# Patient Record
Sex: FEMALE | Race: WHITE | Employment: OTHER | URBAN - METROPOLITAN AREA
[De-identification: names, ages, dates, MRNs, and addresses within clinical notes are randomized per-mention and may not be internally consistent; named-entity substitution may affect disease eponyms.]

---

## 2017-02-24 ENCOUNTER — TELEPHONE (OUTPATIENT)
Dept: INTERNAL MEDICINE CLINIC | Age: 72
End: 2017-02-24

## 2017-02-24 NOTE — TELEPHONE ENCOUNTER
PSR called pt to f/u about pts insurance referral request to Dr. Maurice Painter for her appt on 3/2/17. PSR informed pt that April has pended the referral request, and we are awaiting April's response for the authorization. Pt verbalized understanding. Pt is also asking for a call from the nurse. Pt wants to know if she has had all of her pneumonia shots, and wants to know if she has been given the PR13 shot? Pt states she can best be reached on her cell, [phone].

## 2017-02-27 NOTE — TELEPHONE ENCOUNTER
I called and spoke with patient. Pt was informed that according to our records, it does not look like she has had her Prevnar 13. She verbalized understanding.

## 2017-02-28 ENCOUNTER — HOSPITAL ENCOUNTER (OUTPATIENT)
Dept: MAMMOGRAPHY | Age: 72
Discharge: HOME | End: 2017-02-28
Attending: SPECIALIST
Payer: MEDICARE

## 2017-02-28 DIAGNOSIS — Z12.31 ENCOUNTER FOR MAMMOGRAM TO ESTABLISH BASELINE MAMMOGRAM: ICD-10-CM

## 2017-02-28 PROCEDURE — 77067 SCR MAMMO BI INCL CAD: CPT

## 2017-03-06 RX ORDER — POTASSIUM CHLORIDE 750 MG/1
CAPSULE, EXTENDED RELEASE ORAL
Qty: 180 CAP | Refills: 3 | Status: SHIPPED | OUTPATIENT
Start: 2017-03-06 | End: 2017-04-11 | Stop reason: SDUPTHER

## 2017-03-06 RX ORDER — FUROSEMIDE 80 MG/1
TABLET ORAL
Qty: 90 TAB | Refills: 3 | Status: SHIPPED | OUTPATIENT
Start: 2017-03-06 | End: 2017-04-11 | Stop reason: SDUPTHER

## 2017-03-06 RX ORDER — AMLODIPINE BESYLATE 10 MG/1
TABLET ORAL
Qty: 90 TAB | Refills: 3 | Status: SHIPPED | OUTPATIENT
Start: 2017-03-06 | End: 2017-04-11 | Stop reason: SDUPTHER

## 2017-03-06 RX ORDER — OMEGA-3-ACID ETHYL ESTERS 1 G/1
CAPSULE, LIQUID FILLED ORAL
Qty: 360 CAP | Refills: 3 | Status: SHIPPED | OUTPATIENT
Start: 2017-03-06 | End: 2017-04-11 | Stop reason: SDUPTHER

## 2017-03-06 RX ORDER — EZETIMIBE 10 MG
TABLET ORAL
Qty: 90 TAB | Refills: 3 | Status: SHIPPED | OUTPATIENT
Start: 2017-03-06 | End: 2017-04-11 | Stop reason: SDUPTHER

## 2017-04-10 RX ORDER — POLYETHYLENE GLYCOL 3350 17 G/17G
POWDER, FOR SOLUTION ORAL
Qty: 1581 G | Refills: 3 | Status: SHIPPED | OUTPATIENT
Start: 2017-04-10 | End: 2018-09-14 | Stop reason: SDUPTHER

## 2017-04-11 ENCOUNTER — OFFICE VISIT (OUTPATIENT)
Dept: INTERNAL MEDICINE CLINIC | Age: 72
End: 2017-04-11

## 2017-04-11 ENCOUNTER — DOCUMENTATION ONLY (OUTPATIENT)
Dept: INTERNAL MEDICINE CLINIC | Age: 72
End: 2017-04-11

## 2017-04-11 VITALS
TEMPERATURE: 98 F | OXYGEN SATURATION: 97 % | HEART RATE: 69 BPM | WEIGHT: 219 LBS | DIASTOLIC BLOOD PRESSURE: 70 MMHG | SYSTOLIC BLOOD PRESSURE: 129 MMHG | HEIGHT: 63 IN | RESPIRATION RATE: 16 BRPM | BODY MASS INDEX: 38.8 KG/M2

## 2017-04-11 DIAGNOSIS — Z00.00 ROUTINE GENERAL MEDICAL EXAMINATION AT A HEALTH CARE FACILITY: ICD-10-CM

## 2017-04-11 DIAGNOSIS — Z13.31 SCREENING FOR DEPRESSION: ICD-10-CM

## 2017-04-11 DIAGNOSIS — Z13.39 SCREENING FOR ALCOHOLISM: ICD-10-CM

## 2017-04-11 DIAGNOSIS — R09.81 NASAL SINUS CONGESTION: Primary | ICD-10-CM

## 2017-04-11 DIAGNOSIS — R04.0 MILD EPISTAXIS: ICD-10-CM

## 2017-04-11 RX ORDER — RANITIDINE 300 MG/1
TABLET ORAL
Qty: 30 TAB | Refills: 11 | Status: SHIPPED | OUTPATIENT
Start: 2017-04-11 | End: 2018-04-13 | Stop reason: SDUPTHER

## 2017-04-11 RX ORDER — OMEGA-3-ACID ETHYL ESTERS 1 G/1
CAPSULE, LIQUID FILLED ORAL
Qty: 360 CAP | Refills: 3 | Status: SHIPPED | OUTPATIENT
Start: 2017-04-11 | End: 2018-01-17 | Stop reason: SDUPTHER

## 2017-04-11 RX ORDER — FUROSEMIDE 80 MG/1
TABLET ORAL
Qty: 90 TAB | Refills: 3 | Status: SHIPPED | OUTPATIENT
Start: 2017-04-11 | End: 2018-01-16 | Stop reason: SDUPTHER

## 2017-04-11 RX ORDER — FLUTICASONE PROPIONATE 50 MCG
2 SPRAY, SUSPENSION (ML) NASAL DAILY
Qty: 3 BOTTLE | Refills: 3 | Status: SHIPPED | OUTPATIENT
Start: 2017-04-11 | End: 2018-03-13 | Stop reason: SDUPTHER

## 2017-04-11 RX ORDER — COLCHICINE 0.6 MG/1
TABLET ORAL
Qty: 90 TAB | Refills: 3 | Status: SHIPPED | OUTPATIENT
Start: 2017-04-11 | End: 2017-11-27

## 2017-04-11 RX ORDER — LORATADINE 10 MG/1
10 TABLET ORAL DAILY
Qty: 30 TAB | Refills: 5 | Status: SHIPPED | OUTPATIENT
Start: 2017-04-11 | End: 2018-05-09 | Stop reason: SDUPTHER

## 2017-04-11 RX ORDER — LANCETS 28 GAUGE
EACH MISCELLANEOUS
Refills: 3 | COMMUNITY
Start: 2017-02-08 | End: 2020-03-13 | Stop reason: SDUPTHER

## 2017-04-11 RX ORDER — NEOMYCIN SULFATE, POLYMYXIN B SULFATE AND DEXAMETHASONE 3.5; 10000; 1 MG/ML; [USP'U]/ML; MG/ML
SUSPENSION/ DROPS OPHTHALMIC
Refills: 0 | COMMUNITY
Start: 2017-04-07 | End: 2018-07-10

## 2017-04-11 NOTE — MR AVS SNAPSHOT
Visit Information Date & Time Provider Department Dept. Phone Encounter #  
 4/11/2017 12:00 PM Heidi Joaquin, [address] [phone] [id number] Your Appointments 4/13/2017 10:30 AM  
ROUTINE CARE with MD Benny [facility] Appt Note: 4m f.u  
 [address] [facility] [address] [place] [address]  
[address] [facility]  
  
    
 5/15/2017 11:00 AM  
ROUTINE CARE with Emeterio Almanza, [address] [facility]) Appt Note: 6 month follow up  
 [address] [facility] [address] [id number]  
[address] [place] [address] [address] [place] [address] Upcoming Health Maintenance Date Due Hepatitis C Screening 1945 DTaP/Tdap/Td series (1 - Tdap) 4/1/1966 MEDICARE YEARLY EXAM 7/26/2015 Pneumococcal 65+ Low/Medium Risk (2 of 2 - PPSV23) 10/5/2015 GLAUCOMA SCREENING Q2Y 1/28/2016 BREAST CANCER SCRN MAMMOGRAM 2/28/2019 COLONOSCOPY 2/11/2020 Allergies as of 4/11/2017  Review Complete On: 4/11/2017 By: Kalani Mckinley III, DO Severity Noted Reaction Type Reactions Celebrex [Celecoxib]  10/05/2009    Hives Pcn [Penicillins]  10/05/2009    Unknown (comments) Can't remember Sulfa (Sulfonamide Antibiotics)  10/05/2009    Hives Current Immunizations  Reviewed on 1/12/2015 Name Date Influenza Vaccine 10/1/2013 Pneumococcal Vaccine (Unspecified Type) 10/5/2010 Not reviewed this visit You Were Diagnosed With   
  
 Codes Comments Nasal sinus congestion    -  Primary ICD-10-CM: R09.81 ICD-9-CM: 478.19 Routine general medical examination at a health care facility     ICD-10-CM: Z00.00 ICD-9-CM: V70.0 Screening for alcoholism     ICD-10-CM: Z13.89 ICD-9-CM: V79.1  Screening for depression     ICD-10-CM: Z13.89 
 ICD-9-CM: V79.0 Mild epistaxis     ICD-10-CM: R04.0 ICD-9-CM: 772. 7 Vitals BP Pulse Temp Resp Height(growth percentile) Weight(growth percentile) 129/70 (BP 1 Location: Right arm, BP Patient Position: Sitting) 69 98 °F (36.7 °C) (Oral) 16 5' 3\" (1.6 m) 219 lb (99.3 kg) SpO2 BMI OB Status Smoking Status 97% 38.79 kg/m2 Hysterectomy Never Smoker Vitals History BMI and BSA Data Body Mass Index Body Surface Area 38.79 kg/m 2 2.1 m 2 Preferred Pharmacy Pharmacy Name Phone [facility]. [phone] Your Updated Medication List  
  
   
This list is accurate as of: 4/11/17 12:08 PM.  Always use your most recent med list. amLODIPine 10 mg tablet Commonly known as:  Norah Arnt Take 1 Tab by mouth daily. aspirin 81 mg chewable tablet Take 81 mg by mouth daily. atorvastatin 20 mg tablet Commonly known as:  LIPITOR  
TAKE 1 TABLET DAILY CLARITIN 10 mg tablet Generic drug:  loratadine Take 10 mg by mouth daily. colchicine 0.6 mg tablet TAKE 1 TABLET EVERY DAY  
  
 ezetimibe 10 mg tablet Commonly known as:  Jesi Eaglee TAKE 1 TABLET EVERY DAY  
  
 fluocinoNIDE 0.05 % topical cream  
Commonly known as:  LIDEX Apply  to affected area two (2) times a day. fluticasone 50 mcg/actuation nasal spray Commonly known as:  Erica Rad 2 Sprays by Both Nostrils route daily. Administer to right and left nostril. furosemide 80 mg tablet Commonly known as:  LASIX TAKE 1 TABLET EVERY DAY  
  
 glucose blood VI test strips strip Commonly known as:  ACCU-CHEK MILA PLUS TEST STRP Check blood sugar one to two times daily  
  
 indomethacin 25 mg capsule Commonly known as:  INDOCIN  
TAKE 1 CAPSULE BY MOUTH THREE TIMES A DAY AS NEEDED FOR PAIN. * Lancets Misc Commonly known as:  ACCU-CHEK SOFTCLIX LANCETS  
 Check blood sugar one to two times daily * FREESTYLE LANCETS 28 gauge Misc Generic drug:  lancets  
  
 levothyroxine 175 mcg tablet Commonly known as:  SYNTHROID  
TAKE ONE DAILY BY MOUTH. TAKE AN EXTRA 1/2 PILL ON SUNDAYS. (7.5 TABLETS/WEEK  MCG) lidocaine 5 % Commonly known as:  Natividad Hammer 1 patch by TransDERmal route every twenty-four (24) hours. Apply patch to the affected area for 12 hours a day and remove for 12 hours a day. losartan 25 mg tablet Commonly known as:  COZAAR Take 1 Tab by mouth daily. neomycin-polymyxin-dexamethasone 3.5mg/mL-10,000 unit/mL-0.1 % ophthalmic suspension Commonly known as:  DEXACIDIN, MAXITROL  
instill 1 drop into right eye four times a day  
  
 omega-3 acid ethyl esters 1 gram capsule Commonly known as:  Delmy Exon TAKE 2 CAPSULES TWICE DAILY WITH MEALS. ONE-A-DAY 50 PLUS PO Take  by mouth. OTHER Accu-Chek Lady Plus Kit  Check blood sugar one to two times daily [address] Take  by mouth.  
  
 polyethylene glycol 17 gram/dose powder Commonly known as:  [employer] MIX 1 CAPFUL (17 GM) IN LIQUID AND DRINK DAILY. potassium chloride SA 10 mEq capsule Commonly known as:  MICRO-K  
TAKE 1 CAPSULE TWICE DAILY  
  
 raNITIdine 300 mg tablet Commonly known as:  ZANTAC TAKE 1 TABLET BY MOUTH EVERY DAY. scopolamine 1.5 mg (1 mg over 3 days) Pt3d Commonly known as:  TRANSDERM-SCOP  
1 Patch by TransDERmal route every seventy-two (72) hours. traMADol 50 mg tablet Commonly known as:  ULTRAM  
Take 1 tablet by mouth every six (6) hours as needed for Pain. VITAMIN D3 2,000 unit Tab Generic drug:  cholecalciferol (vitamin D3) Take  by mouth.  
  
 vitamin E 400 unit capsule Commonly known as:  [address] Take 800 Units by mouth daily. * Notice:   This list has 2 medication(s) that are the same as other medications prescribed for you. Read the directions carefully, and ask your doctor or other care provider to review them with you. Prescriptions Sent to Pharmacy Refills  
 fluticasone (FLONASE) 50 mcg/actuation nasal spray 3 Si Sprays by Both Nostrils route daily. Administer to right and left nostril. Class: Normal  
 Pharmacy: [address]  #: [phone] Route: Both Nostrils We Performed the Following [place] [id number] [[id number] [facility] [place]] Patient Instructions Health Maintenance Due Topic Date Due  
 Hepatitis C Screening  1945  
 DTaP/Tdap/Td series (1 - Tdap) 1966  MEDICARE YEARLY EXAM  2015  Pneumococcal 65+ Low/Medium Risk (2 of 2 - PPSV23) 10/05/2015  GLAUCOMA SCREENING Q2Y  2016 Medicare Part B Preventive Services Limitations Recommendation/Date completed if known Scheduled/ Next Due Bone Mass Measurement 
(age 72 & older, biennial) Requires diagnosis related to osteoporosis or estrogen deficiency. Biennial benefit unless patient has history of long-term glucocorticoid tx or baseline is needed because initial test was by other method Completed:  Recommended every 2 years DUE: Recommended every 2 years Cardiovascular Screening Blood Tests (every 5 years) Total cholesterol, HDL, Triglycerides Order as a panel if possible Completed:  Recommended annually DUE: Every 6-12 months as recommended by your provider. Colorectal Cancer Screening 
-Fecal occult blood test (annual) -Flexible sigmoidoscopy (5y) 
-Screening colonoscopy (10y) -Barium Enema  Completed: 2/15 Recommended every (5) years DUE: 2/20 Counseling to Prevent Tobacco Use (up to 8 sessions per year) - Counseling greater than 3 and up to 10 minutes - Counseling greater than 10 minutes Patients must be asymptomatic of tobacco-related conditions to receive as preventive service Diabetes Screening Tests (at least every 3 years, Medicare covers annually or at 6-month intervals for prediabetic patients) Fasting blood sugar (FBS) or glucose tolerance test (GTT) Patient must be diagnosed with one of the following: 
-Hypertension, Dyslipidemia, obesity, previous impaired FBS or GTT 
Or any two of the following: overweight, FH of diabetes, age ? 72, history of gestational diabetes, birth of baby weighing more than 9 pounds Recommended annually DUE:   
Diabetes Self-Management Training (DSMT) (no USPSTF recommendation) Requires referral by treating physician for patient with diabetes or renal disease. 10 hours of initial DSMT session of no less than 30 minutes each in a continuous 12-month period. 2 hours of follow-up DSMT in subsequent years. Glaucoma Screening (no USPSTF recommendation) Diabetes mellitus, family history, , age 48 or over,  American, age 72 or over Completed: 1/14 Recommended annually DUE: Now Human Immunodeficiency Virus (HIV) Screening (annually for increased risk patients) HIV-1 and HIV-2 by EIA, EARL, rapid antibody test, or oral mucosa transudate Patient must be at increased risk for HIV infection per USPSTF guidelines or pregnant. Tests covered annually for patients at increased risk. Pregnant patients may receive up to 3 test during pregnancy. Medical Nutrition Therapy (MNT) (for diabetes or renal disease not recommended schedule) Requires referral by treating physician for patient with diabetes or renal disease. Can be provided in same year as diabetes self-management training (DSMT), and CMS recommends medical nutrition therapy take place after DSMT. Up to 3 hours for initial year and 2 hours in subsequent years. Prostate Cancer Screening (annually up to age 76) - Digital rectal exam (HEIDI) - Prostate specific antigen (PSA) Annually (age 48 or over), HEIDI not paid separately when covered E/M service is provided on same date Completed: n/A Recommended annually DUE: N/A Seasonal Influenza Vaccination (annually)  Completed: 9/27/16 Recommended annually DUE every Fall Pneumococcal Vaccination (once after 65)  Completed: 2010 Hepatitis B Vaccinations (if medium/high risk) Medium/high risk factors:  End-stage renal disease, Hemophiliacs who received Factor VIII or IX concentrates, Clients of institutions for the mentally retarded, Persons who live in the same house as a HepB virus carrier, Homosexual men, Illicit injectable drug abusers. Screening Mammography (biennial age 54-69)? Annually (age 36 or over) Completed: 2/2015 Recommended annually DUE: Now  
Screening Pap Tests and Pelvic Examination (up to age 79 and after 79 if unknown history or abnormal study last 10 years) Every 24 months except high risk Completed:  
 
 
 
Recommended every 2 years DUE:  
Ultrasound Screening for Abdominal Aortic Aneurysm (AAA) (once) Patient must be referred through IPPE and not have had a screening for abdominal aortic aneurysm before under Medicare. Limited to patients who meet one of the following criteria: 
- Men who are 73-68 years old and have smoked more than 100 cigarettes in their lifetime. 
-Anyone with a FH of AAA 
-Anyone recommended for screening by USPSTF Not covered by Medicare as preventive. Thanks for coming in today. It was nice to spend some time with you. If you have any questions about your visit today, please call [phone] and ask for Katya Wilde. Medicare Wellness Visit, Female The best way to live healthy is to have a healthy lifestyle by eating a well-balanced diet, exercising regularly, limiting alcohol and stopping smoking.  
 
Regular physical exams and screening tests are another way to keep healthy. Preventive exams provided by your health care provider can find health problems before they become diseases or illnesses. Preventive services including immunizations, screening tests, monitoring and exams can help you take care of your own health. All people over age 72 should have a pneumovax  and and a prevnar shot to prevent pneumonia. These are once in a lifetime unless you and your provider decide differently. All people over 65 should have a yearly flu shot and a tetanus vaccine every 10 years. A bone mass density to screen for osteoporosis or thinning of the bones should be done every 2 years after 65. Screening for diabetes mellitus with a blood sugar test should be done every year. Glaucoma is a disease of the eye due to increased ocular pressure that can lead to blindness and it should be done every year by an eye professional. 
 
Cardiovascular screening tests that check for elevated lipids (fatty part of blood) which can lead to heart disease and strokes should be done every 5 years. Colorectal screening that evaluates for blood or polyps in your colon should be done yearly as a stool test or every five years as a flexible sigmoidoscope or every 10 years as a colonoscopy up to age 76. Breast cancer screening with a mammogram is recommended biennially  for women age 54-69. Screening for cervical cancer with a pap smear and pelvic exam is recommended for women after age 72 years every 2 years up to age 79 or when the provider and patient decide to stop. If there is a history of cervical abnormalities or other increased risk for cancer then the test is recommended yearly. Hepatitis C screening is also recommended for anyone born between 80 through [address]. A shingles vaccine is also recommended once in a lifetime after age 61. Your Medicare Wellness Exam is recommended annually. Here is a list of your current Health Maintenance items with a due date: Health Maintenance Due Topic Date Due  
 Hepatitis C Test  1945  
 DTaP/Tdap/Td  (1 - Tdap) 04/01/1966 [address] [place] [address] Annual Well Visit  07/26/2015  Pneumococcal Vaccine (2 of 2 - PPSV23) 10/05/2015  Glaucoma Screening   01/28/2016 Advance Directives: After Your Visit Your Care Instructions An advance directive is a legal way to state your wishes at the end of your life. It tells your family and your doctor what to do if you can no longer say what you want. There are two main types of advance directives. You can change them any time that your wishes change. · A living will tells your family and your doctor your wishes about life support and other treatment. · A medical power of  lets you name a person to make treatment decisions for you when you can't speak for yourself. This person is called a health care agent. If you do not have an advance directive, decisions about your medical care may be made by a doctor or a  who doesn't know you. It may help to think of an advance directive as a gift to the people who care for you. If you have one, they won't have to make tough decisions by themselves. Follow-up care is a key part of your treatment and safety. Be sure to make and go to all appointments, and call your doctor if you are having problems. It's also a good idea to know your test results and keep a list of the medicines you take. How can you care for yourself at home? · Discuss your wishes with your loved ones and your doctor. This way, there are no surprises. · Many states have a unique form. Or you might use a universal form that has been approved by many states. This kind of form can sometimes be completed and stored online. Your electronic copy will then be available wherever you have a connection to the Internet. In most cases, doctors will respect your wishes even if you have a form from a different state. · You don't need a  to do an advance directive. But you may want to get legal advice. · Think about these questions when you prepare an advance directive: ¨ Who do you want to make decisions about your medical care if you are not able to? Many people choose a family member, close friend, or doctor. ¨ Do you know enough about life support methods that might be used? If not, talk to your doctor so you understand. ¨ What are you most afraid of that might happen? You might be afraid of having pain, losing your independence, or being kept alive by machines. ¨ Where would you prefer to die? Choices include your home, a hospital, or a nursing home. ¨ Would you like to have information about hospice care to support you and your family? ¨ Do you want to donate organs when you die? ¨ Do you want certain Rastafarian practices performed before you die? If so, put your wishes in the advance directive. · Read your advance directive every year, and make changes as needed. When should you call for help? Be sure to contact your doctor if you have any questions. Where can you learn more? Go to [employer].be Enter R264 in the search box to learn more about \"Advance Directives: After Your Visit. \"  
© 5085-0044 [employer]wise, Incorporated. Care instructions adapted under license by [employer] (which disclaims liability or warranty for this information). This care instruction is for use with your licensed healthcare professional. If you have questions about a medical condition or this instruction, always ask your healthcare professional. [place] [address] any warranty or liability for your use of this information. Content Version: 34.1.534264; Current as of: February 20, 2015 Sinusitis: Care Instructions Your Care Instructions Sinusitis is an infection of the lining of the sinus cavities in your head. Sinusitis often follows a cold. It causes pain and pressure in your head and face. In most cases, sinusitis gets better on its own in 1 to 2 weeks. But some mild symptoms may last for several weeks. Sometimes antibiotics are needed. Follow-up care is a key part of your treatment and safety. Be sure to make and go to all appointments, and call your doctor if you are having problems. It's also a good idea to know your test results and keep a list of the medicines you take. How can you care for yourself at home? · Take an over-the-counter pain medicine, such as acetaminophen (Tylenol), ibuprofen (Advil, Motrin), or naproxen (Aleve). Read and follow all instructions on the label. · If the doctor prescribed antibiotics, take them as directed. Do not stop taking them just because you feel better. You need to take the full course of antibiotics. · Be careful when taking over-the-counter cold or flu medicines and Tylenol at the same time. Many of these medicines have acetaminophen, which is Tylenol. Read the labels to make sure that you are not taking more than the recommended dose. Too much acetaminophen (Tylenol) can be harmful. · Breathe warm, moist air from a steamy shower, a hot bath, or a sink filled with hot water. Avoid cold, dry air. Using a humidifier in your home may help. Follow the directions for cleaning the machine. · Use saline (saltwater) nasal washes to help keep your nasal passages open and wash out mucus and bacteria. You can buy saline nose drops at a grocery store or drugstore. Or you can make your own at home by adding 1 teaspoon of salt and 1 teaspoon of baking soda to 2 cups of distilled water. If you make your own, fill a bulb syringe with the solution, insert the tip into your nostril, and squeeze gently. Danelle Jf your nose. · Put a hot, wet towel or a warm gel pack on your face 3 or 4 times a day for 5 to 10 minutes each time. · Try a decongestant nasal spray like oxymetazoline (Afrin). Do not use it for more than 3 days in a row. Using it for more than 3 days can make your congestion worse. When should you call for help? Call your doctor now or seek immediate medical care if: 
· You have new or worse swelling or redness in your face or around your eyes. · You have a new or higher fever. Watch closely for changes in your health, and be sure to contact your doctor if: 
· You have new or worse facial pain. · The mucus from your nose becomes thicker (like pus) or has new blood in it. · You are not getting better as expected. Where can you learn more? Go to http://mady-marcelina.info/. Enter G217 in the search box to learn more about \"Sinusitis: Care Instructions. \" Current as of: July 29, 2016 Content Version: 11.2 © 0260-4342 [employer]. Care instructions adapted under license by [employer] (which disclaims liability or warranty for this information). If you have questions about a medical condition or this instruction, always ask your healthcare professional. [address] any warranty or liability for your use of this information. Add small amount of vaseline into both nostrils to help keep them moist. 
 
  
Introducing [facility]! Aminta Cochran introduces [employer] patient portal. Now you can access parts of your medical record, email your doctor's office, and request medication refills online. 1. In your internet browser, go to [email or web address] 2. Click on the First Time User? Click Here link in the Sign In box. You will see the New Member Sign Up page. 3. Enter your [employer]ios Access Code exactly as it appears below. You will not need to use this code after youve completed the sign-up process. If you do not sign up before the expiration date, you must request a new code. · [employer]ios Access Code: [email or web address] Expires: 5/24/2017  2:37 PM 
 
4. Enter the last four digits of your Social Security Number (xxxx) and Date of Birth (mm/dd/yyyy) as indicated and click Submit. You will be taken to the next sign-up page. 5. Create a [employer] ID. This will be your [employer] login ID and cannot be changed, so think of one that is secure and easy to remember. 6. Create a [employer] password. You can change your password at any time. 7. Enter your Password Reset Question and Answer. This can be used at a later time if you forget your password. 8. Enter your e-mail address. You will receive e-mail notification when new information is available in [address] 9. Click Sign Up. You can now view and download portions of your medical record. 10. Click the Download Summary menu link to download a portable copy of your medical information. If you have questions, please visit the Frequently Asked Questions section of the [employer] website. Remember, [employer] is NOT to be used for urgent needs. For medical emergencies, dial 911. Now available from your iPhone and Android! Please provide this summary of care documentation to your next provider. Your primary care clinician is listed as Valorie MCFARLANE. If you have any questions after today's visit, please call [phone].

## 2017-04-11 NOTE — PROGRESS NOTES
This is a Subsequent Medicare Annual Wellness Visit providing Personalized Prevention Plan Services (PPPS) (Performed 12 months after initial AWV and PPPS )    I have reviewed the patient's medical history in detail and updated the computerized patient record. History     Past Medical History:   Diagnosis Date    Arthritis     KNEE,gout    Endocrine disease     HYPOTHYROIDISM    GERD (gastroesophageal reflux disease)     Hypertension     Hypoglycemia     \"my body produces too much insulin\"    Liver disease     BRADY    Nausea & vomiting     when had gallbladder out    Osteoporosis     Other ill-defined conditions     spinal stenosis    Other ill-defined conditions     BBB    Other ill-defined conditions     HIGH CHOLESTEROL    Other ill-defined conditions     edema legs    Thyroid disease       Past Surgical History:   Procedure Laterality Date    ABDOMEN SURGERY PROC UNLISTED      liver biopsy--BRADY and fibrosis    COLONOSCOPY,REMV LESN,SNARE  2/11/2015         COLORECTAL SCRN; HI RISK IND  2/11/2015         HX CHOLECYSTECTOMY      HX HYSTERECTOMY      HX ORTHOPAEDIC      left leg surgery - plates, screws, wires, pins in place    HX UROLOGICAL      PESSURY    UPPER GI ENDOSCOPY,BIOPSY  11/17/2015          Current Outpatient Prescriptions   Medication Sig Dispense Refill    neomycin-polymyxin-dexamethasone (MAXITROL) ophthalmic suspension instill 1 drop into right eye four times a day  0    FREESTYLE LANCETS 28 gauge misc   3    fluticasone (FLONASE) 50 mcg/actuation nasal spray 2 Sprays by Both Nostrils route daily. Administer to right and left nostril. 3 Bottle 3    polyethylene glycol (MIRALAX) 17 gram/dose powder MIX 1 CAPFUL (17 GM) IN LIQUID AND DRINK DAILY. 1581 g 3    omega-3 acid ethyl esters (LOVAZA) 1 gram capsule TAKE 2 CAPSULES TWICE DAILY WITH MEALS. 360 Cap 3    losartan (COZAAR) 25 mg tablet Take 1 Tab by mouth daily.  90 Tab 3    colchicine 0.6 mg tablet TAKE 1 TABLET EVERY DAY 90 Tab 3    indomethacin (INDOCIN) 25 mg capsule TAKE 1 CAPSULE BY MOUTH THREE TIMES A DAY AS NEEDED FOR PAIN. 30 Cap 6    levothyroxine (SYNTHROID) 175 mcg tablet TAKE ONE DAILY BY MOUTH. TAKE AN EXTRA 1/2 PILL ON SUNDAYS. (7.5 TABLETS/WEEK  MCG) 32 Tab 10    ranitidine (ZANTAC) 300 mg tablet TAKE 1 TABLET BY MOUTH EVERY DAY. 30 Tab 11    furosemide (LASIX) 80 mg tablet TAKE 1 TABLET EVERY DAY 90 Tab 3    atorvastatin (LIPITOR) 20 mg tablet TAKE 1 TABLET DAILY 90 Tab 3    ezetimibe (ZETIA) 10 mg tablet TAKE 1 TABLET EVERY DAY 90 Tab 3    potassium chloride SA (MICRO-K) 10 mEq capsule TAKE 1 CAPSULE TWICE DAILY 180 Cap 3    amLODIPine (NORVASC) 10 mg tablet Take 1 Tab by mouth daily. 90 Tab 3    Lancets (ACCU-CHEK SOFTCLIX LANCETS) misc Check blood sugar one to two times daily 3 Package 3    L GASSERI/B BIFIDUM/B LONGUM ([place] PO) Take  by mouth.  scopolamine (TRANSDERM-SCOP) 1.5 mg 1 Patch by TransDERmal route every seventy-two (72) hours. 4 Patch 1    vitamin E (AQUA GEMS) 400 unit capsule Take 800 Units by mouth daily.  loratadine (CLARITIN) 10 mg tablet Take 10 mg by mouth daily.  lidocaine (LIDODERM) 5 %(700 mg/patch) 1 patch by TransDERmal route every twenty-four (24) hours. Apply patch to the affected area for 12 hours a day and remove for 12 hours a day.  cholecalciferol, vitamin D3, (VITAMIN D3) 2,000 unit tab Take  by mouth.  traMADol (ULTRAM) 50 mg tablet Take 1 tablet by mouth every six (6) hours as needed for Pain. 12 tablet 0    OTHER Accu-Chek Lady Plus Kit  Check blood sugar one to two times daily 1 kit 3    glucose blood VI test strips (ACCU-CHEK LADY PLUS TEST STRP) strip Check blood sugar one to two times daily 3 Package 3    MULTIVITAMIN WITH MINERALS (ONE-A-DAY 50 PLUS PO) Take  by mouth.  aspirin 81 mg chewable tablet Take 81 mg by mouth daily.       fluocinoNIDE (LIDEX) 0.05 % topical cream Apply  to affected area two (2) times a day. 60 g 3     Allergies   Allergen Reactions    Celebrex [Celecoxib] Hives    Pcn [Penicillins] Unknown (comments)     Can't remember    Sulfa (Sulfonamide Antibiotics) Hives     Family History   Problem Relation Age of Onset    Diabetes Mother     Heart Disease Mother     Lung Disease Father      copd     Social History   Substance Use Topics    Smoking status: Never Smoker    Smokeless tobacco: Never Used    Alcohol use No     Patient Active Problem List   Diagnosis Code    Gout M10.9    Hypothyroidism E03.9    Osteoporosis M81.0    Anxiety F41.9    Leg edema R60.0    RSD lower limb G90.529    Fatty liver K76.0    Pure hypercholesterolemia E78.00    Colon polyp K63.5    Bifascicular block I45.2    HTN (hypertension) I10    Thrombocytopenia (HCC) D69.6    Prediabetes R73.03       Depression Risk Factor Screening:     PHQ 2 / 9, over the last two weeks 4/11/2017   Little interest or pleasure in doing things Not at all   Feeling down, depressed or hopeless Not at all   Total Score PHQ 2 0     Alcohol Risk Factor Screening: On any occasion during the past 3 months, have you had more than 3 drinks containing alcohol? No    Do you average more than 7 drinks per week? No      Functional Ability and Level of Safety:     Hearing Loss   none    Activities of Daily Living   Partial assistance.    Requires assistance with:    ADL Assessment 4/11/2017   Feeding yourself No Help Needed   Getting from bed to chair No Help Needed   Getting dressed No Help Needed   Bathing or showering No Help Needed   Walk across the room (includes cane/walker) No Help Needed   Using the telphone No Help Needed   Taking your medications No Help Needed   Preparing meals No Help Needed   Managing money (expenses/bills) No Help Needed   Moderately strenuous housework (laundry) Help Needed   Shopping for personal items (toiletries/medicines) No Help Needed   Shopping for groceries No Help Needed   Driving Help Needed   Climbing a flight of stairs Help Needed   Getting to places beyond walking distances Help Needed         Fall Risk     Fall Risk Assessment, last 12 mths 4/11/2017   Able to walk? Yes   Fall in past 12 months? No     Abuse Screen   Patient is not abused  Abuse Screening Questionnaire 4/11/2017   Do you ever feel afraid of your partner? N   Are you in a relationship with someone who physically or mentally threatens you? N   Is it safe for you to go home? Y       Review of Systems   Medicare Wellness Visit     Physical Examination     Evaluation of Cognitive Function:  Mood/affect:  neutral  Appearance: age appropriate and casually dressed  Family member/caregiver input: No family present during today's visit. No exam performed today, Medicare Wellness Visit. Patient Care Team:  Sam Lennon MD as PCP - Ian Grijalva RN as Nurse Sesar Giudo MD as Consulting Provider (Endocrinology)  Henry Mota MD (Obstetrics & Gynecology)  Angelica Lawrence MD (Ophthalmology)  Modesta Villasenor MD (Dermatology)  CATY Vu MD (Orthopedic Surgery)    Advice/Referrals/Counseling   Education and counseling provided:  Are appropriate based on today's review and evaluation  Pneumococcal Vaccine  Screening for glaucoma  Diabetes screening test      Assessment/Plan   - Patient provided AVS which includes Medicare Wellness Preventative Screening Table. - Patient to have [facility] fax over vaccination record to update HM.   - Recent eye exam completed on 9/16 at [facility]. Encouraged patient to have records faxed to the office to update HM.   - AMD; encouraged patient to complete. The patient states that she has information, but has not filled it out. Informed patient to provide us a copy when she decides. The patient verbalized understanding.        Attending note:  Agree with above  Thanks  Rudolph Amador,

## 2017-04-11 NOTE — PROGRESS NOTES
Karla Jacobo is a 67 y.o. female who presents for evaluation of few days worth of sinus congestion and sneezing. Had pink eye last week, much better after seeing her eye doctor and getting some drops. Has had some mild blood on kleenex when she has blown her nose. No f/c  No n/v/d  Minimal sinus pressure below eyes alongside nose.       ROS:  Constitutional: negative for fevers, chills, anorexia and weight loss  Eyes:   negative for visual disturbance and irritation  ENT:   negative for tinnitus,sore throat,nasal congestion,ear pain,hoarseness  Respiratory:  negative for cough, hemoptysis, dyspnea,wheezing  CV:   negative for chest pain, palpitations, lower extremity edema  GI:   negative for nausea, vomiting, diarrhea, abdominal pain,melena  Genitourinary: negative for frequency, dysuria and hematuria  Musculoskel: negative for myalgias, arthralgias, back pain, muscle weakness, joint pain  Neurological:  negative for headaches, dizziness, focal weakness, numbness  Psychiatric:     Negative for depression or anxiety      Past Medical History:   Diagnosis Date    Arthritis     KNEE,gout    Endocrine disease     HYPOTHYROIDISM    GERD (gastroesophageal reflux disease)     Hypertension     Hypoglycemia     \"my body produces too much insulin\"    Liver disease     BRADY    Nausea & vomiting     when had gallbladder out    Osteoporosis     Other ill-defined conditions     spinal stenosis    Other ill-defined conditions     BBB    Other ill-defined conditions     HIGH CHOLESTEROL    Other ill-defined conditions     edema legs    Thyroid disease        Past Surgical History:   Procedure Laterality Date    ABDOMEN SURGERY PROC UNLISTED      liver biopsy--BRADY and fibrosis    COLONOSCOPY,REMALECIA TINSLEY,SNARE  2/11/2015         COLORECTAL SCRN; HI RISK IND  2/11/2015         HX CHOLECYSTECTOMY      HX HYSTERECTOMY      HX ORTHOPAEDIC      left leg surgery - plates, screws, wires, pins in place    HX UROLOGICAL      PESSURY    UPPER GI ENDOSCOPY,BIOPSY  11/17/2015            Family History   Problem Relation Age of Onset    Diabetes Mother     Heart Disease Mother     Lung Disease Father      copd       Social History     Social History    Marital status: SINGLE     Spouse name: N/A    Number of children: N/A    Years of education: N/A     Occupational History    Not on file. Social History Main Topics    Smoking status: Never Smoker    Smokeless tobacco: Never Used    Alcohol use No    Drug use: No    Sexual activity: Not on file     Other Topics Concern    Not on file     Social History Narrative            Visit Vitals    /70 (BP 1 Location: Right arm, BP Patient Position: Sitting)    Pulse 69    Temp 98 °F (36.7 °C) (Oral)    Resp 16    Ht 5' 3\" (1.6 m)    Wt 219 lb (99.3 kg)    SpO2 97%    BMI 38.79 kg/m2       Physical Examination:   General - Well appearing female  HEENT - PERRL, TM no erythema/opacification, normal nasal turbinates, no oropharyngeal erythema or exudate, MMM  Neck - supple, no bruits, no thyroidomegaly, no lymphadenopathy  Pulm - clear to auscultation bilaterally  Cardio - RRR, normal S1 S2, no murmur  Abd - soft, nontender, no masses, no HSM  Extrem - no edema, +2 distal pulses  Neuro-  No focal deficits, CN intact     Assessment/Plan:    1. Mild sinus congestion--resume flonase. Don't think she needs any abx.  2.  Mild epistaxis--try small amount of vaseline to keep nares moist.    rtc prn, follows with dr Tanya Chan.         [facility], DO

## 2017-04-11 NOTE — PROGRESS NOTES
Reviewed record in preparation for visit and have obtained necessary documentation. Identified pt with two pt identifiers(name and ). Chief Complaint   Patient presents with    Annual Wellness Visit    Cough    Sneezing       Health Maintenance Due   Topic Date Due    Hepatitis C Screening  1945    DTaP/Tdap/Td series (1 - Tdap) 1966    MEDICARE YEARLY EXAM  2015    Pneumococcal 65+ Low/Medium Risk (2 of 2 - PPSV23) 10/05/2015    GLAUCOMA SCREENING Q2Y  2016       Ms. Jailyn Zavala has a reminder for a \"due or due soon\" health maintenance. I have asked that she discuss health maintenance topic(s) due with Her  primary care provider. Coordination of Care Questionnaire:  :     1) Have you been to an emergency room, urgent care clinic since your last visit? no   Hospitalized since your last visit? no             2) Have you seen or consulted any other health care providers outside of [facility] since your last visit? no  (Include any pap smears or colon screenings in this section.)      Patient is accompanied by self I have received verbal consent from Miriam Lopez to discuss any/all medical information while they are present in the room.

## 2017-04-11 NOTE — PATIENT INSTRUCTIONS
Health Maintenance Due   Topic Date Due    Hepatitis C Screening  1945    DTaP/Tdap/Td series (1 - Tdap) 04/01/1966    MEDICARE YEARLY EXAM  07/26/2015    Pneumococcal 65+ Low/Medium Risk (2 of 2 - PPSV23) 10/05/2015    GLAUCOMA SCREENING Q2Y  01/28/2016       Medicare Part B Preventive Services Limitations Recommendation/Date completed if known Scheduled/ Next Due   Bone Mass Measurement  (age 72 & older, biennial) Requires diagnosis related to osteoporosis or estrogen deficiency. Biennial benefit unless patient has history of long-term glucocorticoid tx or baseline is needed because initial test was by other method Completed: 12/16    Recommended every 2 years DUE: Recommended every 2 years   Cardiovascular Screening Blood Tests (every 5 years)  Total cholesterol, HDL, Triglycerides Order as a panel if possible Completed: 12/16      Recommended annually DUE: Every 6-12 months as recommended by your provider. Colorectal Cancer Screening  -Fecal occult blood test (annual)  -Flexible sigmoidoscopy (5y)  -Screening colonoscopy (10y)  -Barium Enema  Completed: 2/15        Recommended every (5) years DUE: 2/20   Counseling to Prevent Tobacco Use (up to 8 sessions per year)  - Counseling greater than 3 and up to 10 minutes  - Counseling greater than 10 minutes Patients must be asymptomatic of tobacco-related conditions to receive as preventive service     Diabetes Screening Tests (at least every 3 years, Medicare covers annually or at 6-month intervals for prediabetic patients)    Fasting blood sugar (FBS) or glucose tolerance test (GTT) Patient must be diagnosed with one of the following:  -Hypertension, Dyslipidemia, obesity, previous impaired FBS or GTT  Or any two of the following: overweight, FH of diabetes, age ? 72, history of gestational diabetes, birth of baby weighing more than 9 pounds         Recommended annually DUE:    Diabetes Self-Management Training (DSMT) (no USPSTF recommendation) Requires referral by treating physician for patient with diabetes or renal disease. 10 hours of initial DSMT session of no less than 30 minutes each in a continuous 12-month period. 2 hours of follow-up DSMT in subsequent years. Glaucoma Screening (no USPSTF recommendation) Diabetes mellitus, family history, , age 48 or over,  American, age 72 or over Completed: 9/16        Recommended annually DUE: Patient stated that she had a recent eye exam at [facility] on 9/16. Human Immunodeficiency Virus (HIV) Screening (annually for increased risk patients)  HIV-1 and HIV-2 by EIA, EARL, rapid antibody test, or oral mucosa transudate Patient must be at increased risk for HIV infection per USPSTF guidelines or pregnant. Tests covered annually for patients at increased risk. Pregnant patients may receive up to 3 test during pregnancy. Medical Nutrition Therapy (MNT) (for diabetes or renal disease not recommended schedule) Requires referral by treating physician for patient with diabetes or renal disease. Can be provided in same year as diabetes self-management training (DSMT), and CMS recommends medical nutrition therapy take place after DSMT. Up to 3 hours for initial year and 2 hours in subsequent years. Prostate Cancer Screening (annually up to age 76)  - Digital rectal exam (HEIDI)  - Prostate specific antigen (PSA) Annually (age 48 or over), HEIDI not paid separately when covered E/M service is provided on same date Completed: N/A        Recommended annually DUE: N/A   Seasonal Influenza Vaccination (annually)  Completed: 9/27/16       Recommended annually    DUE every Fall    Pneumococcal Vaccination (once after 65)  Completed: 2010   Patient will have [employer] over vaccination record to update HM.    Hepatitis B Vaccinations (if medium/high risk) Medium/high risk factors:  End-stage renal disease,  Hemophiliacs who received Factor VIII or IX concentrates, Clients of institutions for the mentally retarded, Persons who live in the same house as a HepB virus carrier, Homosexual men, Illicit injectable drug abusers. Screening Mammography (biennial age 54-69)? Annually (age 36 or over) Completed: 2/2017      Recommended annually DUE: Next Year   Screening Pap Tests and Pelvic Examination (up to age 79 and after 79 if unknown history or abnormal study last 10 years) Every 24 months except high risk Completed:         Recommended every 2 years DUE:   Ultrasound Screening for Abdominal Aortic Aneurysm (AAA) (once) Patient must be referred through IPPE and not have had a screening for abdominal aortic aneurysm before under Medicare. Limited to patients who meet one of the following criteria:  - Men who are 73-68 years old and have smoked more than 100 cigarettes in their lifetime.  -Anyone with a FH of AAA  -Anyone recommended for screening by USPSTF Not covered by Medicare as preventive. Thanks for coming in today. It was nice to spend some time with you. If you have any questions about your visit today, please call [phone] and ask for [facility]. Medicare Wellness Visit, Female    The best way to live healthy is to have a healthy lifestyle by eating a well-balanced diet, exercising regularly, limiting alcohol and stopping smoking. Regular physical exams and screening tests are another way to keep healthy. Preventive exams provided by your health care provider can find health problems before they become diseases or illnesses. Preventive services including immunizations, screening tests, monitoring and exams can help you take care of your own health. All people over age 72 should have a pneumovax  and and a prevnar shot to prevent pneumonia. These are once in a lifetime unless you and your provider decide differently. All people over 65 should have a yearly flu shot and a tetanus vaccine every 10 years.     A bone mass density to screen for osteoporosis or thinning of the bones should be done every 2 years after 72. Screening for diabetes mellitus with a blood sugar test should be done every year. Glaucoma is a disease of the eye due to increased ocular pressure that can lead to blindness and it should be done every year by an eye professional.    Cardiovascular screening tests that check for elevated lipids (fatty part of blood) which can lead to heart disease and strokes should be done every 5 years. Colorectal screening that evaluates for blood or polyps in your colon should be done yearly as a stool test or every five years as a flexible sigmoidoscope or every 10 years as a colonoscopy up to age 76. Breast cancer screening with a mammogram is recommended biennially  for women age 54-69. Screening for cervical cancer with a pap smear and pelvic exam is recommended for women after age 72 years every 2 years up to age 79 or when the provider and patient decide to stop. If there is a history of cervical abnormalities or other increased risk for cancer then the test is recommended yearly. Hepatitis C screening is also recommended for anyone born between 80 through [address]. A shingles vaccine is also recommended once in a lifetime after age 61. Your Medicare Wellness Exam is recommended annually. Here is a list of your current Health Maintenance items with a due date:  Health Maintenance Due   Topic Date Due    Hepatitis C Test  1945    DTaP/Tdap/Td  (1 - Tdap) 04/01/1966    Annual Well Visit  07/26/2015    Pneumococcal Vaccine (2 of 2 - PPSV23) 10/05/2015    Glaucoma Screening   01/28/2016     Advance Directives: After Your Visit  Your Care Instructions  An advance directive is a legal way to state your wishes at the end of your life. It tells your family and your doctor what to do if you can no longer say what you want. There are two main types of advance directives. You can change them any time that your wishes change.   · A living will tells your family and your doctor your wishes about life support and other treatment. · A medical power of  lets you name a person to make treatment decisions for you when you can't speak for yourself. This person is called a health care agent. If you do not have an advance directive, decisions about your medical care may be made by a doctor or a  who doesn't know you. It may help to think of an advance directive as a gift to the people who care for you. If you have one, they won't have to make tough decisions by themselves. Follow-up care is a key part of your treatment and safety. Be sure to make and go to all appointments, and call your doctor if you are having problems. It's also a good idea to know your test results and keep a list of the medicines you take. How can you care for yourself at home? · Discuss your wishes with your loved ones and your doctor. This way, there are no surprises. · Many states have a unique form. Or you might use a universal form that has been approved by many states. This kind of form can sometimes be completed and stored online. Your electronic copy will then be available wherever you have a connection to the Internet. In most cases, doctors will respect your wishes even if you have a form from a different state. · You don't need a  to do an advance directive. But you may want to get legal advice. · Think about these questions when you prepare an advance directive:  ¨ Who do you want to make decisions about your medical care if you are not able to? Many people choose a family member, close friend, or doctor. ¨ Do you know enough about life support methods that might be used? If not, talk to your doctor so you understand. ¨ What are you most afraid of that might happen? You might be afraid of having pain, losing your independence, or being kept alive by machines. ¨ Where would you prefer to die? Choices include your home, a hospital, or a nursing home.   ¨ Would you like to have information about hospice care to support you and your family? ¨ Do you want to donate organs when you die? ¨ Do you want certain Hindu practices performed before you die? If so, put your wishes in the advance directive. · Read your advance directive every year, and make changes as needed. When should you call for help? Be sure to contact your doctor if you have any questions. Where can you learn more? Go to [employer]  Enter R264 in the search box to learn more about \"Advance Directives: After Your Visit. \"   © 2911-8607 Healthwise, Incorporated. Care instructions adapted under license by [employer] (which disclaims liability or warranty for this information). This care instruction is for use with your licensed healthcare professional. If you have questions about a medical condition or this instruction, always ask your healthcare professional. [address] any warranty or liability for your use of this information. Content Version: 66.8.600913; Current as of: February 20, 2015                   Sinusitis: Care Instructions  Your Care Instructions    Sinusitis is an infection of the lining of the sinus cavities in your head. Sinusitis often follows a cold. It causes pain and pressure in your head and face. In most cases, sinusitis gets better on its own in 1 to 2 weeks. But some mild symptoms may last for several weeks. Sometimes antibiotics are needed. Follow-up care is a key part of your treatment and safety. Be sure to make and go to all appointments, and call your doctor if you are having problems. It's also a good idea to know your test results and keep a list of the medicines you take. How can you care for yourself at home? · Take an over-the-counter pain medicine, such as acetaminophen (Tylenol), ibuprofen (Advil, Motrin), or naproxen (Aleve). Read and follow all instructions on the label.   · If the doctor prescribed antibiotics, take them as directed. Do not stop taking them just because you feel better. You need to take the full course of antibiotics. · Be careful when taking over-the-counter cold or flu medicines and Tylenol at the same time. Many of these medicines have acetaminophen, which is Tylenol. Read the labels to make sure that you are not taking more than the recommended dose. Too much acetaminophen (Tylenol) can be harmful. · Breathe warm, moist air from a steamy shower, a hot bath, or a sink filled with hot water. Avoid cold, dry air. Using a humidifier in your home may help. Follow the directions for cleaning the machine. · Use saline (saltwater) nasal washes to help keep your nasal passages open and wash out mucus and bacteria. You can buy saline nose drops at a grocery store or drugstore. Or you can make your own at home by adding 1 teaspoon of salt and 1 teaspoon of baking soda to 2 cups of distilled water. If you make your own, fill a bulb syringe with the solution, insert the tip into your nostril, and squeeze gently. [employer] your nose. · Put a hot, wet towel or a warm gel pack on your face 3 or 4 times a day for 5 to 10 minutes each time. · Try a decongestant nasal spray like oxymetazoline (Afrin). Do not use it for more than 3 days in a row. Using it for more than 3 days can make your congestion worse. When should you call for help? Call your doctor now or seek immediate medical care if:  · You have new or worse swelling or redness in your face or around your eyes. · You have a new or higher fever. Watch closely for changes in your health, and be sure to contact your doctor if:  · You have new or worse facial pain. · The mucus from your nose becomes thicker (like pus) or has new blood in it. · You are not getting better as expected. Where can you learn more? Go to http://mady-marcelina.info/. Enter L235 in the search box to learn more about \"Sinusitis: Care Instructions. \"  Current as of: July 29, 2016  Content Version: 11.2  © 1199-6477 [employer]. Care instructions adapted under license by [employer] (which disclaims liability or warranty for this information). If you have questions about a medical condition or this instruction, always ask your healthcare professional. [address] any warranty or liability for your use of this information.   Add small amount of vaseline into both nostrils to help keep them moist.

## 2017-04-11 NOTE — TELEPHONE ENCOUNTER
Requested Prescriptions     Pending Prescriptions Disp Refills    furosemide (LASIX) 80 mg tablet 90 Tab 3     Sig: TAKE 1 TABLET EVERY DAY    loratadine (CLARITIN) 10 mg tablet 30 Tab 5     Sig: Take 1 Tab by mouth daily.  colchicine 0.6 mg tablet 90 Tab 3     Sig: TAKE 1 TABLET EVERY DAY    raNITIdine (ZANTAC) 300 mg tablet 30 Tab 11     Sig: TAKE 1 TABLET BY MOUTH EVERY DAY.  omega-3 acid ethyl esters (LOVAZA) 1 gram capsule 360 Cap 3     Sig: TAKE 2 CAPSULES TWICE DAILY WITH MEALS.      Last OV-04/11/17  Next OV-05/15/17  Med refilled-

## 2017-04-11 NOTE — ACP (ADVANCE CARE PLANNING)
Patient states conversation about Advanced Medical Directive has been started, but nothing written down yet. NN encouraged patient to complete Advanced Medical Directive paperwork & bring a copy to the office for scanning into the medical record. Patient verbalized understanding & agreement.

## 2017-04-13 ENCOUNTER — OFFICE VISIT (OUTPATIENT)
Dept: ENDOCRINOLOGY | Age: 72
End: 2017-04-13

## 2017-04-13 VITALS — HEIGHT: 63 IN | WEIGHT: 219 LBS | BODY MASS INDEX: 38.8 KG/M2

## 2017-04-13 DIAGNOSIS — K76.0 FATTY LIVER: ICD-10-CM

## 2017-04-13 DIAGNOSIS — I10 ESSENTIAL HYPERTENSION: ICD-10-CM

## 2017-04-13 DIAGNOSIS — M81.0 OSTEOPOROSIS, UNSPECIFIED OSTEOPOROSIS TYPE, UNSPECIFIED PATHOLOGICAL FRACTURE PRESENCE: ICD-10-CM

## 2017-04-13 DIAGNOSIS — E03.4 HYPOTHYROIDISM DUE TO ACQUIRED ATROPHY OF THYROID: Primary | ICD-10-CM

## 2017-04-13 DIAGNOSIS — E11.9 TYPE 2 DIABETES MELLITUS WITHOUT COMPLICATION, WITHOUT LONG-TERM CURRENT USE OF INSULIN (HCC): ICD-10-CM

## 2017-04-13 RX ORDER — METFORMIN HYDROCHLORIDE 500 MG/1
500 TABLET, EXTENDED RELEASE ORAL
Qty: 60 TAB | Refills: 10 | Status: SHIPPED | OUTPATIENT
Start: 2017-04-13 | End: 2018-01-26 | Stop reason: SDUPTHER

## 2017-04-13 RX ORDER — LEVOTHYROXINE SODIUM 175 UG/1
TABLET ORAL
Qty: 32 TAB | Refills: 10 | Status: SHIPPED | OUTPATIENT
Start: 2017-04-13 | End: 2019-02-05 | Stop reason: SDUPTHER

## 2017-04-13 NOTE — MR AVS SNAPSHOT
Visit Information Date & Time Provider Department Dept. Phone Encounter #  
 4/13/2017 10:30 AM Jimmy Abraham, [facility] [phone] Follow-up Instructions Return in about 4 months (around 8/13/2017). Your Appointments 5/15/2017 11:00 AM  
ROUTINE CARE with Oxana Robb, [address] [facility] [place]) Appt Note: 6 month follow up  
 [facility] [address] [id number]  
[address] [place] [address] [address] [place] [address] Upcoming Health Maintenance Date Due Hepatitis C Screening 1945 GLAUCOMA SCREENING Q2Y 1/28/2016 MEDICARE YEARLY EXAM 4/12/2018 BREAST CANCER SCRN MAMMOGRAM 2/28/2019 COLONOSCOPY 2/11/2020 DTaP/Tdap/Td series (2 - Td) 11/24/2025 Allergies as of 4/13/2017  Review Complete On: 4/13/2017 By: Jimmy Abraham MD  
  
 Severity Noted Reaction Type Reactions Celebrex [Celecoxib]  10/05/2009    Hives Pcn [Penicillins]  10/05/2009    Unknown (comments) Can't remember Sulfa (Sulfonamide Antibiotics)  10/05/2009    Hives Current Immunizations  Reviewed on 1/12/2015 Name Date Influenza Vaccine 10/1/2013 Pneumococcal Vaccine (Unspecified Type) 10/5/2010 Not reviewed this visit You Were Diagnosed With   
  
 Codes Comments Hypothyroidism due to acquired atrophy of thyroid    -  Primary ICD-10-CM: E03.4 ICD-9-CM: 244.8, 246.8 Essential hypertension     ICD-10-CM: I10 
ICD-9-CM: 401.9 Fatty liver     ICD-10-CM: K76.0 ICD-9-CM: 571.8 Osteoporosis, unspecified osteoporosis type, unspecified pathological fracture presence     ICD-10-CM: M81.0 ICD-9-CM: 733.00 Type 2 diabetes mellitus without complication, without long-term current use of insulin (HCC)     ICD-10-CM: E11.9 ICD-9-CM: 250.00 Vitals  Height(growth percentile) Weight(growth percentile) BMI OB Status Smoking Status 5' 3\" (1.6 m) 219 lb (99.3 kg) 38.79 kg/m2 Hysterectomy Never Smoker Vitals History BMI and BSA Data Body Mass Index Body Surface Area 38.79 kg/m 2 2.1 m 2 Preferred Pharmacy Pharmacy Name Phone [facility]. [phone] Your Updated Medication List  
  
   
This list is accurate as of: 4/13/17 11:29 AM.  Always use your most recent med list. amLODIPine 10 mg tablet Commonly known as:  Sameul Heather Take 1 Tab by mouth daily. aspirin 81 mg chewable tablet Take 81 mg by mouth daily. atorvastatin 20 mg tablet Commonly known as:  LIPITOR  
TAKE 1 TABLET DAILY  
  
 colchicine 0.6 mg tablet TAKE 1 TABLET EVERY DAY  
  
 ezetimibe 10 mg tablet Commonly known as:  Asha Blazer TAKE 1 TABLET EVERY DAY  
  
 fluocinoNIDE 0.05 % topical cream  
Commonly known as:  LIDEX Apply  to affected area two (2) times a day. fluticasone 50 mcg/actuation nasal spray Commonly known as:  Loulou Peralta 2 Sprays by Both Nostrils route daily. Administer to right and left nostril. furosemide 80 mg tablet Commonly known as:  LASIX TAKE 1 TABLET EVERY DAY  
  
 glucose blood VI test strips strip Commonly known as:  ACCU-CHEK MILA PLUS TEST STRP Check blood sugar one to two times daily  
  
 indomethacin 25 mg capsule Commonly known as:  INDOCIN  
TAKE 1 CAPSULE BY MOUTH THREE TIMES A DAY AS NEEDED FOR PAIN. * Lancets Misc Commonly known as:  ACCU-CHEK SOFTCLIX LANCETS Check blood sugar one to two times daily * FREESTYLE LANCETS 28 gauge Misc Generic drug:  lancets  
  
 levothyroxine 175 mcg tablet Commonly known as:  SYNTHROID  
TAKE ONE DAILY BY MOUTH. TAKE AN EXTRA 1/2 PILL ON SUNDAYS. (7.5 TABLETS/WEEK  MCG) lidocaine 5 % Commonly known as:  Angelique Peels 1 patch by TransDERmal route every twenty-four (24) hours.  Apply patch to the affected area for 12 hours a day and remove for 12 hours a day. loratadine 10 mg tablet Commonly known as:  Al Alvine Take 1 Tab by mouth daily. losartan 25 mg tablet Commonly known as:  COZAAR Take 1 Tab by mouth daily. metFORMIN  mg tablet Commonly known as:  GLUCOPHAGE XR Take 1 Tab by mouth two (2) times daily (after meals). For diabetes. neomycin-polymyxin-dexamethasone 3.5mg/mL-10,000 unit/mL-0.1 % ophthalmic suspension Commonly known as:  DEXACIDIN, MAXITROL  
instill 1 drop into right eye four times a day  
  
 omega-3 acid ethyl esters 1 gram capsule Commonly known as:  Pb Boss TAKE 2 CAPSULES TWICE DAILY WITH MEALS. ONE-A-DAY 50 PLUS PO Take  by mouth. OTHER Accu-Chek Lady Plus Kit  Check blood sugar one to two times daily [address] Take  by mouth.  
  
 polyethylene glycol 17 gram/dose powder Commonly known as:  [employer] MIX 1 CAPFUL (17 GM) IN LIQUID AND DRINK DAILY. potassium chloride SA 10 mEq capsule Commonly known as:  MICRO-K  
TAKE 1 CAPSULE TWICE DAILY  
  
 raNITIdine 300 mg tablet Commonly known as:  ZANTAC TAKE 1 TABLET BY MOUTH EVERY DAY. scopolamine 1.5 mg (1 mg over 3 days) Pt3d Commonly known as:  TRANSDERM-SCOP  
1 Patch by TransDERmal route every seventy-two (72) hours. traMADol 50 mg tablet Commonly known as:  ULTRAM  
Take 1 tablet by mouth every six (6) hours as needed for Pain. VITAMIN D3 2,000 unit Tab Generic drug:  cholecalciferol (vitamin D3) Take  by mouth.  
  
 vitamin E 400 unit capsule Commonly known as:  [address] Take 800 Units by mouth daily. * Notice: This list has 2 medication(s) that are the same as other medications prescribed for you. Read the directions carefully, and ask your doctor or other care provider to review them with you. Prescriptions Sent to Pharmacy Refills metFORMIN ER (GLUCOPHAGE XR) 500 mg tablet 10 Sig: Take 1 Tab by mouth two (2) times daily (after meals). For diabetes. Class: Normal  
 Pharmacy: [address] Ph #: [phone] Route: Oral  
  
We Performed the Following HEMOGLOBIN A1C WITH EAG [17975 CPT(R)] LIPID PANEL [03863 CPT(R)] METABOLIC PANEL, COMPREHENSIVE [62207 CPT(R)] TSH 3RD GENERATION [97754 CPT(R)] Follow-up Instructions Return in about 4 months (around 8/13/2017). Patient Instructions Osteoporosis: 
Continue vitamin D today - 1000 units Recommend Prolia. We'll look into this again for you. Hypothyroidism: 
Continue levothyroxine 175 mcg. Will repeat gain. Has been at goal.  
 
Pre-diabetes: 
- continue efforts to decrease/avoid intake of bread, pasta, rice, potatoes - Exercise: work up to 30 minutes 5 days weekly of walking, or any other activity that gets your heart rate up. Continue weight loss efforts ** Add metformin  mg after dinner. If you tolerate this well (no loose stools), then increase to 500 mg in AM after breakfast and 500 mg in evening after dinner. Introducing [facility] & HE[facility]! [employer] introduces [employer] patient portal. Now you can access parts of your medical record, email your doctor's office, and request medication refills online. 1. In your internet browser, go to [email or web address] 2. Click on the First Time User? Click Here link in the Sign In box. You will see the New Member Sign Up page. 3. Enter your [employer] Access Code exactly as it appears below. You will not need to use this code after youve completed the sign-up process. If you do not sign up before the expiration date, you must request a new code. · [employer] Access Code: [email or web address] Expires: 5/24/2017  2:37 PM 
 
4.  Enter the last four digits of your Social Security Number (xxxx) and Date of Birth (mm/dd/yyyy) as indicated and click Submit. You will be taken to the next sign-up page. 5. Create a [email or web address] ID. This will be your [employer] login ID and cannot be changed, so think of one that is secure and easy to remember. 6. Create a [employer] password. You can change your password at any time. 7. Enter your Password Reset Question and Answer. This can be used at a later time if you forget your password. 8. Enter your e-mail address. You will receive e-mail notification when new information is available in [address] 9. Click Sign Up. You can now view and download portions of your medical record. 10. Click the Download Summary menu link to download a portable copy of your medical information. If you have questions, please visit the Frequently Asked Questions section of the [email or web address] website. Remember, [email or web address] is NOT to be used for urgent needs. For medical emergencies, dial 911. Now available from your iPhone and Android! Please provide this summary of care documentation to your next provider. Your primary care clinician is listed as Javid MCFARLANE. If you have any questions after today's visit, please call [phone].

## 2017-04-13 NOTE — PROGRESS NOTES
History of Present Illness: Miriam Lopez is a 67 y.o. female presents for follow-up of hypothyroidism diabetes. She also has dyslipidemia, obesity, fatty liver (with bridging fibrosis 2008 - followed [facility]), gout and arthritis  Also has pre-diabetes. Diabetes - A1c 6.8 in December 2016. Generally trying to eat lower carbs. Osteoporosis:  Diagnosed in 2002. Says she was started on Fosamax initially and then this was changed to [facility] [place] in 2007. She took this until 2011. She then had about a 2 year drug holiday. After BMD in 7/2013, alendronate was resumed until early 2016. BMD in 8/2014 showed BMD to be stable. Remained at very high fx risk. 8/2016 study showed worsening of BMD although values at left femoral neck and L-spine appears stable to improved vs 2012.   - ordered Prolia and this approved. Unsure of what the problem was with not getting this set up. Calcium: dietary calcium  Vitamin D: taking 1000 units daily. Hypothyroidism:  175 mcg daily. HTN  amlodipine 10 mg and also takes furosemide for flood, losartan added.  Taking KCL 10 meq twice daily for low potassium  - following with Dr George Neal for this      Past Medical History:   Diagnosis Date    Arthritis     KNEE,gout    Endocrine disease     HYPOTHYROIDISM    GERD (gastroesophageal reflux disease)     Hypertension     Hypoglycemia     \"my body produces too much insulin\"    Liver disease     BRADY    Nausea & vomiting     when had gallbladder out    Osteoporosis     Other ill-defined conditions     spinal stenosis    Other ill-defined conditions     BBB    Other ill-defined conditions     HIGH CHOLESTEROL    Other ill-defined conditions     edema legs    Thyroid disease      Current Outpatient Prescriptions   Medication Sig    neomycin-polymyxin-dexamethasone (MAXITROL) ophthalmic suspension instill 1 drop into right eye four times a day    FREESTYLE LANCETS 28 gauge misc     fluticasone (FLONASE) 50 mcg/actuation nasal spray 2 Sprays by Both Nostrils route daily. Administer to right and left nostril.  furosemide (LASIX) 80 mg tablet TAKE 1 TABLET EVERY DAY    loratadine (CLARITIN) 10 mg tablet Take 1 Tab by mouth daily.  colchicine 0.6 mg tablet TAKE 1 TABLET EVERY DAY    raNITIdine (ZANTAC) 300 mg tablet TAKE 1 TABLET BY MOUTH EVERY DAY.  omega-3 acid ethyl esters (LOVAZA) 1 gram capsule TAKE 2 CAPSULES TWICE DAILY WITH MEALS.  polyethylene glycol (MIRALAX) 17 gram/dose powder MIX 1 CAPFUL (17 GM) IN LIQUID AND DRINK DAILY.  losartan (COZAAR) 25 mg tablet Take 1 Tab by mouth daily.  indomethacin (INDOCIN) 25 mg capsule TAKE 1 CAPSULE BY MOUTH THREE TIMES A DAY AS NEEDED FOR PAIN.  levothyroxine (SYNTHROID) 175 mcg tablet TAKE ONE DAILY BY MOUTH. TAKE AN EXTRA 1/2 PILL ON SUNDAYS. (7.5 TABLETS/WEEK  MCG)    atorvastatin (LIPITOR) 20 mg tablet TAKE 1 TABLET DAILY    ezetimibe (ZETIA) 10 mg tablet TAKE 1 TABLET EVERY DAY    potassium chloride SA (MICRO-K) 10 mEq capsule TAKE 1 CAPSULE TWICE DAILY    amLODIPine (NORVASC) 10 mg tablet Take 1 Tab by mouth daily.  Lancets (ACCU-CHEK SOFTCLIX LANCETS) misc Check blood sugar one to two times daily    L GASSERI/B BIFIDUM/B LONGUM ([facility] PO) Take  by mouth.  scopolamine (TRANSDERM-SCOP) 1.5 mg 1 Patch by TransDERmal route every seventy-two (72) hours.  vitamin E (AQUA GEMS) 400 unit capsule Take 800 Units by mouth daily.  lidocaine (LIDODERM) 5 %(700 mg/patch) 1 patch by TransDERmal route every twenty-four (24) hours. Apply patch to the affected area for 12 hours a day and remove for 12 hours a day.  cholecalciferol, vitamin D3, (VITAMIN D3) 2,000 unit tab Take  by mouth.  traMADol (ULTRAM) 50 mg tablet Take 1 tablet by mouth every six (6) hours as needed for Pain.     OTHER Accu-Chek Lady Plus Kit  Check blood sugar one to two times daily    glucose blood VI test strips (ACCU-CHEK LADY PLUS TEST STRP) strip Check blood sugar one to two times daily    MULTIVITAMIN WITH MINERALS (ONE-A-DAY 50 PLUS PO) Take  by mouth.  aspirin 81 mg chewable tablet Take 81 mg by mouth daily.  fluocinoNIDE (LIDEX) 0.05 % topical cream Apply  to affected area two (2) times a day. No current facility-administered medications for this visit. Allergies   Allergen Reactions    Celebrex [Celecoxib] Hives    Pcn [Penicillins] Unknown (comments)     Can't remember    Sulfa (Sulfonamide Antibiotics) Hives       Review of Systems:  - Eyes: no blurry vision or double vision  - Cardiovascular: no chest pain  - Respiratory: no shortness of breath  - Musculoskeletal: no myalgias  - Neurological: no numbness/tingling in extremities    Physical Examination:  Visit Vitals    Ht 5' 3\" (1.6 m)    Wt 219 lb (99.3 kg)    BMI 38.79 kg/m2   -   - General: pleasant, no distress, uses walker   HEENT: hearing intact, EOMI, clear sclera without icterus  - Neck: no thyromegaly or LAD  - Cardiovascular: regular, normal rate   - Respiratory: normal effort  - Integumentary: no edema  - Psychiatric: normal mood and affect    Data Reviewed:   Component      Latest Ref Rng & Units 12/12/2016 12/12/2016 12/12/2016           3:02 PM  3:02 PM  3:02 PM   Hemoglobin A1c, (calculated)      4.8 - 5.6 %   6.8 (H)   Estimated average glucose      mg/dL   148   TSH      0.450 - 4.500 uIU/mL  1.060    T4, Free      0.82 - 1.77 ng/dL 1.75       Assessment/Plan:   1. Hypothyroidism due to acquired atrophy of thyroid   - labs on 175 mcg   2. Essential hypertension   - controlled - continue losartan, amlodipine 10 mg and lisinopril. 3. Fatty liver - encouraged dietary efforts and wt loss. 4. Osteoporosis, unspecified osteoporosis type, unspecified pathological fracture presence   - will plan on Prolia   5. Type 2 diabetes mellitus without complication, without long-term current use of insulin ([address]   - started metformin  mg BId.  Reviewed sideeffects  Reviewed pathology of type II diabetes, including insulin resistance and progressive loss of beta cell function and insulin production with time. Explained the role of weight loss and limiting carbohydrate intake in affecting insulin needs. Reviewed basal and prandial insulin needs. Reviewed handout and gave basic directions on carbohydrate content of foods. Recommended limiting carbohydrate intake to < 45 g with meals. Encouraged exercise - 30 min 5 x weekly. Patient Instructions   Osteoporosis:  Continue vitamin D today - 1000 units    Recommend Prolia. We'll look into this again for you. Hypothyroidism:  Continue levothyroxine 175 mcg. Will repeat gain. Has been at goal.     Pre-diabetes:  - continue efforts to decrease/avoid intake of bread, pasta, rice, potatoes    - Exercise: work up to 30 minutes 5 days weekly of walking, or any other activity that gets your heart rate up. Continue weight loss efforts    ** Add metformin  mg after dinner. If you tolerate this well (no loose stools), then increase to 500 mg in AM after breakfast and 500 mg in evening after dinner. Follow-up Disposition:  Return in about 4 months (around 8/13/2017).     Copy sent to:

## 2017-04-13 NOTE — PATIENT INSTRUCTIONS
Osteoporosis:  Continue vitamin D today - 1000 units    Recommend Prolia. We'll look into this again for you. Hypothyroidism:  Continue levothyroxine 175 mcg. Will repeat gain. Has been at goal.     Pre-diabetes:  - continue efforts to decrease/avoid intake of bread, pasta, rice, potatoes    - Exercise: work up to 30 minutes 5 days weekly of walking, or any other activity that gets your heart rate up. Continue weight loss efforts    ** Add metformin  mg after dinner. If you tolerate this well (no loose stools), then increase to 500 mg in AM after breakfast and 500 mg in evening after dinner.

## 2017-04-14 LAB
ALBUMIN SERPL-MCNC: 4.5 G/DL (ref 3.5–4.8)
ALBUMIN/GLOB SERPL: 1.6 {RATIO} (ref 1.2–2.2)
ALP SERPL-CCNC: 149 IU/L (ref 39–117)
ALT SERPL-CCNC: 60 IU/L (ref 0–32)
AST SERPL-CCNC: 71 IU/L (ref 0–40)
BILIRUB SERPL-MCNC: 0.7 MG/DL (ref 0–1.2)
BUN SERPL-MCNC: 17 MG/DL (ref 8–27)
BUN/CREAT SERPL: 26 (ref 12–28)
CALCIUM SERPL-MCNC: 9.7 MG/DL (ref 8.7–10.3)
CHLORIDE SERPL-SCNC: 96 MMOL/L (ref 96–106)
CHOLEST SERPL-MCNC: 150 MG/DL (ref 100–199)
CO2 SERPL-SCNC: 26 MMOL/L (ref 18–29)
CREAT SERPL-MCNC: 0.66 MG/DL (ref 0.57–1)
EST. AVERAGE GLUCOSE BLD GHB EST-MCNC: 157 MG/DL
GLOBULIN SER CALC-MCNC: 2.9 G/DL (ref 1.5–4.5)
GLUCOSE SERPL-MCNC: 119 MG/DL (ref 65–99)
HBA1C MFR BLD: 7.1 % (ref 4.8–5.6)
HDLC SERPL-MCNC: 58 MG/DL
LDLC SERPL CALC-MCNC: 57 MG/DL (ref 0–99)
POTASSIUM SERPL-SCNC: 3.8 MMOL/L (ref 3.5–5.2)
PROT SERPL-MCNC: 7.4 G/DL (ref 6–8.5)
SODIUM SERPL-SCNC: 141 MMOL/L (ref 134–144)
TRIGL SERPL-MCNC: 174 MG/DL (ref 0–149)
TSH SERPL DL<=0.005 MIU/L-ACNC: 0.84 UIU/ML (ref 0.45–4.5)
VLDLC SERPL CALC-MCNC: 35 MG/DL (ref 5–40)

## 2017-05-03 RX ORDER — ATORVASTATIN CALCIUM 20 MG/1
TABLET, FILM COATED ORAL
Qty: 90 TAB | Refills: 3 | Status: SHIPPED | OUTPATIENT
Start: 2017-05-03 | End: 2017-05-15 | Stop reason: SDUPTHER

## 2017-05-15 ENCOUNTER — OFFICE VISIT (OUTPATIENT)
Dept: INTERNAL MEDICINE CLINIC | Age: 72
End: 2017-05-15

## 2017-05-15 VITALS
SYSTOLIC BLOOD PRESSURE: 105 MMHG | BODY MASS INDEX: 38.66 KG/M2 | HEART RATE: 71 BPM | RESPIRATION RATE: 16 BRPM | OXYGEN SATURATION: 96 % | DIASTOLIC BLOOD PRESSURE: 63 MMHG | TEMPERATURE: 98 F | WEIGHT: 218.2 LBS | HEIGHT: 63 IN

## 2017-05-15 DIAGNOSIS — E03.9 HYPOTHYROIDISM, UNSPECIFIED TYPE: ICD-10-CM

## 2017-05-15 DIAGNOSIS — E11.9 CONTROLLED TYPE 2 DIABETES MELLITUS WITHOUT COMPLICATION, WITHOUT LONG-TERM CURRENT USE OF INSULIN (HCC): Primary | ICD-10-CM

## 2017-05-15 DIAGNOSIS — E78.00 PURE HYPERCHOLESTEROLEMIA: ICD-10-CM

## 2017-05-15 DIAGNOSIS — I10 ESSENTIAL HYPERTENSION: ICD-10-CM

## 2017-05-15 NOTE — MR AVS SNAPSHOT
Visit Information Date & Time Provider Department Dept. Phone Encounter #  
 5/15/2017  3:00 PM Mitchell Rose, [address] [phone] [id number] Follow-up Instructions Return in about 8 months (around 1/15/2018) for dm2 htn hld fatty liver. Your Appointments 5/15/2017  3:00 PM  
ROUTINE CARE with Mitchell Rose, [address] [facility]) Appt Note: 6 month follow up; 6 month follow up//r/s from 11:00 am//BAM-5/10/17  
 [address] [facility]  
[phone]  
  
   
 [address] [facility] [place] [address] [id number]  
  
    
 8/10/2017 10:10 AM  
ROUTINE CARE with MD [facility]) Appt Note: f/u Osteoporosis & Thyroid cp40.00  
 [address]  
[address] [facility] [address] [address] Upcoming Health Maintenance Date Due Hepatitis C Screening 1945 GLAUCOMA SCREENING Q2Y 1/28/2016 INFLUENZA AGE 9 TO ADULT 8/1/2017 MEDICARE YEARLY EXAM 4/12/2018 BREAST CANCER SCRN MAMMOGRAM 2/28/2019 COLONOSCOPY 2/11/2020 DTaP/Tdap/Td series (2 - Td) 11/24/2025 Allergies as of 5/15/2017  Review Complete On: 5/15/2017 By: Brandie Cid LPN Severity Noted Reaction Type Reactions Celebrex [Celecoxib]  10/05/2009    Hives Pcn [Penicillins]  10/05/2009    Unknown (comments) Can't remember Sulfa (Sulfonamide Antibiotics)  10/05/2009    Hives Current Immunizations  Reviewed on 1/12/2015 Name Date Influenza Vaccine 10/1/2013 Pneumococcal Vaccine (Unspecified Type) 10/5/2010 Not reviewed this visit You Were Diagnosed With   
  
 Codes Comments Controlled type 2 diabetes mellitus without complication, without long-term current use of insulin ([facility]    -  Primary ICD-10-CM: E11.9 ICD-9-CM: 250.00 Essential hypertension     ICD-10-CM: I10 
ICD-9-CM: 401.9 Pure hypercholesterolemia     ICD-10-CM: E78.00 ICD-9-CM: 272.0 Hypothyroidism, unspecified type     ICD-10-CM: E03.9 ICD-9-CM: 304. 9 Vitals BP Pulse Temp Resp Height(growth percentile) Weight(growth percentile) 105/63 71 98 °F (36.7 °C) (Oral) 16 5' 3\" (1.6 m) 218 lb 3.2 oz (99 kg) SpO2 BMI OB Status Smoking Status 96% 38.65 kg/m2 Hysterectomy Never Smoker Vitals History BMI and BSA Data Body Mass Index Body Surface Area  
 38.65 kg/m 2 2.1 m 2 Preferred Pharmacy Pharmacy Name Phone [facility] [phone] Your Updated Medication List  
  
   
This list is accurate as of: 5/15/17  2:26 PM.  Always use your most recent med list. amLODIPine 10 mg tablet Commonly known as:  Laurie Coop Take 1 Tab by mouth daily. aspirin 81 mg chewable tablet Take 81 mg by mouth daily. atorvastatin 20 mg tablet Commonly known as:  LIPITOR  
TAKE 1 TABLET DAILY  
  
 colchicine 0.6 mg tablet TAKE 1 TABLET EVERY DAY  
  
 ezetimibe 10 mg tablet Commonly known as:  Rosemary [place] TAKE 1 TABLET EVERY DAY  
  
 fluocinoNIDE 0.05 % topical cream  
Commonly known as:  LIDEX Apply  to affected area two (2) times a day. fluticasone 50 mcg/actuation nasal spray Commonly known as:  [place] 2 Sprays by Both Nostrils route daily. Administer to right and left nostril. furosemide 80 mg tablet Commonly known as:  LASIX TAKE 1 TABLET EVERY DAY  
  
 glucose blood VI test strips strip Commonly known as:  ACCU-CHEK MILA PLUS TEST STRP Check blood sugar one to two times daily  
  
 indomethacin 25 mg capsule Commonly known as:  INDOCIN  
TAKE 1 CAPSULE BY MOUTH THREE TIMES A DAY AS NEEDED FOR PAIN. * Lancets Misc Commonly known as:  ACCU-CHEK SOFTCLIX LANCETS Check blood sugar one to two times daily * FREESTYLE LANCETS 28 gauge Misc Generic drug:  lancets  
  
 levothyroxine 175 mcg tablet Commonly known as:  SYNTHROID  
TAKE ONE DAILY BY MOUTH. TAKE AN EXTRA 1/2 PILL ON SUNDAYS. (7.5 TABLETS/WEEK  MCG) lidocaine 5 % Commonly known as:  Nicholaus Karlie 1 patch by TransDERmal route every twenty-four (24) hours. Apply patch to the affected area for 12 hours a day and remove for 12 hours a day. loratadine 10 mg tablet Commonly known as:  Alben Jobs Take 1 Tab by mouth daily. losartan 25 mg tablet Commonly known as:  COZAAR Take 1 Tab by mouth daily. metFORMIN  mg tablet Commonly known as:  GLUCOPHAGE XR Take 1 Tab by mouth two (2) times daily (after meals). For diabetes. neomycin-polymyxin-dexamethasone 3.5mg/mL-10,000 unit/mL-0.1 % ophthalmic suspension Commonly known as:  DEXACIDIN, MAXITROL  
instill 1 drop into right eye four times a day  
  
 omega-3 acid ethyl esters 1 gram capsule Commonly known as:  Daniela Evens TAKE 2 CAPSULES TWICE DAILY WITH MEALS. ONE-A-DAY 50 PLUS PO Take  by mouth. OTHER Accu-Chek Lady Plus Kit  Check blood sugar one to two times daily [address] Take  by mouth.  
  
 polyethylene glycol 17 gram/dose powder Commonly known as:  Artice Daunt MIX 1 CAPFUL (17 GM) IN LIQUID AND DRINK DAILY. potassium chloride SA 10 mEq capsule Commonly known as:  MICRO-K  
TAKE 1 CAPSULE TWICE DAILY  
  
 raNITIdine 300 mg tablet Commonly known as:  ZANTAC TAKE 1 TABLET BY MOUTH EVERY DAY. scopolamine 1.5 mg (1 mg over 3 days) Pt3d Commonly known as:  TRANSDERM-SCOP  
1 Patch by TransDERmal route every seventy-two (72) hours. traMADol 50 mg tablet Commonly known as:  ULTRAM  
Take 1 tablet by mouth every six (6) hours as needed for Pain. VITAMIN D3 2,000 unit Tab Generic drug:  cholecalciferol (vitamin D3) Take  by mouth.  
  
 vitamin E 400 unit capsule Commonly known as:  [address] Take 800 Units by mouth daily. * Notice: This list has 2 medication(s) that are the same as other medications prescribed for you. Read the directions carefully, and ask your doctor or other care provider to review them with you. Follow-up Instructions Return in about 8 months (around 1/15/2018) for dm2 htn hld fatty liver. To-Do List   
 05/26/2017 1:00 PM  
  Appointment with Ronnie Navarro 2 at [facility] Ultrasound ([phone]) General  NPO DIET RESTICTIONS Please be NPO (nothing by mouth) for 6- 8 hours prior to procedure. GENERAL INSTRUCTIONS 1. Bring any non [facility] facility films/reports pertaining to the area being studied with you on the day of appointment. 2. A written order with a valid diagnosis and Physicians signature is required for all scheduled tests. 3. Check in at registration 30 minutes before your appointment time unless you were instructed to do otherwise. Introducing [facility]! [employer] introduces [employer] patient portal. Now you can access parts of your medical record, email your doctor's office, and request medication refills online. 1. In your internet browser, go to [email or web address]. [email or web address] 2. Click on the First Time User? Click Here link in the Sign In box. You will see the New Member Sign Up page. 3. Enter your [employer] Access Code exactly as it appears below. You will not need to use this code after youve completed the sign-up process. If you do not sign up before the expiration date, you must request a new code. · [employer] Access Code: [email or web address] Expires: 5/24/2017  2:37 PM 
 
4. Enter the last four digits of your Social Security Number (xxxx) and Date of Birth (mm/dd/yyyy) as indicated and click Submit. You will be taken to the next sign-up page. 5. Create a [employer] ID.  This will be your [employer] login ID and cannot be changed, so think of one that is secure and easy to remember. 6. Create a [employer] password. You can change your password at any time. 7. Enter your Password Reset Question and Answer. This can be used at a later time if you forget your password. 8. Enter your e-mail address. You will receive e-mail notification when new information is available in [address] 9. Click Sign Up. You can now view and download portions of your medical record. 10. Click the Download Summary menu link to download a portable copy of your medical information. If you have questions, please visit the Frequently Asked Questions section of the [employer] website. Remember, [employer] is NOT to be used for urgent needs. For medical emergencies, dial 911. Now available from your iPhone and Android! Please provide this summary of care documentation to your next provider. Your primary care clinician is listed as Shruti MCFARLANE. If you have any questions after today's visit, please call [phone].

## 2017-05-15 NOTE — PROGRESS NOTES
HISTORY OF PRESENT ILLNESS  Hank Zavala is a 67 y.o. female. HPI     F/u prediabetes hld hypothyroid osteoporosis  Has been started on metformin by dr Rick Cordon for a1c up to 7.1 then down to 6.0 last week at [facility]  May be started on prolia for osteoporosis  Will get liver US at [facility] tramadol 1-3 / day for back and leg pain -Dr Morteza Rinaldi  Patient Active Problem List    Diagnosis Date Noted    Prediabetes 04/11/2016    Thrombocytopenia ([address] 01/24/2015    HTN (hypertension) 01/17/2014    Bifascicular block 12/23/2010    Fatty liver 06/18/2010    Pure hypercholesterolemia 06/18/2010    Colon polyp 06/18/2010    Gout 06/14/2010    Hypothyroidism 06/14/2010    Osteoporosis 06/14/2010    Anxiety 06/14/2010    Leg edema 06/14/2010    RSD lower limb 06/14/2010     Current Outpatient Prescriptions   Medication Sig Dispense Refill    levothyroxine (SYNTHROID) 175 mcg tablet TAKE ONE DAILY BY MOUTH. TAKE AN EXTRA 1/2 PILL ON SUNDAYS. (7.5 TABLETS/WEEK  MCG) 32 Tab 10    metFORMIN ER (GLUCOPHAGE XR) 500 mg tablet Take 1 Tab by mouth two (2) times daily (after meals). For diabetes. 60 Tab 10    FREESTYLE LANCETS 28 gauge misc   3    fluticasone (FLONASE) 50 mcg/actuation nasal spray 2 Sprays by Both Nostrils route daily. Administer to right and left nostril. 3 Bottle 3    furosemide (LASIX) 80 mg tablet TAKE 1 TABLET EVERY DAY 90 Tab 3    loratadine (CLARITIN) 10 mg tablet Take 1 Tab by mouth daily. 30 Tab 5    colchicine 0.6 mg tablet TAKE 1 TABLET EVERY DAY 90 Tab 3    raNITIdine (ZANTAC) 300 mg tablet TAKE 1 TABLET BY MOUTH EVERY DAY. 30 Tab 11    omega-3 acid ethyl esters (LOVAZA) 1 gram capsule TAKE 2 CAPSULES TWICE DAILY WITH MEALS. 360 Cap 3    polyethylene glycol (MIRALAX) 17 gram/dose powder MIX 1 CAPFUL (17 GM) IN LIQUID AND DRINK DAILY. 1581 g 3    losartan (COZAAR) 25 mg tablet Take 1 Tab by mouth daily.  90 Tab 3    indomethacin (INDOCIN) 25 mg capsule TAKE 1 CAPSULE BY MOUTH THREE TIMES A DAY AS NEEDED FOR PAIN. 30 Cap 6    atorvastatin (LIPITOR) 20 mg tablet TAKE 1 TABLET DAILY 90 Tab 3    ezetimibe (ZETIA) 10 mg tablet TAKE 1 TABLET EVERY DAY 90 Tab 3    potassium chloride SA (MICRO-K) 10 mEq capsule TAKE 1 CAPSULE TWICE DAILY 180 Cap 3    Lancets (ACCU-CHEK SOFTCLIX LANCETS) misc Check blood sugar one to two times daily 3 Package 3    L GASSERI/B BIFIDUM/B LONGUM ([place]. PO) Take  by mouth.  scopolamine (TRANSDERM-SCOP) 1.5 mg 1 Patch by TransDERmal route every seventy-two (72) hours. 4 Patch 1    vitamin E (AQUA GEMS) 400 unit capsule Take 800 Units by mouth daily.  lidocaine (LIDODERM) 5 %(700 mg/patch) 1 patch by TransDERmal route every twenty-four (24) hours. Apply patch to the affected area for 12 hours a day and remove for 12 hours a day.  cholecalciferol, vitamin D3, (VITAMIN D3) 2,000 unit tab Take  by mouth.  traMADol (ULTRAM) 50 mg tablet Take 1 tablet by mouth every six (6) hours as needed for Pain. 12 tablet 0    OTHER Accu-Chek Lady Plus Kit  Check blood sugar one to two times daily 1 kit 3    glucose blood VI test strips (ACCU-CHEK LADY PLUS TEST STRP) strip Check blood sugar one to two times daily 3 Package 3    MULTIVITAMIN WITH MINERALS (ONE-A-DAY 50 PLUS PO) Take  by mouth.  aspirin 81 mg chewable tablet Take 81 mg by mouth daily.  fluocinoNIDE (LIDEX) 0.05 % topical cream Apply  to affected area two (2) times a day. 60 g 3    neomycin-polymyxin-dexamethasone (MAXITROL) ophthalmic suspension instill 1 drop into right eye four times a day  0    amLODIPine (NORVASC) 10 mg tablet Take 1 Tab by mouth daily.  90 Tab 3     Allergies   Allergen Reactions    Celebrex [Celecoxib] Hives    Pcn [Penicillins] Unknown (comments)     Can't remember    Sulfa (Sulfonamide Antibiotics) Hives      Lab Results  Component Value Date/Time   Hemoglobin A1c 7.1 04/13/2017 12:06 PM   Hemoglobin A1c 6.8 12/12/2016 03:02 PM   Hemoglobin A1c, External 6.4 06/21/2016   Glucose 119 04/13/2017 12:06 PM   Glucose (POC) 129 11/17/2015 07:23 AM   LDL, calculated 57 04/13/2017 12:06 PM   Creatinine 0.66 04/13/2017 12:06 PM      Lab Results  Component Value Date/Time   Cholesterol, total 150 04/13/2017 12:06 PM   HDL Cholesterol 58 04/13/2017 12:06 PM   LDL, calculated 57 04/13/2017 12:06 PM   Triglyceride 174 04/13/2017 12:06 PM   CHOL/HDL Ratio 2.7 06/14/2010 11:16 AM       Lab Results  Component Value Date/Time   GFR est  04/13/2017 12:06 PM   GFR est non-AA 89 04/13/2017 12:06 PM   Creatinine 0.66 04/13/2017 12:06 PM   BUN 17 04/13/2017 12:06 PM   Sodium 141 04/13/2017 12:06 PM   Potassium 3.8 04/13/2017 12:06 PM   Chloride 96 04/13/2017 12:06 PM   CO2 26 04/13/2017 12:06 PM         ROS    Physical Exam   Constitutional: She appears well-developed and well-nourished. Appears stated age   Cardiovascular: Normal rate, regular rhythm and normal heart sounds. Exam reveals no gallop and no friction rub. No murmur heard. Pulmonary/Chest: Effort normal and breath sounds normal. No respiratory distress. She has no wheezes. Abdominal: Soft. Bowel sounds are normal.   Musculoskeletal: She exhibits no edema. Neurological: She is alert. Psychiatric: She has a normal mood and affect. Nursing note and vitals reviewed. ASSESSMENT and PLAN  Abimbola Rios was seen today for diabetes, hypertension, thyroid problem, follow-up and hip pain. Diagnoses and all orders for this visit:    Controlled type 2 diabetes mellitus without complication, without long-term current use of insulin (HCC)   F/u Dr Jed Ayoub -controlled on metformin  Essential hypertension   Good control  Pure hypercholesterolemia   Continue statin -LDL at goal  Hypothyroidism, unspecified type   Per Dr Jed BRADY   Stable LFT's    Completed [facility] form  Follow-up Disposition:  Return in about 8 months (around 1/15/2018) for dm2 htn hld fatty liver.

## 2017-05-15 NOTE — PROGRESS NOTES
Chief Complaint   Patient presents with    Diabetes    Hypertension     BP Reading of 142/62 today     Thyroid Problem    Follow-up     6 Month Follow Up    Hip Pain     (R) Hip Pain     1. Have you been to the ER, urgent care clinic since your last visit? Hospitalized since your last visit? No    2. Have you seen or consulted any other health care providers outside of the [address] [place] [address] since your last visit? Include any pap smears or colon screening.  No

## 2017-05-26 ENCOUNTER — HOSPITAL ENCOUNTER (OUTPATIENT)
Dept: ULTRASOUND IMAGING | Age: 72
Discharge: HOME | End: 2017-05-26
Payer: MEDICARE

## 2017-05-26 DIAGNOSIS — K75.81 NASH (NONALCOHOLIC STEATOHEPATITIS): ICD-10-CM

## 2017-05-26 DIAGNOSIS — K74.00 LIVER FIBROSIS: ICD-10-CM

## 2017-05-26 PROCEDURE — 76705 ECHO EXAM OF ABDOMEN: CPT

## 2017-06-02 RX ORDER — LOSARTAN POTASSIUM 25 MG/1
TABLET ORAL
Qty: 90 TAB | Refills: 3 | Status: SHIPPED | OUTPATIENT
Start: 2017-06-02 | End: 2018-03-16 | Stop reason: SDUPTHER

## 2017-06-19 NOTE — PROGRESS NOTES
Thyroid was at goal.  Metformin was started at her visit. Liver enzymes: We will encourage continued efforts with weight loss. Ramiro Coronel,  Please call and mail lab letter. Thank you.

## 2017-08-22 ENCOUNTER — OFFICE VISIT (OUTPATIENT)
Dept: ENDOCRINOLOGY | Age: 72
End: 2017-08-22

## 2017-08-22 VITALS
DIASTOLIC BLOOD PRESSURE: 73 MMHG | BODY MASS INDEX: 38.98 KG/M2 | SYSTOLIC BLOOD PRESSURE: 137 MMHG | HEIGHT: 63 IN | HEART RATE: 66 BPM | WEIGHT: 220 LBS

## 2017-08-22 DIAGNOSIS — K74.60 CIRRHOSIS, NON-ALCOHOLIC (HCC): ICD-10-CM

## 2017-08-22 DIAGNOSIS — E11.9 TYPE 2 DIABETES MELLITUS WITHOUT COMPLICATION, WITHOUT LONG-TERM CURRENT USE OF INSULIN (HCC): ICD-10-CM

## 2017-08-22 DIAGNOSIS — E03.9 HYPOTHYROIDISM, UNSPECIFIED TYPE: Primary | ICD-10-CM

## 2017-08-22 DIAGNOSIS — M81.0 AGE RELATED OSTEOPOROSIS, UNSPECIFIED PATHOLOGICAL FRACTURE PRESENCE: ICD-10-CM

## 2017-08-22 DIAGNOSIS — E78.00 PURE HYPERCHOLESTEROLEMIA: ICD-10-CM

## 2017-08-22 DIAGNOSIS — E87.6 HYPOKALEMIA: ICD-10-CM

## 2017-08-22 NOTE — PATIENT INSTRUCTIONS
Osteoporosis:  Continue vitamin D today - 1000 units    Will consider Prolia in September      Hypothyroidism:  Continue levothyroxine 175 mcg. Diabetes. - continue efforts to decrease/avoid intake of bread, pasta, rice, potatoes  - continue exercise. Continue weight loss efforts  Continue metformin  mg twice daily. Lila er:  Continue efforts with exercise, weight loss, and avoiding added sugars.

## 2017-08-22 NOTE — MR AVS SNAPSHOT
Visit Information Date & Time Provider Department Dept. Phone Encounter #  
 8/22/2017 11:50 AM Enrico Villalta, [address] [facility] [phone] [id number] Follow-up Instructions Return in about 4 months (around 12/22/2017). Your Appointments 1/15/2018 11:30 AM  
ROUTINE CARE with Stephen Kaminski, [address] [place] [address],[address]) Appt Note: 8 month follow up  
 [address] [facility] [address] [phone]  
[address] 235 [facility] [address] [place] [address] Upcoming Health Maintenance Date Due Hepatitis C Screening 1945 GLAUCOMA SCREENING Q2Y 1/28/2016 INFLUENZA AGE 9 TO ADULT 8/1/2017 MEDICARE YEARLY EXAM 4/12/2018 BREAST CANCER SCRN MAMMOGRAM 2/28/2019 COLONOSCOPY 2/11/2020 DTaP/Tdap/Td series (2 - Td) 11/24/2025 Allergies as of 8/22/2017  Review Complete On: 8/22/2017 By: Enrico Villalta MD  
  
 Severity Noted Reaction Type Reactions Celebrex [Celecoxib]  10/05/2009    Hives Pcn [Penicillins]  10/05/2009    Unknown (comments) Can't remember Sulfa (Sulfonamide Antibiotics)  10/05/2009    Hives Current Immunizations  Reviewed on 1/12/2015 Name Date Influenza Vaccine 10/1/2013 ZZZ-RETIRED (DO NOT USE) Pneumococcal Vaccine (Unspecified Type) 10/5/2010 Not reviewed this visit You Were Diagnosed With   
  
 Codes Comments Hypothyroidism, unspecified type    -  Primary ICD-10-CM: E03.9 ICD-9-CM: 316. 9 Type 2 diabetes mellitus without complication, without long-term current use of insulin (HCC)     ICD-10-CM: E11.9 ICD-9-CM: 250.00 Pure hypercholesterolemia     ICD-10-CM: E78.00 ICD-9-CM: 272.0 Cirrhosis, non-alcoholic ([facility]     ETC-13-ZS: K74.60 ICD-9-CM: 571.5 Hypokalemia     ICD-10-CM: E87.6 ICD-9-CM: 276.8 Age related osteoporosis, unspecified pathological fracture presence     ICD-10-CM: M81.0 ICD-9-CM: 733.01 Vitals BP Pulse Height(growth percentile) Weight(growth percentile) BMI OB Status 137/73 66 5' 3\" (1.6 m) 220 lb (99.8 kg) 38.97 kg/m2 Hysterectomy Smoking Status Never Smoker Vitals History BMI and BSA Data Body Mass Index Body Surface Area  
 38.97 kg/m 2 2.11 m 2 Preferred Pharmacy Pharmacy Name Phone [facility]. [phone] Your Updated Medication List  
  
   
This list is accurate as of: 8/22/17  1:10 PM.  Always use your most recent med list. amLODIPine 10 mg tablet Commonly known as:  Flores Denisse Take 1 Tab by mouth daily. aspirin 81 mg chewable tablet Take 81 mg by mouth daily. atorvastatin 20 mg tablet Commonly known as:  LIPITOR  
TAKE 1 TABLET DAILY  
  
 colchicine 0.6 mg tablet TAKE 1 TABLET EVERY DAY  
  
 ezetimibe 10 mg tablet Commonly known as:  Milladore Franklin TAKE 1 TABLET EVERY DAY  
  
 fluocinoNIDE 0.05 % topical cream  
Commonly known as:  LIDEX Apply  to affected area two (2) times a day. fluticasone 50 mcg/actuation nasal spray Commonly known as:  [place] Capes 2 Sprays by Both Nostrils route daily. Administer to right and left nostril. furosemide 80 mg tablet Commonly known as:  LASIX TAKE 1 TABLET EVERY DAY  
  
 glucose blood VI test strips strip Commonly known as:  ACCU-CHEK MILA PLUS TEST STRP Check blood sugar one to two times daily  
  
 indomethacin 25 mg capsule Commonly known as:  INDOCIN  
TAKE 1 CAPSULE BY MOUTH THREE TIMES A DAY AS NEEDED FOR PAIN. * Lancets Misc Commonly known as:  ACCU-CHEK SOFTCLIX LANCETS Check blood sugar one to two times daily * FREESTYLE LANCETS 28 gauge Misc Generic drug:  lancets  
  
 levothyroxine 175 mcg tablet Commonly known as:  SYNTHROID  
TAKE ONE DAILY BY MOUTH. TAKE AN EXTRA 1/2 PILL ON SUNDAYS. (7.5 TABLETS/WEEK  MCG) lidocaine 5 % Commonly known as:  Sesar Kocher 1 patch by TransDERmal route every twenty-four (24) hours. Apply patch to the affected area for 12 hours a day and remove for 12 hours a day. loratadine 10 mg tablet Commonly known as:  Gennett Caroli Take 1 Tab by mouth daily. losartan 25 mg tablet Commonly known as:  COZAAR  
TAKE 1 TABLET BY MOUTH DAILY. metFORMIN  mg tablet Commonly known as:  GLUCOPHAGE XR Take 1 Tab by mouth two (2) times daily (after meals). For diabetes. neomycin-polymyxin-dexamethasone 3.5mg/mL-10,000 unit/mL-0.1 % ophthalmic suspension Commonly known as:  DEXACIDIN, MAXITROL  
instill 1 drop into right eye four times a day  
  
 omega-3 acid ethyl esters 1 gram capsule Commonly known as:  [place] TAKE 2 CAPSULES TWICE DAILY WITH MEALS. ONE-A-DAY 50 PLUS PO Take  by mouth. OTHER Accu-Chek Lady Plus Kit  Check blood sugar one to two times daily [address] Take  by mouth.  
  
 polyethylene glycol 17 gram/dose powder Commonly known as:  Orysia Rainelle MIX 1 CAPFUL (17 GM) IN LIQUID AND DRINK DAILY. potassium chloride SA 10 mEq capsule Commonly known as:  MICRO-K  
TAKE 1 CAPSULE TWICE DAILY  
  
 raNITIdine 300 mg tablet Commonly known as:  ZANTAC TAKE 1 TABLET BY MOUTH EVERY DAY. scopolamine 1.5 mg (1 mg over 3 days) Pt3d Commonly known as:  TRANSDERM-SCOP  
1 Patch by TransDERmal route every seventy-two (72) hours. traMADol 50 mg tablet Commonly known as:  ULTRAM  
Take 1 tablet by mouth every six (6) hours as needed for Pain. VITAMIN D3 2,000 unit Tab Generic drug:  cholecalciferol (vitamin D3) Take  by mouth.  
  
 vitamin E 400 unit capsule Commonly known as:  [address] Take 800 Units by mouth daily. * Notice: This list has 2 medication(s) that are the same as other medications prescribed for you.  Read the directions carefully, and ask your doctor or other care provider to review them with you. We Performed the Following HEMOGLOBIN A1C WITH EAG [60167 CPT(R)] MAGNESIUM [id number] CPT(R)] METABOLIC PANEL, COMPREHENSIVE [97036 CPT(R)] TSH 3RD GENERATION [93629 CPT(R)] Follow-up Instructions Return in about 4 months (around 12/22/2017). Patient Instructions Osteoporosis: 
Continue vitamin D today - 1000 units Will consider Prolia in September Hypothyroidism: 
Continue levothyroxine 175 mcg. Diabetes. - continue efforts to decrease/avoid intake of bread, pasta, rice, potatoes 
- continue exercise. Continue weight loss efforts Continue metformin  mg twice daily. Lila er: 
Continue efforts with exercise, weight loss, and avoiding added sugars. Introducing [facility]! [employer] introduces [employer] patient portal. Now you can access parts of your medical record, email your doctor's office, and request medication refills online. 1. In your internet browser, go to [email or web address]. [email or web address] 2. Click on the First Time User? Click Here link in the Sign In box. You will see the New Member Sign Up page. 3. Enter your [employer] Access Code exactly as it appears below. You will not need to use this code after youve completed the sign-up process. If you do not sign up before the expiration date, you must request a new code. · [employer] Access Code: [email or web address] Expires: 8/23/2017  2:30 PM 
 
4. Enter the last four digits of your Social Security Number (xxxx) and Date of Birth (mm/dd/yyyy) as indicated and click Submit. You will be taken to the next sign-up page. 5. Create a [employer] ID. This will be your [employer] login ID and cannot be changed, so think of one that is secure and easy to remember. 6. Create a [employer] password. You can change your password at any time. 7. Enter your Password Reset Question and Answer.  This can be used at a later time if you forget your password. 8. Enter your e-mail address. You will receive e-mail notification when new information is available in [address] 9. Click Sign Up. You can now view and download portions of your medical record. 10. Click the Download Summary menu link to download a portable copy of your medical information. If you have questions, please visit the Frequently Asked Questions section of the [facility] website. Remember, [facility] is NOT to be used for urgent needs. For medical emergencies, dial 911. Now available from your iPhone and Android! Please provide this summary of care documentation to your next provider. Your primary care clinician is listed as Kinyarwanda Mini DENYS. If you have any questions after today's visit, please call [phone].

## 2017-08-22 NOTE — PROGRESS NOTES
History of Present Illness: Charity Halsted is a 67 y.o. female presents for follow-up of diabetes. She also has dyslipidemia, obesity, fatty liver (with bridging fibrosis 2008 - followed [facility]), gout and arthritis  Also has diabetes. Diabetes - A1c 7.1 in April 2017  Generally trying to eat lower carbs. She is taking metformin  mg twice daily. She reports thumb problems with bloating from this    Osteoporosis:  Diagnosed in 2002. Says she was started on Fosamax initially and then this was changed to [facility] [place] in 2007. She took this until 2011. She then had about a 2 year drug holiday. After BMD in 7/2013, alendronate was resumed until early 2016. BMD in 8/2014 showed BMD to be stable. Remained at very high fx risk. 8/2016 study showed worsening of BMD although values at left femoral neck and L-spine appears stable to improved vs 2012. Plan was to start Prolia, but this was never started. She now wants to wait until she has new insurance in September before starting    Calcium: dietary calcium  Vitamin D: taking 1000 units daily. Hypothyroidism:  175 mcg daily. TSH has been stable    HTN  amlodipine 10 mg and also takes furosemide for fluid, losartan added. Taking KCL 10 meq twice daily for low potassium  - following with Dr Mikhail Low for this        Past Medical History:   Diagnosis Date    Arthritis     KNEE,gout    Endocrine disease     HYPOTHYROIDISM    GERD (gastroesophageal reflux disease)     Hypertension     Hypoglycemia     \"my body produces too much insulin\"    Liver disease     BRADY    Nausea & vomiting     when had gallbladder out    Osteoporosis     Other ill-defined conditions     spinal stenosis    Other ill-defined conditions     BBB    Other ill-defined conditions     HIGH CHOLESTEROL    Other ill-defined conditions     edema legs    Thyroid disease      Current Outpatient Prescriptions   Medication Sig    losartan (COZAAR) 25 mg tablet TAKE 1 TABLET BY MOUTH DAILY.  levothyroxine (SYNTHROID) 175 mcg tablet TAKE ONE DAILY BY MOUTH. TAKE AN EXTRA 1/2 PILL ON SUNDAYS. (7.5 TABLETS/WEEK  MCG)    metFORMIN ER (GLUCOPHAGE XR) 500 mg tablet Take 1 Tab by mouth two (2) times daily (after meals). For diabetes.  neomycin-polymyxin-dexamethasone (MAXITROL) ophthalmic suspension instill 1 drop into right eye four times a day    FREESTYLE LANCETS 28 gauge misc     fluticasone (FLONASE) 50 mcg/actuation nasal spray 2 Sprays by Both Nostrils route daily. Administer to right and left nostril.  furosemide (LASIX) 80 mg tablet TAKE 1 TABLET EVERY DAY    loratadine (CLARITIN) 10 mg tablet Take 1 Tab by mouth daily.  colchicine 0.6 mg tablet TAKE 1 TABLET EVERY DAY    raNITIdine (ZANTAC) 300 mg tablet TAKE 1 TABLET BY MOUTH EVERY DAY.  omega-3 acid ethyl esters (LOVAZA) 1 gram capsule TAKE 2 CAPSULES TWICE DAILY WITH MEALS.  polyethylene glycol (MIRALAX) 17 gram/dose powder MIX 1 CAPFUL (17 GM) IN LIQUID AND DRINK DAILY.  indomethacin (INDOCIN) 25 mg capsule TAKE 1 CAPSULE BY MOUTH THREE TIMES A DAY AS NEEDED FOR PAIN.  atorvastatin (LIPITOR) 20 mg tablet TAKE 1 TABLET DAILY    ezetimibe (ZETIA) 10 mg tablet TAKE 1 TABLET EVERY DAY    potassium chloride SA (MICRO-K) 10 mEq capsule TAKE 1 CAPSULE TWICE DAILY    amLODIPine (NORVASC) 10 mg tablet Take 1 Tab by mouth daily.  Lancets (ACCU-CHEK SOFTCLIX LANCETS) misc Check blood sugar one to two times daily    L GASSERI/B BIFIDUM/B LONGUM ([facility] PO) Take  by mouth.  scopolamine (TRANSDERM-SCOP) 1.5 mg 1 Patch by TransDERmal route every seventy-two (72) hours.  vitamin E (AQUA GEMS) 400 unit capsule Take 800 Units by mouth daily.  lidocaine (LIDODERM) 5 %(700 mg/patch) 1 patch by TransDERmal route every twenty-four (24) hours. Apply patch to the affected area for 12 hours a day and remove for 12 hours a day.  cholecalciferol, vitamin D3, (VITAMIN D3) 2,000 unit tab Take  by mouth.  traMADol (ULTRAM) 50 mg tablet Take 1 tablet by mouth every six (6) hours as needed for Pain.  OTHER Accu-Chek Lady Plus Kit  Check blood sugar one to two times daily    glucose blood VI test strips (ACCU-CHEK LADY PLUS TEST STRP) strip Check blood sugar one to two times daily    MULTIVITAMIN WITH MINERALS (ONE-A-DAY 50 PLUS PO) Take  by mouth.  aspirin 81 mg chewable tablet Take 81 mg by mouth daily.  fluocinoNIDE (LIDEX) 0.05 % topical cream Apply  to affected area two (2) times a day. No current facility-administered medications for this visit.       Allergies   Allergen Reactions    Celebrex [Celecoxib] Hives    Pcn [Penicillins] Unknown (comments)     Can't remember    Sulfa (Sulfonamide Antibiotics) Hives       Review of Systems:  - Eyes: no blurry vision or double vision  - Cardiovascular: no chest pain  - Respiratory: no shortness of breath  - Musculoskeletal: no myalgias  - Neurological: no numbness/tingling in extremities    Physical Examination:  Visit Vitals    /73    Pulse 66    Ht 5' 3\" (1.6 m)    Wt 220 lb (99.8 kg)    BMI 38.97 kg/m2   -   - General: pleasant, no distress, uses walker   HEENT: hearing intact, EOMI, clear sclera without icterus  - Cardiovascular: regular, normal rate   - Respiratory: normal effort  - Integumentary: no edema  - Psychiatric: normal mood and affect    Data Reviewed:   Component      Latest Ref Rng & Units 4/13/2017 4/13/2017 4/13/2017 4/13/2017          12:06 PM 12:06 PM 12:06 PM 12:06 PM   Glucose      65 - 99 mg/dL    119 (H)   BUN      8 - 27 mg/dL    17   Creatinine      0.57 - 1.00 mg/dL    0.66   GFR est non-AA      >59 mL/min/1.73    89   GFR est AA      >59 mL/min/1.73    102   BUN/Creatinine ratio      12 - 28    26   Sodium      134 - 144 mmol/L    141   Potassium      3.5 - 5.2 mmol/L    3.8   Chloride      96 - 106 mmol/L    96   CO2      18 - 29 mmol/L    26   Calcium      8.7 - 10.3 mg/dL    9.7   Protein, total      6.0 - 8.5 g/dL    7.4   Albumin      3.5 - 4.8 g/dL    4.5   GLOBULIN, TOTAL      1.5 - 4.5 g/dL    2.9   A-G Ratio      1.2 - 2.2    1.6   Bilirubin, total      0.0 - 1.2 mg/dL    0.7   Alk. phosphatase      39 - 117 IU/L    149 (H)   AST      0 - 40 IU/L    71 (H)   ALT (SGPT)      0 - 32 IU/L    60 (H)   Cholesterol, total      100 - 199 mg/dL   150    Triglyceride      0 - 149 mg/dL   174 (H)    HDL Cholesterol      >39 mg/dL   58    VLDL, calculated      5 - 40 mg/dL   35    LDL, calculated      0 - 99 mg/dL   57    Hemoglobin A1c, (calculated)      4.8 - 5.6 %  7.1 (H)     Estimated average glucose      mg/dL  157     TSH      0.450 - 4.500 uIU/mL 0.845        Component      Latest Ref Rng & Units 12/12/2016 12/12/2016           3:02 PM  3:02 PM   Glucose      65 - 99 mg/dL     BUN      8 - 27 mg/dL     Creatinine      0.57 - 1.00 mg/dL     GFR est non-AA      >59 mL/min/1.73     GFR est AA      >59 mL/min/1.73     BUN/Creatinine ratio      12 - 28     Sodium      134 - 144 mmol/L     Potassium      3.5 - 5.2 mmol/L     Chloride      96 - 106 mmol/L     CO2      18 - 29 mmol/L     Calcium      8.7 - 10.3 mg/dL     Protein, total      6.0 - 8.5 g/dL     Albumin      3.5 - 4.8 g/dL     GLOBULIN, TOTAL      1.5 - 4.5 g/dL     A-G Ratio      1.2 - 2.2     Bilirubin, total      0.0 - 1.2 mg/dL     Alk. phosphatase      39 - 117 IU/L     AST      0 - 40 IU/L     ALT (SGPT)      0 - 32 IU/L     Cholesterol, total      100 - 199 mg/dL     Triglyceride      0 - 149 mg/dL     HDL Cholesterol      >39 mg/dL     VLDL, calculated      5 - 40 mg/dL     LDL, calculated      0 - 99 mg/dL     Hemoglobin A1c, (calculated)      4.8 - 5.6 %  6.8 (H)   Estimated average glucose      mg/dL  148   TSH      0.450 - 4.500 uIU/mL 1.060        Assessment/Plan:   1. Hypothyroidism, unspecified type   TSH is stable. Continue levothyroxine 175 mcg daily   2.  Type 2 diabetes mellitus without complication, without long-term current use of insulin ([facility]   Reassess in globin A1c  Continue metformin 500 mg twice daily  Encouraged smaller portions, lower carbohydrate intake and weight loss. 3. Pure hypercholesterolemia   Continue atorvastatin, ezetimibe, weight loss efforts   4. Cirrhosis, non-alcoholic ([facility]   We will check liver function tests today  Encouraged weight loss and avoidance of added sugars   5. Hypokalemia-reassess   6. Age related osteoporosis, unspecified pathological fracture presence   Plan is to start Prolia in September once she has her new insurance     Patient Instructions   Osteoporosis:  Continue vitamin D today - 1000 units    Will consider Prolia in September      Hypothyroidism:  Continue levothyroxine 175 mcg. Diabetes. - continue efforts to decrease/avoid intake of bread, pasta, rice, potatoes  - continue exercise. Continue weight loss efforts  Continue metformin  mg twice daily. Lila er:  Continue efforts with exercise, weight loss, and avoiding added sugars. Follow-up Disposition:  Return in about 4 months (around 12/22/2017).     Copy sent to:

## 2017-08-29 LAB
ALBUMIN SERPL-MCNC: 4.6 G/DL (ref 3.5–4.8)
ALBUMIN/GLOB SERPL: 1.8 {RATIO} (ref 1.2–2.2)
ALP SERPL-CCNC: 133 IU/L (ref 39–117)
ALT SERPL-CCNC: 58 IU/L (ref 0–32)
AST SERPL-CCNC: 59 IU/L (ref 0–40)
BILIRUB SERPL-MCNC: 0.5 MG/DL (ref 0–1.2)
BUN SERPL-MCNC: 14 MG/DL (ref 8–27)
BUN/CREAT SERPL: 21 (ref 12–28)
CALCIUM SERPL-MCNC: 10.2 MG/DL (ref 8.7–10.3)
CHLORIDE SERPL-SCNC: 98 MMOL/L (ref 96–106)
CO2 SERPL-SCNC: 28 MMOL/L (ref 18–29)
CREAT SERPL-MCNC: 0.68 MG/DL (ref 0.57–1)
EST. AVERAGE GLUCOSE BLD GHB EST-MCNC: 134 MG/DL
GLOBULIN SER CALC-MCNC: 2.6 G/DL (ref 1.5–4.5)
GLUCOSE SERPL-MCNC: 104 MG/DL (ref 65–99)
HBA1C MFR BLD: 6.3 % (ref 4.8–5.6)
MAGNESIUM SERPL-MCNC: 2.2 MG/DL (ref 1.6–2.3)
POTASSIUM SERPL-SCNC: 3.9 MMOL/L (ref 3.5–5.2)
PROT SERPL-MCNC: 7.2 G/DL (ref 6–8.5)
SODIUM SERPL-SCNC: 143 MMOL/L (ref 134–144)
TSH SERPL DL<=0.005 MIU/L-ACNC: 1.15 UIU/ML (ref 0.45–4.5)

## 2017-11-21 ENCOUNTER — OFFICE VISIT (OUTPATIENT)
Dept: ENDOCRINOLOGY | Age: 72
End: 2017-11-21

## 2017-11-21 VITALS
HEART RATE: 69 BPM | DIASTOLIC BLOOD PRESSURE: 66 MMHG | BODY MASS INDEX: 37.39 KG/M2 | WEIGHT: 211 LBS | SYSTOLIC BLOOD PRESSURE: 144 MMHG | HEIGHT: 63 IN

## 2017-11-21 DIAGNOSIS — K74.60 CIRRHOSIS OF LIVER WITHOUT ASCITES, UNSPECIFIED HEPATIC CIRRHOSIS TYPE (HCC): ICD-10-CM

## 2017-11-21 DIAGNOSIS — R73.03 PREDIABETES: ICD-10-CM

## 2017-11-21 DIAGNOSIS — E11.9 TYPE 2 DIABETES MELLITUS WITHOUT COMPLICATION, WITHOUT LONG-TERM CURRENT USE OF INSULIN (HCC): Primary | ICD-10-CM

## 2017-11-21 DIAGNOSIS — E03.9 HYPOTHYROIDISM, UNSPECIFIED TYPE: ICD-10-CM

## 2017-11-21 DIAGNOSIS — M81.0 OSTEOPOROSIS, UNSPECIFIED OSTEOPOROSIS TYPE, UNSPECIFIED PATHOLOGICAL FRACTURE PRESENCE: ICD-10-CM

## 2017-11-21 DIAGNOSIS — D69.6 THROMBOCYTOPENIA (HCC): ICD-10-CM

## 2017-11-21 LAB — GLUCOSE POC: 109 MG/DL

## 2017-11-21 NOTE — PROGRESS NOTES
History of Present Illness: Jorge Dudley is a 67 y.o. female presents for follow-up of hypothyroidism, diabetes and osteoporosis  She also has dyslipidemia, obesity, fatty liver (with bridging fibrosis 2008 - followed [facility]), gout and arthritis    Diabetes - A1c 6.3  in 8/2017  Generally trying to eat lower carbs. She is taking metformin  mg twice daily. Brings home meter: avg glucose 133 over last 30 days. Checking 2-3 times daily    Osteoporosis:  Diagnosed in 2002. Says she was started on Fosamax initially and then this was changed to [facility] [place] in 2007. She took this until 2011. She then had about a 2 year drug holiday. After BMD in 7/2013, alendronate was resumed until early 2016. BMD in 8/2014 showed BMD to be stable. Remained at very high fx risk. 8/2016 study showed worsening of BMD although values at left femoral neck and L-spine appears stable to improved vs 2012. Plan was to start Prolia, but this was never started. She now wants to wait until she has new insurance in January before starting    Calcium: dietary calcium  Vitamin D: taking 1000 units daily. Hypothyroidism:  175 mcg daily. TSH has been stable    HTN  amlodipine 10 mg, takes furosemide for fluid, losartan,.  Taking KCL 10 meq twice daily for low potassium  - following with Dr Niranjan Washington for this    Requests labs to be done for Dr Jared Gaming team      Past Medical History:   Diagnosis Date    Arthritis     KNEE,gout    Endocrine disease     HYPOTHYROIDISM    GERD (gastroesophageal reflux disease)     Hypertension     Hypoglycemia     \"my body produces too much insulin\"    Liver disease     BRADY    Nausea & vomiting     when had gallbladder out    Osteoporosis     Other ill-defined conditions(799.89)     spinal stenosis    Other ill-defined conditions(799.89)     BBB    Other ill-defined conditions(799.89)     HIGH CHOLESTEROL    Other ill-defined conditions(799.89)     edema legs    Thyroid disease      Current Outpatient Prescriptions   Medication Sig    losartan (COZAAR) 25 mg tablet TAKE 1 TABLET BY MOUTH DAILY.  levothyroxine (SYNTHROID) 175 mcg tablet TAKE ONE DAILY BY MOUTH. TAKE AN EXTRA 1/2 PILL ON SUNDAYS. (7.5 TABLETS/WEEK  MCG)    metFORMIN ER (GLUCOPHAGE XR) 500 mg tablet Take 1 Tab by mouth two (2) times daily (after meals). For diabetes.  neomycin-polymyxin-dexamethasone (MAXITROL) ophthalmic suspension instill 1 drop into right eye four times a day    FREESTYLE LANCETS 28 gauge misc     fluticasone (FLONASE) 50 mcg/actuation nasal spray 2 Sprays by Both Nostrils route daily. Administer to right and left nostril.  furosemide (LASIX) 80 mg tablet TAKE 1 TABLET EVERY DAY    loratadine (CLARITIN) 10 mg tablet Take 1 Tab by mouth daily.  colchicine 0.6 mg tablet TAKE 1 TABLET EVERY DAY    raNITIdine (ZANTAC) 300 mg tablet TAKE 1 TABLET BY MOUTH EVERY DAY.  omega-3 acid ethyl esters (LOVAZA) 1 gram capsule TAKE 2 CAPSULES TWICE DAILY WITH MEALS.  polyethylene glycol (MIRALAX) 17 gram/dose powder MIX 1 CAPFUL (17 GM) IN LIQUID AND DRINK DAILY.  indomethacin (INDOCIN) 25 mg capsule TAKE 1 CAPSULE BY MOUTH THREE TIMES A DAY AS NEEDED FOR PAIN.  atorvastatin (LIPITOR) 20 mg tablet TAKE 1 TABLET DAILY    ezetimibe (ZETIA) 10 mg tablet TAKE 1 TABLET EVERY DAY    potassium chloride SA (MICRO-K) 10 mEq capsule TAKE 1 CAPSULE TWICE DAILY    amLODIPine (NORVASC) 10 mg tablet Take 1 Tab by mouth daily.  Lancets (ACCU-CHEK SOFTCLIX LANCETS) misc Check blood sugar one to two times daily    L GASSERI/B BIFIDUM/B LONGUM ([facility] PO) Take  by mouth.  scopolamine (TRANSDERM-SCOP) 1.5 mg 1 Patch by TransDERmal route every seventy-two (72) hours.  vitamin E (AQUA GEMS) 400 unit capsule Take 800 Units by mouth daily.  lidocaine (LIDODERM) 5 %(700 mg/patch) 1 patch by TransDERmal route every twenty-four (24) hours.  Apply patch to the affected area for 12 hours a day and remove for 12 hours a day.  cholecalciferol, vitamin D3, (VITAMIN D3) 2,000 unit tab Take  by mouth.  traMADol (ULTRAM) 50 mg tablet Take 1 tablet by mouth every six (6) hours as needed for Pain.  OTHER Accu-Chek Lady Plus Kit  Check blood sugar one to two times daily    glucose blood VI test strips (ACCU-CHEK LADY PLUS TEST STRP) strip Check blood sugar one to two times daily    MULTIVITAMIN WITH MINERALS (ONE-A-DAY 50 PLUS PO) Take  by mouth.  aspirin 81 mg chewable tablet Take 81 mg by mouth daily.  fluocinoNIDE (LIDEX) 0.05 % topical cream Apply  to affected area two (2) times a day. No current facility-administered medications for this visit.       Allergies   Allergen Reactions    Celebrex [Celecoxib] Hives    Pcn [Penicillins] Unknown (comments)     Can't remember    Sulfa (Sulfonamide Antibiotics) Hives       Review of Systems:  - Eyes: no blurry vision or double vision  - Cardiovascular: no chest pain  - Respiratory: no shortness of breath   Neurological: no numbness/tingling in extremities    Physical Examination:  Visit Vitals    /66    Pulse 69    Ht 5' 3\" (1.6 m)    Wt 211 lb (95.7 kg)    BMI 37.38 kg/m2   -   - General: pleasant, no distress, normal gait   HEENT: hearing intact, EOMI, clear sclera without icterus  - Cardiovascular: regular, normal rate   - Respiratory: normal effort  - Integumentary: no edema   Diabetic foot exam:     Left: Filament test normal sensation with micro filament   Pulse DP: 2+ (normal)   Deformities: None  - Psychiatric: normal mood and affect    Data Reviewed:   Component      Latest Ref Rng & Units 11/21/2017 8/28/2017 8/28/2017 8/28/2017          12:00 PM 12:00 AM 12:00 AM 12:00 AM   Glucose      65 - 99 mg/dL   104 (H)    BUN      8 - 27 mg/dL   14    Creatinine      0.57 - 1.00 mg/dL   0.68    GFR est non-AA      >59 mL/min/1.73   88    GFR est AA      >59 mL/min/1.73   101    BUN/Creatinine ratio      12 - 28   21    Sodium 134 - 144 mmol/L   143    Potassium      3.5 - 5.2 mmol/L   3.9    Chloride      96 - 106 mmol/L   98    CO2      18 - 29 mmol/L   28    Calcium      8.7 - 10.3 mg/dL   10.2    Protein, total      6.0 - 8.5 g/dL   7.2    Albumin      3.5 - 4.8 g/dL   4.6    GLOBULIN, TOTAL      1.5 - 4.5 g/dL   2.6    A-G Ratio      1.2 - 2.2   1.8    Bilirubin, total      0.0 - 1.2 mg/dL   0.5    Alk. phosphatase      39 - 117 IU/L   133 (H)    AST      0 - 40 IU/L   59 (H)    ALT (SGPT)      0 - 32 IU/L   58 (H)    Cholesterol, total      100 - 199 mg/dL       Triglyceride      0 - 149 mg/dL       HDL Cholesterol      >39 mg/dL       VLDL, calculated      5 - 40 mg/dL       LDL, calculated      0 - 99 mg/dL       Hemoglobin A1c, (calculated)      4.8 - 5.6 %  6.3 (H)     Estimated average glucose      mg/dL  134     TSH      0.450 - 4.500 uIU/mL    1.150   GLUCOSE,FAST - POC      mg/dL 109        Component      Latest Ref Rng & Units 7/15/2016          12:00 AM   Glucose      65 - 99 mg/dL    BUN      8 - 27 mg/dL    Creatinine      0.57 - 1.00 mg/dL    GFR est non-AA      >59 mL/min/1.73    GFR est AA      >59 mL/min/1.73    BUN/Creatinine ratio      12 - 28    Sodium      134 - 144 mmol/L    Potassium      3.5 - 5.2 mmol/L    Chloride      96 - 106 mmol/L    CO2      18 - 29 mmol/L    Calcium      8.7 - 10.3 mg/dL    Protein, total      6.0 - 8.5 g/dL    Albumin      3.5 - 4.8 g/dL    GLOBULIN, TOTAL      1.5 - 4.5 g/dL    A-G Ratio      1.2 - 2.2    Bilirubin, total      0.0 - 1.2 mg/dL    Alk. phosphatase      39 - 117 IU/L    AST      0 - 40 IU/L    ALT (SGPT)      0 - 32 IU/L    Cholesterol, total      100 - 199 mg/dL 166   Triglyceride      0 - 149 mg/dL 131   HDL Cholesterol      >39 mg/dL 66   VLDL, calculated      5 - 40 mg/dL 26   LDL, calculated      0 - 99 mg/dL 74       Assessment/Plan:   1.  Type 2 diabetes mellitus without complication, without long-term current use of insulin (HCC)   - control appears improved due to dietary efforts and weight loss  - continue metformin  - check hemoglobin A1c       2. Hypothyroidism, unspecified type   - TSH was at goal in 8/2017. Will check every 6 months or so. Continue 175 mcg most days and extra 1/2 tablet on Sundays for 7.5 total/week   3. Osteoporosis, unspecified osteoporosis type, unspecified pathological fracture presence   - start Prolia in 2018   4 Thrombocytopenia ([address]   - related to cirrhosis. Repeat and forward to Dr Damien Henson team and send Mrs Marybeth Gu letter   5 Cirrhosis of liver without ascites, unspecified hepatic cirrhosis type ([address]   - continue weight loss effort  - labs - > send letter to her and fax to Dr Antoine Puentes     Patient Instructions   Osteoporosis:  Continue vitamin D today - 1000 units    Will consider Prolia after New year      Hypothyroidism:  Continue levothyroxine 175 mcg. Diabetes. - continue efforts to decrease/avoid intake of bread, pasta, rice, potatoes  - continue exercise. Continue weight loss efforts. You are making progress here. Continue metformin  mg twice daily. Liver:  Continue efforts with exercise, weight loss, and avoiding added sugars. Follow-up Disposition:  Return in about 3 months (around 2/21/2018).     Copy sent to:

## 2017-11-21 NOTE — PATIENT INSTRUCTIONS
Osteoporosis:  Continue vitamin D today - 1000 units    Will consider Prolia after New year      Hypothyroidism:  Continue levothyroxine 175 mcg. Diabetes. - continue efforts to decrease/avoid intake of bread, pasta, rice, potatoes  - continue exercise. Continue weight loss efforts. You are making progress here. Continue metformin  mg twice daily. Liver:  Continue efforts with exercise, weight loss, and avoiding added sugars.

## 2017-11-22 LAB
ALBUMIN SERPL-MCNC: 4.7 G/DL (ref 3.5–4.8)
ALBUMIN/GLOB SERPL: 1.9 {RATIO} (ref 1.2–2.2)
ALP SERPL-CCNC: 108 IU/L (ref 39–117)
ALT SERPL-CCNC: 50 IU/L (ref 0–32)
AST SERPL-CCNC: 54 IU/L (ref 0–40)
BASOPHILS # BLD AUTO: 0 X10E3/UL (ref 0–0.2)
BASOPHILS NFR BLD AUTO: 0 %
BILIRUB SERPL-MCNC: 0.5 MG/DL (ref 0–1.2)
BUN SERPL-MCNC: 15 MG/DL (ref 8–27)
BUN/CREAT SERPL: 25 (ref 12–28)
CALCIUM SERPL-MCNC: 10.1 MG/DL (ref 8.7–10.3)
CHLORIDE SERPL-SCNC: 97 MMOL/L (ref 96–106)
CHOLEST SERPL-MCNC: 143 MG/DL (ref 100–199)
CO2 SERPL-SCNC: 29 MMOL/L (ref 18–29)
CREAT SERPL-MCNC: 0.6 MG/DL (ref 0.57–1)
EOSINOPHIL # BLD AUTO: 0.1 X10E3/UL (ref 0–0.4)
EOSINOPHIL NFR BLD AUTO: 3 %
ERYTHROCYTE [DISTWIDTH] IN BLOOD BY AUTOMATED COUNT: 14.2 % (ref 12.3–15.4)
EST. AVERAGE GLUCOSE BLD GHB EST-MCNC: 126 MG/DL
GFR SERPLBLD CREATININE-BSD FMLA CKD-EPI: 105 ML/MIN/1.73
GFR SERPLBLD CREATININE-BSD FMLA CKD-EPI: 91 ML/MIN/1.73
GLOBULIN SER CALC-MCNC: 2.5 G/DL (ref 1.5–4.5)
GLUCOSE SERPL-MCNC: 89 MG/DL (ref 65–99)
HBA1C MFR BLD: 6 % (ref 4.8–5.6)
HCT VFR BLD AUTO: 39.7 % (ref 34–46.6)
HDLC SERPL-MCNC: 55 MG/DL
HGB BLD-MCNC: 13.3 G/DL (ref 11.1–15.9)
IMM GRANULOCYTES # BLD: 0 X10E3/UL (ref 0–0.1)
IMM GRANULOCYTES NFR BLD: 0 %
LDLC SERPL CALC-MCNC: 50 MG/DL (ref 0–99)
LYMPHOCYTES # BLD AUTO: 1.3 X10E3/UL (ref 0.7–3.1)
LYMPHOCYTES NFR BLD AUTO: 23 %
MCH RBC QN AUTO: 32.3 PG (ref 26.6–33)
MCHC RBC AUTO-ENTMCNC: 33.5 G/DL (ref 31.5–35.7)
MCV RBC AUTO: 96 FL (ref 79–97)
MONOCYTES # BLD AUTO: 0.5 X10E3/UL (ref 0.1–0.9)
MONOCYTES NFR BLD AUTO: 9 %
NEUTROPHILS # BLD AUTO: 3.7 X10E3/UL (ref 1.4–7)
NEUTROPHILS NFR BLD AUTO: 65 %
PLATELET # BLD AUTO: 119 X10E3/UL (ref 150–379)
POTASSIUM SERPL-SCNC: 3.6 MMOL/L (ref 3.5–5.2)
PROT SERPL-MCNC: 7.2 G/DL (ref 6–8.5)
RBC # BLD AUTO: 4.12 X10E6/UL (ref 3.77–5.28)
SODIUM SERPL-SCNC: 143 MMOL/L (ref 134–144)
TRIGL SERPL-MCNC: 191 MG/DL (ref 0–149)
TSH SERPL DL<=0.005 MIU/L-ACNC: 0.37 UIU/ML (ref 0.45–4.5)
VLDLC SERPL CALC-MCNC: 38 MG/DL (ref 5–40)
WBC # BLD AUTO: 5.7 X10E3/UL (ref 3.4–10.8)

## 2017-11-27 ENCOUNTER — HOSPITAL ENCOUNTER (OUTPATIENT)
Dept: ULTRASOUND IMAGING | Age: 72
Discharge: HOME | End: 2017-11-27
Payer: MEDICARE

## 2017-11-27 ENCOUNTER — OFFICE VISIT (OUTPATIENT)
Dept: INTERNAL MEDICINE CLINIC | Age: 72
End: 2017-11-27

## 2017-11-27 VITALS
HEART RATE: 72 BPM | WEIGHT: 214 LBS | OXYGEN SATURATION: 94 % | HEIGHT: 63 IN | DIASTOLIC BLOOD PRESSURE: 71 MMHG | TEMPERATURE: 97.4 F | BODY MASS INDEX: 37.92 KG/M2 | SYSTOLIC BLOOD PRESSURE: 137 MMHG

## 2017-11-27 DIAGNOSIS — E11.9 TYPE 2 DIABETES MELLITUS WITHOUT COMPLICATION, WITHOUT LONG-TERM CURRENT USE OF INSULIN (HCC): Primary | ICD-10-CM

## 2017-11-27 DIAGNOSIS — E03.9 HYPOTHYROIDISM, UNSPECIFIED TYPE: ICD-10-CM

## 2017-11-27 DIAGNOSIS — I10 ESSENTIAL HYPERTENSION: ICD-10-CM

## 2017-11-27 DIAGNOSIS — N39.3 STRESS INCONTINENCE OF URINE: ICD-10-CM

## 2017-11-27 DIAGNOSIS — K75.81 NASH (NONALCOHOLIC STEATOHEPATITIS): ICD-10-CM

## 2017-11-27 DIAGNOSIS — K76.0 FATTY LIVER: ICD-10-CM

## 2017-11-27 DIAGNOSIS — M10.9 INTERCRITICAL GOUT: ICD-10-CM

## 2017-11-27 DIAGNOSIS — K74.00 LIVER FIBROSIS: ICD-10-CM

## 2017-11-27 DIAGNOSIS — E78.00 PURE HYPERCHOLESTEROLEMIA: ICD-10-CM

## 2017-11-27 DIAGNOSIS — M51.36 DDD (DEGENERATIVE DISC DISEASE), LUMBAR: ICD-10-CM

## 2017-11-27 PROCEDURE — 76705 ECHO EXAM OF ABDOMEN: CPT

## 2017-11-27 NOTE — PROGRESS NOTES
HISTORY OF PRESENT ILLNESS  Juliano Vazquez is a 67 y.o. female. HPI     F/u DM-2  hld hypothyroid osteoporosis  Just saw Dr Hyatt  for Dm-2--a1c down to 6.0 after metformin was added this year  Weight down 6 lbs from last visit--not eating as much  May be started on prolia for osteoporosis in 2018 once insurance changes per pt  Will get liver US at [facility] Dr Laure Zaragoza at [facility] and repeat liver bx was recommeneded--BRADY with bridging fibrosis but has cirrhosis by fibroscan per notes from [facility]--pt reluectant to get repeat liver bx----  Had EGD--no varices-Dr Fred Yates  Takes tramadol 1-3 / day for back and leg pain -Dr Trena Felty  Has not had gotu attack in 4 yrs-prvious ly about 2 attacks per year. Takes daily colchicine, wants to stop the colchinie  Patient Active Problem List    Diagnosis Date Noted    Prediabetes 04/11/2016    Thrombocytopenia ([address] 01/24/2015    HTN (hypertension) 01/17/2014    Bifascicular block 12/23/2010    Fatty liver 06/18/2010    Pure hypercholesterolemia 06/18/2010    Colon polyp 06/18/2010    Gout 06/14/2010    Hypothyroidism 06/14/2010    Osteoporosis 06/14/2010    Anxiety 06/14/2010    Leg edema 06/14/2010    RSD lower limb 06/14/2010     Current Outpatient Prescriptions   Medication Sig Dispense Refill    losartan (COZAAR) 25 mg tablet TAKE 1 TABLET BY MOUTH DAILY. 90 Tab 3    levothyroxine (SYNTHROID) 175 mcg tablet TAKE ONE DAILY BY MOUTH. TAKE AN EXTRA 1/2 PILL ON SUNDAYS. (7.5 TABLETS/WEEK  MCG) 32 Tab 10    metFORMIN ER (GLUCOPHAGE XR) 500 mg tablet Take 1 Tab by mouth two (2) times daily (after meals). For diabetes. 60 Tab 10    neomycin-polymyxin-dexamethasone (MAXITROL) ophthalmic suspension instill 1 drop into right eye four times a day  0    FREESTYLE LANCETS 28 gauge misc   3    fluticasone (FLONASE) 50 mcg/actuation nasal spray 2 Sprays by Both Nostrils route daily. Administer to right and left nostril.  3 Bottle 3    furosemide (LASIX) 80 mg tablet TAKE 1 TABLET EVERY DAY 90 Tab 3    loratadine (CLARITIN) 10 mg tablet Take 1 Tab by mouth daily. 30 Tab 5    colchicine 0.6 mg tablet TAKE 1 TABLET EVERY DAY 90 Tab 3    raNITIdine (ZANTAC) 300 mg tablet TAKE 1 TABLET BY MOUTH EVERY DAY. 30 Tab 11    omega-3 acid ethyl esters (LOVAZA) 1 gram capsule TAKE 2 CAPSULES TWICE DAILY WITH MEALS. 360 Cap 3    polyethylene glycol (MIRALAX) 17 gram/dose powder MIX 1 CAPFUL (17 GM) IN LIQUID AND DRINK DAILY. 1581 g 3    indomethacin (INDOCIN) 25 mg capsule TAKE 1 CAPSULE BY MOUTH THREE TIMES A DAY AS NEEDED FOR PAIN. 30 Cap 6    atorvastatin (LIPITOR) 20 mg tablet TAKE 1 TABLET DAILY 90 Tab 3    ezetimibe (ZETIA) 10 mg tablet TAKE 1 TABLET EVERY DAY 90 Tab 3    potassium chloride SA (MICRO-K) 10 mEq capsule TAKE 1 CAPSULE TWICE DAILY 180 Cap 3    amLODIPine (NORVASC) 10 mg tablet Take 1 Tab by mouth daily. 90 Tab 3    Lancets (ACCU-CHEK SOFTCLIX LANCETS) misc Check blood sugar one to two times daily 3 Package 3    L GASSERI/B BIFIDUM/B LONGUM ([employer] PO) Take  by mouth.  scopolamine (TRANSDERM-SCOP) 1.5 mg 1 Patch by TransDERmal route every seventy-two (72) hours. 4 Patch 1    vitamin E (AQUA GEMS) 400 unit capsule Take 800 Units by mouth daily.  lidocaine (LIDODERM) 5 %(700 mg/patch) 1 patch by TransDERmal route every twenty-four (24) hours. Apply patch to the affected area for 12 hours a day and remove for 12 hours a day.  cholecalciferol, vitamin D3, (VITAMIN D3) 2,000 unit tab Take  by mouth.  traMADol (ULTRAM) 50 mg tablet Take 1 tablet by mouth every six (6) hours as needed for Pain. 12 tablet 0    OTHER Accu-Chek Lady Plus Kit  Check blood sugar one to two times daily 1 kit 3    glucose blood VI test strips (ACCU-CHEK LADY PLUS TEST STRP) strip Check blood sugar one to two times daily 3 Package 3    MULTIVITAMIN WITH MINERALS (ONE-A-DAY 50 PLUS PO) Take  by mouth.       aspirin 81 mg chewable tablet Take 81 mg by mouth daily.  fluocinoNIDE (LIDEX) 0.05 % topical cream Apply  to affected area two (2) times a day. 60 g 3     Allergies   Allergen Reactions    Celebrex [Celecoxib] Hives    Pcn [Penicillins] Unknown (comments)     Can't remember    Sulfa (Sulfonamide Antibiotics) Hives      Lab Results  Component Value Date/Time   Hemoglobin A1c 6.0 11/21/2017 04:00 PM   Hemoglobin A1c 6.3 08/28/2017 12:00 AM   Hemoglobin A1c 7.1 04/13/2017 12:06 PM   Hemoglobin A1c, External 6.4 06/21/2016   Glucose 89 11/21/2017 04:00 PM   Glucose (POC) 129 11/17/2015 07:23 AM   Glucose  11/21/2017 12:00 PM   LDL, calculated 50 11/21/2017 04:00 PM   Creatinine 0.60 11/21/2017 04:00 PM      Lab Results  Component Value Date/Time   Cholesterol, total 143 11/21/2017 04:00 PM   HDL Cholesterol 55 11/21/2017 04:00 PM   LDL, calculated 50 11/21/2017 04:00 PM   Triglyceride 191 11/21/2017 04:00 PM   CHOL/HDL Ratio 2.7 06/14/2010 11:16 AM     Lab Results  Component Value Date/Time   GFR est non-AA 91 11/21/2017 04:00 PM   GFR est  11/21/2017 04:00 PM   Creatinine 0.60 11/21/2017 04:00 PM   BUN 15 11/21/2017 04:00 PM   Sodium 143 11/21/2017 04:00 PM   Potassium 3.6 11/21/2017 04:00 PM   Chloride 97 11/21/2017 04:00 PM   CO2 29 11/21/2017 04:00 PM   Magnesium 2.2 08/28/2017 12:00 AM   PTH, Intact 31 01/06/2016 09:55 AM          ROS    Physical Exam   Constitutional: She appears well-developed and well-nourished. Appears stated age   Cardiovascular: Normal rate, regular rhythm and normal heart sounds. Exam reveals no gallop and no friction rub. No murmur heard. Pulmonary/Chest: Effort normal and breath sounds normal. No respiratory distress. She has no wheezes. Abdominal: Soft. Bowel sounds are normal.   Musculoskeletal: She exhibits no edema. Neurological: She is alert. Psychiatric: She has a normal mood and affect. Nursing note and vitals reviewed.       ASSESSMENT and PLAN  Diagnoses and all orders for this visit: 1. Type 2 diabetes mellitus without complication, without long-term current use of insulin (HCC)  -     REFERRAL TO ENDOCRINOLOGY    2. Pure hypercholesterolemia    3. Essential hypertension    4. Hypothyroidism, unspecified type    5. Fatty liver  -     F/u [facility]  Hepatology clinic   Continue with weight reduction efforts    6. Stress incontinence of urine   Refer Dr João Ocampo  7. DDD (degenerative disc disease), lumbar  -     REFERRAL TO PHYSIATRY    8. Intercritical gout   Ok to stop colchicine   Can go to allopurinol if has frequent recurring gout but not needed right now    Follow-up Disposition:  Return in about 6 months (around 5/27/2018) for dm-2 htn hld gout fatty liver. Addendum    Pt has type 2 diabetes without complications--well controlled  The patient has fatty liver disease. Has BRADY with bridging fibrosis by liver biopsy. Does not have liver Cirrhosis on bx but has suggestion of cirrhosis by fibroscan. Has not had a repeat liver biopsy. BMI is 37.

## 2017-11-27 NOTE — MR AVS SNAPSHOT
Visit Information Date & Time Provider Department Dept. Phone Encounter #  
 11/27/2017  2:45 PM Max Velasco, [address] [place] [address],[address] [phone] [id number] Follow-up Instructions Return in about 6 months (around 5/27/2018) for dm-2 htn hld gout fatty liver. Your Appointments 3/22/2018 11:50 AM  
ROUTINE CARE with MD Benny Polanco Diabetes and Endocrinology [facility] Appt Note: f/u diabetes cp0.00  
 [address] [place] [address]  
[id number] [place] [id number] Upcoming Health Maintenance Date Due Hepatitis C Screening 1945 EYE EXAM RETINAL OR DILATED Q1 7/25/2013 GLAUCOMA SCREENING Q2Y 1/28/2016 MEDICARE YEARLY EXAM 4/12/2018 MICROALBUMIN Q1 5/12/2018 HEMOGLOBIN A1C Q6M 5/21/2018 FOOT EXAM Q1 11/21/2018 LIPID PANEL Q1 11/21/2018 BREAST CANCER SCRN MAMMOGRAM 2/28/2019 COLONOSCOPY 2/11/2020 DTaP/Tdap/Td series (2 - Td) 11/24/2025 Allergies as of 11/27/2017  Review Complete On: 11/27/2017 By: Max Velasco MD  
  
 Severity Noted Reaction Type Reactions Celebrex [Celecoxib]  10/05/2009    Hives Pcn [Penicillins]  10/05/2009    Unknown (comments) Can't remember Sulfa (Sulfonamide Antibiotics)  10/05/2009    Hives Current Immunizations  Reviewed on 1/12/2015 Name Date Influenza Vaccine 10/1/2013 ZZZ-RETIRED (DO NOT USE) Pneumococcal Vaccine (Unspecified Type) 10/5/2010 Not reviewed this visit You Were Diagnosed With   
  
 Codes Comments Type 2 diabetes mellitus without complication, without long-term current use of insulin (HCC)    -  Primary ICD-10-CM: E11.9 ICD-9-CM: 250.00 Pure hypercholesterolemia     ICD-10-CM: E78.00 ICD-9-CM: 272.0 Essential hypertension     ICD-10-CM: I10 
ICD-9-CM: 401.9 Hypothyroidism, unspecified type     ICD-10-CM: E03.9 ICD-9-CM: 244.9 Fatty liver     ICD-10-CM: K76.0 ICD-9-CM: 571.8 Stress incontinence of urine     ICD-10-CM: N39.3 ICD-9-CM: [id number] DDD (degenerative disc disease), lumbar     ICD-10-CM: M51.36 
ICD-9-CM: 722.52 Intercritical gout     ICD-10-CM: M10.9 ICD-9-CM: 274.9 Vitals BP Pulse Temp Height(growth percentile) Weight(growth percentile) SpO2  
 137/71 (BP 1 Location: Left arm, BP Patient Position: Sitting) 72 97.4 °F (36.3 °C) (Oral) 5' 3\" (1.6 m) 214 lb (97.1 kg) 94% BMI OB Status Smoking Status 37.91 kg/m2 Hysterectomy Never Smoker BMI and BSA Data Body Mass Index Body Surface Area  
 37.91 kg/m 2 2.08 m 2 Preferred Pharmacy Pharmacy Name Phone [facility]. [phone] Your Updated Medication List  
  
   
This list is accurate as of: 11/27/17  3:04 PM.  Always use your most recent med list. amLODIPine 10 mg tablet Commonly known as:  [place] Salle Take 1 Tab by mouth daily. aspirin 81 mg chewable tablet Take 81 mg by mouth daily. atorvastatin 20 mg tablet Commonly known as:  LIPITOR  
TAKE 1 TABLET DAILY  
  
 ezetimibe 10 mg tablet Commonly known as:  Xenia Cohens TAKE 1 TABLET EVERY DAY  
  
 fluocinoNIDE 0.05 % topical cream  
Commonly known as:  LIDEX Apply  to affected area two (2) times a day. fluticasone 50 mcg/actuation nasal spray Commonly known as:  Alric Eaves 2 Sprays by Both Nostrils route daily. Administer to right and left nostril. furosemide 80 mg tablet Commonly known as:  LASIX TAKE 1 TABLET EVERY DAY  
  
 glucose blood VI test strips strip Commonly known as:  ACCU-CHEK MILA PLUS TEST STRP Check blood sugar one to two times daily  
  
 indomethacin 25 mg capsule Commonly known as:  INDOCIN  
TAKE 1 CAPSULE BY MOUTH THREE TIMES A DAY AS NEEDED FOR PAIN. * Lancets Misc Commonly known as:  ACCU-CHEK SOFTCLIX LANCETS  
 Check blood sugar one to two times daily * FREESTYLE LANCETS 28 gauge Misc Generic drug:  lancets  
  
 levothyroxine 175 mcg tablet Commonly known as:  SYNTHROID  
TAKE ONE DAILY BY MOUTH. TAKE AN EXTRA 1/2 PILL ON SUNDAYS. (7.5 TABLETS/WEEK  MCG) lidocaine 5 % Commonly known as:  Sanjeev Ponce 1 patch by TransDERmal route every twenty-four (24) hours. Apply patch to the affected area for 12 hours a day and remove for 12 hours a day. loratadine 10 mg tablet Commonly known as:  Edison Britain Take 1 Tab by mouth daily. losartan 25 mg tablet Commonly known as:  COZAAR  
TAKE 1 TABLET BY MOUTH DAILY. metFORMIN  mg tablet Commonly known as:  GLUCOPHAGE XR Take 1 Tab by mouth two (2) times daily (after meals). For diabetes. neomycin-polymyxin-dexamethasone 3.5mg/mL-10,000 unit/mL-0.1 % ophthalmic suspension Commonly known as:  DEXACIDIN, MAXITROL  
instill 1 drop into right eye four times a day  
  
 omega-3 acid ethyl esters 1 gram capsule Commonly known as:  Bethene Daniella TAKE 2 CAPSULES TWICE DAILY WITH MEALS. ONE-A-DAY 50 PLUS PO Take  by mouth. OTHER Accu-Chek Lady Plus Kit  Check blood sugar one to two times daily [address] Take  by mouth.  
  
 polyethylene glycol 17 gram/dose powder Commonly known as:  Reginia Crazier MIX 1 CAPFUL (17 GM) IN LIQUID AND DRINK DAILY. potassium chloride SA 10 mEq capsule Commonly known as:  MICRO-K  
TAKE 1 CAPSULE TWICE DAILY  
  
 raNITIdine 300 mg tablet Commonly known as:  ZANTAC TAKE 1 TABLET BY MOUTH EVERY DAY. scopolamine 1 mg over 3 days Pt3d Commonly known as:  TRANSDERM-SCOP  
1 Patch by TransDERmal route every seventy-two (72) hours. traMADol 50 mg tablet Commonly known as:  ULTRAM  
Take 1 tablet by mouth every six (6) hours as needed for Pain. VITAMIN D3 2,000 unit Tab Generic drug:  cholecalciferol (vitamin D3) Take  by mouth. vitamin E 400 unit capsule Commonly known as:  [address] Take 800 Units by mouth daily. * Notice: This list has 2 medication(s) that are the same as other medications prescribed for you. Read the directions carefully, and ask your doctor or other care provider to review them with you. We Performed the Following REFERRAL TO ENDOCRINOLOGY [OOL61 Custom] REFERRAL TO OBSTETRICS AND GYNECOLOGY [REF51 Custom] REFERRAL TO PHYSIATRY [WUK323 Custom] Follow-up Instructions Return in about 6 months (around 5/27/2018) for dm-2 htn hld gout fatty liver. Referral Information Referral ID Referred By Referred To  
  
 [id number] DENYS, [address] [place], [address] Visits Status Start Date End Date 1 New Request 11/27/17 11/27/18 If your referral has a status of pending review or denied, additional information will be sent to support the outcome of this decision. Referral ID Referred By Referred To  
 [id number] Daniel MCFARLANE. [address] Ankit Barney MD  
   [address] [address] [address] [facility] Phone: [phone] Fax: [phone] Visits Status Start Date End Date 1 New Request 11/27/17 11/27/18 If your referral has a status of pending review or denied, additional information will be sent to support the outcome of this decision. Referral ID Referred By Referred To  
 [id number] DENYS [address], [place], [place]  
   [address] [facility] [address] [place] [address] [address] [place] Phone: [phone] Fax: [phone] Visits Status Start Date End Date 1 New Request 11/27/17 11/27/18 If your referral has a status of pending review or denied, additional information will be sent to support the outcome of this decision. Introducing [facility]!    
 Mary Anne Pacheco introduces [employer] patient portal. Now you can access parts of your medical record, email your doctor's office, and request medication refills online. 1. In your internet browser, go to [email or web address] 2. Click on the First Time User? Click Here link in the Sign In box. You will see the New Member Sign Up page. 3. Enter your [employer] Access Code exactly as it appears below. You will not need to use this code after youve completed the sign-up process. If you do not sign up before the expiration date, you must request a new code. · [employer] Access Code: [email or web address] Expires: 2/8/2018 10:32 AM 
 
4. Enter the last four digits of your Social Security Number (xxxx) and Date of Birth (mm/dd/yyyy) as indicated and click Submit. You will be taken to the next sign-up page. 5. Create a SIVI ID. This will be your [employer]I login ID and cannot be changed, so think of one that is secure and easy to remember. 6. Create a [employer]IVI password. You can change your password at any time. 7. Enter your Password Reset Question and Answer. This can be used at a later time if you forget your password. 8. Enter your e-mail address. You will receive e-mail notification when new information is available in [address] 9. Click Sign Up. You can now view and download portions of your medical record. 10. Click the Download Summary menu link to download a portable copy of your medical information. If you have questions, please visit the Frequently Asked Questions section of the [employer] website. Remember, [employer] is NOT to be used for urgent needs. For medical emergencies, dial 911. Now available from your iPhone and Android! Please provide this summary of care documentation to your next provider. Your primary care clinician is listed as Gladys MCFARLANE. If you have any questions after today's visit, please call [phone].

## 2017-11-27 NOTE — PROGRESS NOTES
Patient is here today for 8 month follow up  Diabetes,hypertension,cholesterol and fatty  Liver. Patient is requesting several referrals.

## 2017-11-28 PROBLEM — E11.9 CONTROLLED TYPE 2 DIABETES MELLITUS WITHOUT COMPLICATION (HCC): Status: ACTIVE | Noted: 2017-11-28

## 2017-12-04 ENCOUNTER — TELEPHONE (OUTPATIENT)
Dept: INTERNAL MEDICINE CLINIC | Age: 72
End: 2017-12-04

## 2017-12-04 NOTE — TELEPHONE ENCOUNTER
#[phone] pt wants to know if she can take indomethacin for the gout or does she need to go to the hospital?  Please advise today

## 2017-12-04 NOTE — TELEPHONE ENCOUNTER
Spoke with patient after 2 patient identifiers being note and advised per Dr. Tristen Bhatti that patient could take indocin TID edin call tomorrow if not better. Patient expressed understanding and has no further questions at this time.

## 2017-12-28 NOTE — PROGRESS NOTES
I called patient and reviewed Dr. Tavares Watson notes regarding her lab results and she voiced understanding. Patient asked how frequently she should monitor her blood sugar?

## 2017-12-28 NOTE — PROGRESS NOTES
Svetlana Evangelista  Please call and mail lab letter. Thank you. TSH is mildly low. This has been normal for the last 2 years. We can either repeat this again with next labs, or she can decrease her dose back to levothyroxine 175 mcg every day (I have that she was taking 175 mcg most days and extra half tablet on Sundays, for 7.5 tablets total/week)  Liver enzymes have improved over the preceding 6 months.   Platelets are stable as well

## 2017-12-28 NOTE — PROGRESS NOTES
Per Dr. Eileen Naranjo \"  She is only taking metformin, which does not cause low glucoses and, from her A1c, her glucoses are well controlled. She does not need to monitor often at all. She could check fasting values once weekly or she could not monitor at all. \"   I called patient and read Dr. Jameel Menendez recommendation and she voiced understanding.

## 2018-01-26 RX ORDER — METFORMIN HYDROCHLORIDE 500 MG/1
500 TABLET, EXTENDED RELEASE ORAL
Qty: 60 TAB | Refills: 10 | Status: SHIPPED | OUTPATIENT
Start: 2018-01-26 | End: 2018-12-13 | Stop reason: SDUPTHER

## 2018-02-27 RX ORDER — INDOMETHACIN 25 MG/1
CAPSULE ORAL
Qty: 30 CAP | Refills: 6 | Status: SHIPPED | OUTPATIENT
Start: 2018-02-27 | End: 2020-11-03

## 2018-03-05 ENCOUNTER — TELEPHONE (OUTPATIENT)
Dept: INTERNAL MEDICINE CLINIC | Age: 73
End: 2018-03-05

## 2018-03-05 NOTE — TELEPHONE ENCOUNTER
Spoke with patients [facility] Nurse after 2 patient identifiers being note and advised per Dr. Dayna Marinelli of verbal order for Nursing services related gout flair up and recent bed bound. Patient expressed understanding and has no further questions at this time.

## 2018-03-05 NOTE — TELEPHONE ENCOUNTER
Jazzmine Goodman//Kim ROSALES states she needs a call back in reference to getting verbal orders for Home Health & also needs to know when patient last seen. Please call.  Thank you

## 2018-03-13 ENCOUNTER — OFFICE VISIT (OUTPATIENT)
Dept: INTERNAL MEDICINE CLINIC | Age: 73
End: 2018-03-13

## 2018-03-13 VITALS
TEMPERATURE: 98.1 F | WEIGHT: 198 LBS | DIASTOLIC BLOOD PRESSURE: 84 MMHG | SYSTOLIC BLOOD PRESSURE: 134 MMHG | HEART RATE: 71 BPM | OXYGEN SATURATION: 98 % | BODY MASS INDEX: 35.08 KG/M2 | HEIGHT: 63 IN

## 2018-03-13 DIAGNOSIS — E03.9 HYPOTHYROIDISM, UNSPECIFIED TYPE: ICD-10-CM

## 2018-03-13 DIAGNOSIS — M10.9 ACUTE GOUT OF FOOT, UNSPECIFIED CAUSE, UNSPECIFIED LATERALITY: ICD-10-CM

## 2018-03-13 DIAGNOSIS — E11.9 CONTROLLED TYPE 2 DIABETES MELLITUS WITHOUT COMPLICATION, WITHOUT LONG-TERM CURRENT USE OF INSULIN (HCC): Primary | ICD-10-CM

## 2018-03-13 RX ORDER — COLCHICINE 0.6 MG/1
0.6 CAPSULE ORAL DAILY
Qty: 90 CAP | Refills: 3 | Status: SHIPPED | OUTPATIENT
Start: 2018-03-13 | End: 2018-05-09 | Stop reason: SDUPTHER

## 2018-03-13 RX ORDER — GLUCOSAMINE/CHONDR SU A SOD 750-600 MG
TABLET ORAL
COMMUNITY

## 2018-03-13 RX ORDER — FLUTICASONE PROPIONATE 50 MCG
2 SPRAY, SUSPENSION (ML) NASAL DAILY
Qty: 3 BOTTLE | Refills: 3 | Status: SHIPPED | OUTPATIENT
Start: 2018-03-13 | End: 2020-04-06 | Stop reason: SDUPTHER

## 2018-03-13 NOTE — PROGRESS NOTES
Chief Complaint   Patient presents with    Gout     feet    Labs     complaining of gout requesting Uric acid level, concern about anemia    Medication Refill     Flonase      Health Maintenance   Topic Date Due    Hepatitis C Screening  1945    EYE EXAM RETINAL OR DILATED Q1  07/25/2013    GLAUCOMA SCREENING Q2Y  01/28/2016    MEDICARE YEARLY EXAM  04/12/2018    MICROALBUMIN Q1  05/12/2018    HEMOGLOBIN A1C Q6M  05/21/2018    FOOT EXAM Q1  11/21/2018    LIPID PANEL Q1  11/21/2018    BREAST CANCER SCRN MAMMOGRAM  02/28/2019    COLONOSCOPY  02/11/2020    DTaP/Tdap/Td series (2 - Td) 11/24/2025    Bone Densitometry (Dexa) Screening  Completed    ZOSTER VACCINE AGE 60>  Completed    Pneumococcal 65+ Low/Medium Risk  Completed    Influenza Age 5 to Adult  Completed     Health Maintenance Review

## 2018-03-13 NOTE — PROGRESS NOTES
HISTORY OF PRESENT ILLNESS  Versa Spurling Danny Ranks is a 67 y.o. female. HPI   Had gout attack 2 weeks ago b/l ankles-treated at [address] with indocin  Couldn't walk so home health was called but did not participate with her insurance so did not get services but doing well now  Also had gout attack in December treated at [facility]  Colchicine was stopped 3 months ago --had not had a gout attack in 4 years prior to stopping the colchicine. Lost 16 lbs last few months  A1c< 6 at [facility] 3 mos, uric acid was around 8      Last visit:  Just saw  [facility] for Dm-2--a1c down to 6.0 after metformin was added this year  Weight down 6 lbs from last visit--not eating as much  May be started on prolia for osteoporosis in 2018 once insurance changes per pt  Will get liver US at [facility] Dr Mercy Richardson at [facility] and repeat liver bx was recommeneded--BRADY with bridging fibrosis but has cirrhosis by fibroscan per notes from [facility]--pt reluectant to get repeat liver bx----  Had EGD--no varices-Dr Abhilash Vance  Takes tramadol 1-3 / day for back and leg pain -Dr Saint Bakes  Has not had gout attack in 4 yrs-prvious ly about 2 attacks per year. Takes daily colchicine, wants to stop the colchinie    Patient Active Problem List    Diagnosis Date Noted    Controlled type 2 diabetes mellitus without complication ([facility] 06/01/4993    DDD (degenerative disc disease), lumbar 11/27/2017    Prediabetes 04/11/2016    Thrombocytopenia ([facility] [address] 01/24/2015    HTN (hypertension) 01/17/2014    Bifascicular block 12/23/2010    Fatty liver 06/18/2010    Pure hypercholesterolemia 06/18/2010    Colon polyp 06/18/2010    Gout 06/14/2010    Hypothyroidism 06/14/2010    Osteoporosis 06/14/2010    Anxiety 06/14/2010    Leg edema 06/14/2010    RSD lower limb 06/14/2010     Current Outpatient Prescriptions   Medication Sig Dispense Refill    indomethacin (INDOCIN) 25 mg capsule TAKE 1 CAPSULE BY MOUTH THREE TIMES A DAY AS NEEDED FOR PAIN.  30 Cap 6    metFORMIN ER (GLUCOPHAGE XR) 500 mg tablet Take 1 Tab by mouth two (2) times daily (after meals). For diabetes. 60 Tab 10    furosemide (LASIX) 80 mg tablet Take 1 tablet by mouth daily. 90 Tab 3    amLODIPine (NORVASC) 10 mg tablet Take 1 tablet by mouth daily. 90 Tab 3    ZETIA 10 mg tablet Take 1 tablet by mouth daily. 90 Tab 3    omega-3 acid ethyl esters (LOVAZA) 1 gram capsule Take 2 capsules by mouth twice daily with meals. 360 Cap 3    potassium chloride SA (MICRO-K) 10 mEq capsule Take 1 capsule by mouth twice daily. 180 Cap 3    losartan (COZAAR) 25 mg tablet TAKE 1 TABLET BY MOUTH DAILY. 90 Tab 3    levothyroxine (SYNTHROID) 175 mcg tablet TAKE ONE DAILY BY MOUTH. TAKE AN EXTRA 1/2 PILL ON SUNDAYS. (7.5 TABLETS/WEEK  MCG) 32 Tab 10    neomycin-polymyxin-dexamethasone (MAXITROL) ophthalmic suspension instill 1 drop into right eye four times a day  0    FREESTYLE LANCETS 28 gauge misc   3    fluticasone (FLONASE) 50 mcg/actuation nasal spray 2 Sprays by Both Nostrils route daily. Administer to right and left nostril. 3 Bottle 3    loratadine (CLARITIN) 10 mg tablet Take 1 Tab by mouth daily. 30 Tab 5    raNITIdine (ZANTAC) 300 mg tablet TAKE 1 TABLET BY MOUTH EVERY DAY. 30 Tab 11    polyethylene glycol (MIRALAX) 17 gram/dose powder MIX 1 CAPFUL (17 GM) IN LIQUID AND DRINK DAILY. 1581 g 3    atorvastatin (LIPITOR) 20 mg tablet TAKE 1 TABLET DAILY 90 Tab 3    Lancets (ACCU-CHEK SOFTCLIX LANCETS) misc Check blood sugar one to two times daily 3 Package 3    L GASSERI/B BIFIDUM/B LONGUM ([employer] COLON HEALTH PO) Take  by mouth.  scopolamine (TRANSDERM-SCOP) 1.5 mg 1 Patch by TransDERmal route every seventy-two (72) hours. 4 Patch 1    vitamin E (AQUA GEMS) 400 unit capsule Take 800 Units by mouth daily.  lidocaine (LIDODERM) 5 %(700 mg/patch) 1 patch by TransDERmal route every twenty-four (24) hours.  Apply patch to the affected area for 12 hours a day and remove for 12 hours a day.      cholecalciferol, vitamin D3, (VITAMIN D3) 2,000 unit tab Take  by mouth.  traMADol (ULTRAM) 50 mg tablet Take 1 tablet by mouth every six (6) hours as needed for Pain. 12 tablet 0    OTHER Accu-Chek Lady Plus Kit  Check blood sugar one to two times daily 1 kit 3    glucose blood VI test strips (ACCU-CHEK LADY PLUS TEST STRP) strip Check blood sugar one to two times daily 3 Package 3    MULTIVITAMIN WITH MINERALS (ONE-A-DAY 50 PLUS PO) Take  by mouth.  aspirin 81 mg chewable tablet Take 81 mg by mouth daily.  fluocinoNIDE (LIDEX) 0.05 % topical cream Apply  to affected area two (2) times a day.  60 g 3     Allergies   Allergen Reactions    Celebrex [Celecoxib] Hives    Pcn [Penicillins] Unknown (comments)     Can't remember    Sulfa (Sulfonamide Antibiotics) Hives      Lab Results  Component Value Date/Time   Hemoglobin A1c 6.0 (H) 11/21/2017 04:00 PM   Hemoglobin A1c 6.3 (H) 08/28/2017 12:00 AM   Hemoglobin A1c 7.1 (H) 04/13/2017 12:06 PM   Hemoglobin A1c, External 6.4 06/21/2016   Glucose 89 11/21/2017 04:00 PM   Glucose (POC) 129 (H) 11/17/2015 07:23 AM   Glucose  11/21/2017 12:00 PM   LDL, calculated 50 11/21/2017 04:00 PM   Creatinine 0.60 11/21/2017 04:00 PM      Lab Results  Component Value Date/Time   Cholesterol, total 143 11/21/2017 04:00 PM   HDL Cholesterol 55 11/21/2017 04:00 PM   LDL, calculated 50 11/21/2017 04:00 PM   Triglyceride 191 (H) 11/21/2017 04:00 PM   CHOL/HDL Ratio 2.7 06/14/2010 11:16 AM     Lab Results  Component Value Date/Time   GFR est non-AA 91 11/21/2017 04:00 PM   GFR est  11/21/2017 04:00 PM   Creatinine 0.60 11/21/2017 04:00 PM   BUN 15 11/21/2017 04:00 PM   Sodium 143 11/21/2017 04:00 PM   Potassium 3.6 11/21/2017 04:00 PM   Chloride 97 11/21/2017 04:00 PM   CO2 29 11/21/2017 04:00 PM   Magnesium 2.2 08/28/2017 12:00 AM   PTH, Intact 31 01/06/2016 09:55 AM        ROS    Physical Exam   Constitutional: She appears well-developed and well-nourished. Appears stated age   Cardiovascular: Normal rate, regular rhythm and normal heart sounds. Exam reveals no gallop and no friction rub. No murmur heard. Pulmonary/Chest: Effort normal and breath sounds normal. No respiratory distress. She has no wheezes. Abdominal: Soft. Bowel sounds are normal.   Musculoskeletal: She exhibits no edema. Neurological: She is alert. Psychiatric: She has a normal mood and affect. Nursing note and vitals reviewed. ASSESSMENT and PLAN  Diagnoses and all orders for this visit:    1. Controlled type 2 diabetes mellitus without complication, without long-term current use of insulin (HCC)  -     FUNDUS PHOTOGRAPHY    2. Acute gout of foot, unspecified cause, unspecified laterality  -     URIC ACID   Low purine diet info   Restart colchicine 0.6 mg qd  3. Hypothyroidism, unspecified type  -     TSH 3RD GENERATION  -     T4, FREE   F/u labs with Dr Flores Pen appt next week  Other orders  -     colchicine (MITIGARE) 0.6 mg capsule; Take 1 Cap by mouth daily. -     fluticasone (FLONASE) 50 mcg/actuation nasal spray; 2 Sprays by Both Nostrils route daily. Administer to right and left nostril. Follow-up Disposition:  Return in about 4 months (around 7/13/2018) for dm-2 htn gout.

## 2018-03-13 NOTE — MR AVS SNAPSHOT
[address] 
[phone] Patient: Paul Mata MRN:  [id number] Visit Information Date & Time Provider Department Dept. Phone Encounter #  
 3/13/2018  2:45 PM Maximino Vargas, [address] [phone] [id number] Follow-up Instructions Return in about 4 months (around 7/13/2018) for dm-2 htn gout. Your Appointments 3/22/2018 11:50 AM  
ROUTINE CARE with MD [facility] [address]) Appt Note: f/u diabetes cp0.00  
 [address] [place] [address]  
[address] [place] [address]  
  
    
 4/16/2018  1:30 PM  
ROUTINE CARE with Maximino Sam, [address]) Appt Note: 6  month follow up  
 [address] [facility] [address] [phone]  
[address] [facility] [address] Upcoming Health Maintenance Date Due Hepatitis C Screening 1945 EYE EXAM RETINAL OR DILATED Q1 7/25/2013 GLAUCOMA SCREENING Q2Y 1/28/2016 MEDICARE YEARLY EXAM 4/12/2018 MICROALBUMIN Q1 5/12/2018 HEMOGLOBIN A1C Q6M 5/21/2018 FOOT EXAM Q1 11/21/2018 LIPID PANEL Q1 11/21/2018 BREAST CANCER SCRN MAMMOGRAM 2/28/2019 COLONOSCOPY 2/11/2020 DTaP/Tdap/Td series (2 - Td) 11/24/2025 Allergies as of 3/13/2018  Review Complete On: 3/13/2018 By: Duke Ace, LPN Severity Noted Reaction Type Reactions Celebrex [Celecoxib]  10/05/2009    Hives Pcn [Penicillins]  10/05/2009    Unknown (comments) Can't remember Sulfa (Sulfonamide Antibiotics)  10/05/2009    Hives Current Immunizations  Reviewed on 1/12/2015 Name Date Influenza Vaccine 10/1/2013 ZZZ-RETIRED (DO NOT USE) Pneumococcal Vaccine (Unspecified Type) 10/5/2010 Not reviewed this visit You Were Diagnosed With   
  
 Codes Comments Controlled type 2 diabetes mellitus without complication, without long-term current use of insulin ([facility]    -  Primary ICD-10-CM: E11.9 ICD-9-CM: 250.00 Acute gout of foot, unspecified cause, unspecified laterality     ICD-10-CM: M10.9 ICD-9-CM: 274.01 Hypothyroidism, unspecified type     ICD-10-CM: E03.9 ICD-9-CM: 249. 9 Vitals BP Pulse Temp Height(growth percentile) Weight(growth percentile) SpO2  
 134/84 71 98.1 °F (36.7 °C) (Oral) 5' 3\" (1.6 m) 198 lb (89.8 kg) 98% BMI OB Status Smoking Status 35.07 kg/m2 Hysterectomy Never Smoker Vitals History BMI and BSA Data Body Mass Index Body Surface Area 35.07 kg/m 2 2 m 2 Preferred Pharmacy Pharmacy Name Phone [facility], [address] [facility] [phone] Your Updated Medication List  
  
   
This list is accurate as of 3/13/18  3:19 PM.  Always use your most recent med list. amLODIPine 10 mg tablet Commonly known as:  Allena Mansi Take 1 tablet by mouth daily. aspirin 81 mg chewable tablet Take 81 mg by mouth daily. atorvastatin 20 mg tablet Commonly known as:  LIPITOR  
TAKE 1 TABLET DAILY Biotin 2,500 mcg Cap Take  by mouth.  
  
 colchicine 0.6 mg capsule Commonly known as:  Sue Manger Take 1 Cap by mouth daily. fluocinoNIDE 0.05 % topical cream  
Commonly known as:  LIDEX Apply  to affected area two (2) times a day. fluticasone 50 mcg/actuation nasal spray Commonly known as:  Lorry Sinning 2 Sprays by Both Nostrils route daily. Administer to right and left nostril. furosemide 80 mg tablet Commonly known as:  LASIX Take 1 tablet by mouth daily. glucose blood VI test strips strip Commonly known as:  ACCU-CHEK MILA PLUS TEST STRP Check blood sugar one to two times daily  
  
 indomethacin 25 mg capsule Commonly known as:  INDOCIN  
TAKE 1 CAPSULE BY MOUTH THREE TIMES A DAY AS NEEDED FOR PAIN. * Lancets Misc Commonly known as:  ACCU-CHEK SOFTCLIX LANCETS Check blood sugar one to two times daily * FREESTYLE LANCETS 28 gauge Misc Generic drug:  lancets  
  
 levothyroxine 175 mcg tablet Commonly known as:  SYNTHROID  
TAKE ONE DAILY BY MOUTH. TAKE AN EXTRA 1/2 PILL ON SUNDAYS. (7.5 TABLETS/WEEK  MCG) lidocaine 5 % Commonly known as:  Danella Lizzie 1 patch by TransDERmal route every twenty-four (24) hours. Apply patch to the affected area for 12 hours a day and remove for 12 hours a day. loratadine 10 mg tablet Commonly known as:  Jeremiah Lieu Take 1 Tab by mouth daily. losartan 25 mg tablet Commonly known as:  COZAAR  
TAKE 1 TABLET BY MOUTH DAILY. metFORMIN  mg tablet Commonly known as:  GLUCOPHAGE XR Take 1 Tab by mouth two (2) times daily (after meals). For diabetes. neomycin-polymyxin-dexamethasone 3.5mg/mL-10,000 unit/mL-0.1 % ophthalmic suspension Commonly known as:  DEXACIDIN, MAXITROL  
instill 1 drop into right eye four times a day  
  
 omega-3 acid ethyl esters 1 gram capsule Commonly known as:  Avery Jeffries Take 2 capsules by mouth twice daily with meals. ONE-A-DAY 50 PLUS PO Take  by mouth. OTHER Accu-Chek Lady Plus Kit  Check blood sugar one to two times daily [address] Take  by mouth.  
  
 polyethylene glycol 17 gram/dose powder Commonly known as:  Barbara Century MIX 1 CAPFUL (17 GM) IN LIQUID AND DRINK DAILY. potassium chloride SA 10 mEq capsule Commonly known as:  Janine Flake Take 1 capsule by mouth twice daily. raNITIdine 300 mg tablet Commonly known as:  ZANTAC TAKE 1 TABLET BY MOUTH EVERY DAY. scopolamine 1 mg over 3 days Pt3d Commonly known as:  TRANSDERM-SCOP  
1 Patch by TransDERmal route every seventy-two (72) hours. traMADol 50 mg tablet Commonly known as:  ULTRAM  
Take 1 tablet by mouth every six (6) hours as needed for Pain. VITAMIN D3 2,000 unit Tab Generic drug:  cholecalciferol (vitamin D3) Take  by mouth.  
  
 vitamin E 400 unit capsule Commonly known as:  [address] Take 800 Units by mouth daily. ZETIA 10 mg tablet Generic drug:  ezetimibe Take 1 tablet by mouth daily. * Notice: This list has 2 medication(s) that are the same as other medications prescribed for you. Read the directions carefully, and ask your doctor or other care provider to review them with you. Prescriptions Sent to Pharmacy Refills  
 colchicine (MITIGARE) 0.6 mg capsule 3 Sig: Take 1 Cap by mouth daily. Class: Normal  
 Pharmacy: [facility] [address] [facility] #: [phone] Route: Oral  
  
We Performed the Following FUNDUS PHOTOGRAPHY [id number] CPT(R)] T4, FREE [id number] CPT(R)] TSH 3RD GENERATION [01325 CPT(R)] URIC ACID [id number] CPT(R)] Follow-up Instructions Return in about 4 months (around 7/13/2018) for dm-2 htn gout. To-Do List   
 03/23/2018 1:30 PM  
  Appointment with [facility] 5 at [address] [place] [address] ([phone]) Shower or bathe using soap and water. Do not use deodorant, powder, perfumes, or lotion the day of your exam.  If your prior mammograms were not performed at [facility] 6 please bring films with you or forward prior images 2 days before your procedure. Check in at registration 15min before your appointment time unless you were instructed to do otherwise. A script is not necessary, but if you have one, please bring it on the day of the mammogram or have it faxed to the department. [facility] [phone] [facility]  [phone] [facility] [address]  [phone] [facility] [phone] [place] [address] [facility] [phone] Introducing \[facility]\"" & HEALTH SERVICES!    
 [address] [facility] introduces [employer] patient portal. Now you can access parts of your medical record, email your doctor's office, and request medication refills online. 1. In your internet browser, go to [email or web address] 2. Click on the First Time User? Click Here link in the Sign In box. You will see the New Member Sign Up page. 3. Enter your [employer] Access Code exactly as it appears below. You will not need to use this code after youve completed the sign-up process. If you do not sign up before the expiration date, you must request a new code. · [employer] Access Code: [email or web address] Expires: 6/11/2018  3:12 PM 
 
4. Enter the last four digits of your Social Security Number (xxxx) and Date of Birth (mm/dd/yyyy) as indicated and click Submit. You will be taken to the next sign-up page. 5. Create a [employer] ID. This will be your [employer] login ID and cannot be changed, so think of one that is secure and easy to remember. 6. Create a [employer] password. You can change your password at any time. 7. Enter your Password Reset Question and Answer. This can be used at a later time if you forget your password. 8. Enter your e-mail address. You will receive e-mail notification when new information is available in [address] 9. Click Sign Up. You can now view and download portions of your medical record. 10. Click the Download Summary menu link to download a portable copy of your medical information. If you have questions, please visit the Frequently Asked Questions section of the [employer] website. Remember, [employer] is NOT to be used for urgent needs. For medical emergencies, dial 911. Now available from your iPhone and Android! Please provide this summary of care documentation to your next provider. Your primary care clinician is listed as Anoop MCFARLANE. If you have any questions after today's visit, please call [phone].

## 2018-03-13 NOTE — LETTER
3/22/2018 9:15 AM 
 
Ms. Vik [address]vas [address] [facility] [phone] Dear Vik Paiz: 
 
Please find your most recent results below. Resulted Orders FUNDUS PHOTOGRAPHY Result Value Ref Range SEVERITY ALERT (A) Right eye diabetic retinopathy None Right eye macular edema None Right eye other retinopathy None Right eye image quality Gradable Image Left eye diabetic retinopathy Mild (A) Left eye macular edema None Left eye other retinopathy None Left eye image quality Gradable Image Result Retinal Study Result for Martha Medina Result Result renee Richmond 66 y/o, F (: 41-, MRN: [id number]) Result    
  presented to [facility] on 2018 for a retinal imaging  
study of the left and right eyes. Result Result Based on the findings of the study, the following is recommended for Janette MAYA Result Return in 6 months: Return for follow up retina exam in 6 months. Result Result Interpreting Provider's Comments:  No comments provided Result Diagnoses Present: E11.9 - Type 2 diabetes mellitus without complications Result U37.7812 - Type 2 diabetes mellitus with mild nonproliferative diabetic  
retinopathy without macular edema, left eye Result Result Right Eye Findings:   
 Result Normal Result. Negative for Diabetic Retinopathy. Result Result Left Eye Findings:   
 Result Diabetic Retinopathy: Mild Result Result Result This result was electronically signed by Kezia Guidry MD, NPI:  
[id number]; Taxonomy: [id number] on 2018 11:36:14 [facility] time. Result Result NOTE:  Any pathology noted on this diabetic retinal evaluation should be  
confirmed by an appropriate ophthalmic examination.  
  
 
 
RECOMMENDATIONS: 
 Tell pt she does have mild diabetic retinopathy by our scan--should see EYE MD in next 6 mos for another exam  
 
Please call me if you have any questions: [phone] Sincerely, 
 
 
Jason Phillips MD

## 2018-03-14 LAB
LEFT EYE DIABETIC RETINOPATHY: ABNORMAL
LEFT EYE IMAGE QUALITY: ABNORMAL
LEFT EYE MACULAR EDEMA: ABNORMAL
LEFT EYE OTHER RETINOPATHY: ABNORMAL
RESULT: ABNORMAL
RIGHT EYE DIABETIC RETINOPATHY: ABNORMAL
RIGHT EYE IMAGE QUALITY: ABNORMAL
RIGHT EYE MACULAR EDEMA: ABNORMAL
RIGHT EYE OTHER RETINOPATHY: ABNORMAL
SEVERITY: ABNORMAL
T4 FREE SERPL-MCNC: 2.33 NG/DL (ref 0.82–1.77)
TSH SERPL DL<=0.005 MIU/L-ACNC: 1.09 UIU/ML (ref 0.45–4.5)
URATE SERPL-MCNC: 9 MG/DL (ref 2.5–7.1)

## 2018-03-14 NOTE — PROGRESS NOTES
discussed lab--restarted colchicine, low purine diet.  May need to initiate allopurinol-discussed  To see randolph HENNESSY next week regarding thyroid condition

## 2018-03-16 RX ORDER — LOSARTAN POTASSIUM 25 MG/1
TABLET ORAL
Qty: 90 TAB | Refills: 3 | Status: SHIPPED | OUTPATIENT
Start: 2018-03-16 | End: 2019-03-14 | Stop reason: SDUPTHER

## 2018-03-21 NOTE — PROGRESS NOTES
Tell pt she does have mild diabetic retinopathy by our scan--should see EYE MD in next 6 mos for another exam

## 2018-03-22 ENCOUNTER — OFFICE VISIT (OUTPATIENT)
Dept: ENDOCRINOLOGY | Age: 73
End: 2018-03-22

## 2018-03-22 VITALS
SYSTOLIC BLOOD PRESSURE: 135 MMHG | HEART RATE: 71 BPM | WEIGHT: 199 LBS | DIASTOLIC BLOOD PRESSURE: 66 MMHG | HEIGHT: 63 IN | BODY MASS INDEX: 35.26 KG/M2

## 2018-03-22 DIAGNOSIS — K74.60 CIRRHOSIS OF LIVER WITHOUT ASCITES, UNSPECIFIED HEPATIC CIRRHOSIS TYPE (HCC): ICD-10-CM

## 2018-03-22 DIAGNOSIS — M81.0 OSTEOPOROSIS, UNSPECIFIED OSTEOPOROSIS TYPE, UNSPECIFIED PATHOLOGICAL FRACTURE PRESENCE: ICD-10-CM

## 2018-03-22 DIAGNOSIS — E03.9 HYPOTHYROIDISM, UNSPECIFIED TYPE: ICD-10-CM

## 2018-03-22 DIAGNOSIS — E11.9 CONTROLLED TYPE 2 DIABETES MELLITUS WITHOUT COMPLICATION, WITHOUT LONG-TERM CURRENT USE OF INSULIN (HCC): Primary | ICD-10-CM

## 2018-03-22 NOTE — PROGRESS NOTES
Spoke with patient after 2 patient identifiers being note and advised per Dr. Qian Juares pt she does have mild diabetic retinopathy by our scan--should see EYE MD in next 6 mos for another exam . Patient expressed understanding and has no further questions at this time.

## 2018-03-22 NOTE — PATIENT INSTRUCTIONS
Hypothyroidism:  Continue levothyroxine 175 mcg. This dose is working well for you. Diabetes. Continue dietary and weight loss efforts. You are doing very well. Continue metformin  mg twice daily. ** If you have more values less than 80, we will need to consider stopping metformin, even though metformin should not lead to low glucoses    Liver:  Continue efforts with exercise, weight loss, and avoiding added sugars. Osteoporosis:  Continue vitamin D today - 1000 units  I think Prolia would work the best.  Plan on repeating bone density this summer.

## 2018-03-22 NOTE — PROGRESS NOTES
History of Present Illness: Jamia Webber is a 67 y.o. female presents for follow-up of hypothyroidism, diabetes and osteoporosis  She also has dyslipidemia, obesity, fatty liver (with bridging fibrosis 2008 - followed [facility]), gout and arthritis     Diabetes - A1c 6.0 in 11/2017  Generally trying to eat lower carbs.      Glucoses - 30 day avg 123 - checked 56 times. Values range from 60 to low 200s, but most are in the 120 +/-   - unclear what precedes lower glucoses. Some values < 80 occur during the day and others can occur fasting. She is taking metformin  mg twice daily. Gout has been a problem for her of late - both ankles were very sore several weeks ago. Now following a low purine diet.      Osteoporosis:  Diagnosed in 2002. Says she was started on Fosamax initially and then this was changed to [facility] [place] in 2007. She took this until 2011. She then had about a 2 year drug holiday. After BMD in 7/2013, alendronate was resumed until early 2016. BMD in 8/2014 showed BMD to be stable. Remained at very high fx risk. 8/2016 study showed worsening of BMD although values at left femoral neck and L-spine appears stable to improved vs 2012. She has some concerns about starting Prolia, which was recommended, and cost.     Calcium: dietary calcium  Vitamin D: taking 1000 units daily.      Hypothyroidism:  175 mcg daily.  - TSH  Was low in fall, but recently normal.      HTN  amlodipine 10 mg, takes furosemide for fluid, losartan,.  Taking KCL 10 meq twice daily for low potassium  - following with Dr Syed Perez for this     Requests labs to be done for Dr Dee Dee Snow team  Past Medical History:   Diagnosis Date    Arthritis     KNEE,gout    Endocrine disease     HYPOTHYROIDISM    GERD (gastroesophageal reflux disease)     Hypertension     Hypoglycemia     \"my body produces too much insulin\"    Liver disease     BRADY    Nausea & vomiting     when had gallbladder out    Osteoporosis     Other ill-defined conditions(799.89)     spinal stenosis    Other ill-defined conditions(799.89)     BBB    Other ill-defined conditions(799.89)     HIGH CHOLESTEROL    Other ill-defined conditions(799.89)     edema legs    Thyroid disease      Current Outpatient Prescriptions   Medication Sig    losartan (COZAAR) 25 mg tablet TAKE 1 TABLET BY MOUTH DAILY.  Biotin 2,500 mcg cap Take  by mouth.  colchicine (MITIGARE) 0.6 mg capsule Take 1 Cap by mouth daily.  fluticasone (FLONASE) 50 mcg/actuation nasal spray 2 Sprays by Both Nostrils route daily. Administer to right and left nostril.  indomethacin (INDOCIN) 25 mg capsule TAKE 1 CAPSULE BY MOUTH THREE TIMES A DAY AS NEEDED FOR PAIN.  metFORMIN ER (GLUCOPHAGE XR) 500 mg tablet Take 1 Tab by mouth two (2) times daily (after meals). For diabetes.  furosemide (LASIX) 80 mg tablet Take 1 tablet by mouth daily.  amLODIPine (NORVASC) 10 mg tablet Take 1 tablet by mouth daily.  ZETIA 10 mg tablet Take 1 tablet by mouth daily.  omega-3 acid ethyl esters (LOVAZA) 1 gram capsule Take 2 capsules by mouth twice daily with meals.  potassium chloride SA (MICRO-K) 10 mEq capsule Take 1 capsule by mouth twice daily.  levothyroxine (SYNTHROID) 175 mcg tablet TAKE ONE DAILY BY MOUTH. TAKE AN EXTRA 1/2 PILL ON SUNDAYS. (7.5 TABLETS/WEEK  MCG)    neomycin-polymyxin-dexamethasone (MAXITROL) ophthalmic suspension instill 1 drop into right eye four times a day    FREESTYLE LANCETS 28 gauge misc     loratadine (CLARITIN) 10 mg tablet Take 1 Tab by mouth daily.  raNITIdine (ZANTAC) 300 mg tablet TAKE 1 TABLET BY MOUTH EVERY DAY.  polyethylene glycol (MIRALAX) 17 gram/dose powder MIX 1 CAPFUL (17 GM) IN LIQUID AND DRINK DAILY.  atorvastatin (LIPITOR) 20 mg tablet TAKE 1 TABLET DAILY    Lancets (ACCU-CHEK SOFTCLIX LANCETS) misc Check blood sugar one to two times daily    L GASSERI/B BIFIDUM/B LONGUM ([employer]ley HEALTH PO) Take  by mouth.     scopolamine (TRANSDERM-SCOP) 1.5 mg 1 Patch by TransDERmal route every seventy-two (72) hours.  vitamin E (AQUA GEMS) 400 unit capsule Take 800 Units by mouth daily.  lidocaine (LIDODERM) 5 %(700 mg/patch) 1 patch by TransDERmal route every twenty-four (24) hours. Apply patch to the affected area for 12 hours a day and remove for 12 hours a day.  cholecalciferol, vitamin D3, (VITAMIN D3) 2,000 unit tab Take  by mouth.  traMADol (ULTRAM) 50 mg tablet Take 1 tablet by mouth every six (6) hours as needed for Pain.  OTHER Accu-Chek Lady Plus Kit  Check blood sugar one to two times daily    glucose blood VI test strips (ACCU-CHEK LADY PLUS TEST STRP) strip Check blood sugar one to two times daily    MULTIVITAMIN WITH MINERALS (ONE-A-DAY 50 PLUS PO) Take  by mouth.  aspirin 81 mg chewable tablet Take 81 mg by mouth daily.  fluocinoNIDE (LIDEX) 0.05 % topical cream Apply  to affected area two (2) times a day. No current facility-administered medications for this visit.       Allergies   Allergen Reactions    Celebrex [Celecoxib] Hives    Pcn [Penicillins] Unknown (comments)     Can't remember    Sulfa (Sulfonamide Antibiotics) Hives       Review of Systems:  - Eyes: no blurry vision or double vision  - Cardiovascular: no chest pain  - Respiratory: no shortness of breath  - Musculoskeletal: see HPI  - Neurological: no numbness/tingling in extremities    Physical Examination:  Visit Vitals    /66    Pulse 71    Ht 5' 3\" (1.6 m)    Wt 199 lb (90.3 kg)    BMI 35.25 kg/m2   -   - General: pleasant, no distress, uses walker   HEENT: hearing intact, EOMI, clear sclera without icterus  - Cardiovascular: regular, normal rate   - Respiratory: normal effort  - Integumentary: no edema  - Psychiatric: normal mood and affect    Data Reviewed:   Component      Latest Ref Rng & Units 3/13/2018 11/21/2017 11/21/2017 11/21/2017           3:35 PM  4:00 PM  4:00 PM  4:00 PM   Glucose      65 - 99 mg/dL  89     BUN      8 - 27 mg/dL  15     Creatinine      0.57 - 1.00 mg/dL  0.60     GFR est non-AA      >59 mL/min/1.73  91     GFR est AA      >59 mL/min/1.73  105     BUN/Creatinine ratio      12 - 28  25     Sodium      134 - 144 mmol/L  143     Potassium      3.5 - 5.2 mmol/L  3.6     Chloride      96 - 106 mmol/L  97     CO2      18 - 29 mmol/L  29     Calcium      8.7 - 10.3 mg/dL  10.1     Protein, total      6.0 - 8.5 g/dL  7.2     Albumin      3.5 - 4.8 g/dL  4.7     GLOBULIN, TOTAL      1.5 - 4.5 g/dL  2.5     A-G Ratio      1.2 - 2.2  1.9     Bilirubin, total      0.0 - 1.2 mg/dL  0.5     Alk. phosphatase      39 - 117 IU/L  108     AST      0 - 40 IU/L  54 (H)     ALT (SGPT)      0 - 32 IU/L  50 (H)     Cholesterol, total      100 - 199 mg/dL    143   Triglyceride      0 - 149 mg/dL    191 (H)   HDL Cholesterol      >39 mg/dL    55   VLDL, calculated      5 - 40 mg/dL    38   LDL, calculated      0 - 99 mg/dL    50   Hemoglobin A1c, (calculated)      4.8 - 5.6 %   6.0 (H)    Estimated average glucose      mg/dL   126    TSH      0.450 - 4.500 uIU/mL 1.090        Component      Latest Ref Rng & Units 11/21/2017           4:00 PM   Glucose      65 - 99 mg/dL    BUN      8 - 27 mg/dL    Creatinine      0.57 - 1.00 mg/dL    GFR est non-AA      >59 mL/min/1.73    GFR est AA      >59 mL/min/1.73    BUN/Creatinine ratio      12 - 28    Sodium      134 - 144 mmol/L    Potassium      3.5 - 5.2 mmol/L    Chloride      96 - 106 mmol/L    CO2      18 - 29 mmol/L    Calcium      8.7 - 10.3 mg/dL    Protein, total      6.0 - 8.5 g/dL    Albumin      3.5 - 4.8 g/dL    GLOBULIN, TOTAL      1.5 - 4.5 g/dL    A-G Ratio      1.2 - 2.2    Bilirubin, total      0.0 - 1.2 mg/dL    Alk.  phosphatase      39 - 117 IU/L    AST      0 - 40 IU/L    ALT (SGPT)      0 - 32 IU/L    Cholesterol, total      100 - 199 mg/dL    Triglyceride      0 - 149 mg/dL    HDL Cholesterol      >39 mg/dL    VLDL, calculated      5 - 40 mg/dL LDL, calculated      0 - 99 mg/dL    Hemoglobin A1c, (calculated)      4.8 - 5.6 %    Estimated average glucose      mg/dL    TSH      0.450 - 4.500 uIU/mL 0.374 (L)       Assessment/Plan:   1. Controlled type 2 diabetes mellitus without complication, without long-term current use of insulin ([facility]   - diabetes appears well controlled. Weight loss (20lbs over 9 months) has likely helped greatly  Having some low glucoses. Reviewed that metformin should not cause lows, however, if she is having lows (<80) still, she should consider stopping metformin. Encouraged continued dietary and weight loss efforts   2. Hypothyroidism, unspecified type   - continue levothyroxine 175 mcg daily - TSH now at goal.    3. Osteoporosis, unspecified osteoporosis type, unspecified pathological fracture presence   - as she has had exposure to alendronate in the past, feel an agent with a different mechanism, such as Prolia would be more efficacious. Will re-check costs. 4. Cirrhosis of liver without ascites, unspecified hepatic cirrhosis type ([facility]   - may be possible explanation for lower glucoses, but her synthetic function has been good, so this argues against that theory  - continue weight loss efforts. 5.      BMI 35 - wt down 20 lbs over last 9 months. Encouraged continued dietary efforts. Patient Instructions   Hypothyroidism:  Continue levothyroxine 175 mcg. This dose is working well for you. Diabetes. Continue dietary and weight loss efforts. You are doing very well. Continue metformin  mg twice daily. ** If you have more values less than 80, we will need to consider stopping metformin, even though metformin should not lead to low glucoses    Liver:  Continue efforts with exercise, weight loss, and avoiding added sugars. Osteoporosis:  Continue vitamin D today - 1000 units  I think Prolia would work the best.  Plan on repeating bone density this summer.      Follow-up Disposition:  Return in about 4 months (around 7/22/2018).     Copy sent to:

## 2018-03-22 NOTE — MR AVS SNAPSHOT
[address] [facility] 
[phone] Patient: Paul Mata MRN:  [id number] Visit Information Date & Time Provider Department Dept. Phone Encounter #  
 3/22/2018 11:50 AM Jodi Redman, 1024 [facility] Diabetes and Endocrinology [phone] [id number] Follow-up Instructions Return in about 4 months (around 7/22/2018). Your Appointments 8/7/2018 11:30 AM  
ROUTINE CARE with Maximino Vargas, 2000 [facility]) Appt Note: 4 month follow up  
 1500 [facility] [address] [address]  
[address] [place] [address] [address] [place] [address] Upcoming Health Maintenance Date Due Hepatitis C Screening 1945 GLAUCOMA SCREENING Q2Y 1/28/2016 MEDICARE YEARLY EXAM 4/12/2018 MICROALBUMIN Q1 5/12/2018 HEMOGLOBIN A1C Q6M 5/21/2018 FOOT EXAM Q1 11/21/2018 LIPID PANEL Q1 11/21/2018 BREAST CANCER SCRN MAMMOGRAM 2/28/2019 EYE EXAM RETINAL OR DILATED Q1 3/13/2019 COLONOSCOPY 2/11/2020 DTaP/Tdap/Td series (2 - Td) 11/24/2025 Allergies as of 3/22/2018  Review Complete On: 3/22/2018 By: Jodi Redman MD  
  
 Severity Noted Reaction Type Reactions Celebrex [Celecoxib]  10/05/2009    Hives Pcn [Penicillins]  10/05/2009    Unknown (comments) Can't remember Sulfa (Sulfonamide Antibiotics)  10/05/2009    Hives Current Immunizations  Reviewed on 1/12/2015 Name Date Influenza Vaccine 10/1/2013 ZZZ-RETIRED (DO NOT USE) Pneumococcal Vaccine (Unspecified Type) 10/5/2010 Not reviewed this visit You Were Diagnosed With   
  
 Codes Comments Controlled type 2 diabetes mellitus without complication, without long-term current use of insulin ([facility]    -  Primary ICD-10-CM: E11.9 ICD-9-CM: 250.00 Hypothyroidism, unspecified type     ICD-10-CM: E03.9 ICD-9-CM: 244.9 Osteoporosis, unspecified osteoporosis type, unspecified pathological fracture presence     ICD-10-CM: M81.0 ICD-9-CM: 733.00 Cirrhosis of liver without ascites, unspecified hepatic cirrhosis type ([facility] 75.)     ICD-10-CM: K74.60 ICD-9-CM: 571.5 Vitals BP Pulse Height(growth percentile) Weight(growth percentile) BMI OB Status 135/66 71 5' 3\" (1.6 m) 199 lb (90.3 kg) 35.25 kg/m2 Hysterectomy Smoking Status Never Smoker Vitals History BMI and BSA Data Body Mass Index Body Surface Area  
 35.25 kg/m 2 2 m 2 Preferred Pharmacy Pharmacy Name Phone [facility], [address] [facility] [phone] Your Updated Medication List  
  
   
This list is accurate as of 3/22/18  1:00 PM.  Always use your most recent med list. amLODIPine 10 mg tablet Commonly known as:  Julissa Prow Take 1 tablet by mouth daily. aspirin 81 mg chewable tablet Take 81 mg by mouth daily. atorvastatin 20 mg tablet Commonly known as:  LIPITOR  
TAKE 1 TABLET DAILY Biotin 2,500 mcg Cap Take  by mouth.  
  
 colchicine 0.6 mg capsule Commonly known as:  Delorise Picket Take 1 Cap by mouth daily. fluocinoNIDE 0.05 % topical cream  
Commonly known as:  LIDEX Apply  to affected area two (2) times a day. fluticasone 50 mcg/actuation nasal spray Commonly known as:  Whitney Andrew 2 Sprays by Both Nostrils route daily. Administer to right and left nostril. furosemide 80 mg tablet Commonly known as:  LASIX Take 1 tablet by mouth daily. glucose blood VI test strips strip Commonly known as:  ACCU-CHEK MILA PLUS TEST STRP Check blood sugar one to two times daily  
  
 indomethacin 25 mg capsule Commonly known as:  INDOCIN  
TAKE 1 CAPSULE BY MOUTH THREE TIMES A DAY AS NEEDED FOR PAIN. * Lancets Misc Commonly known as:  ACCU-CHEK SOFTCLIX LANCETS Check blood sugar one to two times daily * FREESTYLE LANCETS 28 gauge Misc Generic drug:  lancets  
  
 levothyroxine 175 mcg tablet Commonly known as:  SYNTHROID  
TAKE ONE DAILY BY MOUTH. TAKE AN EXTRA 1/2 PILL ON SUNDAYS. (7.5 TABLETS/WEEK  MCG) lidocaine 5 % Commonly known as:  Carina Amend 1 patch by TransDERmal route every twenty-four (24) hours. Apply patch to the affected area for 12 hours a day and remove for 12 hours a day. loratadine 10 mg tablet Commonly known as:  Wicho Lush Take 1 Tab by mouth daily. losartan 25 mg tablet Commonly known as:  COZAAR  
TAKE 1 TABLET BY MOUTH DAILY. metFORMIN  mg tablet Commonly known as:  GLUCOPHAGE XR Take 1 Tab by mouth two (2) times daily (after meals). For diabetes. neomycin-polymyxin-dexamethasone 3.5mg/mL-10,000 unit/mL-0.1 % ophthalmic suspension Commonly known as:  DEXACIDIN, MAXITROL  
instill 1 drop into right eye four times a day  
  
 omega-3 acid ethyl esters 1 gram capsule Commonly known as:  Bon Caba Take 2 capsules by mouth twice daily with meals. ONE-A-DAY 50 PLUS PO Take  by mouth. OTHER Accu-Chek Lady Plus Kit  Check blood sugar one to two times daily [address] Take  by mouth.  
  
 polyethylene glycol 17 gram/dose powder Commonly known as:  Ursula Farrier MIX 1 CAPFUL (17 GM) IN LIQUID AND DRINK DAILY. potassium chloride SA 10 mEq capsule Commonly known as:  Trude Vinny Take 1 capsule by mouth twice daily. raNITIdine 300 mg tablet Commonly known as:  ZANTAC TAKE 1 TABLET BY MOUTH EVERY DAY. scopolamine 1 mg over 3 days Pt3d Commonly known as:  TRANSDERM-SCOP  
1 Patch by TransDERmal route every seventy-two (72) hours. traMADol 50 mg tablet Commonly known as:  ULTRAM  
Take 1 tablet by mouth every six (6) hours as needed for Pain. VITAMIN D3 2,000 unit Tab Generic drug:  cholecalciferol (vitamin D3) Take  by mouth.  
  
 vitamin E 400 unit capsule Commonly known as:  [address] Take 800 Units by mouth daily. ZETIA 10 mg tablet Generic drug:  ezetimibe Take 1 tablet by mouth daily. * Notice: This list has 2 medication(s) that are the same as other medications prescribed for you. Read the directions carefully, and ask your doctor or other care provider to review them with you. We Performed the Following HEMOGLOBIN A1C WITH EAG [77336 CPT(R)] METABOLIC PANEL, COMPREHENSIVE [91828 CPT(R)] Follow-up Instructions Return in about 4 months (around 7/22/2018). To-Do List   
 03/23/2018 1:30 PM  
  Appointment with [facility] NICK 5 at [address] [place] [address] ([phone]) Shower or bathe using soap and water. Do not use deodorant, powder, perfumes, or lotion the day of your exam.  If your prior mammograms were not performed at [facility] 6 please bring films with you or forward prior images 2 days before your procedure. Check in at registration 15min before your appointment time unless you were instructed to do otherwise. A script is not necessary, but if you have one, please bring it on the day of the mammogram or have it faxed to the department. [facility] [phone] [facility]  [phone] [facility] 19 AKIKO  [phone] [facility] [phone] [place] [address] Colletta Ali [phone] Patient Instructions Hypothyroidism: 
Continue levothyroxine 175 mcg. This dose is working well for you. Diabetes. Continue dietary and weight loss efforts. You are doing very well. Continue metformin  mg twice daily. ** If you have more values less than 80, we will need to consider stopping metformin, even though metformin should not lead to low glucoses Liver: 
Continue efforts with exercise, weight loss, and avoiding added sugars. Osteoporosis: 
Continue vitamin D today - 1000 units I think Prolia would work the best. 
Plan on repeating bone density this summer. Introducing \[facility]\"" & HEALTH SERVICES! Louis Yanes introduces Animeeple patient portal. Now you can access parts of your medical record, email your doctor's office, and request medication refills online. 1. In your internet browser, go to [email or web address] 2. Click on the First Time User? Click Here link in the Sign In box. You will see the New Member Sign Up page. 3. Enter your Animeeple Access Code exactly as it appears below. You will not need to use this code after youve completed the sign-up process. If you do not sign up before the expiration date, you must request a new code. · Animeeple Access Code: [email or web address] Expires: 6/11/2018  3:12 PM 
 
4. Enter the last four digits of your Social Security Number (xxxx) and Date of Birth (mm/dd/yyyy) as indicated and click Submit. You will be taken to the next sign-up page. 5. Create a Animeeple ID. This will be your Animeeple login ID and cannot be changed, so think of one that is secure and easy to remember. 6. Create a Animeeple password. You can change your password at any time. 7. Enter your Password Reset Question and Answer. This can be used at a later time if you forget your password. 8. Enter your e-mail address. You will receive e-mail notification when new information is available in [address] 9. Click Sign Up. You can now view and download portions of your medical record. 10. Click the Download Summary menu link to download a portable copy of your medical information. If you have questions, please visit the Frequently Asked Questions section of the Animeeple website. Remember, Animeeple is NOT to be used for urgent needs. For medical emergencies, dial 911. Now available from your iPhone and Android! Please provide this summary of care documentation to your next provider. Your primary care clinician is listed as Uvaldo MCFARLANE. If you have any questions after today's visit, please call [phone].

## 2018-03-23 ENCOUNTER — HOSPITAL ENCOUNTER (OUTPATIENT)
Dept: MAMMOGRAPHY | Age: 73
Discharge: HOME | End: 2018-03-23
Attending: SPECIALIST
Payer: MEDICARE

## 2018-03-23 DIAGNOSIS — Z12.31 VISIT FOR SCREENING MAMMOGRAM: ICD-10-CM

## 2018-03-23 PROCEDURE — 77067 SCR MAMMO BI INCL CAD: CPT

## 2018-03-24 LAB
ALBUMIN SERPL-MCNC: 4.8 G/DL (ref 3.5–4.8)
ALBUMIN/GLOB SERPL: 1.9 {RATIO} (ref 1.2–2.2)
ALP SERPL-CCNC: 158 IU/L (ref 39–117)
ALT SERPL-CCNC: 54 IU/L (ref 0–32)
AST SERPL-CCNC: 62 IU/L (ref 0–40)
BILIRUB SERPL-MCNC: 0.7 MG/DL (ref 0–1.2)
BUN SERPL-MCNC: 17 MG/DL (ref 8–27)
BUN/CREAT SERPL: 22 (ref 12–28)
CALCIUM SERPL-MCNC: 10.2 MG/DL (ref 8.7–10.3)
CHLORIDE SERPL-SCNC: 96 MMOL/L (ref 96–106)
CO2 SERPL-SCNC: 29 MMOL/L (ref 18–29)
CREAT SERPL-MCNC: 0.78 MG/DL (ref 0.57–1)
EST. AVERAGE GLUCOSE BLD GHB EST-MCNC: 120 MG/DL
GFR SERPLBLD CREATININE-BSD FMLA CKD-EPI: 76 ML/MIN/1.73
GFR SERPLBLD CREATININE-BSD FMLA CKD-EPI: 88 ML/MIN/1.73
GLOBULIN SER CALC-MCNC: 2.5 G/DL (ref 1.5–4.5)
GLUCOSE SERPL-MCNC: 126 MG/DL (ref 65–99)
HBA1C MFR BLD: 5.8 % (ref 4.8–5.6)
POTASSIUM SERPL-SCNC: 3.6 MMOL/L (ref 3.5–5.2)
PROT SERPL-MCNC: 7.3 G/DL (ref 6–8.5)
SODIUM SERPL-SCNC: 141 MMOL/L (ref 134–144)

## 2018-03-26 NOTE — PROGRESS NOTES
Hemoglobin A1c has improved substantially with weight loss. Will encourage dietary and weight loss efforts. Liver enzymes are made remain elevated. Elaina Elias,  Please call and mail lab letter. Thank you.

## 2018-03-29 RX ORDER — ATORVASTATIN CALCIUM 20 MG/1
TABLET, FILM COATED ORAL
Qty: 90 TAB | Refills: 3 | Status: SHIPPED | OUTPATIENT
Start: 2018-03-29 | End: 2019-03-14 | Stop reason: SDUPTHER

## 2018-04-12 ENCOUNTER — HOSPITAL ENCOUNTER (OUTPATIENT)
Dept: MAMMOGRAPHY | Age: 73
Discharge: HOME | End: 2018-04-12
Payer: MEDICARE

## 2018-04-12 DIAGNOSIS — Z13.820 SCREENING FOR OSTEOPOROSIS: ICD-10-CM

## 2018-04-12 DIAGNOSIS — M81.0 AGE RELATED OSTEOPOROSIS: ICD-10-CM

## 2018-04-12 PROCEDURE — 77080 DXA BONE DENSITY AXIAL: CPT

## 2018-04-13 DIAGNOSIS — K21.9 GASTROESOPHAGEAL REFLUX DISEASE WITHOUT ESOPHAGITIS: Primary | ICD-10-CM

## 2018-04-13 RX ORDER — RANITIDINE 300 MG/1
TABLET ORAL
Qty: 90 TAB | Refills: 3 | Status: SHIPPED | OUTPATIENT
Start: 2018-04-13 | End: 2019-03-14 | Stop reason: SDUPTHER

## 2018-04-13 NOTE — TELEPHONE ENCOUNTER
Requested Prescriptions     Pending Prescriptions Disp Refills    raNITIdine (ZANTAC) 300 mg tablet 90 Tab 3     Sig: TAKE 1 TABLET BY MOUTH EVERY DAY. PCP: Brigida Fontaine MD    Last appt: 3/13/2018  Future Appointments  Date Time Provider [facility]   7/10/2018 10:30 AM Anitra Rajput MD RDE [address] [facility]   8/7/2018 11:30 AM Brigida Fontaine MD [address]       Requested Prescriptions     Pending Prescriptions Disp Refills    raNITIdine (ZANTAC) 300 mg tablet 90 Tab 3     Sig: TAKE 1 TABLET BY MOUTH EVERY DAY.

## 2018-05-09 ENCOUNTER — TELEPHONE (OUTPATIENT)
Dept: INTERNAL MEDICINE CLINIC | Age: 73
End: 2018-05-09

## 2018-05-09 RX ORDER — LORATADINE 10 MG/1
10 TABLET ORAL DAILY
Qty: 30 TAB | Refills: 5 | Status: SHIPPED | OUTPATIENT
Start: 2018-05-09 | End: 2018-06-14 | Stop reason: SDUPTHER

## 2018-05-09 NOTE — TELEPHONE ENCOUNTER
Spoke with pharmacist at [facility]. Pharmacist states that patient is leaving on holiday and needs a refill on her colchincine. Advised that I will send the request to the doctor. Pharmacist verbalized understanding of information discussed w/ no further questions at this time.

## 2018-05-09 NOTE — TELEPHONE ENCOUNTER
Mendoza//Nathen Pharm states she needs a call back in reference to if patient is back on her medication, colchicine (MITIGARE) 0.6 mg capsule. Please call to advise.  Thank you

## 2018-05-10 RX ORDER — COLCHICINE 0.6 MG/1
0.6 CAPSULE ORAL DAILY
Qty: 90 CAP | Refills: 3 | Status: SHIPPED | OUTPATIENT
Start: 2018-05-10 | End: 2019-05-29

## 2018-05-10 RX ORDER — COLCHICINE 0.6 MG/1
0.6 CAPSULE ORAL DAILY
Qty: 90 CAP | Refills: 3 | Status: SHIPPED | OUTPATIENT
Start: 2018-05-10 | End: 2018-05-10 | Stop reason: SDUPTHER

## 2018-05-10 NOTE — TELEPHONE ENCOUNTER
90 day refill pt states she needs to have this by Friday as she is going on a cruise and needs before she leaves. Please call if there are any problems.  #[phone]

## 2018-06-14 RX ORDER — LORATADINE 10 MG/1
10 TABLET ORAL DAILY
Qty: 90 TAB | Refills: 3 | Status: SHIPPED | OUTPATIENT
Start: 2018-06-14 | End: 2019-05-29

## 2018-06-14 NOTE — TELEPHONE ENCOUNTER
PCP: Butch Bassett MD    Last appt: 3/13/2018  Future Appointments  Date Time Provider Hilary Dickinson   7/10/2018 10:30 AM Emilia gAuilar MD RDE [address] [facility]   8/7/2018 11:30 AM Butch Bassett MD [facility] 87       Requested Prescriptions     Pending Prescriptions Disp Refills    loratadine (CLARITIN) 10 mg tablet 90 Tab 3     Sig: Take 1 Tab by mouth daily.

## 2018-06-14 NOTE — TELEPHONE ENCOUNTER
PCP: Nita Sweet MD    Last appt: 3/13/2018  Future Appointments  Date Time Provider Hilary Dickinson   7/10/2018 10:30 AM Janeth Goodman MD RDE [address] [facility]   8/7/2018 11:30 AM Nita Sweet MD [facility] 87       Requested Prescriptions     Pending Prescriptions Disp Refills    loratadine (CLARITIN) 10 mg tablet 90 Tab 3     Sig: Take 1 Tab by mouth daily.

## 2018-06-25 ENCOUNTER — TELEPHONE (OUTPATIENT)
Dept: INTERNAL MEDICINE CLINIC | Age: 73
End: 2018-06-25

## 2018-06-25 NOTE — TELEPHONE ENCOUNTER
Patient would like results from her retinal examshe had performed in the office. She would like them mailed to her home. She lost her copy. Thanks.

## 2018-07-10 ENCOUNTER — OFFICE VISIT (OUTPATIENT)
Dept: ENDOCRINOLOGY | Age: 73
End: 2018-07-10

## 2018-07-10 VITALS
HEART RATE: 63 BPM | OXYGEN SATURATION: 97 % | BODY MASS INDEX: 37.03 KG/M2 | HEIGHT: 63 IN | WEIGHT: 209 LBS | RESPIRATION RATE: 16 BRPM | SYSTOLIC BLOOD PRESSURE: 137 MMHG | DIASTOLIC BLOOD PRESSURE: 60 MMHG

## 2018-07-10 DIAGNOSIS — E03.9 HYPOTHYROIDISM, UNSPECIFIED TYPE: Primary | ICD-10-CM

## 2018-07-10 DIAGNOSIS — M81.0 OSTEOPOROSIS, UNSPECIFIED OSTEOPOROSIS TYPE, UNSPECIFIED PATHOLOGICAL FRACTURE PRESENCE: ICD-10-CM

## 2018-07-10 DIAGNOSIS — K75.81 NASH (NONALCOHOLIC STEATOHEPATITIS): ICD-10-CM

## 2018-07-10 DIAGNOSIS — E11.9 CONTROLLED TYPE 2 DIABETES MELLITUS WITHOUT COMPLICATION, WITHOUT LONG-TERM CURRENT USE OF INSULIN (HCC): ICD-10-CM

## 2018-07-10 NOTE — PATIENT INSTRUCTIONS
Hypothyroidism:  Continue levothyroxine 175 mcg. Reassess    Diabetes. Continue dietary and weight loss efforts. You are doing very well. Continue metformin  mg twice daily. Liver:  Continue efforts with exercise, weight loss, and avoiding added sugars.     Osteoporosis:  Continue vitamin D today - 1000 units  I think Prolia would work the best. We will try to order this and check coverage/cost.

## 2018-07-10 NOTE — MR AVS SNAPSHOT
727 [facility] 
[phone] Patient: Rudy Howard MRN:  [id number] Visit Information Date & Time Provider Department Dept. Phone Encounter #  
 7/10/2018 10:30 AM Shanika Meraz, 1024 [facility] Diabetes and Endocrinology [phone] [id number] Follow-up Instructions Return in about 4 months (around 11/10/2018). Your Appointments 8/7/2018 11:30 AM  
ROUTINE CARE with Alexander Pittman, [address] [place] [address],[address] [facility]) Appt Note: 4 month follow up  
 1500 [facility] [address] [address]  
[address] [place] [address] [address] [place] [address] Upcoming Health Maintenance Date Due Hepatitis C Screening 1945 GLAUCOMA SCREENING Q2Y 1/28/2016 MEDICARE YEARLY EXAM 4/12/2018 MICROALBUMIN Q1 5/12/2018 Influenza Age 5 to Adult 8/1/2018 HEMOGLOBIN A1C Q6M 9/23/2018 FOOT EXAM Q1 11/21/2018 LIPID PANEL Q1 11/21/2018 EYE EXAM RETINAL OR DILATED Q1 3/13/2019 COLONOSCOPY 2/11/2020 BREAST CANCER SCRN MAMMOGRAM 3/23/2020 DTaP/Tdap/Td series (2 - Td) 11/24/2025 Allergies as of 7/10/2018  Review Complete On: 7/10/2018 By: Day Rahman LPN Severity Noted Reaction Type Reactions Celebrex [Celecoxib]  10/05/2009    Hives Pcn [Penicillins]  10/05/2009    Unknown (comments) Can't remember Sulfa (Sulfonamide Antibiotics)  10/05/2009    Hives Current Immunizations  Reviewed on 7/10/2018 Name Date Influenza Vaccine 10/1/2013 ZZZ-RETIRED (DO NOT USE) Pneumococcal Vaccine (Unspecified Type) 10/5/2010 Reviewed by Day Rahman LPN on 4/84/1388 at 53:53 AM  
You Were Diagnosed With   
  
 Codes Comments Hypothyroidism, unspecified type    -  Primary ICD-10-CM: E03.9 ICD-9-CM: 244.9  Controlled type 2 diabetes mellitus without complication, without long-term current use of insulin (HCC)     ICD-10-CM: E11.9 ICD-9-CM: 250.00 Osteoporosis, unspecified osteoporosis type, unspecified pathological fracture presence     ICD-10-CM: M81.0 ICD-9-CM: 733.00 Vitals BP Pulse Resp Height(growth percentile) Weight(growth percentile) SpO2  
 137/60 (BP 1 Location: Left arm, BP Patient Position: Sitting) 63 16 5' 3\" (1.6 m) 209 lb (94.8 kg) 97% BMI OB Status Smoking Status 37.02 kg/m2 Hysterectomy Never Smoker Vitals History BMI and BSA Data Body Mass Index Body Surface Area 37.02 kg/m 2 2.05 m 2 Preferred Pharmacy Pharmacy Name Phone [facility], [address] [facility] [phone] Your Updated Medication List  
  
   
This list is accurate as of 7/10/18 11:27 AM.  Always use your most recent med list. amLODIPine 10 mg tablet Commonly known as:  Gina Colon Take 1 tablet by mouth daily. aspirin 81 mg chewable tablet Take 81 mg by mouth daily. atorvastatin 20 mg tablet Commonly known as:  LIPITOR  
TAKE 1 TABLET DAILY Biotin 2,500 mcg Cap Take  by mouth.  
  
 colchicine 0.6 mg capsule Commonly known as:  Jacquetta Khai Take 1 Cap by mouth daily. fluocinoNIDE 0.05 % topical cream  
Commonly known as:  LIDEX Apply  to affected area two (2) times a day. fluticasone 50 mcg/actuation nasal spray Commonly known as:  Mingo Peel 2 Sprays by Both Nostrils route daily. Administer to right and left nostril. furosemide 80 mg tablet Commonly known as:  LASIX Take 1 tablet by mouth daily. * glucose blood VI test strips strip Commonly known as:  ACCU-CHEK MILA PLUS TEST STRP Check blood sugar one to two times daily * glucose blood VI test strips strip Commonly known as:  FREESTYLE LITE STRIPS Monitor blood sugar twice daily. Diagnosis code: E11.9  
  
 indomethacin 25 mg capsule Commonly known as:  INDOCIN  
 TAKE 1 CAPSULE BY MOUTH THREE TIMES A DAY AS NEEDED FOR PAIN. * Lancets Misc Commonly known as:  ACCU-CHEK SOFTCLIX LANCETS Check blood sugar one to two times daily * FREESTYLE LANCETS 28 gauge Misc Generic drug:  lancets  
  
 levothyroxine 175 mcg tablet Commonly known as:  SYNTHROID  
TAKE ONE DAILY BY MOUTH. TAKE AN EXTRA 1/2 PILL ON SUNDAYS. (7.5 TABLETS/WEEK  MCG) lidocaine 5 % Commonly known as:  Fatemehnesha Hasten 1 patch by TransDERmal route every twenty-four (24) hours. Apply patch to the affected area for 12 hours a day and remove for 12 hours a day. loratadine 10 mg tablet Commonly known as:  Franky Mj Take 1 Tab by mouth daily. losartan 25 mg tablet Commonly known as:  COZAAR  
TAKE 1 TABLET BY MOUTH DAILY. metFORMIN  mg tablet Commonly known as:  GLUCOPHAGE XR Take 1 Tab by mouth two (2) times daily (after meals). For diabetes. omega-3 acid ethyl esters 1 gram capsule Commonly known as:  Claremont Dauer Take 2 capsules by mouth twice daily with meals. ONE-A-DAY 50 PLUS PO Take  by mouth. OTHER Accu-Chek Lady Plus Kit  Check blood sugar one to two times daily [address] Take  by mouth.  
  
 polyethylene glycol 17 gram/dose powder Commonly known as:  Danielle Donnell MIX 1 CAPFUL (17 GM) IN LIQUID AND DRINK DAILY. potassium chloride SA 10 mEq capsule Commonly known as:  Mandy Burlington Take 1 capsule by mouth twice daily. raNITIdine 300 mg tablet Commonly known as:  ZANTAC TAKE 1 TABLET BY MOUTH EVERY DAY. traMADol 50 mg tablet Commonly known as:  ULTRAM  
Take 1 tablet by mouth every six (6) hours as needed for Pain. VITAMIN D3 2,000 unit Tab Generic drug:  cholecalciferol (vitamin D3) Take  by mouth.  
  
 vitamin E 400 unit capsule Commonly known as:  [address] Take 800 Units by mouth daily. ZETIA 10 mg tablet Generic drug:  ezetimibe Take 1 tablet by mouth daily. * Notice: This list has 4 medication(s) that are the same as other medications prescribed for you. Read the directions carefully, and ask your doctor or other care provider to review them with you. We Performed the Following LIPID PANEL [02331 CPT(R)] METABOLIC PANEL, COMPREHENSIVE [69024 CPT(R)] MICROALBUMIN, UR, RAND W/ MICROALB/CREAT RATIO [id number] CPT(R)] TSH 3RD GENERATION [29480 CPT(R)] Follow-up Instructions Return in about 4 months (around 11/10/2018). Patient Instructions Hypothyroidism: 
Continue levothyroxine 175 mcg. Reassess Diabetes. Continue dietary and weight loss efforts. You are doing very well. Continue metformin  mg twice daily. Liver: 
Continue efforts with exercise, weight loss, and avoiding added sugars. Osteoporosis: 
Continue vitamin D today - 1000 units I think Prolia would work the best. We will try to order this and check coverage/cost.  
 
 
  
Introducing [facility] & HEALTH SERVICES! Kallie Hunt introduces Kuldat patient portal. Now you can access parts of your medical record, email your doctor's office, and request medication refills online. 1. In your internet browser, go to [email or web address] 2. Click on the First Time User? Click Here link in the Sign In box. You will see the New Member Sign Up page. 3. Enter your Kuldat Access Code exactly as it appears below. You will not need to use this code after youve completed the sign-up process. If you do not sign up before the expiration date, you must request a new code. · Kuldat Access Code: [email or web address] Expires: 10/8/2018 11:27 AM 
 
4. Enter the last four digits of your Social Security Number (xxxx) and Date of Birth (mm/dd/yyyy) as indicated and click Submit. You will be taken to the next sign-up page. 5. Create a Kuldat ID.  This will be your Kuldat login ID and cannot be changed, so think of one that is secure and easy to remember. 6. Create a Gaikai password. You can change your password at any time. 7. Enter your Password Reset Question and Answer. This can be used at a later time if you forget your password. 8. Enter your e-mail address. You will receive e-mail notification when new information is available in [address] 9. Click Sign Up. You can now view and download portions of your medical record. 10. Click the Download Summary menu link to download a portable copy of your medical information. If you have questions, please visit the Frequently Asked Questions section of the Gaikai website. Remember, Gaikai is NOT to be used for urgent needs. For medical emergencies, dial 911. Now available from your iPhone and Android! Please provide this summary of care documentation to your next provider. Your primary care clinician is listed as Frankie MCFARLANE. If you have any questions after today's visit, please call [phone].

## 2018-07-10 NOTE — PROGRESS NOTES
History of Present Illness: Chidi Taylor is a 68 y.o. female presents for follow-up of diabetes. She also has dyslipidemia, obesity, fatty liver (with bridging fibrosis 2008 - followed [facility]), gout and arthritis     Diabetes - A1c 5.8 in 3/2018. Reports A1c with [facility] was in the 5s still. Generally trying to eat lower carbs. Diet was off track when she went on her [place] cruise.       Glucoses - 30 day avg 135. Checked 13 times. Values range from 67 to low 178s, but most are in the 100-130 range   She is taking metformin  mg twice daily. Osteoporosis:  Diagnosed in 2002. Says she was started on Fosamax initially and then this was changed to [facility] [place] in 2007. She took this until 2011. She then had about a 2 year drug holiday. After BMD in 7/2013, alendronate was resumed until early 2016. BMD in 8/2014 showed BMD to be stable. Remained at very high fx risk. 8/2016 study showed worsening of BMD although values at left femoral neck and L-spine appears stable to improved vs 2012.   2018 study done earlier this year showed BMD stable vs 2016. She is willing to consider Prolia, if it is affordable for her.     Calcium: dietary calcium  Vitamin D: taking 1000 units daily.      Hypothyroidism:  175 mcg daily. labs normal in 3/2018. Overall feels well. No heat/cold intolerance.      HTN  amlodipine 10 mg, takes furosemide for fluid, losartan,. Taking KCL 10 meq twice daily for low potassium  - following with Dr Kenton Black for this  Dallin Berg on an [address] about 1 month ago. She has a 2 yo cat who she dotes on.      Past Medical History:   Diagnosis Date    Arthritis     KNEE,gout    Endocrine disease     HYPOTHYROIDISM    Fracture     Left distal femur (age 12 - pt fell)    Fracture     Left tibia 1990 (pt fell)    Fracture     Right wrist fractured 2 times (pt fell)    GERD (gastroesophageal reflux disease)     Hypertension     Hypoglycemia     \"my body produces too much insulin\"    Liver disease     BRADY    Nausea & vomiting     when had gallbladder out    Osteoporosis     Other ill-defined conditions(799.89)     spinal stenosis    Other ill-defined conditions(799.89)     BBB    Other ill-defined conditions(799.89)     HIGH CHOLESTEROL    Other ill-defined conditions(799.89)     edema legs    Thyroid disease      Current Outpatient Prescriptions   Medication Sig    glucose blood VI test strips (FREESTYLE LITE STRIPS) strip Monitor blood sugar twice daily. Diagnosis code: E11.9    loratadine (CLARITIN) 10 mg tablet Take 1 Tab by mouth daily.  colchicine (MITIGARE) 0.6 mg capsule Take 1 Cap by mouth daily.  raNITIdine (ZANTAC) 300 mg tablet TAKE 1 TABLET BY MOUTH EVERY DAY.  atorvastatin (LIPITOR) 20 mg tablet TAKE 1 TABLET DAILY    losartan (COZAAR) 25 mg tablet TAKE 1 TABLET BY MOUTH DAILY.  Biotin 2,500 mcg cap Take  by mouth.  fluticasone (FLONASE) 50 mcg/actuation nasal spray 2 Sprays by Both Nostrils route daily. Administer to right and left nostril.  indomethacin (INDOCIN) 25 mg capsule TAKE 1 CAPSULE BY MOUTH THREE TIMES A DAY AS NEEDED FOR PAIN.  metFORMIN ER (GLUCOPHAGE XR) 500 mg tablet Take 1 Tab by mouth two (2) times daily (after meals). For diabetes.  furosemide (LASIX) 80 mg tablet Take 1 tablet by mouth daily.  amLODIPine (NORVASC) 10 mg tablet Take 1 tablet by mouth daily.  ZETIA 10 mg tablet Take 1 tablet by mouth daily.  omega-3 acid ethyl esters (LOVAZA) 1 gram capsule Take 2 capsules by mouth twice daily with meals.  potassium chloride SA (MICRO-K) 10 mEq capsule Take 1 capsule by mouth twice daily.  levothyroxine (SYNTHROID) 175 mcg tablet TAKE ONE DAILY BY MOUTH. TAKE AN EXTRA 1/2 PILL ON SUNDAYS. (7.5 TABLETS/WEEK  MCG)    FREESTYLE LANCETS 28 gauge misc     polyethylene glycol (MIRALAX) 17 gram/dose powder MIX 1 CAPFUL (17 GM) IN LIQUID AND DRINK DAILY.     Lancets (ACCU-CHEK SOFTCLIX LANCETS) misc Check blood sugar one to two times daily    L GASSERI/B BIFIDUM/B LONGUM (24x7 Learning PO) Take  by mouth.  vitamin E (AQUA GEMS) 400 unit capsule Take 800 Units by mouth daily.  lidocaine (LIDODERM) 5 %(700 mg/patch) 1 patch by TransDERmal route every twenty-four (24) hours. Apply patch to the affected area for 12 hours a day and remove for 12 hours a day.  cholecalciferol, vitamin D3, (VITAMIN D3) 2,000 unit tab Take  by mouth.  traMADol (ULTRAM) 50 mg tablet Take 1 tablet by mouth every six (6) hours as needed for Pain.  OTHER Accu-Chek Lady Plus Kit  Check blood sugar one to two times daily    glucose blood VI test strips (ACCU-CHEK LADY PLUS TEST STRP) strip Check blood sugar one to two times daily    MULTIVITAMIN WITH MINERALS (ONE-A-DAY 50 PLUS PO) Take  by mouth.  aspirin 81 mg chewable tablet Take 81 mg by mouth daily.  fluocinoNIDE (LIDEX) 0.05 % topical cream Apply  to affected area two (2) times a day.  neomycin-polymyxin-dexamethasone (MAXITROL) ophthalmic suspension instill 1 drop into right eye four times a day    scopolamine (TRANSDERM-SCOP) 1.5 mg 1 Patch by TransDERmal route every seventy-two (72) hours. No current facility-administered medications for this visit. Allergies   Allergen Reactions    Celebrex [Celecoxib] Hives    Pcn [Penicillins] Unknown (comments)     Can't remember    Sulfa (Sulfonamide Antibiotics) Hives       Review of Systems:  - Eyes: no blurry vision or double vision  - Cardiovascular: no chest pain  - Respiratory: no shortness of breath  - Musculoskeletal: + arthralgias.  Uses walker  - Neurological: no numbness/tingling in extremities    Physical Examination:  Visit Vitals    Resp 16    Ht 5' 3\" (1.6 m)    Wt 209 lb (94.8 kg)    BMI 37.02 kg/m2   -   - General: pleasant, no distress, uses walker   HEENT: hearing intact, EOMI, clear sclera without icterus  - Cardiovascular: regular, normal rate   - Respiratory: normal effort  - Integumentary: no edema  - Psychiatric: normal mood and affect    Data Reviewed:   Component      Latest Ref Rng & Units 3/23/2018 3/23/2018 3/13/2018           2:02 PM  2:02 PM  3:35 PM   Glucose      65 - 99 mg/dL 126 (H)     BUN      8 - 27 mg/dL 17     Creatinine      0.57 - 1.00 mg/dL 0.78     GFR est non-AA      >59 mL/min/1.73 76     GFR est AA      >59 mL/min/1.73 88     BUN/Creatinine ratio      12 - 28 22     Sodium      134 - 144 mmol/L 141     Potassium      3.5 - 5.2 mmol/L 3.6     Chloride      96 - 106 mmol/L 96     CO2      18 - 29 mmol/L 29     Calcium      8.7 - 10.3 mg/dL 10.2     Protein, total      6.0 - 8.5 g/dL 7.3     Albumin      3.5 - 4.8 g/dL 4.8     GLOBULIN, TOTAL      1.5 - 4.5 g/dL 2.5     A-G Ratio      1.2 - 2.2 1.9     Bilirubin, total      0.0 - 1.2 mg/dL 0.7     Alk. phosphatase      39 - 117 IU/L 158 (H)     AST      0 - 40 IU/L 62 (H)     ALT (SGPT)      0 - 32 IU/L 54 (H)     Hemoglobin A1c, (calculated)      4.8 - 5.6 %  5.8 (H)    Estimated average glucose      mg/dL  120    TSH      0.450 - 4.500 uIU/mL   1.090       Assessment/Plan:   1. Hypothyroidism, unspecified type   - labs today on 175mcg   2. Controlled type 2 diabetes mellitus without complication, without long-term current use of insulin ([facility] [address]   - continue metformin. Controlled via recent A1c at [address]   3. Osteoporosis, unspecified osteoporosis type, unspecified pathological fracture presence   - no tx since 2016  - recommend Prolia. She was agreeable. Will verify she is is sufficiently covered and affordable   4. BMI 37.0-37.9, adult   - she will work on [employer]. 5. BRADY (nonalcoholic steatohepatitis)   - weight loss encouraged. Patient Instructions   Hypothyroidism:  Continue levothyroxine 175 mcg. Reassess    Diabetes. Continue dietary and weight loss efforts. You are doing very well. Continue metformin  mg twice daily.      Liver:  Continue efforts with exercise, weight loss, and avoiding added sugars. Osteoporosis:  Continue vitamin D today - 1000 units  I think Prolia would work the best. We will try to order this and check coverage/cost.     Follow-up Disposition:  Return in about 4 months (around 11/10/2018).     Copy sent to:

## 2018-07-10 NOTE — PROGRESS NOTES
Johanna Gallardo is a 68 y.o. female  Chief Complaint   Patient presents with    Thyroid Problem     follow up    Diabetes     follow up     Visit Vitals    Resp 16    Ht 5' 3\" (1.6 m)    Wt 209 lb (94.8 kg)    BMI 37.02 kg/m2     1. Have you been to the ER, urgent care clinic since your last visit? Hospitalized since your last visit?no    2. Have you seen or consulted any other health care providers outside of the [address] [place] [address] since your last visit? Include any pap smears or colon screening.  no

## 2018-07-11 LAB
ALBUMIN SERPL-MCNC: 4.7 G/DL (ref 3.5–4.8)
ALBUMIN/CREAT UR: 10.4 MG/G CREAT (ref 0–30)
ALBUMIN/GLOB SERPL: 1.6 {RATIO} (ref 1.2–2.2)
ALP SERPL-CCNC: 155 IU/L (ref 39–117)
ALT SERPL-CCNC: 41 IU/L (ref 0–32)
AST SERPL-CCNC: 45 IU/L (ref 0–40)
BILIRUB SERPL-MCNC: 0.5 MG/DL (ref 0–1.2)
BUN SERPL-MCNC: 19 MG/DL (ref 8–27)
BUN/CREAT SERPL: 31 (ref 12–28)
CALCIUM SERPL-MCNC: 10 MG/DL (ref 8.7–10.3)
CHLORIDE SERPL-SCNC: 101 MMOL/L (ref 96–106)
CHOLEST SERPL-MCNC: 160 MG/DL (ref 100–199)
CO2 SERPL-SCNC: 24 MMOL/L (ref 20–29)
CREAT SERPL-MCNC: 0.61 MG/DL (ref 0.57–1)
CREAT UR-MCNC: 73.3 MG/DL
GLOBULIN SER CALC-MCNC: 2.9 G/DL (ref 1.5–4.5)
GLUCOSE SERPL-MCNC: 93 MG/DL (ref 65–99)
HDLC SERPL-MCNC: 69 MG/DL
LDLC SERPL CALC-MCNC: 56 MG/DL (ref 0–99)
MICROALBUMIN UR-MCNC: 7.6 UG/ML
POTASSIUM SERPL-SCNC: 4 MMOL/L (ref 3.5–5.2)
PROT SERPL-MCNC: 7.6 G/DL (ref 6–8.5)
SODIUM SERPL-SCNC: 144 MMOL/L (ref 134–144)
TRIGL SERPL-MCNC: 177 MG/DL (ref 0–149)
TSH SERPL DL<=0.005 MIU/L-ACNC: 0.49 UIU/ML (ref 0.45–4.5)
VLDLC SERPL CALC-MCNC: 35 MG/DL (ref 5–40)

## 2018-08-07 ENCOUNTER — OFFICE VISIT (OUTPATIENT)
Dept: INTERNAL MEDICINE CLINIC | Age: 73
End: 2018-08-07

## 2018-08-07 VITALS
WEIGHT: 207 LBS | DIASTOLIC BLOOD PRESSURE: 75 MMHG | HEIGHT: 63 IN | BODY MASS INDEX: 36.68 KG/M2 | TEMPERATURE: 97.5 F | SYSTOLIC BLOOD PRESSURE: 148 MMHG | OXYGEN SATURATION: 97 % | HEART RATE: 69 BPM

## 2018-08-07 DIAGNOSIS — I10 ESSENTIAL HYPERTENSION: ICD-10-CM

## 2018-08-07 DIAGNOSIS — E78.00 PURE HYPERCHOLESTEROLEMIA: ICD-10-CM

## 2018-08-07 DIAGNOSIS — Z11.59 ENCOUNTER FOR HEPATITIS C SCREENING TEST FOR LOW RISK PATIENT: Primary | ICD-10-CM

## 2018-08-07 DIAGNOSIS — E11.9 CONTROLLED TYPE 2 DIABETES MELLITUS WITHOUT COMPLICATION, WITHOUT LONG-TERM CURRENT USE OF INSULIN (HCC): ICD-10-CM

## 2018-08-07 DIAGNOSIS — Z00.00 MEDICARE ANNUAL WELLNESS VISIT, INITIAL: ICD-10-CM

## 2018-08-07 NOTE — PROGRESS NOTES
Chief Complaint   Patient presents with    Diabetes     4 month follow up    Hypertension     4 month follow up    Pressure Behind the Eyes     sinus pressure right side of nose and face

## 2018-08-07 NOTE — MR AVS SNAPSHOT
[address] [place] [address] 
[phone] Patient: Jayda Mcdaniel MRN:  [id number] Visit Information Date & Time Provider Department Dept. Phone Encounter #  
 8/7/2018 11:30 AM Friregla Leonard, [address] [facility] [phone] [id number] Follow-up Instructions Return in about 6 months (around 2/7/2019) for dm-2 htn gout. Your Appointments 11/5/2018 10:30 AM  
Follow Up with MD [facility]-[address]) Appt Note: f/u diabetes cp0.00; f/u diabetes cp0.00  
 [address] [address]  
[phone]  
  
   
 [address] [facility] [address] Upcoming Health Maintenance Date Due Hepatitis C Screening 1945 GLAUCOMA SCREENING Q2Y 1/28/2016 MEDICARE YEARLY EXAM 4/12/2018 Influenza Age 5 to Adult 8/1/2018 HEMOGLOBIN A1C Q6M 9/23/2018 FOOT EXAM Q1 11/21/2018 EYE EXAM RETINAL OR DILATED Q1 3/13/2019 MICROALBUMIN Q1 7/10/2019 LIPID PANEL Q1 7/10/2019 COLONOSCOPY 2/11/2020 BREAST CANCER SCRN MAMMOGRAM 3/23/2020 DTaP/Tdap/Td series (2 - Td) 11/24/2025 Allergies as of 8/7/2018  Review Complete On: 8/7/2018 By: Jane Red LPN Severity Noted Reaction Type Reactions Celebrex [Celecoxib]  10/05/2009    Hives Pcn [Penicillins]  10/05/2009    Unknown (comments) Can't remember Sulfa (Sulfonamide Antibiotics)  10/05/2009    Hives Current Immunizations  Reviewed on 7/10/2018 Name Date Influenza Vaccine 10/1/2013 ZZZ-RETIRED (DO NOT USE) Pneumococcal Vaccine (Unspecified Type) 10/5/2010 Not reviewed this visit You Were Diagnosed With   
  
 Codes Comments  Encounter for hepatitis C screening test for low risk patient    -  Primary ICD-10-CM: Z11.59 
ICD-9-CM: V73.89   
 Controlled type 2 diabetes mellitus without complication, without long-term current use of insulin ([facility] [address]     ICD-10-CM: E11.9 ICD-9-CM: 250.00 Essential hypertension     ICD-10-CM: I10 
ICD-9-CM: 401.9 Pure hypercholesterolemia     ICD-10-CM: E78.00 ICD-9-CM: 272.0 Vitals BP Pulse Temp Height(growth percentile) Weight(growth percentile) SpO2  
 148/75 (BP 1 Location: Left arm, BP Patient Position: Sitting) 69 97.5 °F (36.4 °C) (Oral) 5' 3\" (1.6 m) 207 lb (93.9 kg) 97% BMI OB Status Smoking Status 36.67 kg/m2 Hysterectomy Never Smoker BMI and BSA Data Body Mass Index Body Surface Area  
 36.67 kg/m 2 2.04 m 2 Preferred Pharmacy Pharmacy Name Phone [facility], [address] [facility] [phone] Your Updated Medication List  
  
   
This list is accurate as of 8/7/18 11:38 AM.  Always use your most recent med list. amLODIPine 10 mg tablet Commonly known as:  Vida Abida Take 1 tablet by mouth daily. aspirin 81 mg chewable tablet Take 81 mg by mouth daily. atorvastatin 20 mg tablet Commonly known as:  LIPITOR  
TAKE 1 TABLET DAILY Biotin 2,500 mcg Cap Take  by mouth.  
  
 colchicine 0.6 mg capsule Commonly known as:  Eulene Abelson Take 1 Cap by mouth daily. fluocinoNIDE 0.05 % topical cream  
Commonly known as:  LIDEX Apply  to affected area two (2) times a day. fluticasone 50 mcg/actuation nasal spray Commonly known as:  Kassie Jumper 2 Sprays by Both Nostrils route daily. Administer to right and left nostril. furosemide 80 mg tablet Commonly known as:  LASIX Take 1 tablet by mouth daily. * glucose blood VI test strips strip Commonly known as:  ACCU-CHEK MILA PLUS TEST STRP Check blood sugar one to two times daily * glucose blood VI test strips strip Commonly known as:  FREESTYLE LITE STRIPS Monitor blood sugar twice daily. Diagnosis code: E11.9 indomethacin 25 mg capsule Commonly known as:  INDOCIN  
TAKE 1 CAPSULE BY MOUTH THREE TIMES A DAY AS NEEDED FOR PAIN. * Lancets Misc Commonly known as:  ACCU-CHEK SOFTCLIX LANCETS Check blood sugar one to two times daily * FREESTYLE LANCETS 28 gauge Misc Generic drug:  lancets  
  
 levothyroxine 175 mcg tablet Commonly known as:  SYNTHROID  
TAKE ONE DAILY BY MOUTH. TAKE AN EXTRA 1/2 PILL ON SUNDAYS. (7.5 TABLETS/WEEK  MCG) lidocaine 5 % Commonly known as:  Jurline Nam 1 patch by TransDERmal route every twenty-four (24) hours. Apply patch to the affected area for 12 hours a day and remove for 12 hours a day. loratadine 10 mg tablet Commonly known as:  Claudette Matthews Take 1 Tab by mouth daily. losartan 25 mg tablet Commonly known as:  COZAAR  
TAKE 1 TABLET BY MOUTH DAILY. metFORMIN  mg tablet Commonly known as:  GLUCOPHAGE XR Take 1 Tab by mouth two (2) times daily (after meals). For diabetes. omega-3 acid ethyl esters 1 gram capsule Commonly known as:  Phong Miller Take 2 capsules by mouth twice daily with meals. ONE-A-DAY 50 PLUS PO Take  by mouth. OTHER Accu-Chek Lady Plus Kit  Check blood sugar one to two times daily [address] Take  by mouth.  
  
 polyethylene glycol 17 gram/dose powder Commonly known as:  Valentine Harrington MIX 1 CAPFUL (17 GM) IN LIQUID AND DRINK DAILY. potassium chloride SA 10 mEq capsule Commonly known as:  Rayma Flake Take 1 capsule by mouth twice daily. raNITIdine 300 mg tablet Commonly known as:  ZANTAC TAKE 1 TABLET BY MOUTH EVERY DAY. traMADol 50 mg tablet Commonly known as:  ULTRAM  
Take 1 tablet by mouth every six (6) hours as needed for Pain. VITAMIN D3 2,000 unit Tab Generic drug:  cholecalciferol (vitamin D3) Take  by mouth.  
  
 vitamin E 400 unit capsule Commonly known as:  [address] Take 800 Units by mouth daily. ZETIA 10 mg tablet Generic drug:  ezetimibe Take 1 tablet by mouth daily. * Notice: This list has 4 medication(s) that are the same as other medications prescribed for you. Read the directions carefully, and ask your doctor or other care provider to review them with you. We Performed the Following HEPATITIS C AB [52373 CPT(R)] Follow-up Instructions Return in about 6 months (around 2/7/2019) for dm-2 htn gout. Patient Instructions Medicare Wellness Visit, Female The best way to live healthy is to have a lifestyle where you eat a well-balanced diet, exercise regularly, limit alcohol use, and quit all forms of tobacco/nicotine, if applicable. Regular preventive services are another way to keep healthy. Preventive services (vaccines, screening tests, monitoring & exams) can help personalize your care plan, which helps you manage your own care. Screening tests can find health problems at the earliest stages, when they are easiest to treat. [place]  follows the current, evidence-based guidelines published by the [facility] Patrick Mireya ([facility]) when recommending preventive services for our patients. Because we follow these guidelines, sometimes recommendations change over time as research supports it. (For example, mammograms used to be recommended annually. Even though Medicare will still pay for an annual mammogram, the newer guidelines recommend a mammogram every two years for women of average risk.) Of course, you and your provider may decide to screen more often for some diseases, based on your risk and co-morbidities (chronic disease you are already diagnosed with). Preventive services for you include: - Medicare offers their members a free annual wellness visit, which is time for you and your primary care provider to discuss and plan for your preventive service needs. Take advantage of this benefit every year! 
 
-All people over age 72 should receive the recommended pneumonia vaccines. Current USPSTF guidelines recommend a series of two vaccines for the best pneumonia protection.  
 
-All adults should have a yearly flu vaccine and a tetanus vaccine every 10 years. All adults age 61 years should receive a shingles vaccine once in their lifetime.   
 
-A bone mass density test is recommended when a woman turns 65 to screen for osteoporosis. This test is only recommended once as a screening. Some providers will use this same test as a disease monitoring tool if you already have osteoporosis. -All adults age 38-68 years who are overweight should have a diabetes screening test once every three years.  
 
-Other screening tests & preventive services for persons with diabetes include: an eye exam to screen for diabetic retinopathy, a kidney function test, a foot exam, and stricter control over your cholesterol.  
 
-Cardiovascular screening for adults with routine risk involves an electrocardiogram (ECG) at intervals determined by the provider.  
 
-Colorectal cancer screenings should be done for adults age 54-65 years with normal risk. There are a number of acceptable methods of screening for this type of cancer. Each test has its own benefits and drawbacks. Discuss with your provider what is most appropriate for you during your annual wellness visit. The different tests include: colonoscopy (considered the best screening method), a fecal occult blood test, a fecal DNA test, and sigmoidoscopy. -Breast cancer screenings are recommended every other year for women of normal risk age 54-69 years.  
 
-Cervical cancer screenings for women over age 72 are only recommended with certain risk factors.  
 
-All adults born between [facility] should be screened once for Hepatitis C.   
 
Here is a list of your current Health Maintenance items (your personalized list of preventive services) with a due date: 
Health Maintenance Due Topic Date Due  
 Hepatitis C Test  1945  Glaucoma Screening   01/28/2016 [facility] Annual Well Visit  04/12/2018  Flu Vaccine  08/01/2018 Introducing [facility]! [facility] introduces [employer] patient portal. Now you can access parts of your medical record, email your doctor's office, and request medication refills online. 1. In your internet browser, go to [email or web address] 2. Click on the First Time User? Click Here link in the Sign In box. You will see the New Member Sign Up page. 3. Enter your [employer] Access Code exactly as it appears below. You will not need to use this code after youve completed the sign-up process. If you do not sign up before the expiration date, you must request a new code. · [employer] Access Code: -[email or web address] Expires: 10/8/2018 11:27 AM 
 
4. Enter the last four digits of your Social Security Number (xxxx) and Date of Birth (mm/dd/yyyy) as indicated and click Submit. You will be taken to the next sign-up page. 5. Create a [employer] ID. This will be your [employer] login ID and cannot be changed, so think of one that is secure and easy to remember. 6. Create a [employer] password. You can change your password at any time. 7. Enter your Password Reset Question and Answer. This can be used at a later time if you forget your password. 8. Enter your e-mail address. You will receive e-mail notification when new information is available in [address] 9. Click Sign Up. You can now view and download portions of your medical record. 10. Click the Download Summary menu link to download a portable copy of your medical information. If you have questions, please visit the Frequently Asked Questions section of the [employer] website. Remember, [employer] is NOT to be used for urgent needs. For medical emergencies, dial 911. Now available from your iPhone and Android! Please provide this summary of care documentation to your next provider. Your primary care clinician is listed as Alejandrina MCFARLANE. If you have any questions after today's visit, please call [phone].

## 2018-08-07 NOTE — PATIENT INSTRUCTIONS
Medicare Wellness Visit, Female    The best way to live healthy is to have a lifestyle where you eat a well-balanced diet, exercise regularly, limit alcohol use, and quit all forms of tobacco/nicotine, if applicable. Regular preventive services are another way to keep healthy. Preventive services (vaccines, screening tests, monitoring & exams) can help personalize your care plan, which helps you manage your own care. Screening tests can find health problems at the earliest stages, when they are easiest to treat. [address] follows the current, evidence-based guidelines published by the [facility] Patrick Mireya ([facility]) when recommending preventive services for our patients. Because we follow these guidelines, sometimes recommendations change over time as research supports it. (For example, mammograms used to be recommended annually. Even though Medicare will still pay for an annual mammogram, the newer guidelines recommend a mammogram every two years for women of average risk.)    Of course, you and your provider may decide to screen more often for some diseases, based on your risk and co-morbidities (chronic disease you are already diagnosed with). Preventive services for you include:    - Medicare offers their members a free annual wellness visit, which is time for you and your primary care provider to discuss and plan for your preventive service needs. Take advantage of this benefit every year!    -All people over age 72 should receive the recommended pneumonia vaccines. Current USPSTF guidelines recommend a series of two vaccines for the best pneumonia protection.     -All adults should have a yearly flu vaccine and a tetanus vaccine every 10 years. All adults age 61 years should receive a shingles vaccine once in their lifetime.      -A bone mass density test is recommended when a woman turns 65 to screen for osteoporosis.  This test is only recommended once as a screening. Some providers will use this same test as a disease monitoring tool if you already have osteoporosis. -All adults age 38-68 years who are overweight should have a diabetes screening test once every three years.     -Other screening tests & preventive services for persons with diabetes include: an eye exam to screen for diabetic retinopathy, a kidney function test, a foot exam, and stricter control over your cholesterol.     -Cardiovascular screening for adults with routine risk involves an electrocardiogram (ECG) at intervals determined by the provider.     -Colorectal cancer screenings should be done for adults age 54-65 years with normal risk. There are a number of acceptable methods of screening for this type of cancer. Each test has its own benefits and drawbacks. Discuss with your provider what is most appropriate for you during your annual wellness visit. The different tests include: colonoscopy (considered the best screening method), a fecal occult blood test, a fecal DNA test, and sigmoidoscopy. -Breast cancer screenings are recommended every other year for women of normal risk age 54-69 years.     -Cervical cancer screenings for women over age 72 are only recommended with certain risk factors.     -All adults born between [facility] should be screened once for Hepatitis C.      Here is a list of your current Health Maintenance items (your personalized list of preventive services) with a due date:  Health Maintenance Due   Topic Date Due    Hepatitis C Test  1945    Glaucoma Screening   01/28/2016    Annual Well Visit  04/12/2018    Flu Vaccine  08/01/2018

## 2018-08-07 NOTE — PROGRESS NOTES
HISTORY OF PRESENT ILLNESS  Mikhail Pham is a 68 y.o. female. HPI   HPI   F/u DM-2 htn gout  Last a1c < 5.8 on metformin  Has mild diabetic retinopathy by office fundus photopgraphy  Has not yet started on prolia for osteoporosis    Just back from 3 week trip to [place]  Gout has been quiescent this summer  ghas f/u [facility] hepatology in 2 mos and f/u Dr Genny Fregoso this year    C/o right sinus pressure and bloody nose on right side x 2 weeks  Last OV  Had gout attack 2 weeks ago b/l ankles-treated at [facility] with indocin  Couldn't walk so home health was called but did not participate with her insurance so did not get services but doing well now  Also had gout attack in December treated at [facility]  Colchicine was stopped 3 months ago --had not had a gout attack in 4 years prior to stopping the colchicine. Lost 16 lbs last few months  A1c< 6 at [facility] 3 mos, uric acid was around 8        Last visit:  Just saw Dr Genny Fregoso for Dm-2--a1c down to 6.0 after metformin was added this year  Weight down 6 lbs from last visit--not eating as much  May be started on prolia for osteoporosis in 2018 once insurance changes per pt  Will get liver US at [facility] Dr Satya Portillo at [facility] and repeat liver bx was recommeneded--BRADY with bridging fibrosis but has cirrhosis by fibroscan per notes from [facility]--pt reluectant to get repeat liver bx----  Had EGD--no varices-Dr Elaine Whalen  Takes tramadol 1-3 / day for back and leg pain -Dr Yolanda Lane  Has not had gout attack in 4 yrs-prvious ly about 2 attacks per year.  Takes daily colchicine, wants to stop the colchinie       Patient Active Problem List    Diagnosis Date Noted    Controlled type 2 diabetes mellitus without complication ([address] 62/32/2526    DDD (degenerative disc disease), lumbar 11/27/2017    Prediabetes 04/11/2016    Thrombocytopenia ([address] 01/24/2015    HTN (hypertension) 01/17/2014    Bifascicular block 12/23/2010    Fatty liver 06/18/2010    Pure hypercholesterolemia 06/18/2010    Colon polyp 06/18/2010    Gout 06/14/2010    Hypothyroidism 06/14/2010    Osteoporosis 06/14/2010    Anxiety 06/14/2010    Leg edema 06/14/2010    RSD lower limb 06/14/2010     Current Outpatient Prescriptions   Medication Sig Dispense Refill    glucose blood VI test strips (FREESTYLE LITE STRIPS) strip Monitor blood sugar twice daily. Diagnosis code: E11.9 50 Strip 10    loratadine (CLARITIN) 10 mg tablet Take 1 Tab by mouth daily. 90 Tab 3    colchicine (MITIGARE) 0.6 mg capsule Take 1 Cap by mouth daily. 90 Cap 3    raNITIdine (ZANTAC) 300 mg tablet TAKE 1 TABLET BY MOUTH EVERY DAY. 90 Tab 3    atorvastatin (LIPITOR) 20 mg tablet TAKE 1 TABLET DAILY 90 Tab 3    losartan (COZAAR) 25 mg tablet TAKE 1 TABLET BY MOUTH DAILY. 90 Tab 3    Biotin 2,500 mcg cap Take  by mouth.  metFORMIN ER (GLUCOPHAGE XR) 500 mg tablet Take 1 Tab by mouth two (2) times daily (after meals). For diabetes. 60 Tab 10    furosemide (LASIX) 80 mg tablet Take 1 tablet by mouth daily. 90 Tab 3    amLODIPine (NORVASC) 10 mg tablet Take 1 tablet by mouth daily. 90 Tab 3    ZETIA 10 mg tablet Take 1 tablet by mouth daily. 90 Tab 3    omega-3 acid ethyl esters (LOVAZA) 1 gram capsule Take 2 capsules by mouth twice daily with meals. 360 Cap 3    potassium chloride SA (MICRO-K) 10 mEq capsule Take 1 capsule by mouth twice daily. 180 Cap 3    levothyroxine (SYNTHROID) 175 mcg tablet TAKE ONE DAILY BY MOUTH. TAKE AN EXTRA 1/2 PILL ON SUNDAYS. (7.5 TABLETS/WEEK  MCG) 32 Tab 10    FREESTYLE LANCETS 28 gauge misc   3    polyethylene glycol (MIRALAX) 17 gram/dose powder MIX 1 CAPFUL (17 GM) IN LIQUID AND DRINK DAILY. 1581 g 3    Lancets (ACCU-CHEK SOFTCLIX LANCETS) misc Check blood sugar one to two times daily 3 Package 3    L GASSERI/B BIFIDUM/B LONGUM ([employer] PO) Take  by mouth.  vitamin E (AQUA GEMS) 400 unit capsule Take 800 Units by mouth daily.       lidocaine (LIDODERM) 5 %(700 mg/patch) 1 patch by TransDERmal route every twenty-four (24) hours. Apply patch to the affected area for 12 hours a day and remove for 12 hours a day.  cholecalciferol, vitamin D3, (VITAMIN D3) 2,000 unit tab Take  by mouth.  traMADol (ULTRAM) 50 mg tablet Take 1 tablet by mouth every six (6) hours as needed for Pain. 12 tablet 0    OTHER Accu-Chek Lady Plus Kit  Check blood sugar one to two times daily 1 kit 3    glucose blood VI test strips (ACCU-CHEK LADY PLUS TEST STRP) strip Check blood sugar one to two times daily 3 Package 3    MULTIVITAMIN WITH MINERALS (ONE-A-DAY 50 PLUS PO) Take  by mouth.  aspirin 81 mg chewable tablet Take 81 mg by mouth daily.  fluticasone (FLONASE) 50 mcg/actuation nasal spray 2 Sprays by Both Nostrils route daily. Administer to right and left nostril. 3 Bottle 3    indomethacin (INDOCIN) 25 mg capsule TAKE 1 CAPSULE BY MOUTH THREE TIMES A DAY AS NEEDED FOR PAIN. 30 Cap 6    fluocinoNIDE (LIDEX) 0.05 % topical cream Apply  to affected area two (2) times a day.  60 g 3     Allergies   Allergen Reactions    Celebrex [Celecoxib] Hives    Pcn [Penicillins] Unknown (comments)     Can't remember    Sulfa (Sulfonamide Antibiotics) Hives     Social History   Substance Use Topics    Smoking status: Never Smoker    Smokeless tobacco: Never Used    Alcohol use No      Lab Results  Component Value Date/Time   WBC 5.7 11/21/2017 04:00 PM   HGB 13.3 11/21/2017 04:00 PM   HCT 39.7 11/21/2017 04:00 PM   PLATELET 431 (L) 43/18/4385 04:00 PM   MCV 96 11/21/2017 04:00 PM     Lab Results  Component Value Date/Time   Hemoglobin A1c 5.8 (H) 03/23/2018 02:02 PM   Hemoglobin A1c 6.0 (H) 11/21/2017 04:00 PM   Hemoglobin A1c 6.3 (H) 08/28/2017 12:00 AM   Hemoglobin A1c, External 6.4 06/21/2016   Glucose 93 07/10/2018 11:54 AM   Glucose (POC) 129 (H) 11/17/2015 07:23 AM   Glucose  11/21/2017 12:00 PM   Microalb/Creat ratio (ug/mg creat.) 10.4 07/10/2018 11:54 AM   LDL, calculated 56 07/10/2018 11:54 AM   Creatinine 0.61 07/10/2018 11:54 AM      Lab Results  Component Value Date/Time   Cholesterol, total 160 07/10/2018 11:54 AM   HDL Cholesterol 69 07/10/2018 11:54 AM   LDL, calculated 56 07/10/2018 11:54 AM   Triglyceride 177 (H) 07/10/2018 11:54 AM   CHOL/HDL Ratio 2.7 06/14/2010 11:16 AM     Lab Results  Component Value Date/Time   GFR est non-AA 90 07/10/2018 11:54 AM   GFR est  07/10/2018 11:54 AM   Creatinine 0.61 07/10/2018 11:54 AM   BUN 19 07/10/2018 11:54 AM   Sodium 144 07/10/2018 11:54 AM   Potassium 4.0 07/10/2018 11:54 AM   Chloride 101 07/10/2018 11:54 AM   CO2 24 07/10/2018 11:54 AM   Magnesium 2.2 08/28/2017 12:00 AM   PTH, Intact 31 01/06/2016 09:55 AM        ROS    Physical Exam    ASSESSMENT and PLAN  Diagnoses and all orders for this visit:    1. Encounter for hepatitis C screening test for low risk patient  -     HEPATITIS C AB    2. Controlled type 2 diabetes mellitus without complication, without long-term current use of insulin ([facility]    3. Essential hypertension    4. Pure hypercholesterolemia    5. Medicare annual wellness visit, initial      Follow-up Disposition:  Return in about 6 months (around 2/7/2019) for dm-2 htn gout. This is the Subsequent Medicare Annual Wellness Exam, performed 12 months or more after the Initial AWV or the last Subsequent AWV    I have reviewed the patient's medical history in detail and updated the computerized patient record.      History     Past Medical History:   Diagnosis Date    Arthritis     KNEE,gout    Endocrine disease     HYPOTHYROIDISM    Fracture     Left distal femur (age 12 - pt fell)    Fracture     Left tibia 1990 (pt fell)    Fracture     Right wrist fractured 2 times (pt fell)    GERD (gastroesophageal reflux disease)     Hypertension     Hypoglycemia     \"my body produces too much insulin\"    Liver disease     BRADY    Nausea & vomiting     when had gallbladder out    Osteoporosis     Other ill-defined conditions(799.89)     spinal stenosis    Other ill-defined conditions(799.89)     BBB    Other ill-defined conditions(799.89)     HIGH CHOLESTEROL    Other ill-defined conditions(799.89)     edema legs    Thyroid disease       Past Surgical History:   Procedure Laterality Date    ABDOMEN SURGERY PROC UNLISTED      liver biopsy--BRAYD and fibrosis    COLONOSCOPY,REMV LESN,SNARE  2/11/2015         COLORECTAL SCRN; HI RISK IND  2/11/2015         HX CHOLECYSTECTOMY      HX HYSTERECTOMY      HX ORTHOPAEDIC      left leg surgery - plates, screws, wires, pins in place    HX UROLOGICAL      PESSURY    UPPER GI ENDOSCOPY,BIOPSY  11/17/2015          Current Outpatient Prescriptions   Medication Sig Dispense Refill    glucose blood VI test strips (FREESTYLE LITE STRIPS) strip Monitor blood sugar twice daily. Diagnosis code: E11.9 50 Strip 10    loratadine (CLARITIN) 10 mg tablet Take 1 Tab by mouth daily. 90 Tab 3    colchicine (MITIGARE) 0.6 mg capsule Take 1 Cap by mouth daily. 90 Cap 3    raNITIdine (ZANTAC) 300 mg tablet TAKE 1 TABLET BY MOUTH EVERY DAY. 90 Tab 3    atorvastatin (LIPITOR) 20 mg tablet TAKE 1 TABLET DAILY 90 Tab 3    losartan (COZAAR) 25 mg tablet TAKE 1 TABLET BY MOUTH DAILY. 90 Tab 3    Biotin 2,500 mcg cap Take  by mouth.  metFORMIN ER (GLUCOPHAGE XR) 500 mg tablet Take 1 Tab by mouth two (2) times daily (after meals). For diabetes. 60 Tab 10    furosemide (LASIX) 80 mg tablet Take 1 tablet by mouth daily. 90 Tab 3    amLODIPine (NORVASC) 10 mg tablet Take 1 tablet by mouth daily. 90 Tab 3    ZETIA 10 mg tablet Take 1 tablet by mouth daily. 90 Tab 3    omega-3 acid ethyl esters (LOVAZA) 1 gram capsule Take 2 capsules by mouth twice daily with meals. 360 Cap 3    potassium chloride SA (MICRO-K) 10 mEq capsule Take 1 capsule by mouth twice daily. 180 Cap 3    levothyroxine (SYNTHROID) 175 mcg tablet TAKE ONE DAILY BY MOUTH. TAKE AN EXTRA 1/2 PILL ON SUNDAYS. (7.5 TABLETS/WEEK  MCG) 32 Tab 10    FREESTYLE LANCETS 28 gauge misc   3    polyethylene glycol (MIRALAX) 17 gram/dose powder MIX 1 CAPFUL (17 GM) IN LIQUID AND DRINK DAILY. 1581 g 3    Lancets (ACCU-CHEK SOFTCLIX LANCETS) misc Check blood sugar one to two times daily 3 Package 3    L GASSERI/B BIFIDUM/B LONGUM ([employer] PO) Take  by mouth.  vitamin E (AQUA GEMS) 400 unit capsule Take 800 Units by mouth daily.  lidocaine (LIDODERM) 5 %(700 mg/patch) 1 patch by TransDERmal route every twenty-four (24) hours. Apply patch to the affected area for 12 hours a day and remove for 12 hours a day.  cholecalciferol, vitamin D3, (VITAMIN D3) 2,000 unit tab Take  by mouth.  traMADol (ULTRAM) 50 mg tablet Take 1 tablet by mouth every six (6) hours as needed for Pain. 12 tablet 0    OTHER Accu-Chek Lady Plus Kit  Check blood sugar one to two times daily 1 kit 3    glucose blood VI test strips (ACCU-CHEK LADY PLUS TEST STRP) strip Check blood sugar one to two times daily 3 Package 3    MULTIVITAMIN WITH MINERALS (ONE-A-DAY 50 PLUS PO) Take  by mouth.  aspirin 81 mg chewable tablet Take 81 mg by mouth daily.  fluticasone (FLONASE) 50 mcg/actuation nasal spray 2 Sprays by Both Nostrils route daily. Administer to right and left nostril. 3 Bottle 3    indomethacin (INDOCIN) 25 mg capsule TAKE 1 CAPSULE BY MOUTH THREE TIMES A DAY AS NEEDED FOR PAIN. 30 Cap 6    fluocinoNIDE (LIDEX) 0.05 % topical cream Apply  to affected area two (2) times a day.  60 g 3     Allergies   Allergen Reactions    Celebrex [Celecoxib] Hives    Pcn [Penicillins] Unknown (comments)     Can't remember    Sulfa (Sulfonamide Antibiotics) Hives     Family History   Problem Relation Age of Onset    Diabetes Mother     Heart Disease Mother     Lung Disease Father      copd     Social History   Substance Use Topics    Smoking status: Never Smoker    Smokeless tobacco: Never Used    Alcohol use No Patient Active Problem List   Diagnosis Code    Gout M10.9    Hypothyroidism E03.9    Osteoporosis M81.0    Anxiety F41.9    Leg edema R60.0    RSD lower limb G90.529    Fatty liver K76.0    Pure hypercholesterolemia E78.00    Colon polyp K63.5    Bifascicular block I45.2    HTN (hypertension) I10    Thrombocytopenia (HCC) D69.6    Prediabetes R73.03    DDD (degenerative disc disease), lumbar M51.36    Controlled type 2 diabetes mellitus without complication (HCC) U26.3       Depression Risk Factor Screening:     PHQ over the last two weeks 7/10/2018   Little interest or pleasure in doing things Not at all   Feeling down, depressed, irritable, or hopeless Not at all   Total Score PHQ 2 0     Alcohol Risk Factor Screening: You do not drink alcohol or very rarely. Functional Ability and Level of Safety:   Hearing Loss  Hearing is good. Activities of Daily Living  The home contains: handrails  Patient needs help with:  transportation and housework    Fall Risk  Fall Risk Assessment, last 12 mths 7/10/2018   Able to walk? Yes   Fall in past 12 months? No       Abuse Screen  Patient is not abused    Cognitive Screening   Evaluation of Cognitive Function:  Has your family/caregiver stated any concerns about your memory: no  Normal    Patient Care Team   Patient Care Team:  Spenser eLe MD as PCP - Madisyn Palm RN as Nurse Yaritza Aguilar MD as Consulting Provider (Endocrinology)  Ashok Boggs MD (Obstetrics & Gynecology)  Maximiliano Whitehead MD (Ophthalmology)  Dominique Torrez MD (Dermatology)  CATY Ceja MD (Orthopedic Surgery)    Assessment/Plan   Education and counseling provided:  Are appropriate based on today's review and evaluation  End-of-Life planning (with patient's consent)-see ACP note  Influenza Vaccine  Screening for glaucoma  screen hep C ordered    Diagnoses and all orders for this visit:    1.  Encounter for hepatitis C screening test for low risk patient  -     HEPATITIS C AB    2. Controlled type 2 diabetes mellitus without complication, without long-term current use of insulin ([facility] [address] [address]   F/u Dr Clementina Engel  3. Essential hypertension   controlled  4.  Pure hypercholesterolemia   LDL at goal      Health Maintenance Due   Topic Date Due    Hepatitis C Screening  1945    GLAUCOMA SCREENING Q2Y  01/28/2016    MEDICARE YEARLY EXAM  04/12/2018    Influenza Age 9 to Adult  08/01/2018

## 2018-08-08 LAB — HCV AB S/CO SERPL IA: <0.1 S/CO RATIO (ref 0–0.9)

## 2018-09-14 DIAGNOSIS — K59.00 CONSTIPATION, UNSPECIFIED CONSTIPATION TYPE: Primary | ICD-10-CM

## 2018-09-14 RX ORDER — POLYETHYLENE GLYCOL 3350 17 G/17G
17 POWDER, FOR SOLUTION ORAL DAILY
Qty: 2108 G | Refills: 3 | Status: SHIPPED | OUTPATIENT
Start: 2018-09-14 | End: 2019-12-09 | Stop reason: SDUPTHER

## 2018-09-14 NOTE — TELEPHONE ENCOUNTER
Requested Prescriptions     Pending Prescriptions Disp Refills    polyethylene glycol (MIRALAX) 17 gram/dose powder 2108 g 3     PCP: Regino Menendez MD    Last appt: 8/7/2018  Future Appointments  Date Time Provider [facility]   11/5/2018 10:30 AM Liberty Urrutia MD RDE [address] [place] [address]   2/6/2019 11:30 AM Regino Menendez MD [address]       Requested Prescriptions     Pending Prescriptions Disp Refills    polyethylene glycol (MIRALAX) 17 gram/dose powder 2108 g 3

## 2018-11-16 DIAGNOSIS — I10 HYPERTENSION, UNSPECIFIED TYPE: Primary | ICD-10-CM

## 2018-11-16 RX ORDER — POTASSIUM CHLORIDE 750 MG/1
10 CAPSULE, EXTENDED RELEASE ORAL DAILY
Qty: 180 CAP | Refills: 3 | Status: SHIPPED | OUTPATIENT
Start: 2018-11-16 | End: 2019-03-25 | Stop reason: SDUPTHER

## 2018-11-16 RX ORDER — OMEGA-3-ACID ETHYL ESTERS 1 G/1
1 CAPSULE, LIQUID FILLED ORAL 2 TIMES DAILY WITH MEALS
Qty: 360 CAP | Refills: 3 | Status: SHIPPED | OUTPATIENT
Start: 2018-11-16 | End: 2019-03-25 | Stop reason: SDUPTHER

## 2018-11-16 RX ORDER — AMLODIPINE BESYLATE 10 MG/1
10 TABLET ORAL DAILY
Qty: 90 TAB | Refills: 3 | Status: SHIPPED | OUTPATIENT
Start: 2018-11-16 | End: 2019-10-17 | Stop reason: SDUPTHER

## 2018-11-16 RX ORDER — FUROSEMIDE 80 MG/1
80 TABLET ORAL DAILY
Qty: 90 TAB | Refills: 3 | Status: SHIPPED | OUTPATIENT
Start: 2018-11-16 | End: 2019-06-20 | Stop reason: SDUPTHER

## 2018-11-16 NOTE — TELEPHONE ENCOUNTER
Requested Prescriptions     Pending Prescriptions Disp Refills    potassium chloride SA (MICRO-K) 10 mEq capsule 180 Cap 3     Sig: Take 1 Cap by mouth daily.  omega-3 acid ethyl esters (LOVAZA) 1 gram capsule 360 Cap 3     Sig: Take 1 Cap by mouth two (2) times daily (with meals).  furosemide (LASIX) 80 mg tablet 90 Tab 3     Sig: Take 1 Tab by mouth daily.  amLODIPine (NORVASC) 10 mg tablet 90 Tab 3     Sig: Take 1 Tab by mouth daily.       PCP: Isabel Salazar MD    Last appt: 8/7/2018  Future Appointments   Date Time Provider Hilary Kulkarnii   12/10/2018  1:50 PM Andrey Whiteside MD RDE [facility] [place] FELIZ SCHED   2/6/2019 11:30 AM Isabel Salazar MD [place] [id number]

## 2018-12-13 DIAGNOSIS — E11.9 CONTROLLED TYPE 2 DIABETES MELLITUS WITHOUT COMPLICATION, WITHOUT LONG-TERM CURRENT USE OF INSULIN (HCC): Primary | ICD-10-CM

## 2018-12-13 RX ORDER — METFORMIN HYDROCHLORIDE 500 MG/1
500 TABLET, EXTENDED RELEASE ORAL
Qty: 60 TAB | Refills: 11 | Status: SHIPPED | OUTPATIENT
Start: 2018-12-13 | End: 2019-11-18 | Stop reason: SDUPTHER

## 2019-02-05 DIAGNOSIS — E03.9 ACQUIRED HYPOTHYROIDISM: Primary | ICD-10-CM

## 2019-02-05 RX ORDER — LEVOTHYROXINE SODIUM 175 UG/1
TABLET ORAL
Qty: 32 TAB | Refills: 5 | Status: SHIPPED | OUTPATIENT
Start: 2019-02-05 | End: 2019-08-22 | Stop reason: SDUPTHER

## 2019-03-14 DIAGNOSIS — K21.9 GASTROESOPHAGEAL REFLUX DISEASE WITHOUT ESOPHAGITIS: ICD-10-CM

## 2019-03-14 RX ORDER — ATORVASTATIN CALCIUM 20 MG/1
TABLET, FILM COATED ORAL
Qty: 90 TAB | Refills: 3 | Status: SHIPPED | OUTPATIENT
Start: 2019-03-14 | End: 2020-02-27 | Stop reason: SDUPTHER

## 2019-03-14 RX ORDER — LOSARTAN POTASSIUM 25 MG/1
TABLET ORAL
Qty: 90 TAB | Refills: 3 | Status: SHIPPED | OUTPATIENT
Start: 2019-03-14 | End: 2020-02-27 | Stop reason: SDUPTHER

## 2019-03-14 RX ORDER — RANITIDINE 300 MG/1
TABLET ORAL
Qty: 90 TAB | Refills: 3 | Status: SHIPPED | OUTPATIENT
Start: 2019-03-14 | End: 2019-08-23 | Stop reason: SDUPTHER

## 2019-03-14 RX ORDER — EZETIMIBE 10 MG
TABLET ORAL
Qty: 90 TAB | Refills: 3 | Status: SHIPPED | OUTPATIENT
Start: 2019-03-14 | End: 2020-02-27 | Stop reason: SDUPTHER

## 2019-03-25 ENCOUNTER — HOSPITAL ENCOUNTER (OUTPATIENT)
Dept: MAMMOGRAPHY | Age: 74
Discharge: HOME | End: 2019-03-25
Attending: INTERNAL MEDICINE
Payer: MEDICARE

## 2019-03-25 ENCOUNTER — OFFICE VISIT (OUTPATIENT)
Dept: INTERNAL MEDICINE CLINIC | Age: 74
End: 2019-03-25

## 2019-03-25 VITALS
BODY MASS INDEX: 36.68 KG/M2 | OXYGEN SATURATION: 98 % | WEIGHT: 207 LBS | HEART RATE: 69 BPM | SYSTOLIC BLOOD PRESSURE: 150 MMHG | DIASTOLIC BLOOD PRESSURE: 76 MMHG | RESPIRATION RATE: 14 BRPM | HEIGHT: 63 IN | TEMPERATURE: 97.6 F

## 2019-03-25 DIAGNOSIS — Z12.39 BREAST SCREENING: ICD-10-CM

## 2019-03-25 DIAGNOSIS — R60.0 LEG EDEMA: ICD-10-CM

## 2019-03-25 DIAGNOSIS — I10 ESSENTIAL HYPERTENSION: Primary | ICD-10-CM

## 2019-03-25 PROCEDURE — 77067 SCR MAMMO BI INCL CAD: CPT

## 2019-03-25 RX ORDER — POTASSIUM CHLORIDE 750 MG/1
20 CAPSULE, EXTENDED RELEASE ORAL DAILY
Qty: 180 CAP | Refills: 3 | Status: SHIPPED | OUTPATIENT
Start: 2019-03-25 | End: 2020-11-10 | Stop reason: SDUPTHER

## 2019-03-25 RX ORDER — OMEGA-3-ACID ETHYL ESTERS 1 G/1
2 CAPSULE, LIQUID FILLED ORAL 2 TIMES DAILY WITH MEALS
Qty: 360 CAP | Refills: 3 | Status: SHIPPED | OUTPATIENT
Start: 2019-03-25 | End: 2020-11-10 | Stop reason: SDUPTHER

## 2019-03-25 NOTE — PROGRESS NOTES
HISTORY OF PRESENT ILLNESS  Rosario Ferris is a 68 y.o. female. HPI     F/u DM-2 htn gout  Went to [facility] last week for edema and weight gain--bumex was given 1 mg bid for 1-2 weeks  Edema improved but felt week  Dispatch was called for weakness and diarrhea---labs ok bmp wnl=--K 4.0 bun/cr 19 /0.8  Stopped bumex, back on lasix  Last a1c 5.8 LDL 56  fsbs at home avg  120-130  Sees Dr Graeme Marcus for Dm-2 osteoporosis  At care more were done a1c 6.0    2 weeks ago per pt      Last OV  Last a1c < 5.8 on metformin  Has mild diabetic retinopathy by office fundus photopgraphy  Has not yet started on prolia for osteoporosis     Just back from 3 week trip to [place]  Gout has been quiescent this summer  ghas f/u [facility] hepatology in 2 mos and f/u Dr Graeme Marcus this year     C/o right sinus pressure and bloody nose on right side x 2 weeks  Last OV  Had gout attack 2 weeks ago b/l ankles-treated at [facility] with indocin  Couldn't walk so home health was called but did not participate with her insurance so did not get services but doing well now  Also had gout attack in December treated at [facility]  Colchicine was stopped 3 months ago --had not had a gout attack in 4 years prior to stopping the colchicine.   Lost 16 lbs last few months  A1c< 6 at [facility] 3 mos, uric acid was around 8           Patient Active Problem List    Diagnosis Date Noted    Controlled type 2 diabetes mellitus without complication ([facility] [address] 68/34/6863    DDD (degenerative disc disease), lumbar 11/27/2017    Prediabetes 04/11/2016    Thrombocytopenia ([facility] [address] 01/24/2015    HTN (hypertension) 01/17/2014    Bifascicular block 12/23/2010    Fatty liver 06/18/2010    Pure hypercholesterolemia 06/18/2010    Colon polyp 06/18/2010    Gout 06/14/2010    Hypothyroidism 06/14/2010    Osteoporosis 06/14/2010    Anxiety 06/14/2010    Leg edema 06/14/2010    RSD lower limb 06/14/2010     Current Outpatient Medications   Medication Sig Dispense Refill    losartan (COZAAR) 25 mg tablet TAKE 1 TABLET BY MOUTH DAILY. 90 Tab 3    raNITIdine (ZANTAC) 300 mg tab TAKE 1 TABLET BY MOUTH EVERY DAY. 90 Tab 3    ZETIA 10 mg tablet Take 1 tablet by mouth daily. 90 Tab 3    atorvastatin (LIPITOR) 20 mg tablet TAKE 1 TABLET DAILY 90 Tab 3    levothyroxine (SYNTHROID) 175 mcg tablet TAKE ONE DAILY BY MOUTH. TAKE AN EXTRA 1/2 PILL ON SUNDAYS. (7.5 TABLETS/WEEK  MCG) 32 Tab 5    metFORMIN ER (GLUCOPHAGE XR) 500 mg tablet Take 1 Tab by mouth two (2) times daily (after meals). For diabetes. 60 Tab 11    potassium chloride SA (MICRO-K) 10 mEq capsule Take 1 Cap by mouth daily. 180 Cap 3    omega-3 acid ethyl esters (LOVAZA) 1 gram capsule Take 1 Cap by mouth two (2) times daily (with meals). 360 Cap 3    furosemide (LASIX) 80 mg tablet Take 1 Tab by mouth daily. 90 Tab 3    amLODIPine (NORVASC) 10 mg tablet Take 1 Tab by mouth daily. 90 Tab 3    polyethylene glycol (MIRALAX) 17 gram/dose powder Take 17 g by mouth daily. 2108 g 3    glucose blood VI test strips (FREESTYLE LITE STRIPS) strip Monitor blood sugar twice daily. Diagnosis code: E11.9 50 Strip 10    loratadine (CLARITIN) 10 mg tablet Take 1 Tab by mouth daily. 90 Tab 3    colchicine (MITIGARE) 0.6 mg capsule Take 1 Cap by mouth daily. 90 Cap 3    Biotin 2,500 mcg cap Take  by mouth.  fluticasone (FLONASE) 50 mcg/actuation nasal spray 2 Sprays by Both Nostrils route daily. Administer to right and left nostril. 3 Bottle 3    indomethacin (INDOCIN) 25 mg capsule TAKE 1 CAPSULE BY MOUTH THREE TIMES A DAY AS NEEDED FOR PAIN. 30 Cap 6    FREESTYLE LANCETS 28 gauge misc   3    Lancets (ACCU-CHEK SOFTCLIX LANCETS) misc Check blood sugar one to two times daily 3 Package 3    L GASSERI/B BIFIDUM/B LONGUM ([employer] PO) Take  by mouth.  vitamin E (AQUA GEMS) 400 unit capsule Take 800 Units by mouth daily.       lidocaine (LIDODERM) 5 %(700 mg/patch) 1 patch by TransDERmal route every twenty-four (24) hours. Apply patch to the affected area for 12 hours a day and remove for 12 hours a day.  cholecalciferol, vitamin D3, (VITAMIN D3) 2,000 unit tab Take  by mouth.  traMADol (ULTRAM) 50 mg tablet Take 1 tablet by mouth every six (6) hours as needed for Pain. 12 tablet 0    OTHER Accu-Chek Lady Plus Kit  Check blood sugar one to two times daily 1 kit 3    glucose blood VI test strips (ACCU-CHEK LADY PLUS TEST STRP) strip Check blood sugar one to two times daily 3 Package 3    MULTIVITAMIN WITH MINERALS (ONE-A-DAY 50 PLUS PO) Take  by mouth.  aspirin 81 mg chewable tablet Take 81 mg by mouth daily.  fluocinoNIDE (LIDEX) 0.05 % topical cream Apply  to affected area two (2) times a day.  60 g 3     Allergies   Allergen Reactions    Celebrex [Celecoxib] Hives    Pcn [Penicillins] Unknown (comments)     Can't remember    Sulfa (Sulfonamide Antibiotics) Hives      Lab Results   Component Value Date/Time    WBC 5.7 11/21/2017 04:00 PM    HGB 13.3 11/21/2017 04:00 PM    HCT 39.7 11/21/2017 04:00 PM    PLATELET 695 (L) 78/66/3385 04:00 PM    MCV 96 11/21/2017 04:00 PM     Lab Results   Component Value Date/Time    Hemoglobin A1c 5.8 (H) 03/23/2018 02:02 PM    Hemoglobin A1c 6.0 (H) 11/21/2017 04:00 PM    Hemoglobin A1c 6.3 (H) 08/28/2017 12:00 AM    Hemoglobin A1c, External 6.4 06/21/2016    Glucose 93 07/10/2018 11:54 AM    Glucose (POC) 129 (H) 11/17/2015 07:23 AM    Glucose  11/21/2017 12:00 PM    Microalb/Creat ratio (ug/mg creat.) 10.4 07/10/2018 11:54 AM    LDL, calculated 56 07/10/2018 11:54 AM    Creatinine 0.61 07/10/2018 11:54 AM      Lab Results   Component Value Date/Time    Cholesterol, total 160 07/10/2018 11:54 AM    HDL Cholesterol 69 07/10/2018 11:54 AM    LDL, calculated 56 07/10/2018 11:54 AM    Triglyceride 177 (H) 07/10/2018 11:54 AM    CHOL/HDL Ratio 2.7 06/14/2010 11:16 AM     Lab Results   Component Value Date/Time    GFR est non-AA 90 07/10/2018 11:54 AM    GFR est AA 104 07/10/2018 11:54 AM    Creatinine 0.61 07/10/2018 11:54 AM    BUN 19 07/10/2018 11:54 AM    Sodium 144 07/10/2018 11:54 AM    Potassium 4.0 07/10/2018 11:54 AM    Chloride 101 07/10/2018 11:54 AM    CO2 24 07/10/2018 11:54 AM    Magnesium 2.2 08/28/2017 12:00 AM    PTH, Intact 31 01/06/2016 09:55 AM        ROS    Physical Exam   Constitutional: She appears well-developed and well-nourished. Appears stated age   Cardiovascular: Normal rate, regular rhythm and normal heart sounds. Exam reveals no gallop and no friction rub. No murmur heard. Pulmonary/Chest: Effort normal and breath sounds normal. No respiratory distress. She has no wheezes. Abdominal: Soft. Bowel sounds are normal.   Musculoskeletal: She exhibits no edema. Neurological: She is alert. Psychiatric: She has a normal mood and affect. Nursing note and vitals reviewed. ASSESSMENT and PLAN  Diagnoses and all orders for this visit:    1. Essential hypertension   controlled  2. Leg edema   Controlled on lasix 80 mg every day  3. DM-2   [facility] a1c 6.0 at [facility]    4. BRADY   Will f/u at [address] [place] [address] hepatology clinic  Other orders  -     potassium chloride SA (MICRO-K) 10 mEq capsule; Take 2 Caps by mouth daily. -     omega-3 acid ethyl esters (LOVAZA) 1 gram capsule; Take 2 Caps by mouth two (2) times daily (with meals). Follow-up and Dispositions    · Return in about 6 months (around 9/25/2019) for htn leg edema.

## 2019-03-25 NOTE — PROGRESS NOTES
Chief Complaint   Patient presents with    Diabetes     6 month folllow up    Cholesterol Problem     6 month follow up    Hypertension     6 month follow up    Medication Evaluation     bumex verse Lasix    Follow-up     leg edema

## 2019-04-29 ENCOUNTER — TELEPHONE (OUTPATIENT)
Dept: INTERNAL MEDICINE CLINIC | Age: 74
End: 2019-04-29

## 2019-05-06 ENCOUNTER — TELEPHONE (OUTPATIENT)
Dept: INTERNAL MEDICINE CLINIC | Age: 74
End: 2019-05-06

## 2019-05-06 ENCOUNTER — DOCUMENTATION ONLY (OUTPATIENT)
Dept: INTERNAL MEDICINE CLINIC | Age: 74
End: 2019-05-06

## 2019-05-06 NOTE — PROGRESS NOTES
Spoke to pt in regards to referral to Dr. Joanne Purdy and advised  that per chelly they are not contracted with this provider. Pt states that this is the provider that she has been seeing and wants to see and that chelly has been covering her visits.

## 2019-05-20 ENCOUNTER — OFFICE VISIT (OUTPATIENT)
Dept: ENDOCRINOLOGY | Age: 74
End: 2019-05-20

## 2019-05-20 VITALS
HEART RATE: 54 BPM | HEIGHT: 63 IN | WEIGHT: 215 LBS | BODY MASS INDEX: 38.09 KG/M2 | SYSTOLIC BLOOD PRESSURE: 122 MMHG | DIASTOLIC BLOOD PRESSURE: 67 MMHG

## 2019-05-20 DIAGNOSIS — M81.0 OSTEOPOROSIS, UNSPECIFIED OSTEOPOROSIS TYPE, UNSPECIFIED PATHOLOGICAL FRACTURE PRESENCE: ICD-10-CM

## 2019-05-20 DIAGNOSIS — K76.0 FATTY LIVER: ICD-10-CM

## 2019-05-20 DIAGNOSIS — E11.9 CONTROLLED TYPE 2 DIABETES MELLITUS WITHOUT COMPLICATION, WITHOUT LONG-TERM CURRENT USE OF INSULIN (HCC): Primary | ICD-10-CM

## 2019-05-20 DIAGNOSIS — E78.00 PURE HYPERCHOLESTEROLEMIA: ICD-10-CM

## 2019-05-20 DIAGNOSIS — E03.9 ACQUIRED HYPOTHYROIDISM: ICD-10-CM

## 2019-05-20 DIAGNOSIS — M10.9 GOUT, UNSPECIFIED CAUSE, UNSPECIFIED CHRONICITY, UNSPECIFIED SITE: ICD-10-CM

## 2019-05-20 DIAGNOSIS — I10 ESSENTIAL HYPERTENSION: ICD-10-CM

## 2019-05-20 RX ORDER — DICLOFENAC SODIUM 10 MG/G
2 GEL TOPICAL
COMMUNITY
Start: 2019-05-06 | End: 2020-11-03

## 2019-05-20 RX ORDER — ALENDRONATE SODIUM 70 MG/1
TABLET ORAL
COMMUNITY
Start: 2019-04-24 | End: 2020-10-28

## 2019-05-20 RX ORDER — FLASH GLUCOSE SCANNING READER
EACH MISCELLANEOUS
COMMUNITY
Start: 2019-02-27 | End: 2019-05-20

## 2019-05-20 RX ORDER — FLASH GLUCOSE SENSOR
KIT MISCELLANEOUS
COMMUNITY
Start: 2019-05-17 | End: 2019-05-20

## 2019-05-20 RX ORDER — METRONIDAZOLE 500 MG/1
TABLET ORAL
COMMUNITY
End: 2019-11-21

## 2019-05-20 RX ORDER — NYSTATIN 100000 [USP'U]/G
POWDER TOPICAL
COMMUNITY
Start: 2019-05-15 | End: 2020-10-28

## 2019-05-20 NOTE — PROGRESS NOTES
History of Present Illness: Shanna Melchor is a 76 y.o. female presents for follow-up of diabetes. She also has dyslipidemia, obesity, fatty liver (with bridging fibrosis 2008 - followed [facility]), gout and arthritis     Diabetes - A1c 5.8 in 3/2018. Reports A1c with [facility] was in the 5s still more recently. .      Glucoses - 30 day avg 155 - using freestyle iza. Not swiping all the time, so missing data. She is taking metformin  mg twice daily. Diet: trying to keep carbsdown. Osteoporosis:  Diagnosed in 2002. Says she was started on Fosamax initially and then this was changed to [facility] [place] in 2007. She took this until 2011. She then had about a 2 year drug holiday. After BMD in 7/2013, alendronate was resumed until early 2016. BMD in 8/2014 showed BMD to be stable. Remained at very high fx risk. 8/2016 study showed worsening of BMD although values at left femoral neck and L-spine appears stable to improved vs 2012.   2018 study done showed BMD stable vs 2016. Alendronate 70 mg weekly for about 3 months now - since early 2019.       Calcium: dietary calcium  Vitamin D: taking 1000 units daily.      Hypothyroidism:  175 mcg daily. labs normal in 3/2018. Feels hair is thinning  Feels cold    HTN  amlodipine 10 mg, takes furosemide for fluid, losartan,. Taking KCL 10 meq twice daily for low potassium  - following with Dr Beryle Levee for this     Social  She has a 2 yo cat who she dotes on.        Past Medical History:   Diagnosis Date    Arthritis     KNEE,gout    Endocrine disease     HYPOTHYROIDISM    Fracture     Left distal femur (age 12 - pt fell)    Fracture     Left tibia 1990 (pt fell)    Fracture     Right wrist fractured 2 times (pt fell)    GERD (gastroesophageal reflux disease)     Hypertension     Hypoglycemia     \"my body produces too much insulin\"    Liver disease     BRADY    Nausea & vomiting     when had gallbladder out    Osteoporosis     Other ill-defined conditions(799.89)     spinal stenosis    Other ill-defined conditions(799.89)     BBB    Other ill-defined conditions(799.89)     HIGH CHOLESTEROL    Other ill-defined conditions(799.89)     edema legs    Thyroid disease      Current Outpatient Medications   Medication Sig    potassium chloride SA (MICRO-K) 10 mEq capsule Take 2 Caps by mouth daily.  omega-3 acid ethyl esters (LOVAZA) 1 gram capsule Take 2 Caps by mouth two (2) times daily (with meals).  losartan (COZAAR) 25 mg tablet TAKE 1 TABLET BY MOUTH DAILY.  raNITIdine (ZANTAC) 300 mg tab TAKE 1 TABLET BY MOUTH EVERY DAY.  ZETIA 10 mg tablet Take 1 tablet by mouth daily.  atorvastatin (LIPITOR) 20 mg tablet TAKE 1 TABLET DAILY    levothyroxine (SYNTHROID) 175 mcg tablet TAKE ONE DAILY BY MOUTH. TAKE AN EXTRA 1/2 PILL ON SUNDAYS. (7.5 TABLETS/WEEK  MCG)    metFORMIN ER (GLUCOPHAGE XR) 500 mg tablet Take 1 Tab by mouth two (2) times daily (after meals). For diabetes.  furosemide (LASIX) 80 mg tablet Take 1 Tab by mouth daily.  amLODIPine (NORVASC) 10 mg tablet Take 1 Tab by mouth daily.  polyethylene glycol (MIRALAX) 17 gram/dose powder Take 17 g by mouth daily.  glucose blood VI test strips (FREESTYLE LITE STRIPS) strip Monitor blood sugar twice daily. Diagnosis code: E11.9    loratadine (CLARITIN) 10 mg tablet Take 1 Tab by mouth daily.  colchicine (MITIGARE) 0.6 mg capsule Take 1 Cap by mouth daily.  Biotin 2,500 mcg cap Take  by mouth.  fluticasone (FLONASE) 50 mcg/actuation nasal spray 2 Sprays by Both Nostrils route daily. Administer to right and left nostril.  FREESTYLE LANCETS 28 gauge misc     Lancets (ACCU-CHEK SOFTCLIX LANCETS) misc Check blood sugar one to two times daily    L GASSERI/B BIFIDUM/B LONGUM ("[employer] PO) Take  by mouth.  vitamin E (AQUA GEMS) 400 unit capsule Take 800 Units by mouth daily.     lidocaine (LIDODERM) 5 %(700 mg/patch) 1 patch by TransDERmal route every twenty-four (24) hours. Apply patch to the affected area for 12 hours a day and remove for 12 hours a day.  cholecalciferol, vitamin D3, (VITAMIN D3) 2,000 unit tab Take  by mouth.  traMADol (ULTRAM) 50 mg tablet Take 1 tablet by mouth every six (6) hours as needed for Pain.  OTHER Accu-Chek Lady Plus Kit  Check blood sugar one to two times daily    glucose blood VI test strips (ACCU-CHEK LADY PLUS TEST STRP) strip Check blood sugar one to two times daily    MULTIVITAMIN WITH MINERALS (ONE-A-DAY 50 PLUS PO) Take  by mouth.  aspirin 81 mg chewable tablet Take 81 mg by mouth daily.  fluocinoNIDE (LIDEX) 0.05 % topical cream Apply  to affected area two (2) times a day.  alendronate (FOSAMAX) 70 mg tablet     FREESTYLE ELAINE 14 DAY READER misc     FREESTYLE ELAINE 14 DAY SENSOR kit     NYAMYC powder     diclofenac (VOLTAREN) 1 % gel     metroNIDAZOLE (FLAGYL) 500 mg tablet metronidazole 500 mg tablet    indomethacin (INDOCIN) 25 mg capsule TAKE 1 CAPSULE BY MOUTH THREE TIMES A DAY AS NEEDED FOR PAIN. No current facility-administered medications for this visit. Allergies   Allergen Reactions    Celebrex [Celecoxib] Hives    Pcn [Penicillins] Unknown (comments)     Can't remember    Sulfa (Sulfonamide Antibiotics) Hives       Review of Systems:  - Eyes: no blurry vision or double vision  - Cardiovascular: no chest pain  - Respiratory: no shortness of breath  - Musculoskeletal: + low back pain and shoulder pain.    - Neurological: no numbness/tingling in extremities    Physical Examination:  Visit Vitals  /67 (BP 1 Location: Left arm, BP Patient Position: Sitting)   Pulse (!) 54   Ht 5' 3\" (1.6 m)   Wt 215 lb (97.5 kg)   BMI 38.09 kg/m²   -   - General: pleasant, no distress, uses walker   HEENT: hearing intact, EOMI, clear sclera without icterus  - Cardiovascular: regular, normal rate   - Respiratory: normal effort  - Integumentary: no edema  - Psychiatric: normal mood and affect    Data Reviewed:   Lab Results   Component Value Date/Time    Hemoglobin A1c 5.8 03/23/2018 02:02 PM      Lab Results   Component Value Date/Time    Sodium 144 07/10/2018 11:54 AM    Potassium 4.0 07/10/2018 11:54 AM    Creatinine 0.61 07/10/2018 11:54 AM    Microalb/Creat ratio (ug/mg creat.) 10.4 07/10/2018 11:54 AM        Lab Results   Component Value Date/Time    Cholesterol, total 160 07/10/2018 11:54 AM    HDL Cholesterol 69 07/10/2018 11:54 AM    LDL, calculated 56 07/10/2018 11:54 AM    Triglyceride 177 07/10/2018 11:54 AM      Lab Results   Component Value Date/Time    TSH 0.489 07/10/2018 11:54 AM    T4, Free 2.33 03/13/2018 03:35 PM        Assessment/Plan:   1. Controlled type 2 diabetes mellitus without complication, without long-term current use of insulin ([facility] [address]   - will see if we can obtain labs from [facility]  Continue metformin and efforts with wt loss   2. Essential hypertension   - controlled. Continue current medications   3. Osteoporosis, unspecified osteoporosis type, unspecified pathological fracture presence   - continue alendronate. Has had a drug holiday. 4. Pure hypercholesterolemia   - continue zetia and atorvastatin   5. Fatty liver   -following with [facility]. Encouraged weight loss   6. Acquired hypothyroidism   - reassess on 175 mcg. 7. Gout, unspecified cause, unspecified chronicity, unspecified site   She requests uric acid be checked. Patient Instructions   Hypothyroidism:  Continue levothyroxine 175 mcg. Reassess    Diabetes. Continue dietary and weight loss efforts. You are doing very well. Continue metformin  mg twice daily. Liver:  Continue efforts with exercise, weight loss, and avoiding added sugars. Osteoporosis:  Continue vitamin D today - 1000 units  Continue alendronate. Follow-up and Dispositions    · Return in about 6 months (around 11/20/2019).          Copy sent to:

## 2019-05-20 NOTE — PATIENT INSTRUCTIONS
Hypothyroidism:  Continue levothyroxine 175 mcg. Reassess    Diabetes. Continue dietary and weight loss efforts. You are doing very well. Continue metformin  mg twice daily. Liver:  Continue efforts with exercise, weight loss, and avoiding added sugars. Osteoporosis:  Continue vitamin D today - 1000 units  Continue alendronate.

## 2019-05-21 LAB
ALBUMIN/CREAT UR: 14.6 MG/G CREAT (ref 0–30)
CHOLEST SERPL-MCNC: 154 MG/DL (ref 100–199)
CREAT UR-MCNC: 165.9 MG/DL
HDLC SERPL-MCNC: 58 MG/DL
LDLC SERPL CALC-MCNC: 39 MG/DL (ref 0–99)
MICROALBUMIN UR-MCNC: 24.2 UG/ML
T4 FREE SERPL-MCNC: 1.73 NG/DL (ref 0.82–1.77)
TRIGL SERPL-MCNC: 284 MG/DL (ref 0–149)
TSH SERPL DL<=0.005 MIU/L-ACNC: 0.68 UIU/ML (ref 0.45–4.5)
URATE SERPL-MCNC: 8.2 MG/DL (ref 2.5–7.1)
VLDLC SERPL CALC-MCNC: 57 MG/DL (ref 5–40)

## 2019-05-29 RX ORDER — LORATADINE 10 MG/1
10 TABLET ORAL DAILY
Qty: 90 TAB | Refills: 3 | Status: SHIPPED | OUTPATIENT
Start: 2019-05-29 | End: 2020-02-27 | Stop reason: SDUPTHER

## 2019-05-29 RX ORDER — COLCHICINE 0.6 MG/1
0.6 CAPSULE ORAL DAILY
Qty: 90 CAP | Refills: 3 | Status: SHIPPED | OUTPATIENT
Start: 2019-05-29 | End: 2020-04-06 | Stop reason: SDUPTHER

## 2019-05-29 NOTE — TELEPHONE ENCOUNTER
Requested Prescriptions     Pending Prescriptions Disp Refills    colchicine (MITIGARE) 0.6 mg capsule 90 Cap 3     Sig: Take 1 Cap by mouth daily. Indications: acute inflammation of the joints due to gout attack    loratadine (CLARITIN) 10 mg tablet 90 Tab 3     Sig: Take 1 Tab by mouth daily. Indications: inflammation of the nose due to an allergy     PCP: Lawyer Dakotah MD    Last appt: 3/25/2019  Future Appointments   Date Time Provider [facility] [place]   9/30/2019  9:30 AM Lawyer Dakotah MD [address]   11/18/2019  9:50 AM Kevin Archuleta MD [facility] [address]       Requested Prescriptions     Pending Prescriptions Disp Refills    colchicine (MITIGARE) 0.6 mg capsule 90 Cap 3     Sig: Take 1 Cap by mouth daily. Indications: acute inflammation of the joints due to gout attack    loratadine (CLARITIN) 10 mg tablet 90 Tab 3     Sig: Take 1 Tab by mouth daily.  Indications: inflammation of the nose due to an allergy

## 2019-06-11 ENCOUNTER — TELEPHONE (OUTPATIENT)
Dept: INTERNAL MEDICINE CLINIC | Age: 74
End: 2019-06-11

## 2019-06-11 NOTE — TELEPHONE ENCOUNTER
Pt has an appt on 6-17-19 with [address] [facility].  Pt is seeing Dr. Jerod Hopson     Their phone #[phone]  Pt did not have the fax number

## 2019-06-20 RX ORDER — FUROSEMIDE 80 MG/1
80 TABLET ORAL DAILY
Qty: 90 TAB | Refills: 3 | Status: SHIPPED | OUTPATIENT
Start: 2019-06-20 | End: 2020-11-03

## 2019-07-16 ENCOUNTER — TELEPHONE (OUTPATIENT)
Dept: INTERNAL MEDICINE CLINIC | Age: 74
End: 2019-07-16

## 2019-07-16 NOTE — TELEPHONE ENCOUNTER
----- Message from Cayden Gonzalez sent at 7/16/2019 11:45 AM EDT -----  Regarding: Dr. Emily Sapp: [phone]  Sarahi from [employer], needs clarification of code 91102. Will this be done in office or a surgery center? Sharon Colunga will send an email to Ramona.

## 2019-08-05 ENCOUNTER — HOSPITAL ENCOUNTER (OUTPATIENT)
Dept: WOUND CARE | Age: 74
Discharge: HOME | End: 2019-08-05
Payer: MEDICARE

## 2019-08-05 VITALS
RESPIRATION RATE: 18 BRPM | HEART RATE: 78 BPM | SYSTOLIC BLOOD PRESSURE: 132 MMHG | TEMPERATURE: 97.1 F | DIASTOLIC BLOOD PRESSURE: 66 MMHG

## 2019-08-05 PROBLEM — L97.921 NON-PRESSURE CHRONIC ULCER OF LEFT LOWER LEG, LIMITED TO BREAKDOWN OF SKIN (HCC): Status: ACTIVE | Noted: 2019-08-05

## 2019-08-05 PROCEDURE — 74011000250 HC RX REV CODE- 250: Performed by: SURGERY

## 2019-08-05 PROCEDURE — 29580 STRAPPING UNNA BOOT: CPT

## 2019-08-05 RX ADMIN — Medication: at 08:39

## 2019-08-05 NOTE — H&P
[address]  HISTORY AND PHYSICAL    Name:  Denise Arryoo  MR#:  [id number]  :  1945  ACCOUNT #:  [de-identified]  ADMIT DATE:  2019    WOUND CARE CENTER HISTORY AND PHYSICAL    HISTORY OF PRESENT ILLNESS:  The patient is a 66-year-old woman referred by Dr. Thais Miramontes regarding ulceration, left lower leg. The patient has history of ulceration of the lower leg many years ago, treated with Unna boots. She saw Dr. Angelic Diop about a month ago because of development of ulceration of the lower leg on the left and has been treated with Unna boots. She reports that the wounds on the left leg had been quite painful, but pain has greatly decreased over the last week or so. The patient has been clinically diagnosed as having venous ulcers, but the patient did have on 2019, a venous duplex scan at [facility], which did not show any evidence of reflux in the superficial vein system or deep vein system in the left lower extremity. The patient does have history at times of leg edema. She does take Lasix 80 mg per day. She reports that does produce a good diuresis. She, by her description, does not drink excessive amounts of fluid. The patient has history of gout, hypothyroidism, osteoporosis, reflex sympathetic dystrophy of lower extremity, fatty liver infiltration with bridging fibrosis followed at [facility], hypercholesterolemia, bifascicular heart block, hypertension, thrombocytopenia, degenerative disk disease, diabetes mellitus. The patient has never smoked. She does not use alcohol. The patient does have prior history of left femur fracture at age 12 and left tibial fracture in . Both were related to falls. The patient reports to have generally good control of blood sugar. She mostly follows a diabetic diet. She is followed by endocrinologist.  Hemoglobin A1c has been around 6.     The patient denies history of MI, anginal chest pain, or coronary intervention. She denies shortness of breath at rest or with exertion. She uses a walker to assist in walking related to balance. She is able to sleep lying down at night in bed. PHYSICAL EXAMINATION:  GENERAL:  On examination, the patient is an alert woman, in no acute distress. She is significantly obese. VITAL SIGNS:  Blood pressure 152/72, pulse 81, temperature 97.1, respirations 18. HEAD AND NECK:  Examination showed no jaundice, mass, or thyromegaly. LUNGS:  Clear bilaterally without rales, rhonchi, or wheezes. HEART:  Regular without murmur, gallop, or rub. ABDOMEN:  Obese and soft. The patient's obesity is mostly truncal obesity. NEUROLOGIC:  The patient is alert and oriented. She moves all extremities equally. Facial movement is symmetrical.  Speech is normal.    Examination of right lower extremity reveals palpable dorsalis pedis pulse. There are no active ulcerations of the right lower leg. There is some hyperpigmentation of the skin of the right lower leg. The patient had an elastic stocking in place on the right below-knee level. This was then removed for examination of the extremity. Left lower extremity does not have edema. An Unna boot, which had been in place had been removed. There is a 2+ left dorsalis pedis pulse. There are no foot ulcers on the left. There are wounds on the medial and anterolateral aspect of left lower leg. Lateral wound is 8 x 8.2 x 0.1 cm with a crusted surface and scattered areas of epithelium. This appears to be partial thickness wound. Left medial lower leg has a wound, which is 15.5 x 6 x 0.1 cm in dimension. This also appears to be partial thickness skin loss with scattered areas of epithelium and scattered crusting. I recommended the patient strictly follow a diabetic diet to assist in control of blood sugar. I encouraged the patient to continue compliance with diuretics and to not drink excessive fluid.     The patient by ultrasound testing does not have venous reflux and would not require any type of venous intervention. She appears to by history have had improvement in the leg wounds utilizing Unna boot as compression therapy and I think we should continue this treatment. The ulcerated sites would be covered by Adaptic and then Unna boot applied on left foot and lower leg. The patient will follow up in 1 week for nurse visit for change of the Unna boot and will see me again in the [address] [place] [address] in 10 days. FINAL DIAGNOSES:  Nonpressure ulcers left lower leg limited to breakdown of the skin, diabetes mellitus.       MD AILYN [id number]  D:  08/05/2019 9:39  T:  08/05/2019 11:07  JOB #:  [id number]

## 2019-08-05 NOTE — WOUND CARE
08/05/19 0853   Wound Leg lower Left;Lateral   Date First Assessed/Time First Assessed: 08/05/19 0853   Present on Hospital Admission: Yes  Wound Approximate Age at First Assessment (Weeks): 4 weeks  Primary Wound Type: Vascular  Location: Leg lower  Wound Location Orientation: Left;Lateral   Dressing Status Removed   Dressing Type Xeroform; Unna boot   Wound Length (cm) 8 cm   Wound Width (cm) 8.2 cm   Wound Depth (cm) 0.1 cm   Wound Volume (cm^3) 6.56 cm^3   Condition of Base Pink   Condition of Edges Open  (cluster)   Drainage Amount Moderate   Drainage Color Serosanguinous   Wound Odor None   Galina-wound Assessment Intact   Cleansing and Cleansing Agents  Normal saline; Soap and water   Wound Leg lower Left;Medial   Date First Assessed/Time First Assessed: 08/05/19 0854   Present on Hospital Admission: Yes  Wound Approximate Age at First Assessment (Weeks): 4 weeks  Primary Wound Type: Vascular  Location: Leg lower  Wound Location Orientation: Left;Medial   Dressing Status Removed   Dressing Type Xeroform   Wound Length (cm) 15.5 cm   Wound Width (cm) 6 cm   Wound Depth (cm) 0.1 cm   Wound Volume (cm^3) 9.3 cm^3   Condition of Base Pink   Condition of Edges Open  (cluster)   Drainage Amount Moderate   Drainage Color Serosanguinous   Wound Odor None   Galina-wound Assessment Intact   Cleansing and Cleansing Agents  Normal saline; Soap and water     Visit Vitals  /72   Pulse 81   Temp 97.1 °F (36.2 °C)   Resp 18   Breastfeeding?  No     LLE Peripheral Vascular   Capillary Refill: Less than/equal to 3 seconds (08/05/19 0846)  Color: Appropriate for race (08/05/19 0846)  Temperature: Warm (08/05/19 0846)  Sensation: Present (08/05/19 0846)  Pedal Pulse: Palpable (08/05/19 0846)  Circumference of Calf (cm): 31 cm (08/05/19 0846)  Location of Measurement (Calf): Mid  (08/05/19 0846)  Circumference of Ankle (cm): 25 cm (08/05/19 0846)  Location of Measurement (Ankle): Upper  (08/05/19 0846)  RLE Peripheral Vascular Circumference of Calf (cm): 33 cm (08/05/19 0846)  Location of Measurement (Calf): Mid  (08/05/19 0846)  Circumference of Ankle (cm): 25.5 cm (08/05/19 0846)  Location of Measurement (Ankle): Upper  (08/05/19 0846)

## 2019-08-05 NOTE — WOUND CARE
08/05/19 0938   Wound Leg lower Left;Lateral   Date First Assessed/Time First Assessed: 08/05/19 0853   Present on Hospital Admission: Yes  Wound Approximate Age at First Assessment (Weeks): 4 weeks  Primary Wound Type: Vascular  Location: Leg lower  Wound Location Orientation: Left;Lateral   Dressing Type Applied Non-adherent; Unna boot  (Adaptic)   Wound Leg lower Left;Medial   Date First Assessed/Time First Assessed: 08/05/19 0854   Present on Hospital Admission: Yes  Wound Approximate Age at First Assessment (Weeks): 4 weeks  Primary Wound Type: Vascular  Location: Leg lower  Wound Location Orientation: Left;Medial   Dressing Type Applied Non-adherent  (Adaptic)       Patient was discharged with Self to home and was with walker.  In stable condition with c/o pain:0__/10_

## 2019-08-12 ENCOUNTER — HOSPITAL ENCOUNTER (OUTPATIENT)
Dept: WOUND CARE | Age: 74
Discharge: HOME | End: 2019-08-12
Payer: MEDICARE

## 2019-08-12 VITALS
RESPIRATION RATE: 18 BRPM | HEART RATE: 75 BPM | SYSTOLIC BLOOD PRESSURE: 153 MMHG | DIASTOLIC BLOOD PRESSURE: 72 MMHG | TEMPERATURE: 97.3 F

## 2019-08-12 PROCEDURE — 29580 STRAPPING UNNA BOOT: CPT

## 2019-08-12 NOTE — WOUND CARE
08/12/19 1521 Wound Leg lower Left;Lateral  
Date First Assessed/Time First Assessed: 08/05/19 0853   Present on Hospital Admission: Yes  Wound Approximate Age at First Assessment (Weeks): 4 weeks  Primary Wound Type: Vascular  Location: Leg lower  Wound Location Orientation: Left;Lateral  
Dressing Status Removed Dressing Type Xeroform; Unna boot Drainage Amount Small Drainage Color Serosanguinous Wound Odor None Galina-wound Assessment Intact Pt in for nursing visit for dressing change and assessment. Patient was discharged with Self to home and was with walker. In stable condition with c/o pain:_0_/10_

## 2019-08-14 NOTE — WOUND CARE
Discharge Instructions for [place] [id number], [address] [facility] Telephone: [phone] NAME:  Emelina Cordova YOB: 1945 MEDICAL RECORD NUMBER:  [id number] DATE:  8/15/2019 Wound Cleansing: Do not scrub or use excessive force. Cleanse wound prior to applying a clean dressing with: 
[x] Normal Saline [] Keep Wound Dry in Shower    [] Wound Cleanser  
[x] Cleanse wound with Mild Soap & Water  [] May Shower at Discharge  
[] Other:   
 
Topical Treatments: Do not apply lotions, creams, or ointments to wound bed unless directed. [x] Apply moisturizing lotion to skin surrounding the wound prior to dressing change. 
[] Apply antifungal ointment to skin surrounding the wound prior to dressing change. 
[] Apply thin film of moisture barrier ointment to skin immediately around wound. [] Other:   
  
Dressings:           Wound Location ***  
[] Apply Primary Dressing:     
 [] MediHoney Gel [] Alginate with Silver [] Alginate 
 [] Collagen [] Collagen with Silver   [] Santyl with Moisten saline gauze   
 [] Hydrocolloid   [] MediHoney Alginate [] Foam with Silver 
 [] Foam   [] Hydrofera Blue    [] Mepilex Border  
 [] Moisten with Saline [] Hydrogel [] Mepitel   
 [] Bactroban/Mupirocin [] Polysporin  [x] Other:  Adaptic 
[] Pack wound loosely with  [] Iodoform   [] Plain Packing  [] Other  
[] Cover and Secure with:   
 [x] Gauze [] Robin [] Kerlix [] Ace Wrap [] Cover Roll Tape [] ABD [] Other:  
 Avoid contact of tape with skin. [] Change dressing: [] Daily    [] Every Other Day [] Three times per week 
 [] Once a week [x] Do Not Change Dressing   [] Other: 
  
Neg 
 
Compression: 
Apply: [x] Multilayer Compression Wrap Applied in [address] []RightLeg []Left Leg 
 [x] Multi-layer compression. Do not get leg(s) with wrap wet. If wraps become too tight call the center or completely remove the wrap. [x] Elevate leg(s) above the level of the heart when sitting. [x] Avoid prolonged standing in one place. Dietary: 
[] Diet as tolerated: [] Calorie Diabetic Diet: [x] No Added Salt: 
[] Increase Protein: [] Other:  
Activity: 
[x] Activity as tolerated:  [] Patient has no activity restrictions     [] Strict Bedrest: [] Remain off Work:     [] May return to full duty work:                                   [] Return to work with restrictions:  
         
Return Appointment: 
[] Wound and dressing supply provider:  
[] ECF or Home Healthcare: 
[] Wound Assessment: [] Physician or NP scheduled for Wound Assessment:  
[x] Return Appointment: With Dr Tito Ganser Rn  in  [facility]) [] Ordered tests:

## 2019-08-15 ENCOUNTER — HOSPITAL ENCOUNTER (OUTPATIENT)
Dept: WOUND CARE | Age: 74
Discharge: HOME | End: 2019-08-15
Payer: MEDICARE

## 2019-08-15 VITALS
HEART RATE: 78 BPM | RESPIRATION RATE: 16 BRPM | TEMPERATURE: 98.1 F | DIASTOLIC BLOOD PRESSURE: 87 MMHG | SYSTOLIC BLOOD PRESSURE: 151 MMHG

## 2019-08-15 PROCEDURE — 29580 STRAPPING UNNA BOOT: CPT

## 2019-08-15 PROCEDURE — 74011000250 HC RX REV CODE- 250: Performed by: SURGERY

## 2019-08-15 RX ADMIN — Medication: at 08:37

## 2019-08-15 NOTE — WOUND CARE
08/15/19 2629 Wound Leg lower Left;Medial  
Date First Assessed/Time First Assessed: 08/05/19 0854   Present on Hospital Admission: Yes  Wound Approximate Age at First Assessment (Weeks): 4 weeks  Primary Wound Type: Vascular  Location: Leg lower  Wound Location Orientation: Left;Medial  
Dressing Status Removed Dressing Type (Adaptic, unna boot) Wound Length (cm) 2.5 cm Wound Width (cm) 1 cm Wound Depth (cm) 0.1 cm Wound Volume (cm^3) 0.25 cm^3 Condition of Base Pink Condition of Edges Open Drainage Amount Moderate Drainage Color Serosanguinous Wound Odor None Galina-wound Assessment Hyperpigmented Cleansing and Cleansing Agents  Normal saline; Soap and water Wound Leg lower Left;Lateral  
Date First Assessed/Time First Assessed: 08/05/19 0853   Present on Hospital Admission: Yes  Wound Approximate Age at First Assessment (Weeks): 4 weeks  Primary Wound Type: Vascular  Location: Leg lower  Wound Location Orientation: Left;Lateral  
Dressing Status Removed Dressing Type (Adaptic, unna boot) Wound Length (cm) 0 cm Wound Width (cm) 0 cm Wound Depth (cm) 0 cm Wound Volume (cm^3) 0 cm^3 Condition of Base Epithelializing Condition of Edges Closed Drainage Amount None Wound Odor None Cleansing and Cleansing Agents  Normal saline; Soap and water Visit Vitals /87 (BP 1 Location: Right arm) Pulse 78 Temp 98.1 °F (36.7 °C) Resp 16

## 2019-08-15 NOTE — WOUND CARE
08/15/19 0845   Wound Leg lower Left;Medial   Date First Assessed/Time First Assessed: 08/05/19 0854   Present on Hospital Admission: Yes  Wound Approximate Age at First Assessment (Weeks): 4 weeks  Primary Wound Type: Vascular  Location: Leg lower  Wound Location Orientation: Left;Medial   Dressing Type Applied   (Adaptic, unna boot)   Patient discharged to home ambulatory via cane, denied pain at time of discharge. She will return to clinic for MD follow-up in 1 week.

## 2019-08-15 NOTE — WOUND CARE
Discharge Instructions for [facility] [address] [facility] [place], [place], [address] [facility] Telephone: [phone] NAME:  Mehdi Wright YOB: 1945 MEDICAL RECORD NUMBER:  [id number] DATE:  8/15/2019 Wound Cleansing: Do not scrub or use excessive force. Cleanse wound prior to applying a clean dressing with: 
[x] Normal Saline [] Keep Wound Dry in Shower    [] Wound Cleanser  
[] Cleanse wound with Mild Soap & Water  [] May Shower at Discharge  
[] Other:   
 
Topical Treatments: Do not apply lotions, creams, or ointments to wound bed unless directed. [] Apply moisturizing lotion to skin surrounding the wound prior to dressing change. 
[] Apply antifungal ointment to skin surrounding the wound prior to dressing change. 
[] Apply thin film of moisture barrier ointment to skin immediately around wound. [x] Other:  A & D ointment Dressings:           Wound Location Left Medial Lower Leg 
[] Apply Primary Dressing:     
 [] MediHoney Gel [] Alginate with Silver [] Alginate 
 [] Collagen [] Collagen with Silver   [] Santyl with Moisten saline gauze   
 [] Hydrocolloid   [] MediHoney Alginate [] Foam with Silver 
 [] Foam   [] Hydrofera Blue    [] Mepilex Border  
 [] Moisten with Saline [] Hydrogel [] Mepitel   
 [] Bactroban/Mupirocin [] Polysporin  [x] Other:  Adaptic 
[] Pack wound loosely with  [] Iodoform   [] Plain Packing  [] Other  
[] Cover and Secure with:   
 [] Gauze [] Robin [] Kerlix [] Ace Wrap [] Cover Roll Tape [] ABD [] Other:  
 Avoid contact of tape with skin. [] Change dressing: [] Daily    [] Every Other Day [] Three times per week [x] Once a week [] Do Not Change Dressing   [] Other: 
 
 
Edema Control: 
Apply: [] Compression Stocking [x]Right Leg [x]Left Leg 
 [] Tubigrip []Right Leg Double Layer []Left Leg Double Layer      []Right Leg Single Layer []Left Leg Single Layer 
 [] SpandaGrip []Right Leg  []Left Leg 
 []Low compression 5-10 mm/Hg []Medium compression 10-20 mm/Hg []High compression  20-30 mm/Hg 
 every morning immediately when getting up should be applied to affected leg(s) from mid foot to knee making sure to cover the heel. Remove every night before going to bed. [x] Elevate leg(s) above the level of the heart when sitting. [x] Avoid prolonged standing in one place. [] Elevate arm/hand above the level of the heart []RightArm []LeftArm Compression: [place] Apply: [x] Multilayer Compression Wrap Applied in Clinic []RightLeg [x]Left Leg 
 [] Multi-layer compression. Do not get leg(s) with wrap wet. If wraps become too tight call the center or completely remove the wrap. [] Elevate leg(s) above the level of the heart when sitting. [] Avoid prolonged standing in one place. Dietary: 
[] Diet as tolerated: [] Calorie Diabetic Diet: [x] No Added Salt: 
[x] Increase Protein: [] Other:  
Activity: 
[x] Activity as tolerated:  [] Patient has no activity restrictions     [] Strict Bedrest: [] Remain off Work:     [] May return to full duty work:                                   [] Return to work with restrictions:  
         
Return Appointment: 
[] Wound and dressing supply provider:  
[] ECF or Home Healthcare: 
[] Wound Assessment: [] Physician or NP scheduled for Wound Assessment:  
[] Return Appointment: With Dr. Geroge Gaucher  in 1 Week(s) [] Ordered tests:  
 
 Electronically signed Yelitza Alfred RN on 8/15/2019 at 8:49 AM  
 
[address] [place] [address] Information: Should you experience any significant changes in your wound(s) or have questions about your wound care, please contact the [facility] at [address] [place] [address] 8:00 am - 4:30. If you need help with your wound outside these hours and cannot wait until we are again available, contact your PCP or go to the hospital emergency room.   
 
PLEASE NOTE: IF YOU ARE UNABLE TO OBTAIN WOUND SUPPLIES, CONTINUE TO USE THE SUPPLIES YOU HAVE AVAILABLE UNTIL YOU ARE ABLE TO REACH US. IT IS MOST IMPORTANT TO KEEP THE WOUND COVERED AT ALL TIMES. Physician Signature:_______________________ Date: _08/15/2019__________ Time:  ____________

## 2019-08-15 NOTE — PROGRESS NOTES
WOUND CARE CENTER HISTORY AND PHYSICAL     HISTORY OF PRESENT ILLNESS:  The patient is a 41-year-old woman referred by Dr. Cecil Sellers regarding ulceration, left lower leg. The patient has history of ulceration of the lower leg many years ago, treated with Unna boots. She saw Dr. Mandeep Polk about a month ago because of development of ulceration of the lower leg on the left and has been treated with Unna boots. She reports that the wounds on the left leg had been quite painful, but pain has greatly decreased over the last week or so.     The patient has been clinically diagnosed as having venous ulcers, but the patient did have on 07/17/2019, a venous duplex scan at [facility], which did not show any evidence of reflux in the superficial vein system or deep vein system in the left lower extremity.     The patient does have history at times of leg edema. She does take Lasix 80 mg per day. She reports that does produce a good diuresis. She, by her description, has reduced intake of fluid.     The patient has history of gout, hypothyroidism, osteoporosis, reflex sympathetic dystrophy of lower extremity, fatty liver infiltration with bridging fibrosis followed at [facility], hypercholesterolemia, bifascicular heart block, hypertension, thrombocytopenia, degenerative disk disease, diabetes mellitus. The patient has never smoked. She does not use alcohol.     The patient does have prior history of left femur fracture at age 12 and left tibial fracture in 1990. Both were related to falls.     The patient reports to have generally good control of blood sugar. She mostly follows a diabetic diet. She is followed by endocrinologist.  Hemoglobin A1c has been around 6.     The patient denies history of MI, anginal chest pain, or coronary intervention. She denies shortness of breath at rest or with exertion. She uses a walker to assist in walking related to balance.   She is able to sleep lying down at night in bed.     PHYSICAL EXAMINATION:  GENERAL:  On examination, the patient is an alert woman, in no acute distress. She is significantly obese.     Examination of right lower extremity reveals palpable dorsalis pedis pulse. There are no active ulcerations of the right lower leg. There is some hyperpigmentation of the skin of the right lower leg. The patient had an elastic stocking in place on the right below-knee level. This was then removed for examination of the extremity.     Left lower extremity does not have edema. An Unna boot, which had been in place had been removed. There is a 2+ left dorsalis pedis pulse. There are no foot ulcers on the left. There are on the  medial left leg scattered areas of scab at prior ulcer sites.     I recommended the patient strictly follow a diabetic diet to assist in control of blood sugar.     I encouraged the patient to continue compliance with diuretics and to not drink excessive fluid.     The patient by ultrasound testing does not have venous reflux and would not require any type of venous intervention.     She appears to by history have had improvement in the leg wounds utilizing Unna boot as compression therapy and I think we should continue this treatment. The ulcerated sites would be covered by Adaptic and then Unna boot applied on left foot and lower leg.     The patient  will see me again in the [address] [place] [address] Road in 1 week.     FINAL DIAGNOSES:  Nonpressure ulcers left lower leg limited to breakdown of the skin, diabetes mellitus.     W85.416        Bushra Murphy MD

## 2019-08-19 ENCOUNTER — APPOINTMENT (OUTPATIENT)
Dept: WOUND CARE | Age: 74
End: 2019-08-19
Payer: MEDICARE

## 2019-08-22 ENCOUNTER — HOSPITAL ENCOUNTER (OUTPATIENT)
Dept: WOUND CARE | Age: 74
Discharge: HOME | End: 2019-08-22
Payer: MEDICARE

## 2019-08-22 VITALS
HEART RATE: 73 BPM | SYSTOLIC BLOOD PRESSURE: 137 MMHG | DIASTOLIC BLOOD PRESSURE: 83 MMHG | TEMPERATURE: 97.8 F | RESPIRATION RATE: 16 BRPM

## 2019-08-22 DIAGNOSIS — E03.9 ACQUIRED HYPOTHYROIDISM: ICD-10-CM

## 2019-08-22 PROCEDURE — 29580 STRAPPING UNNA BOOT: CPT

## 2019-08-22 PROCEDURE — 74011000250 HC RX REV CODE- 250: Performed by: SURGERY

## 2019-08-22 RX ORDER — LEVOTHYROXINE SODIUM 175 UG/1
TABLET ORAL
Qty: 32 TAB | Refills: 11 | Status: SHIPPED | OUTPATIENT
Start: 2019-08-22 | End: 2019-10-15 | Stop reason: SDUPTHER

## 2019-08-22 RX ADMIN — Medication: at 08:58

## 2019-08-22 NOTE — PROGRESS NOTES
WOUND CARE CENTER HISTORY AND PHYSICAL     HISTORY OF PRESENT ILLNESS:  The patient is a 79-year-old woman referred by Dr. Maren Essex regarding ulceration, left lower leg.  The patient has history of ulceration of the lower leg many years ago, treated with Unna boots.  She saw Dr. Jairo Cancino about a month ago because of development of ulceration of the lower leg on the left and has been treated with Unna boots.  She reports that the wounds on the left leg had been quite painful, but pain has greatly decreased over the last week or so.     The patient has been clinically diagnosed as having venous ulcers, but the patient did have on 07/17/2019, a venous duplex scan at [facility], which did not show any evidence of reflux in the superficial vein system or deep vein system in the left lower extremity.     The patient does have history at times of leg edema.  She does take Lasix 80 mg per day. Yoli Pereira reports that does produce a good diuresis.  She, by her description, has reduced intake of fluid.     The patient has history of gout, hypothyroidism, osteoporosis, reflex sympathetic dystrophy of lower extremity, fatty liver infiltration with bridging fibrosis followed at [facility], hypercholesterolemia, bifascicular heart block, hypertension, thrombocytopenia, degenerative disk disease, diabetes mellitus.  The patient has never smoked. Yoli Pereira does not use alcohol.     The patient does have prior history of left femur fracture at age 12 and left tibial fracture in 200.  Both were related to falls.     The patient reports to have generally good control of blood sugar.  She mostly follows a diabetic diet.  She is followed by endocrinologist. [facility] A1c has been around 6.     The patient denies history of MI, anginal chest pain, or coronary intervention.  She denies shortness of breath at rest or with exertion.  She uses a walker to assist in walking related to balance.  She is able to sleep lying down at night in bed. She has had Unna boot in place on the left leg.     PHYSICAL EXAMINATION:  GENERAL:  On examination, the patient is an alert woman, in no acute distress.  She is significantly obese.     Examination of right lower extremity reveals palpable dorsalis pedis pulse.  There are no active ulcerations of the right lower leg.  There is some hyperpigmentation of the skin of the right lower leg.       Left lower extremity does not have edema.  An Unna boot, which had been in place had been removed.  There is a 2+ left dorsalis pedis pulse.  There are no foot ulcers on the left.  There are on the  medial left leg scattered areas of scab at prior ulcer sites. There is erythema diffusely on the lateral aspect of the left lower leg with partial thickness ulcer of skin 3.5 x 4 x 0.1 cm.     I recommended the patient strictly follow a diabetic diet to assist in control of blood sugar.     I encouraged the patient to continue compliance with diuretics and to not drink excessive fluid.     The patient by ultrasound testing does not have venous reflux and would not require any type of venous intervention.     Treatment  -  Apply steroid topically to lateral left lower leg.   Apply Unna boot.     The patient  will see me again in the [address] [place] [address] in 1 week.     FINAL DIAGNOSES:  Nonpressure ulcers left lower leg limited to breakdown of the skin, diabetes mellitus.     K37.263        Daysi Cortez MD

## 2019-08-22 NOTE — WOUND CARE
08/22/19 0930   Wound Leg lower Left;Lateral   Date First Assessed/Time First Assessed: 08/05/19 0853   Present on Hospital Admission: Yes  Wound Approximate Age at First Assessment (Weeks): 4 weeks  Primary Wound Type: Vascular  Location: Leg lower  Wound Location Orientation: Left;Lateral   Dressing Type Applied Unna boot

## 2019-08-23 DIAGNOSIS — K21.9 GASTROESOPHAGEAL REFLUX DISEASE WITHOUT ESOPHAGITIS: ICD-10-CM

## 2019-08-23 RX ORDER — RANITIDINE 300 MG/1
TABLET ORAL
Qty: 90 TAB | Refills: 3 | Status: SHIPPED | OUTPATIENT
Start: 2019-08-23 | End: 2020-05-14

## 2019-08-23 NOTE — TELEPHONE ENCOUNTER
PCP: Sushil Le MD    Last appt: 3/25/2019  Future Appointments   Date Time Provider Hilary Dickinson   8/29/2019  8:15 AM Blank Bardales  [facility]   9/30/2019  9:30 AM Sushil Le MD [address]   11/18/2019  9:50 AM Marlene Black MD [facility] [address]       Requested Prescriptions     Pending Prescriptions Disp Refills    raNITIdine (ZANTAC) 300 mg tab 90 Tab 3     Sig: TAKE 1 TABLET BY MOUTH EVERY DAY.

## 2019-08-29 ENCOUNTER — HOSPITAL ENCOUNTER (OUTPATIENT)
Dept: WOUND CARE | Age: 74
Discharge: HOME | End: 2019-08-29
Payer: MEDICARE

## 2019-08-29 VITALS
RESPIRATION RATE: 16 BRPM | SYSTOLIC BLOOD PRESSURE: 147 MMHG | DIASTOLIC BLOOD PRESSURE: 78 MMHG | HEART RATE: 74 BPM | TEMPERATURE: 97.7 F

## 2019-08-29 PROCEDURE — 99214 OFFICE O/P EST MOD 30 MIN: CPT

## 2019-08-29 NOTE — PROGRESS NOTES
WOUND CARE CENTER HISTORY AND PHYSICAL     HISTORY OF PRESENT ILLNESS:  The patient is a 63-year-old woman referred by Dr. Antione Tabares regarding ulceration, left lower leg. The patient has history of ulceration of the lower leg many years ago, treated with Unna boots. She saw Dr. Laura Yusuf about a month ago because of development of ulceration of the lower leg on the left and has been treated with Unna boots. She reports that the wounds on the left leg had been quite painful, but pain has greatly decreased over the last week or so.     The patient has been clinically diagnosed as having venous ulcers, but the patient did have on 07/17/2019, a venous duplex scan at [facility], which did not show any evidence of reflux in the superficial vein system or deep vein system in the left lower extremity.     The patient does have history at times of leg edema. She does take Lasix 80 mg per day. She reports that does produce a good diuresis. She, by her description, does not drink excessive amounts of fluid.     The patient has history of gout, hypothyroidism, osteoporosis, reflex sympathetic dystrophy of lower extremity, fatty liver infiltration with bridging fibrosis followed at [facility], hypercholesterolemia, bifascicular heart block, hypertension, thrombocytopenia, degenerative disk disease, diabetes mellitus. The patient has never smoked. She does not use alcohol.     The patient does have prior history of left femur fracture at age 12 and left tibial fracture in 1990. Both were related to falls.     The patient reports to have generally good control of blood sugar. She mostly follows a diabetic diet. She is followed by endocrinologist.  Hemoglobin A1c has been around 6.     The patient denies history of MI, anginal chest pain, or coronary intervention. She denies shortness of breath at rest or with exertion. She uses a walker to assist in walking related to balance.   She is able to sleep lying down at night in bed.     PHYSICAL EXAMINATION:  GENERAL:  On examination, the patient is an alert woman, in no acute distress. She is significantly obese. VITAL SIGNS:  Blood pressure 152/72, pulse 81, temperature 97.1, respirations 18. HEAD AND NECK:  Examination showed no jaundice, mass, or thyromegaly. LUNGS:  Clear bilaterally without rales, rhonchi, or wheezes. HEART:  Regular without murmur, gallop, or rub. ABDOMEN:  Obese and soft. The patient's obesity is mostly truncal obesity. NEUROLOGIC:  The patient is alert and oriented. She moves all extremities equally. Facial movement is symmetrical.  Speech is normal.     Examination of right lower extremity reveals palpable dorsalis pedis pulse. There are no active ulcerations of the right lower leg. There is some hyperpigmentation of the skin of the right lower leg. The patient had an elastic stocking in place on the right below-knee level. This was then removed for examination of the extremity.     Left lower extremity does not have edema. An Unna boot, which had been in place had been removed. There is a 2+ left dorsalis pedis pulse. There are no foot ulcers on the left. There are no longer any ulcers on the leg. I recommended the patient strictly follow a diabetic diet to assist in control of blood sugar.     I encouraged the patient to continue compliance with diuretics and to not drink excessive fluid.     The patient by ultrasound testing does not have venous reflux and would not require any type of venous intervention.     Rx for 15-20 mm Hg compression stocking knee length given to patient. To be worn every day all day, but she does not need to wear in bed at night.     Follow up PRN.     FINAL DIAGNOSES:  Nonpressure ulcers left lower leg limited to breakdown of the skin (resolved), diabetes mellitus.        Abigail Gowers, MD

## 2019-08-29 NOTE — WOUND CARE
Visit Vitals  /78 (BP 1 Location: Right arm)   Pulse 74   Temp 97.7 °F (36.5 °C)   Resp 16     LLE Peripheral Vascular   Capillary Refill: Less than/equal to 3 seconds (08/29/19 0814)  Color: Appropriate for race (08/29/19 0814)  Temperature: Warm (08/29/19 6457)  Sensation: Decreased (08/29/19 0973)  Pedal Pulse: Present (08/29/19 0814)  Circumference of Calf (cm): 31 cm (08/29/19 0814)  Location of Measurement (Calf): Mid  (08/29/19 0814)  Circumference of Ankle (cm): 20 cm (08/29/19 0814)  Location of Measurement (Ankle): Upper  (08/29/19 0814)

## 2019-09-03 ENCOUNTER — TELEPHONE (OUTPATIENT)
Dept: INTERNAL MEDICINE CLINIC | Age: 74
End: 2019-09-03

## 2019-09-03 NOTE — TELEPHONE ENCOUNTER
Called, spoke to pt. Two identifiers confirmed. Pt inquiring if the office clean pessaries. Notified pt the office does not do that. Pt verbalized understanding of information discussed w/ no further questions at this time.

## 2019-09-05 ENCOUNTER — HOSPITAL ENCOUNTER (OUTPATIENT)
Dept: WOUND CARE | Age: 74
Discharge: HOME | End: 2019-09-05
Payer: MEDICARE

## 2019-09-05 VITALS
SYSTOLIC BLOOD PRESSURE: 137 MMHG | RESPIRATION RATE: 18 BRPM | TEMPERATURE: 98 F | DIASTOLIC BLOOD PRESSURE: 61 MMHG | HEART RATE: 60 BPM

## 2019-09-05 PROCEDURE — 29580 STRAPPING UNNA BOOT: CPT

## 2019-09-05 PROCEDURE — 74011000250 HC RX REV CODE- 250: Performed by: SURGERY

## 2019-09-05 RX ADMIN — Medication: at 10:30

## 2019-09-05 NOTE — PROGRESS NOTES
WOUND CARE CENTER HISTORY AND PHYSICAL     HISTORY OF PRESENT ILLNESS:  The patient is a 70-year-old woman referred by Dr. Davis Black regarding ulceration, left lower leg.  The patient has history of ulceration of the lower leg many years ago, treated with Unna boots.  She saw Dr. Horace Long about a month ago because of development of ulceration of the lower leg on the left and has been treated with Unna boots.      Her left leg ulcer had healed, but then returned. She had been using 8-15 mm  HG stockings. She reports that the wounds on the left leg have been quite painful. The patient has been clinically diagnosed as having venous ulcers, but the patient did have on 07/17/2019, a venous duplex scan at [facility], which did not show any evidence of reflux in the superficial vein system or deep vein system in the left lower extremity.     The patient does have history at times of leg edema.  She does take Lasix 80 mg per day. Arianeerin Velásquez reports that does produce a good diuresis.  She, by her description, does not drink excessive amounts of fluid.   However on questioning, she in fact reports large amounts of fluid intake.     The patient has history of gout, hypothyroidism, osteoporosis, reflex sympathetic dystrophy of lower extremity, fatty liver infiltration with bridging fibrosis followed at [facility], hypercholesterolemia, bifascicular heart block, hypertension, thrombocytopenia, degenerative disk disease, diabetes mellitus.  The patient has never smoked. Ariane Velásquez does not use alcohol.     The patient does have prior history of left femur fracture at age 12 and left tibial fracture in 200.  Both were related to falls.     The patient reports to have generally good control of blood sugar.  She mostly follows a diabetic diet.  She is followed [facility]inologist[facility]     The patient denies history of MI, anginal chest pain, or coronary intervention.  She denies shortness of breath at rest or with exertion.  She uses a walker to assist in walking related to balance.  She is able to sleep lying down at night in bed.     PHYSICAL EXAMINATION:  GENERAL:  On examination, the patient is an alert woman, in no acute distress.  She is significantly obese.     Examination of right lower extremity reveals palpable dorsalis pedis pulse.  There are no active ulcerations of the right lower leg.  There is some hyperpigmentation of the skin of the right lower leg.  The patient had an elastic stocking in place on the right below-knee level.  There is 1+ pitting edema in right lower leg.     Left lower extremity has 1+ pitting edema. Doreene Chiquita is a 1+ left dorsalis pedis pulse.  There are no foot ulcers on the left.  Left lower lateral lower leg has 6 x 6 x 0.1 cm shallow scattered ulcers. Culture taken of the ulcer site.     I recommended the patient strictly follow a diabetic diet to assist in control of blood sugar.     I encouraged the patient to continue compliance with diuretics and to drink a moderate amount of fluids with meals and only small amounts between meals.     The patient by ultrasound testing does not have venous reflux and would not require any type of venous intervention.     Aquacell applied, Unna boot applied.     Follow up 1 week. .     FINAL DIAGNOSES:  Nonpressure ulcers left lower leg limited to breakdown of the skin , diabetes mellitus.     R21.000        Chacha Abraham MD

## 2019-09-05 NOTE — WOUND CARE
09/05/19 1118   Wound Leg lower Left;Lateral   Date First Assessed/Time First Assessed: 08/05/19 0853   Present on Hospital Admission: Yes  Wound Approximate Age at First Assessment (Weeks): 4 weeks  Primary Wound Type: Vascular  Location: Leg lower  Wound Location Orientation: Left;Lateral   Dressing Type Applied   (aquacel AG, UNNA boot)   Discharged from wound center, ambulatory with assistive device. Pain 0/10. Has follow up appointment with MD in 1 week. Juan Vasquez

## 2019-09-05 NOTE — WOUND CARE
09/05/19 1021   Wound Leg lower Left;Lateral   Date First Assessed/Time First Assessed: 08/05/19 0853   Present on Hospital Admission: Yes  Wound Approximate Age at First Assessment (Weeks): 4 weeks  Primary Wound Type: Vascular  Location: Leg lower  Wound Location Orientation: Left;Lateral   Dressing Status Removed   Wound Length (cm) 6 cm   Wound Width (cm) 6 cm   Wound Depth (cm) 0.1 cm   Wound Volume (cm^3) 3.6 cm^3   Condition of Base Pink   Drainage Amount Small   Drainage Color Serosanguinous   Wound Odor None   Galina-wound Assessment Intact   Cleansing and Cleansing Agents  Normal saline   ,  Visit Vitals  /61 (BP 1 Location: Right arm)   Pulse (!) 48   Temp 98 °F (36.7 °C)   Resp 18

## 2019-09-08 LAB — BACTERIA SPEC AEROBE CULT: ABNORMAL

## 2019-09-09 RX ORDER — DOXYCYCLINE 100 MG/1
100 CAPSULE ORAL 2 TIMES DAILY
COMMUNITY
Start: 2019-09-09 | End: 2019-09-16

## 2019-09-09 NOTE — WOUND CARE
[address]  For prescription for Doxycycline 100 mg PO BID for 7 days. Called patient regarding the prescription called to pharmacy.

## 2019-09-12 ENCOUNTER — HOSPITAL ENCOUNTER (OUTPATIENT)
Dept: WOUND CARE | Age: 74
Discharge: HOME | End: 2019-09-12
Payer: MEDICARE

## 2019-09-12 VITALS
RESPIRATION RATE: 18 BRPM | DIASTOLIC BLOOD PRESSURE: 84 MMHG | HEART RATE: 71 BPM | SYSTOLIC BLOOD PRESSURE: 157 MMHG | TEMPERATURE: 96.9 F

## 2019-09-12 PROCEDURE — 74011000250 HC RX REV CODE- 250: Performed by: SURGERY

## 2019-09-12 PROCEDURE — 29580 STRAPPING UNNA BOOT: CPT

## 2019-09-12 RX ADMIN — Medication: at 09:44

## 2019-09-12 NOTE — WOUND CARE
Visit Vitals  /84 (BP 1 Location: Right arm)   Pulse 71   Temp 96.9 °F (36.1 °C)   Resp 18     LLE Peripheral Vascular   Capillary Refill: Less than/equal to 3 seconds (09/12/19 0931)  Color: Appropriate for race (09/12/19 0931)  Temperature: Warm (09/12/19 0931)  Sensation: Present (09/12/19 0931)  Pedal Pulse: Present (09/12/19 0931)  Circumference of Calf (cm): 32 cm (09/12/19 0931)  Location of Measurement (Calf): Mid  (09/12/19 0931)  Circumference of Ankle (cm): 20.5 cm (09/12/19 0931)  Location of Measurement (Ankle): Upper  (09/12/19 0931)

## 2019-09-12 NOTE — WOUND CARE
09/12/19 1020   Wound Leg lower Left;Lateral   Date First Assessed/Time First Assessed: 08/05/19 0853   Present on Hospital Admission: Yes  Wound Approximate Age at First Assessment (Weeks): 4 weeks  Primary Wound Type: Vascular  Location: Leg lower  Wound Location Orientation: Left;Lateral   Dressing Type Applied Alginate;Silver products; Unna boot   dressing applied by Trevor Galvez

## 2019-09-12 NOTE — PROGRESS NOTES
[facility]  [place] [address]               [facility]   1/3/2017 12:51 PM   ED    Description:  Female : 1986   Department:  [facility]           Your Care was Coordinated By:     Provider Role From To    Gunnar Glasgow MD Attending Provider [phone] --    Morgan Sarah PA-C Physician Assistant 17 [phone] --      Reason for Visit     Exposure to STD           Diagnoses this Visit        Comments    Abnormal vaginal bleeding    -  Primary     Sexually transmitted disease exposure           ED Disposition     ED Disposition Condition Comment    Discharge             To Do List           Follow-up Information     Schedule an appointment as soon as possible for a visit with Cecelia Kaur MD.    Specialty:  Obstetrics and Gynecology    Why:  To establish gynecological care.     Contact information:    [address] [facility]  [address]  [place] [address]  [phone]          Go to [facility].    Why:  If unable to get appointment because of insurance issues    Contact information:    [address] [facility] [address]  [phone]          Follow up with [facility].    Specialty:  Emergency Medicine    Why:  As needed, If symptoms worsen, if become febrile, if become lightheaded/dizzy, overt loss of blood.    Contact information:    [place] [address] [phone]      [facility]     [facility] -  Assistance  Registered nurses in the [facility] provide clinical advisement, health education, appointment booking, and other advisory services.  Call for this free service at [phone].             Medications           Message regarding Medications     Verify the changes and/or additions to your medication regime listed below are the same as discussed with your clinician today.  If any of these changes  WOUND CARE CENTER HISTORY AND PHYSICAL     HISTORY OF PRESENT ILLNESS:  The patient is a 70-year-old woman referred by Dr. Betty Rodriguez regarding ulceration, left lower leg.  The patient has history of ulceration of the lower leg many years ago, treated with Unna boots.  She saw Dr. Bar Reed about a month ago because of development of ulceration of the lower leg on the left and has been treated with Unna boots.       Her left leg ulcer had healed, but then returned. She had been using 8-15 mm  HG stockings. She reports that the wounds on the left leg have been quite painful.     The patient has been clinically diagnosed as having venous ulcers, but the patient did have on 07/17/2019, a venous duplex scan at [facility], which did not show any evidence of reflux in the superficial vein system or deep vein system in the left lower extremity.     The patient does have history at times of leg edema.  She does take Lasix 80 mg per day. Judy Rodriguez reports that does produce a good diuresis.  She, by her description, does not drink excessive amounts of fluid.   However on questioning, she in fact reports large amounts of fluid intake.     The patient has history of gout, hypothyroidism, osteoporosis, reflex sympathetic dystrophy of lower extremity, fatty liver infiltration with bridging fibrosis followed at [facility], hypercholesterolemia, bifascicular heart block, hypertension, thrombocytopenia, degenerative disk disease, diabetes mellitus.  The patient has never smoked. Judy Rodriguez does not use alcohol.     The patient does have prior history of left femur fracture at age 12 and left tibial fracture in 200.  Both were related to falls.     The patient reports to have generally good control of blood sugar.  She mostly follows a diabetic diet.  She is followed by endocrinologist. Curvin Fragmin A1c has been around 6.     The patient denies history of MI, anginal chest pain, or coronary intervention.  She denies shortness "or additions are incorrect, please notify your healthcare provider.             Verify that the below list of medications is an accurate representation of the medications you are currently taking.  If none reported, the list may be blank. If incorrect, please contact your healthcare provider. Carry this list with you in case of emergency.           Current Medications     hydrocortisone 2.5 % lotion Apply topically 2 (two) times daily.           Clinical Reference Information           Your Vitals Were     BP Pulse Temp Resp Height Weight    133/76 (BP Location: Right arm, Patient Position: Sitting) 92 98.9 °F (37.2 °C) (Oral) 17 5' 4.5" (1.638 m) 64 kg (141 lb)    Last Period SpO2 BMI          (LMP Unknown) 98% 23.83 kg/m2        Allergies as of 1/3/2017     No Known Allergies      Immunizations Administered on Date of Encounter - 1/3/2017     None      ED Micro, Lab, POCT     Start Ordered       Status Ordering Provider    01/03/17 1334 01/03/17 1333  C. trachomatis/N. gonorrhoeae by AMP DNA Cervix  STAT      In process     01/03/17 1333 01/03/17 1333  POCT urine pregnancy  Once      Final result     01/03/17 1333 01/03/17 1333  Urinalysis Clean Catch  STAT      Final result     01/03/17 1333 01/03/17 1333  Vaginal Screen Vagina  STAT      Final result     01/03/17 1333 01/03/17 1333  Urinalysis Microscopic  Once      Final result       ED Imaging Orders     None      [facility] Sign-Up     Activating your [facility] account is as easy as 1-2-3!     1) Visit [email or web address], select Sign Up Now, enter this activation code and your date of birth, then select Next.  [email or web address]  Expires: 2/17/2017  2:28 PM      2) Create a username and password to use when you visit [facility] in the future and select a security question in case you lose your password and select Next.    3) Enter your e-mail address and click Sign Up!    Additional Information  If you have questions, please e-mail [email or web address] or call " of breath at rest or with exertion.  She uses a walker to assist in walking related to balance.  She is able to sleep lying down at night in bed. Culture taken on 9/5/2019 grew MRSA. The patient started oral doxycycline.     PHYSICAL EXAMINATION:  GENERAL:  On examination, the patient is an alert woman, in no acute distress.  She is significantly obese.     Examination of right lower extremity reveals palpable dorsalis pedis pulse.  There are no active ulcerations of the right lower leg.  There is some hyperpigmentation of the skin of the right lower leg.  The patient had an elastic stocking in place on the right below-knee level.  There is 1+ pitting edema in right lower leg.     Left lower extremity has 1+ pitting edema. Reda Wallace is a 1+ left dorsalis pedis pulse.  There are no foot ulcers on the left.  Left lower lateral lower leg has 1.7 x 1.5 x 0.1 cm shallow ulcer.     I recommended the patient strictly follow a diabetic diet to assist in control of blood sugar.     I encouraged the patient to continue compliance with diuretics and to drink a moderate amount of fluids with meals and only small amounts between meals.     The patient by ultrasound testing does not have venous reflux and would not require any type of venous intervention.     Aquacell applied, Unna boot applied.     Follow up 1 week. .     FINAL DIAGNOSES:  Nonpressure ulcers left lower leg limited to breakdown of the skin , diabetes mellitus.     X15.420        Alexander Pace MD [phone] to talk to our [facility] staff. Remember, [facility] is NOT to be used for urgent needs. For medical emergencies, dial 911.          [facility] complies with applicable Federal civil rights laws and does not discriminate on the basis of race, color, national origin, age, disability, or sex.        Language Assistance Services     ATTENTION: Language assistance services are available, free of charge. Please call [phone].      ATENCIÓN: Si habla bull, tiene a zarate disposición servicios gratuitos de asistencia lingüística. Llame al [phone].     CHÚ Ý: N?u b?n nói Ti?ng Vi?t, có các d?ch v? h? tr? ngôn ng? mi?n phí dành cho b?n. G?i s? [phone].

## 2019-09-16 PROBLEM — Z89.422: Status: ACTIVE | Noted: 2019-09-16

## 2019-09-19 ENCOUNTER — HOSPITAL ENCOUNTER (OUTPATIENT)
Dept: WOUND CARE | Age: 74
Discharge: HOME | End: 2019-09-19
Payer: MEDICARE

## 2019-09-19 VITALS — TEMPERATURE: 96.2 F | RESPIRATION RATE: 16 BRPM | SYSTOLIC BLOOD PRESSURE: 164 MMHG | DIASTOLIC BLOOD PRESSURE: 71 MMHG

## 2019-09-19 PROCEDURE — 29580 STRAPPING UNNA BOOT: CPT

## 2019-09-19 NOTE — WOUND CARE
09/19/19 1024 Wound Leg lower Left;Lateral  
Date First Assessed/Time First Assessed: 08/05/19 0853   Present on Hospital Admission: Yes  Wound Approximate Age at First Assessment (Weeks): 4 weeks  Primary Wound Type: Vascular  Location: Leg lower  Wound Location Orientation: Left;Lateral  
Dressing Status Removed Dressing Type Aquacel Wound Length (cm) 0.1 cm Wound Width (cm) 0.1 cm Wound Depth (cm) 0.1 cm Wound Volume (cm^3) 0 cm^3 Condition of Base Epithelializing Condition of Edges Open Drainage Amount None Wound Odor None Galina-wound Assessment Intact Cleansing and Cleansing Agents  Soap and water

## 2019-09-19 NOTE — PROGRESS NOTES
WOUND CARE CENTER HISTORY AND PHYSICAL     HISTORY OF PRESENT ILLNESS:  The patient is a 80-year-old woman referred by Dr. Kaleb Martin regarding ulceration, left lower leg.  The patient has history of ulceration of the lower leg many years ago, treated with Unna boots.  She saw Dr. Galo Reeves about a month ago because of development of ulceration of the lower leg on the left and has been treated with Unna boots.       Her left leg ulcer had healed, but then returned. She had been using 8-15 mm  HG stockings.  She reports that the wounds on the left leg are no longer painful. The patient has been clinically diagnosed as having venous ulcers, but the patient did have on 07/17/2019, a venous duplex scan at [facility], which did not show any evidence of reflux in the superficial vein system or deep vein system in the left lower extremity.     The patient does have history at times of leg edema.  She does take Lasix 80 mg per day. Wesley James reports that does produce a good diuresis.  She, by her description, does not drink excessive amounts of fluid.  However on questioning, she in fact reports large amounts of fluid intake.   She is trying to reduce fluid intake.     The patient has history of gout, hypothyroidism, osteoporosis, reflex sympathetic dystrophy of lower extremity, fatty liver infiltration with bridging fibrosis followed at [facility], hypercholesterolemia, bifascicular heart block, hypertension, thrombocytopenia, degenerative disk disease, diabetes mellitus.  The patient has never smoked. Wesley James does not use alcohol.     The patient does have prior history of left femur fracture at age 12 and left tibial fracture in 200.  Both were related to falls.     The patient reports to have generally good control of blood sugar.  She mostly follows a diabetic diet.  She is followed by endocrinologist. Jonel Dub A1c has been around 6.     The patient denies history of MI, anginal chest pain, or coronary intervention.  She denies shortness of breath at rest or with exertion.  She uses a walker to assist in walking related to balance.  She is able to sleep lying down at night in bed.     Culture taken on 9/5/2019 grew MRSA. The patient started oral doxycycline. She just completed that medication.     PHYSICAL EXAMINATION:  GENERAL:  On examination, the patient is an alert woman, in no acute distress.  She is significantly obese.     Examination of right lower extremity reveals palpable dorsalis pedis pulse.  There are no active ulcerations of the right lower leg.  There is some hyperpigmentation of the skin of the right lower leg.  The patient had an elastic stocking in place on the right below-knee level.  There is trace pitting edema in right lower leg.     Left lower extremity has trace pitting edema.  There is a 2+ left dorsalis pedis pulse.  There are no foot ulcers on the left.  Left lower lateral lower leg has 0.1 x 0.1 x 0.1 ulcer with larger area of  scab.     I recommended the patient strictly follow a diabetic diet to assist in control of blood sugar.     I encouraged the patient to continue compliance with diuretics and to drink a moderate amount of fluids with meals and only small amounts between meals.     The patient by ultrasound testing does not have venous reflux and would not require any type of venous intervention.     A and D ointment, Adaptic, Unna boot applied.     Follow up 1 week. .  The patient is to bring her stockings.     FINAL DIAGNOSES:  Nonpressure ulcers left lower leg limited to breakdown of the skin , diabetes mellitus.     L97.921  R60.0        Davidson Diaz MD

## 2019-09-26 ENCOUNTER — HOSPITAL ENCOUNTER (OUTPATIENT)
Dept: WOUND CARE | Age: 74
Discharge: HOME | End: 2019-09-26
Payer: MEDICARE

## 2019-09-26 VITALS — SYSTOLIC BLOOD PRESSURE: 163 MMHG | TEMPERATURE: 96.7 F | DIASTOLIC BLOOD PRESSURE: 71 MMHG | RESPIRATION RATE: 16 BRPM

## 2019-09-26 PROCEDURE — 99212 OFFICE O/P EST SF 10 MIN: CPT

## 2019-09-26 NOTE — PROGRESS NOTES
WOUND CARE CENTER HISTORY AND PHYSICAL     HISTORY OF PRESENT ILLNESS:  The patient is a 66-year-old woman referred by Dr. Klever Ireland regarding ulceration, left lower leg.  The patient has history of ulceration of the lower leg many years ago, treated with Unna boots.  She saw Dr. Meghan Sahni about a month ago because of development of ulceration of the lower leg on the left and has been treated with Unna boots.       Her left leg ulcer had healed, but then returned. She had been using 8-15 mm  HG stockings.  She reports that the wounds on the left leg are no longer painful.     The patient has been clinically diagnosed as having venous ulcers, but the patient did have on 07/17/2019, a venous duplex scan at [facility], which did not show any evidence of reflux in the superficial vein system or deep vein system in the left lower extremity.     The patient does have history at times of leg edema.  She does take Lasix 80 mg per day. Georgiagiovanianna Bolanosi reports that does produce a good diuresis.  She, by her description, does not drink excessive amounts of fluid.  However on questioning, she in fact reports large amounts of fluid intake.   She is trying to reduce fluid intake.     The patient has history of gout, hypothyroidism, osteoporosis, reflex sympathetic dystrophy of lower extremity, fatty liver infiltration with bridging fibrosis followed at [facility], hypercholesterolemia, bifascicular heart block, hypertension, thrombocytopenia, degenerative disk disease, diabetes mellitus.  The patient has never smoked. Nikhil Crooks does not use alcohol.     The patient does have prior history of left femur fracture at age 12 and left tibial fracture in 200.  Both were related to falls.     The patient reports to have generally good control of blood sugar.  She mostly follows a diabetic diet.  She is followed by endocrinologist. Jenniffer Zelaya A1c has been around 6.     The patient denies history of MI, anginal chest pain, or coronary intervention.  She denies shortness of breath at rest or with exertion.  She uses a walker to assist in walking related to balance.  She is able to sleep lying down at night in bed.     Culture taken on 9/5/2019 grew MRSA.  The patient received oral doxycycline.      PHYSICAL EXAMINATION:  GENERAL:  On examination, the patient is an alert woman, in no acute distress.  She is significantly obese.     Examination of right lower extremity reveals palpable dorsalis pedis pulse.  There are no active ulcerations of the right lower leg.  There is some hyperpigmentation of the skin of the right lower leg.  The patient had an elastic stocking in place on the right below-knee level.  There is trace pitting edema in right lower leg.     Left lower extremity has trace pitting edema.  There is a 2+ left dorsalis pedis pulse.  There are no foot ulcers on the left.  Left lower lateral lower leg has dry skin, no active ulcer.     I recommended the patient strictly follow a diabetic diet to assist in control of blood sugar.     I encouraged the patient to continue compliance with diuretics and to drink a moderate amount of fluids with meals and only small amounts between meals.     The patient by ultrasound testing does not have venous reflux and would not require any type of venous intervention.     A and D ointment, patient's elastic stocking applied. Instructions given for daytime use of 15-20 mm Hg knee length stockings.     Ulcer healed.   No follow up needed.     FINAL DIAGNOSES:  Nonpressure ulcers left lower leg limited to breakdown of the skin , diabetes mellitus.     L97.921  R60.0        Meghan Montana MD

## 2019-09-26 NOTE — WOUND CARE
Clinic Level of Care Assessment NAME:  Frankie Navas YOB: 1945 GENDER: female MEDICAL RECORD NUMBER:  [id number] DATE:  9/26/2019 Wound Count Document in [facility] Number of Wounds Assessed Points No Wounds/Ulcers [x]   0 Less than Three Wounds/Ulcers []   1  
3-6 Wounds/Ulcers []   2 Greater than 6 Wounds/Ulcers []   3 Ambulation Status Document in Coord/MOISÉS/Mobility tab Status Definition Points Independent Independently able to ambulate. Fully able (without any assistance) to get on/off exam table/chair. []   0 Minimal Physical Assistance Requires physical assistance of one person to ambulate and/or position patient to be examined. Includes necessary physical assistance to position lower extremities on/off stool. [x]   1 Moderate Physical Assistance Requires at least one staff member to physically assist patient in ambulating into treatment room, and on/off exam table. []   2 Full Assistance Requires assistance of at least two staff members to transfer patient into treatment room and/or on/off exam table/chair. \"Total Transfer\". []   3 Dressing Complexity Document in [facility] and Write Appropriate Order Complexity Definition Points No Dressing  [x]   0 Simple Minimal, simple dressing. i.e. Band-aid, gauze, simple wrap. []   1 Intermediate Moderately complicated requiring licensed personnel to apply i.e. collagen matrix, ointments, gels, alginates. []   2 Complex Complicated requiring licensed personnel to apply dressings 6 or more wounds. []   3 Teaching Effort Document in Education Tab Effort Definition Points No Teaching  []   0 Simple Reinforce two or less topics. Document in Education navigator. [x]   1 Intermediate Reinforce three to five topics and/or one additional  
new topic. Document in Education navigator. []   2 Complex Teach more than one new topic. New patient information  
packet reviewed and/or reinforce more than three topics. Document in Education navigator. HBO initial instruction. []   3 Patient Assessment and Planning Planning Definition Points Simple Multiple System Simple: Simple follow-up with routine assessment and planning. If Discharged, instructions and long term/follow-up care given to patient/caregiver. Discharged, instructions and/or After Visit Summary given to patient/caregiver and instructions completed. [x]   1 Intermediate Multiple System Intermediate: Contact with outside resources; i.e. Telephone calls to home health, [facility]. May include filling out forms and writing letters, arranging transportation, communication with insurance , vendors, etc.  Discharged, instructions and/or After Visit Summary given to patient/caregiver and instructions completed. []   2 Complex Multiple System Complex: Full, comprehensive assessment and planning. Follow the entire navigator under Wound Visit charting filling out each tab which includes OP Adm Database Screening, Education and CarePlan  HBO risk assessment completed. Discharged, instructions and/or After Visit Summary given to patient/caregiver and instructions completed. []   3 Is this the Patient's First Visit to the [address] [place] [address] Road No 
 
 
Is this Patient Established @ [facility] 
Yes Clinical Level of Care Points  0-2  Level 1 [] Points  3-5  Level 2 [x] Points  6-9  Level 3 [] Points  10-12  Level 4 [] Points  13-15  Level 5 [] Electronically signed by Arabella Ramsey RN on 9/26/2019 at 11:35 AM

## 2019-09-26 NOTE — WOUND CARE
09/26/19 2669 Wound Leg lower Left;Lateral  
Date First Assessed/Time First Assessed: 08/05/19 0853   Present on Hospital Admission: Yes  Wound Approximate Age at First Assessment (Weeks): 4 weeks  Primary Wound Type: Vascular  Location: Leg lower  Wound Location Orientation: Left;Lateral  
Dressing Status Removed Dressing Type (Adaptic) Wound Length (cm) 0 cm Wound Width (cm) 0 cm Wound Depth (cm) 0 cm Wound Volume (cm^3) 0 cm^3 Condition of Base Epithelializing Condition of Edges Closed Drainage Amount None Wound Odor None Cleansing and Cleansing Agents  Normal saline; Soap and water Visit Vitals /71 (BP 1 Location: Left arm) Temp 96.7 °F (35.9 °C) Resp 16

## 2019-09-26 NOTE — WOUND CARE
09/26/19 5724 Wound Leg lower Left;Lateral  
Date First Assessed/Time First Assessed: 08/05/19 0853   Present on Hospital Admission: Yes  Wound Approximate Age at First Assessment (Weeks): 4 weeks  Primary Wound Type: Vascular  Location: Leg lower  Wound Location Orientation: Left;Lateral  
Dressing Type Applied  
(Compression stocking) Patient discharged to home ambulatory with cane, wounds resolved, no further follow-up needed. She was given info to purchase 15-20mm/hg knee high compression stockings to be worn daily.

## 2019-09-28 NOTE — PROGRESS NOTES
HISTORY OF PRESENT ILLNESS  Umer Astorga is a 76 y.o. female.   HPI      F/u DM-2 htn HLD gout and medicare wellness-------    Due for flu shot and PCV 13  Last a1c 5.8 LDL 39  a1c was 6.0 at [facility] a few months ago  Last uric acid 8.2-has not any gout episodes this year on colchicine  Sees Dr Michele Buchanan in November for DM-2 and hypothyroidism  Has osteoporosis on fosamax-Dr Michele Buchanan recommended prolia    Went to wound care center for left leg ulcers-discharged last week at [address]  Treated for MRSA, dopplers neg for venous reflux  Had unna boot for edema and ulcer of LLE then was referred to wound care  Some redness noted to LLE last few weeks  Sees hepatologist at [facility] for fatty liver    Last OV  Went to [facility] last week for edema and weight gain--bumex was given 1 mg bid for 1-2 weeks  Edema improved but felt week  Dispatch was called for weakness and diarrhea---labs ok bmp wnl=--K 4.0 bun/cr 19 /0.8  Stopped bumex, back on lasix  Last a1c 5.8 LDL 56  fsbs at home avg  120-130  Sees Dr Michele Buchanan for Dm-2 osteoporosis  At care more were done a1c 6.0    2 weeks ago per pt          Patient Active Problem List    Diagnosis Date Noted    Severe obesity ([address] 09/30/2019    Peripheral vascular disease ([facility] 09/30/2019    Non-pressure chronic ulcer of left lower leg, limited to breakdown of skin ([address] 08/05/2019    Controlled type 2 diabetes mellitus without complication ([address] 00/35/8407    DDD (degenerative disc disease), lumbar 11/27/2017    Prediabetes 04/11/2016    Thrombocytopenia ([address] 01/24/2015    HTN (hypertension) 01/17/2014    Bifascicular block 12/23/2010    Fatty liver 06/18/2010    Pure hypercholesterolemia 06/18/2010    Colon polyp 06/18/2010    Gout 06/14/2010    Hypothyroidism 06/14/2010    Osteoporosis 06/14/2010    Anxiety 06/14/2010    Leg edema 06/14/2010    RSD lower limb 06/14/2010     Current Outpatient Medications   Medication Sig Dispense Refill    doxycycline (ADOXA) 100 mg tablet Take 1 Tab by mouth two (2) times a day. 20 Tab 0    raNITIdine (ZANTAC) 300 mg tab TAKE 1 TABLET BY MOUTH EVERY DAY. 90 Tab 3    levothyroxine (SYNTHROID) 175 mcg tablet TAKE ONE DAILY BY MOUTH. TAKE AN EXTRA 1/2 PILL ON SUNDAYS. (7.5 TABLETS/WEEK  MCG) 32 Tab 11    cranberry extract 450 mg tab tablet Take 450 mg by mouth.  docusate sodium (COLACE) 50 mg capsule Take 100 mg by mouth daily as needed for Constipation.  furosemide (LASIX) 80 mg tablet Take 1 Tab by mouth daily. 90 Tab 3    colchicine (MITIGARE) 0.6 mg capsule Take 1 Cap by mouth daily. Indications: acute inflammation of the joints due to gout attack 90 Cap 3    loratadine (CLARITIN) 10 mg tablet Take 1 Tab by mouth daily. Indications: inflammation of the nose due to an allergy 90 Tab 3    alendronate (FOSAMAX) 70 mg tablet       NYAMYC powder       diclofenac (VOLTAREN) 1 % gel       potassium chloride SA (MICRO-K) 10 mEq capsule Take 2 Caps by mouth daily. 180 Cap 3    omega-3 acid ethyl esters (LOVAZA) 1 gram capsule Take 2 Caps by mouth two (2) times daily (with meals). 360 Cap 3    losartan (COZAAR) 25 mg tablet TAKE 1 TABLET BY MOUTH DAILY. 90 Tab 3    ZETIA 10 mg tablet Take 1 tablet by mouth daily. 90 Tab 3    atorvastatin (LIPITOR) 20 mg tablet TAKE 1 TABLET DAILY 90 Tab 3    metFORMIN ER (GLUCOPHAGE XR) 500 mg tablet Take 1 Tab by mouth two (2) times daily (after meals). For diabetes. 60 Tab 11    amLODIPine (NORVASC) 10 mg tablet Take 1 Tab by mouth daily. 90 Tab 3    polyethylene glycol (MIRALAX) 17 gram/dose powder Take 17 g by mouth daily. 2108 g 3    glucose blood VI test strips (FREESTYLE LITE STRIPS) strip Monitor blood sugar twice daily. Diagnosis code: E11.9 50 Strip 10    Biotin 2,500 mcg cap Take  by mouth.  fluticasone (FLONASE) 50 mcg/actuation nasal spray 2 Sprays by Both Nostrils route daily. Administer to right and left nostril.  3 Bottle 3    FREESTYLE LANCETS 28 gauge misc   3    Lancets (ACCU-CHEK SOFTCLIX LANCETS) misc Check blood sugar one to two times daily 3 Package 3    L GASSERI/B BIFIDUM/B LONGUM (KoolSpan PO) Take  by mouth.  vitamin E (AQUA GEMS) 400 unit capsule Take 800 Units by mouth daily.  lidocaine (LIDODERM) 5 %(700 mg/patch) 1 patch by TransDERmal route every twenty-four (24) hours. Apply patch to the affected area for 12 hours a day and remove for 12 hours a day.  cholecalciferol, vitamin D3, (VITAMIN D3) 2,000 unit tab Take  by mouth.  traMADol (ULTRAM) 50 mg tablet Take 1 tablet by mouth every six (6) hours as needed for Pain. 12 tablet 0    OTHER Accu-Chek Lady Plus Kit  Check blood sugar one to two times daily 1 kit 3    glucose blood VI test strips (ACCU-CHEK LADY PLUS TEST STRP) strip Check blood sugar one to two times daily 3 Package 3    MULTIVITAMIN WITH MINERALS (ONE-A-DAY 50 PLUS PO) Take  by mouth.  aspirin 81 mg chewable tablet Take 81 mg by mouth daily.  fluocinoNIDE (LIDEX) 0.05 % topical cream Apply  to affected area two (2) times a day. 60 g 3    metroNIDAZOLE (FLAGYL) 500 mg tablet metronidazole 500 mg tablet      indomethacin (INDOCIN) 25 mg capsule TAKE 1 CAPSULE BY MOUTH THREE TIMES A DAY AS NEEDED FOR PAIN.  30 Cap 6     Allergies   Allergen Reactions    Celebrex [Celecoxib] Hives    Pcn [Penicillins] Unknown (comments)     Can't remember    Sulfa (Sulfonamide Antibiotics) Hives      Lab Results   Component Value Date/Time    WBC 5.7 11/21/2017 04:00 PM    HGB 13.3 11/21/2017 04:00 PM    HCT 39.7 11/21/2017 04:00 PM    PLATELET 424 (L) 91/70/8970 04:00 PM    MCV 96 11/21/2017 04:00 PM     Lab Results   Component Value Date/Time    Hemoglobin A1c 5.8 (H) 03/23/2018 02:02 PM    Hemoglobin A1c 6.0 (H) 11/21/2017 04:00 PM    Hemoglobin A1c 6.3 (H) 08/28/2017 12:00 AM    Hemoglobin A1c, External 6.4 06/21/2016    Glucose 93 07/10/2018 11:54 AM    Glucose (POC) 129 (H) 11/17/2015 07:23 AM    Glucose  11/21/2017 12:00 PM    Microalb/Creat ratio (ug/mg creat.) 14.6 05/20/2019 02:58 PM    LDL, calculated 39 05/20/2019 02:58 PM    Creatinine 0.61 07/10/2018 11:54 AM      Lab Results   Component Value Date/Time    Cholesterol, total 154 05/20/2019 02:58 PM    HDL Cholesterol 58 05/20/2019 02:58 PM    LDL, calculated 39 05/20/2019 02:58 PM    Triglyceride 284 (H) 05/20/2019 02:58 PM    CHOL/HDL Ratio 2.7 06/14/2010 11:16 AM     Lab Results   Component Value Date/Time    GFR est non-AA 90 07/10/2018 11:54 AM    GFR est  07/10/2018 11:54 AM    Creatinine 0.61 07/10/2018 11:54 AM    BUN 19 07/10/2018 11:54 AM    Sodium 144 07/10/2018 11:54 AM    Potassium 4.0 07/10/2018 11:54 AM    Chloride 101 07/10/2018 11:54 AM    CO2 24 07/10/2018 11:54 AM    Magnesium 2.2 08/28/2017 12:00 AM    PTH, Intact 31 01/06/2016 09:55 AM        ROS    Physical Exam   Constitutional: She appears well-developed and well-nourished. Appears stated age, obese, nad   Cardiovascular: Normal rate, regular rhythm and normal heart sounds. Exam reveals no gallop and no friction rub. No murmur heard. Pulmonary/Chest: Effort normal and breath sounds normal. No respiratory distress. She has no wheezes. Abdominal: Soft. Bowel sounds are normal.   Musculoskeletal: She exhibits no edema. Neurological: She is alert. Skin: There is erythema. Psychiatric: She has a normal mood and affect. Nursing note and vitals reviewed. ASSESSMENT and PLAN  Diagnoses and all orders for this visit:    1. Controlled type 2 diabetes mellitus without complication, without long-term current use of insulin (HCC)  -     METABOLIC PANEL, COMPREHENSIVE  -     HEMOGLOBIN A1C WITH EAG    2. Essential hypertension  -     CBC W/O DIFF  -     METABOLIC PANEL, COMPREHENSIVE    3. Pure hypercholesterolemia  -     METABOLIC PANEL, COMPREHENSIVE    4. Age related osteoporosis, unspecified pathological fracture presence    5.  Fatty liver  -     METABOLIC PANEL, COMPREHENSIVE    6. Severe obesity ([address]    7. Left leg cellulitis  -     doxycycline (ADOXA) 100 mg tablet; Take 1 Tab by mouth two (2) times a day. 8. Peripheral vascular disease ([address]      Follow-up and Dispositions    · Return in about 6 months (around 3/30/2020) for htn gout hld. This is the Subsequent Medicare Annual Wellness Exam, performed 12 months or more after the Initial AWV or the last Subsequent AWV    I have reviewed the patient's medical history in detail and updated the computerized patient record. History     Past Medical History:   Diagnosis Date    Arthritis     KNEE,gout    Endocrine disease     HYPOTHYROIDISM    Fracture     Left distal femur (age 12 - pt fell)    Fracture     Left tibia 1990 (pt fell)    Fracture     Right wrist fractured 2 times (pt fell)    GERD (gastroesophageal reflux disease)     Hypertension     Hypoglycemia     \"my body produces too much insulin\"    Liver disease     BRADY    Nausea & vomiting     when had gallbladder out    Osteoporosis     Other ill-defined conditions(799.89)     spinal stenosis    Other ill-defined conditions(799.89)     BBB    Other ill-defined conditions(799.89)     HIGH CHOLESTEROL    Other ill-defined conditions(799.89)     edema legs    Thyroid disease       Past Surgical History:   Procedure Laterality Date    ABDOMEN SURGERY PROC UNLISTED      liver biopsy--BRADY and fibrosis    COLONOSCOPY,REMV LESN,SNARE  2/11/2015         COLORECTAL SCRN; HI RISK IND  2/11/2015         HX CHOLECYSTECTOMY      HX HYSTERECTOMY      HX ORTHOPAEDIC      left leg surgery - plates, screws, wires, pins in place    HX UROLOGICAL      PESSURY    UPPER GI ENDOSCOPY,BIOPSY  11/17/2015          Current Outpatient Medications   Medication Sig Dispense Refill    doxycycline (ADOXA) 100 mg tablet Take 1 Tab by mouth two (2) times a day. 20 Tab 0    raNITIdine (ZANTAC) 300 mg tab TAKE 1 TABLET BY MOUTH EVERY DAY.  90 Tab 3    levothyroxine (SYNTHROID) 175 mcg tablet TAKE ONE DAILY BY MOUTH. TAKE AN EXTRA 1/2 PILL ON SUNDAYS. (7.5 TABLETS/WEEK  MCG) 32 Tab 11    cranberry extract 450 mg tab tablet Take 450 mg by mouth.  docusate sodium (COLACE) 50 mg capsule Take 100 mg by mouth daily as needed for Constipation.  furosemide (LASIX) 80 mg tablet Take 1 Tab by mouth daily. 90 Tab 3    colchicine (MITIGARE) 0.6 mg capsule Take 1 Cap by mouth daily. Indications: acute inflammation of the joints due to gout attack 90 Cap 3    loratadine (CLARITIN) 10 mg tablet Take 1 Tab by mouth daily. Indications: inflammation of the nose due to an allergy 90 Tab 3    alendronate (FOSAMAX) 70 mg tablet       NYAMYC powder       diclofenac (VOLTAREN) 1 % gel       potassium chloride SA (MICRO-K) 10 mEq capsule Take 2 Caps by mouth daily. 180 Cap 3    omega-3 acid ethyl esters (LOVAZA) 1 gram capsule Take 2 Caps by mouth two (2) times daily (with meals). 360 Cap 3    losartan (COZAAR) 25 mg tablet TAKE 1 TABLET BY MOUTH DAILY. 90 Tab 3    ZETIA 10 mg tablet Take 1 tablet by mouth daily. 90 Tab 3    atorvastatin (LIPITOR) 20 mg tablet TAKE 1 TABLET DAILY 90 Tab 3    metFORMIN ER (GLUCOPHAGE XR) 500 mg tablet Take 1 Tab by mouth two (2) times daily (after meals). For diabetes. 60 Tab 11    amLODIPine (NORVASC) 10 mg tablet Take 1 Tab by mouth daily. 90 Tab 3    polyethylene glycol (MIRALAX) 17 gram/dose powder Take 17 g by mouth daily. 2108 g 3    glucose blood VI test strips (FREESTYLE LITE STRIPS) strip Monitor blood sugar twice daily. Diagnosis code: E11.9 50 Strip 10    Biotin 2,500 mcg cap Take  by mouth.  fluticasone (FLONASE) 50 mcg/actuation nasal spray 2 Sprays by Both Nostrils route daily. Administer to right and left nostril.  3 Bottle 3    FREESTYLE LANCETS 28 gauge misc   3    Lancets (ACCU-CHEK SOFTCLIX LANCETS) misc Check blood sugar one to two times daily 3 Package 3    L GASSERI/B BIFIDUM/B LONGUM ([facility] [address]) Take  by mouth.  vitamin E (AQUA GEMS) 400 unit capsule Take 800 Units by mouth daily.  lidocaine (LIDODERM) 5 %(700 mg/patch) 1 patch by TransDERmal route every twenty-four (24) hours. Apply patch to the affected area for 12 hours a day and remove for 12 hours a day.  cholecalciferol, vitamin D3, (VITAMIN D3) 2,000 unit tab Take  by mouth.  traMADol (ULTRAM) 50 mg tablet Take 1 tablet by mouth every six (6) hours as needed for Pain. 12 tablet 0    OTHER Accu-Chek Lady Plus Kit  Check blood sugar one to two times daily 1 kit 3    glucose blood VI test strips (ACCU-CHEK LADY PLUS TEST STRP) strip Check blood sugar one to two times daily 3 Package 3    MULTIVITAMIN WITH MINERALS (ONE-A-DAY 50 PLUS PO) Take  by mouth.  aspirin 81 mg chewable tablet Take 81 mg by mouth daily.  fluocinoNIDE (LIDEX) 0.05 % topical cream Apply  to affected area two (2) times a day. 60 g 3    metroNIDAZOLE (FLAGYL) 500 mg tablet metronidazole 500 mg tablet      indomethacin (INDOCIN) 25 mg capsule TAKE 1 CAPSULE BY MOUTH THREE TIMES A DAY AS NEEDED FOR PAIN.  30 Cap 6     Allergies   Allergen Reactions    Celebrex [Celecoxib] Hives    Pcn [Penicillins] Unknown (comments)     Can't remember    Sulfa (Sulfonamide Antibiotics) Hives     Family History   Problem Relation Age of Onset    Diabetes Mother     Heart Disease Mother     Lung Disease Father         copd     Social History     Tobacco Use    Smoking status: Never Smoker    Smokeless tobacco: Never Used   Substance Use Topics    Alcohol use: No     Patient Active Problem List   Diagnosis Code    Gout M10.9    Hypothyroidism E03.9    Osteoporosis M81.0    Anxiety F41.9    Leg edema R60.0    RSD lower limb G90.529    Fatty liver K76.0    Pure hypercholesterolemia E78.00    Colon polyp K63.5    Bifascicular block I45.2    HTN (hypertension) I10    Thrombocytopenia (HCC) D69.6    Prediabetes R73.03    DDD (degenerative disc disease), lumbar M51.36    Controlled type 2 diabetes mellitus without complication ([facility]) H43.0    Non-pressure chronic ulcer of left lower leg, limited to breakdown of skin ([facility] L97.921    Severe obesity ([facility]) E66.01    Peripheral vascular disease ([facility]) I73.9       Depression Risk Factor Screening:     3 most recent PHQ Screens 9/30/2019   Little interest or pleasure in doing things Not at all   Feeling down, depressed, irritable, or hopeless Not at all   Total Score PHQ 2 0     Alcohol Risk Factor Screening: You do not drink alcohol or very rarely. Functional Ability and Level of Safety:   Hearing Loss  Hearing is good. Activities of Daily Living  The home contains: grab bars  Patient needs help with:  transportation    Fall Risk  Fall Risk Assessment, last 12 mths 9/30/2019   Able to walk? Yes   Fall in past 12 months? No       Abuse Screen  Patient is not abused    Cognitive Screening   Evaluation of Cognitive Function:  Has your family/caregiver stated any concerns about your memory: no  Normal    Patient Care Team   Patient Care Team:  Karyle Chiquito, MD as PCP - Bautista Schaefer RN as Nurse Navigator  Tru Oleary MD as Consulting Provider (Endocrinology)  Storm Sifuentes MD (Obstetrics & Gynecology)  Rajani Santana MD (Ophthalmology)  Junior Garza MD (Dermatology)  CATY Kennedy MD (Orthopedic Surgery)    Assessment/Plan   Education and counseling provided:  Are appropriate based on today's review and evaluation  End-of-Life planning (with patient's consent)    Diagnoses and all orders for this visit:    1. Controlled type 2 diabetes mellitus without complication, without long-term current use of insulin (HCC)  -     METABOLIC PANEL, COMPREHENSIVE   a1 ordered   F/u Dr Luz Elena Santana  2. Essential hypertension  -     CBC W/O DIFF  -     METABOLIC PANEL, COMPREHENSIVE   Reasonable control  3.  Pure hypercholesterolemia  -     METABOLIC PANEL, COMPREHENSIVE   Statin plus zetia  4. Age related osteoporosis, unspecified pathological fracture presence   On fosamax   Pt will dicussed prolia again with endocrine MD  5. Fatty liver  -     METABOLIC PANEL, COMPREHENSIVE   F/u at [facility] hepatology   LFT ok    6. Severe obesity ([address]   I have reviewed/discussed the above normal BMI with the patient. I have recommended the following interventions: dietary management education, guidance, and counseling . Loan Gonsalves 7. Left leg cellulitis  -     doxycycline (ADOXA) 100 mg tablet; Take 1 Tab by mouth two (2) times a day. Pt will call back in 3-4 d if not improved-can be seen again next week or referral     8.  Preventive   Had flu shot at [facility] per pt and reports had PCV 13 at [facility] Maintenance Due   Topic Date Due    Shingrix Vaccine Age 50> (1 of 2) 04/01/1995    Pneumococcal 65+ years (2 of 2 - PCV13) 10/05/2011    HEMOGLOBIN A1C Q6M  09/23/2018    FOOT EXAM Q1  11/21/2018    Influenza Age 9 to Adult  08/01/2019    MEDICARE YEARLY EXAM  08/08/2019    COLONOSCOPY  02/11/2020

## 2019-09-30 ENCOUNTER — HOSPITAL ENCOUNTER (OUTPATIENT)
Dept: ULTRASOUND IMAGING | Age: 74
Discharge: HOME | End: 2019-09-30
Payer: MEDICARE

## 2019-09-30 ENCOUNTER — OFFICE VISIT (OUTPATIENT)
Dept: INTERNAL MEDICINE CLINIC | Age: 74
End: 2019-09-30

## 2019-09-30 VITALS
BODY MASS INDEX: 37.56 KG/M2 | SYSTOLIC BLOOD PRESSURE: 143 MMHG | RESPIRATION RATE: 16 BRPM | OXYGEN SATURATION: 99 % | DIASTOLIC BLOOD PRESSURE: 78 MMHG | HEART RATE: 73 BPM | TEMPERATURE: 98 F | HEIGHT: 63 IN | WEIGHT: 212 LBS

## 2019-09-30 DIAGNOSIS — L03.116 LEFT LEG CELLULITIS: ICD-10-CM

## 2019-09-30 DIAGNOSIS — K75.81 NASH (NONALCOHOLIC STEATOHEPATITIS): ICD-10-CM

## 2019-09-30 DIAGNOSIS — Z00.00 MEDICARE ANNUAL WELLNESS VISIT, SUBSEQUENT: ICD-10-CM

## 2019-09-30 DIAGNOSIS — K76.0 FATTY LIVER: ICD-10-CM

## 2019-09-30 DIAGNOSIS — K74.00 LIVER FIBROSIS: ICD-10-CM

## 2019-09-30 DIAGNOSIS — E78.00 PURE HYPERCHOLESTEROLEMIA: ICD-10-CM

## 2019-09-30 DIAGNOSIS — I10 ESSENTIAL HYPERTENSION: ICD-10-CM

## 2019-09-30 DIAGNOSIS — M81.0 AGE RELATED OSTEOPOROSIS, UNSPECIFIED PATHOLOGICAL FRACTURE PRESENCE: ICD-10-CM

## 2019-09-30 DIAGNOSIS — E66.01 SEVERE OBESITY (HCC): ICD-10-CM

## 2019-09-30 DIAGNOSIS — E11.9 CONTROLLED TYPE 2 DIABETES MELLITUS WITHOUT COMPLICATION, WITHOUT LONG-TERM CURRENT USE OF INSULIN (HCC): Primary | ICD-10-CM

## 2019-09-30 DIAGNOSIS — I73.9 PERIPHERAL VASCULAR DISEASE (HCC): ICD-10-CM

## 2019-09-30 PROCEDURE — 76705 ECHO EXAM OF ABDOMEN: CPT

## 2019-09-30 RX ORDER — DOXYCYCLINE 100 MG/1
100 TABLET ORAL 2 TIMES DAILY
Qty: 20 TAB | Refills: 0 | Status: SHIPPED | OUTPATIENT
Start: 2019-09-30 | End: 2019-11-21

## 2019-09-30 NOTE — PATIENT INSTRUCTIONS
Office Policies Phone calls/patient messages: Please allow up to 24 hours for someone in the office to contact you about your call or message. Be mindful your provider may be out of the office or your message may require further review. We encourage you to use [employer] for your messages as this is a faster, more efficient way to communicate with our office Medication Refills: 
         
Prescription medications require 48-72 business hours to process. We encourage you to use [employer] [facility] for your refills. For controlled medications: Please allow 72 business hours to process. Certain medications may require you to  a written prescription at our office. NO narcotic/controlled medications will be prescribed after 4pm Monday through Friday or on weekends Form/Paperwork Completion: 
         
Please note a $25 fee may incur for all paperwork for completed by our providers. We ask that you allow 7-10 business days. Pre-payment is due prior to picking up/faxing the completed form. You may also download your forms to [employer] to have your doctor print off. Medicare Wellness Visit, Female The best way to live healthy is to have a lifestyle where you eat a well-balanced diet, exercise regularly, limit alcohol use, and quit all forms of tobacco/nicotine, if applicable. Regular preventive services are another way to keep healthy. Preventive services (vaccines, screening tests, monitoring & exams) can help personalize your care plan, which helps you manage your own care. Screening tests can find health problems at the earliest stages, when they are easiest to treat. [facility] follows the current, evidence-based guidelines published by the [facility] (USPSTF) when recommending preventive services for our patients.  Because we follow these guidelines, sometimes recommendations change over time as research supports it. (For example, mammograms used to be recommended annually. Even though Medicare will still pay for an annual mammogram, the newer guidelines recommend a mammogram every two years for women of average risk.) Of course, you and your doctor may decide to screen more often for some diseases, based on your risk and your health status. Preventive services for you include: - Medicare offers their members a free annual wellness visit, which is time for you and your primary care provider to discuss and plan for your preventive service needs. Take advantage of this benefit every year! 
-All adults over the age of 72 should receive the recommended pneumonia vaccines. Current USPSTF guidelines recommend a series of two vaccines for the best pneumonia protection.  
-All adults should have a flu vaccine yearly and a tetanus vaccine every 10 years. All adults age 61 and older should receive a shingles vaccine once in their lifetime.   
-A bone mass density test is recommended when a woman turns 65 to screen for osteoporosis. This test is only recommended one time, as a screening. Some providers will use this same test as a disease monitoring tool if you already have osteoporosis. -All adults age 38-68 who are overweight should have a diabetes screening test once every three years.  
-Other screening tests and preventive services for persons with diabetes include: an eye exam to screen for diabetic retinopathy, a kidney function test, a foot exam, and stricter control over your cholesterol.  
-Cardiovascular screening for adults with routine risk involves an electrocardiogram (ECG) at intervals determined by your doctor.  
-Colorectal cancer screenings should be done for adults age 54-65 with no increased risk factors for colorectal cancer. There are a number of acceptable methods of screening for this type of cancer. Each test has its own benefits and drawbacks.  Discuss with your doctor what is most appropriate for you during your annual wellness visit. The different tests include: colonoscopy (considered the best screening method), a fecal occult blood test, a fecal DNA test, and sigmoidoscopy. -Breast cancer screenings are recommended every other year for women of normal risk, age 54-69. 
-Cervical cancer screenings for women over age 72 are only recommended with certain risk factors.  
-All adults born between [facility] should be screened once for Hepatitis C. Here is a list of your current Health Maintenance items (your personalized list of preventive services) with a due date: 
Health Maintenance Due Topic Date Due  Shingles Vaccine (1 of 2) 04/01/1995  Pneumococcal Vaccine (2 of 2 - PCV13) 10/05/2011  Hemoglobin A1C    09/23/2018 Lia Vazquez Di[facility] Foot [facility]  11/21/2018  Flu Vaccine  08/01/2019 Lia Vazquez Annual Well Visit  08/08/2019  Colonoscopy  02/11/2020

## 2019-10-01 LAB
ALBUMIN SERPL-MCNC: 4.4 G/DL (ref 3.5–4.8)
ALBUMIN/GLOB SERPL: 1.7 {RATIO} (ref 1.2–2.2)
ALP SERPL-CCNC: 118 IU/L (ref 39–117)
ALT SERPL-CCNC: 41 IU/L (ref 0–32)
AST SERPL-CCNC: 41 IU/L (ref 0–40)
BILIRUB SERPL-MCNC: 0.4 MG/DL (ref 0–1.2)
BUN SERPL-MCNC: 20 MG/DL (ref 8–27)
BUN/CREAT SERPL: 24 (ref 12–28)
CALCIUM SERPL-MCNC: 9.9 MG/DL (ref 8.7–10.3)
CHLORIDE SERPL-SCNC: 99 MMOL/L (ref 96–106)
CO2 SERPL-SCNC: 27 MMOL/L (ref 20–29)
CREAT SERPL-MCNC: 0.85 MG/DL (ref 0.57–1)
ERYTHROCYTE [DISTWIDTH] IN BLOOD BY AUTOMATED COUNT: 13.5 % (ref 12.3–15.4)
EST. AVERAGE GLUCOSE BLD GHB EST-MCNC: 143 MG/DL
GLOBULIN SER CALC-MCNC: 2.6 G/DL (ref 1.5–4.5)
GLUCOSE SERPL-MCNC: 171 MG/DL (ref 65–99)
HBA1C MFR BLD: 6.6 % (ref 4.8–5.6)
HCT VFR BLD AUTO: 39.6 % (ref 34–46.6)
HGB BLD-MCNC: 12.9 G/DL (ref 11.1–15.9)
MCH RBC QN AUTO: 31.4 PG (ref 26.6–33)
MCHC RBC AUTO-ENTMCNC: 32.6 G/DL (ref 31.5–35.7)
MCV RBC AUTO: 96 FL (ref 79–97)
PLATELET # BLD AUTO: 102 X10E3/UL (ref 150–450)
POTASSIUM SERPL-SCNC: 3.7 MMOL/L (ref 3.5–5.2)
PROT SERPL-MCNC: 7 G/DL (ref 6–8.5)
RBC # BLD AUTO: 4.11 X10E6/UL (ref 3.77–5.28)
SODIUM SERPL-SCNC: 143 MMOL/L (ref 134–144)
WBC # BLD AUTO: 6.3 X10E3/UL (ref 3.4–10.8)

## 2019-10-15 DIAGNOSIS — E03.9 ACQUIRED HYPOTHYROIDISM: ICD-10-CM

## 2019-10-16 RX ORDER — LEVOTHYROXINE SODIUM 175 UG/1
TABLET ORAL
Qty: 32 TAB | Refills: 11 | Status: SHIPPED | OUTPATIENT
Start: 2019-10-16 | End: 2020-09-10 | Stop reason: SDUPTHER

## 2019-10-17 DIAGNOSIS — I10 HYPERTENSION, UNSPECIFIED TYPE: ICD-10-CM

## 2019-10-17 RX ORDER — AMLODIPINE BESYLATE 10 MG/1
10 TABLET ORAL DAILY
Qty: 90 TAB | Refills: 3 | Status: SHIPPED | OUTPATIENT
Start: 2019-10-17 | End: 2019-11-18 | Stop reason: SDUPTHER

## 2019-10-17 NOTE — TELEPHONE ENCOUNTER
PCP: Rashad Vail MD    Last appt: 9/30/2019  Future Appointments   Date Time Provider Hilary Dickinson   11/18/2019  9:50 AM Ana Rosa Martin MD [facility]   3/30/2020  9:00 AM Rashad Vail MD [address]       Requested Prescriptions     Pending Prescriptions Disp Refills    amLODIPine (NORVASC) 10 mg tablet 90 Tab 3     Sig: Take 1 Tab by mouth daily.

## 2019-10-18 ENCOUNTER — TELEPHONE (OUTPATIENT)
Dept: INTERNAL MEDICINE CLINIC | Age: 74
End: 2019-10-18

## 2019-10-18 NOTE — TELEPHONE ENCOUNTER
#[phone] pt has appt on   10-30-19 with Dr. Fariba Bell (GYN)  Their phone #[phone]        Pt has an appt on 11-18-19 with Dr. Dottie Will (dm) who is now at [facility]. Their phone #[phone]       Thanks.

## 2019-10-18 NOTE — TELEPHONE ENCOUNTER
Referral for Dr. David Rivera has been approved and faxed and referral for Dr. Hi Kamara is pending per [facility].

## 2019-11-07 ENCOUNTER — TELEPHONE (OUTPATIENT)
Dept: WOUND CARE | Age: 74
End: 2019-11-07

## 2019-11-07 ENCOUNTER — HOSPITAL ENCOUNTER (OUTPATIENT)
Dept: WOUND CARE | Age: 74
Discharge: HOME | End: 2019-11-07
Payer: MEDICARE

## 2019-11-07 VITALS — TEMPERATURE: 97.1 F | SYSTOLIC BLOOD PRESSURE: 155 MMHG | RESPIRATION RATE: 18 BRPM | DIASTOLIC BLOOD PRESSURE: 67 MMHG

## 2019-11-07 PROBLEM — L03.116 CELLULITIS OF LEFT LOWER LEG: Status: ACTIVE | Noted: 2019-11-07

## 2019-11-07 PROCEDURE — 29581 APPL MULTLAYER CMPRN SYS LEG: CPT | Performed by: SURGERY

## 2019-11-07 NOTE — TELEPHONE ENCOUNTER
Prescription for Doxycycline 100 mg, one tab BID, #20, called to [facility] [phone], per Dr. Wojciech Padron.

## 2019-11-07 NOTE — DISCHARGE INSTRUCTIONS
Discharge Instructions for  [place] [id number] [address], [address] [facility]  Telephone: [phone]    NAME:  Mirela Part OF BIRTH:  1945  MEDICAL RECORD NUMBER:  [id number]  DATE:  11/7/2019    Wound Cleansing:   Do not scrub or use excessive force. Cleansed wound prior to applying a clean dressing with:  [x] Normal Saline [x] Keep Wound Dry in Shower    [] Wound Cleanser   [] Cleanse wound with Mild Soap & Water  [] May Shower at Discharge   [] Other:      Topical Treatments:  Do not apply lotions, creams, or ointments to wound bed unless directed. [] Apply moisturizing lotion to skin surrounding the wound prior to dressing change.  [] Apply antifungal ointment to skin surrounding the wound prior to dressing change.  [] Apply thin film of moisture barrier ointment to skin immediately around wound. [x] Other: Intact Left Lower Leg skin moisturized w/A&D ointment, prior to application of 3 layer wrap. Dressings:   Applied to reddened skin, Left lower leg. [x] Apply Primary Dressing: Xeroform Gauze      [x] Cover and Secure with:     [x] Gauze [] Robin [] Kerlix   [] Ace Wrap [] Cover Roll Tape [] ABD     [] Other:    Avoid contact of tape with skin. [] Change dressing: [] Daily    [] Every Other Day [] Three times per week   [] Once a week [x] Do Not Change Dressing (dressing will be changed at next appointment)   [] Other:    Compression:  Apply: [x] Multilayer Compression Wrap Applied in Clinic []RightLeg [x]Left Leg - 3 layer compression wrap   [] Multi-layer compression. Do not get leg(s) with wrap wet. If wraps become too tight call the center or completely remove the    wrap. [x] Elevate leg(s) above the level of the heart when sitting. [x] Avoid prolonged standing in one place.        Dietary:  [] Diet as tolerated: [x]  Diabetic Diet:Low Carbohydrates, No Sugar [x] No Added Salt:  [] Increase Protein: [] Other:   Activity:  [x] Activity as tolerated:  [] Patient has no activity restrictions     [] Strict Bedrest: [] Remain off Work:     [] May return to full duty work:                                   [] Return to work with restrictions:             Return Appointment:  [] Wound and dressing supply provider:   [] ECF or Home Healthcare:  [] Wound Assessment: [] Physician or NP scheduled for Wound Assessment:   [x] Return Appointment: With Dr. Jonnie Andrea  in [address] [place] [address])  [x] Follow up with Dr. Nicole Zapata (your PCP) or associate, regarding your diuretic (fluid pill) dosage, leg edema and leg \"weeping\"    *Antibiotic--Doxycycline 100 mg, to be taken two times a day, for 10 days, prescribed today. Electronically signed Marcela Kuhn RN on 11/7/2019 at 1326 Dr Wisam Parrish: Should you experience any significant changes in your wound(s) or have questions about your wound care, please contact the [facility] at [address] [place] [address] 8:00 am - 4:30. If you need help with your wound outside these hours and cannot wait until we are again available, contact your PCP or go to the hospital emergency room. PLEASE NOTE: IF YOU ARE UNABLE TO OBTAIN WOUND SUPPLIES, CONTINUE TO USE THE SUPPLIES YOU HAVE AVAILABLE UNTIL YOU ARE ABLE TO REACH US. IT IS MOST IMPORTANT TO KEEP THE WOUND COVERED AT ALL TIMES.

## 2019-11-07 NOTE — WOUND CARE
11/07/19 1112 Wound Leg lower Left;Lateral  
Date First Assessed/Time First Assessed: 08/05/19 0853   Present on Hospital Admission: Yes  Wound Approximate Age at First Assessment (Weeks): 4 weeks  Primary Wound Type: Vascular  Location: Leg lower  Wound Location Orientation: Left;Lateral  
Dressing Type Applied (xeroform, 4x4s, RG 3 layer wrap) Discharge Condition: Stable Pain: 0 Ambulatory Status: Nikita Vasquez Discharge Destination: Home Transportation: Car Accompanied by: Family/Caregiver Discharge instructions reviewed with Patient and copy or written instructions have been provided. All questions/concerns have been addressed at this time.

## 2019-11-08 ENCOUNTER — OFFICE VISIT (OUTPATIENT)
Dept: INTERNAL MEDICINE CLINIC | Age: 74
End: 2019-11-08

## 2019-11-08 VITALS
DIASTOLIC BLOOD PRESSURE: 73 MMHG | HEART RATE: 71 BPM | RESPIRATION RATE: 18 BRPM | OXYGEN SATURATION: 98 % | SYSTOLIC BLOOD PRESSURE: 146 MMHG | WEIGHT: 210 LBS | TEMPERATURE: 97.6 F | BODY MASS INDEX: 37.21 KG/M2 | HEIGHT: 63 IN

## 2019-11-08 DIAGNOSIS — R60.0 EDEMA, LOWER EXTREMITY: Primary | ICD-10-CM

## 2019-11-08 RX ORDER — METOLAZONE 5 MG/1
5 TABLET ORAL
Qty: 8 TAB | Refills: 1 | Status: SHIPPED | OUTPATIENT
Start: 2019-11-08 | End: 2020-01-06 | Stop reason: SDUPTHER

## 2019-11-08 NOTE — PROGRESS NOTES
SUBJECTIVE  Ms. Ervin Barton presents today acutely for     Chief Complaint   Patient presents with    Swelling     pt here today for bilateral swelling in legs ; pt want to discuss fluid pill      She is in wound clinic for \"ulcers and a staph infection\", on antibiotics. \"He wanted me to come in and see Dr. Magdaleno Bhatti about getting increase in my lasix. \"  She has had edema of legs, particularly the left. \"They did ultrasound on it and it was good. \"     OBJECTIVE  Visit Vitals  /73 (BP 1 Location: Left arm, BP Patient Position: Sitting)   Pulse 71   Temp 97.6 °F (36.4 °C) (Oral)   Resp 18   Ht 5' 3\" (1.6 m)   Wt 210 lb (95.3 kg)   SpO2 98%   BMI 37.20 kg/m²     Gen: Pleasant 76 y.o.  female in NAD.   HEENT: PERRLA. EOMI. OP moist and pink.  Neck: Supple.  No LAD.  HEART: RRR, No M/G/R.   LUNGS: CTAB No W/R.   ABDOMEN: S, NT, ND, BS+.   EXTREMITIES: Warm. Her L leg is edematous; 1+ edema on R LE.     ASSESSMENT / PLAN    ICD-10-CM ICD-9-CM    1. Edema, lower extremity R60.0 782.3 We'll try adding twice a week metOLazone (ZAROXOLYN) 5 mg tablet       I have reviewed with the patient details of the assessment and plan and all questions were answered. Relevant patient education was performed.

## 2019-11-08 NOTE — PROGRESS NOTES
1. Have you been to the ER, urgent care clinic since your last visit? Hospitalized since your last visit? no    2. Have you seen or consulted any other health care providers outside of the [address] [place] [address] since your last visit? Include any pap smears or colon screening.  Dr Sharri Liu

## 2019-11-08 NOTE — PATIENT INSTRUCTIONS
Office Policies    Phone calls/patient messages:            Please allow up to 24 hours for someone in the office to contact you about your call or message. Be mindful your provider may be out of the office or your message may require further review. We encourage you to use [employer] for your messages as this is a faster, more efficient way to communicate with our office                         Medication Refills:            Prescription medications require 48-72 business hours to process. We encourage you to use [employer] for your refills. For controlled medications: Please allow 72 business hours to process. Certain medications may require you to  a written prescription at our office. NO narcotic/controlled medications will be prescribed after 4pm Monday through Friday or on weekends              Form/Paperwork Completion:            Please note a $25 fee may incur for all paperwork for completed by our providers. We ask that you allow 7-10 business days. Pre-payment is due prior to picking up/faxing the completed form. You may also download your forms to [employer] to have your doctor print off.

## 2019-11-14 ENCOUNTER — HOSPITAL ENCOUNTER (OUTPATIENT)
Dept: WOUND CARE | Age: 74
Discharge: HOME | End: 2019-11-14
Payer: MEDICARE

## 2019-11-14 VITALS
SYSTOLIC BLOOD PRESSURE: 145 MMHG | RESPIRATION RATE: 18 BRPM | HEART RATE: 76 BPM | TEMPERATURE: 96.1 F | DIASTOLIC BLOOD PRESSURE: 69 MMHG

## 2019-11-14 PROCEDURE — 29581 APPL MULTLAYER CMPRN SYS LEG: CPT

## 2019-11-14 PROCEDURE — 29581 APPL MULTLAYER CMPRN SYS LEG: CPT | Performed by: SURGERY

## 2019-11-14 NOTE — DISCHARGE INSTRUCTIONS
Discharge Instructions for  [place] [id number] [facility], [address] [facility]  Telephone: [phone]    NAME:  Harshal Reed OF BIRTH:  1945  MEDICAL RECORD NUMBER:  [id number]  DATE:  11/14/2019    Wound Cleansing:   Do not scrub or use excessive force. Cleanse wound prior to applying a clean dressing with:  [x] Normal Saline [] Keep Wound Dry in Shower    [] Wound Cleanser   [] Cleanse wound with Mild Soap & Water  [] May Shower at Discharge   [] Other:      Topical Treatments:  Do not apply lotions, creams, or ointments to wound bed unless directed. [] Apply moisturizing lotion to skin surrounding the wound prior to dressing change.  [] Apply antifungal ointment to skin surrounding the wound prior to dressing change.  [] Apply thin film of moisture barrier ointment to skin immediately around wound. [] Other:        Dressings:           Wound Location Left lower leg  [] Apply Primary Dressing:       [] MediHoney Gel [] Alginate with Silver [] Alginate   [] Collagen [] Collagen with Silver   [] Santyl with Moisten saline gauze     [] Hydrocolloid   [] MediHoney Alginate [] Foam with Silver   [] Foam   [] Hydrofera Blue    [] Mepilex Border    [] Moisten with Saline [] Hydrogel [] Mepitel     [] Bactroban/Mupirocin [] Polysporin  [x] Other:  Xeroform  [] Pack wound loosely with  [] Iodoform   [] Plain Packing  [] Other   [] Cover and Secure with:     [] Gauze [] Robin [] Kerlix   [] Ace Wrap [] Cover Roll Tape [] ABD     [] Other:    Avoid contact of tape with skin. [x] Change dressing: [] Daily    [] Every Other Day [] Three times per week   [x] Once a week [] Do Not Change Dressing   [] Other:       Compression:  Apply: [x] Multilayer Compression Wrap Applied in Clinic []RightLeg [x]Left Leg 3 layer wrap   [] Multi-layer compression. Do not get leg(s) with wrap wet. If wraps become too tight call the center or completely remove the wrap. [x] Elevate leg(s) above the level of the heart when sitting. [x] Avoid prolonged standing in one place. Dietary:  [x] Diet as tolerated: [] Calorie Diabetic Diet: [x] No Added Salt:  [] Increase Protein: [] Other:   Activity:  [x] Activity as tolerated:  [] Patient has no activity restrictions     [] Strict Bedrest: [] Remain off Work:     [] May return to full duty work:                                   [] Return to work with restrictions:             Return Appointment:  [] Wound and dressing supply provider:   [] ECF or Home Healthcare:  [] Wound Assessment: [] Physician or NP scheduled for Wound Assessment:   [x] Return Appointment: With Dr. Zee Aguilar in [facility])  [] Ordered tests:      Electronically signed Jacqueline Villavicencio RN on 11/14/2019 at 11:46 AM     Brandee Weems 281: Should you experience any significant changes in your wound(s) or have questions about your wound care, please contact the [facility] Main at [address] [place] [address] 8:00 am - 4:30. If you need help with your wound outside these hours and cannot wait until we are again available, contact your PCP or go to the hospital emergency room. PLEASE NOTE: IF YOU ARE UNABLE TO OBTAIN WOUND SUPPLIES, CONTINUE TO USE THE SUPPLIES YOU HAVE AVAILABLE UNTIL YOU ARE ABLE TO REACH US. IT IS MOST IMPORTANT TO KEEP THE WOUND COVERED AT ALL TIMES.      Physician Signature:_______________________    Date: ___________ Time:  ____________

## 2019-11-14 NOTE — WOUND CARE
11/14/19 1120 Wound Leg lower Left;Lateral  
Date First Assessed/Time First Assessed: 08/05/19 0853   Present on Hospital Admission: Yes  Wound Approximate Age at First Assessment (Weeks): 4 weeks  Primary Wound Type: Vascular  Location: Leg lower  Wound Location Orientation: Left;Lateral  
Dressing Status Removed Dressing Type  
(non adherant, 3 layer wrap) Non-staged Wound Description  
(redness, irritation) Drainage Amount Scant Drainage Color Serous Wound Odor None Cleansing and Cleansing Agents  Soap and water

## 2019-11-14 NOTE — WOUND CARE
11/14/19 1120 Wound Leg lower Left;Lateral  
Date First Assessed/Time First Assessed: 08/05/19 0853   Present on Hospital Admission: Yes  Wound Approximate Age at First Assessment (Weeks): 4 weeks  Primary Wound Type: Vascular  Location: Leg lower  Wound Location Orientation: Left;Lateral  
Dressing Status Removed Dressing Type  
(non adherant, 3 layer wrap) Non-staged Wound Description  
(redness, irritation) Drainage Amount Scant Drainage Color Serous Wound Odor None Cleansing and Cleansing Agents  Soap and water Dressing Changed Changed/New Dressing Type Applied Gauze; Compression Wrap/Venous Stasis (A & D ointment to dry skin,Xeroform gauze to reddened area) Discharge Condition: Stable Pain: 0 Ambulatory Status: Keely Peña Discharge Destination: Home Transportation: Car Accompanied by: Other: friend Discharge instructions reviewed with Patient and copy or written instructions have been provided. All questions/concerns have been addressed at this time.

## 2019-11-17 NOTE — PROGRESS NOTES
WOUND CARE CENTER PROGRESS NOTE     HISTORY OF PRESENT ILLNESS:  The patient is a 17-year-old woman referred by Dr. Estelita Doll regarding ulceration, left lower leg.  The patient has history of ulceration of the lower leg many years ago, treated with Unna boots.  She saw Dr. Marsha Bruno about a month ago because of development of ulceration of the lower leg on the left and has been treated with Unna boots.       Her left leg ulcer had healed, but then returned. She has been using 8-15 mm  HG stockings.       The patient has been clinically diagnosed as having venous ulcers, but the patient did have on 07/17/2019, a venous duplex scan at [facility], which did not show any evidence of reflux in the superficial vein system or deep vein system in the left lower extremity.     The patient does have history at times of leg edema.  She does take Lasix 80 mg per day.  Increased to 160 mg per day,  Metolazone added in early November 2019.   She reports that does produce a good diuresis.  She, by her description, does not drink excessive amounts of fluid.        The patient has history of gout, hypothyroidism, osteoporosis, reflex sympathetic dystrophy of lower extremity, fatty liver infiltration with bridging fibrosis followed at [facility], hypercholesterolemia, bifascicular heart block, hypertension, thrombocytopenia, degenerative disk disease, diabetes mellitus.  The patient has never smoked. Abdulaziz Leslie does not use alcohol.     The patient does have prior history of left femur fracture at age 12 and left tibial fracture in 200.  Both were related to falls.     The patient reports to have generally good control of blood sugar.  She mostly follows a diabetic diet.  She is followed by endocrinologist. [facility] A1c has been around 6.     The patient denies history of MI, anginal chest pain, or coronary intervention.  She denies shortness of breath at rest or with exertion.  She uses a walker to assist in walking related to balance.  She is able to sleep lying down at night in bed.     Culture taken on 9/5/2019 grew MRSA.  The patient received oral doxycycline.      She had healing of the ulcer and was discharged from the [facility] on 9/26/2019, but ulcer and erythema recurred. Second 10 day course of Doxycycline ordered on 11/7/2019. Current dressing:  Xeroform and Unna boot.     PHYSICAL EXAMINATION:  GENERAL:  On examination, the patient is an alert woman, in no acute distress.  She is significantly obese.     Examination of right lower extremity reveals palpable dorsalis pedis pulse.  There are no active ulcerations of the right lower leg.  There is some hyperpigmentation of the skin of the right lower leg.  The patient had an elastic stocking in place on the right below-knee level.  There is trace pitting edema in right lower leg.     Left lower extremity has 1+ pitting edema. Wyonia Riis is a 2+ left dorsalis pedis pulse.  There are scattered very shallow ulcers on the anteromedial side. There is mild erythema there. There are no foot ulcers on the left.      I recommended the patient strictly follow a diabetic diet to assist in control of blood sugar.     I encouraged the patient to continue compliance with diuretics and to drink a moderate amount of fluids with meals and only small amounts between meals.     The patient by ultrasound testing does not have venous reflux and would not require any type of venous intervention.       Xeroform and Unna boot applied on the left.     Complete doxycycline.     Follow up in the [address] [place] [address] in 1 week. Bring stocking.     FINAL DIAGNOSES:  Nonpressure ulcers left lower leg limited to breakdown of the skin , cellulitis,  Leg edema, diabetes. .     L97.92  R60.0, L03. [address] [facility]  Elba Burns MD

## 2019-11-18 ENCOUNTER — OFFICE VISIT (OUTPATIENT)
Dept: ENDOCRINOLOGY | Age: 74
End: 2019-11-18

## 2019-11-18 VITALS
WEIGHT: 211.6 LBS | HEIGHT: 63 IN | HEART RATE: 66 BPM | BODY MASS INDEX: 37.49 KG/M2 | SYSTOLIC BLOOD PRESSURE: 138 MMHG | DIASTOLIC BLOOD PRESSURE: 78 MMHG

## 2019-11-18 DIAGNOSIS — E03.9 ACQUIRED HYPOTHYROIDISM: ICD-10-CM

## 2019-11-18 DIAGNOSIS — I10 HYPERTENSION, UNSPECIFIED TYPE: ICD-10-CM

## 2019-11-18 DIAGNOSIS — E11.9 CONTROLLED TYPE 2 DIABETES MELLITUS WITHOUT COMPLICATION, WITHOUT LONG-TERM CURRENT USE OF INSULIN (HCC): Primary | ICD-10-CM

## 2019-11-18 DIAGNOSIS — R60.0 LOCALIZED EDEMA: ICD-10-CM

## 2019-11-18 DIAGNOSIS — K76.0 FATTY LIVER: ICD-10-CM

## 2019-11-18 DIAGNOSIS — M81.0 OSTEOPOROSIS, UNSPECIFIED OSTEOPOROSIS TYPE, UNSPECIFIED PATHOLOGICAL FRACTURE PRESENCE: ICD-10-CM

## 2019-11-18 RX ORDER — METFORMIN HYDROCHLORIDE 500 MG/1
1000 TABLET, EXTENDED RELEASE ORAL
Qty: 180 TAB | Refills: 3 | Status: SHIPPED | OUTPATIENT
Start: 2019-11-18 | End: 2020-09-10 | Stop reason: SDUPTHER

## 2019-11-18 RX ORDER — AMLODIPINE BESYLATE 5 MG/1
5 TABLET ORAL DAILY
Qty: 90 TAB | Refills: 3 | Status: SHIPPED | OUTPATIENT
Start: 2019-11-18 | End: 2020-11-24 | Stop reason: DRUGHIGH

## 2019-11-18 RX ORDER — FLASH GLUCOSE SENSOR
KIT MISCELLANEOUS
COMMUNITY
Start: 2019-08-22 | End: 2020-10-28

## 2019-11-18 RX ORDER — KETOCONAZOLE 20 MG/G
CREAM TOPICAL
Refills: 3 | COMMUNITY
Start: 2019-08-23

## 2019-11-18 NOTE — PROGRESS NOTES
History of Present Illness: Son Martin is a 76 y.o. female presents for follow-up of diabetes. She also has dyslipidemia, obesity, fatty liver (with bridging fibrosis 2008 - followed [facility]), gout and arthritis     Reports having ulcers on her left anterior lower leg in 5/2019. Was diagnosed with staph infection she reports. Following with wound care. This was associated with edema. Diuretics were increased - metolazone added. I did not amlodipine dose is 10 mg, which can be associated with edema  No electrolytes checked since metolazone added. Diabetes - A1c 6.6 in 9/2019.      Glucoses - 30 day avg 172. Missing some data. Glucoses are stable overnight. Values rise after one meal/day. Yesterday she went to [employer]. Had meatloaf and then had blackberry cobbler. Glucoses marvin to 300s after that meal.     She is taking metformin  mg twice daily. she is tolerating this well. Feels she could take more  Activity - was quite inactive when she had problems with lower leg wound    Osteoporosis:  Diagnosed in 2002. Says she was started on Fosamax initially and then this was changed to [facility] [place] in 2007. She took this until 2011. She then had about a 2 year drug holiday. After BMD in 7/2013, alendronate was resumed until early 2016. BMD in 8/2014 showed BMD to be stable. Remained at very high fx risk. 8/2016 study showed worsening of BMD although values at left femoral neck and L-spine appears stable to improved vs 2012.   2018 study done showed BMD stable vs 2016. Alendronate 70 mg weekly since early 2019.       Calcium: dietary calcium  Vitamin D: taking 1000 units daily.      Hypothyroidism:  175 mcg daily. TSH has been normal    HTN  amlodipine 10 mg, takes furosemide for fluid, losartan,. Taking KCL 10 meq twice daily for low potassium  See above.     Social  She has a 2 yo cat who she dotes on.          Past Medical History:   Diagnosis Date    Arthritis     KNEE,gout    Endocrine disease     HYPOTHYROIDISM    Fracture     Left distal femur (age 12 - pt fell)    Fracture     Left tibia 1990 (pt fell)    Fracture     Right wrist fractured 2 times (pt fell)    GERD (gastroesophageal reflux disease)     Hypertension     Hypoglycemia     \"my body produces too much insulin\"    Liver disease     BRADY    Nausea & vomiting     when had gallbladder out    Osteoporosis     Other ill-defined conditions(799.89)     spinal stenosis    Other ill-defined conditions(799.89)     BBB    Other ill-defined conditions(799.89)     HIGH CHOLESTEROL    Other ill-defined conditions(799.89)     edema legs    Thyroid disease      Current Outpatient Medications   Medication Sig    FREESTYLE ELAINE 14 DAY SENSOR kit     ketoconazole (NIZORAL) 2 % topical cream     metOLazone (ZAROXOLYN) 5 mg tablet Take 1 Tab by mouth every Tuesday and Friday.  amLODIPine (NORVASC) 10 mg tablet Take 1 Tab by mouth daily.  levothyroxine (SYNTHROID) 175 mcg tablet TAKE ONE DAILY BY MOUTH. TAKE AN EXTRA 1/2 PILL ON SUNDAYS. (7.5 TABLETS/WEEK  MCG)    doxycycline (ADOXA) 100 mg tablet Take 1 Tab by mouth two (2) times a day.  raNITIdine (ZANTAC) 300 mg tab TAKE 1 TABLET BY MOUTH EVERY DAY.  cranberry extract 450 mg tab tablet Take 450 mg by mouth.  docusate sodium (COLACE) 50 mg capsule Take 100 mg by mouth daily as needed for Constipation.  furosemide (LASIX) 80 mg tablet Take 1 Tab by mouth daily.  colchicine (MITIGARE) 0.6 mg capsule Take 1 Cap by mouth daily. Indications: acute inflammation of the joints due to gout attack    loratadine (CLARITIN) 10 mg tablet Take 1 Tab by mouth daily. Indications: inflammation of the nose due to an allergy    alendronate (FOSAMAX) 70 mg tablet     NYAMYC powder     diclofenac (VOLTAREN) 1 % gel     metroNIDAZOLE (FLAGYL) 500 mg tablet metronidazole 500 mg tablet    potassium chloride SA (MICRO-K) 10 mEq capsule Take 2 Caps by mouth daily.     omega-3 acid ethyl esters (LOVAZA) 1 gram capsule Take 2 Caps by mouth two (2) times daily (with meals).  losartan (COZAAR) 25 mg tablet TAKE 1 TABLET BY MOUTH DAILY.  ZETIA 10 mg tablet Take 1 tablet by mouth daily.  atorvastatin (LIPITOR) 20 mg tablet TAKE 1 TABLET DAILY    metFORMIN ER (GLUCOPHAGE XR) 500 mg tablet Take 1 Tab by mouth two (2) times daily (after meals). For diabetes.  polyethylene glycol (MIRALAX) 17 gram/dose powder Take 17 g by mouth daily.  glucose blood VI test strips (FREESTYLE LITE STRIPS) strip Monitor blood sugar twice daily. Diagnosis code: E11.9    Biotin 2,500 mcg cap Take  by mouth.  fluticasone (FLONASE) 50 mcg/actuation nasal spray 2 Sprays by Both Nostrils route daily. Administer to right and left nostril.  indomethacin (INDOCIN) 25 mg capsule TAKE 1 CAPSULE BY MOUTH THREE TIMES A DAY AS NEEDED FOR PAIN.  FREESTYLE LANCETS 28 gauge misc     Lancets (ACCU-CHEK SOFTCLIX LANCETS) misc Check blood sugar one to two times daily    L GASSERI/B BIFIDUM/B LONGUM (SocialFlow PO) Take  by mouth.  vitamin E (AQUA GEMS) 400 unit capsule Take 800 Units by mouth daily.  lidocaine (LIDODERM) 5 %(700 mg/patch) 1 patch by TransDERmal route every twenty-four (24) hours. Apply patch to the affected area for 12 hours a day and remove for 12 hours a day.  cholecalciferol, vitamin D3, (VITAMIN D3) 2,000 unit tab Take  by mouth.  traMADol (ULTRAM) 50 mg tablet Take 1 tablet by mouth every six (6) hours as needed for Pain.  OTHER Accu-Chek Lady Plus Kit  Check blood sugar one to two times daily    glucose blood VI test strips (ACCU-CHEK LADY PLUS TEST STRP) strip Check blood sugar one to two times daily    MULTIVITAMIN WITH MINERALS (ONE-A-DAY 50 PLUS PO) Take  by mouth.  aspirin 81 mg chewable tablet Take 81 mg by mouth daily.  fluocinoNIDE (LIDEX) 0.05 % topical cream Apply  to affected area two (2) times a day.      No current facility-administered medications for this visit. Allergies   Allergen Reactions    Celebrex [Celecoxib] Hives    Pcn [Penicillins] Unknown (comments)     Can't remember    Sulfa (Sulfonamide Antibiotics) Hives       Review of Systems:  - Eyes: no blurry vision or double vision  - Cardiovascular: no chest pain  - Respiratory: no shortness of breath  - Musculoskeletal: no myalgias  - Neurological: no numbness/tingling in extremities    Physical Examination:  Visit Vitals  /78 (BP 1 Location: Right arm, BP Patient Position: Sitting)   Pulse 66   Ht 5' 3\" (1.6 m)   Wt 211 lb 9.6 oz (96 kg)   BMI 37.48 kg/m²   -   - General: pleasant, no distress, normal gait   HEENT: hearing intact, EOMI, clear sclera without icterus  - Cardiovascular: regular, normal rate   - Respiratory: normal effort  - Integumentary: - left lower  Extremity wrapped in bandage.   - Psychiatric: normal mood and affect    Data Reviewed:   Lab Results   Component Value Date/Time    Hemoglobin A1c 6.6 09/30/2019 09:41 AM      Lab Results   Component Value Date/Time    Sodium 143 09/30/2019 09:41 AM    Potassium 3.7 09/30/2019 09:41 AM    Creatinine 0.85 09/30/2019 09:41 AM    Microalb/Creat ratio (ug/mg creat.) 14.6 05/20/2019 02:58 PM        Lab Results   Component Value Date/Time    Cholesterol, total 154 05/20/2019 02:58 PM    HDL Cholesterol 58 05/20/2019 02:58 PM    LDL, calculated 39 05/20/2019 02:58 PM    Triglyceride 284 05/20/2019 02:58 PM      Lab Results   Component Value Date/Time    TSH 0.677 05/20/2019 02:58 PM    T4, Free 1.73 05/20/2019 02:58 PM        Assessment/Plan:   1. Controlled type 2 diabetes mellitus without complication, without long-term current use of insulin ([facility])   A1c has increased substantially  - increase metformin ER to 1000 mg twice daily  - encouraged dietary improvements and weight loss. - reviewed that glucoses are stable when not eating, but rise dramatically if she has a lot of of starch/sweets. 2. Hypertension, unspecified type   - lower amlodipine to 5 mg daily due to edema  - continue losartan  - may consider spironolactone. 3. Acquired hypothyroidism   - continue 175 mcg. Labs today. 4. Osteoporosis, unspecified osteoporosis type, unspecified pathological fracture presence   - continue alendronate. Recommend taking for 5 years. 5. Fatty liver   - weight loss. 6. Localized edema   - likely exacerbated by amlodipine 10 mg - dose lowered to 5 mg  - continue furosemide  - check electrolytes on metolazone. - consider spironolactone is potassium is low. Patient Instructions   Hypothyroidism:  Continue levothyroxine 175 mcg. Reassess    Diabetes. Work on dietary improvements and weight loss efforts. You are doing very well. Increase metformin ER to two tablets twice daily. Liver:  Continue efforts with exercise, weight loss, and avoiding added sugars. Osteoporosis:  Continue vitamin D today - 1000 units  Continue alendronate. High blood pressure and leg swelling  - recommend lowering amlodipine (Norvasc) to 5 mg daily.  Take this at nighttime  - also take losartan at bedtime

## 2019-11-18 NOTE — PATIENT INSTRUCTIONS
Hypothyroidism:  Continue levothyroxine 175 mcg. Reassess    Diabetes. Work on dietary improvements and weight loss efforts. You are doing very well. Increase metformin ER to two tablets twice daily. Liver:  Continue efforts with exercise, weight loss, and avoiding added sugars. Osteoporosis:  Continue vitamin D today - 1000 units  Continue alendronate. High blood pressure and leg swelling  - recommend lowering amlodipine (Norvasc) to 5 mg daily.  Take this at nighttime  - also take losartan at bedtime

## 2019-11-19 LAB
ALBUMIN SERPL-MCNC: 4.8 G/DL (ref 3.5–4.8)
ALBUMIN/GLOB SERPL: 1.9 {RATIO} (ref 1.2–2.2)
ALP SERPL-CCNC: 124 IU/L (ref 39–117)
ALT SERPL-CCNC: 47 IU/L (ref 0–32)
AST SERPL-CCNC: 49 IU/L (ref 0–40)
BILIRUB SERPL-MCNC: 0.5 MG/DL (ref 0–1.2)
BUN SERPL-MCNC: 26 MG/DL (ref 8–27)
BUN/CREAT SERPL: 35 (ref 12–28)
CALCIUM SERPL-MCNC: 10.1 MG/DL (ref 8.7–10.3)
CHLORIDE SERPL-SCNC: 95 MMOL/L (ref 96–106)
CO2 SERPL-SCNC: 27 MMOL/L (ref 20–29)
CREAT SERPL-MCNC: 0.74 MG/DL (ref 0.57–1)
GLOBULIN SER CALC-MCNC: 2.5 G/DL (ref 1.5–4.5)
GLUCOSE SERPL-MCNC: 126 MG/DL (ref 65–99)
MAGNESIUM SERPL-MCNC: 2.4 MG/DL (ref 1.6–2.3)
POTASSIUM SERPL-SCNC: 3.2 MMOL/L (ref 3.5–5.2)
PROT SERPL-MCNC: 7.3 G/DL (ref 6–8.5)
SODIUM SERPL-SCNC: 143 MMOL/L (ref 134–144)
SPECIMEN STATUS REPORT, ROLRST: NORMAL
TSH SERPL DL<=0.005 MIU/L-ACNC: 0.51 UIU/ML (ref 0.45–4.5)

## 2019-11-21 ENCOUNTER — HOSPITAL ENCOUNTER (OUTPATIENT)
Dept: WOUND CARE | Age: 74
Discharge: HOME | End: 2019-11-21
Payer: MEDICARE

## 2019-11-21 VITALS
SYSTOLIC BLOOD PRESSURE: 150 MMHG | TEMPERATURE: 96.5 F | HEART RATE: 82 BPM | RESPIRATION RATE: 18 BRPM | DIASTOLIC BLOOD PRESSURE: 67 MMHG

## 2019-11-21 PROCEDURE — 29581 APPL MULTLAYER CMPRN SYS LEG: CPT

## 2019-11-21 NOTE — WOUND CARE
11/21/19 9072 Wound Leg lower Left;Lateral  
Date First Assessed/Time First Assessed: 08/05/19 0853   Present on Hospital Admission: Yes  Wound Approximate Age at First Assessment (Weeks): 4 weeks  Primary Wound Type: Vascular  Location: Leg lower  Wound Location Orientation: Left;Lateral  
Dressing Status Removed Dressing Type Non adherent; Compression Wrap/Venous Stasis Non-staged Wound Description  
(redness and irritation) Drainage Amount Scant Drainage Color Serous Wound Odor None Cleansing and Cleansing Agents  Soap and water Visit Vitals /67 (BP 1 Location: Right arm) Pulse 82 Temp 96.5 °F (35.8 °C) Resp 18 LLE Peripheral Vascular Capillary Refill: Less than/equal to 3 seconds (11/21/19 0834) Color: Appropriate for race (11/21/19 5530) Temperature: Warm (11/21/19 0834) Sensation: Present (11/21/19 0224) Pedal Pulse: Palpable (11/21/19 0834) Circumference of Calf (cm): 31 cm (11/21/19 0834) Location of Measurement (Calf): Mid  (11/21/19 0834) Circumference of Ankle (cm): 19.5 cm (11/21/19 0834) Location of Measurement (Ankle): Upper  (11/21/19 0834)

## 2019-11-21 NOTE — WOUND CARE
11/21/19 1000 Wound Leg lower Left;Lateral  
Date First Assessed/Time First Assessed: 08/05/19 0853   Present on Hospital Admission: Yes  Wound Approximate Age at First Assessment (Weeks): 4 weeks  Primary Wound Type: Vascular  Location: Leg lower  Wound Location Orientation: Left;Lateral  
Dressing Type Applied  
(Xeroform, gauze, 3 layer wrap) Discharge Condition: Stable Pain: 0 Ambulatory Status: Walking/walker Discharge Destination: Home Transportation: Car Accompanied by: Self Discharge instructions reviewed with Patient and copy or written instructions have been provided. All questions/concerns have been addressed at this time.

## 2019-11-21 NOTE — PROGRESS NOTES
WOUND CARE CENTER PROGRESS NOTE     HISTORY OF PRESENT ILLNESS:  The patient is a 79-year-old woman referred by Dr. Isra Wagner regarding ulceration, left lower leg.  The patient has history of ulceration of the lower leg many years ago, treated with Unna boots.  She saw Dr. Archana Deleon about a month ago because of development of ulceration of the lower leg on the left and has been treated with Unna boots.       Her left leg ulcer had healed, but then returned. She has been using 8-15 mm  HG stockings.       The patient has been clinically diagnosed as having venous ulcers, but the patient did have on 07/17/2019, a venous duplex scan at [facility], which did not show any evidence of reflux in the superficial vein system or deep vein system in the left lower extremity.     The patient does have history at times of leg edema.  She does take Lasix 80 mg per day.  Increased to 160 mg per day,  Metolazone added in early November 2019.   She reports that does produce a good diuresis.  She, by her description, does not drink excessive amounts of fluid.        The patient has history of gout, hypothyroidism, osteoporosis, reflex sympathetic dystrophy of lower extremity, fatty liver infiltration with bridging fibrosis followed at [facility], hypercholesterolemia, bifascicular heart block, hypertension, thrombocytopenia, degenerative disk disease, diabetes mellitus.  The patient has never smoked. Marietta Matthews does not use alcohol.     The patient does have prior history of left femur fracture at age 12 and left tibial fracture in 200.  Both were related to falls.     The patient reports to have generally good control of blood sugar.  She mostly follows a diabetic diet.  She is followed by endocrinologist. Mayur Hanson A1c has been around 6.     The patient denies history of MI, anginal chest pain, or coronary intervention.  She denies shortness of breath at rest or with exertion.  She uses a walker to assist in walking related to balance.  She is able to sleep lying down at night in bed.     Culture taken on 9/5/2019 grew MRSA.  The patient received oral doxycycline.      She had healing of the ulcer and was discharged from the [facility] on 9/26/2019, but ulcer and erythema recurred.     Completed second course of Doxycycline ordered on 11/7/2019.     Current dressing:  Xeroform and Unna boot.     PHYSICAL EXAMINATION:  GENERAL:  On examination, the patient is an alert woman, in no acute distress.  She is significantly obese.     Examination of right lower extremity reveals palpable dorsalis pedis pulse.  There are no active ulcerations of the right lower leg.  There is some hyperpigmentation of the skin of the right lower leg.  The patient had an elastic stocking in place on the right below-knee level.  There is trace pitting edema in right lower leg.     Left lower extremity has trace pitting edema. Pratibha Haw is a 2+ left dorsalis pedis pulse.  There are scattered scabbed sites on the anteromedial side.  There is minimal erythema there.  There are no foot ulcers on the left.      I recommended the patient strictly follow a diabetic diet to assist in control of blood sugar.     I encouraged the patient to continue compliance with diuretics and to drink a moderate amount of fluids with meals and only small amounts between meals.     The patient by ultrasound testing does not have venous reflux and would not require any type of venous intervention.       Xeroform and triple layer compression dressing applied on the left.          Follow up in the [address] [place] [address] in 1 week for nurse visit; Follow up with me in 2 weeks. Bring stocking.     FINAL DIAGNOSES:  Nonpressure ulcers left lower leg limited to breakdown of the skin , cellulitis,  Leg edema, diabetes. .     L97.921  R60.0, L03. [address] [facility] [address] Teresa Lozoya MD

## 2019-11-21 NOTE — DISCHARGE INSTRUCTIONS
Discharge Instructions for  [place] [id number] [facility], [address]  Telephone: [phone]    NAME:  Rosalie Stevenson OF BIRTH:  1945  MEDICAL RECORD NUMBER:  [id number]  DATE:  11/21/2019    Wound Cleansing:   Do not scrub or use excessive force. Cleansed wound prior to applying a clean dressing with:  [x] Normal Saline [x] Keep Wound Dry in Shower    [] Wound Cleanser   [] Cleanse wound with Mild Soap & Water  [] May Shower at Discharge   [] Other:      Topical Treatments:  Do not apply lotions, creams, or ointments to wound bed unless directed. [x] Applied moisturizer/A&D ointment to skin surrounding the wound prior to dressing change.  [] Apply antifungal ointment to skin surrounding the wound prior to dressing change.  [] Apply thin film of moisture barrier ointment to skin immediately around wound. [] Other:       Dressings:           Wound Location - Left Lower Leg  [x] Applied Primary Dressing:       [] MediHoney Gel [] Alginate with Silver [] Alginate   [] Collagen [] Collagen with Silver   [] Santyl with Moisten saline gauze     [] Hydrocolloid   [] MediHoney Alginate [] Foam with Silver   [] Foam   [] Hydrofera Blue    [] Mepilex Border    [] Moisten with Saline [] Hydrogel [] Mepitel     [] Bactroban/Mupirocin [] Polysporin  [x] Other:  Xeroform gauze    [x] Covered with:     [x] Gauze [] Robin [] Kerlix   [] Ace Wrap [] Cover Roll Tape [] ABD     [] Other:    Avoid contact of tape with skin. [x] Change dressing: [] Daily    [] Every Other Day [] Three times per week   [] Once a week [x] Do Not Change Dressing   [] Other:      Compression:  Apply: [x] Multilayer Compression Wrap Applied in Clinic []RightLeg [x]Left Leg-3 layer compression wrap   [x] Multi-layer compression. Do not get leg(s) with wrap wet. If wraps become too tight call the center or completely remove the wrap.       [x] Elevate leg(s) above the level of the heart when sitting. [x] Avoid prolonged standing in one place. Dietary:  [] Diet as tolerated: [x] Diabetic Diet [x] No Added Salt:  [] Increase Protein: [] Other:   Activity:  [x] Activity as tolerated:  [] Patient has no activity restrictions     [] Strict Bedrest: [] Remain off Work:     [] May return to full duty work:                                   [] Return to work with restrictions:             Return Appointment:  [] Wound and dressing supply provider:   [] ECF or Home Healthcare:  [] Wound Assessment: [] Physician or NP scheduled for Wound Assessment:   [x] Return Appointment: Nurse visit for dressing change in 1 Week, Follow up w/Dr. Christiano Thorpe in 2 weeks. [] Ordered tests:      Electronically signed Sean Hernández RN on 11/21/2019 at 8:52 AM     Brandee Weems 281: Should you experience any significant changes in your wound(s) or have questions about your wound care, please contact the [facility] Main at [address] [place] [address] 8:00 am - 4:30. If you need help with your wound outside these hours and cannot wait until we are again available, contact your PCP or go to the hospital emergency room. PLEASE NOTE: IF YOU ARE UNABLE TO OBTAIN WOUND SUPPLIES, CONTINUE TO USE THE SUPPLIES YOU HAVE AVAILABLE UNTIL YOU ARE ABLE TO REACH US. IT IS MOST IMPORTANT TO KEEP THE WOUND COVERED AT ALL TIMES.

## 2019-11-25 ENCOUNTER — HOSPITAL ENCOUNTER (OUTPATIENT)
Dept: WOUND CARE | Age: 74
Discharge: HOME | End: 2019-11-25
Payer: MEDICARE

## 2019-11-25 VITALS — TEMPERATURE: 97.5 F | RESPIRATION RATE: 18 BRPM

## 2019-11-25 PROCEDURE — 29581 APPL MULTLAYER CMPRN SYS LEG: CPT

## 2019-11-25 NOTE — WOUND CARE
11/25/19 1314 Wound Leg lower Left;Lateral  
Date First Assessed/Time First Assessed: 08/05/19 0853   Present on Hospital Admission: Yes  Wound Approximate Age at First Assessment (Weeks): 4 weeks  Primary Wound Type: Vascular  Location: Leg lower  Wound Location Orientation: Left;Lateral  
Dressing Status Removed Dressing Type Xeroform;Gauze; Compression Wrap/Venous Stasis 
(Xeroform to inflamed areas.) Drainage Amount Scant Drainage Color Serous Wound Odor None Galina-wound Assessment Blanchable erythema Cleansing and Cleansing Agents  Soap and water Dressing Changed Changed/New Dressing Type Applied Xeroform;Gauze; Compression Wrap/Venous Stasis 
(Xeroform to inflamed areas.) Pt. Came in for nurse visit. Visit Vitals Temp 97.5 °F (36.4 °C) Resp 18 LLE Peripheral Vascular Capillary Refill: Less than/equal to 3 seconds (11/25/19 1313) Color: Appropriate for race (11/25/19 1313) Temperature: Warm (11/25/19 1313) Sensation: Present (11/25/19 1313) Pedal Pulse: Palpable (11/25/19 1313) 
   
 
 11/25/19 1314 Wound Leg lower Left;Lateral  
Date First Assessed/Time First Assessed: 08/05/19 0853   Present on Hospital Admission: Yes  Wound Approximate Age at First Assessment (Weeks): 4 weeks  Primary Wound Type: Vascular  Location: Leg lower  Wound Location Orientation: Left;Lateral  
Dressing Status Removed Dressing Type Xeroform;Gauze; Compression Wrap/Venous Stasis 
(Xeroform to inflamed areas.) Drainage Amount Scant Drainage Color Serous Wound Odor None Galina-wound Assessment Blanchable erythema Cleansing and Cleansing Agents  Soap and water Dressing Changed Changed/New Dressing Type Applied Xeroform;Gauze; Compression Wrap/Venous Stasis 
(Xeroform to inflamed areas.) Discharge Condition: Stable Pain: 0 Ambulatory Status: Collin Ingram Discharge Destination: Home Transportation: Car Accompanied by: friend Discharge instructions reviewed with Patient and copy or written instructions have been provided. All questions/concerns have been addressed at this time.

## 2019-12-05 ENCOUNTER — HOSPITAL ENCOUNTER (OUTPATIENT)
Dept: WOUND CARE | Age: 74
Discharge: HOME | End: 2019-12-05
Payer: MEDICARE

## 2019-12-05 VITALS
HEART RATE: 72 BPM | RESPIRATION RATE: 18 BRPM | SYSTOLIC BLOOD PRESSURE: 151 MMHG | TEMPERATURE: 96.2 F | DIASTOLIC BLOOD PRESSURE: 67 MMHG

## 2019-12-05 PROCEDURE — 99212 OFFICE O/P EST SF 10 MIN: CPT

## 2019-12-05 NOTE — WOUND CARE
Clinic Level of Care Assessment NAME:  Shirlene Virgen YOB: 1945 GENDER: female MEDICAL RECORD NUMBER:  [id number] DATE:  12/5/2019 Wound Count Document in Banner Number of Wounds Assessed Points No Wounds/Ulcers [x]   0 Less than Three Wounds/Ulcers []   1  
3-6 Wounds/Ulcers []   2 Greater than 6 Wounds/Ulcers []   3 Ambulation Status Document in Coord/MOISÉS/Mobility tab Status Definition Points Independent Independently able to ambulate. Fully able (without any assistance) to get on/off exam table/chair. [x]   0 Minimal Physical Assistance Requires physical assistance of one person to ambulate and/or position patient to be examined. Includes necessary physical assistance to position lower extremities on/off stool. []   1 Moderate Physical Assistance Requires at least one staff member to physically assist patient in ambulating into treatment room, and on/off exam table. []   2 Full Assistance Requires assistance of at least two staff members to transfer patient into treatment room and/or on/off exam table/chair. \"Total Transfer\". []   3 Dressing Complexity Document in Banner and Write Appropriate Order Complexity Definition Points No Dressing  [x]   0 Simple Minimal, simple dressing. i.e. Band-aid, gauze, simple wrap. []   1 Intermediate Moderately complicated requiring licensed personnel to apply i.e. collagen matrix, ointments, gels, alginates. []   2 Complex Complicated requiring licensed personnel to apply dressings 6 or more wounds. []   3 Teaching Effort Document in Education Tab Effort Definition Points No Teaching  []   0 Simple Reinforce two or less topics. Document in Education navigator.  []   1 Intermediate Reinforce three to five topics and/or one additional  
new topic. Document in Education navigator. [x]   2 Complex Teach more than one new topic. New patient information  
packet reviewed and/or reinforce more than three topics. Document in Education navigator. HBO initial instruction. []   3 Patient Assessment and Planning Planning Definition Points Simple Multiple System Simple: Simple follow-up with routine assessment and planning. If Discharged, instructions and long term/follow-up care given to patient/caregiver. Discharged, instructions and/or After Visit Summary given to patient/caregiver and instructions completed. [x]   1 Intermediate Multiple System Intermediate: Contact with outside resources; i.e. Telephone calls to home health, [facility]. May include filling out forms and writing letters, arranging transportation, communication with insurance , vendors, etc.  Discharged, instructions and/or After Visit Summary given to patient/caregiver and instructions completed. []   2 Complex Multiple System Complex: Full, comprehensive assessment and planning. Follow the entire navigator under Wound Visit charting filling out each tab which includes OP Adm Database Screening, Education and CarePlan  HBO risk assessment completed. Discharged, instructions and/or After Visit Summary given to patient/caregiver and instructions completed. []   3 Is this the Patient's First Visit to the [address] [place] [address] Road No 
 
 
Is this Patient Established @ [facility] 
Yes Clinical Level of Care Points  0-2  Level 1 [] Points  3-5  Level 2 [x] Points  6-9  Level 3 [] Points  10-12  Level 4 [] Points  13-15  Level 5 [] Electronically signed by Renetta Trevino RN on 12/5/2019 at 12:19 PM

## 2019-12-05 NOTE — PROGRESS NOTES
WOUND CARE CENTER PROGRESS NOTE     HISTORY OF PRESENT ILLNESS:  The patient is a 51-year-old woman referred by Dr. Dennie Colas regarding ulceration, left lower leg.  The patient has history of ulceration of the lower leg many years ago, treated with Unna boots.  She saw Dr. Paulo Gamino about a month ago because of development of ulceration of the lower leg on the left and has been treated with Unna boots.       Her left leg ulcer had healed, but then returned.  She has been using 8-15 mm  HG stockings.       The patient has been clinically diagnosed as having venous ulcers, but the patient did have on 07/17/2019, a venous duplex scan at [facility], which did not show any evidence of reflux in the superficial vein system or deep vein system in the left lower extremity.     The patient does have history at times of leg edema.  She does take Lasix 80 mg per day.  Increased to 160 mg per day,  Metolazone added in early November 2019.  She reports that does produce a good diuresis.  She, by her description, does not drink excessive amounts of fluid.        The patient has history of gout, hypothyroidism, osteoporosis, reflex sympathetic dystrophy of lower extremity, fatty liver infiltration with bridging fibrosis followed at [facility], hypercholesterolemia, bifascicular heart block, hypertension, thrombocytopenia, degenerative disk disease, diabetes mellitus.  The patient has never smoked. Taty Acuña does not use alcohol.     The patient does have prior history of left femur fracture at age 12 and left tibial fracture in 200.  Both were related to falls.     The patient reports to have generally good control of blood sugar.  She mostly follows a diabetic diet.  She is followed by endocrinologist. [facility] A1c has been around 6.     The patient denies history of MI, anginal chest pain, or coronary intervention.  She denies shortness of breath at rest or with exertion.  She uses a walker to assist in walking related to balance.  She is able to sleep lying down at night in bed.     Culture taken on 9/5/2019 grew MRSA.  The patient received oral doxycycline.      She had healing of the ulcer and was discharged from the [facility] on 9/26/2019, but ulcer and erythema recurred.     Completed second course of Doxycycline ordered on 11/7/2019.     Current dressing:  Xeroform and Unna boot.     PHYSICAL EXAMINATION:  GENERAL:  On examination, the patient is an alert woman, in no acute distress.  She is significantly obese.     Examination of right lower extremity reveals palpable dorsalis pedis pulse.  There are no active ulcerations of the right lower leg.  There is some hyperpigmentation of the skin of the right lower leg.  The patient had an elastic stocking in place on the right below-knee level.  There is trace pitting edema in right lower leg.     Left lower extremity has trace pitting edema. Lyndal Pries is a 2+ left dorsalis pedis pulse.  There are no active ulcers. I recommended the patient strictly follow a diabetic diet to assist in control of blood sugar.     I encouraged the patient to continue compliance with diuretics and to drink a moderate amount of fluids with meals and only small amounts between meals.     The patient by ultrasound testing does not have venous reflux and would not require any type of venous intervention.     Use 20-30 mm Hg compression stockings on th eleft leg when out of bed. Rx written.          FINAL DIAGNOSES:  Nonpressure ulcers left lower leg, now healed.    Leg edema, diabetes. .     L97.921  R60.0, L03. 2900 [facility]  Rickie Rothman MD

## 2019-12-05 NOTE — WOUND CARE
12/05/19 1118 Wound Leg lower Left;Lateral  
Date First Assessed/Time First Assessed: 08/05/19 0853   Present on Hospital Admission: Yes  Wound Approximate Age at First Assessment (Weeks): 4 weeks  Primary Wound Type: Vascular  Location: Leg lower  Wound Location Orientation: Left;Lateral  
Dressing Status Removed Dressing Type  
(xeroform, gauze 3 layer wrap) Non-staged Wound Description (no open areas) Drainage Amount None Wound Odor None Cleansing and Cleansing Agents  Soap and water Visit Vitals /67 (BP 1 Location: Right arm) Pulse 72 Temp 96.2 °F (35.7 °C) Resp 18

## 2019-12-05 NOTE — DISCHARGE INSTRUCTIONS
Discharge Instructions for  [place] [id number] [facility], [address] [facility]  Telephone: [phone]    NAME:  Lurdes Cramer OF BIRTH:  1945  MEDICAL RECORD NUMBER:  [id number]  DATE:  12/5/2019    Wound Cleansing:   Do not scrub or use excessive force. Cleanse wound prior to applying a clean dressing with:  [] Normal Saline [] Keep Wound Dry in Shower    [] Wound Cleanser   [x] Cleanse left lower leg with Mild Soap & Water  [x] May Shower at Discharge   [] Other:      Topical Treatments:  Do not apply lotions, creams, or ointments to wound bed unless directed. [] Apply moisturizing lotion to skin surrounding the wound prior to dressing change.  [] Apply antifungal ointment to skin surrounding the wound prior to dressing change.  [] Apply thin film of moisture barrier ointment to skin immediately around wound. [x] Other:  Moisturize dry, intact, lower leg skin as needed         Edema Control:  Apply: [x] Compression Stocking [x]Right Leg [x]Left Leg         []Low compression 5-10 mm/Hg      []Medium compression 10-20 mm/Hg     [x]High compression  20-30 mm/Hg-Written order, signed by Dr. Leda Padilla, provided   Apply compression stockings every morning immediately after getting out of bed. Remove every night before going to bed. [x] Elevate leg(s) above the level of the heart when sitting. [x] Avoid prolonged standing in one place. Dietary:  [] Diet as tolerated: [x] Calorie Diabetic Diet-Low Carbohydrates   [x] No Added Salt  [] Increase Protein: [x] Other:Avoid drinking excess fluids, and take diuretics as directed.   Activity:  [x] Activity as tolerated:  [] Patient has no activity restrictions     [] Strict Bedrest: [] Remain off Work:     [] May return to full duty work:                                   [] Return to work with restrictions:             Return Appointment:  [] Wound and dressing supply provider:   [] ECF or Home Healthcare:  [] Wound Assessment: [] Physician or NP scheduled for Wound Assessment:   [] Return Appointment: No follow up appointment needed. Congratulations! [] Ordered tests:      Electronically signed Brittaney Muro RN on 12/5/2019 at [address] [place] [address]: Should you experience any significant changes in your wound(s) or have questions about your wound care, please contact the [facility] Main at [address] [place] [address] 8:00 am - 4:30. If you need help with your wound outside these hours and cannot wait until we are again available, contact your PCP or go to the hospital emergency room. PLEASE NOTE: IF YOU ARE UNABLE TO OBTAIN WOUND SUPPLIES, CONTINUE TO USE THE SUPPLIES YOU HAVE AVAILABLE UNTIL YOU ARE ABLE TO REACH US. IT IS MOST IMPORTANT TO KEEP THE WOUND COVERED AT ALL TIMES.

## 2019-12-05 NOTE — WOUND CARE
12/05/19 1140 [REMOVED] Wound Leg lower Left;Lateral  
Final Assessment Date/Final Assessment Time: 12/05/19 1140  Date First Assessed/Time First Assessed: 08/05/19 0853   Present on Hospital Admission: Yes  Wound Approximate Age at First Assessment (Weeks): 4 weeks  Primary Wound Type: Vascular  Location. .. Wound Length (cm) 0 cm Wound Width (cm) 0 cm Wound Depth (cm) 0 cm Wound Surface Area (cm^2) 0 cm^2 Wound Volume (cm^3) 0 cm^3 Drainage Amount Not assessed Wound Odor None Cleansing and Cleansing Agents  Soap and water Dressing Type Applied Other (Comment) (No dressing. A&D oint. to dry, LLE skin, compression stocking.) Discharge Condition: Stable Pain: 0 Ambulatory Status: Anne Morales Discharge Destination: Home Transportation: Car Accompanied by: Self Patient's wound has healed. No follow up appointment needed. Discharge instructions reviewed with Patient and copy or written instructions have been provided. All questions/concerns have been addressed at this time.

## 2019-12-09 DIAGNOSIS — K59.00 CONSTIPATION, UNSPECIFIED CONSTIPATION TYPE: ICD-10-CM

## 2019-12-09 RX ORDER — POLYETHYLENE GLYCOL 3350 17 G/17G
17 POWDER, FOR SOLUTION ORAL DAILY
Qty: 2108 G | Refills: 3 | Status: SHIPPED | OUTPATIENT
Start: 2019-12-09 | End: 2020-04-06 | Stop reason: SDUPTHER

## 2019-12-12 ENCOUNTER — HOSPITAL ENCOUNTER (OUTPATIENT)
Dept: WOUND CARE | Age: 74
Discharge: HOME | End: 2019-12-12
Payer: MEDICARE

## 2019-12-12 VITALS
RESPIRATION RATE: 18 BRPM | DIASTOLIC BLOOD PRESSURE: 71 MMHG | TEMPERATURE: 96.3 F | SYSTOLIC BLOOD PRESSURE: 170 MMHG | HEART RATE: 77 BPM

## 2019-12-12 PROCEDURE — 29580 STRAPPING UNNA BOOT: CPT

## 2019-12-12 NOTE — PROGRESS NOTES
WOUND CARE CENTER PROGRESS NOTE     HISTORY OF PRESENT ILLNESS:  The patient is a 43-year-old woman referred by Dr. Sara Norrisable regarding ulceration, left lower leg.  The patient has history of ulceration of the lower leg many years ago, treated with Unna boots.  She saw Dr. Alejandra Arreola about a month ago because of development of ulceration of the lower leg on the left and has been treated with Unna boots.       Her left leg ulcer had healed, but then returned. She has been using 20-30 mm  HG stockings.      She had left leg healing doocumented on 12/5/2019.   She has had return of ulcers with skin rash.     The patient has been clinically diagnosed as having venous ulcers, but the patient did have on 07/17/2019, a venous duplex scan at [facility], which did not show any evidence of reflux in the superficial vein system or deep vein system in the left lower extremity.     The patient does have history at times of leg edema.  She does take Lasix 80 mg per day.  Increased to 160 mg per day,  Metolazone added in early November 2019.  She reports that does produce a good diuresis.  She, by her description, does not drink excessive amounts of fluid.        The patient has history of gout, hypothyroidism, osteoporosis, reflex sympathetic dystrophy of lower extremity, fatty liver infiltration with bridging fibrosis followed at [facility], hypercholesterolemia, bifascicular heart block, hypertension, thrombocytopenia, degenerative disk disease, diabetes mellitus.  The patient has never smoked. Taylor Howard does not use alcohol.     The patient does have prior history of left femur fracture at age 12 and left tibial fracture in 200.  Both were related to falls.     The patient reports to have generally good control of blood sugar.  She mostly follows a diabetic diet.  She is followed by endocrinologist. Idalmis Mcrae A1c has been around 6.     The patient denies history of MI, anginal chest pain, or coronary intervention.  She denies shortness of breath at rest or with exertion.  She uses a walker to assist in walking related to balance.  She is able to sleep lying down at night in bed.     Culture taken on 9/5/2019 grew MRSA.  The patient received oral doxycycline.      She had healing of the ulcer and was discharged from the [facility] on 9/26/2019, but ulcer and erythema recurred.     Completed second course of Doxycycline ordered on 11/7/2019.     Current dressing:  none; using stockings.     PHYSICAL EXAMINATION:  GENERAL:  On examination, the patient is an alert woman, in no acute distress.  She is significantly obese.     Examination of right lower extremity reveals  an elastic stocking in place on the right below-knee level.  There is trace pitting edema in right lower leg.     Left lower extremity has 1+ pitting edema. Velasquez Dines is a 2+ left dorsalis pedis pulse.  There are multiple tiny ulcers with associated skin rash. Unna boot placed on the left. I am concerned about compliance with stockings.     I recommended the patient strictly follow a diabetic diet to assist in control of blood sugar.     I encouraged the patient to continue compliance with diuretics and to drink a moderate amount of fluids with meals and only small amounts between meals.     The patient by ultrasound testing does not have venous reflux and would not require any type of venous intervention. Follow up in one week.                FINAL DIAGNOSES:  Nonpressure ulcers left lower leg,  leg edema, diabetes. .     L97.921  R60.0, L03. 2900 [address] Amadeo Rosado MD

## 2019-12-12 NOTE — WOUND CARE
12/12/19 1121 Wound Leg Lateral;Lower #1 Cluster of 3 12/12/19 Date First Assessed/Time First Assessed: 12/12/19 1119   Present on Hospital Admission: No  Wound Approximate Age at First Assessment (Weeks): 1 weeks  Primary Wound Type: Blister/bullae  Location: Leg  Wound Location Orientation: Lateral;Lower  Wound. .. Dressing Status Removed Dressing Type Gauze;Gauze wrap (alejandra); Special tape (comment) Wound Length (cm) 5.5 cm Wound Width (cm) 1.3 cm Wound Depth (cm) 0.1 cm Wound Surface Area (cm^2) 7.15 cm^2 Wound Volume (cm^3) 0.72 cm^3 Condition of Base Pink Condition of Edges Open Drainage Amount Small Drainage Color Serosanguinous Wound Odor None Galina-wound Assessment Blanchable erythema 
(Areas of dry scaly skin) Cleansing and Cleansing Agents  Normal saline Visit Vitals /71 (BP 1 Location: Right arm, BP Patient Position: Sitting; At rest) Pulse 77 Temp 96.3 °F (35.7 °C) Resp 18 LLE Peripheral Vascular Capillary Refill: Less than/equal to 3 seconds (12/12/19 1118) Color: Appropriate for race (12/12/19 1118) Temperature: Warm (12/12/19 1118) Sensation: Present (12/12/19 1118) Pedal Pulse: Palpable (12/12/19 1118) Circumference of Calf (cm): 34.5 cm (12/12/19 1118) Location of Measurement (Calf): Mid  (12/12/19 1118) Circumference of Ankle (cm): 21.5 cm (12/12/19 1118) Location of Measurement (Ankle): Upper  (12/12/19 1118)

## 2019-12-12 NOTE — DISCHARGE INSTRUCTIONS
Discharge Instructions for  [place] [id number] [facility], [address] [facility]  Telephone: [phone]    NAME:  Mike Durham OF BIRTH:  1945  MEDICAL RECORD NUMBER:  [id number]  DATE:  12/12/2019    Wound Cleansing:   Do not scrub or use excessive force. Cleanse wound prior to applying a clean dressing with:  [x] Normal Saline [] Keep Wound Dry in Shower    [] Wound Cleanser   [] Cleanse wound with Mild Soap & Water  [] May Shower at Discharge   [] Other:      Topical Treatments:  Do not apply lotions, creams, or ointments to wound bed unless directed. [] Apply moisturizing lotion to skin surrounding the wound prior to dressing change.  [] Apply antifungal ointment to skin surrounding the wound prior to dressing change.  [] Apply thin film of moisture barrier ointment to skin immediately around wound. [] Other:         Compression:  Apply: [x] Multilayer Compression Wrap Applied in Clinic []RightLeg [x]Left Leg Unna boot   [] Multi-layer compression. Do not get leg(s) with wrap wet. If wraps become too tight call the center or completely remove the wrap. [x] Elevate leg(s) above the level of the heart when sitting. [x] Avoid prolonged standing in one place.       Dietary:  [x] Diet as tolerated: [] Calorie Diabetic Diet: [] No Added Salt:  [x] Increase Protein: [] Other:   Activity:  [x] Activity as tolerated:  [] Patient has no activity restrictions     [] Strict Bedrest: [] Remain off Work:     [] May return to full duty work:                                   [] Return to work with restrictions:             Return Appointment:  [] Wound and dressing supply provider:   [] ECF or Home Healthcare:  [] Wound Assessment: [] Physician or NP scheduled for Wound Assessment:   [x] Return Appointment: With Dr. Paris Steward in  1  [facility])  [] Ordered tests:      Electronically signed Camille Vazquez RN on 12/12/2019 at 11:37 AM     Wound Care Center Information: Should you experience any significant changes in your wound(s) or have questions about your wound care, please contact the [facility] Main at [address] [place] [address] 8:00 am - 4:30. If you need help with your wound outside these hours and cannot wait until we are again available, contact your PCP or go to the hospital emergency room. PLEASE NOTE: IF YOU ARE UNABLE TO OBTAIN WOUND SUPPLIES, CONTINUE TO USE THE SUPPLIES YOU HAVE AVAILABLE UNTIL YOU ARE ABLE TO REACH US. IT IS MOST IMPORTANT TO KEEP THE WOUND COVERED AT ALL TIMES.      Physician Signature:_______________________    Date: ___________ Time:  ____________

## 2019-12-12 NOTE — WOUND CARE
12/12/19 1121 Wound Leg Lateral;Lower; Left #1 Cluster of 3 12/12/19 Date First Assessed/Time First Assessed: 12/12/19 1119   Present on Hospital Admission: No  Wound Approximate Age at First Assessment (Weeks): 1 weeks  Primary Wound Type: Blister/bullae  Location: Leg  Wound Location Orientation: Lateral;Lower; Left  . .. Dressing Status Removed Dressing Type Gauze;Gauze wrap (alejandra); Special tape (comment) Wound Length (cm) 5.5 cm Wound Width (cm) 1.3 cm Wound Depth (cm) 0.1 cm Wound Surface Area (cm^2) 7.15 cm^2 Wound Volume (cm^3) 0.72 cm^3 Condition of Base Pink Condition of Edges Open Drainage Amount Small Drainage Color Serosanguinous Wound Odor None Galina-wound Assessment Blanchable erythema 
(Areas of dry scaly skin) Cleansing and Cleansing Agents  Normal saline Dressing Changed Changed/New Dressing Type Applied Unna boot Discharge Condition: Stable Pain: 0 Ambulatory Status: Yoel Gaming Discharge Destination: Home Transportation: Car Accompanied by: Abner Chinchilla Discharge instructions reviewed with Patient and copy or written instructions have been provided. All questions/concerns have been addressed at this time.

## 2019-12-19 ENCOUNTER — HOSPITAL ENCOUNTER (OUTPATIENT)
Dept: WOUND CARE | Age: 74
Discharge: HOME | End: 2019-12-19
Payer: MEDICARE

## 2019-12-19 VITALS
DIASTOLIC BLOOD PRESSURE: 70 MMHG | SYSTOLIC BLOOD PRESSURE: 160 MMHG | HEART RATE: 77 BPM | RESPIRATION RATE: 18 BRPM | TEMPERATURE: 97.1 F

## 2019-12-19 PROCEDURE — 29580 STRAPPING UNNA BOOT: CPT

## 2019-12-19 NOTE — DISCHARGE INSTRUCTIONS
Discharge Instructions for  [place] [id number] [facility], [address] [facility]  Telephone: [phone]    NAME:  Jackie Fierro OF BIRTH:  1945  MEDICAL RECORD NUMBER:  [id number]  DATE:  12/19/2019    Wound Cleansing:   Do not scrub or use excessive force. Cleanse wound prior to applying a clean dressing with:  [x] Normal Saline [] Keep Wound Dry in Shower    [] Wound Cleanser   [] Cleanse wound with Mild Soap & Water  [] May Shower at Discharge   [] Other:      Topical Treatments:  Do not apply lotions, creams, or ointments to wound bed unless directed. [] Apply moisturizing lotion to skin surrounding the wound prior to dressing change.  [] Apply antifungal ointment to skin surrounding the wound prior to dressing change.  [] Apply thin film of moisture barrier ointment to skin immediately around wound. [] Other:       Dressings:           Wound Location Left lower leg     Compression:  Apply: [x] Multilayer Compression Wrap Applied in Clinic []RightLeg [x]Left Leg Unna boot   [] Multi-layer compression. Do not get leg(s) with wrap wet. If wraps become too tight call the center or completely remove the wrap. [x] Elevate leg(s) above the level of the heart when sitting. [x] Avoid prolonged standing in one place.       Dietary:  [x] Diet as tolerated: [] Calorie Diabetic Diet: [x] No Added Salt:  [x] Increase Protein: [] Other:   Activity:  [x] Activity as tolerated:  [] Patient has no activity restrictions     [] Strict Bedrest: [] Remain off Work:     [] May return to full duty work:                                   [] Return to work with restrictions:             Return Appointment:  [] Wound and dressing supply provider:   [] ECF or Home Healthcare:  [] Wound Assessment: [] Physician or NP scheduled for Wound Assessment:   [x] Return Appointment: With Dr. Sean Padilla  in  [address] [place] [address]), Nurse visit 1 week  [] Ordered tests:      Electronically signed Chris Vee RN on 12/19/2019 at [place] [address] Information: Should you experience any significant changes in your wound(s) or have questions about your wound care, please contact the [facility] Main at [address] [place] [address] 8:00 am - 4:30. If you need help with your wound outside these hours and cannot wait until we are again available, contact your PCP or go to the hospital emergency room. PLEASE NOTE: IF YOU ARE UNABLE TO OBTAIN WOUND SUPPLIES, CONTINUE TO USE THE SUPPLIES YOU HAVE AVAILABLE UNTIL YOU ARE ABLE TO REACH US. IT IS MOST IMPORTANT TO KEEP THE WOUND COVERED AT ALL TIMES.      Physician Signature:_______________________    Date: ___________ Time:  ____________

## 2019-12-19 NOTE — PROGRESS NOTES
WOUND CARE CENTER PROGRESS NOTE     HISTORY OF PRESENT ILLNESS:  The patient is a 49-year-old woman referred by Dr. Chase Rothman regarding ulceration, left lower leg.  The patient has history of ulceration of the lower leg many years ago, treated with Unna boots.  She saw Dr. Tayla Bailey about a month ago because of development of ulceration of the lower leg on the left and has been treated with Unna boots.       Her left leg ulcer had healed, but then returned. She has been using 20-30 mm  HG stockings.       She had left leg healing doocumented on 12/5/2019.   She has had return of ulcers with skin rash.     The patient has been clinically diagnosed as having venous ulcers, but the patient did have on 07/17/2019, a venous duplex scan at [facility], which did not show any evidence of reflux in the superficial vein system or deep vein system in the left lower extremity.     The patient does have history at times of leg edema.  She does take Lasix 80 mg per day.  Increased to 160 mg per day,  Metolazone added in early November 2019.  She reports that does produce a good diuresis.  She, by her description, does not drink excessive amounts of fluid.        The patient has history of gout, hypothyroidism, osteoporosis, reflex sympathetic dystrophy of lower extremity, fatty liver infiltration with bridging fibrosis followed at [facility], hypercholesterolemia, bifascicular heart block, hypertension, thrombocytopenia, degenerative disk disease, diabetes mellitus.  The patient has never smoked. Debra Slade does not use alcohol.     The patient does have prior history of left femur fracture at age 12 and left tibial fracture in 200.  Both were related to falls.     The patient reports to have generally good control of blood sugar.  She mostly follows a diabetic diet.  She is followed by endocrinologist. [facility] [place] A1c has been around 6.     The patient denies history of MI, anginal chest pain, or coronary intervention.  She denies shortness of breath at rest or with exertion.  She uses a walker to assist in walking related to balance.  She is able to sleep lying down at night in bed.     Culture taken on 9/5/2019 grew MRSA.  The patient received oral doxycycline.      She had healing of the ulcer and was discharged from the [address] [place] [address] on 9/26/2019, but ulcer and erythema recurred.     Completed second course of Doxycycline ordered on 11/7/2019.     Current dressing:  Unna boot left leg     PHYSICAL EXAMINATION:  GENERAL:  On examination, the patient is an alert woman, in no acute distress.  She is significantly obese.     Examination of right lower extremity reveals  an elastic stocking in place on the right below-knee level.  There is trace pitting edema in right lower leg.     Left lower extremity has trace pitting edema. Renetta Hives is a 2+ left dorsalis pedis pulse.  There are minimal tiny ulcers with erythema, warmth locally     Unna boot placed on the left.     I recommended the patient strictly follow a diabetic diet to assist in control of blood sugar.     I encouraged the patient to continue compliance with diuretics and to drink a moderate amount of fluids with meals and only small amounts between meals. Rx for Doxycycline 100 mg po bid for 10 days.     The patient by ultrasound testing does not have venous reflux and would not require any type of venous intervention.     Follow up in one week for nurse visit. Follow up in 2 weeks to see me.                 FINAL DIAGNOSES:  Nonpressure ulcers left lower leg,  leg edema, diabetes. .     L97.921  R60.0, L03. [address] [facility] Marylee Prows, MD 2 Hour(s) 39 Minute(s)

## 2019-12-19 NOTE — WOUND CARE
12/19/19 1222 Wound Leg Lateral;Lower; Left #1 Cluster of 3 12/12/19 Date First Assessed/Time First Assessed: 12/12/19 1119   Present on Hospital Admission: No  Wound Approximate Age at First Assessment (Weeks): 1 weeks  Primary Wound Type: Blister/bullae  Location: Leg  Wound Location Orientation: Lateral;Lower; Left  . .. Dressing Type Applied  
(UNNA boot) Discharge Condition: Stable Pain: 0 Ambulatory Status: Nikita Vasquez Discharge Destination: Home Transportation: Car Accompanied by: Other: friend Discharge instructions reviewed with Patient and copy or written instructions have been provided. All questions/concerns have been addressed at this time. Has appointment for nurse visit in 1 week, MD apt in 2 weeks.

## 2019-12-19 NOTE — WOUND CARE
12/19/19 1124 Wound Leg Lateral;Lower; Left #1 Cluster of 3 12/12/19 Date First Assessed/Time First Assessed: 12/12/19 1119   Present on Hospital Admission: No  Wound Approximate Age at First Assessment (Weeks): 1 weeks  Primary Wound Type: Blister/bullae  Location: Leg  Wound Location Orientation: Lateral;Lower; Left  . .. Dressing Status Removed Dressing Type Unna boot Wound Length (cm) 6 cm (Epithelial) Wound Width (cm) 6 cm (Epithelial) Wound Surface Area (cm^2) 36 cm^2 Condition of Base Pink Condition of Edges (Epithelial) Drainage Amount None Wound Odor None Galina-wound Assessment Blanchable erythema Cleansing and Cleansing Agents  Normal saline Visit Vitals /70 (BP 1 Location: Left arm, BP Patient Position: Sitting; At rest) Pulse 77 Temp 97.1 °F (36.2 °C) Resp 18 LLE Peripheral Vascular Capillary Refill: Less than/equal to 3 seconds (12/19/19 1122) Color: Appropriate for race (12/19/19 1122) Temperature: Warm (12/19/19 1122) Sensation: Present (12/19/19 1122) Pedal Pulse: Palpable (12/19/19 1122) Circumference of Calf (cm): 32.5 cm (12/19/19 1122) Location of Measurement (Calf): Mid  (12/19/19 1122) Circumference of Ankle (cm): 21 cm (12/19/19 1122) Location of Measurement (Ankle): Upper  (12/19/19 1122)

## 2019-12-26 ENCOUNTER — HOSPITAL ENCOUNTER (OUTPATIENT)
Dept: WOUND CARE | Age: 74
Discharge: HOME | End: 2019-12-26
Payer: MEDICARE

## 2019-12-26 VITALS
SYSTOLIC BLOOD PRESSURE: 140 MMHG | TEMPERATURE: 96.1 F | RESPIRATION RATE: 18 BRPM | DIASTOLIC BLOOD PRESSURE: 63 MMHG | HEART RATE: 73 BPM

## 2019-12-26 PROCEDURE — 29580 STRAPPING UNNA BOOT: CPT

## 2019-12-26 NOTE — WOUND CARE
12/26/19 1125 Wound Leg Lateral;Lower; Left #1 Cluster of 3 12/12/19 Date First Assessed/Time First Assessed: 12/12/19 1119   Present on Hospital Admission: No  Wound Approximate Age at First Assessment (Weeks): 1 weeks  Primary Wound Type: Blister/bullae  Location: Leg  Wound Location Orientation: Lateral;Lower; Left  . .. Dressing Status Removed Dressing Type Unna boot Condition of Base Pink Condition of Edges Open Drainage Amount Small Drainage Color Serous Wound Odor None Galina-wound Assessment Blanchable erythema Cleansing and Cleansing Agents  Normal saline Dressing Changed Changed/New Dressing Type Applied Unna boot Pt. Came in for Nurse Visit. Discharge Condition: Stable Pain: 0 Ambulatory Status: Lester Mesa Discharge Destination: Home Transportation: Car Accompanied by: Family/Caregiver Discharge instructions reviewed with Patient and copy or written instructions have been provided. All questions/concerns have been addressed at this time.

## 2020-01-02 ENCOUNTER — HOSPITAL ENCOUNTER (OUTPATIENT)
Dept: WOUND CARE | Age: 75
Discharge: HOME | End: 2020-01-02
Payer: COMMERCIAL

## 2020-01-02 VITALS
DIASTOLIC BLOOD PRESSURE: 64 MMHG | RESPIRATION RATE: 18 BRPM | HEART RATE: 75 BPM | SYSTOLIC BLOOD PRESSURE: 147 MMHG | TEMPERATURE: 96.9 F

## 2020-01-02 PROCEDURE — 99212 OFFICE O/P EST SF 10 MIN: CPT

## 2020-01-02 NOTE — WOUND CARE
01/02/20 1113   Wound Leg Lateral;Lower; Left #1 Cluster of 3 12/12/19   Date First Assessed/Time First Assessed: 12/12/19 1119   Present on Hospital Admission: No  Wound Approximate Age at First Assessment (Weeks): 1 weeks  Primary Wound Type: Blister/bullae  Location: Leg  Wound Location Orientation: Lateral;Lower; Left  . .. Dressing Status Removed   Dressing Type Xeroform; Unna boot   Wound Length (cm) 0 cm   Wound Width (cm) 0 cm   Wound Depth (cm) 0 cm   Wound Surface Area (cm^2) 0 cm^2   Wound Volume (cm^3) 0 cm^3   Drainage Amount None   Wound Odor None   Galina-wound Assessment Blanchable erythema   Cleansing and Cleansing Agents  Normal saline

## 2020-01-02 NOTE — DISCHARGE INSTRUCTIONS
Discharge Instructions for  [place] [id number] [facility], [address] [facility]  Telephone: [phone]    NAME:  Zhang Lawson OF BIRTH:  1945  MEDICAL RECORD NUMBER:  [id number]  DATE:  1/2/2020    Wound Cleansing:   Do not scrub or use excessive force. Cleanse wound prior to applying a clean dressing with:  [] Normal Saline [] Keep Wound Dry in Shower    [] Wound Cleanser   [x] Cleanse wound with Mild Soap & Water  [] May Shower at Discharge   [] Other:      Topical Treatments:  Do not apply lotions, creams, or ointments to wound bed unless directed. [] Apply moisturizing lotion to skin surrounding the wound prior to dressing change.  [] Apply antifungal ointment to skin surrounding the wound prior to dressing change.  [] Apply thin film of moisture barrier ointment to skin immediately around wound. [] Other:         Edema Control:  Apply: [x] Compression Stocking [x]Right Leg [x]Left Leg   [] Tubigrip []Right Leg Double Layer []Left Leg Double Layer       []Right Leg Single Layer []Left Leg Single Layer   [] SpandaGrip []Right Leg  []Left Leg      []Low compression 5-10 mm/Hg      []Medium compression 10-20 mm/Hg     []High compression  20-30 mm/Hg   every morning immediately when getting up should be applied to affected leg(s) from mid foot to knee making sure to cover the heel. Remove every night before going to bed. [x] Elevate leg(s) above the level of the heart when sitting. [x] Avoid prolonged standing in one place.    [] Elevate arm/hand above the level of the heart []RightArm []LeftArm     Dietary:  [x] Diet as tolerated: [] Calorie Diabetic Diet: [x] No Added Salt:  [] Increase Protein: [] Other:   Activity:  [x] Activity as tolerated:  [] Patient has no activity restrictions     [] Strict Bedrest: [] Remain off Work:     [] May return to full duty work:                                   [] Return to work with restrictions: Return Appointment:  [] Wound and dressing supply provider:   [] ECF or Home Healthcare:  [] Wound Assessment: [] Physician or NP scheduled for Wound Assessment:   [x] Return Appointment: No follow-up needed  [] Ordered tests:      Electronically signed Marlyn Reddy RN on 1/2/2020 at 11:56 AM     Brandee Weems 281: Should you experience any significant changes in your wound(s) or have questions about your wound care, please contact the [facility] Main at [address] [place] [address] 8:00 am - 4:30. If you need help with your wound outside these hours and cannot wait until we are again available, contact your PCP or go to the hospital emergency room. PLEASE NOTE: IF YOU ARE UNABLE TO OBTAIN WOUND SUPPLIES, CONTINUE TO USE THE SUPPLIES YOU HAVE AVAILABLE UNTIL YOU ARE ABLE TO REACH US. IT IS MOST IMPORTANT TO KEEP THE WOUND COVERED AT ALL TIMES.      Physician Signature:_______________________    Date: ___________ Time:  ____________

## 2020-01-02 NOTE — WOUND CARE
01/02/20 1203   Wound Leg Lateral;Lower; Left #1 Cluster of 3 12/12/19   Date First Assessed/Time First Assessed: 12/12/19 1119   Present on Hospital Admission: No  Wound Approximate Age at First Assessment (Weeks): 1 weeks  Primary Wound Type: Blister/bullae  Location: Leg  Wound Location Orientation: Lateral;Lower; Left  . .. Dressing Type Applied   (Compression stocking)   Discharge Condition: Stable     Pain: 0    Ambulatory Status: Walker     Discharge Destination: Home     Transportation: Car    Accompanied by: Self     Discharge instructions reviewed with Patient and copy or written instructions have been provided. All questions/concerns have been addressed at this time.

## 2020-01-02 NOTE — WOUND CARE
Clinic Level of Care Assessment    NAME:  Shereen Ashraf OF BIRTH:  1945 GENDER: female  MEDICAL RECORD NUMBER:  [id number]   DATE:  1/2/2020      Wound Count Document in [address] [place] [address]  Number of Wounds Assessed Points   No Wounds/Ulcers [x]   0   Less than Three Wounds/Ulcers []   1   3-6 Wounds/Ulcers []   2   Greater than 6 Wounds/Ulcers []   3     Ambulation Status Document in Coord/MOISÉS/Mobility tab  Status Definition Points   Independent Independently able to ambulate. Fully able (without any assistance) to get on/off exam table/chair. []   0   Minimal Physical Assistance Requires physical assistance of one person to ambulate and/or position patient to be examined. Includes necessary physical assistance to position lower extremities on/off stool. [x]   1   Moderate Physical Assistance Requires at least one staff member to physically assist patient in ambulating into treatment room, and on/off exam table. []   2   Full Assistance Requires assistance of at least two staff members to transfer patient into treatment room and/or on/off exam table/chair. \"Total Transfer\". []   3     Dressing Complexity Document in LDA and Write Appropriate Order  Complexity Definition Points   No Dressing  [x]   0   Simple Minimal, simple dressing. i.e. Band-aid, gauze, simple wrap. []   1   Intermediate Moderately complicated requiring licensed personnel to apply i.e. collagen matrix, ointments, gels, alginates. []   2   Complex Complicated requiring licensed personnel to apply dressings 6 or more wounds. []   3     Teaching Effort Document in Education Tab   Effort Definition Points   No Teaching  []   0   Simple Reinforce two or less topics. Document in Education navigator.  []   1   Intermediate Reinforce three to five topics and/or one additional   new topic. Document in Education navigator. [x]   2   Complex Teach more than one new topic.  New patient information   packet reviewed and/or reinforce more than three topics. Document in Education navigator. HBO initial instruction. []   3       Patient Assessment and Planning  Planning Definition Points   Simple Multiple System Simple: Simple follow-up with routine assessment and planning. If Discharged, instructions and long term/follow-up care given to patient/caregiver. Discharged, instructions and/or After Visit Summary given to patient/caregiver and instructions completed. [x]   1   Intermediate Multiple System Intermediate: Contact with outside resources; i.e. Telephone calls to home health, [facility]. May include filling out forms and writing letters, arranging transportation, communication with insurance , vendors, etc.  Discharged, instructions and/or After Visit Summary given to patient/caregiver and instructions completed. []   2   Complex Multiple System Complex: Full, comprehensive assessment and planning. Follow the entire navigator under Wound Visit charting filling out each tab which includes OP Adm Database Screening, Education and CarePlan  HBO risk assessment completed. Discharged, instructions and/or After Visit Summary given to patient/caregiver and instructions completed.    []   3           Is this the Patient's First Visit to the [facility] [address]  No      Is this Patient Established @ [facility]  Yes             Clinical Level of Care      Points  0-2  Level 1 []     Points  3-5  Level 2 [x]     Points  6-9  Level 3 []     Points  10-12  Level 4 []     Points  13-15  Level 5 []       Electronically signed by Yeyo Luque RN on 1/2/2020 at 12:04 PM

## 2020-01-06 DIAGNOSIS — R60.0 EDEMA, LOWER EXTREMITY: ICD-10-CM

## 2020-01-06 RX ORDER — METOLAZONE 5 MG/1
5 TABLET ORAL
Qty: 8 TAB | Refills: 1 | Status: SHIPPED | OUTPATIENT
Start: 2020-01-07 | End: 2020-11-03

## 2020-01-20 ENCOUNTER — TELEPHONE (OUTPATIENT)
Dept: INTERNAL MEDICINE CLINIC | Age: 75
End: 2020-01-20

## 2020-01-20 DIAGNOSIS — R23.8 SKIN IRRITATION: Primary | ICD-10-CM

## 2020-01-20 NOTE — TELEPHONE ENCOUNTER
----- Message from [employer] sent at 1/20/2020  3:45 PM EST -----  Regarding: Dr. Gisele Roque: [phone]    Caller (first and last name if not the patient or from practice): pt  Caller's relationship to patient (if not from a practice): n/a  Name of caller (if calling from a practice): n/a  Patient insurance Type: [facility]  Name of practice: n/a  Specialist's title, first, and last name: Dr. Gabriel Suresh  Specialist Type: Dermatologist  Office Phone Number: [phone]  Fax number: n/a  Date and time of appointment: 01/30/20 @12:10pm  Reason for appointment: break out on left side & both arms   Details to clarify the request:      Copy/paste envera

## 2020-02-27 RX ORDER — EZETIMIBE 10 MG
TABLET ORAL
Qty: 90 TAB | Refills: 3 | Status: SHIPPED | OUTPATIENT
Start: 2020-02-27 | End: 2021-12-22 | Stop reason: SDUPTHER

## 2020-02-27 RX ORDER — LORATADINE 10 MG/1
10 TABLET ORAL DAILY
Qty: 90 TAB | Refills: 3 | Status: SHIPPED | OUTPATIENT
Start: 2020-02-27 | End: 2021-12-22 | Stop reason: SDUPTHER

## 2020-02-27 RX ORDER — ATORVASTATIN CALCIUM 20 MG/1
TABLET, FILM COATED ORAL
Qty: 90 TAB | Refills: 3 | Status: SHIPPED | OUTPATIENT
Start: 2020-02-27 | End: 2021-12-22 | Stop reason: SDUPTHER

## 2020-02-27 RX ORDER — LOSARTAN POTASSIUM 25 MG/1
TABLET ORAL
Qty: 90 TAB | Refills: 3 | Status: SHIPPED | OUTPATIENT
Start: 2020-02-27 | End: 2020-11-03

## 2020-02-27 NOTE — TELEPHONE ENCOUNTER
PCP: Bay Nam MD    Last appt: 11/8/2019  Future Appointments   Date Time Provider Hilary Dickinson   3/30/2020  9:00 AM Bay Nam MD [facility] 87       Requested Prescriptions     Pending Prescriptions Disp Refills    atorvastatin (LIPITOR) 20 mg tablet 90 Tab 3     Sig: TAKE 1 TABLET DAILY    losartan (COZAAR) 25 mg tablet 90 Tab 3     Sig: TAKE 1 TABLET BY MOUTH DAILY.  loratadine (CLARITIN) 10 mg tablet 90 Tab 3     Sig: Take 1 Tab by mouth daily. Indications: inflammation of the nose due to an allergy    ZETIA 10 mg tablet 90 Tab 3     Sig: Take 1 tablet by mouth daily.

## 2020-03-13 ENCOUNTER — TELEPHONE (OUTPATIENT)
Dept: INTERNAL MEDICINE CLINIC | Age: 75
End: 2020-03-13

## 2020-03-13 DIAGNOSIS — E11.9 CONTROLLED TYPE 2 DIABETES MELLITUS WITHOUT COMPLICATION, WITHOUT LONG-TERM CURRENT USE OF INSULIN (HCC): Primary | ICD-10-CM

## 2020-03-13 RX ORDER — LANCETS
EACH MISCELLANEOUS
Qty: 100 EACH | Refills: 11 | Status: SHIPPED | OUTPATIENT
Start: 2020-03-13 | End: 2020-03-20 | Stop reason: ALTCHOICE

## 2020-03-13 RX ORDER — INSULIN PUMP SYRINGE, 3 ML
EACH MISCELLANEOUS
Qty: 1 KIT | Refills: 0 | Status: SHIPPED | OUTPATIENT
Start: 2020-03-13 | End: 2020-03-20 | Stop reason: ALTCHOICE

## 2020-03-13 NOTE — TELEPHONE ENCOUNTER
General Message/Vendor Calls     Caller's first and last name:       Reason for call:   Needs new sugar kit sent to pharmacy     Callback required yes/no and why:   Yes, to discuss the kit that needs to be sent over.      Best contact number(s):   [phone]     Details to clarify the request:       Sully Cantu after closing on 3/12/20

## 2020-03-20 ENCOUNTER — TELEPHONE (OUTPATIENT)
Dept: INTERNAL MEDICINE CLINIC | Age: 75
End: 2020-03-20

## 2020-03-20 DIAGNOSIS — E11.9 CONTROLLED TYPE 2 DIABETES MELLITUS WITHOUT COMPLICATION, WITHOUT LONG-TERM CURRENT USE OF INSULIN (HCC): Primary | ICD-10-CM

## 2020-03-20 RX ORDER — INSULIN PUMP SYRINGE, 3 ML
EACH MISCELLANEOUS
Qty: 1 KIT | Refills: 0 | Status: SHIPPED | OUTPATIENT
Start: 2020-03-20 | End: 2020-10-28

## 2020-03-20 RX ORDER — LANCETS
EACH MISCELLANEOUS
Qty: 150 EACH | Refills: 3 | Status: SHIPPED | OUTPATIENT
Start: 2020-03-20 | End: 2020-10-28

## 2020-03-20 NOTE — TELEPHONE ENCOUNTER
#[phone]   Pt states she specified exactly what monitor was needed due to insurance. Pt can not get the monitor that was called in. Pt needs to test 2-3 times per day per Dr. Cliff Sequeira ([facility])      Please call in the One Step Ultra 2  To [facility] with the entire kit. Test strips, lancets etc     Pt would like this called in today as she has to check her sugar.

## 2020-04-06 DIAGNOSIS — E11.9 CONTROLLED TYPE 2 DIABETES MELLITUS WITHOUT COMPLICATION, WITHOUT LONG-TERM CURRENT USE OF INSULIN (HCC): Primary | ICD-10-CM

## 2020-04-06 DIAGNOSIS — K59.00 CONSTIPATION, UNSPECIFIED CONSTIPATION TYPE: ICD-10-CM

## 2020-04-06 RX ORDER — FLUTICASONE PROPIONATE 50 MCG
2 SPRAY, SUSPENSION (ML) NASAL DAILY
Qty: 3 BOTTLE | Refills: 3 | Status: SHIPPED | OUTPATIENT
Start: 2020-04-06 | End: 2022-06-24 | Stop reason: SDUPTHER

## 2020-04-06 RX ORDER — POLYETHYLENE GLYCOL 3350 17 G/17G
17 POWDER, FOR SOLUTION ORAL DAILY
Qty: 2108 G | Refills: 3 | Status: SHIPPED | OUTPATIENT
Start: 2020-04-06

## 2020-04-06 RX ORDER — COLCHICINE 0.6 MG/1
0.6 CAPSULE ORAL DAILY
Qty: 90 CAP | Refills: 3 | Status: SHIPPED | OUTPATIENT
Start: 2020-04-06 | End: 2020-05-11 | Stop reason: SDUPTHER

## 2020-04-06 NOTE — TELEPHONE ENCOUNTER
Pt received the wrong lancets she needs the One Touch Ultra Two (not ultra soft)    These will need to be called in as pt has none. Pt also needs a script for Miralax to be called in with the rest of the others.

## 2020-05-10 NOTE — PROGRESS NOTES
HISTORY OF PRESENT ILLNESS  Jesus Pitts is a 76 y.o. female. HPI   Jesus Pitts is a 76 y.o. female being evaluated by a Virtual Visit (video visit) encounter to address concerns as mentioned above. A caregiver was present when appropriate. Due to this being a TeleHealth encounter (During RSUCU-89 public health emergency), evaluation of the following organ systems was limited: Vitals/Constitutional/EENT/Resp/CV/GI//MS/Neuro/Skin/Heme-Lymph-Imm. Pursuant to the emergency declaration under the [facility], [address] [place] [address] authority and the [employer] Act, this Virtual Visit was conducted with patient's (and/or legal guardian's) consent, to reduce the risk of exposure to COVID-19 and provide necessary medical care. Services were provided through a video synchronous discussion virtually to substitute for in-person encounter.     --Creta Canavan, MD on 5/9/2020 at 10:29 PM    An electronic signature was used to authenticate this note.  c  Face time encounter     F/u DM-2 htn HLD gout hypothyroid osteoporosis BRADY with fibrosis--[facility] hepatology, suspected cirrhosis-Dr RAZO  No longer seeing Dr Uzma Nolan who is no longer at the endocrine practice  No longert Goes to [facility]  Last a1c 6.6 LDL  39  metformin dose was increased 1000mg bid by Dr Uzma Nolan  fsbs 130-145  Home /68 at gyn md office    Last OV     Due for flu shot and PCV 13  Last a1c 5.8 LDL 39  a1c was 6.0 at [facility] a few months ago  Last uric acid 8.2-has not any gout episodes this year on colchicine  Sees Dr Uzma Nolan in November for DM-2 and hypothyroidism  Has osteoporosis on fosamax-Dr Uzma Nolan recommended miguel a     Went to wound care center for left leg ulcers-discharged last week at [facility]  Treated for MRSA, dopplers neg for venous reflux  Had unna boot for edema and ulcer of LLE then was referred to wound care  Some redness noted to LLE last few weeks  Sees hepatologist at [facility] for fatty liver       Patient Active Problem List    Diagnosis Date Noted    Cellulitis of left lower leg 11/07/2019    Severe obesity ([facility] [address] 09/30/2019    Peripheral vascular disease ([facility] [address] 09/30/2019    Non-pressure chronic ulcer of left lower leg, limited to breakdown of skin ([address] 08/05/2019    Controlled type 2 diabetes mellitus without complication ([facility] [address] 92/55/0313    DDD (degenerative disc disease), lumbar 11/27/2017    Prediabetes 04/11/2016    Thrombocytopenia ([facility] [address] 01/24/2015    HTN (hypertension) 01/17/2014    Bifascicular block 12/23/2010    Fatty liver 06/18/2010    Pure hypercholesterolemia 06/18/2010    Colon polyp 06/18/2010    Gout 06/14/2010    Hypothyroidism 06/14/2010    Osteoporosis 06/14/2010    Anxiety 06/14/2010    Leg edema 06/14/2010    RSD lower limb 06/14/2010     Current Outpatient Medications   Medication Sig Dispense Refill    colchicine (MITIGARE) 0.6 mg capsule Take 1 Cap by mouth daily. Indications: acute inflammation of the joints due to gout attack 90 Cap 3    fluticasone propionate (FLONASE) 50 mcg/actuation nasal spray 2 Sprays by Both Nostrils route daily. Administer to right and left nostril. 3 Bottle 3    polyethylene glycol (MIRALAX) 17 gram/dose powder Take 17 g by mouth daily. 2108 g 3    glucose blood VI test strips (ASCENSIA AUTODISC VI, ONE TOUCH ULTRA TEST VI) strip Use to check blood sugars two times a day. DX E11.9 300 Strip 3    Blood-Glucose Meter (OneTouch Ultra2 Meter) monitoring kit Use to check blood sugars once a day. DX E11.9 1 Kit 0    lancets (OneTouch UltraSoft Lancets) misc Use to check blood sugars once a day. DX E11.9 150 Each 3    glucose blood VI test strips (OneTouch Ultra Blue Test Strip) strip Use to check blood sugars once a day. DX E11.9 150 Strip 3    atorvastatin (LIPITOR) 20 mg tablet TAKE 1 TABLET DAILY 90 Tab 3    losartan (COZAAR) 25 mg tablet TAKE 1 TABLET BY MOUTH DAILY.  90 Tab 3    loratadine (CLARITIN) 10 mg tablet Take 1 Tab by mouth daily. Indications: inflammation of the nose due to an allergy 90 Tab 3    ZETIA 10 mg tablet Take 1 tablet by mouth daily. 90 Tab 3    metOLazone (ZAROXOLYN) 5 mg tablet Take 1 Tab by mouth every Tuesday and Friday. 8 Tab 1    FREESTYLE ELAINE 14 DAY SENSOR kit       ketoconazole (NIZORAL) 2 % topical cream   3    amLODIPine (NORVASC) 5 mg tablet Take 1 Tab by mouth daily. 90 Tab 3    metFORMIN ER (GLUCOPHAGE XR) 500 mg tablet Take 2 Tabs by mouth two (2) times daily (after meals). For diabetes. 180 Tab 3    levothyroxine (SYNTHROID) 175 mcg tablet TAKE ONE DAILY BY MOUTH. TAKE AN EXTRA 1/2 PILL ON SUNDAYS. (7.5 TABLETS/WEEK  MCG) 32 Tab 11    raNITIdine (ZANTAC) 300 mg tab TAKE 1 TABLET BY MOUTH EVERY DAY. 90 Tab 3    cranberry extract 450 mg tab tablet Take 450 mg by mouth.  docusate sodium (COLACE) 50 mg capsule Take 100 mg by mouth daily as needed for Constipation.  furosemide (LASIX) 80 mg tablet Take 1 Tab by mouth daily. 90 Tab 3    alendronate (FOSAMAX) 70 mg tablet       NYAMYC powder       diclofenac (VOLTAREN) 1 % gel       potassium chloride SA (MICRO-K) 10 mEq capsule Take 2 Caps by mouth daily. 180 Cap 3    omega-3 acid ethyl esters (LOVAZA) 1 gram capsule Take 2 Caps by mouth two (2) times daily (with meals). 360 Cap 3    Biotin 2,500 mcg cap Take  by mouth.  indomethacin (INDOCIN) 25 mg capsule TAKE 1 CAPSULE BY MOUTH THREE TIMES A DAY AS NEEDED FOR PAIN. 30 Cap 6    L GASSERI/B BIFIDUM/B LONGUM (Erecruit COLON HEALTH PO) Take  by mouth.  vitamin E (AQUA GEMS) 400 unit capsule Take 800 Units by mouth daily.  lidocaine (LIDODERM) 5 %(700 mg/patch) 1 patch by TransDERmal route every twenty-four (24) hours. Apply patch to the affected area for 12 hours a day and remove for 12 hours a day.  cholecalciferol, vitamin D3, (VITAMIN D3) 2,000 unit tab Take  by mouth.       traMADol (ULTRAM) 50 mg tablet Take 1 tablet by mouth every six (6) hours as needed for Pain. 12 tablet 0    OTHER Accu-Chek Lady Plus Kit  Check blood sugar one to two times daily 1 kit 3    MULTIVITAMIN WITH MINERALS (ONE-A-DAY 50 PLUS PO) Take  by mouth.  aspirin 81 mg chewable tablet Take 81 mg by mouth daily.  fluocinoNIDE (LIDEX) 0.05 % topical cream Apply  to affected area two (2) times a day.  60 g 3     Allergies   Allergen Reactions    Gabapentin Other (comments)    Celebrex [Celecoxib] Hives    Pcn [Penicillins] Unknown (comments)     Can't remember    Sulfa (Sulfonamide Antibiotics) Hives      Lab Results   Component Value Date/Time    WBC 6.3 09/30/2019 09:41 AM    HGB 12.9 09/30/2019 09:41 AM    HCT 39.6 09/30/2019 09:41 AM    PLATELET 233 (L) 06/99/5027 09:41 AM    MCV 96 09/30/2019 09:41 AM     Lab Results   Component Value Date/Time    Hemoglobin A1c 6.6 (H) 09/30/2019 09:41 AM    Hemoglobin A1c 5.8 (H) 03/23/2018 02:02 PM    Hemoglobin A1c 6.0 (H) 11/21/2017 04:00 PM    Hemoglobin A1c, External 6.4 06/21/2016    Glucose 126 (H) 11/18/2019 10:27 AM    Glucose (POC) 129 (H) 11/17/2015 07:23 AM    Glucose  11/21/2017 12:00 PM    Microalb/Creat ratio (ug/mg creat.) 14.6 05/20/2019 02:58 PM    LDL, calculated 39 05/20/2019 02:58 PM    Creatinine 0.74 11/18/2019 10:27 AM      Lab Results   Component Value Date/Time    Cholesterol, total 154 05/20/2019 02:58 PM    HDL Cholesterol 58 05/20/2019 02:58 PM    LDL, calculated 39 05/20/2019 02:58 PM    Triglyceride 284 (H) 05/20/2019 02:58 PM    CHOL/HDL Ratio 2.7 06/14/2010 11:16 AM     Lab Results   Component Value Date/Time    GFR est non-AA 80 11/18/2019 10:27 AM    GFR est AA 92 11/18/2019 10:27 AM    Creatinine 0.74 11/18/2019 10:27 AM    BUN 26 11/18/2019 10:27 AM    Sodium 143 11/18/2019 10:27 AM    Potassium 3.2 (L) 11/18/2019 10:27 AM    Chloride 95 (L) 11/18/2019 10:27 AM    CO2 27 11/18/2019 10:27 AM    Magnesium 2.4 (H) 11/18/2019 10:27 AM    PTH, Intact 31 01/06/2016 09:55 AM     Lab Results   Component Value Date/Time    TSH 0.508 11/18/2019 10:27 AM    T4, Free 1.73 05/20/2019 02:58 PM         ROS    Physical Exam  Constitutional:       Appearance: She is obese. Pulmonary:      Effort: Pulmonary effort is normal.   Neurological:      Mental Status: She is alert. Psychiatric:         Mood and Affect: Mood normal.         Behavior: Behavior normal.         Thought Content: Thought content normal.         Judgment: Judgment normal.         ASSESSMENT and PLAN  Diagnoses and all orders for this visit:    1. Controlled type 2 diabetes mellitus without complication, without long-term current use of insulin (HCC)  -     METABOLIC PANEL, COMPREHENSIVE  -     HEMOGLOBIN A1C WITH EAG  -     MICROALBUMIN, UR, RAND W/ MICROALB/CREAT RATIO   Controlled per home readings  2. Essential hypertension  -     METABOLIC PANEL, COMPREHENSIVE   contolled  3. Pure hypercholesterolemia  -     METABOLIC PANEL, COMPREHENSIVE  -     LIPID PANEL   On statin and zetia    4. BRADY/cirrhosis   Pt will schedule EGD with Dr Monique Abernathy   F/[facility] Dr Argelia Lazo at [facility]  5.  Hypothyroid   euthryoid clinically    rtc 6 months medicare wellness

## 2020-05-11 ENCOUNTER — VIRTUAL VISIT (OUTPATIENT)
Dept: INTERNAL MEDICINE CLINIC | Age: 75
End: 2020-05-11

## 2020-05-11 DIAGNOSIS — E78.00 PURE HYPERCHOLESTEROLEMIA: ICD-10-CM

## 2020-05-11 DIAGNOSIS — I10 ESSENTIAL HYPERTENSION: ICD-10-CM

## 2020-05-11 DIAGNOSIS — E11.9 CONTROLLED TYPE 2 DIABETES MELLITUS WITHOUT COMPLICATION, WITHOUT LONG-TERM CURRENT USE OF INSULIN (HCC): Primary | ICD-10-CM

## 2020-05-11 RX ORDER — COLCHICINE 0.6 MG/1
0.6 CAPSULE ORAL DAILY
Qty: 90 CAP | Refills: 3 | Status: SHIPPED | OUTPATIENT
Start: 2020-05-11 | End: 2020-11-03

## 2020-05-11 NOTE — TELEPHONE ENCOUNTER
PCP: Max Bronson MD    Last appt: 11/8/2019  Future Appointments   Date Time Provider Hilary Dickinson   5/11/2020 11:30 AM Max Bronson MD [facility] 87       Requested Prescriptions     Pending Prescriptions Disp Refills    colchicine (MITIGARE) 0.6 mg capsule 90 Cap 3     Sig: Take 1 Cap by mouth daily.  Indications: acute inflammation of the joints due to gout attack

## 2020-05-11 NOTE — PATIENT INSTRUCTIONS
This is an established visit conducted via telemedicine. The patient has been instructed that this meets HIPAA criteria and acknowledges and agrees to this method of visitation. [place] 
58/[birth year] 
68:21 AM 
Chief Complaint Patient presents with  Diabetes 6 month follow up  Hypertension 6 month follow up  Labs  
  mail order with caitie

## 2020-05-14 ENCOUNTER — TELEPHONE (OUTPATIENT)
Dept: INTERNAL MEDICINE CLINIC | Age: 75
End: 2020-05-14

## 2020-05-14 RX ORDER — FAMOTIDINE 40 MG/1
40 TABLET, FILM COATED ORAL DAILY
Qty: 90 TAB | Refills: 3 | Status: SHIPPED | OUTPATIENT
Start: 2020-05-14 | End: 2021-07-12

## 2020-05-14 NOTE — TELEPHONE ENCOUNTER
Darcie RAMESH Ruby   Phone Number: [phone]             General Message/Vendor Calls     Caller's first and last name:Char Siu       Reason for call:pt is requesting to have mailed the diet sheet for Gout. Pt states also to tell the doctor that the letter for [facility] was mailed yesterday. Also pt wants to know if she still needs to take the Ranitidine mediation? Please advise.        Callback required yes/no and why:yes       Best contact number(s):[phone]       Details to clarify the request:       [address] [facility]

## 2020-05-14 NOTE — TELEPHONE ENCOUNTER
Called, spoke to pt. Two identifiers confirmed. Notified pt I would send request to Dr. Anand Kirkpatrick for alternative to ranitidine. Pt verbalized understanding of information discussed w/ no further questions at this time. Gout diet mailed to pt.

## 2020-05-21 ENCOUNTER — TELEPHONE (OUTPATIENT)
Dept: INTERNAL MEDICINE CLINIC | Age: 75
End: 2020-05-21

## 2020-05-21 NOTE — TELEPHONE ENCOUNTER
Called, spoke to pt  Received two pt identifiers  Pt informed medical information for mail has not arrived yet. Pt advised that if it is not here by 05/28/2020 then we will give her a call back if not sooner when it comes in. Pt verbalizes understanding of the instructions and has no further questions at this time.

## 2020-05-21 NOTE — TELEPHONE ENCOUNTER
Patient called to see if we received documentation she mailed out on last week. It was medical information that she had done at [facility]. Please check and call the patient back. Thanks.

## 2020-05-26 ENCOUNTER — TELEPHONE (OUTPATIENT)
Dept: INTERNAL MEDICINE CLINIC | Age: 75
End: 2020-05-26

## 2020-05-26 NOTE — TELEPHONE ENCOUNTER
----- Message from Mickey Deleon sent at 5/26/2020  9:09 AM EDT -----  Regarding: Dr. Tamika Carrillo: [phone]  Caller's first and last name: N/A  Reason for call: Pt stated wants to speak to Dr. Hayes Issa concerning medication. Callback required yes/no and why: Yes, to inform.    Best contact number(s): [phone]  Details to clarify the request: Call after 2PM    Message copied/pasted from [facility]

## 2020-05-26 NOTE — TELEPHONE ENCOUNTER
Called, spoke to pt  Received two pt identifiers  Pt informed the office received her records. Pt verbalizes understanding of the instructions and has no further questions at this time.

## 2020-05-26 NOTE — TELEPHONE ENCOUNTER
Chicho Thompson MD  You 1 hour ago (12:42 PM)     Will wait to get our labs back, thx    Routing comment      Pt notified.

## 2020-05-26 NOTE — TELEPHONE ENCOUNTER
Called, spoke to pt. Two identifiers confirmed. Notified pt med records were received today. Pt stated her liver MD recommended she start a GLP-1 d/t elevated LFT's. Notified pt a message will be sent to Dr. Yared Herr. Pt verbalized understanding of information discussed w/ no further questions at this time.

## 2020-06-04 LAB
ALBUMIN SERPL-MCNC: 5 G/DL (ref 3.7–4.7)
ALBUMIN/CREAT UR: 14 MG/G CREAT (ref 0–29)
ALBUMIN/GLOB SERPL: 2.2 {RATIO} (ref 1.2–2.2)
ALP SERPL-CCNC: 141 IU/L (ref 39–117)
ALT SERPL-CCNC: 58 IU/L (ref 0–32)
AST SERPL-CCNC: 72 IU/L (ref 0–40)
BILIRUB SERPL-MCNC: 0.6 MG/DL (ref 0–1.2)
BUN SERPL-MCNC: 30 MG/DL (ref 8–27)
BUN/CREAT SERPL: 30 (ref 12–28)
CALCIUM SERPL-MCNC: 10.7 MG/DL (ref 8.7–10.3)
CHLORIDE SERPL-SCNC: 95 MMOL/L (ref 96–106)
CHOLEST SERPL-MCNC: 152 MG/DL (ref 100–199)
CO2 SERPL-SCNC: 26 MMOL/L (ref 20–29)
CREAT SERPL-MCNC: 1 MG/DL (ref 0.57–1)
CREAT UR-MCNC: 103.8 MG/DL
EST. AVERAGE GLUCOSE BLD GHB EST-MCNC: 146 MG/DL
GLOBULIN SER CALC-MCNC: 2.3 G/DL (ref 1.5–4.5)
GLUCOSE SERPL-MCNC: 137 MG/DL (ref 65–99)
HBA1C MFR BLD: 6.7 % (ref 4.8–5.6)
HDLC SERPL-MCNC: 56 MG/DL
LDLC SERPL CALC-MCNC: 58 MG/DL (ref 0–99)
MICROALBUMIN UR-MCNC: 15 UG/ML
POTASSIUM SERPL-SCNC: 3.6 MMOL/L (ref 3.5–5.2)
PROT SERPL-MCNC: 7.3 G/DL (ref 6–8.5)
SODIUM SERPL-SCNC: 142 MMOL/L (ref 134–144)
TRIGL SERPL-MCNC: 191 MG/DL (ref 0–149)
VLDLC SERPL CALC-MCNC: 38 MG/DL (ref 5–40)

## 2020-06-05 ENCOUNTER — TELEPHONE (OUTPATIENT)
Dept: INTERNAL MEDICINE CLINIC | Age: 75
End: 2020-06-05

## 2020-06-05 NOTE — TELEPHONE ENCOUNTER
Jeane Harrell, [address] [facility]   Phone Number:  [phone] (Call me)             Patient needs a prior auth to be sent to [address] (on file) ph: [phone] for Miralax. Please call her if there are any questions.  Cell: [phone]     Copy/Paste  ENVERA

## 2020-08-26 ENCOUNTER — TELEPHONE (OUTPATIENT)
Dept: INTERNAL MEDICINE CLINIC | Age: 75
End: 2020-08-26

## 2020-08-26 NOTE — TELEPHONE ENCOUNTER
----- Message from York Cano sent at 8/26/2020 10:52 AM EDT -----  Regarding: Dr. Andersen Shall: [phone]  Caller's first and last name and relationship to patient (if not the patient): N/A  Best contact number: [phone]  Preferred date and time: On a Wednesday. Scheduled appointment date and time: 11/12/20  Reason for appointment: Complete Physical  Details to clarify the request: Pt would like to reschedule her physical with Dr. Emely Bear for a Wednesday. Pt stated she will be home all day tomorrow if someone could call her then. She will also be home after 3:30pm today.

## 2020-09-10 DIAGNOSIS — E11.9 CONTROLLED TYPE 2 DIABETES MELLITUS WITHOUT COMPLICATION, WITHOUT LONG-TERM CURRENT USE OF INSULIN (HCC): ICD-10-CM

## 2020-09-10 DIAGNOSIS — E03.9 ACQUIRED HYPOTHYROIDISM: ICD-10-CM

## 2020-09-10 RX ORDER — LEVOTHYROXINE SODIUM 175 UG/1
TABLET ORAL
Qty: 96 TAB | Refills: 3 | Status: SHIPPED
Start: 2020-09-10 | End: 2021-05-20 | Stop reason: DRUGHIGH

## 2020-09-10 RX ORDER — METFORMIN HYDROCHLORIDE 500 MG/1
1000 TABLET, EXTENDED RELEASE ORAL
Qty: 180 TAB | Refills: 3 | Status: SHIPPED | OUTPATIENT
Start: 2020-09-10 | End: 2020-11-03

## 2020-09-10 NOTE — TELEPHONE ENCOUNTER
Will forward refill for levothyroxine and metformin to Dr. Cresencio Yao as patient is no longer under Dr. Rosy hyde since he left on 1/19/20 and has not established with another physician in our practice.

## 2020-10-07 ENCOUNTER — HOSPITAL ENCOUNTER (OUTPATIENT)
Dept: MAMMOGRAPHY | Age: 75
Discharge: HOME | End: 2020-10-07
Attending: SPECIALIST
Payer: MEDICARE

## 2020-10-07 DIAGNOSIS — Z12.31 VISIT FOR SCREENING MAMMOGRAM: ICD-10-CM

## 2020-10-07 PROCEDURE — 77067 SCR MAMMO BI INCL CAD: CPT

## 2020-10-27 ENCOUNTER — APPOINTMENT (OUTPATIENT)
Dept: CT IMAGING | Age: 75
DRG: 683 | End: 2020-10-27
Attending: EMERGENCY MEDICINE
Payer: MEDICARE

## 2020-10-27 ENCOUNTER — APPOINTMENT (OUTPATIENT)
Dept: GENERAL RADIOLOGY | Age: 75
DRG: 683 | End: 2020-10-27
Attending: EMERGENCY MEDICINE
Payer: MEDICARE

## 2020-10-27 ENCOUNTER — HOSPITAL ENCOUNTER (INPATIENT)
Age: 75
LOS: 7 days | Discharge: HOME HEALTH | DRG: 683 | End: 2020-11-03
Attending: EMERGENCY MEDICINE | Admitting: STUDENT IN AN ORGANIZED HEALTH CARE EDUCATION/TRAINING PROGRAM
Payer: MEDICARE

## 2020-10-27 DIAGNOSIS — N17.9 ACUTE RENAL FAILURE, UNSPECIFIED ACUTE RENAL FAILURE TYPE (HCC): Primary | ICD-10-CM

## 2020-10-27 DIAGNOSIS — E87.5 ACUTE HYPERKALEMIA: ICD-10-CM

## 2020-10-27 DIAGNOSIS — E87.20 METABOLIC ACIDOSIS: ICD-10-CM

## 2020-10-27 DIAGNOSIS — N12 PYELONEPHRITIS: ICD-10-CM

## 2020-10-27 LAB
ALBUMIN SERPL-MCNC: 3.9 G/DL (ref 3.5–5)
ALBUMIN SERPL-MCNC: 3.9 G/DL (ref 3.5–5)
ALBUMIN/GLOB SERPL: 1 {RATIO} (ref 1.1–2.2)
ALBUMIN/GLOB SERPL: 1 {RATIO} (ref 1.1–2.2)
ALP SERPL-CCNC: 140 U/L (ref 45–117)
ALP SERPL-CCNC: 146 U/L (ref 45–117)
ALT SERPL-CCNC: 58 U/L (ref 12–78)
ALT SERPL-CCNC: 58 U/L (ref 12–78)
ANION GAP SERPL CALC-SCNC: 13 MMOL/L (ref 5–15)
ANION GAP SERPL CALC-SCNC: 14 MMOL/L (ref 5–15)
ANION GAP SERPL CALC-SCNC: 15 MMOL/L (ref 5–15)
APPEARANCE UR: ABNORMAL
AST SERPL-CCNC: 60 U/L (ref 15–37)
AST SERPL-CCNC: 61 U/L (ref 15–37)
BACTERIA URNS QL MICRO: ABNORMAL /HPF
BASOPHILS # BLD: 0 K/UL (ref 0–0.1)
BASOPHILS NFR BLD: 0 % (ref 0–1)
BILIRUB SERPL-MCNC: 0.8 MG/DL (ref 0.2–1)
BILIRUB SERPL-MCNC: 0.8 MG/DL (ref 0.2–1)
BILIRUB UR QL CFM: NEGATIVE
BNP SERPL-MCNC: 5168 PG/ML
BUN SERPL-MCNC: 96 MG/DL (ref 6–20)
BUN SERPL-MCNC: 97 MG/DL (ref 6–20)
BUN SERPL-MCNC: 97 MG/DL (ref 6–20)
BUN/CREAT SERPL: 13 (ref 12–20)
CALCIUM SERPL-MCNC: 9 MG/DL (ref 8.5–10.1)
CALCIUM SERPL-MCNC: 9.3 MG/DL (ref 8.5–10.1)
CALCIUM SERPL-MCNC: 9.3 MG/DL (ref 8.5–10.1)
CHLORIDE SERPL-SCNC: 102 MMOL/L (ref 97–108)
CHLORIDE SERPL-SCNC: 103 MMOL/L (ref 97–108)
CHLORIDE SERPL-SCNC: 104 MMOL/L (ref 97–108)
CHLORIDE UR-SCNC: 33 MMOL/L
CO2 SERPL-SCNC: 18 MMOL/L (ref 21–32)
CO2 SERPL-SCNC: 19 MMOL/L (ref 21–32)
CO2 SERPL-SCNC: 19 MMOL/L (ref 21–32)
COLOR UR: ABNORMAL
CREAT SERPL-MCNC: 7.67 MG/DL (ref 0.55–1.02)
CREAT SERPL-MCNC: 7.72 MG/DL (ref 0.55–1.02)
CREAT SERPL-MCNC: 7.74 MG/DL (ref 0.55–1.02)
CREAT UR-MCNC: 69.6 MG/DL
CREAT UR-MCNC: 71 MG/DL
DIFFERENTIAL METHOD BLD: ABNORMAL
EOSINOPHIL # BLD: 0.1 K/UL (ref 0–0.4)
EOSINOPHIL NFR BLD: 1 % (ref 0–7)
EPITH CASTS URNS QL MICRO: ABNORMAL /LPF
ERYTHROCYTE [DISTWIDTH] IN BLOOD BY AUTOMATED COUNT: 16.2 % (ref 11.5–14.5)
GLOBULIN SER CALC-MCNC: 3.8 G/DL (ref 2–4)
GLOBULIN SER CALC-MCNC: 3.9 G/DL (ref 2–4)
GLUCOSE BLD STRIP.AUTO-MCNC: 126 MG/DL (ref 65–100)
GLUCOSE BLD STRIP.AUTO-MCNC: 155 MG/DL (ref 65–100)
GLUCOSE BLD STRIP.AUTO-MCNC: 160 MG/DL (ref 65–100)
GLUCOSE SERPL-MCNC: 122 MG/DL (ref 65–100)
GLUCOSE SERPL-MCNC: 122 MG/DL (ref 65–100)
GLUCOSE SERPL-MCNC: 146 MG/DL (ref 65–100)
GLUCOSE UR STRIP.AUTO-MCNC: NEGATIVE MG/DL
HCT VFR BLD AUTO: 31.1 % (ref 35–47)
HGB BLD-MCNC: 9.9 G/DL (ref 11.5–16)
HGB UR QL STRIP: ABNORMAL
IMM GRANULOCYTES # BLD AUTO: 0.1 K/UL (ref 0–0.04)
IMM GRANULOCYTES NFR BLD AUTO: 1 % (ref 0–0.5)
KETONES UR QL STRIP.AUTO: NEGATIVE MG/DL
LEUKOCYTE ESTERASE UR QL STRIP.AUTO: ABNORMAL
LYMPHOCYTES # BLD: 1.2 K/UL (ref 0.8–3.5)
LYMPHOCYTES NFR BLD: 13 % (ref 12–49)
MAGNESIUM SERPL-MCNC: 2.7 MG/DL (ref 1.6–2.4)
MCH RBC QN AUTO: 32.1 PG (ref 26–34)
MCHC RBC AUTO-ENTMCNC: 31.8 G/DL (ref 30–36.5)
MCV RBC AUTO: 101 FL (ref 80–99)
MICROALBUMIN UR-MCNC: 84.5 MG/DL
MICROALBUMIN/CREAT UR-RTO: 1190 MG/G (ref 0–30)
MONOCYTES # BLD: 0.9 K/UL (ref 0–1)
MONOCYTES NFR BLD: 9 % (ref 5–13)
NEUTS SEG # BLD: 7.5 K/UL (ref 1.8–8)
NEUTS SEG NFR BLD: 76 % (ref 32–75)
NITRITE UR QL STRIP.AUTO: NEGATIVE
NRBC # BLD: 0 K/UL (ref 0–0.01)
NRBC BLD-RTO: 0 PER 100 WBC
PH UR STRIP: 5 [PH] (ref 5–8)
PLATELET # BLD AUTO: 186 K/UL (ref 150–400)
PMV BLD AUTO: 10.9 FL (ref 8.9–12.9)
POTASSIUM SERPL-SCNC: 4.9 MMOL/L (ref 3.5–5.1)
POTASSIUM SERPL-SCNC: 6.2 MMOL/L (ref 3.5–5.1)
POTASSIUM SERPL-SCNC: 6.2 MMOL/L (ref 3.5–5.1)
PROT SERPL-MCNC: 7.7 G/DL (ref 6.4–8.2)
PROT SERPL-MCNC: 7.8 G/DL (ref 6.4–8.2)
PROT UR STRIP-MCNC: 100 MG/DL
RBC # BLD AUTO: 3.08 M/UL (ref 3.8–5.2)
RBC #/AREA URNS HPF: ABNORMAL /HPF (ref 0–5)
SERVICE CMNT-IMP: ABNORMAL
SODIUM SERPL-SCNC: 134 MMOL/L (ref 136–145)
SODIUM SERPL-SCNC: 135 MMOL/L (ref 136–145)
SODIUM SERPL-SCNC: 138 MMOL/L (ref 136–145)
SP GR UR REFRACTOMETRY: 1.01 (ref 1–1.03)
TROPONIN I SERPL-MCNC: <0.05 NG/ML
UA: UC IF INDICATED,UAUC: ABNORMAL
UROBILINOGEN UR QL STRIP.AUTO: 1 EU/DL (ref 0.2–1)
WBC # BLD AUTO: 9.9 K/UL (ref 3.6–11)
WBC URNS QL MICRO: >100 /HPF (ref 0–4)

## 2020-10-27 PROCEDURE — 74011000258 HC RX REV CODE- 258: Performed by: EMERGENCY MEDICINE

## 2020-10-27 PROCEDURE — 96366 THER/PROPH/DIAG IV INF ADDON: CPT

## 2020-10-27 PROCEDURE — 74011000250 HC RX REV CODE- 250: Performed by: EMERGENCY MEDICINE

## 2020-10-27 PROCEDURE — 65660000000 HC RM CCU STEPDOWN

## 2020-10-27 PROCEDURE — 74011250637 HC RX REV CODE- 250/637: Performed by: EMERGENCY MEDICINE

## 2020-10-27 PROCEDURE — 82962 GLUCOSE BLOOD TEST: CPT

## 2020-10-27 PROCEDURE — 87086 URINE CULTURE/COLONY COUNT: CPT

## 2020-10-27 PROCEDURE — 83735 ASSAY OF MAGNESIUM: CPT

## 2020-10-27 PROCEDURE — 83880 ASSAY OF NATRIURETIC PEPTIDE: CPT

## 2020-10-27 PROCEDURE — 71045 X-RAY EXAM CHEST 1 VIEW: CPT

## 2020-10-27 PROCEDURE — 80053 COMPREHEN METABOLIC PANEL: CPT

## 2020-10-27 PROCEDURE — 74011636637 HC RX REV CODE- 636/637: Performed by: EMERGENCY MEDICINE

## 2020-10-27 PROCEDURE — 96375 TX/PRO/DX INJ NEW DRUG ADDON: CPT

## 2020-10-27 PROCEDURE — 83605 ASSAY OF LACTIC ACID: CPT

## 2020-10-27 PROCEDURE — 99285 EMERGENCY DEPT VISIT HI MDM: CPT

## 2020-10-27 PROCEDURE — 82043 UR ALBUMIN QUANTITATIVE: CPT

## 2020-10-27 PROCEDURE — 74011250636 HC RX REV CODE- 250/636: Performed by: EMERGENCY MEDICINE

## 2020-10-27 PROCEDURE — 84484 ASSAY OF TROPONIN QUANT: CPT

## 2020-10-27 PROCEDURE — 87205 SMEAR GRAM STAIN: CPT

## 2020-10-27 PROCEDURE — 82436 ASSAY OF URINE CHLORIDE: CPT

## 2020-10-27 PROCEDURE — 36415 COLL VENOUS BLD VENIPUNCTURE: CPT

## 2020-10-27 PROCEDURE — 82570 ASSAY OF URINE CREATININE: CPT

## 2020-10-27 PROCEDURE — 93005 ELECTROCARDIOGRAM TRACING: CPT

## 2020-10-27 PROCEDURE — 74176 CT ABD & PELVIS W/O CONTRAST: CPT

## 2020-10-27 PROCEDURE — 96365 THER/PROPH/DIAG IV INF INIT: CPT

## 2020-10-27 PROCEDURE — 74011636637 HC RX REV CODE- 636/637: Performed by: STUDENT IN AN ORGANIZED HEALTH CARE EDUCATION/TRAINING PROGRAM

## 2020-10-27 PROCEDURE — 85025 COMPLETE CBC W/AUTO DIFF WBC: CPT

## 2020-10-27 PROCEDURE — 81001 URINALYSIS AUTO W/SCOPE: CPT

## 2020-10-27 PROCEDURE — 74011250636 HC RX REV CODE- 250/636: Performed by: STUDENT IN AN ORGANIZED HEALTH CARE EDUCATION/TRAINING PROGRAM

## 2020-10-27 RX ORDER — ONDANSETRON 2 MG/ML
4 INJECTION INTRAMUSCULAR; INTRAVENOUS
Status: DISCONTINUED | OUTPATIENT
Start: 2020-10-27 | End: 2020-11-03 | Stop reason: HOSPADM

## 2020-10-27 RX ORDER — AMLODIPINE BESYLATE 5 MG/1
10 TABLET ORAL DAILY
Status: DISCONTINUED | OUTPATIENT
Start: 2020-10-28 | End: 2020-10-27

## 2020-10-27 RX ORDER — DEXTROSE 50 % IN WATER (D50W) INTRAVENOUS SYRINGE
25
Status: COMPLETED | OUTPATIENT
Start: 2020-10-27 | End: 2020-10-27

## 2020-10-27 RX ORDER — ACETAMINOPHEN 325 MG/1
650 TABLET ORAL
Status: DISCONTINUED | OUTPATIENT
Start: 2020-10-27 | End: 2020-11-03 | Stop reason: HOSPADM

## 2020-10-27 RX ORDER — LABETALOL HYDROCHLORIDE 5 MG/ML
20 INJECTION, SOLUTION INTRAVENOUS
Status: DISCONTINUED | OUTPATIENT
Start: 2020-10-27 | End: 2020-11-03 | Stop reason: HOSPADM

## 2020-10-27 RX ORDER — INSULIN LISPRO 100 [IU]/ML
INJECTION, SOLUTION INTRAVENOUS; SUBCUTANEOUS
Status: DISCONTINUED | OUTPATIENT
Start: 2020-10-27 | End: 2020-11-03 | Stop reason: HOSPADM

## 2020-10-27 RX ORDER — CALCIUM GLUCONATE 94 MG/ML
1 INJECTION, SOLUTION INTRAVENOUS
Status: COMPLETED | OUTPATIENT
Start: 2020-10-27 | End: 2020-10-27

## 2020-10-27 RX ORDER — SODIUM BICARBONATE IN D5W 150/1000ML
PLASTIC BAG, INJECTION (ML) INTRAVENOUS CONTINUOUS
Status: DISCONTINUED | OUTPATIENT
Start: 2020-10-27 | End: 2020-10-29

## 2020-10-27 RX ORDER — SODIUM POLYSTYRENE SULFONATE 15 G/60ML
15 SUSPENSION ORAL; RECTAL
Status: COMPLETED | OUTPATIENT
Start: 2020-10-27 | End: 2020-10-27

## 2020-10-27 RX ORDER — DEXTROSE 50 % IN WATER (D50W) INTRAVENOUS SYRINGE
12.5-25 AS NEEDED
Status: DISCONTINUED | OUTPATIENT
Start: 2020-10-27 | End: 2020-11-03 | Stop reason: HOSPADM

## 2020-10-27 RX ORDER — ACETAMINOPHEN 650 MG/1
650 SUPPOSITORY RECTAL
Status: DISCONTINUED | OUTPATIENT
Start: 2020-10-27 | End: 2020-11-03 | Stop reason: HOSPADM

## 2020-10-27 RX ORDER — TRAMADOL HYDROCHLORIDE 50 MG/1
50 TABLET ORAL
Status: DISCONTINUED | OUTPATIENT
Start: 2020-10-27 | End: 2020-11-03 | Stop reason: HOSPADM

## 2020-10-27 RX ORDER — GUAIFENESIN 100 MG/5ML
81 LIQUID (ML) ORAL DAILY
Status: DISCONTINUED | OUTPATIENT
Start: 2020-10-28 | End: 2020-11-03 | Stop reason: HOSPADM

## 2020-10-27 RX ORDER — POLYETHYLENE GLYCOL 3350 17 G/17G
17 POWDER, FOR SOLUTION ORAL DAILY PRN
Status: DISCONTINUED | OUTPATIENT
Start: 2020-10-27 | End: 2020-11-03 | Stop reason: HOSPADM

## 2020-10-27 RX ORDER — SODIUM CHLORIDE 0.9 % (FLUSH) 0.9 %
5-40 SYRINGE (ML) INJECTION AS NEEDED
Status: DISCONTINUED | OUTPATIENT
Start: 2020-10-27 | End: 2020-11-03 | Stop reason: HOSPADM

## 2020-10-27 RX ORDER — ALBUTEROL SULFATE 0.83 MG/ML
10 SOLUTION RESPIRATORY (INHALATION)
Status: COMPLETED | OUTPATIENT
Start: 2020-10-27 | End: 2020-10-27

## 2020-10-27 RX ORDER — HEPARIN SODIUM 5000 [USP'U]/ML
5000 INJECTION, SOLUTION INTRAVENOUS; SUBCUTANEOUS EVERY 8 HOURS
Status: DISCONTINUED | OUTPATIENT
Start: 2020-10-27 | End: 2020-10-29

## 2020-10-27 RX ORDER — SODIUM CHLORIDE 0.9 % (FLUSH) 0.9 %
5-40 SYRINGE (ML) INJECTION EVERY 8 HOURS
Status: DISCONTINUED | OUTPATIENT
Start: 2020-10-27 | End: 2020-11-03 | Stop reason: HOSPADM

## 2020-10-27 RX ORDER — HYDRALAZINE HYDROCHLORIDE 20 MG/ML
20 INJECTION INTRAMUSCULAR; INTRAVENOUS
Status: DISCONTINUED | OUTPATIENT
Start: 2020-10-27 | End: 2020-11-03 | Stop reason: HOSPADM

## 2020-10-27 RX ORDER — AMLODIPINE BESYLATE 5 MG/1
5 TABLET ORAL DAILY
Status: DISCONTINUED | OUTPATIENT
Start: 2020-10-28 | End: 2020-11-03 | Stop reason: HOSPADM

## 2020-10-27 RX ORDER — PROMETHAZINE HYDROCHLORIDE 25 MG/1
12.5 TABLET ORAL
Status: DISCONTINUED | OUTPATIENT
Start: 2020-10-27 | End: 2020-11-03 | Stop reason: HOSPADM

## 2020-10-27 RX ORDER — MAGNESIUM SULFATE 100 %
4 CRYSTALS MISCELLANEOUS AS NEEDED
Status: DISCONTINUED | OUTPATIENT
Start: 2020-10-27 | End: 2020-11-03 | Stop reason: HOSPADM

## 2020-10-27 RX ORDER — SODIUM BICARBONATE 1 MEQ/ML
50 SYRINGE (ML) INTRAVENOUS ONCE
Status: COMPLETED | OUTPATIENT
Start: 2020-10-27 | End: 2020-10-27

## 2020-10-27 RX ORDER — DOCUSATE SODIUM 100 MG/1
100 CAPSULE, LIQUID FILLED ORAL
Status: DISCONTINUED | OUTPATIENT
Start: 2020-10-27 | End: 2020-11-03

## 2020-10-27 RX ADMIN — SODIUM BICARBONATE 150 MEQ/1,000 ML IN DEXTROSE 5 % INTRAVENOUS: SOLUTION at 17:12

## 2020-10-27 RX ADMIN — ALBUTEROL SULFATE 10 MG: 2.5 SOLUTION RESPIRATORY (INHALATION) at 17:30

## 2020-10-27 RX ADMIN — INSULIN LISPRO 2 UNITS: 100 INJECTION, SOLUTION INTRAVENOUS; SUBCUTANEOUS at 21:55

## 2020-10-27 RX ADMIN — CEFTRIAXONE SODIUM 1 G: 1 INJECTION, POWDER, FOR SOLUTION INTRAMUSCULAR; INTRAVENOUS at 17:13

## 2020-10-27 RX ADMIN — SODIUM CHLORIDE 1000 ML: 900 INJECTION, SOLUTION INTRAVENOUS at 17:13

## 2020-10-27 RX ADMIN — HEPARIN SODIUM 5000 UNITS: 5000 INJECTION INTRAVENOUS; SUBCUTANEOUS at 21:55

## 2020-10-27 RX ADMIN — HUMAN INSULIN 10 UNITS: 100 INJECTION, SOLUTION SUBCUTANEOUS at 17:30

## 2020-10-27 RX ADMIN — SODIUM BICARBONATE 50 MEQ: 84 INJECTION, SOLUTION INTRAVENOUS at 17:30

## 2020-10-27 RX ADMIN — SODIUM POLYSTYRENE SULFONATE 15 G: 15 SUSPENSION ORAL; RECTAL at 19:15

## 2020-10-27 RX ADMIN — DEXTROSE MONOHYDRATE 25 G: 25 INJECTION, SOLUTION INTRAVENOUS at 17:30

## 2020-10-27 RX ADMIN — CALCIUM GLUCONATE 1 G: 98 INJECTION, SOLUTION INTRAVENOUS at 17:30

## 2020-10-27 NOTE — ED NOTES
MD Ashley Ibarra with Nephrology at bedside to see patient. Consent form completed by this RN, MD Ashley Ibarra and patient for hemodialysis in case patient would need to get dialysis over night. Consent form placed on patient chart.

## 2020-10-27 NOTE — ED NOTES
Assumed care of pt via triage. Pt states she has been having a UTI that was diagnosed a couple days ago but hasnt been able to take her antibiotics d/t n/v. Pt also having decreased urine output over the last couple days. Pt denies any abdominal pain. Pt resting comfortably on stretcher in position of comfort. Pt in no apparent distress at this time. Call bell within reach. Side rails x2. Connected to monitor x2. Pt a/o x4. Stretcher locked in lowest position. Pt aware of plan to await for MD/PA-C/NP assessment, and pt/family verbalizes understanding. Will continue to monitor pt condition.

## 2020-10-27 NOTE — CONSULTS
[facility]   [id number] [facility], [facility], [facility]  Phone: [phone] NOTE     Patient: Jesús Ramirez MRN: [id number]  PCP: Diana Abraham MD   :     1945  Age:   76 y.o. Sex:  female      Referring physician: Diana Vieira MD  Reason for consultation: 76 y.o. female with No admission diagnoses are documented for this encounter. complicated by MICHELLE   Admission Date: 10/27/2020  3:27 PM  LOS: 0 days      ASSESSMENT and PLAN :     1 MICHELLE/CKD 2   - MICHELLE from ATN ?  - will intiate a full work up   - renal US in AM   - suspect ATN from Losartan+ Poor PO intake + Lasix   - start bicarb drip for AGMA    - d/w pt if her labs dont improve she may need HD   - HD consent obtained and placed in chart   - d.w also with son on phone       2 CKD 2  - from DM+ HTN   - baseline cr 1     3 Hyperkalemia  - rx with Insulin/Sodium bicarb + calcium gluconate and also D50  - Keyexalate also   - repeat k In 2 hrs nad if not better will need RRT   - starting to make urine and that is a good sign       4 DM  - hold her metformin and do ISS      5 HTN  - hold losartan and monitor         6 UTI  - pan cx and ABS as per primary team        7 BRADY with supected cirrhosis  - needs more info  - IF cirrhosis , then HRS will be in differential         8 Anemia   - will check for MM         9 Nep will follow     Care Plan discussed with:pt / ER doc/ Nurse Marsha Bush and pts son on phone      Thank you for consulting [facility] in the care of your patient. Subjective:   HPI: Jesús Ramirez is a 76 y.o.  female who has been admitted to the hospital for [facility]. She has  Been having nausea and vomiting and poor PO intake for few days. She had not been making urine for last 48 hrs and as she felt bad she came to ER> In ER she was found to have a cr of 7 and so Nep has been consulted. She is also mildy acidotic. Pt has ABD ct pending .  She has a UTI. She denies any NSAIDS use   Baseline cr is 1       Past Medical Hx:   Past Medical History:   Diagnosis Date    Arthritis     KNEE,gout    Endocrine disease     HYPOTHYROIDISM    Fracture     Left distal femur (age 12 - pt fell)    Fracture     Left tibia 1990 (pt fell)    Fracture     Right wrist fractured 2 times (pt fell)    GERD (gastroesophageal reflux disease)     Hypertension     Hypoglycemia     \"my body produces too much insulin\"    Liver disease     BRADY    Nausea & vomiting     when had gallbladder out    Osteoporosis     Other ill-defined conditions(799.89)     spinal stenosis    Other ill-defined conditions(799.89)     BBB    Other ill-defined conditions(799.89)     HIGH CHOLESTEROL    Other ill-defined conditions(799.89)     edema legs    Thyroid disease         Past Surgical Hx:     Past Surgical History:   Procedure Laterality Date    ABDOMEN SURGERY PROC UNLISTED      liver biopsy--BRADY and fibrosis    COLONOSCOPY,REMV ALVINA,SNARE  2/11/2015         COLORECTAL SCRN; HI RISK IND  2/11/2015         HX CHOLECYSTECTOMY      HX HYSTERECTOMY      HX ORTHOPAEDIC      left leg surgery - plates, screws, wires, pins in place    HX UROLOGICAL      PESSURY    UPPER GI ENDOSCOPY,BIOPSY  11/17/2015              Allergies   Allergen Reactions    Gabapentin Other (comments)    Celebrex [Celecoxib] Hives    Pcn [Penicillins] Unknown (comments)     Can't remember    Sulfa (Sulfonamide Antibiotics) Hives       Social Hx:  reports that she has never smoked. She has never used smokeless tobacco. She reports current drug use. Drugs: Prescription and OTC. She reports that she does not drink alcohol. Family History   Problem Relation Age of Onset    Diabetes Mother     Heart Disease Mother     Lung Disease Father         copd       Review of Systems:  A thorough twelve point review of system was performed today. Pertinent positives and negatives are mentioned in the HPI.  The reminder of the ROS is negative and noncontributory. Objective:    Vitals:    Vitals:    10/27/20 1414 10/27/20 1730 10/27/20 1800   BP: (!) 146/71 (!) 151/54 (!) 163/62   Pulse: 62 77 67   Resp: 14  21   Temp: 99 °F (37.2 °C)     SpO2: 100%  98%   Weight: 90.7 kg (200 lb)     Height: 5' 3\" (1.6 m)       I&O's:  No intake/output data recorded. Visit Vitals  BP (!) 163/62   Pulse 67   Temp 99 °F (37.2 °C)   Resp 21   Ht 5' 3\" (1.6 m)   Wt 90.7 kg (200 lb)   SpO2 98%   BMI 35.43 kg/m²       Physical Exam:  General:  No apparent Distress  HEENT: PERRL,  No Pallor , No Icterus  Neck: Supple,no mass palpable  Lungs : CTA  CVS: RRR, S1 S2 normal, No murmur   Abdomen: Soft, NT, BS +  Extremities:  No Edema  Skin: No rash or lesions.   MS: No joint swelling, erythema, warmth  Neurologic: non focal, AAO x 3  Psych: normal affect    Laboratory Results:    Recent Labs     10/27/20  1430   *  134*   K 6.2*  6.2*     102   CO2 19*  18*   *  122*   BUN 97*  97*   CREA 7.74*  7.72*   CA 9.3  9.3   MG 2.7*   ALB 3.9  3.9   ALT 58  58     Recent Labs     10/27/20  1430   WBC 9.9   HGB 9.9*   HCT 31.1*        Lab Results   Component Value Date/Time    Specimen Description: BLOOD 01/03/2011 06:55 AM    Specimen Description: URINE 12/01/2010 05:45 PM     Lab Results   Component Value Date/Time    Culture result: NO GROWTH 6 DAYS 01/03/2011 06:55 AM    Culture result: NO SIGNIFICANT GROWTH 12/01/2010 05:45 PM     Recent Results (from the past 24 hour(s))   CBC WITH AUTOMATED DIFF    Collection Time: 10/27/20  2:30 PM   Result Value Ref Range    WBC 9.9 3.6 - 11.0 K/uL    RBC 3.08 (L) 3.80 - 5.20 M/uL    HGB 9.9 (L) 11.5 - 16.0 g/dL    HCT 31.1 (L) 35.0 - 47.0 %    .0 (H) 80.0 - 99.0 FL    MCH 32.1 26.0 - 34.0 PG    MCHC 31.8 30.0 - 36.5 g/dL    RDW 16.2 (H) 11.5 - 14.5 %    PLATELET 316 101 - 480 K/uL    MPV 10.9 8.9 - 12.9 FL    NRBC 0.0 0  WBC    ABSOLUTE NRBC 0.00 0.00 - 0.01 K/uL NEUTROPHILS 76 (H) 32 - 75 %    LYMPHOCYTES 13 12 - 49 %    MONOCYTES 9 5 - 13 %    EOSINOPHILS 1 0 - 7 %    BASOPHILS 0 0 - 1 %    IMMATURE GRANULOCYTES 1 (H) 0.0 - 0.5 %    ABS. NEUTROPHILS 7.5 1.8 - 8.0 K/UL    ABS. LYMPHOCYTES 1.2 0.8 - 3.5 K/UL    ABS. MONOCYTES 0.9 0.0 - 1.0 K/UL    ABS. EOSINOPHILS 0.1 0.0 - 0.4 K/UL    ABS. BASOPHILS 0.0 0.0 - 0.1 K/UL    ABS. IMM. GRANS. 0.1 (H) 0.00 - 0.04 K/UL    DF AUTOMATED     METABOLIC PANEL, COMPREHENSIVE    Collection Time: 10/27/20  2:30 PM   Result Value Ref Range    Sodium 134 (L) 136 - 145 mmol/L    Potassium 6.2 (H) 3.5 - 5.1 mmol/L    Chloride 102 97 - 108 mmol/L    CO2 18 (L) 21 - 32 mmol/L    Anion gap 14 5 - 15 mmol/L    Glucose 122 (H) 65 - 100 mg/dL    BUN 97 (H) 6 - 20 MG/DL    Creatinine 7.72 (H) 0.55 - 1.02 MG/DL    BUN/Creatinine ratio 13 12 - 20      GFR est AA 6 (L) >60 ml/min/1.73m2    GFR est non-AA 5 (L) >60 ml/min/1.73m2    Calcium 9.3 8.5 - 10.1 MG/DL    Bilirubin, total 0.8 0.2 - 1.0 MG/DL    ALT (SGPT) 58 12 - 78 U/L    AST (SGOT) 60 (H) 15 - 37 U/L    Alk. phosphatase 140 (H) 45 - 117 U/L    Protein, total 7.8 6.4 - 8.2 g/dL    Albumin 3.9 3.5 - 5.0 g/dL    Globulin 3.9 2.0 - 4.0 g/dL    A-G Ratio 1.0 (L) 1.1 - 2.2     NT-PRO BNP    Collection Time: 10/27/20  2:30 PM   Result Value Ref Range    NT pro-BNP 5,168 (H) <815 PG/ML   METABOLIC PANEL, COMPREHENSIVE    Collection Time: 10/27/20  2:30 PM   Result Value Ref Range    Sodium 135 (L) 136 - 145 mmol/L    Potassium 6.2 (H) 3.5 - 5.1 mmol/L    Chloride 103 97 - 108 mmol/L    CO2 19 (L) 21 - 32 mmol/L    Anion gap 13 5 - 15 mmol/L    Glucose 122 (H) 65 - 100 mg/dL    BUN 97 (H) 6 - 20 MG/DL    Creatinine 7.74 (H) 0.55 - 1.02 MG/DL    BUN/Creatinine ratio 13 12 - 20      GFR est AA 6 (L) >60 ml/min/1.73m2    GFR est non-AA 5 (L) >60 ml/min/1.73m2    Calcium 9.3 8.5 - 10.1 MG/DL    Bilirubin, total 0.8 0.2 - 1.0 MG/DL    ALT (SGPT) 58 12 - 78 U/L    AST (SGOT) 61 (H) 15 - 37 U/L    Alk. phosphatase 146 (H) 45 - 117 U/L    Protein, total 7.7 6.4 - 8.2 g/dL    Albumin 3.9 3.5 - 5.0 g/dL    Globulin 3.8 2.0 - 4.0 g/dL    A-G Ratio 1.0 (L) 1.1 - 2.2     TROPONIN I    Collection Time: 10/27/20  2:30 PM   Result Value Ref Range    Troponin-I, Qt. <0.05 <0.05 ng/mL   MAGNESIUM    Collection Time: 10/27/20  2:30 PM   Result Value Ref Range    Magnesium 2.7 (H) 1.6 - 2.4 mg/dL   URINALYSIS W/ REFLEX CULTURE    Collection Time: 10/27/20  2:54 PM    Specimen: Miscellaneous sample; Urine    Urine specimen   Result Value Ref Range    Color YELLOW/STRAW      Appearance CLOUDY (A) CLEAR      Specific gravity 1.015 1.003 - 1.030      pH (UA) 5.0 5.0 - 8.0      Protein 100 (A) NEG mg/dL    Glucose Negative NEG mg/dL    Ketone Negative NEG mg/dL    Blood MODERATE (A) NEG      Urobilinogen 1.0 0.2 - 1.0 EU/dL    Nitrites Negative NEG      Leukocyte Esterase LARGE (A) NEG      WBC >100 (H) 0 - 4 /hpf    RBC 5-10 0 - 5 /hpf    Epithelial cells MANY (A) FEW /lpf    Bacteria 3+ (A) NEG /hpf    UA:UC IF INDICATED URINE CULTURE ORDERED (A) CNI     BILIRUBIN, CONFIRM    Collection Time: 10/27/20  2:54 PM   Result Value Ref Range    Bilirubin UA, confirm Negative NEG     EKG, 12 LEAD, INITIAL    Collection Time: 10/27/20  4:58 PM   Result Value Ref Range    Ventricular Rate 49 BPM    Atrial Rate 33 BPM    QRS Duration 156 ms    Q-T Interval 564 ms    QTC Calculation (Bezet) 509 ms    Calculated R Axis -45 degrees    Calculated T Axis 69 degrees    Diagnosis       Atrial fibrillation with slow ventricular response  Right bundle branch block  Left anterior fascicular block  ** Bifascicular block **  Voltage criteria for left ventricular hypertrophy  When compared with ECG of 03-JAN-2011 06:48,  Atrial fibrillation has replaced Sinus rhythm  Vent.  rate has decreased BY  51 BPM  Questionable change in QRS duration     GLUCOSE, POC    Collection Time: 10/27/20  5:24 PM   Result Value Ref Range    Glucose (POC) 126 (H) 65 - 100 mg/dL    Performed by Gabe Lennox EMT          Urine dipstick:   Lab Results   Component Value Date/Time    Color YELLOW/STRAW 10/27/2020 02:54 PM    Appearance CLOUDY (A) 10/27/2020 02:54 PM    Specific gravity 1.015 10/27/2020 02:54 PM    pH (UA) 5.0 10/27/2020 02:54 PM    Protein 100 (A) 10/27/2020 02:54 PM    Glucose Negative 10/27/2020 02:54 PM    Ketone Negative 10/27/2020 02:54 PM    Bilirubin NEGATIVE  11/04/2012 10:39 PM    Urobilinogen 1.0 10/27/2020 02:54 PM    Nitrites Negative 10/27/2020 02:54 PM    Leukocyte Esterase LARGE (A) 10/27/2020 02:54 PM    Epithelial cells MANY (A) 10/27/2020 02:54 PM    Bacteria 3+ (A) 10/27/2020 02:54 PM    WBC >100 (H) 10/27/2020 02:54 PM    RBC 5-10 10/27/2020 02:54 PM       I have reviewed the following: All pertinent labs, microbiology data, radiology imaging for my assessment     Medications list Personally Reviewed   [x]      Yes     []               No       Medications:  Prior to Admission medications    Medication Sig Start Date End Date Taking? Authorizing Provider   metFORMIN ER (GLUCOPHAGE XR) 500 mg tablet Take 2 Tabs by mouth two (2) times daily (after meals). For diabetes. 9/10/20   Della Sparks MD   levothyroxine (SYNTHROID) 175 mcg tablet TAKE ONE DAILY BY MOUTH. TAKE AN EXTRA 1/2 PILL ON SUNDAYS. (7.5 TABLETS/WEEK  MCG) 9/10/20   Della Sparks MD   famotidine (PEPCID) 40 mg tablet Take 1 Tab by mouth daily. 5/14/20   Della Sparks MD   colchicine (MITIGARE) 0.6 mg capsule Take 1 Cap by mouth daily. Indications: acute inflammation of the joints due to gout attack 5/11/20   Della Sparks MD   fluticasone propionate (FLONASE) 50 mcg/actuation nasal spray 2 Sprays by Both Nostrils route daily. Administer to right and left nostril. 4/6/20   Della Sparks MD   polyethylene glycol [facility]) 17 gram/dose powder Take 17 g by mouth daily.  4/6/20   Della Sparks MD   glucose blood VI test strips (ASCENSIA AUTODISC VI, ONE TOUCH ULTRA TEST VI) strip Use to check blood sugars two times a day. DX E11.9 4/6/20   Malachi Tarango MD   Blood-Glucose Meter (OneTouch Ultra2 Meter) monitoring kit Use to check blood sugars once a day. DX E11.9 3/20/20   Malachi Tarango MD   lancets (OneTouch UltraSoft Lancets) misc Use to check blood sugars once a day. DX E11.9 3/20/20   Malachi Tarango MD   glucose blood VI test strips (OneTouch Ultra Blue Test Strip) strip Use to check blood sugars once a day. DX E11.9 3/20/20   Malachi Tarango MD   atorvastatin (LIPITOR) 20 mg tablet TAKE 1 TABLET DAILY 2/27/20   Malachi Tarango MD   losartan (COZAAR) 25 mg tablet TAKE 1 TABLET BY MOUTH DAILY. 2/27/20   Malachi Tarango MD   loratadine (CLARITIN) 10 mg tablet Take 1 Tab by mouth daily. Indications: inflammation of the nose due to an allergy 2/27/20   Malachi Tarango MD   ZETIA 10 mg tablet Take 1 tablet by mouth daily. 2/27/20   Malachi Tarango MD   metOLazone (ZAROXOLYN) 5 mg tablet Take 1 Tab by mouth every Tuesday and Friday. 1/7/20   Malachi Tarango MD   FREESTYLE ELAINE 14 DAY SENSOR kit  8/22/19   Provider, Historical   ketoconazole (NIZORAL) 2 % topical cream  8/23/19   Provider, Historical   amLODIPine (NORVASC) 5 mg tablet Take 1 Tab by mouth daily. 11/18/19   Gilda Terrazas MD   cranberry extract 450 mg tab tablet Take 450 mg by mouth. Provider, Historical   docusate sodium (COLACE) 50 mg capsule Take 100 mg by mouth daily as needed for Constipation. Provider, Historical   furosemide (LASIX) 80 mg tablet Take 1 Tab by mouth daily. 6/20/19   Malachi Tarango MD   alendronate (FOSAMAX) 70 mg tablet  4/24/19   Provider, Historical   NYAMYC powder  5/15/19   Provider, Historical   diclofenac (VOLTAREN) 1 % gel  5/6/19   Provider, Historical   potassium chloride SA (MICRO-K) 10 mEq capsule Take 2 Caps by mouth daily. 3/25/19   Malachi Tarango MD   omega-3 acid ethyl esters (LOVAZA) 1 gram capsule Take 2 Caps by mouth two (2) times daily (with meals).  3/25/19   Malachi Tarango MD   Biotin 2,500 mcg cap Take  by mouth. Provider, Historical   indomethacin (INDOCIN) 25 mg capsule TAKE 1 CAPSULE BY MOUTH THREE TIMES A DAY AS NEEDED FOR PAIN. 2/27/18   Terrence Henson MD   L GASSERI/B BIFIDUM/B LONGUM ([employer] PO) Take  by mouth. Provider, Historical   vitamin E (AQUA GEMS) 400 unit capsule Take 800 Units by mouth daily. Provider, Historical   lidocaine (LIDODERM) 5 %(700 mg/patch) 1 patch by TransDERmal route every twenty-four (24) hours. Apply patch to the affected area for 12 hours a day and remove for 12 hours a day. Other, MD Janette   cholecalciferol, vitamin D3, (VITAMIN D3) 2,000 unit tab Take  by mouth. Other, MD Janette   traMADol (ULTRAM) 50 mg tablet Take 1 tablet by mouth every six (6) hours as needed for Pain. 1/12/15   Mikey To, DO   OTHER Accu-Chek Lady Plus Kit  Check blood sugar one to two times daily 9/11/14   Terrence Henson MD   MULTIVITAMIN WITH MINERALS (ONE-A-DAY 50 PLUS PO) Take  by mouth. Provider, Historical   aspirin 81 mg chewable tablet Take 81 mg by mouth daily. Provider, Historical   fluocinoNIDE (LIDEX) 0.05 % topical cream Apply  to affected area two (2) times a day. 4/9/12   Terrence Henson MD        Thank you for allowing us to participate in the care of this patient. We will follow patient. Please dont hesitate to call with any questions    Carley Pruitt MD  [facility]   [id number] [place] [address] [address]  Phone - [phone]   Fax - [phone]  www. [facility]

## 2020-10-28 ENCOUNTER — APPOINTMENT (OUTPATIENT)
Dept: ULTRASOUND IMAGING | Age: 75
DRG: 683 | End: 2020-10-28
Attending: INTERNAL MEDICINE
Payer: MEDICARE

## 2020-10-28 LAB
ALBUMIN SERPL-MCNC: 3.4 G/DL (ref 3.5–5)
ANION GAP SERPL CALC-SCNC: 15 MMOL/L (ref 5–15)
ATRIAL RATE: 33 BPM
ATRIAL RATE: 69 BPM
BACTERIA SPEC CULT: NORMAL
BASOPHILS # BLD: 0 K/UL (ref 0–0.1)
BASOPHILS NFR BLD: 0 % (ref 0–1)
BUN SERPL-MCNC: 98 MG/DL (ref 6–20)
BUN/CREAT SERPL: 13 (ref 12–20)
CALCIUM SERPL-MCNC: 9.2 MG/DL (ref 8.5–10.1)
CALCULATED P AXIS, ECG09: -21 DEGREES
CALCULATED R AXIS, ECG10: -32 DEGREES
CALCULATED R AXIS, ECG10: -45 DEGREES
CALCULATED T AXIS, ECG11: 57 DEGREES
CALCULATED T AXIS, ECG11: 69 DEGREES
CHLORIDE SERPL-SCNC: 100 MMOL/L (ref 97–108)
CK SERPL-CCNC: 132 U/L (ref 26–192)
CO2 SERPL-SCNC: 25 MMOL/L (ref 21–32)
CREAT SERPL-MCNC: 7.3 MG/DL (ref 0.55–1.02)
DIAGNOSIS, 93000: NORMAL
DIAGNOSIS, 93000: NORMAL
DIFFERENTIAL METHOD BLD: ABNORMAL
EOSINOPHIL # BLD: 0.1 K/UL (ref 0–0.4)
EOSINOPHIL #/AREA URNS HPF: NEGATIVE /[HPF]
EOSINOPHIL NFR BLD: 2 % (ref 0–7)
ERYTHROCYTE [DISTWIDTH] IN BLOOD BY AUTOMATED COUNT: 15.9 % (ref 11.5–14.5)
GLUCOSE BLD STRIP.AUTO-MCNC: 118 MG/DL (ref 65–100)
GLUCOSE BLD STRIP.AUTO-MCNC: 188 MG/DL (ref 65–100)
GLUCOSE SERPL-MCNC: 114 MG/DL (ref 65–100)
HCT VFR BLD AUTO: 24.9 % (ref 35–47)
HGB BLD-MCNC: 8.1 G/DL (ref 11.5–16)
IMM GRANULOCYTES # BLD AUTO: 0 K/UL (ref 0–0.04)
IMM GRANULOCYTES NFR BLD AUTO: 1 % (ref 0–0.5)
LACTATE BLD-SCNC: 3.64 MMOL/L (ref 0.4–2)
LYMPHOCYTES # BLD: 1 K/UL (ref 0.8–3.5)
LYMPHOCYTES NFR BLD: 23 % (ref 12–49)
MCH RBC QN AUTO: 31.6 PG (ref 26–34)
MCHC RBC AUTO-ENTMCNC: 32.5 G/DL (ref 30–36.5)
MCV RBC AUTO: 97.3 FL (ref 80–99)
MONOCYTES # BLD: 0.6 K/UL (ref 0–1)
MONOCYTES NFR BLD: 13 % (ref 5–13)
NEUTS SEG # BLD: 2.7 K/UL (ref 1.8–8)
NEUTS SEG NFR BLD: 61 % (ref 32–75)
NRBC # BLD: 0 K/UL (ref 0–0.01)
NRBC BLD-RTO: 0 PER 100 WBC
P-R INTERVAL, ECG05: 198 MS
PHOSPHATE SERPL-MCNC: 5.9 MG/DL (ref 2.6–4.7)
PLATELET # BLD AUTO: 84 K/UL (ref 150–400)
PMV BLD AUTO: 10.5 FL (ref 8.9–12.9)
POTASSIUM SERPL-SCNC: 4.7 MMOL/L (ref 3.5–5.1)
Q-T INTERVAL, ECG07: 484 MS
Q-T INTERVAL, ECG07: 564 MS
QRS DURATION, ECG06: 140 MS
QRS DURATION, ECG06: 156 MS
QTC CALCULATION (BEZET), ECG08: 509 MS
QTC CALCULATION (BEZET), ECG08: 518 MS
RBC # BLD AUTO: 2.56 M/UL (ref 3.8–5.2)
SERVICE CMNT-IMP: ABNORMAL
SERVICE CMNT-IMP: NORMAL
SODIUM SERPL-SCNC: 140 MMOL/L (ref 136–145)
VENTRICULAR RATE, ECG03: 49 BPM
VENTRICULAR RATE, ECG03: 69 BPM
WBC # BLD AUTO: 4.5 K/UL (ref 3.6–11)

## 2020-10-28 PROCEDURE — 86160 COMPLEMENT ANTIGEN: CPT

## 2020-10-28 PROCEDURE — 82962 GLUCOSE BLOOD TEST: CPT

## 2020-10-28 PROCEDURE — 84165 PROTEIN E-PHORESIS SERUM: CPT

## 2020-10-28 PROCEDURE — 82784 ASSAY IGA/IGD/IGG/IGM EACH: CPT

## 2020-10-28 PROCEDURE — 74011250637 HC RX REV CODE- 250/637: Performed by: STUDENT IN AN ORGANIZED HEALTH CARE EDUCATION/TRAINING PROGRAM

## 2020-10-28 PROCEDURE — 80069 RENAL FUNCTION PANEL: CPT

## 2020-10-28 PROCEDURE — 74011000250 HC RX REV CODE- 250: Performed by: STUDENT IN AN ORGANIZED HEALTH CARE EDUCATION/TRAINING PROGRAM

## 2020-10-28 PROCEDURE — 83516 IMMUNOASSAY NONANTIBODY: CPT

## 2020-10-28 PROCEDURE — 86162 COMPLEMENT TOTAL (CH50): CPT

## 2020-10-28 PROCEDURE — 82550 ASSAY OF CK (CPK): CPT

## 2020-10-28 PROCEDURE — 83520 IMMUNOASSAY QUANT NOS NONAB: CPT

## 2020-10-28 PROCEDURE — 36415 COLL VENOUS BLD VENIPUNCTURE: CPT

## 2020-10-28 PROCEDURE — 86038 ANTINUCLEAR ANTIBODIES: CPT

## 2020-10-28 PROCEDURE — 74011636637 HC RX REV CODE- 636/637: Performed by: STUDENT IN AN ORGANIZED HEALTH CARE EDUCATION/TRAINING PROGRAM

## 2020-10-28 PROCEDURE — 85025 COMPLETE CBC W/AUTO DIFF WBC: CPT

## 2020-10-28 PROCEDURE — 83883 ASSAY NEPHELOMETRY NOT SPEC: CPT

## 2020-10-28 PROCEDURE — 76770 US EXAM ABDO BACK WALL COMP: CPT

## 2020-10-28 PROCEDURE — 74011250636 HC RX REV CODE- 250/636: Performed by: STUDENT IN AN ORGANIZED HEALTH CARE EDUCATION/TRAINING PROGRAM

## 2020-10-28 PROCEDURE — 93005 ELECTROCARDIOGRAM TRACING: CPT

## 2020-10-28 PROCEDURE — 65660000000 HC RM CCU STEPDOWN

## 2020-10-28 PROCEDURE — 74011000258 HC RX REV CODE- 258: Performed by: STUDENT IN AN ORGANIZED HEALTH CARE EDUCATION/TRAINING PROGRAM

## 2020-10-28 RX ORDER — CIPROFLOXACIN 500 MG/1
500 TABLET ORAL 2 TIMES DAILY
COMMUNITY
End: 2020-11-03

## 2020-10-28 RX ADMIN — INSULIN LISPRO 2 UNITS: 100 INJECTION, SOLUTION INTRAVENOUS; SUBCUTANEOUS at 17:17

## 2020-10-28 RX ADMIN — CEFTRIAXONE 1 G: 1 INJECTION, POWDER, FOR SOLUTION INTRAMUSCULAR; INTRAVENOUS at 17:17

## 2020-10-28 RX ADMIN — HEPARIN SODIUM 5000 UNITS: 5000 INJECTION INTRAVENOUS; SUBCUTANEOUS at 10:34

## 2020-10-28 RX ADMIN — HEPARIN SODIUM 5000 UNITS: 5000 INJECTION INTRAVENOUS; SUBCUTANEOUS at 17:21

## 2020-10-28 RX ADMIN — Medication 10 ML: at 21:26

## 2020-10-28 RX ADMIN — INSULIN LISPRO 2 UNITS: 100 INJECTION, SOLUTION INTRAVENOUS; SUBCUTANEOUS at 12:55

## 2020-10-28 RX ADMIN — INSULIN LISPRO 2 UNITS: 100 INJECTION, SOLUTION INTRAVENOUS; SUBCUTANEOUS at 10:44

## 2020-10-28 RX ADMIN — AMLODIPINE BESYLATE 5 MG: 5 TABLET ORAL at 10:34

## 2020-10-28 RX ADMIN — SODIUM BICARBONATE 150 MEQ/1,000 ML IN DEXTROSE 5 % INTRAVENOUS: SOLUTION at 04:26

## 2020-10-28 RX ADMIN — SODIUM BICARBONATE 150 MEQ/1,000 ML IN DEXTROSE 5 % INTRAVENOUS: SOLUTION at 14:38

## 2020-10-28 RX ADMIN — Medication 10 ML: at 10:50

## 2020-10-28 RX ADMIN — LEVOTHYROXINE SODIUM 175 MCG: 0.15 TABLET ORAL at 10:47

## 2020-10-28 RX ADMIN — Medication 5 ML: at 14:00

## 2020-10-28 RX ADMIN — ASPIRIN 81 MG: 81 TABLET, CHEWABLE ORAL at 10:34

## 2020-10-28 NOTE — ED NOTES
Bedside shift change report given to [address] (oncoming nurse) by Molly Gee (offgoing nurse). Report included the following information SBAR, Kardex, ED Summary, [facility] and Recent Results.

## 2020-10-28 NOTE — ED NOTES
Bedside and Verbal report given to [place], RN (oncoming nurse) by Seamus Bacon RN (offgoing nurse). Report included the following information SBAR, ED Summary, MAR and Recent Results.

## 2020-10-28 NOTE — PROGRESS NOTES
Hospitalist Progress Note    NAME: Jesús Ramirez   :  1945   MRN:  [id number]       Assessment / Plan:  Acute kidney injury  - likely prerenal due to vomiting and poor oral intake  - Cr 7.7 and BUN 97. CT abd no hydronephrosis   - Admit to tele  - IV fluid resuscitation  - Check urine elctrolytes  - Avoid nephrotoxic mes  - Monitor renal function and urine out put  - Nephrology consulted    10/28:  Cr still significantly elevated  Nephrology evals appreciated  Continue with IVF     Hyperkalemia - resolved  - secondary to MICHELLE  - EKG changes noted, RBBB, ?junctional rhythm, peaked T waves  - Received Ca gluconate, insulin, albuterol, and bicarb at the ED  - Repeat K at 4.9. Continue to monitor. Repeat EKG     ?Pyelonephritis   - nausea, vomiting, and urinalyris suggestive of UTI  - was unable to tolerate oral antibiotics  - send urine culture  - ceftriaxone    10/28: Follow up CX results  Continue empiric abx     Masslike enlargement of pancrease  - Incidental CT finding  - no hx of smoking or alcohol. No family hx of pancreatic cancer. No recent weight loss. - further workup once renal function improves    10/28:  Discussed results with pt and the need for further workup which she is in agreement with - will ask for Oncology evaluation     Hypertension  - hold losartan and lasix due to MICHELLE  - continue amlodipine  - PRN hydralazine and labetalol     Type 2 DM  - hold metfromin  - sliding scale insulin     BRADY  - hx of BRADY. CT abd shows heaptic cirrhosis. LFT wnl. Platelet wnl. Elevated AST, ALT, and AlkP. - Outpt f/u      Hx of Gout      30.0 - 39.9 Obese / Body mass index is 35.43 kg/m². Code status: Full  Prophylaxis: Hep SQ  Recommended Disposition: tbd     Subjective:     Chief Complaint / Reason for Physician Visit  Doing well this morning but is very concerned about the CT results. Discussed with RN events overnight.      Review of Systems:  Symptom Y/N Comments  Symptom Y/N Comments Fever/Chills n   Chest Pain n    Poor Appetite    Edema     Cough n   Abdominal Pain n    Sputum    Joint Pain     SOB/HENLEY n   Pruritis/Rash     Nausea/vomit    Tolerating PT/OT     Diarrhea    Tolerating Diet     Constipation    Other       Could NOT obtain due to:      Objective:     VITALS:   Last 24hrs VS reviewed since prior progress note.  Most recent are:  Patient Vitals for the past 24 hrs:   Temp Pulse Resp BP SpO2   10/28/20 1245 98.9 °F (37.2 °C) 73 24 (!) 143/52 96 %   10/28/20 1230  74 20  97 %   10/28/20 1215  73 13 (!) 174/137 96 %   10/28/20 1200  65 21 (!) 122/51 96 %   10/28/20 1145  65 19 (!) 135/57 96 %   10/28/20 1130  66 16 (!) 147/129 98 %   10/28/20 1115  64 20 139/60 98 %   10/28/20 1100  66 15 (!) 125/58 99 %   10/28/20 1045  67 22 (!) 138/58 98 %   10/28/20 1034  65  (!) 127/50    10/28/20 1030  65 21 (!) 127/50 97 %   10/28/20 1015  65 19 (!) 121/110 96 %   10/28/20 1000  65 19 (!) 133/49 95 %   10/28/20 0945  66 19 (!) 128/53 95 %   10/28/20 0930  68 22 109/87 96 %   10/28/20 0915  68 16 (!) 143/50 95 %   10/28/20 0900  69 17 125/69 96 %   10/28/20 0845  67 23 103/74 96 %   10/28/20 0830 99.2 °F (37.3 °C) 95 21 (!) 134/42 95 %   10/28/20 0815  95 21 (!) 140/41 96 %   10/28/20 0800  66 21 (!) 148/57 96 %   10/28/20 0745  65 19 (!) 127/50 96 %   10/28/20 0730  61 16 (!) 114/56 95 %   10/28/20 0430  68 21 (!) 133/55 96 %   10/28/20 0200  67 18 (!) 138/56 96 %   10/28/20 0015  67 20 (!) 133/55 97 %   10/27/20 2345  69 28 (!) 141/57 97 %   10/27/20 2300  69 19 (!) 146/55 96 %   10/27/20 2145  73 29 (!) 155/50 98 %   10/27/20 2131  78 17 (!) 179/49 98 %   10/27/20 2115  75 23 (!) 119/93 99 %   10/27/20 2030  75 20 (!) 167/70 98 %   10/27/20 1900  80 26 (!) 151/55 100 %   10/27/20 1800  67 21 (!) 163/62 98 %   10/27/20 1730  77  (!) 151/54    10/27/20 1414 99 °F (37.2 °C) 62 14 (!) 146/71 100 %       Intake/Output Summary (Last 24 hours) at 10/28/2020 1326  Last data filed at 10/28/2020 0433  Gross per 24 hour   Intake 1050 ml   Output 610 ml   Net 440 ml        PHYSICAL EXAM:  General: Alert, cooperative, no acute distress    EENT:  EOMI. Anicteric sclerae. MMM  Resp:  CTA bilaterally, no wheezing or rales. No accessory muscle use  CV:  Regular  rhythm,  No edema  GI:  Soft, Non distended, Non tender.  +Bowel sounds  Neurologic:  Alert and oriented X 3, normal speech,   Psych:   Good insight. Not anxious nor agitated  Skin:  No rashes. No jaundice    Reviewed most current lab test results and cultures  YES  Reviewed most current radiology test results   YES  Review and summation of old records today    NO  Reviewed patient's current orders and MAR    YES  PMH/SH reviewed - no change compared to H&P  ________________________________________________________________________  Care Plan discussed with:    Comments   Patient     Family      RN     Care Manager     Consultant                        Multidiciplinary team rounds were held today with , nursing, pharmacist and clinical coordinator. Patient's plan of care was discussed; medications were reviewed and discharge planning was addressed. ________________________________________________________________________  Total NON critical care TIME:  35   Minutes    Total CRITICAL CARE TIME Spent:   Minutes non procedure based      Comments   >50% of visit spent in counseling and coordination of care     ________________________________________________________________________  Tabitha Gongora MD     Procedures: see electronic medical records for all procedures/Xrays and details which were not copied into this note but were reviewed prior to creation of Plan. LABS:  I reviewed today's most current labs and imaging studies.   Pertinent labs include:  Recent Labs     10/28/20  0410 10/27/20  1430   WBC 4.5 9.9   HGB 8.1* 9.9*   HCT 24.9* 31.1*   PLT 84* 186     Recent Labs     10/28/20  0410 10/27/20  1900 10/27/20  1430    138 135*  134*   K 4.7 4.9 6.2*  6.2*    104 103  102   CO2 25 19* 19*  18*   * 146* 122*  122*   BUN 98* 96* 97*  97*   CREA 7.30* 7.67* 7.74*  7.72*   CA 9.2 9.0 9.3  9.3   MG  --   --  2.7*   PHOS 5.9*  --   --    ALB 3.4*  --  3.9  3.9   TBILI  --   --  0.8  0.8   ALT  --   --  58  58       Signed: Biju Albrecht MD

## 2020-10-28 NOTE — PROGRESS NOTES
Problem: Pressure Injury - Risk of  Goal: *Prevention of pressure injury  Description: Document Boris Scale and appropriate interventions in the flowsheet.   Outcome: Progressing Towards Goal  Note: Pressure Injury Interventions:  Sensory Interventions: Assess changes in LOC, Float heels, Minimize linen layers, Monitor skin under medical devices, Sit a 90-degree angle/use footstool if needed, Suspension boots    Moisture Interventions: Absorbent underpads, Apply protective barrier, creams and emollients, Internal/External urinary devices, Limit adult briefs, Minimize layers, Offer toileting Q_hr    Activity Interventions: Assess need for specialty bed, Increase time out of bed, Pressure redistribution bed/mattress(bed type), PT/OT evaluation    Mobility Interventions: Assess need for specialty bed, Float heels, HOB 30 degrees or less, Pressure redistribution bed/mattress (bed type), PT/OT evaluation    Nutrition Interventions: Document food/fluid/supplement intake    Friction and Shear Interventions: Apply protective barrier, creams and emollients, Feet elevated on foot rest, Foam dressings/transparent film/skin sealants, Transfer aides:transfer board/Lazaro lift/ceiling lift, Trapeze to reposition

## 2020-10-28 NOTE — ACP (ADVANCE CARE PLANNING)
Advance Care Planning Note      NAME: Olegario Gilliam   :  1945   MRN:  [id number]     Date/Time:  10/28/2020 12:45 AM    Active Diagnoses:  Hospital Problems  Date Reviewed: 2019          Codes Class Noted POA    MICHELLE (acute kidney injury) [facility]) ICD-10-CM: N17.9  ICD-9-CM: 584.9  10/27/2020 Unknown              These active diagnoses are of sufficient risk that focused discussion on advance care planning is indicated in order to allow the patient to thoughtfully consider personal goals of care, and if situations arise that prevent the ability to personally give input, to ensure appropriate representation of their personal desires for different levels and aggressiveness of care. Discussion:   Code status addressed and wants to be a Full Code. Patient wants central line and vasopressors if needed. Patient would also want a feeding tube, if needed, for nutritional support. Patient  would like to assign her Son Olegario Singh as the surrogate decision maker. Persons present and participating in discussion: Jennifer Jonas MD.      Time Spent:   Total time spent face-to-face in education and discussion: 16  minutes.          Cinthya Gallagher MD   Hospitalist

## 2020-10-28 NOTE — PROGRESS NOTES
APURVA Plan:    - Home with HH vs. Home with outpatient follow up appts  - Recommending PT/OT consults to assist with disposition  - OP F/u Appt: PCP, Nephrology?  - Pt's son, Honey Gorman, or friend, Nguyễn March will transport at d/c  - 2nd IMM letter prior to d/c        Reason for Admission:  Nausea/vomiting                    RUR Score: 18% low risk for readmission                     Plan for utilizing home health: Pt had previous [facility] hx in the late 1990's. Pt feels that [facility] services at d/ would be beneficial for her and is agreeable to services if recommended. CM recommending PT/OT evaluations          PCP: First and Last name: Michelle Yates MD    Name of Practice: [facility]   Are you a current patient: Yes/No: Yes   Approximate date of last visit: Virtual visit in April or May 2020, pt cannot recall. States that she has next PCP appt on November 12th, 2020   Can you participate in a virtual visit with your PCP: Yes                    Current Advanced Directive/Advance Care Plan: No ACP documents on file. Pt states that her son, Zurdo Eubanks, is surrogate decision maker. CM discussed AMD with pt and offered to assist with addressing AMD. Pt declined to address AMD at this time, and states that she likely will address with her PCP. Transition of Care Plan: Pt to return home with outpatient follow up appointments, possible [facility] services if recommended. CM met with pt at bedside to introduce self/role, verify demographics, and discuss discharge planning. Pt is a 76 y.o. who presented to [facility] ED with nausea and vomiting. Pt's PMHx is significant for hypertension, type 2 DM, liver disease, arthritis, etc.     Pt's APURVA plan is to return home with possible [facility] services and outpatient follow up appts. Pt lives at [facility] in an independent living apartment on the 3rd floor with elevator access.  Pt lives alone, but has assistance with cleaning her apartment through a cleaning service that comes every 2 weeks. Pt has a rollator, which she uses to ambulate with in the apartment. Pt also has a shower chair. Pt states that her son and her friend, Army Dhillon, are her main supports. Pt's son assists with ordering food/briefs/other supplies for pt, and pt's friend Army Dhillon assists with transporting pt to appointments and going to grocery store for pt. Pt does not drive. No SNF/IRF hx. Pt is interested and agreeable to home health services being arranged at d/c if recommended by therapy. Pt is active with her PCP, Dr. Kathy Campoverde, and states that her next appt is on Nov. 12th. Pt states that she sees a hepatologist at [facility]. Pt's pharmacy preference is [facility] (p. [phone]). Pt voiced no concerns regarding returning home. Pt requested an application for a care card, CM provided application to pt. CM to continue to follow for [facility] planning needs. Care Management Interventions  PCP Verified by CM: Yes  Palliative Care Criteria Met (RRAT>21 & CHF Dx)?: No  Mode of Transport at Discharge:  Other (see comment)(son, or friend Army Dhillon)  Transition of Care Consult (CM Consult): Discharge Planning  Discharge Durable Medical Equipment: No(Pt has a rollator and shower chair )  Physical Therapy Consult: No  Occupational Therapy Consult: No  Speech Therapy Consult: No  Current Support Network: Lives Alone, Family Lives Nearby(Has support from son and friend, Army Dhillon)  Confirm Follow Up Transport: Friends(friend, Army Dhillon)  Discharge Location  Discharge Placement: Other:([facility] with [facility] )        [facility], [address]

## 2020-10-28 NOTE — PROGRESS NOTES
Pharmacy Clarification of the Prior to Admission Medication Regimen Retrospective to the Admission Medication Reconciliation    The patient was interviewed regarding clarification of the prior to admission medication regimen. Elton Sebastian was questioned regarding use of any other inhalers, topical products, over the counter medications, herbal medications, vitamin products or ophthalmic/nasal/otic medication use. Information Obtained From: query, patient, list from patient    Recommendations/Findings: The following amendments were made to the patient's active medication list on file at [facility]:     1) Additions:   cipro      2) Removals: Fosamax   Cranberry extract  lidex  nyamyc  Diabetic supplies      3) Changes:      4) Pertinent Pharmacy Findings:  Updated patients preferred outpatient pharmacy to: [facility] medication list was corrected to the following:     Prior to Admission Medications   Prescriptions Last Dose Informant Taking? Biotin 2,500 mcg cap 10/26/2020 at Unknown time Self Yes   Sig: Take  by mouth. L GASSERI/B BIFIDUM/B LONGUM (TRINH' [address]) 10/26/2020 at Unknown time Self Yes   Sig: Take 1 Tab by mouth daily. MULTIVITAMIN WITH MINERALS (ONE-A-DAY 50 PLUS PO) 10/26/2020 at Unknown time Self Yes   Sig: Take 1 Tab by mouth daily. ZETIA 10 mg tablet 10/26/2020 at Unknown time Self Yes   Sig: Take 1 tablet by mouth daily. amLODIPine (NORVASC) 5 mg tablet 10/26/2020 at Unknown time Self Yes   Sig: Take 1 Tab by mouth daily. aspirin 81 mg chewable tablet 10/26/2020 at Unknown time Self Yes   Sig: Take 81 mg by mouth daily. atorvastatin (LIPITOR) 20 mg tablet 10/26/2020 at Unknown time Self Yes   Sig: TAKE 1 TABLET DAILY   cholecalciferol, vitamin D3, (VITAMIN D3) 2,000 unit tab 10/26/2020 at Unknown time Self Yes   Sig: Take 1 Tab by mouth daily.    ciprofloxacin HCl (Cipro) 500 mg tablet 10/26/2020 at Unknown time Self Yes   Sig: Take 500 mg by mouth two (2) times a day. colchicine (MITIGARE) 0.6 mg capsule 10/26/2020 at Unknown time Self Yes   Sig: Take 1 Cap by mouth daily. Indications: acute inflammation of the joints due to gout attack   diclofenac (VOLTAREN) 1 % gel 2020 at Unknown time Self Yes   Sig: Apply 2 g to affected area four (4) times daily as needed. docusate sodium (COLACE) 50 mg capsule 10/26/2020 at Unknown time Self Yes   Sig: Take 100 mg by mouth daily as needed for Constipation. famotidine (PEPCID) 40 mg tablet 10/26/2020 at Unknown time Self Yes   Sig: Take 1 Tab by mouth daily. fluticasone propionate (FLONASE) 50 mcg/actuation nasal spray 2020 at Unknown time Self Yes   Si Sprays by Both Nostrils route daily. Administer to right and left nostril. furosemide (LASIX) 80 mg tablet 10/21/2020 at Unknown time Self Yes   Sig: Take 1 Tab by mouth daily. indomethacin (INDOCIN) 25 mg capsule Unknown at Unknown time Self No   Sig: TAKE 1 CAPSULE BY MOUTH THREE TIMES A DAY AS NEEDED FOR PAIN.   ketoconazole (NIZORAL) 2 % topical cream  Self No   Sig: Apply  to affected area daily as needed. levothyroxine (SYNTHROID) 175 mcg tablet 10/26/2020 at Unknown time Self Yes   Sig: TAKE ONE DAILY BY MOUTH. TAKE AN EXTRA 1/2 PILL ON SUNDAYS. (7.5 TABLETS/WEEK  MCG)   lidocaine (LIDODERM) 5 %(700 mg/patch) 10/26/2020 at Unknown time Self Yes   Si patch by TransDERmal route every twenty-four (24) hours. Apply patch to the affected area for 12 hours a day and remove for 12 hours a day. loratadine (CLARITIN) 10 mg tablet 10/26/2020 at Unknown time Self Yes   Sig: Take 1 Tab by mouth daily. Indications: inflammation of the nose due to an allergy   losartan (COZAAR) 25 mg tablet 10/26/2020 at Unknown time Self Yes   Sig: TAKE 1 TABLET BY MOUTH DAILY. metFORMIN ER (GLUCOPHAGE XR) 500 mg tablet 10/26/2020 at Unknown time Self Yes   Sig: Take 2 Tabs by mouth two (2) times daily (after meals). For diabetes.    metOLazone (ZAROXOLYN) 5 mg tablet 10/21/2020 at Unknown time Self Yes   Sig: Take 1 Tab by mouth every Tuesday and Friday. omega-3 acid ethyl esters (LOVAZA) 1 gram capsule 10/26/2020 at Unknown time Self Yes   Sig: Take 2 Caps by mouth two (2) times daily (with meals). polyethylene glycol (MIRALAX) 17 gram/dose powder 10/26/2020 at Unknown time Self Yes   Sig: Take 17 g by mouth daily. potassium chloride SA (MICRO-K) 10 mEq capsule 10/24/2020 at Unknown time Self Yes   Sig: Take 2 Caps by mouth daily. traMADol (ULTRAM) 50 mg tablet 9/28/2020 at Unknown time Self Yes   Sig: Take 1 tablet by mouth every six (6) hours as needed for Pain.   vitamin E (AQUA GEMS) 400 unit capsule 10/26/2020 at Unknown time Self Yes   Sig: Take 800 Units by mouth daily.       Facility-Administered Medications: None          Thank you,  Tonya Toth CPHT  Medication History Pharmacy Technician

## 2020-10-28 NOTE — PROGRESS NOTES
NEPHROLOGY PROGRESS NOTE     Patient: Jhony Elkins MRN: [id number]  PCP: Shaun Mayen MD   :     1945  Age:   76 y.o. Sex:  female      Referring physician: Dain Klinefelter, MD    Admission Date: 10/27/2020  3:27 PM  LOS: 1 day      ASSESSMENT and PLAN :     MICHELLE on CKD  - suspect ATN from prolonged GI losses+poor PO intake+ Losartan+ Lasix   - improved  from hyperkalemia and acidosis w/ bicarb drip. 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       Subjective:   Feeling somewhat better this AM  No recurrent N/V  No diarrhea  No SOB      Past Medical Hx:   Past Medical History:   Diagnosis Date    Arthritis     KNEE,gout    Endocrine disease     HYPOTHYROIDISM    Fracture     Left distal femur (age 12 - pt fell)    Fracture     Left tibia  (pt fell)    Fracture     Right wrist fractured 2 times (pt fell)    GERD (gastroesophageal reflux disease)     Hypertension     Hypoglycemia     \"my body produces too much insulin\"    Liver disease     BRADY    Nausea & vomiting     when had gallbladder out    Osteoporosis     Other ill-defined conditions(799.89)     spinal stenosis    Other ill-defined conditions(799.89)     BBB    Other ill-defined conditions(799.89)     HIGH CHOLESTEROL    Other ill-defined conditions(799.89)     edema legs    Thyroid disease         Past Surgical Hx:     Past Surgical History:   Procedure Laterality Date    ABDOMEN SURGERY PROC UNLISTED      liver biopsy--BRADY and fibrosis    COLONOSCOPY,GATITO TINSLEY,LALI  2015         COLORECTAL SCRN; HI RISK IND  2015         HX CHOLECYSTECTOMY      HX HYSTERECTOMY      HX ORTHOPAEDIC      left leg surgery - plates, screws, wires, pins in place    HX UROLOGICAL      PESSURY    UPPER GI ENDOSCOPY,BIOPSY  11/17/2015              Allergies   Allergen Reactions    Gabapentin Other (comments)    Celebrex [Celecoxib] Hives    Pcn [Penicillins] Unknown (comments)     Can't remember    Sulfa (Sulfonamide Antibiotics) Hives       Social Hx:  reports that she has never smoked. She has never used smokeless tobacco. She reports current drug use. Drugs: Prescription and OTC. She reports that she does not drink alcohol. Family History   Problem Relation Age of Onset    Diabetes Mother     Heart Disease Mother     Lung Disease Father         copd       Review of Systems:  A thorough twelve point review of system was performed today. Pertinent positives and negatives are mentioned in the HPI. The reminder of the ROS is negative and noncontributory. Objective:    Vitals:    Vitals:    10/28/20 0945 10/28/20 1000 10/28/20 1015 10/28/20 1034   BP: (!) 128/53 (!) 133/49 (!) 121/110 (!) 127/50   Pulse: 66 65 65 65   Resp: 19 19 19    Temp:       SpO2: 95% 95% 96%    Weight:       Height:         I&O's:  10/27 0701 - 10/28 0700  In: 1050 [I.V.:1050]  Out: 56 [Urine:610]  Visit Vitals  BP (!) 127/50   Pulse 65   Temp 99 °F (37.2 °C)   Resp 19   Ht 5' 3\" (1.6 m)   Wt 90.7 kg (200 lb)   SpO2 96%   BMI 35.43 kg/m²       Physical Exam:  General: AAOx3, in NAD   HEENT: PERRL,  No Pallor , No Icterus  Neck: Supple,no mass palpable  Lungs : CTA  CVS: RRR  Abdomen: Soft, NT, BS +  Extremities:  No Edema  Skin: No rash or lesions.   Neurologic: non focal, AAO x 3  Psych: normal affect    Laboratory Results:    Recent Labs     10/28/20  0410 10/27/20  1900 10/27/20  1430    138 135*  134*   K 4.7 4.9 6.2*  6.2*    104 103  102   CO2 25 19* 19*  18*   * 146* 122*  122*   BUN 98* 96* 97*  97*   CREA 7.30* 7.67* 7.74*  7.72*   CA 9.2 9.0 9.3  9.3   MG --   --  2.7*   PHOS 5.9*  --   --    ALB 3.4*  --  3.9  3.9   ALT  --   --  58  58     Recent Labs     10/28/20  0410 10/27/20  1430   WBC 4.5 9.9   HGB 8.1* 9.9*   HCT 24.9* 31.1*   PLT 84* 186     Lab Results   Component Value Date/Time    Specimen Description: BLOOD 01/03/2011 06:55 AM    Specimen Description: URINE 12/01/2010 05:45 PM     Lab Results   Component Value Date/Time    Culture result: NO GROWTH 6 DAYS 01/03/2011 06:55 AM    Culture result: NO SIGNIFICANT GROWTH 12/01/2010 05:45 PM     Recent Results (from the past 24 hour(s))   CBC WITH AUTOMATED DIFF    Collection Time: 10/27/20  2:30 PM   Result Value Ref Range    WBC 9.9 3.6 - 11.0 K/uL    RBC 3.08 (L) 3.80 - 5.20 M/uL    HGB 9.9 (L) 11.5 - 16.0 g/dL    HCT 31.1 (L) 35.0 - 47.0 %    .0 (H) 80.0 - 99.0 FL    MCH 32.1 26.0 - 34.0 PG    MCHC 31.8 30.0 - 36.5 g/dL    RDW 16.2 (H) 11.5 - 14.5 %    PLATELET 955 561 - 905 K/uL    MPV 10.9 8.9 - 12.9 FL    NRBC 0.0 0  WBC    ABSOLUTE NRBC 0.00 0.00 - 0.01 K/uL    NEUTROPHILS 76 (H) 32 - 75 %    LYMPHOCYTES 13 12 - 49 %    MONOCYTES 9 5 - 13 %    EOSINOPHILS 1 0 - 7 %    BASOPHILS 0 0 - 1 %    IMMATURE GRANULOCYTES 1 (H) 0.0 - 0.5 %    ABS. NEUTROPHILS 7.5 1.8 - 8.0 K/UL    ABS. LYMPHOCYTES 1.2 0.8 - 3.5 K/UL    ABS. MONOCYTES 0.9 0.0 - 1.0 K/UL    ABS. EOSINOPHILS 0.1 0.0 - 0.4 K/UL    ABS. BASOPHILS 0.0 0.0 - 0.1 K/UL    ABS. IMM.  GRANS. 0.1 (H) 0.00 - 0.04 K/UL    DF AUTOMATED     METABOLIC PANEL, COMPREHENSIVE    Collection Time: 10/27/20  2:30 PM   Result Value Ref Range    Sodium 134 (L) 136 - 145 mmol/L    Potassium 6.2 (H) 3.5 - 5.1 mmol/L    Chloride 102 97 - 108 mmol/L    CO2 18 (L) 21 - 32 mmol/L    Anion gap 14 5 - 15 mmol/L    Glucose 122 (H) 65 - 100 mg/dL    BUN 97 (H) 6 - 20 MG/DL    Creatinine 7.72 (H) 0.55 - 1.02 MG/DL    BUN/Creatinine ratio 13 12 - 20      GFR est AA 6 (L) >60 ml/min/1.73m2    GFR est non-AA 5 (L) >60 ml/min/1.73m2    Calcium 9.3 8.5 - 10.1 MG/DL Bilirubin, total 0.8 0.2 - 1.0 MG/DL    ALT (SGPT) 58 12 - 78 U/L    AST (SGOT) 60 (H) 15 - 37 U/L    Alk. phosphatase 140 (H) 45 - 117 U/L    Protein, total 7.8 6.4 - 8.2 g/dL    Albumin 3.9 3.5 - 5.0 g/dL    Globulin 3.9 2.0 - 4.0 g/dL    A-G Ratio 1.0 (L) 1.1 - 2.2     NT-PRO BNP    Collection Time: 10/27/20  2:30 PM   Result Value Ref Range    NT pro-BNP 5,168 (H) <892 PG/ML   METABOLIC PANEL, COMPREHENSIVE    Collection Time: 10/27/20  2:30 PM   Result Value Ref Range    Sodium 135 (L) 136 - 145 mmol/L    Potassium 6.2 (H) 3.5 - 5.1 mmol/L    Chloride 103 97 - 108 mmol/L    CO2 19 (L) 21 - 32 mmol/L    Anion gap 13 5 - 15 mmol/L    Glucose 122 (H) 65 - 100 mg/dL    BUN 97 (H) 6 - 20 MG/DL    Creatinine 7.74 (H) 0.55 - 1.02 MG/DL    BUN/Creatinine ratio 13 12 - 20      GFR est AA 6 (L) >60 ml/min/1.73m2    GFR est non-AA 5 (L) >60 ml/min/1.73m2    Calcium 9.3 8.5 - 10.1 MG/DL    Bilirubin, total 0.8 0.2 - 1.0 MG/DL    ALT (SGPT) 58 12 - 78 U/L    AST (SGOT) 61 (H) 15 - 37 U/L    Alk.  phosphatase 146 (H) 45 - 117 U/L    Protein, total 7.7 6.4 - 8.2 g/dL    Albumin 3.9 3.5 - 5.0 g/dL    Globulin 3.8 2.0 - 4.0 g/dL    A-G Ratio 1.0 (L) 1.1 - 2.2     TROPONIN I    Collection Time: 10/27/20  2:30 PM   Result Value Ref Range    Troponin-I, Qt. <0.05 <0.05 ng/mL   MAGNESIUM    Collection Time: 10/27/20  2:30 PM   Result Value Ref Range    Magnesium 2.7 (H) 1.6 - 2.4 mg/dL   URINALYSIS W/ REFLEX CULTURE    Collection Time: 10/27/20  2:54 PM    Specimen: Miscellaneous sample; Urine    Urine specimen   Result Value Ref Range    Color YELLOW/STRAW      Appearance CLOUDY (A) CLEAR      Specific gravity 1.015 1.003 - 1.030      pH (UA) 5.0 5.0 - 8.0      Protein 100 (A) NEG mg/dL    Glucose Negative NEG mg/dL    Ketone Negative NEG mg/dL    Blood MODERATE (A) NEG      Urobilinogen 1.0 0.2 - 1.0 EU/dL    Nitrites Negative NEG      Leukocyte Esterase LARGE (A) NEG      WBC >100 (H) 0 - 4 /hpf    RBC 5-10 0 - 5 /hpf Epithelial cells MANY (A) FEW /lpf    Bacteria 3+ (A) NEG /hpf    UA:UC IF INDICATED URINE CULTURE ORDERED (A) CNI     BILIRUBIN, CONFIRM    Collection Time: 10/27/20  2:54 PM   Result Value Ref Range    Bilirubin UA, confirm Negative NEG     EKG, 12 LEAD, INITIAL    Collection Time: 10/27/20  4:58 PM   Result Value Ref Range    Ventricular Rate 49 BPM    Atrial Rate 33 BPM    QRS Duration 156 ms    Q-T Interval 564 ms    QTC Calculation (Bezet) 509 ms    Calculated R Axis -45 degrees    Calculated T Axis 69 degrees    Diagnosis       Atrial fibrillation with slow ventricular response  Right bundle branch block  Left anterior fascicular block  ** Bifascicular block **  Voltage criteria for left ventricular hypertrophy  When compared with ECG of 03-JAN-2011 06:48,  Atrial fibrillation has replaced Sinus rhythm  Vent.  rate has decreased BY  51 BPM  Questionable change in QRS duration  Confirmed by Edward Zhang ([id number]) on 10/28/2020 9:28:25 AM     GLUCOSE, POC    Collection Time: 10/27/20  5:24 PM   Result Value Ref Range    Glucose (POC) 126 (H) 65 - 100 mg/dL    Performed by Salma HESTER    CHLORIDE, URINE RANDOM    Collection Time: 10/27/20  6:59 PM   Result Value Ref Range    Chloride,urine random 33 MMOL/L   EOSINOPHILS, URINE    Collection Time: 10/27/20  6:59 PM   Result Value Ref Range    Eosinophils,urine Negative     CREATININE, UR, RANDOM    Collection Time: 10/27/20  6:59 PM   Result Value Ref Range    Creatinine, urine 69.60 mg/dL   MICROALBUMIN, UR, RAND W/ MICROALB/CREAT RATIO    Collection Time: 10/27/20  6:59 PM   Result Value Ref Range    Microalbumin,urine random 84.50 MG/DL    Creatinine, urine 71.00 mg/dL    Microalbumin/Creat ratio (mg/g creat) 1,190 (H) 0 - 30 mg/g   METABOLIC PANEL, BASIC    Collection Time: 10/27/20  7:00 PM   Result Value Ref Range    Sodium 138 136 - 145 mmol/L    Potassium 4.9 3.5 - 5.1 mmol/L    Chloride 104 97 - 108 mmol/L    CO2 19 (L) 21 - 32 mmol/L    Anion gap 15 5 - 15 mmol/L    Glucose 146 (H) 65 - 100 mg/dL    BUN 96 (H) 6 - 20 MG/DL    Creatinine 7.67 (H) 0.55 - 1.02 MG/DL    BUN/Creatinine ratio 13 12 - 20      GFR est AA 6 (L) >60 ml/min/1.73m2    GFR est non-AA 5 (L) >60 ml/min/1.73m2    Calcium 9.0 8.5 - 10.1 MG/DL   GLUCOSE, POC    Collection Time: 10/27/20  8:59 PM   Result Value Ref Range    Glucose (POC) 160 (H) 65 - 100 mg/dL    Performed by Larisa Patel RN    GLUCOSE, POC    Collection Time: 10/27/20  9:29 PM   Result Value Ref Range    Glucose (POC) 155 (H) 65 - 100 mg/dL    Performed by Roverto HESTER    EKG, 12 LEAD, SUBSEQUENT    Collection Time: 10/28/20 12:21 AM   Result Value Ref Range    Ventricular Rate 69 BPM    Atrial Rate 69 BPM    P-R Interval 198 ms    QRS Duration 140 ms    Q-T Interval 484 ms    QTC Calculation (Bezet) 518 ms    Calculated P Axis -21 degrees    Calculated R Axis -32 degrees    Calculated T Axis 57 degrees    Diagnosis       Normal sinus rhythm  Left axis deviation  Right bundle branch block  Voltage criteria for left ventricular hypertrophy  Cannot rule out Septal infarct , age undetermined    Confirmed by Bianca Perez ([id number]) on 10/28/2020 9:28:32 AM     CK    Collection Time: 10/28/20  4:10 AM   Result Value Ref Range     26 - 192 U/L   RENAL FUNCTION PANEL    Collection Time: 10/28/20  4:10 AM   Result Value Ref Range    Sodium 140 136 - 145 mmol/L    Potassium 4.7 3.5 - 5.1 mmol/L    Chloride 100 97 - 108 mmol/L    CO2 25 21 - 32 mmol/L    Anion gap 15 5 - 15 mmol/L    Glucose 114 (H) 65 - 100 mg/dL    BUN 98 (H) 6 - 20 MG/DL    Creatinine 7.30 (H) 0.55 - 1.02 MG/DL    BUN/Creatinine ratio 13 12 - 20      GFR est AA 7 (L) >60 ml/min/1.73m2    GFR est non-AA 5 (L) >60 ml/min/1.73m2    Calcium 9.2 8.5 - 10.1 MG/DL    Phosphorus 5.9 (H) 2.6 - 4.7 MG/DL    Albumin 3.4 (L) 3.5 - 5.0 g/dL   CBC WITH AUTOMATED DIFF    Collection Time: 10/28/20  4:10 AM   Result Value Ref Range    WBC 4.5 3.6 - 11.0 K/uL    RBC 2.56 (L) 3.80 - 5.20 M/uL    HGB 8.1 (L) 11.5 - 16.0 g/dL    HCT 24.9 (L) 35.0 - 47.0 %    MCV 97.3 80.0 - 99.0 FL    MCH 31.6 26.0 - 34.0 PG    MCHC 32.5 30.0 - 36.5 g/dL    RDW 15.9 (H) 11.5 - 14.5 %    PLATELET 84 (L) 851 - 400 K/uL    MPV 10.5 8.9 - 12.9 FL    NRBC 0.0 0  WBC    ABSOLUTE NRBC 0.00 0.00 - 0.01 K/uL    NEUTROPHILS 61 32 - 75 %    LYMPHOCYTES 23 12 - 49 %    MONOCYTES 13 5 - 13 %    EOSINOPHILS 2 0 - 7 %    BASOPHILS 0 0 - 1 %    IMMATURE GRANULOCYTES 1 (H) 0.0 - 0.5 %    ABS. NEUTROPHILS 2.7 1.8 - 8.0 K/UL    ABS. LYMPHOCYTES 1.0 0.8 - 3.5 K/UL    ABS. MONOCYTES 0.6 0.0 - 1.0 K/UL    ABS. EOSINOPHILS 0.1 0.0 - 0.4 K/UL    ABS. BASOPHILS 0.0 0.0 - 0.1 K/UL    ABS. IMM. GRANS. 0.0 0.00 - 0.04 K/UL    DF AUTOMATED     GLUCOSE, POC    Collection Time: 10/28/20 10:37 AM   Result Value Ref Range    Glucose (POC) 188 (H) 65 - 100 mg/dL    Performed by Urban VALLEJO          Urine dipstick:   Lab Results   Component Value Date/Time    Color YELLOW/STRAW 10/27/2020 02:54 PM    Appearance CLOUDY (A) 10/27/2020 02:54 PM    Specific gravity 1.015 10/27/2020 02:54 PM    pH (UA) 5.0 10/27/2020 02:54 PM    Protein 100 (A) 10/27/2020 02:54 PM    Glucose Negative 10/27/2020 02:54 PM    Ketone Negative 10/27/2020 02:54 PM    Bilirubin NEGATIVE  11/04/2012 10:39 PM    Urobilinogen 1.0 10/27/2020 02:54 PM    Nitrites Negative 10/27/2020 02:54 PM    Leukocyte Esterase LARGE (A) 10/27/2020 02:54 PM    Epithelial cells MANY (A) 10/27/2020 02:54 PM    Bacteria 3+ (A) 10/27/2020 02:54 PM    WBC >100 (H) 10/27/2020 02:54 PM    RBC 5-10 10/27/2020 02:54 PM       I have reviewed the following: All pertinent labs, microbiology data, radiology imaging for my assessment     Medications list Personally Reviewed   [x]      Yes     []               No       Medications:  Prior to Admission medications    Medication Sig Start Date End Date Taking?  Authorizing Provider   famotidine (PEPCID) 40 mg tablet Take 1 Tab by mouth daily. 5/14/20  Yes Verónica Brito MD   colchicine (MITIGARE) 0.6 mg capsule Take 1 Cap by mouth daily. Indications: acute inflammation of the joints due to gout attack 5/11/20  Yes Verónica Brito MD   fluticasone propionate (FLONASE) 50 mcg/actuation nasal spray 2 Sprays by Both Nostrils route daily. Administer to right and left nostril. 4/6/20  Yes Verónica Brito MD   atorvastatin (LIPITOR) 20 mg tablet TAKE 1 TABLET DAILY 2/27/20  Yes Verónica Brito MD   amLODIPine (NORVASC) 5 mg tablet Take 1 Tab by mouth daily. 11/18/19  Yes Carlos Cazares MD   docusate sodium (COLACE) 50 mg capsule Take 100 mg by mouth daily as needed for Constipation. Yes Provider, Historical   furosemide (LASIX) 80 mg tablet Take 1 Tab by mouth daily. 6/20/19  Yes Verónica Brito MD   diclofenac (VOLTAREN) 1 % gel Apply 2 g to affected area four (4) times daily as needed. 5/6/19  Yes Provider, Historical   Biotin 2,500 mcg cap Take  by mouth. Yes Provider, Historical   L GASSERI/B BIFIDUM/B LONGUM (Philoptima PO) Take 1 Tab by mouth daily. Yes Provider, Historical   cholecalciferol, vitamin D3, (VITAMIN D3) 2,000 unit tab Take 1 Tab by mouth daily. Yes Other, MD Janette   aspirin 81 mg chewable tablet Take 81 mg by mouth daily. Yes Provider, Historical   metFORMIN ER (GLUCOPHAGE XR) 500 mg tablet Take 2 Tabs by mouth two (2) times daily (after meals). For diabetes. 9/10/20   Verónica Brito MD   levothyroxine (SYNTHROID) 175 mcg tablet TAKE ONE DAILY BY MOUTH. TAKE AN EXTRA 1/2 PILL ON SUNDAYS. (7.5 TABLETS/WEEK  MCG) 9/10/20   Verónica Brito MD   polyethylene glycol [facility]) 17 gram/dose powder Take 17 g by mouth daily. 4/6/20   Verónica Brito MD   losartan (COZAAR) 25 mg tablet TAKE 1 TABLET BY MOUTH DAILY. 2/27/20   Verónica Brito MD   loratadine (CLARITIN) 10 mg tablet Take 1 Tab by mouth daily.  Indications: inflammation of the nose due to an allergy 2/27/20   Verónica Brito MD   ZETIA 10 mg tablet Take 1 tablet by mouth daily. 2/27/20   Shara Patel MD   metOLazone (ZAROXOLYN) 5 mg tablet Take 1 Tab by mouth every Tuesday and Friday. 1/7/20   Shara Patel MD   ketoconazole (NIZORAL) 2 % topical cream Apply  to affected area daily as needed. 8/23/19   Provider, Historical   NYAMYC powder  5/15/19   Provider, Historical   potassium chloride SA (MICRO-K) 10 mEq capsule Take 2 Caps by mouth daily. 3/25/19   Shara Patel MD   omega-3 acid ethyl esters (LOVAZA) 1 gram capsule Take 2 Caps by mouth two (2) times daily (with meals). 3/25/19   Shara Patel MD   indomethacin (INDOCIN) 25 mg capsule TAKE 1 CAPSULE BY MOUTH THREE TIMES A DAY AS NEEDED FOR PAIN. 2/27/18   Shara Patel MD   vitamin E (AQUA GEMS) 400 unit capsule Take 800 Units by mouth daily. Provider, Historical   lidocaine (LIDODERM) 5 %(700 mg/patch) 1 patch by TransDERmal route every twenty-four (24) hours. Apply patch to the affected area for 12 hours a day and remove for 12 hours a day. Other, MD Janette   traMADol (ULTRAM) 50 mg tablet Take 1 tablet by mouth every six (6) hours as needed for Pain. 1/12/15   Giovanni Smith, DO   OTHER Accu-Chek Lady Plus Kit  Check blood sugar one to two times daily 9/11/14   Shara Patel MD   MULTIVITAMIN WITH MINERALS (ONE-A-DAY 50 PLUS PO) Take  by mouth. Provider, Historical        Thank you for allowing us to participate in the care of this patient. We will follow patient. Please dont hesitate to call with any questions    Sierra Mas MD  [facility]   [address] [facility] [address]  Phone - [phone]   Fax - [phone]  [email or web address]

## 2020-10-28 NOTE — ED PROVIDER NOTES
EMERGENCY DEPARTMENT HISTORY AND PHYSICAL EXAM      Date: 10/27/2020  Patient Name: Ana Blair    History of Presenting Illness     Chief Complaint   Patient presents with    Urinary Retention     reports she is only been urinating a little bit over the last few days despite taking lasix    Vomiting     nausea and vomiting x24 hours       History Provided By: Patient    HPI: Ana Blair, 76 y.o. female with PMHx as noted below presents the emergency department with complaints of decreased urine output. Patient notes she developed illness approximately 2 days ago and states that she has been unable to keep down anything by mouth over this timeframe. Patient reports persistent nausea and vomiting for the last 48 hours. Was seen by her OB/GYN yesterday and diagnosed a urinary tract infection and prescribed Cipro however patient notes she has been unable to keep down the antibiotics was presenting here today for evaluation. Otherwise is denying any fevers, chills, abdominal pain, diarrhea, dysuria, chest pain, shortness of breath, cough.       PCP: Lonny Henriquez MD    Current Facility-Administered Medications   Medication Dose Route Frequency Provider Last Rate Last Dose    sodium bicarbonate 150 mEq/1000 mL D5W (premix)   IntraVENous CONTINUOUS Erica Whaley  mL/hr at 10/27/20 1712      [START ON 10/28/2020] amLODIPine (NORVASC) tablet 5 mg  5 mg Oral DAILY Erica Whaley MD        [START ON 10/28/2020] aspirin chewable tablet 81 mg  81 mg Oral DAILY Woldemariam, Corinne Hark, MD        docusate sodium (COLACE) capsule 100 mg  100 mg Oral DAILY PRN Erica Whaley MD        [START ON 10/28/2020] levothyroxine (SYNTHROID) tablet 175 mcg  175 mcg Oral 6am Woldemariam, Corinne Hark, MD        traMADoL (ULTRAM) tablet 50 mg  50 mg Oral Q6H PRN Erica Whaley MD        sodium chloride (NS) flush 5-40 mL  5-40 mL IntraVENous Q8H Erica Whaley MD  sodium chloride (NS) flush 5-40 mL  5-40 mL IntraVENous PRN Rizwan Velarde MD        acetaminophen (TYLENOL) tablet 650 mg  650 mg Oral Q6H PRN Rizwan Velarde MD        Or    acetaminophen (TYLENOL) suppository 650 mg  650 mg Rectal Q6H PRN Cynthia Patel MD        polyethylene glycol (MIRALAX) packet 17 g  17 g Oral DAILY PRN Cynthia Patel MD        promethazine (PHENERGAN) tablet 12.5 mg  12.5 mg Oral Q6H PRN Rizwan Velarde MD        Or    ondansetron (ZOFRAN) injection 4 mg  4 mg IntraVENous Q6H PRN Rizwan Velarde MD        heparin (porcine) injection 5,000 Units  5,000 Units SubCUTAneous Q8H Rizwan Velarde MD   5,000 Units at 10/27/20 2155    insulin lispro (HUMALOG) injection   SubCUTAneous AC&HS Rizwan Velarde MD   2 Units at 10/27/20 2155    glucose chewable tablet 16 g  4 Tab Oral PRN Rizwan Velarde MD        dextrose (D50W) injection syrg 12.5-25 g  12.5-25 g IntraVENous PRN Rizwan Velarde MD        glucagon (GLUCAGEN) injection 1 mg  1 mg IntraMUSCular PRN Rizwan Velarde MD        hydrALAZINE (APRESOLINE) 20 mg/mL injection 20 mg  20 mg IntraVENous Q6H PRN Cynthia Patel MD        labetaloL (NORMODYNE;TRANDATE) injection 20 mg  20 mg IntraVENous Q6H PRN Rizwan Velarde MD         Current Outpatient Medications   Medication Sig Dispense Refill    metFORMIN ER (GLUCOPHAGE XR) 500 mg tablet Take 2 Tabs by mouth two (2) times daily (after meals). For diabetes. 180 Tab 3    levothyroxine (SYNTHROID) 175 mcg tablet TAKE ONE DAILY BY MOUTH. TAKE AN EXTRA 1/2 PILL ON SUNDAYS. (7.5 TABLETS/WEEK  MCG) 96 Tab 3    famotidine (PEPCID) 40 mg tablet Take 1 Tab by mouth daily. 90 Tab 3    colchicine (MITIGARE) 0.6 mg capsule Take 1 Cap by mouth daily.  Indications: acute inflammation of the joints due to gout attack 90 Cap 3    fluticasone propionate (FLONASE) 50 mcg/actuation nasal spray 2 Sprays by Both Nostrils route daily. Administer to right and left nostril. 3 Bottle 3    polyethylene glycol (MIRALAX) 17 gram/dose powder Take 17 g by mouth daily. 2108 g 3    glucose blood VI test strips (ASCENSIA AUTODISC VI, ONE TOUCH ULTRA TEST VI) strip Use to check blood sugars two times a day. DX E11.9 300 Strip 3    Blood-Glucose Meter (OneTouch Ultra2 Meter) monitoring kit Use to check blood sugars once a day. DX E11.9 1 Kit 0    lancets (OneTouch UltraSoft Lancets) misc Use to check blood sugars once a day. DX E11.9 150 Each 3    glucose blood VI test strips (OneTouch Ultra Blue Test Strip) strip Use to check blood sugars once a day. DX E11.9 150 Strip 3    atorvastatin (LIPITOR) 20 mg tablet TAKE 1 TABLET DAILY 90 Tab 3    losartan (COZAAR) 25 mg tablet TAKE 1 TABLET BY MOUTH DAILY. 90 Tab 3    loratadine (CLARITIN) 10 mg tablet Take 1 Tab by mouth daily. Indications: inflammation of the nose due to an allergy 90 Tab 3    ZETIA 10 mg tablet Take 1 tablet by mouth daily. 90 Tab 3    metOLazone (ZAROXOLYN) 5 mg tablet Take 1 Tab by mouth every Tuesday and Friday. 8 Tab 1    FREESTYLE ELAINE 14 DAY SENSOR kit       ketoconazole (NIZORAL) 2 % topical cream   3    amLODIPine (NORVASC) 5 mg tablet Take 1 Tab by mouth daily. 90 Tab 3    cranberry extract 450 mg tab tablet Take 450 mg by mouth.  docusate sodium (COLACE) 50 mg capsule Take 100 mg by mouth daily as needed for Constipation.  furosemide (LASIX) 80 mg tablet Take 1 Tab by mouth daily. 90 Tab 3    alendronate (FOSAMAX) 70 mg tablet       NYAMYC powder       diclofenac (VOLTAREN) 1 % gel       potassium chloride SA (MICRO-K) 10 mEq capsule Take 2 Caps by mouth daily. 180 Cap 3    omega-3 acid ethyl esters (LOVAZA) 1 gram capsule Take 2 Caps by mouth two (2) times daily (with meals). 360 Cap 3    Biotin 2,500 mcg cap Take  by mouth.       indomethacin (INDOCIN) 25 mg capsule TAKE 1 CAPSULE BY MOUTH THREE TIMES A DAY AS NEEDED FOR PAIN. 30 Cap 6    L GASSERI/B BIFIDUM/B LONGUM (Phenomix PO) Take  by mouth.  vitamin E (AQUA GEMS) 400 unit capsule Take 800 Units by mouth daily.  lidocaine (LIDODERM) 5 %(700 mg/patch) 1 patch by TransDERmal route every twenty-four (24) hours. Apply patch to the affected area for 12 hours a day and remove for 12 hours a day.  cholecalciferol, vitamin D3, (VITAMIN D3) 2,000 unit tab Take  by mouth.  traMADol (ULTRAM) 50 mg tablet Take 1 tablet by mouth every six (6) hours as needed for Pain. 12 tablet 0    OTHER Accu-Chek Lady Plus Kit  Check blood sugar one to two times daily 1 kit 3    MULTIVITAMIN WITH MINERALS (ONE-A-DAY 50 PLUS PO) Take  by mouth.  aspirin 81 mg chewable tablet Take 81 mg by mouth daily.  fluocinoNIDE (LIDEX) 0.05 % topical cream Apply  to affected area two (2) times a day.  60 g 3       Past History     Past Medical History:  Past Medical History:   Diagnosis Date    Arthritis     KNEE,gout    Endocrine disease     HYPOTHYROIDISM    Fracture     Left distal femur (age 12 - pt fell)    Fracture     Left tibia 1990 (pt fell)    Fracture     Right wrist fractured 2 times (pt fell)    GERD (gastroesophageal reflux disease)     Hypertension     Hypoglycemia     \"my body produces too much insulin\"    Liver disease     BRADY    Nausea & vomiting     when had gallbladder out    Osteoporosis     Other ill-defined conditions(799.89)     spinal stenosis    Other ill-defined conditions(799.89)     BBB    Other ill-defined conditions(799.89)     HIGH CHOLESTEROL    Other ill-defined conditions(799.89)     edema legs    Thyroid disease        Past Surgical History:  Past Surgical History:   Procedure Laterality Date    ABDOMEN SURGERY PROC UNLISTED      liver biopsy--BRADY and fibrosis    COLONOSCOPY,REMALECIA TINSLEY,SNARE  2/11/2015         COLORECTAL SCRN; HI RISK IND  2/11/2015         HX CHOLECYSTECTOMY  HX HYSTERECTOMY      HX ORTHOPAEDIC      left leg surgery - plates, screws, wires, pins in place    HX UROLOGICAL      PESSURY    UPPER GI ENDOSCOPY,BIOPSY  11/17/2015            Family History:  Family History   Problem Relation Age of Onset    Diabetes Mother     Heart Disease Mother     Lung Disease Father         copd       Social History:  Social History     Tobacco Use    Smoking status: Never Smoker    Smokeless tobacco: Never Used   Substance Use Topics    Alcohol use: No    Drug use: Yes     Types: Prescription, OTC       Allergies: Allergies   Allergen Reactions    Gabapentin Other (comments)    Celebrex [Celecoxib] Hives    Pcn [Penicillins] Unknown (comments)     Can't remember    Sulfa (Sulfonamide Antibiotics) Hives         Review of Systems   Review of Systems  Constitutional: Negative for fever, chills, and fatigue. HENT: Negative for congestion, sore throat, rhinorrhea, sneezing and neck stiffness   Eyes: Negative for discharge and redness. Respiratory: Negative for  shortness of breath, wheezing   Cardiovascular: Negative for chest pain, palpitations   Gastrointestinal: Positive for nausea, vomiting. Negative abdominal pain, constipation, diarrhea and blood in stool. Genitourinary: Positive decreased urine output. Negative for dysuria, urgency, frequency,   Musculoskeletal: Negative for myalgias or joint pain . Skin: Negative for rash or lesions . Neurological: Negative weakness, light-headedness, numbness and headaches. Physical Exam   Physical Exam    GENERAL: alert and oriented, no acute distress  EYES: PEERL, No injection, discharge or icterus. ENT: Mucous membranes dry. NECK: Supple  LUNGS: Airway patent. Non-labored respirations. Breath sounds clear with good air entry bilaterally. HEART: Regular rate and rhythm. No peripheral edema  ABDOMEN: Non-distended and non-tender, without guarding or rebound. There are some mild right CVA tenderness.   SKIN: warm, dry  EXTREMITIES: Without swelling, tenderness or deformity, symmetric with normal ROM  NEUROLOGICAL: Alert, oriented      Diagnostic Study Results     Labs -     Recent Results (from the past 12 hour(s))   CBC WITH AUTOMATED DIFF    Collection Time: 10/27/20  2:30 PM   Result Value Ref Range    WBC 9.9 3.6 - 11.0 K/uL    RBC 3.08 (L) 3.80 - 5.20 M/uL    HGB 9.9 (L) 11.5 - 16.0 g/dL    HCT 31.1 (L) 35.0 - 47.0 %    .0 (H) 80.0 - 99.0 FL    MCH 32.1 26.0 - 34.0 PG    MCHC 31.8 30.0 - 36.5 g/dL    RDW 16.2 (H) 11.5 - 14.5 %    PLATELET 722 496 - 791 K/uL    MPV 10.9 8.9 - 12.9 FL    NRBC 0.0 0  WBC    ABSOLUTE NRBC 0.00 0.00 - 0.01 K/uL    NEUTROPHILS 76 (H) 32 - 75 %    LYMPHOCYTES 13 12 - 49 %    MONOCYTES 9 5 - 13 %    EOSINOPHILS 1 0 - 7 %    BASOPHILS 0 0 - 1 %    IMMATURE GRANULOCYTES 1 (H) 0.0 - 0.5 %    ABS. NEUTROPHILS 7.5 1.8 - 8.0 K/UL    ABS. LYMPHOCYTES 1.2 0.8 - 3.5 K/UL    ABS. MONOCYTES 0.9 0.0 - 1.0 K/UL    ABS. EOSINOPHILS 0.1 0.0 - 0.4 K/UL    ABS. BASOPHILS 0.0 0.0 - 0.1 K/UL    ABS. IMM. GRANS. 0.1 (H) 0.00 - 0.04 K/UL    DF AUTOMATED     METABOLIC PANEL, COMPREHENSIVE    Collection Time: 10/27/20  2:30 PM   Result Value Ref Range    Sodium 134 (L) 136 - 145 mmol/L    Potassium 6.2 (H) 3.5 - 5.1 mmol/L    Chloride 102 97 - 108 mmol/L    CO2 18 (L) 21 - 32 mmol/L    Anion gap 14 5 - 15 mmol/L    Glucose 122 (H) 65 - 100 mg/dL    BUN 97 (H) 6 - 20 MG/DL    Creatinine 7.72 (H) 0.55 - 1.02 MG/DL    BUN/Creatinine ratio 13 12 - 20      GFR est AA 6 (L) >60 ml/min/1.73m2    GFR est non-AA 5 (L) >60 ml/min/1.73m2    Calcium 9.3 8.5 - 10.1 MG/DL    Bilirubin, total 0.8 0.2 - 1.0 MG/DL    ALT (SGPT) 58 12 - 78 U/L    AST (SGOT) 60 (H) 15 - 37 U/L    Alk.  phosphatase 140 (H) 45 - 117 U/L    Protein, total 7.8 6.4 - 8.2 g/dL    Albumin 3.9 3.5 - 5.0 g/dL    Globulin 3.9 2.0 - 4.0 g/dL    A-G Ratio 1.0 (L) 1.1 - 2.2     NT-PRO BNP    Collection Time: 10/27/20  2:30 PM   Result Value Ref Range    NT pro-BNP 5,168 (H) <041 PG/ML   METABOLIC PANEL, COMPREHENSIVE    Collection Time: 10/27/20  2:30 PM   Result Value Ref Range    Sodium 135 (L) 136 - 145 mmol/L    Potassium 6.2 (H) 3.5 - 5.1 mmol/L    Chloride 103 97 - 108 mmol/L    CO2 19 (L) 21 - 32 mmol/L    Anion gap 13 5 - 15 mmol/L    Glucose 122 (H) 65 - 100 mg/dL    BUN 97 (H) 6 - 20 MG/DL    Creatinine 7.74 (H) 0.55 - 1.02 MG/DL    BUN/Creatinine ratio 13 12 - 20      GFR est AA 6 (L) >60 ml/min/1.73m2    GFR est non-AA 5 (L) >60 ml/min/1.73m2    Calcium 9.3 8.5 - 10.1 MG/DL    Bilirubin, total 0.8 0.2 - 1.0 MG/DL    ALT (SGPT) 58 12 - 78 U/L    AST (SGOT) 61 (H) 15 - 37 U/L    Alk.  phosphatase 146 (H) 45 - 117 U/L    Protein, total 7.7 6.4 - 8.2 g/dL    Albumin 3.9 3.5 - 5.0 g/dL    Globulin 3.8 2.0 - 4.0 g/dL    A-G Ratio 1.0 (L) 1.1 - 2.2     TROPONIN I    Collection Time: 10/27/20  2:30 PM   Result Value Ref Range    Troponin-I, Qt. <0.05 <0.05 ng/mL   MAGNESIUM    Collection Time: 10/27/20  2:30 PM   Result Value Ref Range    Magnesium 2.7 (H) 1.6 - 2.4 mg/dL   URINALYSIS W/ REFLEX CULTURE    Collection Time: 10/27/20  2:54 PM    Specimen: Miscellaneous sample; Urine    Urine specimen   Result Value Ref Range    Color YELLOW/STRAW      Appearance CLOUDY (A) CLEAR      Specific gravity 1.015 1.003 - 1.030      pH (UA) 5.0 5.0 - 8.0      Protein 100 (A) NEG mg/dL    Glucose Negative NEG mg/dL    Ketone Negative NEG mg/dL    Blood MODERATE (A) NEG      Urobilinogen 1.0 0.2 - 1.0 EU/dL    Nitrites Negative NEG      Leukocyte Esterase LARGE (A) NEG      WBC >100 (H) 0 - 4 /hpf    RBC 5-10 0 - 5 /hpf    Epithelial cells MANY (A) FEW /lpf    Bacteria 3+ (A) NEG /hpf    UA:UC IF INDICATED URINE CULTURE ORDERED (A) CNI     BILIRUBIN, CONFIRM    Collection Time: 10/27/20  2:54 PM   Result Value Ref Range    Bilirubin UA, confirm Negative NEG     EKG, 12 LEAD, INITIAL    Collection Time: 10/27/20  4:58 PM   Result Value Ref Range    Ventricular Rate 49 BPM    Atrial Rate 33 BPM    QRS Duration 156 ms    Q-T Interval 564 ms    QTC Calculation (Bezet) 509 ms    Calculated R Axis -45 degrees    Calculated T Axis 69 degrees    Diagnosis       Atrial fibrillation with slow ventricular response  Right bundle branch block  Left anterior fascicular block  ** Bifascicular block **  Voltage criteria for left ventricular hypertrophy  When compared with ECG of 03-JAN-2011 06:48,  Atrial fibrillation has replaced Sinus rhythm  Vent.  rate has decreased BY  51 BPM  Questionable change in QRS duration     GLUCOSE, POC    Collection Time: 10/27/20  5:24 PM   Result Value Ref Range    Glucose (POC) 126 (H) 65 - 100 mg/dL    Performed by Reno HESTER    CHLORIDE, URINE RANDOM    Collection Time: 10/27/20  6:59 PM   Result Value Ref Range    Chloride,urine random 33 MMOL/L   CREATININE, UR, RANDOM    Collection Time: 10/27/20  6:59 PM   Result Value Ref Range    Creatinine, urine 69.60 mg/dL   MICROALBUMIN, UR, RAND W/ MICROALB/CREAT RATIO    Collection Time: 10/27/20  6:59 PM   Result Value Ref Range    Microalbumin,urine random PENDING MG/DL    Creatinine, urine 71.00 mg/dL    Microalbumin/Creat ratio (mg/g creat) PENDING mg/g   METABOLIC PANEL, BASIC    Collection Time: 10/27/20  7:00 PM   Result Value Ref Range    Sodium 138 136 - 145 mmol/L    Potassium 4.9 3.5 - 5.1 mmol/L    Chloride 104 97 - 108 mmol/L    CO2 19 (L) 21 - 32 mmol/L    Anion gap 15 5 - 15 mmol/L    Glucose 146 (H) 65 - 100 mg/dL    BUN 96 (H) 6 - 20 MG/DL    Creatinine 7.67 (H) 0.55 - 1.02 MG/DL    BUN/Creatinine ratio 13 12 - 20      GFR est AA 6 (L) >60 ml/min/1.73m2    GFR est non-AA 5 (L) >60 ml/min/1.73m2    Calcium 9.0 8.5 - 10.1 MG/DL   GLUCOSE, POC    Collection Time: 10/27/20  8:59 PM   Result Value Ref Range    Glucose (POC) 160 (H) 65 - 100 mg/dL    Performed by Adam Alanis RN    GLUCOSE, POC    Collection Time: 10/27/20  9:29 PM   Result Value Ref Range    Glucose (POC) 155 (H) 65 - 100 mg/dL    Performed by Dino Bhatia EMT        Radiologic Studies -   XR CHEST PORT   Final Result   IMPRESSION:   No acute cardiopulmonary process. CT ABD PELV WO CONT   Final Result   IMPRESSION:      1. No nephrolithiasis or hydronephrosis. 2. Masslike enlargement of the uncinate process of the pancreas which is new   since 2011. Findings are worrisome for malignancy. Recommend MRI with contrast,   which could be performed once the patient's renal function improves. 3. Hepatic cirrhosis. 4. Status post cholecystectomy. Findings discussed with Dr. Sherren Hailstone at 7:50 PM on 10/27/2020. CT Results  (Last 48 hours)               10/27/20 1923  CT ABD PELV WO CONT Final result    Impression:  IMPRESSION:       1. No nephrolithiasis or hydronephrosis. 2. Masslike enlargement of the uncinate process of the pancreas which is new   since 2011. Findings are worrisome for malignancy. Recommend MRI with contrast,   which could be performed once the patient's renal function improves. 3. Hepatic cirrhosis. 4. Status post cholecystectomy. Findings discussed with Dr. Sherren Hailstone at 7:50 PM on 10/27/2020. Narrative:  EXAM: CT ABD PELV WO CONT       INDICATION: r/o renal obstruction. Acute renal dysfunction. Urinary tract   infection. COMPARISON: CT 1/3/2011       CONTRAST:  None. TECHNIQUE:    Thin axial images were obtained through the abdomen and pelvis. Coronal and   sagittal reformats were generated. Oral contrast was not administered. CT dose   reduction was achieved through use of a standardized protocol tailored for this   examination and automatic exposure control for dose modulation. The absence of intravenous contrast material reduces the sensitivity for   evaluation of the vasculature and solid organs.        FINDINGS:    LOWER THORAX: No significant abnormality in the incidentally imaged lower chest.   LIVER: There is nodularity of the liver capsule suggestive of cirrhosis. BILIARY TREE: Status post cholecystectomy. CBD is not dilated. SPLEEN: On the upper limits of normal for size. PANCREAS: There is masslike enlargement of the uncinate process measuring 3.0 x   2.8 cm. This has changed in appearance since 2011. ADRENALS: Unremarkable. KIDNEYS/URETERS: No calculus or hydronephrosis. STOMACH: Unremarkable. SMALL BOWEL: No dilatation or wall thickening. COLON: No dilatation or wall thickening. APPENDIX: Not well seen. PERITONEUM: No ascites or pneumoperitoneum. RETROPERITONEUM: No lymphadenopathy or aortic aneurysm. REPRODUCTIVE ORGANS: Status post hysterectomy. A pessary is in place. URINARY BLADDER: A West catheter decompresses the bladder. BONES: No destructive bone lesion. ABDOMINAL WALL: There is a small fat-containing umbilical hernia. ADDITIONAL COMMENTS: N/A               CXR Results  (Last 48 hours)               10/27/20 1941  XR CHEST PORT Final result    Impression:  IMPRESSION:   No acute cardiopulmonary process. Narrative:  EXAM: XR CHEST PORT       HISTORY: renal failure. COMPARISON: 1/5/2007       FINDINGS: Single view(s) of the chest. The lungs are well expanded. No focal   consolidation, pleural effusion, or pneumothorax. The cardiomediastinal   silhouette is unremarkable. The bones are osteopenic. Medical Decision Making     I, Ailyn Muro MD am the first provider for this patient and am the attending of record for this patient encounter. I reviewed the vital signs, available nursing notes, past medical history, past surgical history, family history and social history. Vital Signs-Reviewed the patient's vital signs.   Patient Vitals for the past 12 hrs:   Temp Pulse Resp BP SpO2   10/27/20 2300  69 19 (!) 146/55 96 %   10/27/20 2145  73 29 (!) 155/50 98 %   10/27/20 2131  78 17 (!) 179/49 98 %   10/27/20 2115  75 23 (!) 119/93 99 %   10/27/20 2030  75 20 (!) 167/70 98 % 10/27/20 1900  80 26 (!) 151/55 100 %   10/27/20 1800  67 21 (!) 163/62 98 %   10/27/20 1730  77  (!) 151/54    10/27/20 1414 99 °F (37.2 °C) 62 14 (!) 146/71 100 %         Pulse Oximetry Analysis - 96% on RA    Cardiac Monitor:   Rate: 68 bpm  Rhythm: Atrial Fibrillation    The cardiac monitor was ordered secondary to hyperkalemia  and to monitor the patient for dysrhythmia    Cardiac Monitor:   Rate: 75 bpm  Rhythm: Atrial fibrillation  The cardiac monitor was ordered secondary to hyperkalemia  and to monitor the patient for dysrhythmia      Records Reviewed: Nursing Notes and Old Medical Records    Provider Notes (Medical Decision Making): This is a 51-year-old female presenting with persistent nausea vomiting with recently diagnosed UTI. Differential diagnosis was broad and included pyelonephritis, ACS, electrolyte abnormalities, metabolic abnormalities, dehydration. Patient abdomen is soft, benign so doubt potentially surgical intra-abdominal pathology on arrival.  Labs obtained were notable for acute renal failure with hyperkalemia and metabolic acidosis. EKG did show some concerning peaked T waves so was treated aggressively with calcium, bicarb, albuterol, insulin/D50. Given the patient's nausea and vomiting this was felt to be prerenal so was given initial IV fluid bolus however did not have much urine output after this. Was started on a bicarb drip given her metabolic acidosis and renal failure. Patient also given IV Rocephin for urinary tract infection, presumed pyelonephritis. Obtain a CT scan of the abdomen pelvis with did not show any obstructive pathology but did note a pancreatic mass. Nephrology was consulted and agreed with care thus far, will evaluate in the emergency department for further recommendations. We will plan to the hospitalist at this time. ED Course:   Initial assessment performed.  The patients presenting problems have been discussed, and they are in agreement with the care plan formulated and outlined with them. I have encouraged them to ask questions as they arise throughout their visit.     Medications   sodium bicarbonate 150 mEq/1000 mL D5W (premix) ( IntraVENous New Bag 10/27/20 1712)   amLODIPine (NORVASC) tablet 5 mg (has no administration in time range)   aspirin chewable tablet 81 mg (has no administration in time range)   docusate sodium (COLACE) capsule 100 mg (has no administration in time range)   levothyroxine (SYNTHROID) tablet 175 mcg (has no administration in time range)   traMADoL (ULTRAM) tablet 50 mg (has no administration in time range)   sodium chloride (NS) flush 5-40 mL (has no administration in time range)   sodium chloride (NS) flush 5-40 mL (has no administration in time range)   acetaminophen (TYLENOL) tablet 650 mg (has no administration in time range)     Or   acetaminophen (TYLENOL) suppository 650 mg (has no administration in time range)   polyethylene glycol (MIRALAX) packet 17 g (has no administration in time range)   promethazine (PHENERGAN) tablet 12.5 mg (has no administration in time range)     Or   ondansetron (ZOFRAN) injection 4 mg (has no administration in time range)   heparin (porcine) injection 5,000 Units (5,000 Units SubCUTAneous Given 10/27/20 2155)   insulin lispro (HUMALOG) injection (2 Units SubCUTAneous Given 10/27/20 2155)   glucose chewable tablet 16 g (has no administration in time range)   dextrose (D50W) injection syrg 12.5-25 g (has no administration in time range)   glucagon (GLUCAGEN) injection 1 mg (has no administration in time range)   hydrALAZINE (APRESOLINE) 20 mg/mL injection 20 mg (has no administration in time range)   labetaloL (NORMODYNE;TRANDATE) injection 20 mg (has no administration in time range)   sodium chloride 0.9 % bolus infusion 1,000 mL (0 mL IntraVENous IV Completed 10/27/20 1906)   cefTRIAXone (ROCEPHIN) 1 g in 0.9% sodium chloride (MBP/ADV) 50 mL (0 g IntraVENous IV Completed 10/27/20 1916)   sodium polystyrene (KAYEXALATE) 15 gram/60 mL oral suspension 15 g (15 g Oral Given 10/27/20 1915)   calcium gluconate injection 1 g (1 g IntraVENous Given 10/27/20 1730)   sodium bicarbonate 8.4 % (1 mEq/mL) injection 50 mEq (50 mEq IntraVENous Given 10/27/20 1730)   insulin regular (NOVOLIN R, HUMULIN R) injection 10 Units (10 Units IntraVENous Given 10/27/20 1730)   dextrose (D50W) injection syrg 25 g (25 g IntraVENous Given 10/27/20 1730)   albuterol (PROVENTIL VENTOLIN) nebulizer solution 10 mg (10 mg Nebulization Given 10/27/20 1730)       ED Course as of Oct 27 2341   Jatin Comment Oct 27, 2020   1713 Consulted with Dr. Connor East, nephrology. He agreed with aggressive hyperkalemia treatment. He will evaluate the patient is a consultant in the emergency department. [BN]      ED Course User Index  [BN] [place] Pi, MD       PROGRESS:  The patient has been re-evaluated and sx have improved. Reviewed available results with patient and have counseled them on diagnosis and care plan. They have expressed understanding, and all their questions have been answered. They agree with plan for admission. CONSULT:  Layla Callejas MD spoke with the hospitalist.  Discussed HPI and PE, available diagnostic tests and clinical findings. He is in agreement with care plans as outlined and will evaluate for admission     Admit Note  Patient is being admitted to the hospitalist.  The results of their tests and reasons for their admission have been discussed with them and/or available family. They convey agreement and understanding for the need to be admitted and for their admission diagnosis. Consultation has been made with the inpatient physician specialist for hospitalization.     Critical Care Note      IMPENDING DETERIORATION -Cardiovascular, CNS, Metabolic and Renal  ASSOCIATED RISK FACTORS - Hypotension, Metabolic changes and Dehydration  MANAGEMENT- Bedside Assessment and Supervision of Care  INTERPRETATION - CT Scan, ECG and Blood Pressure  INTERVENTIONS - hemodynamic mngmt and Metobolic interventions  CASE REVIEW - Hospitalist and Medical Sub-Specialist  TREATMENT RESPONSE -Improved  PERFORMED BY - Self    NOTES   :  I have provided a total of 62 minutes of critical time not including time spent on separately documented procedures. The reason for providing this level of medical care for this critically ill patient was due to a critical illness that impaired one or more vital organ systems such that there was a high probability of imminent or life threatening deterioration in the patients condition. This care involved high complexity decision making to assess, manipulate, and support vital system functions,  lab review, consultations with specialist, family decision- making, bedside attention and documentation. During this entire length of time I was immediately available to the patient    Disposition:  admission    PLAN:  1. Admit     Diagnosis     Clinical Impression:   1. Acute renal failure, unspecified acute renal failure type ([address]    2. Acute hyperkalemia    3. Metabolic acidosis    4. Pyelonephritis        Please note that this dictation was completed with Dragon, computer voice recognition software. Quite often unanticipated grammatical, syntax, homophones, and other interpretive errors are inadvertently transcribed by the computer software. Please disregard these errors. Additionally, please excuse any errors that have escaped final proofreading.

## 2020-10-28 NOTE — ED NOTES
TRANSFER - OUT REPORT:    Verbal report given to [facility] RN (name) on Blondell File  being transferred to [address] Renal unit (unit) for routine progression of care       Report consisted of patients Situation, Background, Assessment and   Recommendations(SBAR). Information from the following report(s) SBAR, Kardex, ED Summary, Intake/Output and MAR was reviewed with the receiving nurse. Lines:   Peripheral IV 10/27/20 Right Antecubital (Active)   Site Assessment Clean, dry, & intact 10/27/20 1553   Phlebitis Assessment 0 10/27/20 1553   Infiltration Assessment 0 10/27/20 1553   Dressing Status Clean, dry, & intact 10/27/20 1553   Dressing Type Tape 10/27/20 1553   Hub Color/Line Status Pink;Flushed;Patent 10/27/20 1553       Peripheral IV 10/27/20 Right; Anterior Forearm (Active)   Site Assessment Clean, dry, & intact 10/27/20 1818   Phlebitis Assessment 0 10/27/20 1818   Infiltration Assessment 0 10/27/20 1818   Dressing Status Clean, dry, & intact 10/27/20 1818   Dressing Type Tape;Transparent 10/27/20 1818   Hub Color/Line Status Pink;Flushed;Patent 10/27/20 1818        Opportunity for questions and clarification was provided. Patient transported with:  Pt labels,   Pt Belongings bag. Receiving nurse aware of oncology consult still needing to be called and the us still needing to be performed. All questions answered. Pt put in for transport to floor. Update: pt bedside us completed.  Pt now to floor with transport team.

## 2020-10-28 NOTE — PROGRESS NOTES
1309: TRANSFER - IN REPORT:    Verbal report received from Vandana Stoll [place] (name) on Shilpi Torres  being received from ED (unit) for routine progression of care      Report consisted of patients Situation, Background, Assessment and   Recommendations(SBAR). Information from the following report(s) SBAR and Kardex was reviewed with the receiving nurse. Opportunity for questions and clarification was provided. Assessment completed upon patients arrival to unit and care assumed. 1410: Primary Nurse Gigi Carrillo and Griselda RN performed a dual skin assessment on this patient Impairment noted- see wound doc flow sheet. Sacrum pink and blanchable. Heels pink and boggy. Blanchable. BLE hyperpigmented.    Boris score is 21

## 2020-10-28 NOTE — H&P
Hospitalist Admission Note    NAME: Gabriel Ruff   :  1945   MRN:  [id number]     Date/Time:  10/27/2020 9:02 PM    Patient PCP: Wicho Lanier MD  ______________________________________________________________________  Given the patient's current clinical presentation, I have a high level of concern for decompensation if discharged from the emergency department. Complex decision making was performed, which includes reviewing the patient's available past medical records, laboratory results, and x-ray films. My assessment of this patient's clinical condition and my plan of care is as follows. Assessment / Plan:    Acute kidney injury  - likely prerenal due to vomiting and poor oral intake  - Cr 7.7 and BUN 97. CT abd no hydronephrosis   - Admit to tele  - IV fluid resuscitation  - Check urine elctrolytes  - Avoid nephrotoxic mes  - Monitor renal function and urine out put  - Nephrology consulted      Hyperkalemia  - secondary to MICHELLE  - EKG changes noted, RBBB, ?junctional rhythm, peaked T waves  - Received Ca gluconate, insulin, albuterol, and bicarb at the ED  - Repeat K at 4.9. Continue to monitor. Repeat EKG    Pyelonephritis  - nausea, vomiting, and urinalyris suggestive of UTI  - was unable to tolerate oral antibiotics  - send urine culture  - ceftriaxone    Masslike enlargement of pancrease  - Incidental CT finding  - no hx of smoking or alcohol. No family hx of pancreatic cancer. No recent weight loss. - further workup once renal function improves    Hypertension  - hold losartan and lasix due to MICHELLE  - continue amlodipine  - PRN hydralazine and labetalol    Type 2 DM  - hold metfromin  - sliding scale insulin    BRADY  - hx of BRADY. CT abd shows heaptic cirrhosis. LFT wnl. Platelet wnl. Elevated AST, ALT, and AlkP.   - Outpt f/u     Hx of Gout    Code Status: FUll  Surrogate Decision Maker: Son Salazar Francois    DVT Prophylaxis: Heparin  GI Prophylaxis: not indicated    Baseline: Uses rolling walker      Subjective:   CHIEF COMPLAINT: Nausea and vomiting    HISTORY OF PRESENT ILLNESS:     Clara Gonzalez is a 76 y.o.  female who presents with past medical history significant for hypertension type II DM gout who presents with nausea and vomiting. Three days ago patient noted that her urine output was minimal despite her taking lasix and she also noted some blood on her pad for which she saw her OB/GYN doctor yesterday morning. She was told to have UTI and she was prescribed antibiotic. She started having having nausea and vomiting when she got home and was not able to take the antibiotic. She was not able to keep anything down. she called her OB/GYN doctor today and she was told to go to the emergency room. She denies taking nonsteroidal anti-inflammatory drugs. She denies dysuria, flank pain, fever, or chills. In the ED she was found to have Cr of 7.7 and K 6.2. We were asked to admit for work up and evaluation of the above problems.      Past Medical History:   Diagnosis Date    Arthritis     KNEE,gout    Endocrine disease     HYPOTHYROIDISM    Fracture     Left distal femur (age 12 - pt fell)    Fracture     Left tibia 1990 (pt fell)    Fracture     Right wrist fractured 2 times (pt fell)    GERD (gastroesophageal reflux disease)     Hypertension     Hypoglycemia     \"my body produces too much insulin\"    Liver disease     BRADY    Nausea & vomiting     when had gallbladder out    Osteoporosis     Other ill-defined conditions(799.89)     spinal stenosis    Other ill-defined conditions(799.89)     BBB    Other ill-defined conditions(799.89)     HIGH CHOLESTEROL    Other ill-defined conditions(799.89)     edema legs    Thyroid disease         Past Surgical History:   Procedure Laterality Date    ABDOMEN SURGERY PROC UNLISTED      liver biopsy--BRADY and fibrosis    COLONOSCOPY,LALI BROWN  2/11/2015         COLORECTAL SCRN; HI RISK IND  2/11/2015         HX CHOLECYSTECTOMY      HX HYSTERECTOMY      HX ORTHOPAEDIC      left leg surgery - plates, screws, wires, pins in place    HX UROLOGICAL      PESSURY    UPPER GI ENDOSCOPY,BIOPSY  11/17/2015            Social History     Tobacco Use    Smoking status: Never Smoker    Smokeless tobacco: Never Used   Substance Use Topics    Alcohol use: No        Family History   Problem Relation Age of Onset    Diabetes Mother     Heart Disease Mother     Lung Disease Father         copd     Allergies   Allergen Reactions    Gabapentin Other (comments)    Celebrex [Celecoxib] Hives    Pcn [Penicillins] Unknown (comments)     Can't remember    Sulfa (Sulfonamide Antibiotics) Hives        Prior to Admission medications    Medication Sig Start Date End Date Taking? Authorizing Provider   metFORMIN ER (GLUCOPHAGE XR) 500 mg tablet Take 2 Tabs by mouth two (2) times daily (after meals). For diabetes. 9/10/20   Isaiah Segura MD   levothyroxine (SYNTHROID) 175 mcg tablet TAKE ONE DAILY BY MOUTH. TAKE AN EXTRA 1/2 PILL ON SUNDAYS. (7.5 TABLETS/WEEK  MCG) 9/10/20   Isaiah Segura MD   famotidine (PEPCID) 40 mg tablet Take 1 Tab by mouth daily. 5/14/20   Isaiah Segura MD   colchicine (MITIGARE) 0.6 mg capsule Take 1 Cap by mouth daily. Indications: acute inflammation of the joints due to gout attack 5/11/20   Isaiah Segura MD   fluticasone propionate (FLONASE) 50 mcg/actuation nasal spray 2 Sprays by Both Nostrils route daily. Administer to right and left nostril. 4/6/20   Isaiah Segura MD   polyethylene glycol [facility]) 17 gram/dose powder Take 17 g by mouth daily. 4/6/20   Isaiah Segura MD   glucose blood VI test strips (ASCENSIA AUTODISC VI, ONE TOUCH ULTRA TEST VI) strip Use to check blood sugars two times a day. DX E11.9 4/6/20   Isaiah Segura MD   Blood-Glucose Meter (OneTouch Ultra2 Meter) monitoring kit Use to check blood sugars once a day.  DX E11.9 3/20/20   Isaiah Segura MD   lancets (OneTouch UltraSoft Lancets) misc Use to check blood sugars once a day. DX E11.9 3/20/20   Wicho Lanier MD   glucose blood VI test strips (OneTouch Ultra Blue Test Strip) strip Use to check blood sugars once a day. DX E11.9 3/20/20   Wicho Lanier MD   atorvastatin (LIPITOR) 20 mg tablet TAKE 1 TABLET DAILY 2/27/20   Wicho Lanier MD   losartan (COZAAR) 25 mg tablet TAKE 1 TABLET BY MOUTH DAILY. 2/27/20   Wicho Lanier MD   loratadine (CLARITIN) 10 mg tablet Take 1 Tab by mouth daily. Indications: inflammation of the nose due to an allergy 2/27/20   Wicho Lanier MD   ZETIA 10 mg tablet Take 1 tablet by mouth daily. 2/27/20   Wicho Lanier MD   metOLazone (ZAROXOLYN) 5 mg tablet Take 1 Tab by mouth every Tuesday and Friday. 1/7/20   Wicho Lanier MD   FREESTYLE ELAINE 14 DAY SENSOR kit  8/22/19   Provider, Historical   ketoconazole (NIZORAL) 2 % topical cream  8/23/19   Provider, Historical   amLODIPine (NORVASC) 5 mg tablet Take 1 Tab by mouth daily. 11/18/19   Cameron Molina MD   cranberry extract 450 mg tab tablet Take 450 mg by mouth. Provider, Historical   docusate sodium (COLACE) 50 mg capsule Take 100 mg by mouth daily as needed for Constipation. Provider, Historical   furosemide (LASIX) 80 mg tablet Take 1 Tab by mouth daily. 6/20/19   Wicho Lanier MD   alendronate (FOSAMAX) 70 mg tablet  4/24/19   Provider, Historical   NYAMYC powder  5/15/19   Provider, Historical   diclofenac (VOLTAREN) 1 % gel  5/6/19   Provider, Historical   potassium chloride SA (MICRO-K) 10 mEq capsule Take 2 Caps by mouth daily. 3/25/19   Wicho Lanier MD   omega-3 acid ethyl esters (LOVAZA) 1 gram capsule Take 2 Caps by mouth two (2) times daily (with meals). 3/25/19   Wicho Lanier MD   Biotin 2,500 mcg cap Take  by mouth.     Provider, Historical   indomethacin (INDOCIN) 25 mg capsule TAKE 1 CAPSULE BY MOUTH THREE TIMES A DAY AS NEEDED FOR PAIN. 2/27/18   MD KERVIN Rodgers/IRVING BIFIDUM/IRVING LONGUM (GAYATHRI' COLON HEALTH PO) Take  by mouth. Provider, Historical   vitamin E (AQUA GEMS) 400 unit capsule Take 800 Units by mouth daily. Provider, Historical   lidocaine (LIDODERM) 5 %(700 mg/patch) 1 patch by TransDERmal route every twenty-four (24) hours. Apply patch to the affected area for 12 hours a day and remove for 12 hours a day. Other, MD Janette   cholecalciferol, vitamin D3, (VITAMIN D3) 2,000 unit tab Take  by mouth. Other, MD Janette   traMADol (ULTRAM) 50 mg tablet Take 1 tablet by mouth every six (6) hours as needed for Pain. 1/12/15   Elvis Chiu,    OTHER Accu-Chek Lady Plus Kit  Check blood sugar one to two times daily 9/11/14   Amada Guthrie MD   MULTIVITAMIN WITH MINERALS (ONE-A-DAY 50 PLUS PO) Take  by mouth. Provider, Historical   aspirin 81 mg chewable tablet Take 81 mg by mouth daily. Provider, Historical   fluocinoNIDE (LIDEX) 0.05 % topical cream Apply  to affected area two (2) times a day. 4/9/12   Amada Guthrie MD       REVIEW OF SYSTEMS:     I am not able to complete the review of systems because:    The patient is intubated and sedated    The patient has altered mental status due to his acute medical problems    The patient has baseline aphasia from prior stroke(s)    The patient has baseline dementia and is not reliable historian    The patient is in acute medical distress and unable to provide information           Total of 12 systems reviewed as follows:       POSITIVE= underlined text  Negative = text not underlined  General:  fever, chills, sweats, generalized weakness, weight loss/gain,      loss of appetite   Eyes:    blurred vision, eye pain, loss of vision, double vision  ENT:    rhinorrhea, pharyngitis   Respiratory:   cough, sputum production, SOB, HENLEY, wheezing, pleuritic pain   Cardiology:   chest pain, palpitations, orthopnea, PND, edema, syncope   Gastrointestinal:  abdominal pain , N/V, diarrhea, dysphagia, constipation, bleeding   Genitourinary:  frequency, urgency, dysuria, hematuria, incontinence   Muskuloskeletal :  arthralgia, myalgia, back pain  Hematology:  easy bruising, nose or gum bleeding, lymphadenopathy   Dermatological: rash, ulceration, pruritis, color change / jaundice  Endocrine:   hot flashes or polydipsia   Neurological:  headache, dizziness, confusion, focal weakness, paresthesia,     Speech difficulties, memory loss, gait difficulty  Psychological: Feelings of anxiety, depression, agitation    Objective:   VITALS:    Visit Vitals  BP (!) 167/70   Pulse 75   Temp 99 °F (37.2 °C)   Resp 20   Ht 5' 3\" (1.6 m)   Wt 90.7 kg (200 lb)   SpO2 98%   BMI 35.43 kg/m²       PHYSICAL EXAM:    General:    Alert, cooperative, no distress, appears stated age. HEENT: Atraumatic, anicteric sclerae, pink conjunctivae     No oral ulcers, mucosa moist, throat clear, dentition fair  Neck:  Supple, symmetrical,  thyroid: non tender  Lungs:   Clear to auscultation bilaterally. No Wheezing or Rhonchi. No rales. Chest wall:  No tenderness  No Accessory muscle use. Heart:   Regular  rhythm,  No  murmur   No edema  Abdomen:   Soft, non-tender. Not distended. Bowel sounds normal  Extremities: No cyanosis. No clubbing,      Skin turgor normal, Capillary refill normal, Radial dial pulse 2+  Skin:     Not pale. Not Jaundiced  No rashes   Psych:  Good insight. Not depressed. Not anxious or agitated. Neurologic: EOMs intact. No facial asymmetry. No aphasia or slurred speech. Symmetrical strength, Sensation grossly intact.  Alert and oriented X 4.     _______________________________________________________________________  Care Plan discussed with:    Comments   Patient x    Family      RN     Care Manager                    Consultant:      _______________________________________________________________________  Expected  Disposition:   Home with Family x   HH/PT/OT/RN    SNF/LTC    DIAMOND    ________________________________________________________________________  TOTAL TIME:  35 Minutes    Critical Care Provided     Minutes non procedure based      Comments    x Reviewed previous records   >50% of visit spent in counseling and coordination of care x Discussion with patient and/or family and questions answered       ________________________________________________________________________  Signed: Soo Kebede MD    Procedures: see electronic medical records for all procedures/Xrays and details which were not copied into this note but were reviewed prior to creation of Plan. LAB DATA REVIEWED:    Recent Results (from the past 24 hour(s))   CBC WITH AUTOMATED DIFF    Collection Time: 10/27/20  2:30 PM   Result Value Ref Range    WBC 9.9 3.6 - 11.0 K/uL    RBC 3.08 (L) 3.80 - 5.20 M/uL    HGB 9.9 (L) 11.5 - 16.0 g/dL    HCT 31.1 (L) 35.0 - 47.0 %    .0 (H) 80.0 - 99.0 FL    MCH 32.1 26.0 - 34.0 PG    MCHC 31.8 30.0 - 36.5 g/dL    RDW 16.2 (H) 11.5 - 14.5 %    PLATELET 958 860 - 417 K/uL    MPV 10.9 8.9 - 12.9 FL    NRBC 0.0 0  WBC    ABSOLUTE NRBC 0.00 0.00 - 0.01 K/uL    NEUTROPHILS 76 (H) 32 - 75 %    LYMPHOCYTES 13 12 - 49 %    MONOCYTES 9 5 - 13 %    EOSINOPHILS 1 0 - 7 %    BASOPHILS 0 0 - 1 %    IMMATURE GRANULOCYTES 1 (H) 0.0 - 0.5 %    ABS. NEUTROPHILS 7.5 1.8 - 8.0 K/UL    ABS. LYMPHOCYTES 1.2 0.8 - 3.5 K/UL    ABS. MONOCYTES 0.9 0.0 - 1.0 K/UL    ABS. EOSINOPHILS 0.1 0.0 - 0.4 K/UL    ABS. BASOPHILS 0.0 0.0 - 0.1 K/UL    ABS. IMM.  GRANS. 0.1 (H) 0.00 - 0.04 K/UL    DF AUTOMATED     METABOLIC PANEL, COMPREHENSIVE    Collection Time: 10/27/20  2:30 PM   Result Value Ref Range    Sodium 134 (L) 136 - 145 mmol/L    Potassium 6.2 (H) 3.5 - 5.1 mmol/L    Chloride 102 97 - 108 mmol/L    CO2 18 (L) 21 - 32 mmol/L    Anion gap 14 5 - 15 mmol/L    Glucose 122 (H) 65 - 100 mg/dL    BUN 97 (H) 6 - 20 MG/DL    Creatinine 7.72 (H) 0.55 - 1.02 MG/DL    BUN/Creatinine ratio 13 12 - 20      GFR est AA 6 (L) >60 ml/min/1.73m2    GFR est non-AA 5 (L) >60 ml/min/1.73m2    Calcium 9.3 8.5 - 10.1 MG/DL    Bilirubin, total 0.8 0.2 - 1.0 MG/DL    ALT (SGPT) 58 12 - 78 U/L    AST (SGOT) 60 (H) 15 - 37 U/L    Alk. phosphatase 140 (H) 45 - 117 U/L    Protein, total 7.8 6.4 - 8.2 g/dL    Albumin 3.9 3.5 - 5.0 g/dL    Globulin 3.9 2.0 - 4.0 g/dL    A-G Ratio 1.0 (L) 1.1 - 2.2     NT-PRO BNP    Collection Time: 10/27/20  2:30 PM   Result Value Ref Range    NT pro-BNP 5,168 (H) <804 PG/ML   METABOLIC PANEL, COMPREHENSIVE    Collection Time: 10/27/20  2:30 PM   Result Value Ref Range    Sodium 135 (L) 136 - 145 mmol/L    Potassium 6.2 (H) 3.5 - 5.1 mmol/L    Chloride 103 97 - 108 mmol/L    CO2 19 (L) 21 - 32 mmol/L    Anion gap 13 5 - 15 mmol/L    Glucose 122 (H) 65 - 100 mg/dL    BUN 97 (H) 6 - 20 MG/DL    Creatinine 7.74 (H) 0.55 - 1.02 MG/DL    BUN/Creatinine ratio 13 12 - 20      GFR est AA 6 (L) >60 ml/min/1.73m2    GFR est non-AA 5 (L) >60 ml/min/1.73m2    Calcium 9.3 8.5 - 10.1 MG/DL    Bilirubin, total 0.8 0.2 - 1.0 MG/DL    ALT (SGPT) 58 12 - 78 U/L    AST (SGOT) 61 (H) 15 - 37 U/L    Alk.  phosphatase 146 (H) 45 - 117 U/L    Protein, total 7.7 6.4 - 8.2 g/dL    Albumin 3.9 3.5 - 5.0 g/dL    Globulin 3.8 2.0 - 4.0 g/dL    A-G Ratio 1.0 (L) 1.1 - 2.2     TROPONIN I    Collection Time: 10/27/20  2:30 PM   Result Value Ref Range    Troponin-I, Qt. <0.05 <0.05 ng/mL   MAGNESIUM    Collection Time: 10/27/20  2:30 PM   Result Value Ref Range    Magnesium 2.7 (H) 1.6 - 2.4 mg/dL   URINALYSIS W/ REFLEX CULTURE    Collection Time: 10/27/20  2:54 PM    Specimen: Miscellaneous sample; Urine    Urine specimen   Result Value Ref Range    Color YELLOW/STRAW      Appearance CLOUDY (A) CLEAR      Specific gravity 1.015 1.003 - 1.030      pH (UA) 5.0 5.0 - 8.0      Protein 100 (A) NEG mg/dL    Glucose Negative NEG mg/dL    Ketone Negative NEG mg/dL    Blood MODERATE (A) NEG      Urobilinogen 1.0 0.2 - 1.0 EU/dL    Nitrites Negative NEG      Leukocyte Esterase LARGE (A) NEG      WBC >100 (H) 0 - 4 /hpf    RBC 5-10 0 - 5 /hpf    Epithelial cells MANY (A) FEW /lpf    Bacteria 3+ (A) NEG /hpf    UA:UC IF INDICATED URINE CULTURE ORDERED (A) CNI     BILIRUBIN, CONFIRM    Collection Time: 10/27/20  2:54 PM   Result Value Ref Range    Bilirubin UA, confirm Negative NEG     EKG, 12 LEAD, INITIAL    Collection Time: 10/27/20  4:58 PM   Result Value Ref Range    Ventricular Rate 49 BPM    Atrial Rate 33 BPM    QRS Duration 156 ms    Q-T Interval 564 ms    QTC Calculation (Bezet) 509 ms    Calculated R Axis -45 degrees    Calculated T Axis 69 degrees    Diagnosis       Atrial fibrillation with slow ventricular response  Right bundle branch block  Left anterior fascicular block  ** Bifascicular block **  Voltage criteria for left ventricular hypertrophy  When compared with ECG of 03-JAN-2011 06:48,  Atrial fibrillation has replaced Sinus rhythm  Vent.  rate has decreased BY  51 BPM  Questionable change in QRS duration     GLUCOSE, POC    Collection Time: 10/27/20  5:24 PM   Result Value Ref Range    Glucose (POC) 126 (H) 65 - 100 mg/dL    Performed by Theresa Montes EMT    CREATININE, UR, RANDOM    Collection Time: 10/27/20  6:59 PM   Result Value Ref Range    Creatinine, urine 16.04 mg/dL   METABOLIC PANEL, BASIC    Collection Time: 10/27/20  7:00 PM   Result Value Ref Range    Sodium 138 136 - 145 mmol/L    Potassium 4.9 3.5 - 5.1 mmol/L    Chloride 104 97 - 108 mmol/L    CO2 19 (L) 21 - 32 mmol/L    Anion gap 15 5 - 15 mmol/L    Glucose 146 (H) 65 - 100 mg/dL    BUN 96 (H) 6 - 20 MG/DL    Creatinine 7.67 (H) 0.55 - 1.02 MG/DL    BUN/Creatinine ratio 13 12 - 20      GFR est AA 6 (L) >60 ml/min/1.73m2    GFR est non-AA 5 (L) >60 ml/min/1.73m2    Calcium 9.0 8.5 - 10.1 MG/DL   GLUCOSE, POC    Collection Time: 10/27/20  8:59 PM   Result Value Ref Range    Glucose (POC) 160 (H) 65 - 100 mg/dL    Performed by Laura Deleon RN

## 2020-10-29 LAB
ALBUMIN SERPL ELPH-MCNC: 3.4 G/DL (ref 2.9–4.4)
ALBUMIN/GLOB SERPL: 1.3 {RATIO} (ref 0.7–1.7)
ALPHA1 GLOB SERPL ELPH-MCNC: 0.2 G/DL (ref 0–0.4)
ALPHA2 GLOB SERPL ELPH-MCNC: 0.9 G/DL (ref 0.4–1)
ANA SER QL: NEGATIVE
ANION GAP SERPL CALC-SCNC: 10 MMOL/L (ref 5–15)
B-GLOBULIN SERPL ELPH-MCNC: 0.8 G/DL (ref 0.7–1.3)
BUN SERPL-MCNC: 87 MG/DL (ref 6–20)
BUN/CREAT SERPL: 12 (ref 12–20)
CALCIUM SERPL-MCNC: 8.8 MG/DL (ref 8.5–10.1)
CH50 SERPL-ACNC: >60 U/ML
CHLORIDE SERPL-SCNC: 97 MMOL/L (ref 97–108)
CO2 SERPL-SCNC: 31 MMOL/L (ref 21–32)
COLLECT DURATION TIME UR: ABNORMAL HR
CREAT SERPL-MCNC: 7.39 MG/DL (ref 0.55–1.02)
ERYTHROCYTE [DISTWIDTH] IN BLOOD BY AUTOMATED COUNT: 15.6 % (ref 11.5–14.5)
GAMMA GLOB SERPL ELPH-MCNC: 0.7 G/DL (ref 0.4–1.8)
GBM IGG SER-ACNC: 2 UNITS (ref 0–20)
GLOBULIN SER CALC-MCNC: 2.7 G/DL (ref 2.2–3.9)
GLUCOSE BLD STRIP.AUTO-MCNC: 133 MG/DL (ref 65–100)
GLUCOSE BLD STRIP.AUTO-MCNC: 146 MG/DL (ref 65–100)
GLUCOSE BLD STRIP.AUTO-MCNC: 169 MG/DL (ref 65–100)
GLUCOSE BLD STRIP.AUTO-MCNC: 215 MG/DL (ref 65–100)
GLUCOSE SERPL-MCNC: 227 MG/DL (ref 65–100)
HCT VFR BLD AUTO: 25.9 % (ref 35–47)
HGB BLD-MCNC: 8.2 G/DL (ref 11.5–16)
KAPPA LC UR-MCNC: 220.6 MG/L (ref 0.63–113.79)
KAPPA LC/LAMBDA UR: 4.85 {RATIO} (ref 1.03–31.76)
LAMBDA LC UR-MCNC: 45.45 MG/L (ref 0.47–11.77)
M PROTEIN SERPL ELPH-MCNC: NORMAL G/DL
MCH RBC QN AUTO: 31.1 PG (ref 26–34)
MCHC RBC AUTO-ENTMCNC: 31.7 G/DL (ref 30–36.5)
MCV RBC AUTO: 98.1 FL (ref 80–99)
NRBC # BLD: 0 K/UL (ref 0–0.01)
NRBC BLD-RTO: 0 PER 100 WBC
PLATELET # BLD AUTO: 91 K/UL (ref 150–400)
PMV BLD AUTO: 10.7 FL (ref 8.9–12.9)
POTASSIUM SERPL-SCNC: 4 MMOL/L (ref 3.5–5.1)
PROT SERPL-MCNC: 6.1 G/DL (ref 6–8.5)
RBC # BLD AUTO: 2.64 M/UL (ref 3.8–5.2)
SERVICE CMNT-IMP: ABNORMAL
SODIUM SERPL-SCNC: 138 MMOL/L (ref 136–145)
SPECIMEN VOL ?TM UR: ABNORMAL ML
WBC # BLD AUTO: 4 K/UL (ref 3.6–11)

## 2020-10-29 PROCEDURE — 74011000258 HC RX REV CODE- 258: Performed by: STUDENT IN AN ORGANIZED HEALTH CARE EDUCATION/TRAINING PROGRAM

## 2020-10-29 PROCEDURE — 80048 BASIC METABOLIC PNL TOTAL CA: CPT

## 2020-10-29 PROCEDURE — 74011250636 HC RX REV CODE- 250/636: Performed by: STUDENT IN AN ORGANIZED HEALTH CARE EDUCATION/TRAINING PROGRAM

## 2020-10-29 PROCEDURE — 99221 1ST HOSP IP/OBS SF/LOW 40: CPT | Performed by: INTERNAL MEDICINE

## 2020-10-29 PROCEDURE — 74011000258 HC RX REV CODE- 258: Performed by: INTERNAL MEDICINE

## 2020-10-29 PROCEDURE — 74011000250 HC RX REV CODE- 250: Performed by: STUDENT IN AN ORGANIZED HEALTH CARE EDUCATION/TRAINING PROGRAM

## 2020-10-29 PROCEDURE — 74011250637 HC RX REV CODE- 250/637: Performed by: GENERAL ACUTE CARE HOSPITAL

## 2020-10-29 PROCEDURE — 65660000000 HC RM CCU STEPDOWN

## 2020-10-29 PROCEDURE — 74011636637 HC RX REV CODE- 636/637: Performed by: STUDENT IN AN ORGANIZED HEALTH CARE EDUCATION/TRAINING PROGRAM

## 2020-10-29 PROCEDURE — 82962 GLUCOSE BLOOD TEST: CPT

## 2020-10-29 PROCEDURE — 85027 COMPLETE CBC AUTOMATED: CPT

## 2020-10-29 PROCEDURE — 74011250637 HC RX REV CODE- 250/637: Performed by: STUDENT IN AN ORGANIZED HEALTH CARE EDUCATION/TRAINING PROGRAM

## 2020-10-29 PROCEDURE — 36415 COLL VENOUS BLD VENIPUNCTURE: CPT

## 2020-10-29 RX ORDER — HEPARIN SODIUM 5000 [USP'U]/ML
5000 INJECTION, SOLUTION INTRAVENOUS; SUBCUTANEOUS EVERY 8 HOURS
Status: DISCONTINUED | OUTPATIENT
Start: 2020-10-29 | End: 2020-10-30

## 2020-10-29 RX ORDER — SODIUM CHLORIDE 450 MG/100ML
75 INJECTION, SOLUTION INTRAVENOUS CONTINUOUS
Status: DISCONTINUED | OUTPATIENT
Start: 2020-10-29 | End: 2020-10-30

## 2020-10-29 RX ORDER — COLCHICINE 0.6 MG/1
0.6 TABLET ORAL ONCE
Status: COMPLETED | OUTPATIENT
Start: 2020-10-29 | End: 2020-10-29

## 2020-10-29 RX ORDER — COLCHICINE 0.6 MG/1
1.2 TABLET ORAL ONCE
Status: COMPLETED | OUTPATIENT
Start: 2020-10-29 | End: 2020-10-29

## 2020-10-29 RX ORDER — COLCHICINE 0.6 MG/1
0.6 TABLET ORAL DAILY
Status: DISCONTINUED | OUTPATIENT
Start: 2020-10-29 | End: 2020-10-29

## 2020-10-29 RX ADMIN — SODIUM CHLORIDE 75 ML/HR: 450 INJECTION, SOLUTION INTRAVENOUS at 12:59

## 2020-10-29 RX ADMIN — AMLODIPINE BESYLATE 5 MG: 5 TABLET ORAL at 09:38

## 2020-10-29 RX ADMIN — SODIUM BICARBONATE 150 MEQ/1,000 ML IN DEXTROSE 5 % INTRAVENOUS: SOLUTION at 03:17

## 2020-10-29 RX ADMIN — Medication 10 ML: at 22:12

## 2020-10-29 RX ADMIN — CEFTRIAXONE 1 G: 1 INJECTION, POWDER, FOR SOLUTION INTRAMUSCULAR; INTRAVENOUS at 16:05

## 2020-10-29 RX ADMIN — ASPIRIN 81 MG: 81 TABLET, CHEWABLE ORAL at 09:37

## 2020-10-29 RX ADMIN — Medication 10 ML: at 13:01

## 2020-10-29 RX ADMIN — INSULIN LISPRO 2 UNITS: 100 INJECTION, SOLUTION INTRAVENOUS; SUBCUTANEOUS at 16:53

## 2020-10-29 RX ADMIN — Medication 10 ML: at 06:00

## 2020-10-29 RX ADMIN — COLCHICINE 1.2 MG: 0.6 TABLET ORAL at 11:01

## 2020-10-29 RX ADMIN — LEVOTHYROXINE SODIUM 175 MCG: 0.15 TABLET ORAL at 07:02

## 2020-10-29 RX ADMIN — INSULIN LISPRO 3 UNITS: 100 INJECTION, SOLUTION INTRAVENOUS; SUBCUTANEOUS at 11:45

## 2020-10-29 RX ADMIN — COLCHICINE 0.6 MG: 0.6 TABLET ORAL at 11:01

## 2020-10-29 NOTE — PROGRESS NOTES
Hospitalist Progress Note    NAME: Tracey Rivas   :  1945   MRN:  [id number]       Assessment / Plan:  Acute kidney injury  - likely prerenal due to vomiting and poor oral intake  - Cr 7.7 and BUN 97. CT abd no hydronephrosis   - Admit to tele  - IV fluid resuscitation  - Check urine elctrolytes  - Avoid nephrotoxic mes  - Monitor renal function and urine out put  - Nephrology consulted    10/28:  Cr still significantly elevated  Nephrology evals appreciated  Continue with IVF    10/29:  Nephrology evals on going - Cr slightly worse than yesterday    Hyperkalemia - resolved  - secondary to MICHELLE  - EKG changes noted, RBBB, ?junctional rhythm, peaked T waves  - Received Ca gluconate, insulin, albuterol, and bicarb at the ED  - Repeat K at 4.9. Continue to monitor. Repeat EKG     ?Pyelonephritis   - nausea, vomiting, and urinalyris suggestive of UTI  - was unable to tolerate oral antibiotics  - send urine culture  - ceftriaxone    10/29: Follow up CX results  Continue empiric abx     Masslike enlargement of pancrease  - Incidental CT finding  - no hx of smoking or alcohol. No family hx of pancreatic cancer. No recent weight loss. - further workup once renal function improves    10/28:  Discussed results with pt and the need for further workup which she is in agreement with - will ask for Oncology evaluation    10/29: Oncology eval appreciated      Hypertension  - hold losartan and lasix due to MICHELLE  - continue amlodipine  - PRN hydralazine and labetalol     Type 2 DM  - hold metfromin  - sliding scale insulin     BRADY  - hx of BRADY. CT abd shows heaptic cirrhosis. LFT wnl. Platelet wnl. Elevated AST, ALT, and AlkP. - Outpt f/u      Hx of Gout      30.0 - 39.9 Obese / Body mass index is 36.63 kg/m². Code status: Full  Prophylaxis: Hep SQ  Recommended Disposition: tbd     Subjective:     Chief Complaint / Reason for Physician Visit  Doing well this morning but is very concerned about the CT results. Discussed with RN events overnight. Review of Systems:  Symptom Y/N Comments  Symptom Y/N Comments   Fever/Chills n   Chest Pain n    Poor Appetite    Edema     Cough n   Abdominal Pain n    Sputum    Joint Pain     SOB/HENLEY n   Pruritis/Rash     Nausea/vomit    Tolerating PT/OT     Diarrhea    Tolerating Diet     Constipation    Other       Could NOT obtain due to:      Objective:     VITALS:   Last 24hrs VS reviewed since prior progress note. Most recent are:  Patient Vitals for the past 24 hrs:   Temp Pulse Resp BP SpO2   10/29/20 1100 98.1 °F (36.7 °C) 70 18 130/83 96 %   10/29/20 0937  71  (!) 145/63    10/29/20 0632 97.8 °F (36.6 °C) 66 17 (!) 136/56 97 %   10/29/20 0600    (!) 128/54    10/29/20 0400 97.8 °F (36.6 °C) 71 17 (!) 144/52 94 %   10/29/20 0200    119/61    10/29/20 0000 97.8 °F (36.6 °C) 65 19 (!) 120/51 95 %   10/28/20 2343  67  (!) 115/49 93 %   10/28/20 1841 97.9 °F (36.6 °C) 68 20 (!) 135/58 98 %   10/28/20 1526 97.3 °F (36.3 °C) 69 20 (!) 133/56 97 %   10/28/20 1359 98.1 °F (36.7 °C) 72 20 129/61 98 %   10/28/20 1245 98.9 °F (37.2 °C) 73 24 (!) 143/52 96 %   10/28/20 1230  74 20  97 %   10/28/20 1215  73 13 (!) 174/137 96 %   10/28/20 1200  65 21 (!) 122/51 96 %   10/28/20 1145  65 19 (!) 135/57 96 %   10/28/20 1130  66 16 (!) 147/129 98 %       Intake/Output Summary (Last 24 hours) at 10/29/2020 1129  Last data filed at 10/29/2020 1100  Gross per 24 hour   Intake 3675 ml   Output 2275 ml   Net 1400 ml        PHYSICAL EXAM:  General: Alert, cooperative, no acute distress    EENT:  EOMI. Anicteric sclerae. MMM  Resp:  CTA bilaterally, no wheezing or rales. No accessory muscle use  CV:  Regular  rhythm,  No edema  GI:  Soft, Non distended, Non tender.  +Bowel sounds  Neurologic:  Alert and oriented X 3, normal speech,   Psych:   Good insight. Not anxious nor agitated  Skin:  No rashes.   No jaundice    Reviewed most current lab test results and cultures  YES  Reviewed most current radiology test results   YES  Review and summation of old records today    NO  Reviewed patient's current orders and MAR    YES  PMH/SH reviewed - no change compared to H&P  ________________________________________________________________________  Care Plan discussed with:    Comments   Patient     Family      RN     Care Manager     Consultant                        Multidiciplinary team rounds were held today with , nursing, pharmacist and clinical coordinator. Patient's plan of care was discussed; medications were reviewed and discharge planning was addressed. ________________________________________________________________________  Total NON critical care TIME:  35   Minutes    Total CRITICAL CARE TIME Spent:   Minutes non procedure based      Comments   >50% of visit spent in counseling and coordination of care     ________________________________________________________________________  Cleopatra Villarreal MD     Procedures: see electronic medical records for all procedures/Xrays and details which were not copied into this note but were reviewed prior to creation of Plan. LABS:  I reviewed today's most current labs and imaging studies.   Pertinent labs include:  Recent Labs     10/29/20  0337 10/28/20  0410 10/27/20  1430   WBC 4.0 4.5 9.9   HGB 8.2* 8.1* 9.9*   HCT 25.9* 24.9* 31.1*   PLT 91* 84* 186     Recent Labs     10/29/20  0944 10/28/20  0410 10/27/20  1900 10/27/20  1430    140 138 135*  134*   K 4.0 4.7 4.9 6.2*  6.2*   CL 97 100 104 103  102   CO2 31 25 19* 19*  18*   * 114* 146* 122*  122*   BUN 87* 98* 96* 97*  97*   CREA 7.39* 7.30* 7.67* 7.74*  7.72*   CA 8.8 9.2 9.0 9.3  9.3   MG  --   --   --  2.7*   PHOS  --  5.9*  --   --    ALB  --  3.4*  --  3.9  3.9   TBILI  --   --   --  0.8  0.8   ALT  --   --   --  58  58       Signed: Cleopatra Villarreal MD

## 2020-10-29 NOTE — PROGRESS NOTES
0700: Bedside shift change report given to Vivian Mac RN (oncoming nurse) by Luzma Duque RN (offgoing nurse). Report included the following information SBAR and Kardex. 1317: Paged GI consult. Awaiting call back. 1418: GI at bedside evaluating patient.

## 2020-10-29 NOTE — PROGRESS NOTES
[facility] END OF SHIFT REPORT      Bedside and Verbal shift change report GIVEN TO , RN. Report included the following information Kardex. SIGNIFICANT CHANGES DURING SHIFT:    0000 Pt refused 0200 heparin because of hx of low platelets which are now 84. I will pass  it on bedside report ,for MD to discuss with pt  . CONCERNS TO ADDRESS WITH MD:          [facility] NURSING NOTE   Admission Date 10/27/2020   Admission Diagnosis MICHELLE (acute kidney injury) ([facility] [address] [N17.9]   Consults IP CONSULT TO NEPHROLOGY  IP CONSULT TO ONCOLOGY  IP CONSULT TO HOSPITALIST      Cardiac Monitoring [x] Yes [] No      Purposeful Hourly Rounding [x] Yes    Demian Score Total Score: 3   Demian score 3 or > [x] Bed Alarm [] Avasys [] 1:1 sitter [] Patient refused (Signed refusal form in chart)   Boris Score Boris Score: 18   Boris score 14 or < [] PMT consult [] Wound Care consult    []  Specialty bed  [] Nutrition consult      Influenza Vaccine             Oxygen needs? [] Room air Oxygen @  []1L    []2L    []3L   []4L    []5L   []6L via  NC   Chronic home O2 use? [] Yes [x] No  Perform O2 challenge test and document in progress note using smartphrase (.Homeoxygen)      Last bowel movement Last Bowel Movement Date: 10/28/20      Urinary Catheter Urinary Catheter 10/27/20 West-Indications for Use: Acute urinary retention/bladder outlet obstruction     Urinary Catheter 10/27/20 West-Urine Output (mL): 300 ml     LDAs               Peripheral IV 10/27/20 Right Antecubital (Active)   Site Assessment Clean, dry, & intact 10/28/20 1954   Phlebitis Assessment 0 10/28/20 1954   Infiltration Assessment 0 10/28/20 1954   Dressing Status Clean, dry, & intact 10/28/20 1954   Dressing Type Tape;Transparent 10/28/20 1954   Hub Color/Line Status Pink; Infusing;Flushed;Patent 10/28/20 1954       Peripheral IV 10/27/20 Anterior; Left Forearm (Active)   Site Assessment Clean, dry, & intact 10/28/20 1954   Phlebitis Assessment 0 10/28/20 1954   Infiltration Assessment 0 10/28/20 1954   Dressing Status Clean, dry, & intact 10/28/20 1954   Dressing Type Tape;Transparent 10/28/20 1954   Hub Color/Line Status Pink; Infusing;Flushed 10/28/20 1954                         Readmission Risk Assessment Tool Score High Risk            21       Total Score        2 . Living with Significant Other. Assisted Living. LTAC. SNF. or   Rehab    5 Pt.  Coverage (Medicare=5 , Medicaid, or Self-Pay=4)    14 Charlson Comorbidity Score (Age + Comorbid Conditions)        Criteria that do not apply:    Has Seen PCP in Last 6 Months (Yes=3, No=0)    Patient Length of Stay (>5 days = 3)    IP Visits Last 12 Months (1-3=4, 4=9, >4=11)       Expected Length of Stay 3d 2h   Actual Length of Stay 1

## 2020-10-29 NOTE — PROGRESS NOTES
Labs from this AM reviewed   Better from acidosis  Cr stable  Stop bicarb drip, change to 1/2 NS   no need for HD today

## 2020-10-29 NOTE — CONSULTS
[address], [address] [facility] [place], [address] [facility]  [phone]      Hematology/ Oncology Consult Note      Reason for consult:     Jhony Elkins is a 76 y.o. female who we have been asked to see by Dr. Lolis Kaplan for pancreatic mass. Subjective:     Jhony Elkins is a 76year old female who presented to the ED on 10/27 with complaint of decrease urine output. A CT of the abdomen was obtain and an incidental finding of a pancreatic mass was found. She denies any pain, weight loss or decreased appetite. She does have a history of BRADY and follows with Dr. Umer Arias at [facility]. (Last office note scanned into EMR)    PMH - DM, HTN, Gout, Osteopenia, GERD and Hypothyroid      Review of Systems:  A comprehensive review of systems was negative except for that written in the History of Present Illness.        Past Medical History:   Diagnosis Date    Arthritis     KNEE,gout    Endocrine disease     HYPOTHYROIDISM    Fracture     Left distal femur (age 12 - pt fell)    Fracture     Left tibia 1990 (pt fell)    Fracture     Right wrist fractured 2 times (pt fell)    GERD (gastroesophageal reflux disease)     Hypertension     Hypoglycemia     \"my body produces too much insulin\"    Liver disease     BRADY    Nausea & vomiting     when had gallbladder out    Osteoporosis     Other ill-defined conditions(799.89)     spinal stenosis    Other ill-defined conditions(799.89)     BBB    Other ill-defined conditions(799.89)     HIGH CHOLESTEROL    Other ill-defined conditions(799.89)     edema legs    Thyroid disease      Past Surgical History:   Procedure Laterality Date    ABDOMEN SURGERY PROC UNLISTED      liver biopsy--BRADY and fibrosis    COLONOSCOPY,GATITO TINSLEY,LALI  2/11/2015         COLORECTAL SCRN; HI RISK IND  2/11/2015         HX CHOLECYSTECTOMY      HX HYSTERECTOMY      HX ORTHOPAEDIC      left leg surgery - plates, screws, wires, pins in place    HX UROLOGICAL      PESSURY    UPPER GI ENDOSCOPY,BIOPSY  11/17/2015           Family History   Problem Relation Age of Onset    Diabetes Mother     Heart Disease Mother     Lung Disease Father         copd     Social History     Tobacco Use    Smoking status: Never Smoker    Smokeless tobacco: Never Used   Substance Use Topics    Alcohol use: No      Current Facility-Administered Medications   Medication Dose Route Frequency Provider Last Rate Last Dose    colchicine tablet 1.2 mg  1.2 mg Oral ONCE Julissa Aguirre MD        Followed by   Pradip Henderson colchicine tablet 0.6 mg  0.6 mg Oral ONCE Julissa Aguirre MD        cefTRIAXone (ROCEPHIN) 1 g in 0.9% sodium chloride (MBP/ADV) 50 mL  1 g IntraVENous Q24H Bassem Welch  mL/hr at 10/28/20 1717 1 g at 10/28/20 1717    sodium bicarbonate 150 mEq/1000 mL D5W (premix)   IntraVENous CONTINUOUS Bassem Welch  mL/hr at 10/29/20 0317      amLODIPine (NORVASC) tablet 5 mg  5 mg Oral DAILY Bassem Welch MD   5 mg at 10/29/20 2749    aspirin chewable tablet 81 mg  81 mg Oral DAILY Bassem Welch MD   81 mg at 10/29/20 1244    docusate sodium (COLACE) capsule 100 mg  100 mg Oral DAILY PRN Bassem Welch MD        levothyroxine (SYNTHROID) tablet 175 mcg  175 mcg Oral 6am Bassem Welch MD   175 mcg at 10/29/20 0702    traMADoL (ULTRAM) tablet 50 mg  50 mg Oral Q6H PRN Bassem Welch MD        sodium chloride (NS) flush 5-40 mL  5-40 mL IntraVENous Q8H Andrew Patel MD   10 mL at 10/29/20 0600    sodium chloride (NS) flush 5-40 mL  5-40 mL IntraVENous PRN Bassem Welch MD        acetaminophen (TYLENOL) tablet 650 mg  650 mg Oral Q6H PRN Bassem Welch MD        Or    acetaminophen (TYLENOL) suppository 650 mg  650 mg Rectal Q6H PRN Padma Patel MD        polyethylene glycol (MIRALAX) packet 17 g  17 g Oral DAILY PRN Andrew Lemus MD        promethazine (PHENERGAN) tablet 12.5 mg  12.5 mg Oral Q6H PRN Andrew Lemus MD        Or    ondansetron (ZOFRAN) injection 4 mg  4 mg IntraVENous Q6H PRN Andrew Lemus MD        heparin (porcine) injection 5,000 Units  5,000 Units SubCUTAneous Q8H Andrew Lemus MD   Stopped at 10/29/20 1000    insulin lispro (HUMALOG) injection   SubCUTAneous AC&HS Andrew Lemus MD   Stopped at 10/28/20 2200    glucose chewable tablet 16 g  4 Tab Oral PRN Andrew Lemus MD        dextrose (D50W) injection syrg 12.5-25 g  12.5-25 g IntraVENous PRN Andrew Lemus MD        glucagon (GLUCAGEN) injection 1 mg  1 mg IntraMUSCular PRN Andrew Lemus MD        hydrALAZINE (APRESOLINE) 20 mg/mL injection 20 mg  20 mg IntraVENous Q6H PRN Andrew Lemus MD        labetaloL (NORMODYNE;TRANDATE) injection 20 mg  20 mg IntraVENous Q6H PRN Andrew Lemus MD            Allergies   Allergen Reactions    Gabapentin Other (comments)    Celebrex [Celecoxib] Hives    Pcn [Penicillins] Unknown (comments)     Can't remember    Sulfa (Sulfonamide Antibiotics) Hives          Objective:     Patient Vitals for the past 8 hrs:   BP Temp Pulse Resp SpO2 Weight   10/29/20 0937 (!) 145/63  71      10/29/20 0632 (!) 136/56 97.8 °F (36.6 °C) 66 17 97 %    10/29/20 0600 (!) 128/54        10/29/20 0400 (!) 144/52 97.8 °F (36.6 °C) 71 17 94 %    10/29/20 0332      206 lb 12.7 oz (93.8 kg)   10/29/20 0200 119/61            Lab Results   Component Value Date/Time    WBC 4.0 10/29/2020 03:37 AM    HGB 8.2 (L) 10/29/2020 03:37 AM    HCT 25.9 (L) 10/29/2020 03:37 AM    PLATELET 91 (L) 95/73/9082 03:37 AM    MCV 98.1 10/29/2020 03:37 AM       Physical Exam:   General appearance: alert, cooperative, no distress, appears stated age  Eyes: negative  Lungs: clear to auscultation bilaterally  Heart: regular rate and rhythm  Abdomen: soft, non-tender. Bowel sounds normal. No masses,  no organomegaly  Skin: Skin color, texture, turgor normal. No rashes or lesions  Lymph nodes: Cervical, supraclavicular, and axillary nodes normal.  Neurologic: Grossly normal      CT Results (most recent):  Results from Hospital Encounter encounter on 10/27/20   CT ABD PELV WO CONT    Narrative EXAM: CT ABD PELV WO CONT    INDICATION: r/o renal obstruction. Acute renal dysfunction. Urinary tract  infection. COMPARISON: CT 1/3/2011    CONTRAST:  None. TECHNIQUE:   Thin axial images were obtained through the abdomen and pelvis. Coronal and  sagittal reformats were generated. Oral contrast was not administered. CT dose  reduction was achieved through use of a standardized protocol tailored for this  examination and automatic exposure control for dose modulation. The absence of intravenous contrast material reduces the sensitivity for  evaluation of the vasculature and solid organs. FINDINGS:   LOWER THORAX: No significant abnormality in the incidentally imaged lower chest.  LIVER: There is nodularity of the liver capsule suggestive of cirrhosis. BILIARY TREE: Status post cholecystectomy. CBD is not dilated. SPLEEN: On the upper limits of normal for size. PANCREAS: There is masslike enlargement of the uncinate process measuring 3.0 x  2.8 cm. This has changed in appearance since 2011. ADRENALS: Unremarkable. KIDNEYS/URETERS: No calculus or hydronephrosis. STOMACH: Unremarkable. SMALL BOWEL: No dilatation or wall thickening. COLON: No dilatation or wall thickening. APPENDIX: Not well seen. PERITONEUM: No ascites or pneumoperitoneum. RETROPERITONEUM: No lymphadenopathy or aortic aneurysm. REPRODUCTIVE ORGANS: Status post hysterectomy. A pessary is in place. URINARY BLADDER: A West catheter decompresses the bladder. BONES: No destructive bone lesion.   ABDOMINAL WALL: There is a small fat-containing umbilical hernia. ADDITIONAL COMMENTS: N/A      Impression IMPRESSION:    1. No nephrolithiasis or hydronephrosis. 2. Masslike enlargement of the uncinate process of the pancreas which is new  since 2011. Findings are worrisome for malignancy. Recommend MRI with contrast,  which could be performed once the patient's renal function improves. 3. Hepatic cirrhosis. 4. Status post cholecystectomy. Findings discussed with Dr. Mahesh Lew at 7:50 PM on 10/27/2020. Assessment:     1. Pancreatic mass     Abnormal finding on CT  History of BRADY - has been getting serial RUQ US d/t liver fibrosis    More imaging needed. Either pancreatic protocol CT or MRI. She has had knee surgery in the past and says she has plates that were placed in 1991 and she feels she cannot have a MRI. Surgery was by Dr. Debora Chung at the [facility]     Will obtain a CA 19-9  Consult Dr. Vianney Diop regarding biopsy in the future    Obtain records from [facility]    2. Anemia -     Multifactorial -  Chronic disease   Acute illness     Transfuse to keep platelets > 7 g/dL     3. Thrombocytopenia -    More than likely multifactorial -  History of thrombocytopenia  History of BRADY  Consumption from acute illness  Diluted lab from fluids    Hold Heparin today. If platelets are below [phone] will complete HIT work up tomorrow    4. MICHELLE on CKD -    Managed by nephrology      Plan:     1. Hold Heparin today  2. Consult GI Dr. Vianney Diop  3. Will need MRI once renal function improves  4. CA 19.9  5. CBC daily   6.  Obtain records from [address] [facility], [place]

## 2020-10-29 NOTE — CONSULTS
Gastroenterology Attending Physician Brandee Atkins) attestation statement and comments. This patient was seen and examined by me in a face-to-face visit today. I reviewed the medical record including lab work, imaging and other provider notes. I confirmed the history as described above. I spoke to the patient, reviewed the medical record including lab work, imaging and other provider notes. I discussed this case in detail with SNOW Almeida NP. I formulated an  assessment of this patient and developed a treatment plan. I agree with the above consultation note. I agree with the history, exam and assessment and plan as outlined in the note. 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 Etiology of renal failure remains unclear with DDx including ATN vs. HRS, but she is being considered for Hemodialysis now. Prior imaging limited by lack of IV contrast and she may not be candidate for contrasted CT or MRI anytime soon. PE with some edema, but benign CV, Pulm, abd exam o/w. Labs and meds reviewed. Plan:  1) Optimize renal function and treat for possible underlying HRS  2) EUS should be delayed as we sort out her renal failure. 3 )EUS may help visualize vessels and obtain tissue dx, but would not consider her operative candidate given cirrhosis, new renal failure, and portal hypertension. Tissue dx might allow for chemotherapies if malignancy confirmed.   4) Later radiologic imaging dependent on clinical status and renal function  Raeann Martinez MD            GI CONSULTATION NOTE   Yoan Bach NP  [phone] NP in-hospital cell phone M-F until 4:30  After 5pm or on weekends, please call  for physician on call    NAME: Jhony Severo :  1945 MRN:  [id number]   Attending: Dr. Haroldo Dover  Primary GI: Dr. Jackman Seen  Date/Time:  10/29/2020 2:10 PM  Assessment:   -Pancreatic mass, as seen on CT imaging  · New finding, will need EUS with biopsy  -Anemia, stable at 8.2  -Abnormal CT of the GI tract  · masslike enlargement of the uncinate process measuring 3.0 x  2.8 cm. This has changed in appearance since 2011. · Hepatic cirrhosis  -H/o cirrhosis, h/o BRADY, compensated  · Had liver bx with [facility]  · 2006 BRADY, moderate fibrosis with bridging, stage III  -Acute on Chronic kidney failure  · Needs further management, with reversal if possible. If not reversible could lead to hepato-renal syndrome which could lead to rapid decompensation  -Obesity  GI Hx  -2015 EGD showing normal esophagus and duodenum. Diffuse gastritis with friability, no ulcers, no varices  -2015 CLN grade 1 internal hemorrhoids, ascending colon polyps (TA). Recommended repeat in 5 years  Plan:   -Needs further workup and management of acute renal failure. Ideally needs to be reversed if possible before EUS. If can't be reversed concern for hepato-renal syndrome in which would be high risk of decompensation very quickly. -Patient would not be a candidate for whipple surgery given her cirrhosis, treatment would need to be based off biopsy   -Plan for EUS with biopsy in future, once renal failure is better managed  -Will benefit from further imaging with CT pancreatic protocol or MRCP. Patient's knee replacement may not be compatible with MRCP. If to get CT pancreatic protocol will need to wait until renal function improves. Appreciate input from nephrology  -Appreciate input from hematology/oncology  -Supportive measure per primary team  -Will continue to follow, please call GI with any further questions or concerns. Thank you!   Plan discussed with Dr. Mirian Rothman  Subjective:     HISTORY OF PRESENT ILLNESS:     Melva Zuniga is an 76 y.o.  female who we are asked to see for complaint of pancreatic mass. Reports on Monday was placed on antibiotics for UTI. Reports after starting the antibiotics she started having N/V/D. Ultimately came to ER because was unable to void, which upset her given she is on lasix. She was also told by a provider her kidneys were not working correctly. No abdominal pain. Had nausea and vomiting for multiple days, no hematemesis or coffee ground emesis. H/o acid reflux, on famotidine daily, well controlled. Appetite and weight stable. Typically has regular bowel movements, no hematochezia or melena. This week had some diarrhea while taking the cipro. Is not currently having any more diarrhea or N/V. Moni Hunter has had a colonoscopy since 2015, unsure, but thinks is due for repeat in 1-2 years. Last EGD with Dr. Warden Prasad in 2015. No family history of any GI illnesses/cancers. No h/o of any pancreatic issues. Reports does have history of cirrhosis and changes in the liver, has had two liver biopsies, unsure what results were. No blood thinners. Has h/o knee replacement, is unsure if is MRI compatible.       Past Medical History:   Diagnosis Date    Arthritis     KNEE,gout    Endocrine disease     HYPOTHYROIDISM    Fracture     Left distal femur (age 12 - pt fell)    Fracture     Left tibia 1990 (pt fell)    Fracture     Right wrist fractured 2 times (pt fell)    GERD (gastroesophageal reflux disease)     Hypertension     Hypoglycemia     \"my body produces too much insulin\"    Liver disease     BRADY    Nausea & vomiting     when had gallbladder out    Osteoporosis     Other ill-defined conditions(799.89)     spinal stenosis    Other ill-defined conditions(799.89)     BBB    Other ill-defined conditions(799.89)     HIGH CHOLESTEROL    Other ill-defined conditions(799.89)     edema legs    Thyroid disease       Past Surgical History:   Procedure Laterality Date    ABDOMEN SURGERY PROC UNLISTED      liver biopsy--BRADY and fibrosis    COLONOSCOPY,REMV ALVINA,SNARE 2/11/2015         COLORECTAL SCRN; HI RISK IND  2/11/2015         HX CHOLECYSTECTOMY      HX HYSTERECTOMY      HX ORTHOPAEDIC      left leg surgery - plates, screws, wires, pins in place    HX UROLOGICAL      PESSURY    UPPER GI ENDOSCOPY,BIOPSY  11/17/2015          Social History     Tobacco Use    Smoking status: Never Smoker    Smokeless tobacco: Never Used   Substance Use Topics    Alcohol use: No      Family History   Problem Relation Age of Onset    Diabetes Mother     Heart Disease Mother     Lung Disease Father         copd      Allergies   Allergen Reactions    Gabapentin Other (comments)    Celebrex [Celecoxib] Hives    Pcn [Penicillins] Unknown (comments)     Can't remember    Sulfa (Sulfonamide Antibiotics) Hives      Prior to Admission medications    Medication Sig Start Date End Date Taking? Authorizing Provider   ciprofloxacin HCl (Cipro) 500 mg tablet Take 500 mg by mouth two (2) times a day. Yes Provider, Historical   metFORMIN ER (GLUCOPHAGE XR) 500 mg tablet Take 2 Tabs by mouth two (2) times daily (after meals). For diabetes. 9/10/20  Yes Malachi Tarango MD   levothyroxine (SYNTHROID) 175 mcg tablet TAKE ONE DAILY BY MOUTH. TAKE AN EXTRA 1/2 PILL ON SUNDAYS. (7.5 TABLETS/WEEK  MCG) 9/10/20  Yes Malachi Tarango MD   famotidine (PEPCID) 40 mg tablet Take 1 Tab by mouth daily. 5/14/20  Yes Malachi Tarango MD   colchicine (MITIGARE) 0.6 mg capsule Take 1 Cap by mouth daily. Indications: acute inflammation of the joints due to gout attack 5/11/20  Yes Malachi Tarango MD   fluticasone propionate (FLONASE) 50 mcg/actuation nasal spray 2 Sprays by Both Nostrils route daily. Administer to right and left nostril. 4/6/20  Yes Malachi Tarango MD   polyethylene glycol [facility]) 17 gram/dose powder Take 17 g by mouth daily.  4/6/20  Yes Malachi Tarango MD   atorvastatin (LIPITOR) 20 mg tablet TAKE 1 TABLET DAILY 2/27/20  Yes Malachi Tarango MD   losartan (COZAAR) 25 mg tablet TAKE 1 TABLET BY MOUTH DAILY. 2/27/20  Yes Gurpreet Esqueda MD   loratadine (CLARITIN) 10 mg tablet Take 1 Tab by mouth daily. Indications: inflammation of the nose due to an allergy 2/27/20  Yes Gurpreet Esqueda MD   ZETIA 10 mg tablet Take 1 tablet by mouth daily. 2/27/20  Yes Gurpreet Esqueda MD   metOLazone (ZAROXOLYN) 5 mg tablet Take 1 Tab by mouth every Tuesday and Friday. 1/7/20  Yes Gurpreet Esqueda MD   amLODIPine (NORVASC) 5 mg tablet Take 1 Tab by mouth daily. 11/18/19  Yes Mateo Nails MD   docusate sodium (COLACE) 50 mg capsule Take 100 mg by mouth daily as needed for Constipation. Yes Provider, Historical   furosemide (LASIX) 80 mg tablet Take 1 Tab by mouth daily. 6/20/19  Yes Gurpreet Esqueda MD   diclofenac (VOLTAREN) 1 % gel Apply 2 g to affected area four (4) times daily as needed. 5/6/19  Yes Provider, Historical   potassium chloride SA (MICRO-K) 10 mEq capsule Take 2 Caps by mouth daily. 3/25/19  Yes Gurpreet Esqueda MD   omega-3 acid ethyl esters (LOVAZA) 1 gram capsule Take 2 Caps by mouth two (2) times daily (with meals). 3/25/19  Yes Gurpreet Esqueda MD   Biotin 2,500 mcg cap Take  by mouth. Yes Provider, Historical   L GASSERI/B BIFIDUM/B LONGUM (AnalytiCon Discovery PO) Take 1 Tab by mouth daily. Yes Provider, Historical   vitamin E (AQUA GEMS) 400 unit capsule Take 800 Units by mouth daily. Yes Provider, Historical   lidocaine (LIDODERM) 5 %(700 mg/patch) 1 patch by TransDERmal route every twenty-four (24) hours. Apply patch to the affected area for 12 hours a day and remove for 12 hours a day. Yes Other, MD Janette   cholecalciferol, vitamin D3, (VITAMIN D3) 2,000 unit tab Take 1 Tab by mouth daily. Yes Other, MD Janette   traMADol (ULTRAM) 50 mg tablet Take 1 tablet by mouth every six (6) hours as needed for Pain. 1/12/15  Yes Jaquelyn Cabot, DO   MULTIVITAMIN WITH MINERALS (ONE-A-DAY 50 PLUS PO) Take 1 Tab by mouth daily. Yes Provider, Historical   aspirin 81 mg chewable tablet Take 81 mg by mouth daily. Yes Provider, Historical   ketoconazole (NIZORAL) 2 % topical cream Apply  to affected area daily as needed. 8/23/19   Provider, Historical   indomethacin (INDOCIN) 25 mg capsule TAKE 1 CAPSULE BY MOUTH THREE TIMES A DAY AS NEEDED FOR PAIN. 2/27/18   Nikki Lopez MD       Patient Active Problem List   Diagnosis Code    Gout M10.9    Hypothyroidism E03.9    Osteoporosis M81.0    Anxiety F41.9    Leg edema R60.0    RSD lower limb G90.529    Fatty liver K76.0    Pure hypercholesterolemia E78.00    Colon polyp K63.5    Bifascicular block I45.2    HTN (hypertension) I10    Thrombocytopenia ([address] D69.6    Prediabetes R73.03    DDD (degenerative disc disease), lumbar M51.36    Controlled type 2 diabetes mellitus without complication ([address] S42.4    Non-pressure chronic ulcer of left lower leg, limited to breakdown of skin ([address] L97.921    Severe obesity (HCC) E66.01    Peripheral vascular disease (HCC) I73.9    Cellulitis of left lower leg L03. 80    MICHELLE (acute kidney injury) ([address] N17.9       REVIEW OF SYSTEMS:    Constitutional: negative fever, negative chills, negative weight loss  Eyes:   negative visual changes  ENT:   negative sore throat, tongue or lip swelling   Respiratory:  negative cough, negative dyspnea  Cards:  negative for chest pain, palpitations, lower extremity edema  GI:   See HPI  :  negative for frequency, dysuria  Integument:  negative for rash and pruritus  Heme:  negative for easy bruising and gum/nose bleeding  Musculoskel: negative for myalgias,  back pain and muscle weakness  Neuro: negative for headaches, dizziness, vertigo  Psych: negative for feelings of anxiety, depression     Pertinent Positives: N/V/D, unable to void      Objective:   VITALS:    Visit Vitals  /83   Pulse 70   Temp 98.1 °F (36.7 °C)   Resp 18   Ht 5' 3\" (1.6 m)   Wt 93.8 kg (206 lb 12.7 oz)   SpO2 96%   BMI 36.63 kg/m²       PHYSICAL EXAM:   General:          Alert, WD, WN, cooperative, no distress, appears stated age. Head:               Normocephalic, without obvious abnormality, atraumatic. Eyes:               Conjunctivae clear and pale, anicteric sclerae. Pupils are equal  Nose:               Nares normal. No drainage or sinus tenderness. Throat:             Lips, mucosa, and tongue normal.  No Thrush  Neck:               Supple, symmetrical,  no adenopathy, thyroid: non tender  Back:               Symmetric,  No CVA tenderness. Lungs:             CTA bilaterally. No wheezing/rhonchi/rales. Chest wall:      No tenderness or deformity. No Accessory muscle use. Heart:              Regular rate and rhythm,  no murmur, rub or gallop. Abdomen:        Soft, non-tender. Not distended. Bowel sounds normal. No masses  Extremities:     Atraumatic, No cyanosis. No edema. No clubbing  Skin:                Texture, turgor normal. No rashes/lesions/jaundice  Lymph:            Cervical, supraclavicular normal.  Psych:             Good insight. Not depressed. Not anxious or agitated. Neurologic:      EOMs intact. No facial asymmetry. No aphasia or slurred speech. Normal                        strength, A/O X 3.      LAB DATA REVIEWED:    Recent Results (from the past 24 hour(s))   GLUCOSE, POC    Collection Time: 10/28/20  4:20 PM   Result Value Ref Range    Glucose (POC) 188 (H) 65 - 100 mg/dL    Performed by Ronnie Navarro, POC    Collection Time: 10/28/20  9:32 PM   Result Value Ref Range    Glucose (POC) 118 (H) 65 - 100 mg/dL    Performed by Drew TAM    CBC W/O DIFF    Collection Time: 10/29/20  3:37 AM   Result Value Ref Range    WBC 4.0 3.6 - 11.0 K/uL    RBC 2.64 (L) 3.80 - 5.20 M/uL    HGB 8.2 (L) 11.5 - 16.0 g/dL    HCT 25.9 (L) 35.0 - 47.0 %    MCV 98.1 80.0 - 99.0 FL    MCH 31.1 26.0 - 34.0 PG    MCHC 31.7 30.0 - 36.5 g/dL    RDW 15.6 (H) 11.5 - 14.5 %    PLATELET 91 (L) 037 - 400 K/uL    MPV 10.7 8.9 - 12.9 FL    NRBC 0.0 0  WBC    ABSOLUTE NRBC 0.00 0.00 - 0.01 K/uL GLUCOSE, POC    Collection Time: 10/29/20  7:50 AM   Result Value Ref Range    Glucose (POC) 133 (H) 65 - 100 mg/dL    Performed by [facility] PCT    METABOLIC PANEL, BASIC    Collection Time: 10/29/20  9:44 AM   Result Value Ref Range    Sodium 138 136 - 145 mmol/L    Potassium 4.0 3.5 - 5.1 mmol/L    Chloride 97 97 - 108 mmol/L    CO2 31 21 - 32 mmol/L    Anion gap 10 5 - 15 mmol/L    Glucose 227 (H) 65 - 100 mg/dL    BUN 87 (H) 6 - 20 MG/DL    Creatinine 7.39 (H) 0.55 - 1.02 MG/DL    BUN/Creatinine ratio 12 12 - 20      GFR est AA 7 (L) >60 ml/min/1.73m2    GFR est non-AA 5 (L) >60 ml/min/1.73m2    Calcium 8.8 8.5 - 10.1 MG/DL   GLUCOSE, POC    Collection Time: 10/29/20 11:32 AM   Result Value Ref Range    Glucose (POC) 215 (H) 65 - 100 mg/dL    Performed by [facility] PCT        IMAGING RESULTS:  EXAM: CT ABD PELV WO CONT     INDICATION: r/o renal obstruction. Acute renal dysfunction. Urinary tract  infection.     COMPARISON: CT 1/3/2011     CONTRAST:  None.     TECHNIQUE:   Thin axial images were obtained through the abdomen and pelvis. Coronal and  sagittal reformats were generated. Oral contrast was not administered. CT dose  reduction was achieved through use of a standardized protocol tailored for this  examination and automatic exposure control for dose modulation.      The absence of intravenous contrast material reduces the sensitivity for  evaluation of the vasculature and solid organs.     FINDINGS:   LOWER THORAX: No significant abnormality in the incidentally imaged lower chest.  LIVER: There is nodularity of the liver capsule suggestive of cirrhosis. BILIARY TREE: Status post cholecystectomy. CBD is not dilated. SPLEEN: On the upper limits of normal for size. PANCREAS: There is masslike enlargement of the uncinate process measuring 3.0 x  2.8 cm. This has changed in appearance since 2011. ADRENALS: Unremarkable. KIDNEYS/URETERS: No calculus or hydronephrosis.   STOMACH: Unremarkable. SMALL BOWEL: No dilatation or wall thickening. COLON: No dilatation or wall thickening. APPENDIX: Not well seen. PERITONEUM: No ascites or pneumoperitoneum. RETROPERITONEUM: No lymphadenopathy or aortic aneurysm. REPRODUCTIVE ORGANS: Status post hysterectomy. A pessary is in place. URINARY BLADDER: A West catheter decompresses the bladder. BONES: No destructive bone lesion. ABDOMINAL WALL: There is a small fat-containing umbilical hernia. ADDITIONAL COMMENTS: N/A    IMPRESSION:     1. No nephrolithiasis or hydronephrosis. 2. Masslike enlargement of the uncinate process of the pancreas which is new  since 2011. Findings are worrisome for malignancy. Recommend MRI with contrast,  which could be performed once the patient's renal function improves. 3. Hepatic cirrhosis. 4. Status post cholecystectomy.   I have personally reviewed the imaging reports      Total time spent with patient: 60 minutes ________________________________________________________________________  Care Plan discussed with:  Patient Y   Family     SANTOS Rudolph              Consultant:       CT  10/29/2020:  ________________________________________________________________________    ___________________________________________________  Consulting Provider: Dale Liu NP      10/29/2020  2:10 PM

## 2020-10-29 NOTE — PROGRESS NOTES
NEPHROLOGY PROGRESS NOTE     Patient: Ced Amado MRN: [id number]  PCP: Merlinda Alto, MD   :     1945  Age:   76 y.o. Sex:  female      Referring physician: Ryanne Echevarria MD    Admission Date: 10/27/2020  3:27 PM  LOS: 2 days      ASSESSMENT and PLAN :     MICHELLE on CKD  - suspect ATN from prolonged GI losses+poor PO intake+ Losartan+ Lasix   - improved  from hyperkalemia and acidosis w/ bicarb drip. 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       Subjective:   C/o left ankle pain from her gout this AM  No recurrent N/V  No diarrhea  No SOB      Past Medical Hx:   Past Medical History:   Diagnosis Date    Arthritis     KNEE,gout    Endocrine disease     HYPOTHYROIDISM    Fracture     Left distal femur (age 12 - pt fell)    Fracture     Left tibia  (pt fell)    Fracture     Right wrist fractured 2 times (pt fell)    GERD (gastroesophageal reflux disease)     Hypertension     Hypoglycemia     \"my body produces too much insulin\"    Liver disease     BRADY    Nausea & vomiting     when had gallbladder out    Osteoporosis     Other ill-defined conditions(799.89)     spinal stenosis    Other ill-defined conditions(799.89)     BBB    Other ill-defined conditions(799.89)     HIGH CHOLESTEROL    Other ill-defined conditions(799.89)     edema legs    Thyroid disease         Past Surgical Hx:     Past Surgical History:   Procedure Laterality Date    ABDOMEN SURGERY PROC UNLISTED      liver biopsy--BRADY and fibrosis    Jerrica Wiley  2/11/2015         COLORECTAL SCRN; HI RISK IND  2/11/2015         HX CHOLECYSTECTOMY      HX HYSTERECTOMY      HX ORTHOPAEDIC      left leg surgery - plates, screws, wires, pins in place    HX UROLOGICAL      PESSURY    UPPER GI ENDOSCOPY,BIOPSY  11/17/2015              Allergies   Allergen Reactions    Gabapentin Other (comments)    Celebrex [Celecoxib] Hives    Pcn [Penicillins] Unknown (comments)     Can't remember    Sulfa (Sulfonamide Antibiotics) Hives       Social Hx:  reports that she has never smoked. She has never used smokeless tobacco. She reports current drug use. Drugs: Prescription and OTC. She reports that she does not drink alcohol. Family History   Problem Relation Age of Onset    Diabetes Mother     Heart Disease Mother     Lung Disease Father         copd       Review of Systems: The reminder of the ROS is negative and noncontributory. Objective:    Vitals:    Vitals:    10/29/20 0332 10/29/20 0400 10/29/20 0600 10/29/20 0632   BP:  (!) 144/52 (!) 128/54 (!) 136/56   Pulse:  71  66   Resp:  17  17   Temp:  97.8 °F (36.6 °C)  97.8 °F (36.6 °C)   SpO2:  94%  97%   Weight: 93.8 kg (206 lb 12.7 oz)      Height:         I&O's:  10/28 0701 - 10/29 0700  In: 8912 [P.O.:240; I.V.:3435]  Out: 1625 [Urine:1625]  Visit Vitals  BP (!) 136/56 (BP 1 Location: Right arm, BP Patient Position: Supine)   Pulse 66   Temp 97.8 °F (36.6 °C)   Resp 17   Ht 5' 3\" (1.6 m)   Wt 93.8 kg (206 lb 12.7 oz)   SpO2 97%   BMI 36.63 kg/m²       Physical Exam:  General: AAOx3, in NAD   HEENT: PERRL,  No Pallor , No Icterus  Neck: Supple,no mass palpable  Lungs : CTA  CVS: RRR  Abdomen: Soft, NT, BS +  Extremities:  No Edema; left ankle warm and TTP  Skin: No rash or lesions.   Neurologic: non focal, AAO x 3  Psych: normal affect    Laboratory Results:    Recent Labs     10/28/20  0410 10/27/20  1900 10/27/20  1430    138 135*  134*   K 4.7 4.9 6.2*  6.2*    104 103 102   CO2 25 19* 19*  18*   * 146* 122*  122*   BUN 98* 96* 97*  97*   CREA 7.30* 7.67* 7.74*  7.72*   CA 9.2 9.0 9.3  9.3   MG  --   --  2.7*   PHOS 5.9*  --   --    ALB 3.4*  --  3.9  3.9   ALT  --   --  58  58     Recent Labs     10/29/20  0337 10/28/20  0410 10/27/20  1430   WBC 4.0 4.5 9.9   HGB 8.2* 8.1* 9.9*   HCT 25.9* 24.9* 31.1*   PLT 91* 84* 186     Lab Results   Component Value Date/Time    Specimen Description: BLOOD 01/03/2011 06:55 AM    Specimen Description: URINE 12/01/2010 05:45 PM     Lab Results   Component Value Date/Time    Culture result: No growth (<1,000 CFU/ML) 10/27/2020 02:54 PM    Culture result: NO GROWTH 6 DAYS 01/03/2011 06:55 AM    Culture result: NO SIGNIFICANT GROWTH 12/01/2010 05:45 PM     Recent Results (from the past 24 hour(s))   GLUCOSE, POC    Collection Time: 10/28/20 10:37 AM   Result Value Ref Range    Glucose (POC) 188 (H) 65 - 100 mg/dL    Performed by Augusto LUBINN    GLUCOSE, POC    Collection Time: 10/28/20 12:47 PM   Result Value Ref Range    Glucose (POC) 188 (H) 65 - 100 mg/dL    Performed by Augusto Mckeon BSN    GLUCOSE, POC    Collection Time: 10/28/20  4:20 PM   Result Value Ref Range    Glucose (POC) 188 (H) 65 - 100 mg/dL    Performed by Ronnie Navarro, POC    Collection Time: 10/28/20  9:32 PM   Result Value Ref Range    Glucose (POC) 118 (H) 65 - 100 mg/dL    Performed by Akhil TAM    CBC W/O DIFF    Collection Time: 10/29/20  3:37 AM   Result Value Ref Range    WBC 4.0 3.6 - 11.0 K/uL    RBC 2.64 (L) 3.80 - 5.20 M/uL    HGB 8.2 (L) 11.5 - 16.0 g/dL    HCT 25.9 (L) 35.0 - 47.0 %    MCV 98.1 80.0 - 99.0 FL    MCH 31.1 26.0 - 34.0 PG    MCHC 31.7 30.0 - 36.5 g/dL    RDW 15.6 (H) 11.5 - 14.5 %    PLATELET 91 (L) 969 - 400 K/uL    MPV 10.7 8.9 - 12.9 FL    NRBC 0.0 0  WBC    ABSOLUTE NRBC 0.00 0.00 - 0.01 K/uL   GLUCOSE, POC    Collection Time: 10/29/20  7:50 AM   Result Value Ref Range    Glucose (POC) 133 (H) 65 - 100 mg/dL    Performed by Shabnam Gomez PCT          Urine dipstick:   Lab Results   Component Value Date/Time    Color YELLOW/STRAW 10/27/2020 02:54 PM    Appearance CLOUDY (A) 10/27/2020 02:54 PM    Specific gravity 1.015 10/27/2020 02:54 PM    pH (UA) 5.0 10/27/2020 02:54 PM    Protein 100 (A) 10/27/2020 02:54 PM    Glucose Negative 10/27/2020 02:54 PM    Ketone Negative 10/27/2020 02:54 PM    Bilirubin NEGATIVE  11/04/2012 10:39 PM    Urobilinogen 1.0 10/27/2020 02:54 PM    Nitrites Negative 10/27/2020 02:54 PM    Leukocyte Esterase LARGE (A) 10/27/2020 02:54 PM    Epithelial cells MANY (A) 10/27/2020 02:54 PM    Bacteria 3+ (A) 10/27/2020 02:54 PM    WBC >100 (H) 10/27/2020 02:54 PM    RBC 5-10 10/27/2020 02:54 PM       I have reviewed the following: All pertinent labs, microbiology data, radiology imaging for my assessment     Medications list Personally Reviewed   [x]      Yes     []               No       Medications:  Prior to Admission medications    Medication Sig Start Date End Date Taking? Authorizing Provider   ciprofloxacin HCl (Cipro) 500 mg tablet Take 500 mg by mouth two (2) times a day. Yes Provider, Historical   metFORMIN ER (GLUCOPHAGE XR) 500 mg tablet Take 2 Tabs by mouth two (2) times daily (after meals). For diabetes. 9/10/20  Yes Waleska Cantu MD   levothyroxine (SYNTHROID) 175 mcg tablet TAKE ONE DAILY BY MOUTH. TAKE AN EXTRA 1/2 PILL ON SUNDAYS. (7.5 TABLETS/WEEK  MCG) 9/10/20  Yes Waleska Cantu MD   famotidine (PEPCID) 40 mg tablet Take 1 Tab by mouth daily. 5/14/20  Yes Waleska Cantu MD   colchicine (MITIGARE) 0.6 mg capsule Take 1 Cap by mouth daily. Indications: acute inflammation of the joints due to gout attack 5/11/20  Yes Waleska Cantu MD   fluticasone propionate (FLONASE) 50 mcg/actuation nasal spray 2 Sprays by Both Nostrils route daily. Administer to right and left nostril.  4/6/20  Yes Waleska Cantu MD   polyethylene glycol [facility]) 17 gram/dose powder Take 17 g by mouth daily. 4/6/20  Yes Kieran Lester MD   atorvastatin (LIPITOR) 20 mg tablet TAKE 1 TABLET DAILY 2/27/20  Yes Kieran Lester MD   losartan (COZAAR) 25 mg tablet TAKE 1 TABLET BY MOUTH DAILY. 2/27/20  Yes Kieran Lester MD   loratadine (CLARITIN) 10 mg tablet Take 1 Tab by mouth daily. Indications: inflammation of the nose due to an allergy 2/27/20  Yes Kieran Lester MD   ZETIA 10 mg tablet Take 1 tablet by mouth daily. 2/27/20  Yes Kieran Lester MD   metOLazone (ZAROXOLYN) 5 mg tablet Take 1 Tab by mouth every Tuesday and Friday. 1/7/20  Yes Kieran Lester MD   amLODIPine (NORVASC) 5 mg tablet Take 1 Tab by mouth daily. 11/18/19  Yes Melyssa Deleon MD   docusate sodium (COLACE) 50 mg capsule Take 100 mg by mouth daily as needed for Constipation. Yes Provider, Historical   furosemide (LASIX) 80 mg tablet Take 1 Tab by mouth daily. 6/20/19  Yes Kieran Lester MD   diclofenac (VOLTAREN) 1 % gel Apply 2 g to affected area four (4) times daily as needed. 5/6/19  Yes Provider, Historical   potassium chloride SA (MICRO-K) 10 mEq capsule Take 2 Caps by mouth daily. 3/25/19  Yes Kieran Lester MD   omega-3 acid ethyl esters (LOVAZA) 1 gram capsule Take 2 Caps by mouth two (2) times daily (with meals). 3/25/19  Yes Kieran Lester MD   Biotin 2,500 mcg cap Take  by mouth. Yes Provider, Historical   L GASSERI/B BIFIDUM/B LONGUM (TRINH Nubefy PO) Take 1 Tab by mouth daily. Yes Provider, Historical   vitamin E (AQUA GEMS) 400 unit capsule Take 800 Units by mouth daily. Yes Provider, Historical   lidocaine (LIDODERM) 5 %(700 mg/patch) 1 patch by TransDERmal route every twenty-four (24) hours. Apply patch to the affected area for 12 hours a day and remove for 12 hours a day. Yes Other, MD Janette   cholecalciferol, vitamin D3, (VITAMIN D3) 2,000 unit tab Take 1 Tab by mouth daily. Yes Other, MD Janette   traMADol (ULTRAM) 50 mg tablet Take 1 tablet by mouth every six (6) hours as needed for Pain.  1/12/15  Yes Brady Unger Raquel S, DO   MULTIVITAMIN WITH MINERALS (ONE-A-DAY 50 PLUS PO) Take 1 Tab by mouth daily. Yes Provider, Historical   aspirin 81 mg chewable tablet Take 81 mg by mouth daily. Yes Provider, Historical   ketoconazole (NIZORAL) 2 % topical cream Apply  to affected area daily as needed. 8/23/19   Provider, Historical   indomethacin (INDOCIN) 25 mg capsule TAKE 1 CAPSULE BY MOUTH THREE TIMES A DAY AS NEEDED FOR PAIN. 2/27/18   Nikki Lopez MD        Thank you for allowing us to participate in the care of this patient. We will follow patient. Please dont hesitate to call with any questions    Monalisa Alvarado MD  [facility]   [address] [facility] [address]  Phone - [phone]   Fax - [phone]  [email or web address]

## 2020-10-29 NOTE — PROGRESS NOTES
[address] [facility]  [address] [facility], [address] [facility], [place], [address]  [phone]      Consult received. We will see patient shortly.       Signed By: Dana Sanchez NP     October 29, 2020

## 2020-10-29 NOTE — PROGRESS NOTES
Renal Dosing/Monitoring  Medication: Colchicine   Current regimen:  0.6 mg PO every 24 hr  Recent Labs     10/28/20  0410 10/27/20  1900 10/27/20  1430   CREA 7.30* 7.67* 7.74*  7.72*   BUN 98* 96* 97*  97*     Estimated CrCl:  8 mL/min  Plan: Change to 1.2 mg once  followed by 0.6 mg x 1 dose in one hour for CrCl < 30 mL/min per renal dosing protocol.      Thank you,   Hailey Cobian, PHARMD

## 2020-10-30 LAB
ANION GAP SERPL CALC-SCNC: 9 MMOL/L (ref 5–15)
BUN SERPL-MCNC: 78 MG/DL (ref 6–20)
BUN/CREAT SERPL: 11 (ref 12–20)
C-ANCA TITR SER IF: NORMAL TITER
C3 SERPL-MCNC: 166 MG/DL (ref 82–167)
C4 SERPL-MCNC: 31 MG/DL (ref 12–38)
CALCIUM SERPL-MCNC: 8.7 MG/DL (ref 8.5–10.1)
CHLORIDE SERPL-SCNC: 101 MMOL/L (ref 97–108)
CO2 SERPL-SCNC: 28 MMOL/L (ref 21–32)
CREAT SERPL-MCNC: 6.83 MG/DL (ref 0.55–1.02)
ERYTHROCYTE [DISTWIDTH] IN BLOOD BY AUTOMATED COUNT: 15.4 % (ref 11.5–14.5)
GLUCOSE BLD STRIP.AUTO-MCNC: 112 MG/DL (ref 65–100)
GLUCOSE BLD STRIP.AUTO-MCNC: 127 MG/DL (ref 65–100)
GLUCOSE BLD STRIP.AUTO-MCNC: 129 MG/DL (ref 65–100)
GLUCOSE BLD STRIP.AUTO-MCNC: 183 MG/DL (ref 65–100)
GLUCOSE SERPL-MCNC: 104 MG/DL (ref 65–100)
HCT VFR BLD AUTO: 24.4 % (ref 35–47)
HGB BLD-MCNC: 8 G/DL (ref 11.5–16)
IGA SERPL-MCNC: 120 MG/DL (ref 64–422)
IGG SERPL-MCNC: 783 MG/DL (ref 586–1602)
IGM SERPL-MCNC: 38 MG/DL (ref 26–217)
MCH RBC QN AUTO: 32.4 PG (ref 26–34)
MCHC RBC AUTO-ENTMCNC: 32.8 G/DL (ref 30–36.5)
MCV RBC AUTO: 98.8 FL (ref 80–99)
MYELOPEROXIDASE AB SER IA-ACNC: <9 U/ML (ref 0–9)
NRBC # BLD: 0 K/UL (ref 0–0.01)
NRBC BLD-RTO: 0 PER 100 WBC
P-ANCA ATYPICAL TITR SER IF: NORMAL TITER
P-ANCA TITR SER IF: NORMAL TITER
PLATELET # BLD AUTO: 74 K/UL (ref 150–400)
PMV BLD AUTO: 10.6 FL (ref 8.9–12.9)
POTASSIUM SERPL-SCNC: 3.8 MMOL/L (ref 3.5–5.1)
PROT PATTERN SERPL IFE-IMP: NORMAL
PROTEINASE3 AB SER IA-ACNC: <3.5 U/ML (ref 0–3.5)
RBC # BLD AUTO: 2.47 M/UL (ref 3.8–5.2)
SERVICE CMNT-IMP: ABNORMAL
SODIUM SERPL-SCNC: 138 MMOL/L (ref 136–145)
WBC # BLD AUTO: 3.1 K/UL (ref 3.6–11)

## 2020-10-30 PROCEDURE — 74011250636 HC RX REV CODE- 250/636: Performed by: NURSE PRACTITIONER

## 2020-10-30 PROCEDURE — 65660000000 HC RM CCU STEPDOWN

## 2020-10-30 PROCEDURE — 74011636637 HC RX REV CODE- 636/637: Performed by: STUDENT IN AN ORGANIZED HEALTH CARE EDUCATION/TRAINING PROGRAM

## 2020-10-30 PROCEDURE — 74011250637 HC RX REV CODE- 250/637: Performed by: STUDENT IN AN ORGANIZED HEALTH CARE EDUCATION/TRAINING PROGRAM

## 2020-10-30 PROCEDURE — 86301 IMMUNOASSAY TUMOR CA 19-9: CPT

## 2020-10-30 PROCEDURE — 74011000258 HC RX REV CODE- 258: Performed by: INTERNAL MEDICINE

## 2020-10-30 PROCEDURE — 99232 SBSQ HOSP IP/OBS MODERATE 35: CPT | Performed by: INTERNAL MEDICINE

## 2020-10-30 PROCEDURE — 82962 GLUCOSE BLOOD TEST: CPT

## 2020-10-30 PROCEDURE — 74011250636 HC RX REV CODE- 250/636: Performed by: INTERNAL MEDICINE

## 2020-10-30 PROCEDURE — P9047 ALBUMIN (HUMAN), 25%, 50ML: HCPCS | Performed by: INTERNAL MEDICINE

## 2020-10-30 PROCEDURE — 80048 BASIC METABOLIC PNL TOTAL CA: CPT

## 2020-10-30 PROCEDURE — 36415 COLL VENOUS BLD VENIPUNCTURE: CPT

## 2020-10-30 PROCEDURE — 85027 COMPLETE CBC AUTOMATED: CPT

## 2020-10-30 RX ORDER — ALBUMIN HUMAN 250 G/1000ML
25 SOLUTION INTRAVENOUS EVERY 8 HOURS
Status: COMPLETED | OUTPATIENT
Start: 2020-10-30 | End: 2020-11-02

## 2020-10-30 RX ADMIN — ASPIRIN 81 MG: 81 TABLET, CHEWABLE ORAL at 08:47

## 2020-10-30 RX ADMIN — INSULIN LISPRO 2 UNITS: 100 INJECTION, SOLUTION INTRAVENOUS; SUBCUTANEOUS at 13:24

## 2020-10-30 RX ADMIN — ALBUMIN (HUMAN) 25 G: 0.25 INJECTION, SOLUTION INTRAVENOUS at 21:28

## 2020-10-30 RX ADMIN — ALBUMIN (HUMAN) 25 G: 0.25 INJECTION, SOLUTION INTRAVENOUS at 14:11

## 2020-10-30 RX ADMIN — AMLODIPINE BESYLATE 5 MG: 5 TABLET ORAL at 08:47

## 2020-10-30 RX ADMIN — HEPARIN SODIUM 5000 UNITS: 5000 INJECTION INTRAVENOUS; SUBCUTANEOUS at 09:40

## 2020-10-30 RX ADMIN — Medication 10 ML: at 15:14

## 2020-10-30 RX ADMIN — SODIUM CHLORIDE 75 ML/HR: 450 INJECTION, SOLUTION INTRAVENOUS at 08:11

## 2020-10-30 RX ADMIN — Medication 10 ML: at 21:37

## 2020-10-30 RX ADMIN — LEVOTHYROXINE SODIUM 175 MCG: 0.15 TABLET ORAL at 05:22

## 2020-10-30 RX ADMIN — Medication 10 ML: at 08:14

## 2020-10-30 NOTE — PROGRESS NOTES
NEPHROLOGY PROGRESS NOTE     Patient: Jodee Valencia MRN: [id number]  PCP: Kieran Lester MD   :     1945  Age:   76 y.o. Sex:  female      Referring physician: Ximena Hogan MD    Admission Date: 10/27/2020  3:27 PM  LOS: 3 days      ASSESSMENT and PLAN :   MICHELLE on CKD  - suspect ATN from prolonged pre-renal state from N/V+poor PO intake+ Losartan+ Lasix   - improved  from hyperkalemia and acidosis w/ bicarb drip. - renal US no hydro  - improving renal function with volume replacement, holding Losartan and Lasix  - nothing to suggest hepatorenal syndrome at this point ( which is a diagnosis of exclusion and patient has multiple reasons for pre-renal status) but will change from 1/2 NS to IV Albumin now that is confirmed that pt has h/o BRADY cirrhosis w/ portal hypertension  - monitor I/O's  - no electrolyte or acid-base  Abnormalities that would require dialysis       CKD stage 2  - from DM+ HTN   - baseline cr 1       HTN  - holding losartan, normotensive  - if becomes hypotensive, add Midodrine      UTI  - negative urine Cx but was on outpt Abx  - keep Ceftriaxone    Anemia/thrombocytopenia  - 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       Subjective:   No recurrent N/V  No diarrhea  No SOB  Tolerating diet better      Past Medical Hx:   Past Medical History:   Diagnosis Date    Arthritis     KNEE,gout    Endocrine disease     HYPOTHYROIDISM    Fracture     Left distal femur (age 12 - pt fell)    Fracture     Left tibia  (pt fell)    Fracture     Right wrist fractured 2 times (pt fell)    GERD (gastroesophageal reflux disease)     Hypertension     Hypoglycemia     \"my body produces too much insulin\"    Liver disease     BRADY    Nausea & vomiting     when had gallbladder out    Osteoporosis     Other ill-defined conditions(799.89)     spinal stenosis    Other ill-defined conditions(799.89)     BBB    Other ill-defined conditions(799.89)     HIGH CHOLESTEROL    Other ill-defined conditions(799.89)     edema legs    Thyroid disease         Past Surgical Hx:     Past Surgical History:   Procedure Laterality Date    ABDOMEN SURGERY PROC UNLISTED      liver biopsy--BRADY and fibrosis    COLONOSCOPY,REMV LESN,SNARE  2/11/2015         COLORECTAL SCRN; HI RISK IND  2/11/2015         HX CHOLECYSTECTOMY      HX HYSTERECTOMY      HX ORTHOPAEDIC      left leg surgery - plates, screws, wires, pins in place    HX UROLOGICAL      PESSURY    UPPER GI ENDOSCOPY,BIOPSY  11/17/2015              Allergies   Allergen Reactions    Gabapentin Other (comments)    Celebrex [Celecoxib] Hives    Pcn [Penicillins] Unknown (comments)     Can't remember    Sulfa (Sulfonamide Antibiotics) Hives       Social Hx:  reports that she has never smoked. She has never used smokeless tobacco. She reports current drug use. Drugs: Prescription and OTC. She reports that she does not drink alcohol. Family History   Problem Relation Age of Onset    Diabetes Mother     Heart Disease Mother     Lung Disease Father         copd       Review of Systems: The reminder of the ROS is negative and noncontributory.      Objective:    Vitals:    Vitals:    10/29/20 2212 10/30/20 0510 10/30/20 0757 10/30/20 0847   BP: (!) 147/60 (!) 149/65 (!) 132/55    Pulse: 68 66 66 68   Resp: 18 16 16    Temp: 98.1 °F (36.7 °C) 98 °F (36.7 °C) 97.7 °F (36.5 °C)    SpO2: 95% 95% 95%    Weight:  92.6 kg (204 lb 2.3 oz)     Height:         I&O's:  10/29 0701 - 10/30 0700  In: 100 [P.O.:100]  Out: 2475 [Urine:2475]  Visit Vitals  BP (!) 132/55 (BP 1 Location: Left arm, BP Patient Position: At rest)   Pulse 68   Temp 97.7 °F (36.5 °C)   Resp 16   Ht 5' 3\" (1.6 m)   Wt 92.6 kg (204 lb 2.3 oz)   SpO2 95%   BMI 36.16 kg/m²       Physical Exam:  General: AAOx3, in NAD   HEENT: PERRL,  No Pallor , No Icterus  Neck: Supple,no mass palpable  Lungs : CTA  CVS: RRR  Abdomen: Soft, NT, BS +  Extremities:  No Edema  Skin: No rash or lesions. Neurologic: non focal, AAO x 3  Psych: normal affect    Laboratory Results:    Recent Labs     10/30/20  0543 10/29/20  0944 10/28/20  0410  10/27/20  1430    138 140   < > 135*  134*   K 3.8 4.0 4.7   < > 6.2*  6.2*    97 100   < > 103  102   CO2 28 31 25   < > 19*  18*   * 227* 114*   < > 122*  122*   BUN 78* 87* 98*   < > 97*  97*   CREA 6.83* 7.39* 7.30*   < > 7.74*  7.72*   CA 8.7 8.8 9.2   < > 9.3  9.3   MG  --   --   --   --  2.7*   PHOS  --   --  5.9*  --   --    ALB  --   --  3.4*  --  3.9  3.9   ALT  --   --   --   --  58  58    < > = values in this interval not displayed.      Recent Labs     10/30/20  0943 10/29/20  0337 10/28/20  0410   WBC 3.1* 4.0 4.5   HGB 8.0* 8.2* 8.1*   HCT 24.4* 25.9* 24.9*   PLT 74* 91* 84*     Lab Results   Component Value Date/Time    Specimen Description: BLOOD 01/03/2011 06:55 AM    Specimen Description: URINE 12/01/2010 05:45 PM     Lab Results   Component Value Date/Time    Culture result: No growth (<1,000 CFU/ML) 10/27/2020 02:54 PM    Culture result: NO GROWTH 6 DAYS 01/03/2011 06:55 AM    Culture result: NO SIGNIFICANT GROWTH 12/01/2010 05:45 PM     Recent Results (from the past 24 hour(s))   GLUCOSE, POC    Collection Time: 10/29/20 11:32 AM   Result Value Ref Range    Glucose (POC) 215 (H) 65 - 100 mg/dL    Performed by [facility]    GLUCOSE, POC    Collection Time: 10/29/20  4:20 PM   Result Value Ref Range    Glucose (POC) 146 (H) 65 - 100 mg/dL    Performed by Mariana ADAMSON    GLUCOSE, POC    Collection Time: 10/29/20  9:38 PM   Result Value Ref Range    Glucose (POC) 169 (H) 65 - 100 mg/dL    Performed by Marcy Katz RN    METABOLIC PANEL, BASIC    Collection Time: 10/30/20  5:43 AM   Result Value Ref Range    Sodium 138 136 - 145 mmol/L    Potassium 3.8 3.5 - 5.1 mmol/L    Chloride 101 97 - 108 mmol/L CO2 28 21 - 32 mmol/L    Anion gap 9 5 - 15 mmol/L    Glucose 104 (H) 65 - 100 mg/dL    BUN 78 (H) 6 - 20 MG/DL    Creatinine 6.83 (H) 0.55 - 1.02 MG/DL    BUN/Creatinine ratio 11 (L) 12 - 20      GFR est AA 7 (L) >60 ml/min/1.73m2    GFR est non-AA 6 (L) >60 ml/min/1.73m2    Calcium 8.7 8.5 - 10.1 MG/DL   GLUCOSE, POC    Collection Time: 10/30/20  7:51 AM   Result Value Ref Range    Glucose (POC) 129 (H) 65 - 100 mg/dL    Performed by Cony Villasenor    CBC W/O DIFF    Collection Time: 10/30/20  9:43 AM   Result Value Ref Range    WBC 3.1 (L) 3.6 - 11.0 K/uL    RBC 2.47 (L) 3.80 - 5.20 M/uL    HGB 8.0 (L) 11.5 - 16.0 g/dL    HCT 24.4 (L) 35.0 - 47.0 %    MCV 98.8 80.0 - 99.0 FL    MCH 32.4 26.0 - 34.0 PG    MCHC 32.8 30.0 - 36.5 g/dL    RDW 15.4 (H) 11.5 - 14.5 %    PLATELET 74 (L) 445 - 400 K/uL    MPV 10.6 8.9 - 12.9 FL    NRBC 0.0 0  WBC    ABSOLUTE NRBC 0.00 0.00 - 0.01 K/uL         Urine dipstick:   Lab Results   Component Value Date/Time    Color YELLOW/STRAW 10/27/2020 02:54 PM    Appearance CLOUDY (A) 10/27/2020 02:54 PM    Specific gravity 1.015 10/27/2020 02:54 PM    pH (UA) 5.0 10/27/2020 02:54 PM    Protein 100 (A) 10/27/2020 02:54 PM    Glucose Negative 10/27/2020 02:54 PM    Ketone Negative 10/27/2020 02:54 PM    Bilirubin NEGATIVE  11/04/2012 10:39 PM    Urobilinogen 1.0 10/27/2020 02:54 PM    Nitrites Negative 10/27/2020 02:54 PM    Leukocyte Esterase LARGE (A) 10/27/2020 02:54 PM    Epithelial cells MANY (A) 10/27/2020 02:54 PM    Bacteria 3+ (A) 10/27/2020 02:54 PM    WBC >100 (H) 10/27/2020 02:54 PM    RBC 5-10 10/27/2020 02:54 PM       I have reviewed the following: All pertinent labs, microbiology data, radiology imaging for my assessment     Medications list Personally Reviewed   [x]      Yes     []               No       Medications:  Prior to Admission medications    Medication Sig Start Date End Date Taking?  Authorizing Provider   ciprofloxacin HCl (Cipro) 500 mg tablet Take 500 mg by mouth two (2) times a day. Yes Provider, Historical   metFORMIN ER (GLUCOPHAGE XR) 500 mg tablet Take 2 Tabs by mouth two (2) times daily (after meals). For diabetes. 9/10/20  Yes Gus Fitzgerald MD   levothyroxine (SYNTHROID) 175 mcg tablet TAKE ONE DAILY BY MOUTH. TAKE AN EXTRA 1/2 PILL ON SUNDAYS. (7.5 TABLETS/WEEK  MCG) 9/10/20  Yes Gus Fitzgerald MD   famotidine (PEPCID) 40 mg tablet Take 1 Tab by mouth daily. 5/14/20  Yes Gus Fitzgerald MD   colchicine (MITIGARE) 0.6 mg capsule Take 1 Cap by mouth daily. Indications: acute inflammation of the joints due to gout attack 5/11/20  Yes Gus Fitzgerald MD   fluticasone propionate (FLONASE) 50 mcg/actuation nasal spray 2 Sprays by Both Nostrils route daily. Administer to right and left nostril. 4/6/20  Yes Gus Fitzgerald MD   polyethylene glycol [facility]) 17 gram/dose powder Take 17 g by mouth daily. 4/6/20  Yes Gus Fitzgerald MD   atorvastatin (LIPITOR) 20 mg tablet TAKE 1 TABLET DAILY 2/27/20  Yes Gus Fitzgerald MD   losartan (COZAAR) 25 mg tablet TAKE 1 TABLET BY MOUTH DAILY. 2/27/20  Yes Gsu Fitzgerald MD   loratadine (CLARITIN) 10 mg tablet Take 1 Tab by mouth daily. Indications: inflammation of the nose due to an allergy 2/27/20  Yes Gus Fitzgerald MD   ZETIA 10 mg tablet Take 1 tablet by mouth daily. 2/27/20  Yes Gus Fitzgerald MD   metOLazone (ZAROXOLYN) 5 mg tablet Take 1 Tab by mouth every Tuesday and Friday. 1/7/20  Yes Gus Fitzgerald MD   amLODIPine (NORVASC) 5 mg tablet Take 1 Tab by mouth daily. 11/18/19  Yes Kiersten Smith MD   docusate sodium (COLACE) 50 mg capsule Take 100 mg by mouth daily as needed for Constipation. Yes Provider, Historical   furosemide (LASIX) 80 mg tablet Take 1 Tab by mouth daily. 6/20/19  Yes Gus Fitzgerald MD   diclofenac (VOLTAREN) 1 % gel Apply 2 g to affected area four (4) times daily as needed. 5/6/19  Yes Provider, Historical   potassium chloride SA (MICRO-K) 10 mEq capsule Take 2 Caps by mouth daily.  3/25/19  Yes Juan Olmedo Hannah Calabrese MD   omega-3 acid ethyl esters (LOVAZA) 1 gram capsule Take 2 Caps by mouth two (2) times daily (with meals). 3/25/19  Yes Dilshad Abdul MD   Biotin 2,500 mcg cap Take  by mouth. Yes Provider, Historical   L GASSERI/B BIFIDUM/B LONGUM (ABT [employer] PO) Take 1 Tab by mouth daily. Yes Provider, Historical   vitamin E (AQUA GEMS) 400 unit capsule Take 800 Units by mouth daily. Yes Provider, Historical   lidocaine (LIDODERM) 5 %(700 mg/patch) 1 patch by TransDERmal route every twenty-four (24) hours. Apply patch to the affected area for 12 hours a day and remove for 12 hours a day. Yes Other, MD Janette   cholecalciferol, vitamin D3, (VITAMIN D3) 2,000 unit tab Take 1 Tab by mouth daily. Yes Other, MD Jnaette   traMADol (ULTRAM) 50 mg tablet Take 1 tablet by mouth every six (6) hours as needed for Pain. 1/12/15  Yes Mattoon Sandoval, DO   MULTIVITAMIN WITH MINERALS (ONE-A-DAY 50 PLUS PO) Take 1 Tab by mouth daily. Yes Provider, Historical   aspirin 81 mg chewable tablet Take 81 mg by mouth daily. Yes Provider, Historical   ketoconazole (NIZORAL) 2 % topical cream Apply  to affected area daily as needed. 8/23/19   Provider, Historical   indomethacin (INDOCIN) 25 mg capsule TAKE 1 CAPSULE BY MOUTH THREE TIMES A DAY AS NEEDED FOR PAIN. 2/27/18   Dilshad Abdul MD        Thank you for allowing us to participate in the care of this patient. We will follow patient. Please dont hesitate to call with any questions    Rafa Cole MD  [facility]   [address] [facility] [address]  Phone - [phone]   Fax - [phone]  [email or web address]

## 2020-10-30 NOTE — PROGRESS NOTES
Problem: Falls - Risk of  Goal: *Absence of Falls  Description: Document Maggy Lechuga Fall Risk and appropriate interventions in the flowsheet.   Outcome: Progressing Towards Goal  Note: Fall Risk Interventions:  Mobility Interventions: Bed/chair exit alarm         Medication Interventions: Assess postural VS orthostatic hypotension, Bed/chair exit alarm, Evaluate medications/consider consulting pharmacy, Patient to call before getting OOB    Elimination Interventions: Call light in reach, Patient to call for help with toileting needs, Stay With Me (per policy), Toilet paper/wipes in reach    History of Falls Interventions: Bed/chair exit alarm, Door open when patient unattended, Room close to nurse's station

## 2020-10-30 NOTE — PROGRESS NOTES
GI PROGRESS NOTE  Justus Ware, NP  [phone] NP in-hospital cell phone M-F until 4:30  After 5pm or on weekends, please call  for physician on call    NAME:Char Aponte :1945 ZTV:[id number]   ATTG: Dr. Braden Christiansen  Primary GI: Dr. Leah Goodpasture PCP: Kieran Lester MD  Date/Time:  10/30/2020 2:36 PM   Reason for following: Pancreatic mass, anemia, abnormal CT of GI tract, BRADY  Assessment:   -Pancreatic mass, as seen on CT imaging  · New finding, will need EUS in future with biopsy  -Anemia, stable at 8.2  -Abnormal CT of the GI tract  · masslike enlargement of the uncinate process measuring 3.0 x  2.8 cm. This has changed in appearance since . · Hepatic cirrhosis  -H/o cirrhosis, h/o BRADY, compensated  · Had liver bx with [facility]  ·  BRDAY, moderate fibrosis with bridging, stage III  · Concern for possible transition into HRS    -Acute on Chronic kidney failure, suspected ATN  · Managed by nephrology  -Obesity  GI Hx  - EGD showing normal esophagus and duodenum. Diffuse gastritis with friability, no ulcers, no varices  - CLN grade 1 internal hemorrhoids, ascending colon polyps (TA). Recommended repeat in 5 years     Plan:   -Plan for EUS with biopsy in future, once renal function is optimized. Likely will be completed as outpatient  -Patient would not be a candidate for whipple surgery given her cirrhosis, treatment would need to be based off biopsy   -Will benefit from further imaging with CT pancreatic protocol or MRCP in future. Patient's knee replacement may not be compatible with MRCP. If to get CT pancreatic protocol will need to wait until renal function improves. -Agree with miralax for constipation  -Appreciate input from nephrology  -Appreciate input from hematology/oncology  -Supportive measure per primary team  -Will continue to follow, please call GI with any further questions or concerns. Thank you!   Plan discussed with Dr. Erlinda Meza    This patient was seen and examined by me in a face-to-face visit today. I reviewed the medical record including lab work, imaging and other provider notes. I confirmed the interval history as described above. I spoke to the patient, discussing our findings and plans. I discussed this case in detail with SNOW Gunter NP. I formulated an updated  assessment of this patient and guided our treatment plan. I agree with the above progress note. I agree with the history, exam and assessment and plan as outlined in the note. I would like to add the followinyo F with BRADY-induced cirrhosis with new renal failure (etiology unclear, ATN?) with Cr 6.8 now but good UO, found to have new uncinate pancreatic mass by suboptimal non-contrasted CT. PE unchanged. She is not a good candidate for operative management and tissue dx via EUS-FNB will allow for prognosis and determination of chemotherapy. Plan:  1) Await optimization of renal function to allow for later CT abd/pelvis with contrast or Abd MRI  2) Can consider for OP EUS-FNB or do as IP if still here on Monday- Can set up by calling her at home [phone] or [place] [phone] if discharge over weekend. 3) Will see on request only over weekend  Allen Alvarez MD       Subjective:   Patient resting comfortably in chair at bedside. Son at bedside. Patient reports feeling better. No abdomina pain. No nausea or vomiting. Is concerned might be constipated. Discussed with RN events overnight. Complaint Y/N Description   Abdominal Pain N    Hematemesis     Hematochezia     Melena     Constipation     Diarrhea     Dyspepsia     Dysphagia     Jaundiced     Nausea/vomiting N      Review of Systems:  Symptom Y/N Comments  Symptom Y/N Comments   Fever/Chills    Chest Pain     Cough    Headaches     Sputum    Joint Pain     SOB/HENLEY    Pruritis/Rash     Tolerating Diet Y   Other       Could NOT obtain due to:      Objective:   VITALS:   Last 24hrs VS reviewed since prior progress note.  Most recent are:  Visit Vitals  BP (!) 145/63 (BP 1 Location: Left arm, BP Patient Position: At rest)   Pulse 67   Temp 98 °F (36.7 °C)   Resp 18   Ht 5' 3\" (1.6 m)   Wt 92.6 kg (204 lb 2.3 oz)   SpO2 95%   BMI 36.16 kg/m²       Intake/Output Summary (Last 24 hours) at 10/30/2020 1436  Last data filed at 10/30/2020 1108  Gross per 24 hour   Intake 100 ml   Output 2550 ml   Net -2450 ml     PHYSICAL EXAM:  General: WD, WN. Alert, cooperative, no acute distress    HEENT: NC, Atraumatic. Anicteric sclerae. Lungs:  CTA Bilaterally. No Wheezing/Rhonchi/Rales. Heart:  Regular  rhythm,  No murmur, No Rubs, No Gallops  Abdomen: Soft, Non distended, Non tender.  +Bowel sounds, no HSM  Extremities: No c/c/e  Neurologic:  Alert and oriented X 3. No acute neurological distress   Psych:   Good insight. Not anxious nor agitated. Lab and Radiology Data Reviewed: (see below)    Medications Reviewed: (see below)  PMH/SH reviewed - no change compared to H&P  ________________________________________________________________________  Total time spent with patient: 30 minutes ________________________________________________________________________  Care Plan discussed with:  Patient Y   Family  Y Son   RN Y- Juan              Consultant:       Rui Hair NP     Procedures: see electronic medical records for all procedures/Xrays and details which were not copied into this note but were reviewed prior to creation of Plan. LABS:  Recent Labs     10/30/20  0943 10/29/20  0337   WBC 3.1* 4.0   HGB 8.0* 8.2*   HCT 24.4* 25.9*   PLT 74* 91*     Recent Labs     10/30/20  0543 10/29/20  0944 10/28/20  0410    138 140   K 3.8 4.0 4.7    97 100   CO2 28 31 25   BUN 78* 87* 98*   CREA 6.83* 7.39* 7.30*   * 227* 114*   CA 8.7 8.8 9.2   PHOS  --   --  5.9*     Recent Labs     10/28/20  0410   TP 6.1   ALB 3.4*     No results for input(s): INR, PTP, APTT, INREXT in the last 72 hours.    No results for input(s): FE, TIBC, PSAT, FERR in the last 72 hours. No results found for: FOL, RBCF  No results for input(s): PH, PCO2, PO2 in the last 72 hours.   Recent Labs     10/28/20  0410        Lab Results   Component Value Date/Time    Color YELLOW/STRAW 10/27/2020 02:54 PM    Appearance CLOUDY (A) 10/27/2020 02:54 PM    Specific gravity 1.015 10/27/2020 02:54 PM    pH (UA) 5.0 10/27/2020 02:54 PM    Protein 100 (A) 10/27/2020 02:54 PM    Glucose Negative 10/27/2020 02:54 PM    Ketone Negative 10/27/2020 02:54 PM    Bilirubin NEGATIVE  11/04/2012 10:39 PM    Urobilinogen 1.0 10/27/2020 02:54 PM    Nitrites Negative 10/27/2020 02:54 PM    Leukocyte Esterase LARGE (A) 10/27/2020 02:54 PM    Epithelial cells MANY (A) 10/27/2020 02:54 PM    Bacteria 3+ (A) 10/27/2020 02:54 PM    WBC >100 (H) 10/27/2020 02:54 PM    RBC 5-10 10/27/2020 02:54 PM       MEDICATIONS:  Current Facility-Administered Medications   Medication Dose Route Frequency    albumin human 25% (BUMINATE) solution 25 g  25 g IntraVENous Q8H    amLODIPine (NORVASC) tablet 5 mg  5 mg Oral DAILY    aspirin chewable tablet 81 mg  81 mg Oral DAILY    docusate sodium (COLACE) capsule 100 mg  100 mg Oral DAILY PRN    levothyroxine (SYNTHROID) tablet 175 mcg  175 mcg Oral 6am    traMADoL (ULTRAM) tablet 50 mg  50 mg Oral Q6H PRN    sodium chloride (NS) flush 5-40 mL  5-40 mL IntraVENous Q8H    sodium chloride (NS) flush 5-40 mL  5-40 mL IntraVENous PRN    acetaminophen (TYLENOL) tablet 650 mg  650 mg Oral Q6H PRN    Or    acetaminophen (TYLENOL) suppository 650 mg  650 mg Rectal Q6H PRN    polyethylene glycol (MIRALAX) packet 17 g  17 g Oral DAILY PRN    promethazine (PHENERGAN) tablet 12.5 mg  12.5 mg Oral Q6H PRN    Or    ondansetron (ZOFRAN) injection 4 mg  4 mg IntraVENous Q6H PRN    insulin lispro (HUMALOG) injection   SubCUTAneous AC&HS    glucose chewable tablet 16 g  4 Tab Oral PRN    dextrose (D50W) injection syrg 12.5-25 g  12.5-25 g IntraVENous PRN    glucagon (GLUCAGEN) injection 1 mg  1 mg IntraMUSCular PRN    hydrALAZINE (APRESOLINE) 20 mg/mL injection 20 mg  20 mg IntraVENous Q6H PRN    labetaloL (NORMODYNE;TRANDATE) injection 20 mg  20 mg IntraVENous Q6H PRN

## 2020-10-30 NOTE — PROGRESS NOTES
Hospitalist Progress Note    NAME: Nohemy Kidd   :  1945   MRN:  [id number]       Assessment / Plan:  Acute kidney injury  - likely prerenal due to vomiting and poor oral intake  - Cr 7.7 and BUN 97. CT abd no hydronephrosis   - Admit to tele  - IV fluid resuscitation  - Check urine elctrolytes  - Avoid nephrotoxic mes  - Monitor renal function and urine out put  - Nephrology consulted    10/28:  Cr still significantly elevated  Nephrology evals appreciated  Continue with IVF    10/29:  Nephrology evals on going - Cr slightly worse than yesterday    10/30:  Cr improving  Nephrology evals on going    Hyperkalemia - resolved  - secondary to MICHELLE  - EKG changes noted, RBBB, ?junctional rhythm, peaked T waves  - Received Ca gluconate, insulin, albuterol, and bicarb at the ED  - Repeat K at 4.9. Continue to monitor. Repeat EKG     ?Pyelonephritis   - nausea, vomiting, and urinalyris suggestive of UTI  - was unable to tolerate oral antibiotics  - send urine culture  - ceftriaxone    10/29: Follow up CX results  Continue empiric abx     10/30:  Discontinue IV abx  CX results noted    Masslike enlargement of pancrease  - Incidental CT finding  - no hx of smoking or alcohol. No family hx of pancreatic cancer. No recent weight loss. - further workup once renal function improves    10/28:  Discussed results with pt and the need for further workup which she is in agreement with - will ask for Oncology evaluation    10/29: Oncology eval appreciated     10/30:  Oncology evals on going  GI was consulted for EUS - eval is on giong     Hypertension  - hold losartan and lasix due to MICHELLE  - continue amlodipine  - PRN hydralazine and labetalol     Type 2 DM  - hold metfromin  - sliding scale insulin     BRADY  - hx of BRADY. CT abd shows heaptic cirrhosis. LFT wnl. Platelet wnl. Elevated AST, ALT, and AlkP.  - GI evals on going     Hx of Gout      30.0 - 39.9 Obese / Body mass index is 36.16 kg/m².     Code status: Full  Prophylaxis: Hep SQ  Recommended Disposition: tbd     Subjective:     Chief Complaint / Reason for Physician Visit  Doing well this morning - feels like she has \"better color\". Discussed with RN events overnight. Review of Systems:  Symptom Y/N Comments  Symptom Y/N Comments   Fever/Chills n   Chest Pain n    Poor Appetite    Edema     Cough n   Abdominal Pain n    Sputum    Joint Pain     SOB/HENLEY n   Pruritis/Rash     Nausea/vomit    Tolerating PT/OT     Diarrhea    Tolerating Diet     Constipation    Other       Could NOT obtain due to:      Objective:     VITALS:   Last 24hrs VS reviewed since prior progress note. Most recent are:  Patient Vitals for the past 24 hrs:   Temp Pulse Resp BP SpO2   10/30/20 0847  68      10/30/20 0757 97.7 °F (36.5 °C) 66 16 (!) 132/55 95 %   10/30/20 0510 98 °F (36.7 °C) 66 16 (!) 149/65 95 %   10/29/20 2212 98.1 °F (36.7 °C) 68 18 (!) 147/60 95 %   10/29/20 1840 98.2 °F (36.8 °C) 70 18 (!) 146/63 96 %   10/29/20 1449 97.8 °F (36.6 °C) 72 18 (!) 148/60 94 %   10/29/20 1100 98.1 °F (36.7 °C) 70 18 130/83 96 %       Intake/Output Summary (Last 24 hours) at 10/30/2020 0051  Last data filed at 10/30/2020 0510  Gross per 24 hour   Intake 100 ml   Output 2475 ml   Net -2375 ml        PHYSICAL EXAM:  General: Alert, cooperative, no acute distress    EENT:  EOMI. Anicteric sclerae. MMM  Resp:  CTA bilaterally, no wheezing or rales. No accessory muscle use  CV:  Regular  rhythm,  No edema  GI:  Soft, Non distended, Non tender.  +Bowel sounds  Neurologic:  Alert and oriented X 3, normal speech,   Psych:   Good insight. Not anxious nor agitated  Skin:  No rashes.   No jaundice    Reviewed most current lab test results and cultures  YES  Reviewed most current radiology test results   YES  Review and summation of old records today    NO  Reviewed patient's current orders and MAR    YES  PMH/SH reviewed - no change compared to H&P  ________________________________________________________________________  Care Plan discussed with:    Comments   Patient x    Family      RN x    Care Manager     Consultant                        Multidiciplinary team rounds were held today with , nursing, pharmacist and clinical coordinator. Patient's plan of care was discussed; medications were reviewed and discharge planning was addressed. ________________________________________________________________________  Total NON critical care TIME:  35   Minutes    Total CRITICAL CARE TIME Spent:   Minutes non procedure based      Comments   >50% of visit spent in counseling and coordination of care     ________________________________________________________________________  Beth Hill MD     Procedures: see electronic medical records for all procedures/Xrays and details which were not copied into this note but were reviewed prior to creation of Plan. LABS:  I reviewed today's most current labs and imaging studies. Pertinent labs include:  Recent Labs     10/29/20  0337 10/28/20  0410 10/27/20  1430   WBC 4.0 4.5 9.9   HGB 8.2* 8.1* 9.9*   HCT 25.9* 24.9* 31.1*   PLT 91* 84* 186     Recent Labs     10/30/20  0543 10/29/20  0944 10/28/20  0410  10/27/20  1430    138 140   < > 135*  134*   K 3.8 4.0 4.7   < > 6.2*  6.2*    97 100   < > 103  102   CO2 28 31 25   < > 19*  18*   * 227* 114*   < > 122*  122*   BUN 78* 87* 98*   < > 97*  97*   CREA 6.83* 7.39* 7.30*   < > 7.74*  7.72*   CA 8.7 8.8 9.2   < > 9.3  9.3   MG  --   --   --   --  2.7*   PHOS  --   --  5.9*  --   --    ALB  --   --  3.4*  --  3.9  3.9   TBILI  --   --   --   --  0.8  0.8   ALT  --   --   --   --  58  58    < > = values in this interval not displayed.        Signed: Beth Hill MD

## 2020-10-30 NOTE — PROGRESS NOTES
APURVA: Home with Home Health and Follow-up Appointments    9:45am- CM met with pt at bedside to discuss d/c plan. CM inquired with pt about home health. Pt was interested in home health. The Plan for Transition of Care is related to the following treatment goals: Home Health    The Patient  was provided with a choice of provider and agrees   with the discharge plan. [x] Yes [] No    Freedom of choice list was provided with basic dialogue that supports the patient's individualized plan of care/goals, treatment preferences and shares the quality data associated with the providers. [x] Yes [] No    Pt selected [address] [facility] [address] [facility] document signed by pt and placed in pt's bedside chart. Referral sent to [facility] via [facility]. CM will continue to follow patient for discharge planning needs and arrange for services as deemed necessary.     Kamran Santiago [address] [place] [address]  [phone]

## 2020-10-30 NOTE — PROGRESS NOTES
[address] [facility]  [address], [address] [facility], [facility] [place], [address] [place]  [phone]      Hematology/ Progress Consult Note      Reason for consult:     Monique Robert is a 76 y.o. female who we have been asked to see by Dr. Poncho Ackerman for pancreatic mass. Subjective:     Monique Robert is a 76year old female who presented to the ED on 10/27 with complaint of decrease urine output. A CT of the abdomen was obtained and an incidental finding of a pancreatic mass was found. She denies any pain, weight loss or decreased appetite. She does have a history of BRADY and follows with Dr. Nae Max at [facility]. (Last office note scanned into EMR)    PMH - DM, HTN, Gout, Osteopenia, GERD and Hypothyroid    Interval History:    Ms. Daniella Oreilly is up in the chair today with her son at the bedside. She says she feels much better today than she did when she was admitted. Creatinine has improved today slightly. Review of Systems:  A comprehensive review of systems was negative except for that written in the History of Present Illness.        Past Medical History:   Diagnosis Date    Arthritis     KNEE,gout    Endocrine disease     HYPOTHYROIDISM    Fracture     Left distal femur (age 12 - pt fell)    Fracture     Left tibia 1990 (pt fell)    Fracture     Right wrist fractured 2 times (pt fell)    GERD (gastroesophageal reflux disease)     Hypertension     Hypoglycemia     \"my body produces too much insulin\"    Liver disease     BRADY    Nausea & vomiting     when had gallbladder out    Osteoporosis     Other ill-defined conditions(799.89)     spinal stenosis    Other ill-defined conditions(799.89)     BBB    Other ill-defined conditions(799.89)     HIGH CHOLESTEROL    Other ill-defined conditions(799.89)     edema legs    Thyroid disease      Past Surgical History:   Procedure Laterality Date    ABDOMEN SURGERY PROC UNLISTED      liver biopsy--BRADY and fibrosis    COLONOSCOPY,REMV ALVINA,SNARE  2/11/2015         COLORECTAL SCRN; HI RISK IND  2/11/2015         HX CHOLECYSTECTOMY      HX HYSTERECTOMY      HX ORTHOPAEDIC      left leg surgery - plates, screws, wires, pins in place    HX UROLOGICAL      PESSURY    UPPER GI ENDOSCOPY,BIOPSY  11/17/2015           Family History   Problem Relation Age of Onset    Diabetes Mother     Heart Disease Mother     Lung Disease Father         copd     Social History     Tobacco Use    Smoking status: Never Smoker    Smokeless tobacco: Never Used   Substance Use Topics    Alcohol use: No      Current Facility-Administered Medications   Medication Dose Route Frequency Provider Last Rate Last Dose    albumin human 25% (BUMINATE) solution 25 g  25 g IntraVENous Genia Cruz MD   25 g at 10/30/20 1411    amLODIPine (NORVASC) tablet 5 mg  5 mg Oral DAILY Bibiana Salazar MD   5 mg at 10/30/20 0847    aspirin chewable tablet 81 mg  81 mg Oral DAILY Bibiana Salazar MD   81 mg at 10/30/20 0847    docusate sodium (COLACE) capsule 100 mg  100 mg Oral DAILY PRN Bibiana Salazar MD        levothyroxine (SYNTHROID) tablet 175 mcg  175 mcg Oral 6am Bibiana Salazar MD   175 mcg at 10/30/20 0522    traMADoL (ULTRAM) tablet 50 mg  50 mg Oral Q6H PRN Bibiana Salazar MD        sodium chloride (NS) flush 5-40 mL  5-40 mL IntraVENous Q8H Andrew Patel MD   10 mL at 10/30/20 1514    sodium chloride (NS) flush 5-40 mL  5-40 mL IntraVENous PRN Bibiana Salazar MD        acetaminophen (TYLENOL) tablet 650 mg  650 mg Oral Q6H PRN Bibiana Salazar MD        Or    acetaminophen (TYLENOL) suppository 650 mg  650 mg Rectal Q6H PRN Neftali Patel MD        polyethylene glycol (MIRALAX) packet 17 g  17 g Oral DAILY PRN Bibiana Salazar MD        promethazine (PHENERGAN) tablet 12.5 mg  12.5 mg Oral Q6H PRN Andrew Patel MD BRITT        Or    ondansetron (ZOFRAN) injection 4 mg  4 mg IntraVENous Q6H PRN Valentine Joshi MD        insulin lispro (HUMALOG) injection   SubCUTAneous AC&HS Hussain Berry MD   2 Units at 10/30/20 1324    glucose chewable tablet 16 g  4 Tab Oral PRN Hussain Berry MD        dextrose (D50W) injection syrg 12.5-25 g  12.5-25 g IntraVENous PRN Hussain Berry MD        glucagon (GLUCAGEN) injection 1 mg  1 mg IntraMUSCular PRN Hussain Berry MD        hydrALAZINE (APRESOLINE) 20 mg/mL injection 20 mg  20 mg IntraVENous Q6H PRN Hussain Berry MD        labetaloL (NORMODYNE;TRANDATE) injection 20 mg  20 mg IntraVENous Q6H PRN Hussain Berry MD            Allergies   Allergen Reactions    Gabapentin Other (comments)    Celebrex [Celecoxib] Hives    Pcn [Penicillins] Unknown (comments)     Can't remember    Sulfa (Sulfonamide Antibiotics) Hives          Objective:     Patient Vitals for the past 8 hrs:   BP Temp Pulse Resp SpO2   10/30/20 1603 (!) 150/62 97.5 °F (36.4 °C) 64 18 97 %   10/30/20 1132 (!) 145/63 98 °F (36.7 °C) 67 18 95 %   10/30/20 0847   76         Lab Results   Component Value Date/Time    WBC 3.1 (L) 10/30/2020 09:43 AM    HGB 8.0 (L) 10/30/2020 09:43 AM    HCT 24.4 (L) 10/30/2020 09:43 AM    PLATELET 74 (L) 02/64/0146 09:43 AM    MCV 98.8 10/30/2020 09:43 AM       Physical Exam:   General appearance: alert, cooperative, no distress, appears stated age  Eyes: negative  Lungs: clear to auscultation bilaterally  Heart: regular rate and rhythm  Abdomen: soft, non-tender.  Bowel sounds normal. No masses,  no organomegaly  Skin: Skin color, texture, turgor normal. No rashes or lesions  Lymph nodes: Cervical, supraclavicular, and axillary nodes normal.  Neurologic: Grossly normal      CT Results (most recent):  Results from Hospital Encounter encounter on 10/27/20   CT ABD PELV WO CONT    Narrative EXAM: CT ABD PELV WO CONT    INDICATION: r/o renal obstruction. Acute renal dysfunction. Urinary tract  infection. COMPARISON: CT 1/3/2011    CONTRAST:  None. TECHNIQUE:   Thin axial images were obtained through the abdomen and pelvis. Coronal and  sagittal reformats were generated. Oral contrast was not administered. CT dose  reduction was achieved through use of a standardized protocol tailored for this  examination and automatic exposure control for dose modulation. The absence of intravenous contrast material reduces the sensitivity for  evaluation of the vasculature and solid organs. FINDINGS:   LOWER THORAX: No significant abnormality in the incidentally imaged lower chest.  LIVER: There is nodularity of the liver capsule suggestive of cirrhosis. BILIARY TREE: Status post cholecystectomy. CBD is not dilated. SPLEEN: On the upper limits of normal for size. PANCREAS: There is masslike enlargement of the uncinate process measuring 3.0 x  2.8 cm. This has changed in appearance since 2011. ADRENALS: Unremarkable. KIDNEYS/URETERS: No calculus or hydronephrosis. STOMACH: Unremarkable. SMALL BOWEL: No dilatation or wall thickening. COLON: No dilatation or wall thickening. APPENDIX: Not well seen. PERITONEUM: No ascites or pneumoperitoneum. RETROPERITONEUM: No lymphadenopathy or aortic aneurysm. REPRODUCTIVE ORGANS: Status post hysterectomy. A pessary is in place. URINARY BLADDER: A West catheter decompresses the bladder. BONES: No destructive bone lesion. ABDOMINAL WALL: There is a small fat-containing umbilical hernia. ADDITIONAL COMMENTS: N/A      Impression IMPRESSION:    1. No nephrolithiasis or hydronephrosis. 2. Masslike enlargement of the uncinate process of the pancreas which is new  since 2011. Findings are worrisome for malignancy. Recommend MRI with contrast,  which could be performed once the patient's renal function improves. 3. Hepatic cirrhosis. 4. Status post cholecystectomy.     Findings discussed with Dr. Tamera Yousif at 7:50 PM on 10/27/2020. Assessment:     1. Pancreatic mass     Abnormal finding on CT  History of BRADY - has been getting serial RUQ US d/t liver fibrosis    More imaging needed.(this is on hold d/t renal function)  Either pancreatic protocol CT or MRI. She has had knee surgery in the past and says she has plates that were placed in 1991 and she feels she cannot have a MRI. Surgery was by Dr. Donnell Sadler at the [facility]     Will obtain a CA 19-9  GI consult completed - Dr. Wisam Purdy. Plan for biopsy outpatient    Obtain records from [address] [place] [address]    2. Anemia -     Multifactorial -  Chronic disease   Acute illness     Transfuse to keep platelets > 7 g/dL     3. Thrombocytopenia -    More than likely multifactorial -  History of thrombocytopenia  History of BRADY  Consumption from acute illness  Diluted lab from fluids    Hold Heparin for now d/t thrombocytopenia. Plan to resume once there is an upward trend    4. MICHELLE on CKD -    Managed by nephrology  susptected ATN    Plan:     1. Hold Heparin(d/t thrombocytopenia) and resume once we see a trend upward in platelets. 2. GI to plan biopsy outpatient once renal function improves  3. Will need either pancreatic protocol CT or MRI once renal function improves  4. CA 19.9 - pdnding  5. CBC daily   6.  We will discuss further treatment options once we have a diagnosis      Jorge Le NP

## 2020-10-30 NOTE — PROGRESS NOTES
[address] END OF SHIFT REPORT      Bedside shift change report GIVEN TO Johanna Wu RN. Report included the following information SBAR, Kardex, Intake/Output, MAR and Recent Results. SIGNIFICANT CHANGES DURING SHIFT:  Patient refused her 0200 dose of heparin, but agreed to have it at 1600 later today. CONCERNS TO ADDRESS WITH MD:          [address] NURSING NOTE   Admission Date 10/27/2020   Admission Diagnosis MICHELLE (acute kidney injury) ([facility] [address] [N17.9]   Consults IP CONSULT TO NEPHROLOGY  IP CONSULT TO ONCOLOGY  IP CONSULT TO GASTROENTEROLOGY  IP CONSULT TO HOSPITALIST      Cardiac Monitoring [x] Yes [] No      Purposeful Hourly Rounding [x] Yes    Demian Score Total Score: 3   Demian score 3 or > [x] Bed Alarm [] Avasys [] 1:1 sitter [] Patient refused (Signed refusal form in chart)   Boris Score Boris Score: 18   Boris score 14 or < [] PMT consult [] Wound Care consult    []  Specialty bed  [] Nutrition consult      Influenza Vaccine             Oxygen needs? [x] Room air Oxygen @  []1L    []2L    []3L   []4L    []5L   []6L via  NC   Chronic home O2 use? [] Yes [x] No  Perform O2 challenge test and document in progress note using smartphrase (.Homeoxygen)      Last bowel movement Last Bowel Movement Date: 10/29/20      Urinary Catheter Urinary Catheter 10/27/20 West-Indications for Use: Acute urinary retention/bladder outlet obstruction     Urinary Catheter 10/27/20 West-Urine Output (mL): 650 ml     LDAs               Peripheral IV 10/27/20 Anterior; Left Forearm (Active)   Site Assessment Clean, dry, & intact 10/29/20 2212   Phlebitis Assessment 0 10/29/20 2212   Infiltration Assessment 0 10/29/20 2212   Dressing Status Clean, dry, & intact 10/29/20 2212   Dressing Type Tape;Transparent 10/29/20 2212   Hub Color/Line Status Pink; Infusing 10/29/20 2212                         Readmission Risk Assessment Tool Score High Risk            21       Total Score        2 . Living with Significant Other. Assisted Living. LTAC. SNF. or   Rehab    5 Pt.  Coverage (Medicare=5 , Medicaid, or Self-Pay=4)    14 Charlson Comorbidity Score (Age + Comorbid Conditions)        Criteria that do not apply:    Has Seen PCP in Last 6 Months (Yes=3, No=0)    Patient Length of Stay (>5 days = 3)    IP Visits Last 12 Months (1-3=4, 4=9, >4=11)       Expected Length of Stay 3d 2h   Actual Length of Stay 3

## 2020-10-31 LAB
ANION GAP SERPL CALC-SCNC: 9 MMOL/L (ref 5–15)
BUN SERPL-MCNC: 71 MG/DL (ref 6–20)
BUN/CREAT SERPL: 13 (ref 12–20)
CALCIUM SERPL-MCNC: 9.3 MG/DL (ref 8.5–10.1)
CANCER AG19-9 SERPL-ACNC: 58 U/ML (ref 0–35)
CHLORIDE SERPL-SCNC: 105 MMOL/L (ref 97–108)
CO2 SERPL-SCNC: 25 MMOL/L (ref 21–32)
CREAT SERPL-MCNC: 5.48 MG/DL (ref 0.55–1.02)
ERYTHROCYTE [DISTWIDTH] IN BLOOD BY AUTOMATED COUNT: 15.1 % (ref 11.5–14.5)
GLUCOSE BLD STRIP.AUTO-MCNC: 123 MG/DL (ref 65–100)
GLUCOSE BLD STRIP.AUTO-MCNC: 128 MG/DL (ref 65–100)
GLUCOSE BLD STRIP.AUTO-MCNC: 163 MG/DL (ref 65–100)
GLUCOSE BLD STRIP.AUTO-MCNC: 187 MG/DL (ref 65–100)
GLUCOSE SERPL-MCNC: 96 MG/DL (ref 65–100)
HCT VFR BLD AUTO: 23.1 % (ref 35–47)
HGB BLD-MCNC: 7.5 G/DL (ref 11.5–16)
MCH RBC QN AUTO: 32.3 PG (ref 26–34)
MCHC RBC AUTO-ENTMCNC: 32.5 G/DL (ref 30–36.5)
MCV RBC AUTO: 99.6 FL (ref 80–99)
NRBC # BLD: 0 K/UL (ref 0–0.01)
NRBC BLD-RTO: 0 PER 100 WBC
PLATELET # BLD AUTO: 68 K/UL (ref 150–400)
PMV BLD AUTO: 10.5 FL (ref 8.9–12.9)
POTASSIUM SERPL-SCNC: 3.4 MMOL/L (ref 3.5–5.1)
RBC # BLD AUTO: 2.32 M/UL (ref 3.8–5.2)
SERVICE CMNT-IMP: ABNORMAL
SODIUM SERPL-SCNC: 139 MMOL/L (ref 136–145)
WBC # BLD AUTO: 2.3 K/UL (ref 3.6–11)

## 2020-10-31 PROCEDURE — 65660000000 HC RM CCU STEPDOWN

## 2020-10-31 PROCEDURE — P9047 ALBUMIN (HUMAN), 25%, 50ML: HCPCS | Performed by: INTERNAL MEDICINE

## 2020-10-31 PROCEDURE — 74011636637 HC RX REV CODE- 636/637: Performed by: STUDENT IN AN ORGANIZED HEALTH CARE EDUCATION/TRAINING PROGRAM

## 2020-10-31 PROCEDURE — 74011250637 HC RX REV CODE- 250/637: Performed by: STUDENT IN AN ORGANIZED HEALTH CARE EDUCATION/TRAINING PROGRAM

## 2020-10-31 PROCEDURE — 36415 COLL VENOUS BLD VENIPUNCTURE: CPT

## 2020-10-31 PROCEDURE — 74011250636 HC RX REV CODE- 250/636: Performed by: INTERNAL MEDICINE

## 2020-10-31 PROCEDURE — 85027 COMPLETE CBC AUTOMATED: CPT

## 2020-10-31 PROCEDURE — 80048 BASIC METABOLIC PNL TOTAL CA: CPT

## 2020-10-31 PROCEDURE — 82962 GLUCOSE BLOOD TEST: CPT

## 2020-10-31 RX ADMIN — Medication 10 ML: at 21:56

## 2020-10-31 RX ADMIN — Medication 10 ML: at 13:26

## 2020-10-31 RX ADMIN — ALBUMIN (HUMAN) 25 G: 0.25 INJECTION, SOLUTION INTRAVENOUS at 21:53

## 2020-10-31 RX ADMIN — ASPIRIN 81 MG: 81 TABLET, CHEWABLE ORAL at 08:10

## 2020-10-31 RX ADMIN — ALBUMIN (HUMAN) 25 G: 0.25 INJECTION, SOLUTION INTRAVENOUS at 05:27

## 2020-10-31 RX ADMIN — INSULIN LISPRO 2 UNITS: 100 INJECTION, SOLUTION INTRAVENOUS; SUBCUTANEOUS at 11:49

## 2020-10-31 RX ADMIN — Medication 10 ML: at 05:27

## 2020-10-31 RX ADMIN — LEVOTHYROXINE SODIUM 175 MCG: 0.15 TABLET ORAL at 05:27

## 2020-10-31 RX ADMIN — AMLODIPINE BESYLATE 5 MG: 5 TABLET ORAL at 08:10

## 2020-10-31 RX ADMIN — ALBUMIN (HUMAN) 25 G: 0.25 INJECTION, SOLUTION INTRAVENOUS at 13:25

## 2020-10-31 NOTE — PROGRESS NOTES
Problem: Pressure Injury - Risk of  Goal: *Prevention of pressure injury  Description: Document Boris Scale and appropriate interventions in the flowsheet.   Outcome: Progressing Towards Goal  Note: Pressure Injury Interventions:  Sensory Interventions: Assess changes in LOC, Assess need for specialty bed    Moisture Interventions: Absorbent underpads, Check for incontinence Q2 hours and as needed    Activity Interventions: Assess need for specialty bed, Chair cushion    Mobility Interventions: Assess need for specialty bed, Chair cushion, Float heels    Nutrition Interventions: Document food/fluid/supplement intake, Discuss nutritional consult with provider, Offer support with meals,snacks and hydration    Friction and Shear Interventions: Apply protective barrier, creams and emollients, Feet elevated on foot rest                Problem: Patient Education: Go to Patient Education Activity  Goal: Patient/Family Education  Outcome: Progressing Towards Goal

## 2020-10-31 NOTE — PROGRESS NOTES
Problem: Falls - Risk of  Goal: *Absence of Falls  Description: Document Christian Correa Fall Risk and appropriate interventions in the flowsheet.   Outcome: Progressing Towards Goal  Note: Fall Risk Interventions:  Mobility Interventions: Assess mobility with egress test, Bed/chair exit alarm, Patient to call before getting OOB         Medication Interventions: Assess postural VS orthostatic hypotension, Bed/chair exit alarm, Patient to call before getting OOB    Elimination Interventions: Bed/chair exit alarm, Call light in reach    History of Falls Interventions: Bed/chair exit alarm, Door open when patient unattended

## 2020-10-31 NOTE — PROGRESS NOTES
Bedside shift change report given to Chicho Goel (oncoming nurse) by Collin Pugh (offgoing nurse). Report included the following information SBAR, Kardex and MAR.

## 2020-10-31 NOTE — PROGRESS NOTES
NEPHROLOGY PROGRESS NOTE     Patient: Eleuterio Velázquez MRN: [id number]  PCP: Valentine Lawson MD   :     1945  Age:   76 y.o. Sex:  female      Referring physician: Julissa Aguirre MD    Admission Date: 10/27/2020  3:27 PM  LOS: 4 days      ASSESSMENT and PLAN :   MICHELLE on CKD  - suspect ATN from prolonged pre-renal state from N/V+poor PO intake+ Losartan+ Lasix   - improved  hyperkalemia and acidosis   - renal US no hydro  - improving renal function with volume replacement, holding Losartan and Lasix  - nothing to suggest hepatorenal syndrome at this point   -continue albumin IV     CKD stage 2  - from DM+ HTN   - baseline cr 1     HTN  - holding losartan, normotensive  - if becomes hypotensive, add Midodrine      UTI  - negative urine Cx but was on outpt Abx  - keep Ceftriaxone    Anemia/thrombocytopenia  - 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       Subjective:   No recurrent N/V  No diarrhea  No SOB  Tolerating diet better      Past Medical Hx:   Past Medical History:   Diagnosis Date    Arthritis     KNEE,gout    Endocrine disease     HYPOTHYROIDISM    Fracture     Left distal femur (age 12 - pt fell)    Fracture     Left tibia  (pt fell)    Fracture     Right wrist fractured 2 times (pt fell)    GERD (gastroesophageal reflux disease)     Hypertension     Hypoglycemia     \"my body produces too much insulin\"    Liver disease     BRADY    Nausea & vomiting     when had gallbladder out    Osteoporosis     Other ill-defined conditions(799.89)     spinal stenosis    Other ill-defined conditions(799.89)     BBB    Other ill-defined conditions(799.89)     HIGH CHOLESTEROL    Other ill-defined conditions(799.89)     edema legs    Thyroid disease         Past Surgical Hx:     Past Surgical History:   Procedure Laterality Date    ABDOMEN SURGERY PROC UNLISTED      liver biopsy--BRADY and fibrosis    Jennifer Barajas  2/11/2015         COLORECTAL SCRN; HI RISK IND  2/11/2015         HX CHOLECYSTECTOMY      HX HYSTERECTOMY      HX ORTHOPAEDIC      left leg surgery - plates, screws, wires, pins in place    HX UROLOGICAL      PESSURY    UPPER GI ENDOSCOPY,BIOPSY  11/17/2015              Allergies   Allergen Reactions    Gabapentin Other (comments)    Celebrex [Celecoxib] Hives    Pcn [Penicillins] Unknown (comments)     Can't remember    Sulfa (Sulfonamide Antibiotics) Hives       Social Hx:  reports that she has never smoked. She has never used smokeless tobacco. She reports current drug use. Drugs: Prescription and OTC. She reports that she does not drink alcohol. Family History   Problem Relation Age of Onset    Diabetes Mother     Heart Disease Mother     Lung Disease Father         copd       Review of Systems: The reminder of the ROS is negative and noncontributory. Objective:    Vitals:    Vitals:    10/30/20 2256 10/31/20 0302 10/31/20 0558 10/31/20 0648   BP: (!) 147/61 (!) 142/69  (!) 120/54   Pulse: 67 60  61   Resp: 18 18  16   Temp: 98.1 °F (36.7 °C) 98.3 °F (36.8 °C)  98.1 °F (36.7 °C)   SpO2: 96% 100%  96%   Weight:   93.3 kg (205 lb 9.6 oz)    Height:         I&O's:  10/30 0701 - 10/31 0700  In: 2050 [P.O.:2050]  Out: 3200 [Urine:3200]  Visit Vitals  BP (!) 120/54   Pulse 61   Temp 98.1 °F (36.7 °C)   Resp 16   Ht 5' 3\" (1.6 m)   Wt 93.3 kg (205 lb 9.6 oz)   SpO2 96%   BMI 36.42 kg/m²       Physical Exam:  General: AAOx3, in NAD   HEENT:  No Pallor , No Icterus  Neck: Supple, no JVD  Lungs : CTA  CVS: RRR  Abdomen: Soft, NT, BS +  Extremities:  1+ Edema  Skin: No rash or lesions.   Neurologic: non focal, AAO x 3  Psych: normal affect  West with excellent U/O    Laboratory Results:    Recent Labs     10/31/20  0520 10/30/20  0543 10/29/20  0944    138 138   K 3.4* 3.8 4.0    101 97   CO2 25 28 31   GLU 96 104* 227*   BUN 71* 78* 87*   CREA 5.48* 6.83* 7.39*   CA 9.3 8.7 8.8     Recent Labs     10/31/20  0520 10/30/20  0943 10/29/20  0337   WBC 2.3* 3.1* 4.0   HGB 7.5* 8.0* 8.2*   HCT 23.1* 24.4* 25.9*   PLT 68* 74* 91*     Lab Results   Component Value Date/Time    Specimen Description: BLOOD 01/03/2011 06:55 AM    Specimen Description: URINE 12/01/2010 05:45 PM     Lab Results   Component Value Date/Time    Culture result: No growth (<1,000 CFU/ML) 10/27/2020 02:54 PM    Culture result: NO GROWTH 6 DAYS 01/03/2011 06:55 AM    Culture result: NO SIGNIFICANT GROWTH 12/01/2010 05:45 PM     Recent Results (from the past 24 hour(s))   GLUCOSE, POC    Collection Time: 10/30/20  7:51 AM   Result Value Ref Range    Glucose (POC) 129 (H) 65 - 100 mg/dL    Performed by [email or web address]    CBC W/O DIFF    Collection Time: 10/30/20  9:43 AM   Result Value Ref Range    WBC 3.1 (L) 3.6 - 11.0 K/uL    RBC 2.47 (L) 3.80 - 5.20 M/uL    HGB 8.0 (L) 11.5 - 16.0 g/dL    HCT 24.4 (L) 35.0 - 47.0 %    MCV 98.8 80.0 - 99.0 FL    MCH 32.4 26.0 - 34.0 PG    MCHC 32.8 30.0 - 36.5 g/dL    RDW 15.4 (H) 11.5 - 14.5 %    PLATELET 74 (L) 869 - 400 K/uL    MPV 10.6 8.9 - 12.9 FL    NRBC 0.0 0  WBC    ABSOLUTE NRBC 0.00 0.00 - 0.01 K/uL   GLUCOSE, POC    Collection Time: 10/30/20 12:37 PM   Result Value Ref Range    Glucose (POC) 183 (H) 65 - 100 mg/dL    Performed by Sam Palma RN    GLUCOSE, POC    Collection Time: 10/30/20  4:03 PM   Result Value Ref Range    Glucose (POC) 127 (H) 65 - 100 mg/dL    Performed by Marolyn Hemp    GLUCOSE, POC    Collection Time: 10/30/20  9:08 PM   Result Value Ref Range    Glucose (POC) 112 (H) 65 - 100 mg/dL    Performed by Miesha Moffett    METABOLIC PANEL, BASIC    Collection Time: 10/31/20  5:20 AM   Result Value Ref Range    Sodium 139 136 - 145 mmol/L    Potassium 3.4 (L) 3.5 - 5.1 mmol/L    Chloride 105 97 - 108 mmol/L    CO2 25 21 - 32 mmol/L    Anion gap 9 5 - 15 mmol/L    Glucose 96 65 - 100 mg/dL    BUN 71 (H) 6 - 20 MG/DL    Creatinine 5.48 (H) 0.55 - 1.02 MG/DL    BUN/Creatinine ratio 13 12 - 20      GFR est AA 9 (L) >60 ml/min/1.73m2    GFR est non-AA 8 (L) >60 ml/min/1.73m2    Calcium 9.3 8.5 - 10.1 MG/DL   CBC W/O DIFF    Collection Time: 10/31/20  5:20 AM   Result Value Ref Range    WBC 2.3 (L) 3.6 - 11.0 K/uL    RBC 2.32 (L) 3.80 - 5.20 M/uL    HGB 7.5 (L) 11.5 - 16.0 g/dL    HCT 23.1 (L) 35.0 - 47.0 %    MCV 99.6 (H) 80.0 - 99.0 FL    MCH 32.3 26.0 - 34.0 PG    MCHC 32.5 30.0 - 36.5 g/dL    RDW 15.1 (H) 11.5 - 14.5 %    PLATELET 68 (L) 671 - 400 K/uL    MPV 10.5 8.9 - 12.9 FL    NRBC 0.0 0  WBC    ABSOLUTE NRBC 0.00 0.00 - 0.01 K/uL         Urine dipstick:   Lab Results   Component Value Date/Time    Color YELLOW/STRAW 10/27/2020 02:54 PM    Appearance CLOUDY (A) 10/27/2020 02:54 PM    Specific gravity 1.015 10/27/2020 02:54 PM    pH (UA) 5.0 10/27/2020 02:54 PM    Protein 100 (A) 10/27/2020 02:54 PM    Glucose Negative 10/27/2020 02:54 PM    Ketone Negative 10/27/2020 02:54 PM    Bilirubin NEGATIVE  11/04/2012 10:39 PM    Urobilinogen 1.0 10/27/2020 02:54 PM    Nitrites Negative 10/27/2020 02:54 PM    Leukocyte Esterase LARGE (A) 10/27/2020 02:54 PM    Epithelial cells MANY (A) 10/27/2020 02:54 PM    Bacteria 3+ (A) 10/27/2020 02:54 PM    WBC >100 (H) 10/27/2020 02:54 PM    RBC 5-10 10/27/2020 02:54 PM       I have reviewed the following: All pertinent labs, microbiology data, radiology imaging for my assessment     Medications list Personally Reviewed   [x]      Yes     []               No       Medications:  Prior to Admission medications    Medication Sig Start Date End Date Taking? Authorizing Provider   ciprofloxacin HCl (Cipro) 500 mg tablet Take 500 mg by mouth two (2) times a day. Yes Provider, Historical   metFORMIN ER (GLUCOPHAGE XR) 500 mg tablet Take 2 Tabs by mouth two (2) times daily (after meals). For diabetes. 9/10/20  Yes Gsu Fitzgerald MD   levothyroxine (SYNTHROID) 175 mcg tablet TAKE ONE DAILY BY MOUTH.  TAKE AN EXTRA 1/2 PILL ON SUNDAYS. (7.5 TABLETS/WEEK  MCG) 9/10/20  Yes Kaylin Dykes MD   famotidine (PEPCID) 40 mg tablet Take 1 Tab by mouth daily. 5/14/20  Yes Kaylin Dykes MD   colchicine (MITIGARE) 0.6 mg capsule Take 1 Cap by mouth daily. Indications: acute inflammation of the joints due to gout attack 5/11/20  Yes Kaylin Dykes MD   fluticasone propionate (FLONASE) 50 mcg/actuation nasal spray 2 Sprays by Both Nostrils route daily. Administer to right and left nostril. 4/6/20  Yes Kaylin Dykes MD   polyethylene glycol [facility]) 17 gram/dose powder Take 17 g by mouth daily. 4/6/20  Yes Kaylin Dykes MD   atorvastatin (LIPITOR) 20 mg tablet TAKE 1 TABLET DAILY 2/27/20  Yes Kaylin Dykes MD   losartan (COZAAR) 25 mg tablet TAKE 1 TABLET BY MOUTH DAILY. 2/27/20  Yes Kaylin Dykes MD   loratadine (CLARITIN) 10 mg tablet Take 1 Tab by mouth daily. Indications: inflammation of the nose due to an allergy 2/27/20  Yes Kaylin Dykes MD   ZETIA 10 mg tablet Take 1 tablet by mouth daily. 2/27/20  Yes Kaylin Dykes MD   metOLazone (ZAROXOLYN) 5 mg tablet Take 1 Tab by mouth every Tuesday and Friday. 1/7/20  Yes Kaylin Dykes MD   amLODIPine (NORVASC) 5 mg tablet Take 1 Tab by mouth daily. 11/18/19  Yes Whitney Diaz MD   docusate sodium (COLACE) 50 mg capsule Take 100 mg by mouth daily as needed for Constipation. Yes Provider, Historical   furosemide (LASIX) 80 mg tablet Take 1 Tab by mouth daily. 6/20/19  Yes Kaylin Dykes MD   diclofenac (VOLTAREN) 1 % gel Apply 2 g to affected area four (4) times daily as needed. 5/6/19  Yes Provider, Historical   potassium chloride SA (MICRO-K) 10 mEq capsule Take 2 Caps by mouth daily. 3/25/19  Yes Kaylin Dykes MD   omega-3 acid ethyl esters (LOVAZA) 1 gram capsule Take 2 Caps by mouth two (2) times daily (with meals). 3/25/19  Yes Kaylin Dykes MD   Biotin 2,500 mcg cap Take  by mouth.    Yes Provider, Historical   L GASSERI/B BIFIDUM/B LONGUM ([facility] PO) Take 1 Tab by mouth daily. Yes Provider, Historical   vitamin E (AQUA GEMS) 400 unit capsule Take 800 Units by mouth daily. Yes Provider, Historical   lidocaine (LIDODERM) 5 %(700 mg/patch) 1 patch by TransDERmal route every twenty-four (24) hours. Apply patch to the affected area for 12 hours a day and remove for 12 hours a day. Yes Other, MD Janette   cholecalciferol, vitamin D3, (VITAMIN D3) 2,000 unit tab Take 1 Tab by mouth daily. Yes Other, MD Janette   traMADol (ULTRAM) 50 mg tablet Take 1 tablet by mouth every six (6) hours as needed for Pain. 1/12/15  Yes Renetta Forts, DO   MULTIVITAMIN WITH MINERALS (ONE-A-DAY 50 PLUS PO) Take 1 Tab by mouth daily. Yes Provider, Historical   aspirin 81 mg chewable tablet Take 81 mg by mouth daily. Yes Provider, Historical   ketoconazole (NIZORAL) 2 % topical cream Apply  to affected area daily as needed. 8/23/19   Provider, Historical   indomethacin (INDOCIN) 25 mg capsule TAKE 1 CAPSULE BY MOUTH THREE TIMES A DAY AS NEEDED FOR PAIN. 2/27/18   Gerri Weinberg MD        Thank you for allowing us to participate in the care of this patient. We will follow patient. Please dont hesitate to call with any questions    Augustus Mayfield MD  [facility] [address]  Phone - [phone]   Fax - [phone]  www. [facility]

## 2020-10-31 NOTE — PROGRESS NOTES
0700  Received bedside report from Shadia Ribera, [address] [place] [address]. SBAR, Kardex, and MAR were discussed. [address] END OF SHIFT REPORT      Bedside shift change report GIVEN TO SANTOS Caal. Report included the following information SBAR, Kardex, ED Summary, Intake/Output, MAR, Recent Results and Cardiac Rhythm NSR, BBB. SIGNIFICANT CHANGES DURING SHIFT:  In chair 13:30-18:50      CONCERNS TO ADDRESS WITH MD:  None        [employer] NURSING NOTE   Admission Date 10/27/2020   Admission Diagnosis MICHELLE (acute kidney injury) ([facility] [address] [N17.9]   Consults IP CONSULT TO NEPHROLOGY  IP CONSULT TO ONCOLOGY  IP CONSULT TO GASTROENTEROLOGY  IP CONSULT TO HOSPITALIST      Cardiac Monitoring [x] Yes [] No      Purposeful Hourly Rounding [x] Yes    Demian Score Total Score: 3   Demian score 3 or > [x] Bed Alarm [] Avasys [] 1:1 sitter [] Patient refused (Signed refusal form in chart)   Boris Score Boris Score: 18   Boris score 14 or < [] PMT consult [] Wound Care consult    []  Specialty bed  [] Nutrition consult      Influenza Vaccine             Oxygen needs? [x] Room air Oxygen @  []1L    []2L    []3L   []4L    []5L   []6L via  NC   Chronic home O2 use? [] Yes [x] No  Perform O2 challenge test and document in progress note using smartphrase (.Homeoxygen)      Last bowel movement Last Bowel Movement Date: 10/29/20      Urinary Catheter Urinary Catheter 10/27/20 West-Indications for Use: Accurate measurement of urinary output, Acute urinary retention/bladder outlet obstruction     Urinary Catheter 10/27/20 West-Urine Output (mL): 400 ml     LDAs               Peripheral IV 10/27/20 Anterior; Left Forearm (Active)   Site Assessment Clean, dry, & intact 10/1945   Phlebitis Assessment 0 10/1945   Infiltration Assessment 0 10/1945   Dressing Status Clean, dry, & intact 10/1945   Dressing Type Transparent 10/1945   Hub Color/Line Status Pink 10/1945       Peripheral IV (Active)       Peripheral IV (Active) Readmission Risk Assessment Tool Score High Risk            21       Total Score        2 . Living with Significant Other. Assisted Living. LTAC. SNF. or   Rehab    5 Pt.  Coverage (Medicare=5 , Medicaid, or Self-Pay=4)    14 Charlson Comorbidity Score (Age + Comorbid Conditions)        Criteria that do not apply:    Has Seen PCP in Last 6 Months (Yes=3, No=0)    Patient Length of Stay (>5 days = 3)    IP Visits Last 12 Months (1-3=4, 4=9, >4=11)       Expected Length of Stay 3d 2h   Actual Length of Stay 3

## 2020-10-31 NOTE — PROGRESS NOTES
0700  Received bedside report from [address]. SBAR, Kardex, and MAR were discussed. [address] END OF SHIFT REPORT      Bedside shift change report GIVEN TO Joslyn Carranza RN. Report included the following information SBAR, Kardex, ED Summary, Intake/Output, MAR, Recent Results and Cardiac Rhythm NSR BBB. SIGNIFICANT CHANGES DURING SHIFT:  None      CONCERNS TO ADDRESS WITH MD:  None        [facility] NURSING NOTE   Admission Date 10/27/2020   Admission Diagnosis MICHELLE (acute kidney injury) ([facility] [address] [N17.9]   Consults IP CONSULT TO NEPHROLOGY  IP CONSULT TO ONCOLOGY  IP CONSULT TO GASTROENTEROLOGY  IP CONSULT TO HOSPITALIST      Cardiac Monitoring [x] Yes [] No      Purposeful Hourly Rounding [x] Yes    Demian Score Total Score: 3   Demian score 3 or > [x] Bed Alarm [] Avasys [] 1:1 sitter [] Patient refused (Signed refusal form in chart)   Boris Score Boris Score: 18   Boris score 14 or < [] PMT consult [] Wound Care consult    []  Specialty bed  [] Nutrition consult      Influenza Vaccine             Oxygen needs? [x] Room air Oxygen @  []1L    []2L    []3L   []4L    []5L   []6L via  NC   Chronic home O2 use? [] Yes [x] No  Perform O2 challenge test and document in progress note using [employer] (.Homeoxygen)      Last bowel movement Last Bowel Movement Date: 10/30/20      Urinary Catheter Urinary Catheter 10/27/20 West-Indications for Use: Accurate measurement of urinary output     Urinary Catheter 10/27/20 West-Urine Output (mL): 350 ml     LDAs               Peripheral IV 10/27/20 Anterior; Left Forearm (Active)   Site Assessment Clean, dry, & intact 10/31/20 1929   Phlebitis Assessment 0 10/31/20 1929   Infiltration Assessment 0 10/31/20 1929   Dressing Status Clean, dry, & intact 10/31/20 1929   Dressing Type Transparent;Tape 10/31/20 1929   Hub Color/Line Status Pink;Flushed 10/31/20 1929       Peripheral IV (Active)       Peripheral IV (Active)                         Readmission Risk Assessment Tool Score High Risk 21       Total Score        2 . Living with Significant Other. Assisted Living. LTAC. SNF. or   Rehab    5 Pt.  Coverage (Medicare=5 , Medicaid, or Self-Pay=4)    14 Charlson Comorbidity Score (Age + Comorbid Conditions)        Criteria that do not apply:    Has Seen PCP in Last 6 Months (Yes=3, No=0)    Patient Length of Stay (>5 days = 3)    IP Visits Last 12 Months (1-3=4, 4=9, >4=11)       Expected Length of Stay 3d 2h   Actual Length of Stay 4

## 2020-10-31 NOTE — PROGRESS NOTES
Hospitalist Progress Note    NAME: Maritza Perez   :  1945   MRN:  [id number]       Assessment / Plan:  Acute kidney injury  - likely prerenal due to vomiting and poor oral intake  - Cr 7.7 and BUN 97. CT abd no hydronephrosis   - Admit to tele  - IV fluid resuscitation  - Check urine elctrolytes  - Avoid nephrotoxic mes  - Monitor renal function and urine out put  - Nephrology consulted    10/28:  Cr still significantly elevated  Nephrology evals appreciated  Continue with IVF    10/29:  Nephrology evals on going - Cr slightly worse than yesterday    10/30:  Cr improving  Nephrology evals on going    10/31:  Cr continues to improve, Nephrology evals on going  Pt receiving IV  Albumin    Hyperkalemia - resolved  - secondary to MICHELLE  - EKG changes noted, RBBB, ?junctional rhythm, peaked T waves  - Received Ca gluconate, insulin, albuterol, and bicarb at the ED  - Repeat K at 4.9. Continue to monitor. Repeat EKG     ?Pyelonephritis   - nausea, vomiting, and urinalyris suggestive of UTI  - was unable to tolerate oral antibiotics  - send urine culture  - ceftriaxone    10/29: Follow up CX results  Continue empiric abx     10/30:  Discontinue IV abx  CX results noted    Masslike enlargement of pancrease  - Incidental CT finding  - no hx of smoking or alcohol. No family hx of pancreatic cancer. No recent weight loss. - further workup once renal function improves    10/28:  Discussed results with pt and the need for further workup which she is in agreement with - will ask for Oncology evaluation    10/29: Oncology eval appreciated     10/30:  Oncology evals on going  GI was consulted for EUS - eval is on going    10/31:  Oncology evals on going  GI eval noted     Hypertension  - hold losartan and lasix due to MICHELLE  - continue amlodipine  - PRN hydralazine and labetalol     Type 2 DM  - hold metfromin  - sliding scale insulin     BRADY  - hx of BRADY. CT abd shows heaptic cirrhosis. LFT wnl. Platelet wnl. Elevated AST, ALT, and AlkP.  - GI evals on going     Hx of Gout      30.0 - 39.9 Obese / Body mass index is 36.42 kg/m². Code status: Full  Prophylaxis: SCD due to thrombocytopenia  Recommended Disposition: tbd     Subjective:     Chief Complaint / Reason for Physician Visit  Doing well this morning - feels like she has \"better color\". Discussed with RN events overnight. Review of Systems:  Symptom Y/N Comments  Symptom Y/N Comments   Fever/Chills n   Chest Pain n    Poor Appetite    Edema     Cough n   Abdominal Pain n    Sputum    Joint Pain     SOB/HENLEY n   Pruritis/Rash     Nausea/vomit    Tolerating PT/OT     Diarrhea    Tolerating Diet     Constipation    Other       Could NOT obtain due to:      Objective:     VITALS:   Last 24hrs VS reviewed since prior progress note. Most recent are:  Patient Vitals for the past 24 hrs:   Temp Pulse Resp BP SpO2   10/31/20 0810  64      10/31/20 0648 98.1 °F (36.7 °C) 61 16 (!) 120/54 96 %   10/31/20 0302 98.3 °F (36.8 °C) 60 18 (!) 142/69 100 %   10/30/20 2256 98.1 °F (36.7 °C) 67 18 (!) 147/61 96 %   10/30/20 1942 97.8 °F (36.6 °C) 64 18 (!) 157/63 97 %   10/30/20 1603 97.5 °F (36.4 °C) 64 18 (!) 150/62 97 %   10/30/20 1132 98 °F (36.7 °C) 67 18 (!) 145/63 95 %       Intake/Output Summary (Last 24 hours) at 10/31/2020 0945  Last data filed at 10/31/2020 0753  Gross per 24 hour   Intake 2050 ml   Output 3700 ml   Net -1650 ml        PHYSICAL EXAM:  General: Alert, cooperative, no acute distress    EENT:  EOMI. Anicteric sclerae. MMM  Resp:  CTA bilaterally, no wheezing or rales. No accessory muscle use  CV:  Regular  rhythm,  No edema  GI:  Soft, Non distended, Non tender.  +Bowel sounds  Neurologic:  Alert and oriented X 3, normal speech,   Psych:   Good insight. Not anxious nor agitated  Skin:  No rashes.   No jaundice    Reviewed most current lab test results and cultures  YES  Reviewed most current radiology test results   YES  Review and summation of old records today    NO  Reviewed patient's current orders and MAR    YES  PMH/SH reviewed - no change compared to H&P  ________________________________________________________________________  Care Plan discussed with:    Comments   Patient x    Family      RN x    Care Manager     Consultant                        Multidiciplinary team rounds were held today with , nursing, pharmacist and clinical coordinator. Patient's plan of care was discussed; medications were reviewed and discharge planning was addressed. ________________________________________________________________________  Total NON critical care TIME:  35   Minutes    Total CRITICAL CARE TIME Spent:   Minutes non procedure based      Comments   >50% of visit spent in counseling and coordination of care     ________________________________________________________________________  Ethan He MD     Procedures: see electronic medical records for all procedures/Xrays and details which were not copied into this note but were reviewed prior to creation of Plan. LABS:  I reviewed today's most current labs and imaging studies.   Pertinent labs include:  Recent Labs     10/31/20  0520 10/30/20  0943 10/29/20  0337   WBC 2.3* 3.1* 4.0   HGB 7.5* 8.0* 8.2*   HCT 23.1* 24.4* 25.9*   PLT 68* 74* 91*     Recent Labs     10/31/20  0520 10/30/20  0543 10/29/20  0944    138 138   K 3.4* 3.8 4.0    101 97   CO2 25 28 31   GLU 96 104* 227*   BUN 71* 78* 87*   CREA 5.48* 6.83* 7.39*   CA 9.3 8.7 8.8       Signed: Ethan He MD

## 2020-11-01 LAB
ALBUMIN SERPL-MCNC: 4.1 G/DL (ref 3.5–5)
ALBUMIN/GLOB SERPL: 1.5 {RATIO} (ref 1.1–2.2)
ALP SERPL-CCNC: 150 U/L (ref 45–117)
ALT SERPL-CCNC: 98 U/L (ref 12–78)
ANION GAP SERPL CALC-SCNC: 9 MMOL/L (ref 5–15)
AST SERPL-CCNC: 108 U/L (ref 15–37)
BASOPHILS # BLD: 0 K/UL (ref 0–0.1)
BASOPHILS NFR BLD: 0 % (ref 0–1)
BILIRUB SERPL-MCNC: 0.7 MG/DL (ref 0.2–1)
BUN SERPL-MCNC: 61 MG/DL (ref 6–20)
BUN/CREAT SERPL: 15 (ref 12–20)
CALCIUM SERPL-MCNC: 10.1 MG/DL (ref 8.5–10.1)
CHLORIDE SERPL-SCNC: 105 MMOL/L (ref 97–108)
CO2 SERPL-SCNC: 26 MMOL/L (ref 21–32)
CREAT SERPL-MCNC: 4.2 MG/DL (ref 0.55–1.02)
DIFFERENTIAL METHOD BLD: ABNORMAL
EOSINOPHIL # BLD: 0.1 K/UL (ref 0–0.4)
EOSINOPHIL NFR BLD: 3 % (ref 0–7)
ERYTHROCYTE [DISTWIDTH] IN BLOOD BY AUTOMATED COUNT: 15 % (ref 11.5–14.5)
FERRITIN SERPL-MCNC: 50 NG/ML (ref 8–252)
GLOBULIN SER CALC-MCNC: 2.8 G/DL (ref 2–4)
GLUCOSE BLD STRIP.AUTO-MCNC: 112 MG/DL (ref 65–100)
GLUCOSE BLD STRIP.AUTO-MCNC: 148 MG/DL (ref 65–100)
GLUCOSE BLD STRIP.AUTO-MCNC: 151 MG/DL (ref 65–100)
GLUCOSE BLD STRIP.AUTO-MCNC: 201 MG/DL (ref 65–100)
GLUCOSE SERPL-MCNC: 97 MG/DL (ref 65–100)
HCT VFR BLD AUTO: 22.2 % (ref 35–47)
HGB BLD-MCNC: 7.2 G/DL (ref 11.5–16)
IMM GRANULOCYTES # BLD AUTO: 0 K/UL (ref 0–0.04)
IMM GRANULOCYTES NFR BLD AUTO: 0 % (ref 0–0.5)
IRON SATN MFR SERPL: 18 % (ref 20–50)
IRON SERPL-MCNC: 44 UG/DL (ref 35–150)
LYMPHOCYTES # BLD: 0.6 K/UL (ref 0.8–3.5)
LYMPHOCYTES NFR BLD: 25 % (ref 12–49)
MCH RBC QN AUTO: 32.1 PG (ref 26–34)
MCHC RBC AUTO-ENTMCNC: 32.4 G/DL (ref 30–36.5)
MCV RBC AUTO: 99.1 FL (ref 80–99)
MONOCYTES # BLD: 0.3 K/UL (ref 0–1)
MONOCYTES NFR BLD: 14 % (ref 5–13)
NEUTS SEG # BLD: 1.4 K/UL (ref 1.8–8)
NEUTS SEG NFR BLD: 58 % (ref 32–75)
NRBC # BLD: 0 K/UL (ref 0–0.01)
NRBC BLD-RTO: 0 PER 100 WBC
PHOSPHATE SERPL-MCNC: 4.9 MG/DL (ref 2.6–4.7)
PLATELET # BLD AUTO: 74 K/UL (ref 150–400)
PMV BLD AUTO: 10.6 FL (ref 8.9–12.9)
POTASSIUM SERPL-SCNC: 3.2 MMOL/L (ref 3.5–5.1)
PROT SERPL-MCNC: 6.9 G/DL (ref 6.4–8.2)
RBC # BLD AUTO: 2.24 M/UL (ref 3.8–5.2)
RBC MORPH BLD: ABNORMAL
RETICS # AUTO: 0.05 M/UL (ref 0.02–0.08)
RETICS/RBC NFR AUTO: 2.4 % (ref 0.7–2.1)
SERVICE CMNT-IMP: ABNORMAL
SODIUM SERPL-SCNC: 140 MMOL/L (ref 136–145)
TIBC SERPL-MCNC: 240 UG/DL (ref 250–450)
WBC # BLD AUTO: 2.4 K/UL (ref 3.6–11)

## 2020-11-01 PROCEDURE — 36415 COLL VENOUS BLD VENIPUNCTURE: CPT

## 2020-11-01 PROCEDURE — 74011636637 HC RX REV CODE- 636/637: Performed by: STUDENT IN AN ORGANIZED HEALTH CARE EDUCATION/TRAINING PROGRAM

## 2020-11-01 PROCEDURE — 83540 ASSAY OF IRON: CPT

## 2020-11-01 PROCEDURE — 65660000000 HC RM CCU STEPDOWN

## 2020-11-01 PROCEDURE — 82728 ASSAY OF FERRITIN: CPT

## 2020-11-01 PROCEDURE — P9047 ALBUMIN (HUMAN), 25%, 50ML: HCPCS | Performed by: INTERNAL MEDICINE

## 2020-11-01 PROCEDURE — 74011000250 HC RX REV CODE- 250: Performed by: STUDENT IN AN ORGANIZED HEALTH CARE EDUCATION/TRAINING PROGRAM

## 2020-11-01 PROCEDURE — 85045 AUTOMATED RETICULOCYTE COUNT: CPT

## 2020-11-01 PROCEDURE — 80053 COMPREHEN METABOLIC PANEL: CPT

## 2020-11-01 PROCEDURE — 85025 COMPLETE CBC W/AUTO DIFF WBC: CPT

## 2020-11-01 PROCEDURE — 74011250637 HC RX REV CODE- 250/637: Performed by: STUDENT IN AN ORGANIZED HEALTH CARE EDUCATION/TRAINING PROGRAM

## 2020-11-01 PROCEDURE — 82962 GLUCOSE BLOOD TEST: CPT

## 2020-11-01 PROCEDURE — 74011250637 HC RX REV CODE- 250/637: Performed by: GENERAL ACUTE CARE HOSPITAL

## 2020-11-01 PROCEDURE — 74011250636 HC RX REV CODE- 250/636: Performed by: INTERNAL MEDICINE

## 2020-11-01 PROCEDURE — 84100 ASSAY OF PHOSPHORUS: CPT

## 2020-11-01 RX ORDER — POTASSIUM CHLORIDE 750 MG/1
20 TABLET, FILM COATED, EXTENDED RELEASE ORAL
Status: COMPLETED | OUTPATIENT
Start: 2020-11-01 | End: 2020-11-01

## 2020-11-01 RX ADMIN — INSULIN LISPRO 3 UNITS: 100 INJECTION, SOLUTION INTRAVENOUS; SUBCUTANEOUS at 17:21

## 2020-11-01 RX ADMIN — POTASSIUM CHLORIDE 20 MEQ: 750 TABLET, FILM COATED, EXTENDED RELEASE ORAL at 08:26

## 2020-11-01 RX ADMIN — LEVOTHYROXINE SODIUM 175 MCG: 0.15 TABLET ORAL at 07:34

## 2020-11-01 RX ADMIN — Medication 10 ML: at 21:18

## 2020-11-01 RX ADMIN — ALBUMIN (HUMAN) 25 G: 0.25 INJECTION, SOLUTION INTRAVENOUS at 07:34

## 2020-11-01 RX ADMIN — LABETALOL HYDROCHLORIDE 20 MG: 5 INJECTION INTRAVENOUS at 18:47

## 2020-11-01 RX ADMIN — AMLODIPINE BESYLATE 5 MG: 5 TABLET ORAL at 08:27

## 2020-11-01 RX ADMIN — ASPIRIN 81 MG: 81 TABLET, CHEWABLE ORAL at 08:26

## 2020-11-01 RX ADMIN — Medication 10 ML: at 07:45

## 2020-11-01 RX ADMIN — ALBUMIN (HUMAN) 25 G: 0.25 INJECTION, SOLUTION INTRAVENOUS at 13:54

## 2020-11-01 RX ADMIN — Medication 10 ML: at 14:00

## 2020-11-01 RX ADMIN — INSULIN LISPRO 2 UNITS: 100 INJECTION, SOLUTION INTRAVENOUS; SUBCUTANEOUS at 12:27

## 2020-11-01 RX ADMIN — ALBUMIN (HUMAN) 25 G: 0.25 INJECTION, SOLUTION INTRAVENOUS at 21:18

## 2020-11-01 NOTE — PROGRESS NOTES
0135: o2 sats dropping to low 80s while sleeping on room air. 1 liter nasal cannula placed on pt while sleeping.     0700: Bedside shift change report given to Lester Scott (oncoming nurse) by Alba Nuñez (offgoing nurse). Report included the following information SBAR, Kardex, Procedure Summary, Intake/Output, MAR, Recent Results and Cardiac Rhythm Sinus [place]/NSR.

## 2020-11-01 NOTE — PROGRESS NOTES
Hospitalist Progress Note    NAME: Alysha Campo   :  1945   MRN:  [id number]       Assessment / Plan:  Acute kidney injury  - likely prerenal due to vomiting and poor oral intake  - Cr 7.7 and BUN 97. CT abd no hydronephrosis   - Admit to tele  - IV fluid resuscitation  - Check urine elctrolytes  - Avoid nephrotoxic mes  - Monitor renal function and urine out put  - Nephrology consulted    10/28:  Cr still significantly elevated  Nephrology evals appreciated  Continue with IVF    10/29:  Nephrology evals on going - Cr slightly worse than yesterday    10/30:  Cr improving  Nephrology evals on going    :  Cr continues to improve, Nephrology evals on going  Pt receiving IV  Albumin    Anemia of chronic disease  Hgb 7.2, continue to monitor    Hyperkalemia - resolved  - secondary to MICHELLE  - EKG changes noted, RBBB, ?junctional rhythm, peaked T waves  - Received Ca gluconate, insulin, albuterol, and bicarb at the ED  - Repeat K at 4.9. Continue to monitor. Repeat EKG     ?Pyelonephritis   - nausea, vomiting, and urinalyris suggestive of UTI  - was unable to tolerate oral antibiotics  - send urine culture  - ceftriaxone    10/29: Follow up CX results  Continue empiric abx     10/30:  Discontinue IV abx  CX results noted    Masslike enlargement of pancrease  - Incidental CT finding  - no hx of smoking or alcohol. No family hx of pancreatic cancer. No recent weight loss. - further workup once renal function improves    10/28:  Discussed results with pt and the need for further workup which she is in agreement with - will ask for Oncology evaluation    10/29: Oncology eval appreciated     10/30:  Oncology evals on going  GI was consulted for EUS - eval is on going    10/31:  Oncology evals on going  GI eval noted     Hypertension  - hold losartan and lasix due to MICHELLE  - continue amlodipine  - PRN hydralazine and labetalol     Type 2 DM  - hold metfromin  - sliding scale insulin     BRADY  - hx of BRADY.  CT abd shows heaptic cirrhosis. LFT wnl. Platelet wnl. Elevated AST, ALT, and AlkP.  - GI evals on going     Hx of Gout      30.0 - 39.9 Obese / Body mass index is 36.65 kg/m². Code status: Full  Prophylaxis: SCD due to thrombocytopenia  Recommended Disposition: tbd     Subjective:     Chief Complaint / Reason for Physician Visit  Doing well this morning - feels like she has \"better color\". Discussed with RN events overnight. Review of Systems:  Symptom Y/N Comments  Symptom Y/N Comments   Fever/Chills n   Chest Pain n    Poor Appetite    Edema     Cough n   Abdominal Pain n    Sputum    Joint Pain     SOB/HENLEY n   Pruritis/Rash     Nausea/vomit    Tolerating PT/OT     Diarrhea    Tolerating Diet     Constipation    Other       Could NOT obtain due to:      Objective:     VITALS:   Last 24hrs VS reviewed since prior progress note. Most recent are:  Patient Vitals for the past 24 hrs:   Temp Pulse Resp BP SpO2   11/01/20 1037 97.5 °F (36.4 °C) 66 18 (!) 149/63 98 %   11/01/20 0826  61  (!) 158/58    11/01/20 0729 97.5 °F (36.4 °C) 64 18 (!) 156/67 94 %   11/01/20 0356 97.9 °F (36.6 °C) (!) 59 20 138/60 98 %   11/01/20 0138     98 %   11/01/20 0135     (!) 84 %   10/31/20 2302 98.1 °F (36.7 °C) 67 18 (!) 151/62 94 %   10/31/20 1845 97.9 °F (36.6 °C) 67 18 (!) 153/57 98 %   10/31/20 1542 98.1 °F (36.7 °C) 65 18 (!) 148/59 97 %       Intake/Output Summary (Last 24 hours) at 11/1/2020 1115  Last data filed at 11/1/2020 0356  Gross per 24 hour   Intake 1300 ml   Output 1600 ml   Net -300 ml        PHYSICAL EXAM:  General: Alert, cooperative, no acute distress    EENT:  EOMI. Anicteric sclerae. MMM  Resp:  CTA bilaterally, no wheezing or rales. No accessory muscle use  CV:  Regular  rhythm,  No edema  GI:  Soft, Non distended, Non tender.  +Bowel sounds  Neurologic:  Alert and oriented X 3, normal speech,   Psych:   Good insight. Not anxious nor agitated  Skin:  No rashes.   No jaundice    Reviewed most current lab test results and cultures  YES  Reviewed most current radiology test results   YES  Review and summation of old records today    NO  Reviewed patient's current orders and MAR    YES  PMH/SH reviewed - no change compared to H&P  ________________________________________________________________________  Care Plan discussed with:    Comments   Patient x    Family      RN x    Care Manager     Consultant                        Multidiciplinary team rounds were held today with , nursing, pharmacist and clinical coordinator. Patient's plan of care was discussed; medications were reviewed and discharge planning was addressed. ________________________________________________________________________  Total NON critical care TIME:  35   Minutes    Total CRITICAL CARE TIME Spent:   Minutes non procedure based      Comments   >50% of visit spent in counseling and coordination of care     ________________________________________________________________________  Nan Andres MD     Procedures: see electronic medical records for all procedures/Xrays and details which were not copied into this note but were reviewed prior to creation of Plan. LABS:  I reviewed today's most current labs and imaging studies.   Pertinent labs include:  Recent Labs     11/01/20  0437 10/31/20  0520 10/30/20  0943   WBC 2.4* 2.3* 3.1*   HGB 7.2* 7.5* 8.0*   HCT 22.2* 23.1* 24.4*   PLT 74* 68* 74*     Recent Labs     11/01/20  0437 10/31/20  0520 10/30/20  0543    139 138   K 3.2* 3.4* 3.8    105 101   CO2 26 25 28   GLU 97 96 104*   BUN 61* 71* 78*   CREA 4.20* 5.48* 6.83*   CA 10.1 9.3 8.7   PHOS 4.9*  --   --    ALB 4.1  --   --    TBILI 0.7  --   --    ALT 98*  --   --        Signed: Nan Andres MD

## 2020-11-01 NOTE — PROGRESS NOTES
GI PROGRESS NOTE  NAME:Char Siu :1945 [id number]   ATTG: Dr. Mamie Coello  Primary GI: Dr. Flor Herman PCP: Iman Oquendo MD  Date/Time:  2020 2:36 PM   Reason for following: Pancreatic mass, anemia, abnormal CT of GI tract, BRADY  Assessment:   · Pancreatic mass, as seen on CT imaging: masslike  enlargement of the uncinate process measuring 3.0 x2.8 cm. This has changed in appearance since . 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 See nephrology notes    · Obesity    · Pancytopenia       Plan:   1) We await optimization of renal function(Cr is 4.2) to allow for a  CT abd/pelvis with contrast or Abd MRI for better pancreatic mass characterization. 2) Consider OP EUS-FNB or IP after above is done. She  will need better platelet counts for it(Can use plt transfusions or a Non-peptide TPO/thrmbopoietin  receptor agonist  Doptelet or Mulpleta to get higher platelet levels for it). 3) Dr Samara Gramajo al to see her tomorrow to decide on above. Tamika Buck MD       Subjective:   Patient resting comfortably in chair      Complaint Y/N Description   Abdominal Pain N    Hematemesis     Hematochezia     Melena     Constipation     Diarrhea     Dyspepsia     Dysphagia     Jaundiced     Nausea/vomiting N      Review of Systems:  Symptom Y/N Comments  Symptom Y/N Comments   Fever/Chills    Chest Pain     Cough    Headaches     Sputum    Joint Pain     SOB/HENLEY    Pruritis/Rash     Tolerating Diet Y   Other       Could NOT obtain due to:      Objective:   VITALS:   Last 24hrs VS reviewed since prior progress note.  Most recent are:  Visit Vitals  BP (!) 149/63 (BP 1 Location: Left arm, BP Patient Position: Sitting)   Pulse 66   Temp 97.5 °F (36.4 °C)   Resp 18   Ht 5' 3\" (1.6 m)   Wt 93.8 kg (206 lb 14.4 oz)   SpO2 98%   BMI 36.65 kg/m²       Intake/Output Summary (Last 24 hours) at 2020 1107  Last data filed at 2020 0356  Gross per 24 hour Intake 1300 ml   Output 1600 ml   Net -300 ml     PHYSICAL EXAM:  General: WD, WN. Alert, cooperative, no acute distress    HEENT: EOMI  Lungs:  CTA Bilaterally. Heart:  Regular  rhythm,  No murmur, No Rubs, No Gallops  Abdomen: Soft, Non distended, Non tender.  +Bowel sounds, no HSM  Extremities: No c/c/e  Neurologic:  Alert and oriented X 3. No acute neurological distress   Psych:   Good insight. Not anxious nor agitated. Lab and Radiology Data Reviewed: (see below)    Medications Reviewed: (see below)  PMH/SH reviewed - no change compared to H&P  ________________________________________________________________________  Total time spent with patient: 30 minutes ________________________________________________________________________  Care Plan discussed with:  Patient Y   Family     RN               Consultant:       Marissa Whitten MD     Procedures: see electronic medical records for all procedures/Xrays and details which were not copied into this note but were reviewed prior to creation of Plan. LABS:  Recent Labs     11/01/20  0437 10/31/20  0520   WBC 2.4* 2.3*   HGB 7.2* 7.5*   HCT 22.2* 23.1*   PLT 74* 68*     Recent Labs     11/01/20  0437 10/31/20  0520 10/30/20  0543    139 138   K 3.2* 3.4* 3.8    105 101   CO2 26 25 28   BUN 61* 71* 78*   CREA 4.20* 5.48* 6.83*   GLU 97 96 104*   CA 10.1 9.3 8.7   PHOS 4.9*  --   --      Recent Labs     11/01/20 0437   *   TP 6.9   ALB 4.1   GLOB 2.8     No results for input(s): INR, PTP, APTT, INREXT, INREXT in the last 72 hours. Recent Labs     11/01/20 0437 11/01/20 0436   TIBC 240*  --    PSAT 18*  --    FERR  --  50      No results found for: FOL, RBCF  No results for input(s): PH, PCO2, PO2 in the last 72 hours. No results for input(s): CPK, CKMB in the last 72 hours.     No lab exists for component: TROPONINI  Lab Results   Component Value Date/Time    Color YELLOW/STRAW 10/27/2020 02:54 PM    Appearance CLOUDY (A) 10/27/2020 02:54 PM    Specific gravity 1.015 10/27/2020 02:54 PM    pH (UA) 5.0 10/27/2020 02:54 PM    Protein 100 (A) 10/27/2020 02:54 PM    Glucose Negative 10/27/2020 02:54 PM    Ketone Negative 10/27/2020 02:54 PM    Bilirubin NEGATIVE  11/04/2012 10:39 PM    Urobilinogen 1.0 10/27/2020 02:54 PM    Nitrites Negative 10/27/2020 02:54 PM    Leukocyte Esterase LARGE (A) 10/27/2020 02:54 PM    Epithelial cells MANY (A) 10/27/2020 02:54 PM    Bacteria 3+ (A) 10/27/2020 02:54 PM    WBC >100 (H) 10/27/2020 02:54 PM    RBC 5-10 10/27/2020 02:54 PM       MEDICATIONS:  Current Facility-Administered Medications   Medication Dose Route Frequency    albumin human 25% (BUMINATE) solution 25 g  25 g IntraVENous Q8H    amLODIPine (NORVASC) tablet 5 mg  5 mg Oral DAILY    aspirin chewable tablet 81 mg  81 mg Oral DAILY    docusate sodium (COLACE) capsule 100 mg  100 mg Oral DAILY PRN    levothyroxine (SYNTHROID) tablet 175 mcg  175 mcg Oral 6am    traMADoL (ULTRAM) tablet 50 mg  50 mg Oral Q6H PRN    sodium chloride (NS) flush 5-40 mL  5-40 mL IntraVENous Q8H    sodium chloride (NS) flush 5-40 mL  5-40 mL IntraVENous PRN    acetaminophen (TYLENOL) tablet 650 mg  650 mg Oral Q6H PRN    Or    acetaminophen (TYLENOL) suppository 650 mg  650 mg Rectal Q6H PRN    polyethylene glycol (MIRALAX) packet 17 g  17 g Oral DAILY PRN    promethazine (PHENERGAN) tablet 12.5 mg  12.5 mg Oral Q6H PRN    Or    ondansetron (ZOFRAN) injection 4 mg  4 mg IntraVENous Q6H PRN    insulin lispro (HUMALOG) injection   SubCUTAneous AC&HS    glucose chewable tablet 16 g  4 Tab Oral PRN    dextrose (D50W) injection syrg 12.5-25 g  12.5-25 g IntraVENous PRN    glucagon (GLUCAGEN) injection 1 mg  1 mg IntraMUSCular PRN    hydrALAZINE (APRESOLINE) 20 mg/mL injection 20 mg  20 mg IntraVENous Q6H PRN    labetaloL (NORMODYNE;TRANDATE) injection 20 mg  20 mg IntraVENous Q6H PRN

## 2020-11-01 NOTE — PROGRESS NOTES
0700 Received bedside shift report from Gabrielle Salvador RN assumed care of the pt     1900 Bedside shift change report given to SANTOS Akbar (oncoming nurse) by Janay Ramirez RN (offgoing nurse). Report included the following information SBAR, Kardex, Intake/Output and MAR.

## 2020-11-02 LAB
ALBUMIN SERPL-MCNC: 4.6 G/DL (ref 3.5–5)
ALBUMIN/GLOB SERPL: 1.7 {RATIO} (ref 1.1–2.2)
ALP SERPL-CCNC: 167 U/L (ref 45–117)
ALT SERPL-CCNC: 95 U/L (ref 12–78)
ANION GAP SERPL CALC-SCNC: 11 MMOL/L (ref 5–15)
AST SERPL-CCNC: 94 U/L (ref 15–37)
BASOPHILS # BLD: 0 K/UL (ref 0–0.1)
BASOPHILS NFR BLD: 0 % (ref 0–1)
BILIRUB SERPL-MCNC: 0.7 MG/DL (ref 0.2–1)
BUN SERPL-MCNC: 60 MG/DL (ref 6–20)
BUN/CREAT SERPL: 17 (ref 12–20)
CALCIUM SERPL-MCNC: 10.2 MG/DL (ref 8.5–10.1)
CHLORIDE SERPL-SCNC: 106 MMOL/L (ref 97–108)
CO2 SERPL-SCNC: 23 MMOL/L (ref 21–32)
CREAT SERPL-MCNC: 3.53 MG/DL (ref 0.55–1.02)
DIFFERENTIAL METHOD BLD: ABNORMAL
EOSINOPHIL # BLD: 0.1 K/UL (ref 0–0.4)
EOSINOPHIL NFR BLD: 3 % (ref 0–7)
ERYTHROCYTE [DISTWIDTH] IN BLOOD BY AUTOMATED COUNT: 15.4 % (ref 11.5–14.5)
GLOBULIN SER CALC-MCNC: 2.7 G/DL (ref 2–4)
GLUCOSE BLD STRIP.AUTO-MCNC: 119 MG/DL (ref 65–100)
GLUCOSE BLD STRIP.AUTO-MCNC: 130 MG/DL (ref 65–100)
GLUCOSE BLD STRIP.AUTO-MCNC: 149 MG/DL (ref 65–100)
GLUCOSE SERPL-MCNC: 122 MG/DL (ref 65–100)
HCT VFR BLD AUTO: 22.6 % (ref 35–47)
HGB BLD-MCNC: 7.2 G/DL (ref 11.5–16)
IMM GRANULOCYTES # BLD AUTO: 0 K/UL (ref 0–0.04)
IMM GRANULOCYTES NFR BLD AUTO: 1 % (ref 0–0.5)
INR PPP: 1.1 (ref 0.9–1.1)
LYMPHOCYTES # BLD: 0.6 K/UL (ref 0.8–3.5)
LYMPHOCYTES NFR BLD: 19 % (ref 12–49)
MAGNESIUM SERPL-MCNC: 2.1 MG/DL (ref 1.6–2.4)
MCH RBC QN AUTO: 31.6 PG (ref 26–34)
MCHC RBC AUTO-ENTMCNC: 31.9 G/DL (ref 30–36.5)
MCV RBC AUTO: 99.1 FL (ref 80–99)
MONOCYTES # BLD: 0.4 K/UL (ref 0–1)
MONOCYTES NFR BLD: 12 % (ref 5–13)
NEUTS SEG # BLD: 2 K/UL (ref 1.8–8)
NEUTS SEG NFR BLD: 65 % (ref 32–75)
NRBC # BLD: 0 K/UL (ref 0–0.01)
NRBC BLD-RTO: 0 PER 100 WBC
PHOSPHATE SERPL-MCNC: 4 MG/DL (ref 2.6–4.7)
PLATELET # BLD AUTO: 71 K/UL (ref 150–400)
PMV BLD AUTO: 10.2 FL (ref 8.9–12.9)
POTASSIUM SERPL-SCNC: 3.3 MMOL/L (ref 3.5–5.1)
PROT SERPL-MCNC: 7.3 G/DL (ref 6.4–8.2)
PROTHROMBIN TIME: 11.3 SEC (ref 9–11.1)
RBC # BLD AUTO: 2.28 M/UL (ref 3.8–5.2)
SERVICE CMNT-IMP: ABNORMAL
SODIUM SERPL-SCNC: 140 MMOL/L (ref 136–145)
WBC # BLD AUTO: 3.2 K/UL (ref 3.6–11)

## 2020-11-02 PROCEDURE — 74011250637 HC RX REV CODE- 250/637: Performed by: STUDENT IN AN ORGANIZED HEALTH CARE EDUCATION/TRAINING PROGRAM

## 2020-11-02 PROCEDURE — 83735 ASSAY OF MAGNESIUM: CPT

## 2020-11-02 PROCEDURE — 85610 PROTHROMBIN TIME: CPT

## 2020-11-02 PROCEDURE — 84100 ASSAY OF PHOSPHORUS: CPT

## 2020-11-02 PROCEDURE — 80053 COMPREHEN METABOLIC PANEL: CPT

## 2020-11-02 PROCEDURE — 74011636637 HC RX REV CODE- 636/637: Performed by: STUDENT IN AN ORGANIZED HEALTH CARE EDUCATION/TRAINING PROGRAM

## 2020-11-02 PROCEDURE — 74011250636 HC RX REV CODE- 250/636: Performed by: INTERNAL MEDICINE

## 2020-11-02 PROCEDURE — 82962 GLUCOSE BLOOD TEST: CPT

## 2020-11-02 PROCEDURE — 74011250637 HC RX REV CODE- 250/637: Performed by: GENERAL ACUTE CARE HOSPITAL

## 2020-11-02 PROCEDURE — P9047 ALBUMIN (HUMAN), 25%, 50ML: HCPCS | Performed by: INTERNAL MEDICINE

## 2020-11-02 PROCEDURE — 36415 COLL VENOUS BLD VENIPUNCTURE: CPT

## 2020-11-02 PROCEDURE — 85025 COMPLETE CBC W/AUTO DIFF WBC: CPT

## 2020-11-02 PROCEDURE — 77010033678 HC OXYGEN DAILY

## 2020-11-02 PROCEDURE — 65660000000 HC RM CCU STEPDOWN

## 2020-11-02 RX ORDER — POTASSIUM CHLORIDE 750 MG/1
20 TABLET, FILM COATED, EXTENDED RELEASE ORAL
Status: COMPLETED | OUTPATIENT
Start: 2020-11-02 | End: 2020-11-02

## 2020-11-02 RX ADMIN — AMLODIPINE BESYLATE 5 MG: 5 TABLET ORAL at 08:34

## 2020-11-02 RX ADMIN — ASPIRIN 81 MG: 81 TABLET, CHEWABLE ORAL at 08:34

## 2020-11-02 RX ADMIN — Medication 10 ML: at 21:08

## 2020-11-02 RX ADMIN — Medication 10 ML: at 17:49

## 2020-11-02 RX ADMIN — DOCUSATE SODIUM 100 MG: 100 CAPSULE, LIQUID FILLED ORAL at 18:00

## 2020-11-02 RX ADMIN — ALBUMIN (HUMAN) 25 G: 0.25 INJECTION, SOLUTION INTRAVENOUS at 05:03

## 2020-11-02 RX ADMIN — INSULIN LISPRO 2 UNITS: 100 INJECTION, SOLUTION INTRAVENOUS; SUBCUTANEOUS at 12:01

## 2020-11-02 RX ADMIN — POTASSIUM CHLORIDE 20 MEQ: 750 TABLET, FILM COATED, EXTENDED RELEASE ORAL at 09:45

## 2020-11-02 RX ADMIN — POLYETHYLENE GLYCOL 3350 17 G: 17 POWDER, FOR SOLUTION ORAL at 18:00

## 2020-11-02 RX ADMIN — Medication 10 ML: at 05:03

## 2020-11-02 RX ADMIN — LEVOTHYROXINE SODIUM 175 MCG: 0.15 TABLET ORAL at 05:03

## 2020-11-02 NOTE — PROGRESS NOTES
0700: Received bedside report from Naomie off going nurse. Assumed care of patient. 0900: Notified Dr. Sandro Carrasquillo of K 3.3, received order for 20mEq po K now. 1130: Spoke w/ Dr. Jacqueline Gillespie, received order to d/c rodgers    1200: Rodgers D/C'd, but to void by 6pm    Bedside shift change report given to Kazakh (oncoming nurse) by Cherie Box (offgoing nurse). Report included the following information SBAR, Kardex, Intake/Output, MAR and Cardiac Rhythm NSR, BBB. Problem: Pressure Injury - Risk of  Goal: *Prevention of pressure injury  Description: Document Boris Scale and appropriate interventions in the flowsheet. Outcome: Progressing Towards Goal  Note: Pressure Injury Interventions:  Sensory Interventions: Float heels, Keep linens dry and wrinkle-free, Minimize linen layers, Maintain/enhance activity level    Moisture Interventions: Absorbent underpads, Apply protective barrier, creams and emollients, Limit adult briefs, Internal/External urinary devices, Internal/External fecal devices    Activity Interventions: Increase time out of bed, Pressure redistribution bed/mattress(bed type), PT/OT evaluation    Mobility Interventions: Assess need for specialty bed, HOB 30 degrees or less, Pressure redistribution bed/mattress (bed type)    Nutrition Interventions: Document food/fluid/supplement intake    Friction and Shear Interventions: Apply protective barrier, creams and emollients, Lift sheet, Lift team/patient mobility team, Minimize layers                Problem: Falls - Risk of  Goal: *Absence of Falls  Description: Document Demian Fall Risk and appropriate interventions in the flowsheet.   Outcome: Progressing Towards Goal  Note: Fall Risk Interventions:  Mobility Interventions: Bed/chair exit alarm, OT consult for ADLs, Patient to call before getting OOB, PT Consult for mobility concerns, PT Consult for assist device competence    Mentation Interventions: Adequate sleep, hydration, pain control    Medication Interventions: Bed/chair exit alarm, Patient to call before getting OOB, Teach patient to arise slowly    Elimination Interventions: Bed/chair exit alarm, Call light in reach, Patient to call for help with toileting needs, Toileting schedule/hourly rounds    History of Falls Interventions: Room close to nurse's station         Problem: Pressure Injury - Risk of  Goal: *Prevention of pressure injury  Description: Document Boris Scale and appropriate interventions in the flowsheet.   Outcome: Progressing Towards Goal  Note: Pressure Injury Interventions:  Sensory Interventions: Float heels, Keep linens dry and wrinkle-free, Minimize linen layers, Maintain/enhance activity level    Moisture Interventions: Absorbent underpads, Apply protective barrier, creams and emollients, Limit adult briefs, Internal/External urinary devices, Internal/External fecal devices    Activity Interventions: Increase time out of bed, Pressure redistribution bed/mattress(bed type), PT/OT evaluation    Mobility Interventions: Assess need for specialty bed, HOB 30 degrees or less, Pressure redistribution bed/mattress (bed type)    Nutrition Interventions: Document food/fluid/supplement intake    Friction and Shear Interventions: Apply protective barrier, creams and emollients, Lift sheet, Lift team/patient mobility team, Minimize layers       Problem: General Medical Care Plan  Goal: *Vital signs within specified parameters  Outcome: Progressing Towards Goal  Goal: *Labs within defined limits  Outcome: Progressing Towards Goal  Goal: *Absence of infection signs and symptoms  Outcome: Progressing Towards Goal  Goal: *Optimal pain control at patient's stated goal  Outcome: Progressing Towards Goal  Goal: *Skin integrity maintained  Outcome: Progressing Towards Goal  Goal: *Fluid volume balance  Outcome: Progressing Towards Goal  Goal: *Optimize nutritional status  Outcome: Progressing Towards Goal  Goal: *Anxiety reduced or absent  Outcome: Progressing Towards Goal

## 2020-11-02 NOTE — PROGRESS NOTES
GI PROGRESS NOTE  Adriane Holstein, NP  [phone] NP in-hospital cell phone M-F until 4:30  After 5pm or on weekends, please call  for physician on call    NAME:Char Siu :1945 YMZ:[id number]   ATTG: Dr. Haroldo Dover  Primary GI: Dr. Jackman Seen PCP: Malachi Tarango MD  Date/Time:  2020 2:36 PM   Reason for following: Pancreatic mass, anemia, abnormal CT of GI tract, BRADY  Assessment:   Pancreatic mass, as seen on CT imaging  · New finding, will need EUS in future with biopsy    Anemia  · Hgb 7.2    Abnormal CT of the GI tract  · Masslike enlargement of the uncinate process measuring 3.0 x 2.8 cm. This has changed in appearance since . · Hepatic cirrhosis    H/o cirrhosis, h/o BRADY, compensated  · Had liver bx with [facility]  ·  BRADY, moderate fibrosis with bridging, stage III  · Concern for possible transition into HRS    Acute on Chronic kidney failure, suspected ATN  · Managed by nephrology  · BUN 60, Cr 3.53, GFR 13    GI History:   EGD showing normal esophagus and duodenum. Diffuse gastritis with friability, no ulcers, no varices   CLN grade 1 internal hemorrhoids, ascending colon polyps (TA). Recommended repeat in 5 years     Plan:   -Plan for MRI of abdomen with and without contrast once GFR is >30  -Inpatient vs outpatient EUS after MRI   -Patient would not be a candidate for whipple surgery given her cirrhosis, treatment would need to be based off biopsy   -Patient's knee replacement may not be compatible with MRCP. If needs to get CT pancreatic protocol will need to wait until renal function improves. -Agree with miralax for constipation  -Appreciate input from nephrology  -Appreciate input from hematology/oncology  -Supportive measure per primary team  *Plan of care discussed with Dr. Jerome Ya:   Patient resting comfortably in chair at bedside. No new complaints at this time. Updated patient on plan of care.   Discussed with RN events overnight. Review of Systems:  Symptom Y/N Comments  Symptom Y/N Comments   Fever/Chills n   Chest Pain n    Cough n   Headaches n    Sputum n   Joint Pain n    SOB/HENLEY n   Pruritis/Rash n    Tolerating Diet Y   Other       Could NOT obtain due to:      Objective:   VITALS:   Last 24hrs VS reviewed since prior progress note. Most recent are:  Visit Vitals  BP (!) 162/64   Pulse 64   Temp 98 °F (36.7 °C)   Resp 16   Ht 5' 3\" (1.6 m)   Wt 92.6 kg (204 lb 3.2 oz)   SpO2 93%   BMI 36.17 kg/m²       Intake/Output Summary (Last 24 hours) at 11/2/2020 1107  Last data filed at 11/2/2020 1007  Gross per 24 hour   Intake 720 ml   Output 950 ml   Net -230 ml     PHYSICAL EXAM:  General: Pleasant overweight  female. NAD  HEENT: NC, Atraumatic. Anicteric sclerae. Lungs:  CTA Bilaterally. No Wheezing/Rhonchi/Rales. Heart:  Regular  rhythm,  No murmur, No Rubs, No Gallops  Abdomen: Soft, Non distended, Non tender.  +Bowel sounds, no HSM  Extremities: No c/c/e  Neurologic:  Alert and oriented X 3. No acute neurological distress   Psych:   Good insight. Not anxious nor agitated. Lab and Radiology Data Reviewed: (see below)    Medications Reviewed: (see below)  PMH/SH reviewed - no change compared to H&P  ________________________________________________________________________  Total time spent with patient: 30 minutes ________________________________________________________________________  Care Plan discussed with:  Patient X   Family     RN X              Consultant:       Stacey Hernandez NP     Procedures: see electronic medical records for all procedures/Xrays and details which were not copied into this note but were reviewed prior to creation of Plan.       LABS:  Recent Labs     11/02/20 0206 11/01/20 0437   WBC 3.2* 2.4*   HGB 7.2* 7.2*   HCT 22.6* 22.2*   PLT 71* 74*     Recent Labs     11/02/20 0206 11/01/20 0437 10/31/20  0520    140 139   K 3.3* 3.2* 3.4*    105 105   CO2 23 26 25 BUN 60* 61* 71*   CREA 3.53* 4.20* 5.48*   * 97 96   CA 10.2* 10.1 9.3   MG 2.1  --   --    PHOS 4.0 4.9*  --      Recent Labs     11/02/20 0206 11/01/20 0437   * 150*   TP 7.3 6.9   ALB 4.6 4.1   GLOB 2.7 2.8     No results for input(s): INR, PTP, APTT, INREXT, INREXT in the last 72 hours. Recent Labs     11/01/20 0437 11/01/20 0436   TIBC 240*  --    PSAT 18*  --    FERR  --  50      No results found for: FOL, RBCF  No results for input(s): PH, PCO2, PO2 in the last 72 hours. No results for input(s): CPK, CKMB in the last 72 hours.     No lab exists for component: TROPONINI  Lab Results   Component Value Date/Time    Color YELLOW/STRAW 10/27/2020 02:54 PM    Appearance CLOUDY (A) 10/27/2020 02:54 PM    Specific gravity 1.015 10/27/2020 02:54 PM    pH (UA) 5.0 10/27/2020 02:54 PM    Protein 100 (A) 10/27/2020 02:54 PM    Glucose Negative 10/27/2020 02:54 PM    Ketone Negative 10/27/2020 02:54 PM    Bilirubin NEGATIVE  11/04/2012 10:39 PM    Urobilinogen 1.0 10/27/2020 02:54 PM    Nitrites Negative 10/27/2020 02:54 PM    Leukocyte Esterase LARGE (A) 10/27/2020 02:54 PM    Epithelial cells MANY (A) 10/27/2020 02:54 PM    Bacteria 3+ (A) 10/27/2020 02:54 PM    WBC >100 (H) 10/27/2020 02:54 PM    RBC 5-10 10/27/2020 02:54 PM       MEDICATIONS:  Current Facility-Administered Medications   Medication Dose Route Frequency    amLODIPine (NORVASC) tablet 5 mg  5 mg Oral DAILY    aspirin chewable tablet 81 mg  81 mg Oral DAILY    docusate sodium (COLACE) capsule 100 mg  100 mg Oral DAILY PRN    levothyroxine (SYNTHROID) tablet 175 mcg  175 mcg Oral 6am    traMADoL (ULTRAM) tablet 50 mg  50 mg Oral Q6H PRN    sodium chloride (NS) flush 5-40 mL  5-40 mL IntraVENous Q8H    sodium chloride (NS) flush 5-40 mL  5-40 mL IntraVENous PRN    acetaminophen (TYLENOL) tablet 650 mg  650 mg Oral Q6H PRN    Or    acetaminophen (TYLENOL) suppository 650 mg  650 mg Rectal Q6H PRN    polyethylene glycol (MIRALAX) packet 17 g  17 g Oral DAILY PRN    promethazine (PHENERGAN) tablet 12.5 mg  12.5 mg Oral Q6H PRN    Or    ondansetron (ZOFRAN) injection 4 mg  4 mg IntraVENous Q6H PRN    insulin lispro (HUMALOG) injection   SubCUTAneous AC&HS    glucose chewable tablet 16 g  4 Tab Oral PRN    dextrose (D50W) injection syrg 12.5-25 g  12.5-25 g IntraVENous PRN    glucagon (GLUCAGEN) injection 1 mg  1 mg IntraMUSCular PRN    hydrALAZINE (APRESOLINE) 20 mg/mL injection 20 mg  20 mg IntraVENous Q6H PRN    labetaloL (NORMODYNE;TRANDATE) injection 20 mg  20 mg IntraVENous Q6H PRN

## 2020-11-02 NOTE — PROGRESS NOTES
Problem: Pressure Injury - Risk of  Goal: *Prevention of pressure injury  Description: Document Boris Scale and appropriate interventions in the flowsheet.   Outcome: Progressing Towards Goal  Note: Pressure Injury Interventions:  Sensory Interventions: Assess changes in LOC, Minimize linen layers    Moisture Interventions: Minimize layers    Activity Interventions: Increase time out of bed, PT/OT evaluation    Mobility Interventions: PT/OT evaluation, HOB 30 degrees or less    Nutrition Interventions: Document food/fluid/supplement intake    Friction and Shear Interventions: Minimize layers

## 2020-11-02 NOTE — PROGRESS NOTES
Nephrology Progress Note  Zhao Dior     [email or web address]  Phone - [phone]   Patient: Gary Lawrence    YOB: 1945     Admit Date: 10/27/2020   Date- 11/2/2020     CC: Follow up for  love        IMPRESSION & PLAN:   love on CKD  - suspect ATN from prolonged pre-renal state from N/V+poor PO intake+ Losartan+ Lasix   - improved  hyperkalemia and acidosis   - renal US no hydro  - improving renal function with volume replacement, holding Losartan and Lasix  - nothing to suggest hepatorenal syndrome at this point      hypokalemia     CKD stage 2- from DM+ HTN   - baseline cr 1      HTN      UTI     Anemia/thrombocytopenia- likely from BRADY cirrhosis    PLAN-   No more albumin   kcl 20 meq po   Start low dose norvasc   Avoid losartan, lasix   Check bmp in am   Likely d/c tomorrow if cr. Continue to improve     Subjective: Interval History:   -  Cr improved to 3.5, bp high,  No c/o sob,  No c/o chest pain,   No c/o nausea or vomiting, No c/o  fever. ROS:- As documented above  Objective:   Vitals:    11/02/20 0213 11/02/20 0245 11/02/20 0641 11/02/20 0834   BP: (!) 157/75  (!) 148/59 (!) 162/64   Pulse: 61  61 64   Resp: 18  16    Temp: 97.7 °F (36.5 °C)  98 °F (36.7 °C)    SpO2: 94%  93%    Weight:  92.6 kg (204 lb 3.2 oz)     Height:          11/01 0701 - 11/02 0700  In: 600 [P.O.:600]  Out: 950 [Urine:950]  Last 3 Recorded Weights in this Encounter    10/31/20 0558 11/01/20 0635 11/02/20 0245   Weight: 93.3 kg (205 lb 9.6 oz) 93.8 kg (206 lb 14.4 oz) 92.6 kg (204 lb 3.2 oz)      Physical exam:   GEN: NAD  NECK- Supple, no mass  RESP: No wheezing, Clear b/l  CVS: S1,S2  RRR  NEURO: Normal speech, Non focal  Abdo- soft, NT  EXT: No Edema   PSYCH: Normal Mood    Chart reviewed. Pertinent Notes reviewed.      Data Review :  Recent Labs     11/02/20  0206 11/01/20  0437 10/31/20  0520    140 139   K 3.3* 3.2* 3.4*    105 105   CO2 23 26 25   BUN 60* 61* 71*   CREA 3.53* 4.20* 5.48*   * 97 96   CA 10.2* 10.1 9.3   MG 2.1  --   --    PHOS 4.0 4.9*  --      Recent Labs     11/02/20  0206 11/01/20  0437 10/31/20  0520   WBC 3.2* 2.4* 2.3*   HGB 7.2* 7.2* 7.5*   HCT 22.6* 22.2* 23.1*   PLT 71* 74* 68*     Recent Labs     11/01/20  0437 11/01/20  0436   TIBC 240*  --    PSAT 18*  --    FERR  --  50      Medication list  reviewed  Current Facility-Administered Medications   Medication Dose Route Frequency    amLODIPine (NORVASC) tablet 5 mg  5 mg Oral DAILY    aspirin chewable tablet 81 mg  81 mg Oral DAILY    docusate sodium (COLACE) capsule 100 mg  100 mg Oral DAILY PRN    levothyroxine (SYNTHROID) tablet 175 mcg  175 mcg Oral 6am    traMADoL (ULTRAM) tablet 50 mg  50 mg Oral Q6H PRN    sodium chloride (NS) flush 5-40 mL  5-40 mL IntraVENous Q8H    sodium chloride (NS) flush 5-40 mL  5-40 mL IntraVENous PRN    acetaminophen (TYLENOL) tablet 650 mg  650 mg Oral Q6H PRN    Or    acetaminophen (TYLENOL) suppository 650 mg  650 mg Rectal Q6H PRN    polyethylene glycol (MIRALAX) packet 17 g  17 g Oral DAILY PRN    promethazine (PHENERGAN) tablet 12.5 mg  12.5 mg Oral Q6H PRN    Or    ondansetron (ZOFRAN) injection 4 mg  4 mg IntraVENous Q6H PRN    insulin lispro (HUMALOG) injection   SubCUTAneous AC&HS    glucose chewable tablet 16 g  4 Tab Oral PRN    dextrose (D50W) injection syrg 12.5-25 g  12.5-25 g IntraVENous PRN    glucagon (GLUCAGEN) injection 1 mg  1 mg IntraMUSCular PRN    hydrALAZINE (APRESOLINE) 20 mg/mL injection 20 mg  20 mg IntraVENous Q6H PRN    labetaloL (NORMODYNE;TRANDATE) injection 20 mg  20 mg IntraVENous Q6H PRN          Yohan Parks MD              [facility]  [facility] / [facility] / [facility], [address]  [place], [address]  Phone - [phone]               Fax - [phone]

## 2020-11-02 NOTE — PROGRESS NOTES
Problem: Falls - Risk of  Goal: *Absence of Falls  Description: Document Rochele Ebonie Fall Risk and appropriate interventions in the flowsheet.   Outcome: Progressing Towards Goal  Note: Fall Risk Interventions:  Mobility Interventions: Patient to call before getting OOB    Mentation Interventions: Adequate sleep, hydration, pain control    Medication Interventions: Patient to call before getting OOB    Elimination Interventions: Call light in reach    History of Falls Interventions: Room close to nurse's station

## 2020-11-02 NOTE — PROGRESS NOTES
Hospitalist Progress Note    NAME: Suma Salinsa   :  1945   MRN:  [id number]       Assessment / Plan:  Acute kidney injury  - likely prerenal due to vomiting and poor oral intake  - Cr 7.7 and BUN 97. CT abd no hydronephrosis   - Admit to tele  - IV fluid resuscitation  - Check urine elctrolytes  - Avoid nephrotoxic mes  - Monitor renal function and urine out put  - Nephrology consulted    :  Cr continues to improve, Nephrology evals on going  Pt continues to receive IV Albumin    Anemia of chronic disease  Hgb 7.2, continue to monitor    Hyperkalemia - resolved  - secondary to MICHELLE  - EKG changes noted, RBBB, ?junctional rhythm, peaked T waves  - Received Ca gluconate, insulin, albuterol, and bicarb at the ED  - Repeat K at 4.9. Continue to monitor. Repeat EKG     ?Pyelonephritis - resolved  - nausea, vomiting, and urinalyris suggestive of UTI  - was unable to tolerate oral antibiotics  - send urine culture  - ceftriaxone    10/29: Follow up CX results  Continue empiric abx     10/30:  Discontinue IV abx  CX results noted    Masslike enlargement of pancreas  - Incidental CT finding  - no hx of smoking or alcohol. No family hx of pancreatic cancer. No recent weight loss. - further workup once renal function improves    10/28:  Discussed results with pt and the need for further workup which she is in agreement with - will ask for Oncology evaluation    10/29: Oncology eval appreciated     10/30:  Oncology evals on going  GI was consulted for EUS - eval is on going    10/31:  Oncology evals on going  GI eval noted     : Both Oncology and GI are awaiting improvement in pt's renal status in order determine plan of care. GI considering OP EUS-FNB or IP if Cr improving. Oncology awaiting tissue results. Hypertension  - hold losartan and lasix due to MICHELLE  - continue amlodipine  - PRN hydralazine and labetalol     Type 2 DM  - hold metfromin  - sliding scale insulin     BRADY  - hx of BRADY.  CT abd shows heaptic cirrhosis. LFT wnl. Platelet wnl. Elevated AST, ALT, and AlkP.  - GI evals on going     Hx of Gout      30.0 - 39.9 Obese / Body mass index is 36.17 kg/m². Code status: Full  Prophylaxis: SCD due to thrombocytopenia  Recommended Disposition: tbd     Subjective:     Chief Complaint / Reason for Physician Visit  Doing well this morning - feels like she has \"better color\". Discussed with RN events overnight. Review of Systems:  Symptom Y/N Comments  Symptom Y/N Comments   Fever/Chills n   Chest Pain n    Poor Appetite    Edema     Cough n   Abdominal Pain n    Sputum    Joint Pain     SOB/HENLEY n   Pruritis/Rash     Nausea/vomit    Tolerating PT/OT     Diarrhea    Tolerating Diet     Constipation    Other       Could NOT obtain due to:      Objective:     VITALS:   Last 24hrs VS reviewed since prior progress note. Most recent are:  Patient Vitals for the past 24 hrs:   Temp Pulse Resp BP SpO2   11/02/20 0834  64  (!) 162/64    11/02/20 0641 98 °F (36.7 °C) 61 16 (!) 148/59 93 %   11/02/20 0213 97.7 °F (36.5 °C) 61 18 (!) 157/75 94 %   11/01/20 2217 97.5 °F (36.4 °C) 62 18 (!) 154/59 94 %   11/01/20 1838 97.6 °F (36.4 °C) 67 18 (!) 165/69 96 %   11/01/20 1452 98.2 °F (36.8 °C) 64 16 (!) 156/66 96 %   11/01/20 1037 97.5 °F (36.4 °C) 66 18 (!) 149/63 98 %       Intake/Output Summary (Last 24 hours) at 11/2/2020 8795  Last data filed at 11/2/2020 0245  Gross per 24 hour   Intake 480 ml   Output 950 ml   Net -470 ml        PHYSICAL EXAM:  General: Alert, cooperative, no acute distress    EENT:  EOMI. Anicteric sclerae. MMM  Resp:  CTA bilaterally, no wheezing or rales. No accessory muscle use  CV:  Regular  rhythm,  No edema  GI:  Soft, Non distended, Non tender.  +Bowel sounds  Neurologic:  Alert and oriented X 3, normal speech,   Psych:   Good insight. Not anxious nor agitated  Skin:  No rashes.   No jaundice    Reviewed most current lab test results and cultures  YES  Reviewed most current radiology test results   YES  Review and summation of old records today    NO  Reviewed patient's current orders and MAR    YES  PMH/SH reviewed - no change compared to H&P  ________________________________________________________________________  Care Plan discussed with:    Comments   Patient x    Family      RN x    Care Manager     Consultant                        Multidiciplinary team rounds were held today with , nursing, pharmacist and clinical coordinator. Patient's plan of care was discussed; medications were reviewed and discharge planning was addressed. ________________________________________________________________________  Total NON critical care TIME:  35   Minutes    Total CRITICAL CARE TIME Spent:   Minutes non procedure based      Comments   >50% of visit spent in counseling and coordination of care     ________________________________________________________________________  Jennifer Ponce MD     Procedures: see electronic medical records for all procedures/Xrays and details which were not copied into this note but were reviewed prior to creation of Plan. LABS:  I reviewed today's most current labs and imaging studies.   Pertinent labs include:  Recent Labs     11/02/20  0206 11/01/20  0437 10/31/20  0520   WBC 3.2* 2.4* 2.3*   HGB 7.2* 7.2* 7.5*   HCT 22.6* 22.2* 23.1*   PLT 71* 74* 68*     Recent Labs     11/02/20  0206 11/01/20  0437 10/31/20  0520    140 139   K 3.3* 3.2* 3.4*    105 105   CO2 23 26 25   * 97 96   BUN 60* 61* 71*   CREA 3.53* 4.20* 5.48*   CA 10.2* 10.1 9.3   MG 2.1  --   --    PHOS 4.0 4.9*  --    ALB 4.6 4.1  --    TBILI 0.7 0.7  --    ALT 95* 98*  --        Signed: Jennifer Ponce MD

## 2020-11-02 NOTE — PROGRESS NOTES
0700: [facility] END OF SHIFT REPORT      Bedside shift change report GIVEN TO Gage Walker RN. Report included the following information SBAR, Kardex, Intake/Output, MAR, Recent Results and Cardiac Rhythm NSR, BBB. SIGNIFICANT CHANGES DURING SHIFT:        CONCERNS TO ADDRESS WITH MD:          [facility] NURSING NOTE   Admission Date 10/27/2020   Admission Diagnosis MICHELLE (acute kidney injury) ([facility] [address] [N17.9]   Consults IP CONSULT TO NEPHROLOGY  IP CONSULT TO ONCOLOGY  IP CONSULT TO GASTROENTEROLOGY  IP CONSULT TO HOSPITALIST      Cardiac Monitoring [x] Yes [] No      Purposeful Hourly Rounding [x] Yes    Demian Score Total Score: 3   Demian score 3 or > [] Bed Alarm [] Avasys [] 1:1 sitter [] Patient refused (Signed refusal form in chart)   Boris Score Boris Score: 17   Boris score 14 or < [] PMT consult [] Wound Care consult    []  Specialty bed  [] Nutrition consult      Influenza Vaccine             Oxygen needs? [] Room air Oxygen @  [x]1L    []2L    []3L   []4L    []5L   []6L via  NC   Chronic home O2 use? [] Yes [x] No  Perform O2 challenge test and document in progress note using smartphrase (.Homeoxygen)      Last bowel movement Last Bowel Movement Date: 10/30/20      Urinary Catheter Urinary Catheter 10/27/20 West-Indications for Use: Accurate measurement of urinary output     Urinary Catheter 10/27/20 West-Urine Output (mL): 400 ml     LDAs               Peripheral IV 10/27/20 Anterior; Left Forearm (Active)   Site Assessment Clean, dry, & intact 11/02/20 0245   Phlebitis Assessment 0 11/02/20 0245   Infiltration Assessment 0 11/02/20 0245   Dressing Status Clean, dry, & intact 11/02/20 0245   Dressing Type Tape;Transparent 11/02/20 0245   Hub Color/Line Status Pink 11/02/20 0245   Alcohol Cap Used Yes 11/01/20 1148       Peripheral IV (Active)       Peripheral IV (Active)                         Readmission Risk Assessment Tool Score High Risk            24       Total Score        2 .  Living with Significant Other. Assisted Living. LTAC. SNF. or   Rehab    3 Patient Length of Stay (>5 days = 3)    5 Pt.  Coverage (Medicare=5 , Medicaid, or Self-Pay=4)    14 Charlson Comorbidity Score (Age + Comorbid Conditions)        Criteria that do not apply:    Has Seen PCP in Last 6 Months (Yes=3, No=0)    IP Visits Last 12 Months (1-3=4, 4=9, >4=11)       Expected Length of Stay 3d 2h   Actual Length of Stay 6

## 2020-11-02 NOTE — ROUTINE PROCESS
[address] END OF SHIFT REPORT Bedside shift change report GIVEN TO SANTOS araujo. Report included the following information SBAR. SIGNIFICANT CHANGES DURING SHIFT:  Complained of constipation and medicated for this. She is fairly mobile with walker and standby assistance. No complaints of sob. CONCERNS TO ADDRESS WITH MD:  Constipation [address] NURSING NOTE Admission Date 10/27/2020 Admission Diagnosis MICHELLE (acute kidney injury) ([facility] [address] [N17.9] Consults IP CONSULT TO NEPHROLOGY 
IP CONSULT TO ONCOLOGY 
IP CONSULT TO GASTROENTEROLOGY 
IP CONSULT TO HOSPITALIST Cardiac Monitoring [x] Yes [] No  
  
Purposeful Hourly Rounding [x] Yes   
Demian Score Total Score: 2 Demian score 3 or > [] Bed Alarm [] Avasys [] 1:1 sitter [] Patient refused (Signed refusal form in chart) Boris Score Boris Score: 17 Boris score 14 or < [] PMT consult [] Wound Care consult  
 []  Specialty bed  [] Nutrition consult Influenza Vaccine Oxygen needs? [x] Room air Oxygen @  []1L    []2L    []3L   []4L    []5L   []6L via NC Chronic home O2 use? [] Yes [x] No 
Perform O2 challenge test and document in progress note using smartphClassiqse (.Homeoxygen) Last bowel movement Last Bowel Movement Date: 10/30/20 Urinary Catheter [REMOVED] Urinary Catheter 10/27/20 West-Indications for Use: Accurate measurement of urinary output [REMOVED] Urinary Catheter 10/27/20 West-Urine Output (mL): 350 ml LDAs Peripheral IV 10/27/20 Anterior; Left Forearm (Active) Site Assessment Clean, dry, & intact 11/02/20 1118 Phlebitis Assessment 0 11/02/20 1118 Infiltration Assessment 0 11/02/20 1118 Dressing Status Clean, dry, & intact 11/02/20 1118 Dressing Type Transparent 11/02/20 1118 Hub Color/Line Status Pink;Flushed;Patent 11/02/20 1118 Alcohol Cap Used Yes 11/01/20 1148 Peripheral IV (Active) Peripheral IV (Active) Readmission Risk Assessment Tool Score High Risk   
      
 24 Total Score 2 . Living with Significant Other. Assisted Living. LTAC. SNF. or  
Rehab  
 3 Patient Length of Stay (>5 days = 3)  
 5 Pt. Coverage (Medicare=5 , Medicaid, or Self-Pay=4) 14 Charlson Comorbidity Score (Age + Comorbid Conditions) Criteria that do not apply:  
 Has Seen PCP in Last 6 Months (Yes=3, No=0) IP Visits Last 12 Months (1-3=4, 4=9, >4=11) Expected Length of Stay 3d 2h Actual Length of Stay 6

## 2020-11-03 ENCOUNTER — TELEPHONE (OUTPATIENT)
Dept: INTERNAL MEDICINE CLINIC | Age: 75
End: 2020-11-03

## 2020-11-03 VITALS
HEART RATE: 62 BPM | RESPIRATION RATE: 16 BRPM | DIASTOLIC BLOOD PRESSURE: 59 MMHG | TEMPERATURE: 98 F | HEIGHT: 63 IN | BODY MASS INDEX: 36.16 KG/M2 | OXYGEN SATURATION: 96 % | WEIGHT: 204.06 LBS | SYSTOLIC BLOOD PRESSURE: 151 MMHG

## 2020-11-03 LAB
ALBUMIN SERPL-MCNC: 4.7 G/DL (ref 3.5–5)
ALBUMIN/GLOB SERPL: 1.7 {RATIO} (ref 1.1–2.2)
ALP SERPL-CCNC: 214 U/L (ref 45–117)
ALT SERPL-CCNC: 104 U/L (ref 12–78)
ANION GAP SERPL CALC-SCNC: 9 MMOL/L (ref 5–15)
AST SERPL-CCNC: 105 U/L (ref 15–37)
BASOPHILS # BLD: 0 K/UL (ref 0–0.1)
BASOPHILS NFR BLD: 0 % (ref 0–1)
BILIRUB SERPL-MCNC: 0.6 MG/DL (ref 0.2–1)
BUN SERPL-MCNC: 56 MG/DL (ref 6–20)
BUN/CREAT SERPL: 18 (ref 12–20)
CALCIUM SERPL-MCNC: 10.4 MG/DL (ref 8.5–10.1)
CHLORIDE SERPL-SCNC: 107 MMOL/L (ref 97–108)
CO2 SERPL-SCNC: 24 MMOL/L (ref 21–32)
CREAT SERPL-MCNC: 3.12 MG/DL (ref 0.55–1.02)
DIFFERENTIAL METHOD BLD: ABNORMAL
EOSINOPHIL # BLD: 0.1 K/UL (ref 0–0.4)
EOSINOPHIL NFR BLD: 2 % (ref 0–7)
ERYTHROCYTE [DISTWIDTH] IN BLOOD BY AUTOMATED COUNT: 15.9 % (ref 11.5–14.5)
GLOBULIN SER CALC-MCNC: 2.7 G/DL (ref 2–4)
GLUCOSE BLD STRIP.AUTO-MCNC: 132 MG/DL (ref 65–100)
GLUCOSE BLD STRIP.AUTO-MCNC: 146 MG/DL (ref 65–100)
GLUCOSE SERPL-MCNC: 106 MG/DL (ref 65–100)
HCT VFR BLD AUTO: 23.6 % (ref 35–47)
HGB BLD-MCNC: 7.5 G/DL (ref 11.5–16)
IMM GRANULOCYTES # BLD AUTO: 0 K/UL (ref 0–0.04)
IMM GRANULOCYTES NFR BLD AUTO: 1 % (ref 0–0.5)
LYMPHOCYTES # BLD: 0.7 K/UL (ref 0.8–3.5)
LYMPHOCYTES NFR BLD: 16 % (ref 12–49)
MCH RBC QN AUTO: 31.8 PG (ref 26–34)
MCHC RBC AUTO-ENTMCNC: 31.8 G/DL (ref 30–36.5)
MCV RBC AUTO: 100 FL (ref 80–99)
MONOCYTES # BLD: 0.5 K/UL (ref 0–1)
MONOCYTES NFR BLD: 12 % (ref 5–13)
NEUTS SEG # BLD: 2.8 K/UL (ref 1.8–8)
NEUTS SEG NFR BLD: 69 % (ref 32–75)
NRBC # BLD: 0 K/UL (ref 0–0.01)
NRBC BLD-RTO: 0 PER 100 WBC
PHOSPHATE SERPL-MCNC: 3.6 MG/DL (ref 2.6–4.7)
PLATELET # BLD AUTO: 84 K/UL (ref 150–400)
PMV BLD AUTO: 10.6 FL (ref 8.9–12.9)
POTASSIUM SERPL-SCNC: 3.6 MMOL/L (ref 3.5–5.1)
PROT SERPL-MCNC: 7.4 G/DL (ref 6.4–8.2)
RBC # BLD AUTO: 2.36 M/UL (ref 3.8–5.2)
SERVICE CMNT-IMP: ABNORMAL
SERVICE CMNT-IMP: ABNORMAL
SODIUM SERPL-SCNC: 140 MMOL/L (ref 136–145)
WBC # BLD AUTO: 4.1 K/UL (ref 3.6–11)

## 2020-11-03 PROCEDURE — 36415 COLL VENOUS BLD VENIPUNCTURE: CPT

## 2020-11-03 PROCEDURE — 74011250637 HC RX REV CODE- 250/637: Performed by: STUDENT IN AN ORGANIZED HEALTH CARE EDUCATION/TRAINING PROGRAM

## 2020-11-03 PROCEDURE — 85025 COMPLETE CBC W/AUTO DIFF WBC: CPT

## 2020-11-03 PROCEDURE — 97161 PT EVAL LOW COMPLEX 20 MIN: CPT

## 2020-11-03 PROCEDURE — 84100 ASSAY OF PHOSPHORUS: CPT

## 2020-11-03 PROCEDURE — 82962 GLUCOSE BLOOD TEST: CPT

## 2020-11-03 PROCEDURE — 90471 IMMUNIZATION ADMIN: CPT

## 2020-11-03 PROCEDURE — 90686 IIV4 VACC NO PRSV 0.5 ML IM: CPT | Performed by: STUDENT IN AN ORGANIZED HEALTH CARE EDUCATION/TRAINING PROGRAM

## 2020-11-03 PROCEDURE — 97116 GAIT TRAINING THERAPY: CPT

## 2020-11-03 PROCEDURE — 74011250636 HC RX REV CODE- 250/636: Performed by: STUDENT IN AN ORGANIZED HEALTH CARE EDUCATION/TRAINING PROGRAM

## 2020-11-03 PROCEDURE — 80053 COMPREHEN METABOLIC PANEL: CPT

## 2020-11-03 PROCEDURE — 74011636637 HC RX REV CODE- 636/637: Performed by: STUDENT IN AN ORGANIZED HEALTH CARE EDUCATION/TRAINING PROGRAM

## 2020-11-03 RX ORDER — AMOXICILLIN 250 MG
1 CAPSULE ORAL DAILY
Status: DISCONTINUED | OUTPATIENT
Start: 2020-11-03 | End: 2020-11-03 | Stop reason: HOSPADM

## 2020-11-03 RX ADMIN — INSULIN LISPRO 2 UNITS: 100 INJECTION, SOLUTION INTRAVENOUS; SUBCUTANEOUS at 12:24

## 2020-11-03 RX ADMIN — Medication 10 ML: at 06:04

## 2020-11-03 RX ADMIN — INFLUENZA VIRUS VACCINE 0.5 ML: 15; 15; 15; 15 SUSPENSION INTRAMUSCULAR at 13:51

## 2020-11-03 RX ADMIN — AMLODIPINE BESYLATE 5 MG: 5 TABLET ORAL at 09:57

## 2020-11-03 RX ADMIN — Medication 10 ML: at 13:55

## 2020-11-03 RX ADMIN — ASPIRIN 81 MG: 81 TABLET, CHEWABLE ORAL at 09:56

## 2020-11-03 RX ADMIN — LEVOTHYROXINE SODIUM 175 MCG: 0.15 TABLET ORAL at 06:04

## 2020-11-03 RX ADMIN — DOCUSATE SODIUM 50MG AND SENNOSIDES 8.6MG 1 TABLET: 8.6; 5 TABLET, FILM COATED ORAL at 09:57

## 2020-11-03 NOTE — PROGRESS NOTES
Physical Therapy    Pt seen for eval. She is at an overall SBA to S level of function with use of rolling walker. Pt has rollator at home and should do well with it as well. She was able to complete toilet hygiene on her own. She states she will have 24 hr assist upon return home from 2 friends. She does express desire to have BSC which would assist in reducing fall risk at night. Pt would benefit from HHPT to assure full return to baseline independence. Full report to follow.     Jack Damian, PT

## 2020-11-03 NOTE — PROGRESS NOTES
APURVA: Home with Home Health and Follow-up Appointment    12:59pm-No further CM needs identified. CM notified pt's nurse of d/c.    12:58pm- CM called pt via phone to inquired about transportation at d/c. Pt stated that her friend will transport at d/c.     11:26am-CM sent emailed to [facility] Specialist to schedule pt's follow-up appointment. 9:30am- CM met with pt at bedside to discuss d/c plan. CM informed pt that Romayne Hires will be providing home health at d/c. Medicare pt has received, reviewed, and signed 2nd IM letter informing them of their right to appeal the discharge. Signed copy has been placed on pt bedside chart. Care Management Interventions  PCP Verified by CM: Yes  Palliative Care Criteria Met (RRAT>21 & CHF Dx)?: No  Mode of Transport at Discharge:  Other (see comment)(Pt's friend will transport at d/c )  Transition of Care Consult (CM Consult): Home Hospice(Home with home health and follow-up appointment)  [facility]: No  Discharge Durable Medical Equipment: No  Physical Therapy Consult: Yes  Occupational Therapy Consult: No  Speech Therapy Consult: No  Current Support Network: Lives Alone, Own Home, Family Lives Nearby  Confirm Follow Up Transport: Friends(friend, Zachary Salvador)  The Plan for Transition of Care is Related to the Following Treatment Goals : [facility]   The Patient and/or Patient Representative was Provided with a Choice of Provider and Agrees with the Discharge Plan?: Yes  Name of the Patient Representative Who was Provided with a Choice of Provider and Agrees with the Discharge Plan: Self  Freedom of Choice List was Provided with Basic Dialogue that Supports the Patient's Individualized Plan of Care/Goals, Treatment Preferences and Shares the Quality Data Associated with the Providers?: Yes  [place] Resource Information Provided?: No  Discharge Location  Discharge Placement: Home with home health(Home with Home Health and Follow-up Appointments)    Neymar Pena [address] [facility]  [phone]

## 2020-11-03 NOTE — PROGRESS NOTES
Problem: Pressure Injury - Risk of  Goal: *Prevention of pressure injury  Description: Document Boris Scale and appropriate interventions in the flowsheet. Outcome: Progressing Towards Goal  Note: Pressure Injury Interventions:  Sensory Interventions: Assess changes in LOC    Moisture Interventions: Absorbent underpads, Minimize layers    Activity Interventions: Increase time out of bed    Mobility Interventions: Float heels, HOB 30 degrees or less    Nutrition Interventions: Document food/fluid/supplement intake, Offer support with meals,snacks and hydration    Friction and Shear Interventions: HOB 30 degrees or less, Minimize layers                Problem: Falls - Risk of  Goal: *Absence of Falls  Description: Document Demian Fall Risk and appropriate interventions in the flowsheet.   Outcome: Progressing Towards Goal  Note: Fall Risk Interventions:  Mobility Interventions: Patient to call before getting OOB, Utilize walker, cane, or other assistive device, Bed/chair exit alarm    Mentation Interventions: Increase mobility, Update white board, Bed/chair exit alarm    Medication Interventions: Patient to call before getting OOB, Bed/chair exit alarm, Teach patient to arise slowly    Elimination Interventions: Bed/chair exit alarm, Call light in reach, Patient to call for help with toileting needs, Toilet paper/wipes in reach    History of Falls Interventions: Bed/chair exit alarm, Room close to nurse's station

## 2020-11-03 NOTE — PROGRESS NOTES
Problem: Pressure Injury - Risk of  Goal: *Prevention of pressure injury  Description: Document Boris Scale and appropriate interventions in the flowsheet.   Outcome: Progressing Towards Goal  Note: Pressure Injury Interventions:  Sensory Interventions: Assess changes in LOC, Float heels, Keep linens dry and wrinkle-free    Moisture Interventions: Absorbent underpads    Activity Interventions: Increase time out of bed    Mobility Interventions: Float heels, HOB 30 degrees or less, PT/OT evaluation    Nutrition Interventions: Document food/fluid/supplement intake, Discuss nutritional consult with provider, Offer support with meals,snacks and hydration    Friction and Shear Interventions: HOB 30 degrees or less, Lift sheet

## 2020-11-03 NOTE — PROGRESS NOTES
Comprehensive Nutrition Assessment    Type and Reason for Visit: Initial, RD nutrition re-screen/LOS    Nutrition Recommendations/Plan:   Continue renal diet  RD provided renal/diabetes diet education. Will f/u to answer additional questions as needed. Please document % meals and supplements consumed in flowsheet I/O's under intake     Nutrition Assessment:      11/3: Chart reviewed for LOS. Pt noted for MICHELLE, anemia of chronic disease and new found pancreatic mass. Hx includes BRADY, gout, DM2, and HTN. Renal diet ordered. Pt reports generally good appetite today and PTA; % intakes recorded. No recent wt hx. Pt reports she has been trying to lose wt by cutting back on how much she eats, though she usually only eats 1 meal/day now. RD educated pt on benefits of multiple meals per day and metabolism. Pt also requested information on renal diet and diabetes. RD provided renal/diabetes diet education, including handout. Will f/u to answer additional questions. Estimated Daily Nutrient Needs:  Energy (kcal): 1307 kcal (BMR 1390 x 1. 3AF - 500); Weight Used for Energy Requirements: Current  Protein (g): 52-62g (1.0-1.2g/kg IBW); Weight Used for Protein Requirements: Current  Fluid (ml/day): 1300mL; Weight Used for Fluid Requirements: Current      Nutrition Related Findings:  Labs: ,130,149mg/dl. Hgb 7.5. Meds: humalog, synthroid. BM 11/1. Wounds:    None       Current Nutrition Therapies:  DIET RENAL Regular    Anthropometric Measures:  · Height:  5' 3\" (160 cm)  · Current Body Wt:  92.6 kg (204 lb 2.3 oz)   · BMI Category:  Obese class 2 (BMI 35.0-39. 9)       Nutrition Diagnosis:   · Food & nutrition-related knowledge deficit related to renal dysfunction as evidenced by (pt requested renal diet education)      Nutrition Interventions:   Food and/or Nutrient Delivery: Continue current diet  Nutrition Education and Counseling: Education completed  Coordination of Nutrition Care: Continue to monitor while inpatient    Goals:  PO intake >50% meals following renal diet next 2-3 days       Nutrition Monitoring and Evaluation:   Behavioral-Environmental Outcomes: Knowledge or skill  Food/Nutrient Intake Outcomes: Food and nutrient intake  Physical Signs/Symptoms Outcomes: Biochemical data, Fluid status or edema, Hemodynamic status, Weight    Discharge Planning:    Continue current diet     Electronically signed by Maria Guadalupe Stanton RD on 11/3/2020 at 11:07 AM    Contact: [phone]  Pager [phone]

## 2020-11-03 NOTE — PROGRESS NOTES
Problem: Pressure Injury - Risk of  Goal: *Prevention of pressure injury  Description: Document Boris Scale and appropriate interventions in the flowsheet. 11/3/2020 1308 by Arun Cordon  Outcome: Resolved/Met  Note: Pressure Injury Interventions:  Sensory Interventions: Assess changes in LOC    Moisture Interventions: Absorbent underpads, Minimize layers    Activity Interventions: Increase time out of bed    Mobility Interventions: Float heels, HOB 30 degrees or less    Nutrition Interventions: Document food/fluid/supplement intake, Offer support with meals,snacks and hydration    Friction and Shear Interventions: HOB 30 degrees or less, Minimize layers             11/3/2020 1205 by Arun Cordon  Outcome: Progressing Towards Goal  Note: Pressure Injury Interventions:  Sensory Interventions: Assess changes in LOC    Moisture Interventions: Absorbent underpads, Minimize layers    Activity Interventions: Increase time out of bed    Mobility Interventions: Float heels, HOB 30 degrees or less    Nutrition Interventions: Document food/fluid/supplement intake, Offer support with meals,snacks and hydration    Friction and Shear Interventions: HOB 30 degrees or less, Minimize layers                Problem: Patient Education: Go to Patient Education Activity  Goal: Patient/Family Education  Outcome: Resolved/Met     Problem: Falls - Risk of  Goal: *Absence of Falls  Description: Document Demian Fall Risk and appropriate interventions in the flowsheet.   11/3/2020 1308 by Arun Cordon  Outcome: Resolved/Met  Note: Fall Risk Interventions:  Mobility Interventions: Patient to call before getting OOB, Utilize walker, cane, or other assistive device, Bed/chair exit alarm    Mentation Interventions: Increase mobility, Update white board, Bed/chair exit alarm    Medication Interventions: Patient to call before getting OOB, Bed/chair exit alarm, Teach patient to arise slowly    Elimination Interventions: Bed/chair exit alarm, Call light in reach, Patient to call for help with toileting needs, Toilet paper/wipes in reach    History of Falls Interventions: Bed/chair exit alarm, Room close to nurse's station      11/3/2020 1205 by Kennedi Carson  Outcome: Progressing Towards Goal  Note: Fall Risk Interventions:  Mobility Interventions: Patient to call before getting OOB, Utilize walker, cane, or other assistive device, Bed/chair exit alarm    Mentation Interventions: Increase mobility, Update white board, Bed/chair exit alarm    Medication Interventions: Patient to call before getting OOB, Bed/chair exit alarm, Teach patient to arise slowly    Elimination Interventions: Bed/chair exit alarm, Call light in reach, Patient to call for help with toileting needs, Toilet paper/wipes in reach    History of Falls Interventions: Bed/chair exit alarm, Room close to nurse's station         Problem: Patient Education: Go to Patient Education Activity  Goal: Patient/Family Education  Outcome: Resolved/Met     Problem: General Medical Care Plan  Goal: *Vital signs within specified parameters  Outcome: Resolved/Met  Goal: *Labs within defined limits  Outcome: Resolved/Met  Goal: *Absence of infection signs and symptoms  Outcome: Resolved/Met  Goal: *Optimal pain control at patient's stated goal  Outcome: Resolved/Met  Goal: *Skin integrity maintained  Outcome: Resolved/Met  Goal: *Fluid volume balance  Outcome: Resolved/Met  Goal: *Optimize nutritional status  Outcome: Resolved/Met  Goal: *Anxiety reduced or absent  Outcome: Resolved/Met  Goal: *Progressive mobility and function (eg: ADL's)  Outcome: Resolved/Met     Problem: Patient Education: Go to Patient Education Activity  Goal: Patient/Family Education  Outcome: Resolved/Met     Problem: Infection - Risk of, Urinary Catheter-Associated Urinary Tract Infection  Goal: *Absence of infection signs and symptoms  Outcome: Resolved/Met     Problem: Patient Education: Go to Patient Education Activity  Goal: Patient/Family Education  Outcome: Resolved/Met     Problem: Constipation - Risk of  Goal: *Prevention of constipation  Outcome: Resolved/Met  Goal: *PALLIATIVE CARE:  Prevention/Alleviation of constipation  Outcome: Resolved/Met     Problem: Patient Education: Go to Patient Education Activity  Goal: Patient/Family Education  Outcome: Resolved/Met

## 2020-11-03 NOTE — PROGRESS NOTES
[address] END OF SHIFT REPORT      Bedside shift change report GIVEN TO Manny Sparks RN. Report included the following information SBAR, Kardex and Recent Results. SIGNIFICANT CHANGES DURING SHIFT:        CONCERNS TO ADDRESS WITH MD:          [address] NURSING NOTE   Admission Date 10/27/2020   Admission Diagnosis MICHELLE (acute kidney injury) ([facility] [address] [N17.9]   Consults IP CONSULT TO NEPHROLOGY  IP CONSULT TO ONCOLOGY  IP CONSULT TO GASTROENTEROLOGY  IP CONSULT TO HOSPITALIST      Cardiac Monitoring [x] Yes [] No      Purposeful Hourly Rounding [x] Yes    Demian Score Total Score: 3   Demian score 3 or > [x] Bed Alarm [] Avasys [] 1:1 sitter [] Patient refused (Signed refusal form in chart)   Boris Score Boris Score: 19   Boris score 14 or < [] PMT consult [] Wound Care consult    []  Specialty bed  [] Nutrition consult      Influenza Vaccine             Oxygen needs? [x] Room air Oxygen @  []1L    []2L    []3L   []4L    []5L   []6L via  NC   Chronic home O2 use? [] Yes [x] No  Perform O2 challenge test and document in progress note using smartphrase (.Homeoxygen)      Last bowel movement Last Bowel Movement Date: 10/30/20      Urinary Catheter [REMOVED] Urinary Catheter 10/27/20 West-Indications for Use: Accurate measurement of urinary output     [REMOVED] Urinary Catheter 10/27/20 West-Urine Output (mL): 350 ml     LDAs               Peripheral IV 10/27/20 Anterior; Left Forearm (Active)   Site Assessment Clean, dry, & intact 11/02/20 1928   Phlebitis Assessment 0 11/02/20 1928   Infiltration Assessment 0 11/02/20 1928   Dressing Status Clean, dry, & intact 11/02/20 1928   Dressing Type Transparent 11/02/20 1928   Hub Color/Line Status Pink;Capped 11/02/20 1928   Alcohol Cap Used Yes 11/01/20 1148                         Readmission Risk Assessment Tool Score High Risk            24       Total Score        2 . Living with Significant Other. Assisted Living. LTAC. SNF.  or   Rehab    3 Patient Length of Stay (>5 days = 3) 5 Pt. Coverage (Medicare=5 , Medicaid, or Self-Pay=4)    14 Charlson Comorbidity Score (Age + Comorbid Conditions)        Criteria that do not apply:    Has Seen PCP in Last 6 Months (Yes=3, No=0)    IP Visits Last 12 Months (1-3=4, 4=9, >4=11)       Expected Length of Stay 3d 2h   Actual Length of Stay 6

## 2020-11-03 NOTE — DISCHARGE INSTRUCTIONS
You came to the hospital with kidney failure caused likely as a result of dehydration. You were seen by nephrologist, your kidney function is steadily improving with hydration. Please continue to hydrate well at home. You need repeat lab work in 1 week to ensure kidney function is improving. Please stop taking lasix, losartan and metformin. Please follow up with PCP, nephrologist, Gastroenterologist and Oncologist.     Mendy Mcconnell were found to have a mass in your pancreas that was noted on CT Scan, this needs evaluation with MRI and Biopsy. However MRI cannot be done until kidney function is better. Please obtain repeat lab work to assess kidney function, once kidney function is better, an MRI can be scheduled followed by biopsy. Please follow up with gastroenterology.

## 2020-11-03 NOTE — PROGRESS NOTES
0700: Bedside shift change report given to Gómez Zurita RN (oncoming nurse) by Nina Santacruz RN (offgoing nurse). Report included the following information SBAR.

## 2020-11-03 NOTE — DISCHARGE SUMMARY
Hospitalist Discharge Summary     Patient ID:  Eleuterio Velázquez  [id number]  76 y.o.  1945    PCP on record: Valentine Lawson MD    Admit date: 10/27/2020  Discharge date and time: 11/3/2020      Admission Diagnoses: MICHELLE (acute kidney injury) [facility]) [N17.9]    Discharge Diagnoses: Active Problems:    MICHELLE (acute kidney injury) ([address] (10/27/2020)           Hospital Course:   Acute kidney injury POA  likely prerenal due to vomiting and poor oral intake  Cr 7.7 and BUN 97. CT abd no hydronephrosis   Nephrology consulted. Cr improved steadily with hydration and IV Albumin. Discharged home with outpatient nephrology and PCP follow up. Holding Lasix and losartan at discharge.        Mass-like enlargement of pancreas  Incidental CT finding  no hx of smoking or alcohol. No family hx of pancreatic cancer. No recent weight loss. Oncology and Gastroenterology consulted. Needs MRCP and EUS for evaluation. MRCP not recommended until GFR > 30. Discussed with nephrology and GI. Plan for discharge with outpatient lab work, and plan for outpatient MRCP and EUS. Anemia of chronic disease  Hgb 7.2, continue to monitor     Hyperkalemia POA- resolved  secondary to MICHELLE. EKG changes noted, RBBB, ?junctional rhythm, peaked T waves  Received Ca gluconate, insulin, albuterol, and bicarb at the ED      Hypertension POA   hold losartan and lasix due to MICHELLE  continue amlodipine      Type 2 DM POA  Lab Results   Component Value Date/Time    Hemoglobin A1c 6.7 (H) 06/03/2020 10:42 AM    Hemoglobin A1c, External 6.4 06/21/2016   Metformin discontinued. Advised follow up with PCP for repeat lab work. Consider starting sulfonylurea as outpatient.        BRADY  hx of BRADY. CT abd shows heaptic cirrhosis. LFT wnl. Platelet wnl. Elevated AST, ALT, and AlkP.  Outpatient GI follow up.      Hx of Gout        30.0 - 39.9 Obese / Body mass index is 36.17 kg/m².     Code status: Full  Prophylaxis: SCD due to thrombocytopenia  Recommended Disposition: tbd      CONSULTATIONS:  IP CONSULT TO NEPHROLOGY  IP CONSULT TO ONCOLOGY  IP CONSULT TO GASTROENTEROLOGY  IP CONSULT TO HOSPITALIST    Excerpted HPI from H&P of Thong Pino MD:  76 y.o.  female who presents with past medical history significant for hypertension type II DM gout who presents with nausea and vomiting. Three days ago patient noted that her urine output was minimal despite her taking lasix and she also noted some blood on her pad for which she saw her OB/GYN doctor yesterday morning. She was told to have UTI and she was prescribed antibiotic. She started having having nausea and vomiting when she got home and was not able to take the antibiotic. She was not able to keep anything down. she called her OB/GYN doctor today and she was told to go to the emergency room. She denies taking nonsteroidal anti-inflammatory drugs. She denies dysuria, flank pain, fever, or chills.      In the ED she was found to have Cr of 7.7 and K 6.2.        ______________________________________________________________________  DISCHARGE SUMMARY/HOSPITAL COURSE:  for full details see H&P, daily progress notes, labs, consult notes. Visit Vitals  BP (!) 151/59   Pulse 62   Temp 98 °F (36.7 °C)   Resp 16   Ht 5' 3\" (1.6 m)   Wt 92.6 kg (204 lb 1 oz)   SpO2 96%   BMI 36.15 kg/m²     _______________________________________________________________________  Patient seen and examined by me on discharge day. Pertinent Findings:  Gen:    Not in distress  Chest: Clear lungs  CVS:   Regular rhythm. No edema  Abd:  Soft, not distended, not tender  Neuro:  Alert with good insight.   Oriented to person, place, and time   _______________________________________________________________________  DISCHARGE MEDICATIONS:   Current Discharge Medication List      CONTINUE these medications which have NOT CHANGED    Details   levothyroxine (SYNTHROID) 175 mcg tablet TAKE ONE DAILY BY MOUTH. TAKE AN EXTRA 1/2 PILL ON SUNDAYS. (7.5 TABLETS/WEEK  MCG)  Qty: 96 Tab, Refills: 3    Associated Diagnoses: Acquired hypothyroidism      famotidine (PEPCID) 40 mg tablet Take 1 Tab by mouth daily. Qty: 90 Tab, Refills: 3      fluticasone propionate (FLONASE) 50 mcg/actuation nasal spray 2 Sprays by Both Nostrils route daily. Administer to right and left nostril. Qty: 3 Bottle, Refills: 3      polyethylene glycol (MIRALAX) 17 gram/dose powder Take 17 g by mouth daily. Qty: 2108 g, Refills: 3    Associated Diagnoses: Constipation, unspecified constipation type      atorvastatin (LIPITOR) 20 mg tablet TAKE 1 TABLET DAILY  Qty: 90 Tab, Refills: 3      loratadine (CLARITIN) 10 mg tablet Take 1 Tab by mouth daily. Indications: inflammation of the nose due to an allergy  Qty: 90 Tab, Refills: 3      ZETIA 10 mg tablet Take 1 tablet by mouth daily. Qty: 90 Tab, Refills: 3      amLODIPine (NORVASC) 5 mg tablet Take 1 Tab by mouth daily. Qty: 90 Tab, Refills: 3    Comments: Dose decrease  Associated Diagnoses: Hypertension, unspecified type      docusate sodium (COLACE) 50 mg capsule Take 100 mg by mouth daily as needed for Constipation. potassium chloride SA (MICRO-K) 10 mEq capsule Take 2 Caps by mouth daily. Qty: 180 Cap, Refills: 3      omega-3 acid ethyl esters (LOVAZA) 1 gram capsule Take 2 Caps by mouth two (2) times daily (with meals). Qty: 360 Cap, Refills: 3      Biotin 2,500 mcg cap Take  by mouth. L GASSERI/B BIFIDUM/B LONGUM (TRINH [facility] PO) Take 1 Tab by mouth daily. vitamin E (AQUA GEMS) 400 unit capsule Take 800 Units by mouth daily. lidocaine (LIDODERM) 5 %(700 mg/patch) 1 patch by TransDERmal route every twenty-four (24) hours. Apply patch to the affected area for 12 hours a day and remove for 12 hours a day. cholecalciferol, vitamin D3, (VITAMIN D3) 2,000 unit tab Take 1 Tab by mouth daily.       traMADol (ULTRAM) 50 mg tablet Take 1 tablet by mouth every six (6) hours as needed for Pain. Qty: 12 tablet, Refills: 0      MULTIVITAMIN WITH MINERALS (ONE-A-DAY 50 PLUS PO) Take 1 Tab by mouth daily. aspirin 81 mg chewable tablet Take 81 mg by mouth daily. ketoconazole (NIZORAL) 2 % topical cream Apply  to affected area daily as needed. Refills: 3         STOP taking these medications       ciprofloxacin HCl (Cipro) 500 mg tablet Comments:   Reason for Stopping:         metFORMIN ER (GLUCOPHAGE XR) 500 mg tablet Comments:   Reason for Stopping:         colchicine (MITIGARE) 0.6 mg capsule Comments:   Reason for Stopping:         losartan (COZAAR) 25 mg tablet Comments:   Reason for Stopping:         metOLazone (ZAROXOLYN) 5 mg tablet Comments:   Reason for Stopping:         furosemide (LASIX) 80 mg tablet Comments:   Reason for Stopping:         diclofenac (VOLTAREN) 1 % gel Comments:   Reason for Stopping:         indomethacin (INDOCIN) 25 mg capsule Comments:   Reason for Stopping:               My Recommended Diet, Activity, Wound Care, and follow-up labs are listed in the patient's Discharge Insturctions which I have personally completed and reviewed. _______________________________________________________________________  DISPOSITION:     Home with Family:    Home with HH/PT/OT/RN: x   SNF/LTC:    DIAMOND:    OTHER:        Condition at Discharge:  Stable  _______________________________________________________________________  Follow up with:   PCP : Clint Thakkar MD  Follow-up Information     Follow up With Specialties Details Why [address] [place] [address]  This is your home health agency. If you have not heard fromt them in 2-3 days, please give them a call. [address]   [facility] [address]    Clint Thakkar MD Internal Medicine On 11/12/2020 For scheduled appointment at 11:15AM  [address] [facility] [address].  [phone] Total time in minutes spent coordinating this discharge (includes going over instructions, follow-up, prescriptions, and preparing report for sign off to her PCP) :  32 minutes    Signed:  Alysha Wilkinson MD

## 2020-11-03 NOTE — PROGRESS NOTES
GI PROGRESS NOTE  Marcela Canada, NP  [phone] NP in-hospital cell phone M-F until 4:30  After 5pm or on weekends, please call  for physician on call    NAME:Char Siu :1945 TFZ:[id number]   ATTG: Dr. Berta Jacques  Primary GI: Dr. Jennifer Gongora PCP: Shaun Mayen MD  Date/Time:  11/3/2020 2:36 PM   Reason for following: Pancreatic mass, anemia, abnormal CT of GI tract, BRADY  Assessment:   Pancreatic mass, as seen on CT imaging  · New finding, will need EUS in future with biopsy    Anemia  · Hgb 7.5    Abnormal CT of the GI tract  · Masslike enlargement of the uncinate process measuring 3.0 x 2.8 cm. This has changed in appearance since . · Hepatic cirrhosis    H/o cirrhosis, h/o BRADY, compensated  · Had liver bx with [facility]  ·  BRADY, moderate fibrosis with bridging, stage III  · Concern for possible transition into HRS    Acute on Chronic kidney failure, suspected ATN  · Managed by nephrology  · BUN 56, Cr 3.12, GFR 15    GI History:   EGD showing normal esophagus and duodenum. Diffuse gastritis with friability, no ulcers, no varices   CLN grade 1 internal hemorrhoids, ascending colon polyps (TA). Recommended repeat in 5 years     Plan:   -Plan for MRI of abdomen with and without contrast once GFR is >30; per nephrology patient may be ready for MRI tomorrow. If discharged home today will need MRI as outpatient  -Inpatient vs outpatient EUS after MRI   -Patient would not be a candidate for whipple surgery given her cirrhosis, treatment would need to be based off biopsy   -Patient's knee replacement may not be compatible with MRCP. If needs to get CT pancreatic protocol will need to wait until renal function improves. -Agree with miralax for constipation  -Appreciate input from nephrology  -Appreciate input from hematology/oncology  -Supportive measure per primary team  *Plan of care discussed with Dr. Anais Joe:   Patient resting comfortably in chair at bedside. No new complaints at this time. Updated patient on plan of care. Discussed with RN events overnight. Review of Systems:  Symptom Y/N Comments  Symptom Y/N Comments   Fever/Chills n   Chest Pain n    Cough n   Headaches n    Sputum n   Joint Pain n    SOB/HENLEY n   Pruritis/Rash n    Tolerating Diet Y   Other       Could NOT obtain due to:      Objective:   VITALS:   Last 24hrs VS reviewed since prior progress note. Most recent are:  Visit Vitals  BP (!) 151/59   Pulse 62   Temp 98 °F (36.7 °C)   Resp 16   Ht 5' 3\" (1.6 m)   Wt 92.6 kg (204 lb 1 oz)   SpO2 96%   BMI 36.15 kg/m²       Intake/Output Summary (Last 24 hours) at 11/3/2020 1148  Last data filed at 11/3/2020 0830  Gross per 24 hour   Intake 920 ml   Output 2050 ml   Net -1130 ml     PHYSICAL EXAM:  General: No acute distress. Pleasant elderly  female. HEENT: NC, Atraumatic. Anicteric sclerae. Lungs:  CTA Bilaterally. No Wheezing/Rhonchi/Rales. Heart:  Regular  rhythm,  No murmur, No Rubs, No Gallops  Abdomen: Soft, Non distended, Non tender.  +Bowel sounds, no HSM  Extremities: No c/c/e  Neurologic:  Alert and oriented X 3. No acute neurological distress   Psych:   Good insight. Not anxious nor agitated. Lab and Radiology Data Reviewed: (see below)    Medications Reviewed: (see below)  PMH/SH reviewed - no change compared to H&P  ________________________________________________________________________  Total time spent with patient: 30 minutes ________________________________________________________________________  Care Plan discussed with:  Patient X   Family     RN X              Consultant:       Tim Galvez NP     Procedures: see electronic medical records for all procedures/Xrays and details which were not copied into this note but were reviewed prior to creation of Plan.       LABS:  Recent Labs     11/03/20  0255 11/02/20  0206   WBC 4.1 3.2*   HGB 7.5* 7.2*   HCT 23.6* 22.6*   PLT 84* 71*     Recent Labs 11/03/20 0255 11/02/20 0206 11/01/20 0437    140 140   K 3.6 3.3* 3.2*    106 105   CO2 24 23 26   BUN 56* 60* 61*   CREA 3.12* 3.53* 4.20*   * 122* 97   CA 10.4* 10.2* 10.1   MG  --  2.1  --    PHOS 3.6 4.0 4.9*     Recent Labs     11/03/20 0255 11/02/20 0206 11/01/20 0437   * 167* 150*   TP 7.4 7.3 6.9   ALB 4.7 4.6 4.1   GLOB 2.7 2.7 2.8     Recent Labs     11/02/20  1041   INR 1.1   PTP 11.3*      Recent Labs     11/01/20 0437 11/01/20 0436   TIBC 240*  --    PSAT 18*  --    FERR  --  50      No results found for: FOL, RBCF  No results for input(s): PH, PCO2, PO2 in the last 72 hours. No results for input(s): CPK, CKMB in the last 72 hours.     No lab exists for component: TROPONINI  Lab Results   Component Value Date/Time    Color YELLOW/STRAW 10/27/2020 02:54 PM    Appearance CLOUDY (A) 10/27/2020 02:54 PM    Specific gravity 1.015 10/27/2020 02:54 PM    pH (UA) 5.0 10/27/2020 02:54 PM    Protein 100 (A) 10/27/2020 02:54 PM    Glucose Negative 10/27/2020 02:54 PM    Ketone Negative 10/27/2020 02:54 PM    Bilirubin NEGATIVE  11/04/2012 10:39 PM    Urobilinogen 1.0 10/27/2020 02:54 PM    Nitrites Negative 10/27/2020 02:54 PM    Leukocyte Esterase LARGE (A) 10/27/2020 02:54 PM    Epithelial cells MANY (A) 10/27/2020 02:54 PM    Bacteria 3+ (A) 10/27/2020 02:54 PM    WBC >100 (H) 10/27/2020 02:54 PM    RBC 5-10 10/27/2020 02:54 PM       MEDICATIONS:  Current Facility-Administered Medications   Medication Dose Route Frequency    senna-docusate (PERICOLACE) 8.6-50 mg per tablet 1 Tab  1 Tab Oral DAILY    amLODIPine (NORVASC) tablet 5 mg  5 mg Oral DAILY    aspirin chewable tablet 81 mg  81 mg Oral DAILY    levothyroxine (SYNTHROID) tablet 175 mcg  175 mcg Oral 6am    traMADoL (ULTRAM) tablet 50 mg  50 mg Oral Q6H PRN    sodium chloride (NS) flush 5-40 mL  5-40 mL IntraVENous Q8H    sodium chloride (NS) flush 5-40 mL  5-40 mL IntraVENous PRN    acetaminophen (TYLENOL) tablet 650 mg  650 mg Oral Q6H PRN    Or    acetaminophen (TYLENOL) suppository 650 mg  650 mg Rectal Q6H PRN    polyethylene glycol (MIRALAX) packet 17 g  17 g Oral DAILY PRN    promethazine (PHENERGAN) tablet 12.5 mg  12.5 mg Oral Q6H PRN    Or    ondansetron (ZOFRAN) injection 4 mg  4 mg IntraVENous Q6H PRN    insulin lispro (HUMALOG) injection   SubCUTAneous AC&HS    glucose chewable tablet 16 g  4 Tab Oral PRN    dextrose (D50W) injection syrg 12.5-25 g  12.5-25 g IntraVENous PRN    glucagon (GLUCAGEN) injection 1 mg  1 mg IntraMUSCular PRN    hydrALAZINE (APRESOLINE) 20 mg/mL injection 20 mg  20 mg IntraVENous Q6H PRN    labetaloL (NORMODYNE;TRANDATE) injection 20 mg  20 mg IntraVENous Q6H PRN

## 2020-11-03 NOTE — PROGRESS NOTES
11/03/20 1811   CARE Discharge   Discharge Caregiver Notified of Impending Discharge Yes   Name of Designated Discharge Caregiver Pt notified family   Discharge Plan/Follow Up Care Reviewed Allen Nixon phone (caregiver copy sent home with patient)   Pt wanted to notify family and go over discharge.

## 2020-11-03 NOTE — PROGRESS NOTES
1415: DISCHARGE SUMMARY FROM NURSE    The patient is stable for discharge. I have reviewed the discharge instructions with the patient. The patient verbalized understanding. All questions were fully answered. The patient verbalized no complaints. Hard scripts and medication handouts were given and reviewed with the patient. Appropriate educational materials and medication side effects teaching were provided also provided. Cardiac monitor and IV line(s) were removed. The following personal items collected during your admission were returned to the patient/family  Home medications:   Dental Appliance: Dental Appliances: Uppers  Vision: Visual Aid: Glasses  Hearing Aid:    Jewelry: Jewelry: None  Clothing: Clothing: Shirt  Other Valuables: Other Valuables: None  Valuables sent to safe:      OR _________________________________________________________________________________________    There were no personal belongings, valuables or home medications left at patient's bedside,  or safe.

## 2020-11-03 NOTE — TELEPHONE ENCOUNTER
----- Message from [place] sent at 11/3/2020  3:54 PM EST -----  Regarding: Dr. Shakira Pearl Message/Vendor Calls    Caller's first and last name: [facility] with [facility]      Reason for call: Pt was discharged today from [facility].  Requesting call back to see if Dr. aZri Chiu will sign and follow home health orders for pt having PT & OT.        Callback required yes/no and why: yes      Best contact number(s):[phone]      Message from [place]

## 2020-11-03 NOTE — PROGRESS NOTES
PHYSICAL THERAPY EVALUATION/DISCHARGE  Patient: Gloria Kaplan (61 y.o. female)  Date: 11/3/2020  Primary Diagnosis: MICHELLE (acute kidney injury) ([facility] [address] [N17.9]       Precautions:          ASSESSMENT  Based on the objective data described below, the patient presents with gait and mobility minimally below her baseline. She is at an overall SBA to S level of function with use of rolling walker. Pt has rollator at home and should do well with it as well. She was able to complete toilet hygiene on her own. She states she will have 24 hr assist upon return home from 2 friends. She does express desire to have BSC which would assist in reducing fall risk at night. Relayed to CM. Pt would benefit from HHPT to assure full return to baseline independence. Functional Outcome Measure: The patient scored 80/100 on the barthel outcome measure which is indicative of 20% functional impairment. Other factors to consider for discharge: lives alone in independent living but will have 2 friends stay with her upon d/c to cover 24 assist     Further skilled acute physical therapy is not indicated at this time. PLAN :  Recommendation for discharge: (in order for the patient to meet his/her long term goals)  Physical therapy at least 2 days/week in the home AND ensure assist and/or supervision for safety with mobility    This discharge recommendation:  Has been made in collaboration with the attending provider and/or case management    IF patient discharges home will need the following DME: bedside commode       SUBJECTIVE:   Patient stated Don't talk to me about assisted living or nursing home! Joey Del Toro    OBJECTIVE DATA SUMMARY:   HISTORY:    Past Medical History:   Diagnosis Date    Arthritis     KNEE,gout    Endocrine disease     HYPOTHYROIDISM    Fracture     Left distal femur (age 12 - pt fell)    Fracture     Left tibia 1990 (pt fell)    Fracture     Right wrist fractured 2 times (pt fell)    GERD (gastroesophageal reflux disease)     Hypertension     Hypoglycemia     \"my body produces too much insulin\"    Liver disease     BRADY    Nausea & vomiting     when had gallbladder out    Osteoporosis     Other ill-defined conditions(799.89)     spinal stenosis    Other ill-defined conditions(799.89)     BBB    Other ill-defined conditions(799.89)     HIGH CHOLESTEROL    Other ill-defined conditions(799.89)     edema legs    Thyroid disease      Past Surgical History:   Procedure Laterality Date    ABDOMEN SURGERY PROC UNLISTED      liver biopsy--BRADY and fibrosis    COLONOSCOPY,REMV LESN,SNARE  2/11/2015         COLORECTAL SCRN; HI RISK IND  2/11/2015         HX CHOLECYSTECTOMY      HX HYSTERECTOMY      HX ORTHOPAEDIC      left leg surgery - plates, screws, wires, pins in place    HX UROLOGICAL      PESSURY    UPPER GI ENDOSCOPY,BIOPSY  11/17/2015            Prior level of function: mod I with rollator and able to complete basic ADLs on her own      [address]: Independent living([facility])  # Steps to Enter: 0  One/Two Story Residence: One story  Living Alone: Yes  Support Systems: Friends \ neighbors, Other (comments)(will have 2 friends w/ her for 24 hr assist upon d/c )  Patient Expects to be Discharged to[de-identified] Independent living facility  Current DME Used/Available at Home: Grab bars, Walker, rollator, Shower chair  Tub or Shower Type: Shower    EXAMINATION/PRESENTATION/DECISION MAKING:   Critical Behavior:  Neurologic State: Alert  Orientation Level: Oriented X4  Cognition: Follows commands     Hearing:   Auditory  Auditory Impairment: None    Range Of Motion:  AROM: Within functional limits           PROM: Within functional limits           Strength:    Strength: Generally decreased, functional                    Tone & Sensation:   Tone: Normal              Sensation: Intact               Coordination:  Coordination: Within functional limits       Functional Mobility:  Bed Mobility:  Rolling: (received in chair)           Transfers:  Sit to Stand: Supervision  Stand to Sit: Supervision                       Balance:   Sitting: Intact  Standing: Impaired  Standing - Static: Fair  Standing - Dynamic : Fair  Ambulation/Gait Training:  Distance (ft): 25 Feet (ft)  Assistive Device: Gait belt;Walker, rolling  Ambulation - Level of Assistance: Stand-by assistance        Gait Abnormalities: Decreased step clearance              Speed/Keerthi: Slow;Pace decreased (<100 feet/min)  Step Length: Right shortened;Left shortened                  Functional Measure:  Barthel Index:    Bathin  Bladder: 10  Bowels: 10  Groomin  Dressing: 10  Feeding: 10  Mobility: 10  Stairs: 0  Toilet Use: 10  Transfer (Bed to Chair and Back): 10  Total: 80/100       The Barthel ADL Index: Guidelines  1. The index should be used as a record of what a patient does, not as a record of what a patient could do. 2. The main aim is to establish degree of independence from any help, physical or verbal, however minor and for whatever reason. 3. The need for supervision renders the patient not independent. 4. A patient's performance should be established using the best available evidence. Asking the patient, friends/relatives and nurses are the usual sources, but direct observation and common sense are also important. However direct testing is not needed. 5. Usually the patient's performance over the preceding 24-48 hours is important, but occasionally longer periods will be relevant. 6. Middle categories imply that the patient supplies over 50 per cent of the effort. 7. Use of aids to be independent is allowed. Je Aviles., Barthel, D.W. (9525). Functional evaluation: the Barthel Index. [address] [employer] (14)2. MEAGHAN Fowler, Honey Dhillon., Garry Driscoll., Rhonda, [address] [place] [address] ().  Measuring the change indisability after inpatient rehabilitation; comparison of the responsiveness of the Barthel Index and Functional Bernard Measure. Journal of Neurology, Neurosurgery, and Psychiatry, 66(4), 955-490. DHEERAJ Vaughan, ERIC Merino, & Elda Jesus M.A. (2004.) Assessment of post-stroke quality of life in cost-effectiveness studies: The usefulness of the Barthel Index and the EuroQoL-5D. Quality of Life Research, 15, 560-49          Physical Therapy Evaluation Charge Determination   History Examination Presentation Decision-Making   HIGH Complexity :3+ comorbidities / personal factors will impact the outcome/ POC  HIGH Complexity : 4+ Standardized tests and measures addressing body structure, function, activity limitation and / or participation in recreation  LOW Complexity : Stable, uncomplicated  LOW Complexity : FOTO score of       Based on the above components, the patient evaluation is determined to be of the following complexity level: LOW     Pain Rating:  No c/o    Activity Tolerance:   Good      After treatment patient left in no apparent distress:   Sitting in chair and Call bell within reach    COMMUNICATION/EDUCATION:   The patients plan of care was discussed with: Registered nurse and Case management. Fall prevention education was provided and the patient/caregiver indicated understanding.     Thank you for this referral.  Harinder Purvis, PT   Time Calculation: 13 mins

## 2020-11-03 NOTE — PROGRESS NOTES
Nephrology Progress Note  Zhao Dior     [facility]. [email or web address]  Phone - [phone]   Patient: Olegario Gilliam    YOB: 1945     Admit Date: 10/27/2020   Date- 11/3/2020     CC: Follow up for  MICHELLE       IMPRESSION & PLAN:   MICHELLE on CKD  - suspect ATN from prolonged pre-renal state from N/V+poor PO intake+ Losartan+ Lasix   - improved  hyperkalemia and acidosis   - renal US no hydro  - improving renal function with volume replacement, holding Losartan and Lasix  - nothing to suggest hepatorenal syndrome at this point     Hypercalcemia- likely due to albumin infusion     hypokalemia     CKD stage 2- from DM+ HTN   - baseline cr 1      HTN      UTI     Anemia/thrombocytopenia- likely from BRADY cirrhosis    PLAN-   Check bmp next week. If calcium high - she will need further work up  [address] No more albumin   kcl 20 meq po   CONTINUE NORVASC   Avoid losartan, lasix   Check bmp in am   GFR improved to 15- it will take few days for GFR TO GET > 30 ML/MIN - STABLE LEVEL to do MRI with ioana.  I recommend to d/c her today and do MRI as out pt     Subjective: Interval History:   -  Cr improved to 3.5, bp high,  No c/o sob,  No c/o chest pain,   No c/o nausea or vomiting, No c/o  fever.     ROS:- As documented above  Objective:   Vitals:    11/03/20 0314 11/03/20 0635 11/03/20 0956 11/03/20 1112   BP:  (!) 146/46 (!) 160/59 (!) 151/59   Pulse:  64 62 62   Resp:  18  16   Temp:  97.9 °F (36.6 °C)  98 °F (36.7 °C)   SpO2:  96%  96%   Weight: 92.6 kg (204 lb 1 oz)      Height:          11/02 0701 - 11/03 0700  In: 1160 [P.O.:1160]  Out: 1900 [Urine:1900]  Last 3 Recorded Weights in this Encounter    11/01/20 0635 11/02/20 0245 11/03/20 0314   Weight: 93.8 kg (206 lb 14.4 oz) 92.6 kg (204 lb 3.2 oz) 92.6 kg (204 lb 1 oz)      Physical exam:   GEN: nad  NECK- Supple, no mass  RESP: No wheezing, Clear b/l  CVS: S1,S2  RRR  NEURO: Normal speech, non focal  Abdo- soft, NT  EXT: No Edema PSYCH: Normal Mood    Chart reviewed. Pertinent Notes reviewed.      Data Review :  Recent Labs     11/03/20 0255 11/02/20 0206 11/01/20 0437    140 140   K 3.6 3.3* 3.2*    106 105   CO2 24 23 26   BUN 56* 60* 61*   CREA 3.12* 3.53* 4.20*   * 122* 97   CA 10.4* 10.2* 10.1   MG  --  2.1  --    PHOS 3.6 4.0 4.9*     Recent Labs     11/03/20 0255 11/02/20 0206 11/01/20 0437   WBC 4.1 3.2* 2.4*   HGB 7.5* 7.2* 7.2*   HCT 23.6* 22.6* 22.2*   PLT 84* 71* 74*     Recent Labs     11/01/20 0437 11/01/20 0436   TIBC 240*  --    PSAT 18*  --    FERR  --  50      Medication list  reviewed  Current Facility-Administered Medications   Medication Dose Route Frequency    senna-docusate (PERICOLACE) 8.6-50 mg per tablet 1 Tab  1 Tab Oral DAILY    amLODIPine (NORVASC) tablet 5 mg  5 mg Oral DAILY    aspirin chewable tablet 81 mg  81 mg Oral DAILY    levothyroxine (SYNTHROID) tablet 175 mcg  175 mcg Oral 6am    traMADoL (ULTRAM) tablet 50 mg  50 mg Oral Q6H PRN    sodium chloride (NS) flush 5-40 mL  5-40 mL IntraVENous Q8H    sodium chloride (NS) flush 5-40 mL  5-40 mL IntraVENous PRN    acetaminophen (TYLENOL) tablet 650 mg  650 mg Oral Q6H PRN    Or    acetaminophen (TYLENOL) suppository 650 mg  650 mg Rectal Q6H PRN    polyethylene glycol (MIRALAX) packet 17 g  17 g Oral DAILY PRN    promethazine (PHENERGAN) tablet 12.5 mg  12.5 mg Oral Q6H PRN    Or    ondansetron (ZOFRAN) injection 4 mg  4 mg IntraVENous Q6H PRN    insulin lispro (HUMALOG) injection   SubCUTAneous AC&HS    glucose chewable tablet 16 g  4 Tab Oral PRN    dextrose (D50W) injection syrg 12.5-25 g  12.5-25 g IntraVENous PRN    glucagon (GLUCAGEN) injection 1 mg  1 mg IntraMUSCular PRN    hydrALAZINE (APRESOLINE) 20 mg/mL injection 20 mg  20 mg IntraVENous Q6H PRN    labetaloL (NORMODYNE;TRANDATE) injection 20 mg  20 mg IntraVENous Q6H PRN          OsMD [facility] [address] / [address], [address]  [place], [address]  Phone - [phone]               Fax - [phone]

## 2020-11-04 ENCOUNTER — PATIENT OUTREACH (OUTPATIENT)
Dept: CASE MANAGEMENT | Age: 75
End: 2020-11-04

## 2020-11-04 NOTE — PROGRESS NOTES
Patient contacted regarding COVID-19  risk. Discussed COVID-19 related testing which was not done at this time. Test results were not done. Outreach made within 2 business days of discharge: Yes    Care Transition Nurse/ Ambulatory Care Manager/ LPN Care Coordinator contacted the patient by telephone to perform post discharge assessment. Verified name and  with patient as identifiers. Provided introduction to self, and explanation of the CTN/ACM/LPN role, and reason for call due to risk factors for infection and/or exposure to COVID-19. Symptoms reviewed with patient who verbalized the following symptoms: no new symptoms. Due to no new or worsening symptoms encounter was not routed to provider for escalation. Discussed follow-up appointments. If no appointment was previously scheduled, appointment scheduling offered: [facility] follow up appointment(s):   Future Appointments   Date Time Provider Hilary Dickinson   2020 11:15 AM Wicho Lanier MD [facility] BS [facility]     Non-BS follow up appointment(s): none      Advance Care Planning:   Does patient have an Advance Directive: currently not on file; education provided     Patient has following risk factors of: diabetes. CTN/ACM/LPN reviewed discharge instructions, medical action plan and red flags such as increased shortness of breath, increasing fever and signs of decompensation with patient who verbalized understanding. Discussed exposure protocols and quarantine with CDC Guidelines What to do if you are sick with coronavirus disease .  Patient was given an opportunity for questions and concerns. The patient agrees to contact the [facility] exposure line [phone], local [facility] department [facility]  ([phone]) and PCP office for questions related to their healthcare. CTN/ACM provided contact information for future needs.     Reviewed and educated patient on any new and changed medications related to discharge diagnosis. Patient/family/caregiver given information for [employer] and agrees to enroll no    14 day call based on severity of symptoms and risk factors.

## 2020-11-04 NOTE — ACP (ADVANCE CARE PLANNING)
Urged pt to discuss with family and PCP any advanced care wishes she may want in case of medical crisis, and to consider completing an ACP document

## 2020-11-10 RX ORDER — POTASSIUM CHLORIDE 750 MG/1
20 CAPSULE, EXTENDED RELEASE ORAL DAILY
Qty: 180 CAP | Refills: 3 | Status: SHIPPED | OUTPATIENT
Start: 2020-11-10 | End: 2022-09-23 | Stop reason: SDUPTHER

## 2020-11-10 RX ORDER — OMEGA-3-ACID ETHYL ESTERS 1 G/1
2 CAPSULE, LIQUID FILLED ORAL 2 TIMES DAILY WITH MEALS
Qty: 360 CAP | Refills: 3 | Status: SHIPPED | OUTPATIENT
Start: 2020-11-10 | End: 2021-09-10 | Stop reason: SDUPTHER

## 2020-11-10 NOTE — TELEPHONE ENCOUNTER
Future Appointments:  Future Appointments   Date Time Provider Hilary Kulkarnii   11/12/2020 11:15 AM Joanne Ochoa MD [facility] BS AMB        Last Appointment With Me:  Visit date not found     Requested Prescriptions     Pending Prescriptions Disp Refills    omega-3 acid ethyl esters (LOVAZA) 1 gram capsule 360 Cap 3     Sig: Take 2 Caps by mouth two (2) times daily (with meals).  potassium chloride SA (MICRO-K) 10 mEq capsule 180 Cap 3     Sig: Take 2 Caps by mouth daily.

## 2020-11-11 ENCOUNTER — TELEPHONE (OUTPATIENT)
Dept: INTERNAL MEDICINE CLINIC | Age: 75
End: 2020-11-11

## 2020-11-11 RX ORDER — COLCHICINE 0.6 MG/1
0.6 TABLET ORAL DAILY
Qty: 30 TAB | Refills: 5 | Status: SHIPPED | OUTPATIENT
Start: 2020-11-11 | End: 2020-11-11 | Stop reason: SDUPTHER

## 2020-11-11 RX ORDER — COLCHICINE 0.6 MG/1
0.6 TABLET ORAL DAILY
Qty: 30 TAB | Refills: 5 | Status: SHIPPED | OUTPATIENT
Start: 2020-11-11 | End: 2022-08-17 | Stop reason: ALTCHOICE

## 2020-11-11 NOTE — TELEPHONE ENCOUNTER
Nikki Lopez MD  You 22 minutes ago (3:56 PM)       Escribed colcrys. CG ok tomorrow    Message text      Pt notified. RX resent to preferred pharmacy.

## 2020-11-11 NOTE — TELEPHONE ENCOUNTER
Patient states she needs a call back Asap today Please to discuss if Dr. Sadia Horn will prescribe/allow her to take COLCHICINE 0.6 MG TABLET for a Gout Flare up patient reports she is having. Patient states she was taken off of this medication when she was in the Hospital.     Patient also needs to be advised if her Caregiver can come with her to her In Office appt tomorrow with Dr. Sadia Horn. Please call to advise.  Thank you

## 2020-11-12 ENCOUNTER — OFFICE VISIT (OUTPATIENT)
Dept: INTERNAL MEDICINE CLINIC | Age: 75
End: 2020-11-12
Payer: MEDICARE

## 2020-11-12 ENCOUNTER — TELEPHONE (OUTPATIENT)
Dept: INTERNAL MEDICINE CLINIC | Age: 75
End: 2020-11-12

## 2020-11-12 VITALS
TEMPERATURE: 96.6 F | HEIGHT: 63 IN | SYSTOLIC BLOOD PRESSURE: 161 MMHG | DIASTOLIC BLOOD PRESSURE: 71 MMHG | HEART RATE: 71 BPM | OXYGEN SATURATION: 98 % | RESPIRATION RATE: 20 BRPM | WEIGHT: 203 LBS | BODY MASS INDEX: 35.97 KG/M2

## 2020-11-12 DIAGNOSIS — K86.89 PANCREATIC MASS: ICD-10-CM

## 2020-11-12 DIAGNOSIS — D64.9 ANEMIA, UNSPECIFIED TYPE: ICD-10-CM

## 2020-11-12 DIAGNOSIS — M10.9 ACUTE GOUT OF MULTIPLE SITES, UNSPECIFIED CAUSE: ICD-10-CM

## 2020-11-12 DIAGNOSIS — N17.9 AKI (ACUTE KIDNEY INJURY) (HCC): Primary | ICD-10-CM

## 2020-11-12 PROCEDURE — 99495 TRANSJ CARE MGMT MOD F2F 14D: CPT | Performed by: INTERNAL MEDICINE

## 2020-11-12 PROCEDURE — G8427 DOCREV CUR MEDS BY ELIG CLIN: HCPCS | Performed by: INTERNAL MEDICINE

## 2020-11-12 RX ORDER — PREDNISONE 10 MG/1
10 TABLET ORAL
Qty: 30 TAB | Refills: 0 | Status: SHIPPED | OUTPATIENT
Start: 2020-11-12 | End: 2021-02-15 | Stop reason: ALTCHOICE

## 2020-11-12 NOTE — PROGRESS NOTES
Identified pt with two pt identifiers(name and ). Reviewed record in preparation for visit and have obtained necessary documentation. Chief Complaint   Patient presents with   [facility] Follow Up     Pt was admitted to [facility] on 10/27/2020, see encounters        Visit Vitals  BP (!) 161/71 (BP 1 Location: Right arm, BP Patient Position: Sitting)   Pulse 71   Temp (!) 96.6 °F (35.9 °C) (Temporal)   Resp 20   Ht 5' 3\" (1.6 m)   Wt 203 lb (92.1 kg)   SpO2 98%   BMI 35.96 kg/m²       Health Maintenance Due   Topic    Shingrix Vaccine Age 50> (1 of 2)    Foot Exam Q1     Colorectal Cancer Screening Combo     GLAUCOMA SCREENING Q2Y     Eye Exam Retinal or Dilated     Medicare Yearly Exam        Med Reconciliation: Completed    Coordination of Care Questionnaire:  :   1) Have you been to an emergency room, urgent care, or hospitalized since your last visit? If yes, where when, and reason for visit? Yes, see chief complaint. 2. Have seen or consulted any other health care provider since your last visit? If yes, where when, and reason for visit? No       3) Do you have an Advanced Directive/ Living Will in place? No  If yes, do we have a copy on file   If no, would you like information     Patient is accompanied by nursing attendant I have received verbal consent from Sarabjit Kincaid to discuss any/all medical information while they are present in the room.

## 2020-11-12 NOTE — PROGRESS NOTES
HISTORY OF PRESENT ILLNESS  Ced Amado is a 76 y.o. female. HPI      [place]  Hx DM-2 htn HLD gout hypothyroid osteoporosis BRADY with fibrosis--[facility] hepatology, suspected cirrhosis-Dr SMITH and medicare wellness---  Last a1c 6.7 LDL 58    Hospital FU MICHELLE d/t n/v and poor po intake  Bun/cr 97/7.7 improved with IVF to 56/3.2 . losatan and lasx were stopped--Dr Kevin Veloz renal MD  Eating and drinking well now  No nausea  Has paid CG fulltime now since [facility] [place] [place] 30 d/c at [facility] independent living  Has acute gout flare of left index finger and hand and right medial ankle--pt restarted daily colcrys  Incidental pancreatic mass 3 x2.8cm-no pain per pt  Elevated ca19.9 58   Seen by GI and hem/onc--outpt bx   Hb 7.5 plt 84k--liver cirrhosis  Admit date: 10/27/2020  Discharge date and time: 11/3/2020        Admission Diagnoses: MICHELLE (acute kidney injury) ([facility] [N17.9]     Discharge Diagnoses: Active Problems:    MICHELLE (acute kidney injury) ([facility] (10/27/2020)              Hospital Course:   Acute kidney injury POA  likely prerenal due to vomiting and poor oral intake  Cr 7.7 and BUN 97. CT abd no hydronephrosis   Nephrology consulted. Cr improved steadily with hydration and IV Albumin. Discharged home with outpatient nephrology and PCP follow up. Holding Lasix and losartan at discharge.         Mass-like enlargement of pancreas  Incidental CT finding  no hx of smoking or alcohol. No family hx of pancreatic cancer. No recent weight loss. Oncology and Gastroenterology consulted. Needs MRCP and EUS for evaluation. MRCP not recommended until GFR > 30. Discussed with nephrology and GI. Plan for discharge with outpatient lab work, and plan for outpatient MRCP and EUS. Anemia of chronic disease  Hgb 7.2, continue to monitor     Hyperkalemia POA- resolved  secondary to MICHELLE.  EKG changes noted, RBBB, ?junctional rhythm, peaked T waves  Received Ca gluconate, insulin, albuterol, and bicarb at the ED        Hypertension POA   hold losartan and lasix due to MICHELLE  continue amlodipine        Type 2 DM POA        Lab Results   Component Value Date/Time     Hemoglobin A1c 6.7 (H) 06/03/2020 10:42 AM     Hemoglobin A1c, External 6.4 06/21/2016   Metformin discontinued. Advised follow up with PCP for repeat lab work. Consider starting sulfonylurea as outpatient.         BRADY  hx of BRADY. CT abd shows heaptic cirrhosis. LFT wnl. Platelet wnl. Elevated AST, ALT, and AlkP. Outpatient GI follow up.      Hx of Gout        30.0 - 39.9 Obese / Body mass index is 36.17 kg/m².     Code status: Full  Prophylaxis: SCD due to thrombocytopenia  Recommended Disposition: tbd        CONSULTATIONS:  IP CONSULT TO NEPHROLOGY  IP CONSULT TO ONCOLOGY  IP CONSULT TO GASTROENTEROLOGY  IP CONSULT TO HOSPITALIST     Excerpted HPI from H&P of Marylou Gama MD:  76 y.o.   female who presents with past medical history significant for hypertension type II DM gout who presents with nausea and vomiting.  Three days ago patient noted that her urine output was minimal despite her taking lasix and she also noted some blood on her pad for which she saw her OB/GYN doctor yesterday morning.  She was told to have UTI and she was prescribed antibiotic. She started having having nausea and vomiting when she got home and was not able to take the antibiotic.  She was not able to keep anything down. she called her OB/GYN doctor today and she was told to go to the emergency room.  She denies taking nonsteroidal anti-inflammatory drugs.  She denies dysuria, flank pain, fever, or chills.      In the ED she was found to have Cr of 7.7 and K 6.2.         ______________________________________________________________________  DISCHARGE SUMMARY/HOSPITAL COURSE:  for full details see H&P, daily progress notes, labs, consult notes.      Visit Vitals  BP (!) 151/59   Pulse 62   Temp 98 °F (36.7 °C)   Resp 16   Ht 5' 3\" (1.6 m)   Wt 92.6 kg (204 lb 1 oz)   SpO2 96%   BMI 36.15 kg/m²      _______________________________________________________________________  Patient seen and examined by me on discharge day. Pertinent Findings:  Gen:    Not in distress  Chest:  Clear lungs  CVS:    Regular rhythm. No edema  Abd:     Soft, not distended, not tender  Neuro:  Alert with good insight. Oriented to person, place, and time   _______________________________________________________________________  DISCHARGE MEDICATIONS:         Current Discharge Medication List             CONTINUE these medications which have NOT CHANGED     Details   levothyroxine (SYNTHROID) 175 mcg tablet TAKE ONE DAILY BY MOUTH. TAKE AN EXTRA 1/2 PILL ON SUNDAYS. (7.5 TABLETS/WEEK  MCG)  Qty: 96 Tab, Refills: 3     Associated Diagnoses: Acquired hypothyroidism       famotidine (PEPCID) 40 mg tablet Take 1 Tab by mouth daily. Qty: 90 Tab, Refills: 3       fluticasone propionate (FLONASE) 50 mcg/actuation nasal spray 2 Sprays by Both Nostrils route daily. Administer to right and left nostril. Qty: 3 Bottle, Refills: 3       polyethylene glycol (MIRALAX) 17 gram/dose powder Take 17 g by mouth daily. Qty: 2108 g, Refills: 3     Associated Diagnoses: Constipation, unspecified constipation type       atorvastatin (LIPITOR) 20 mg tablet TAKE 1 TABLET DAILY  Qty: 90 Tab, Refills: 3       loratadine (CLARITIN) 10 mg tablet Take 1 Tab by mouth daily. Indications: inflammation of the nose due to an allergy  Qty: 90 Tab, Refills: 3       ZETIA 10 mg tablet Take 1 tablet by mouth daily. Qty: 90 Tab, Refills: 3       amLODIPine (NORVASC) 5 mg tablet Take 1 Tab by mouth daily. Qty: 90 Tab, Refills: 3     Comments: Dose decrease  Associated Diagnoses: Hypertension, unspecified type       docusate sodium (COLACE) 50 mg capsule Take 100 mg by mouth daily as needed for Constipation.       potassium chloride SA (MICRO-K) 10 mEq capsule Take 2 Caps by mouth daily.   Qty: 180 Cap, Refills: 3       omega-3 acid ethyl esters (LOVAZA) 1 gram capsule Take 2 Caps by mouth two (2) times daily (with meals). Qty: 360 Cap, Refills: 3       Biotin 2,500 mcg cap Take  by mouth.       L GASSERI/B BIFIDUM/B LONGUM ([employer] PO) Take 1 Tab by mouth daily.       vitamin E (AQUA GEMS) 400 unit capsule Take 800 Units by mouth daily.       lidocaine (LIDODERM) 5 %(700 mg/patch) 1 patch by TransDERmal route every twenty-four (24) hours. Apply patch to the affected area for 12 hours a day and remove for 12 hours a day.       cholecalciferol, vitamin D3, (VITAMIN D3) 2,000 unit tab Take 1 Tab by mouth daily.       traMADol (ULTRAM) 50 mg tablet Take 1 tablet by mouth every six (6) hours as needed for Pain. Qty: 12 tablet, Refills: 0       MULTIVITAMIN WITH MINERALS (ONE-A-DAY 50 PLUS PO) Take 1 Tab by mouth daily.       aspirin 81 mg chewable tablet Take 81 mg by mouth daily.       ketoconazole (NIZORAL) 2 % topical cream Apply  to affected area daily as needed.   Refills: 3                STOP taking these medications         ciprofloxacin HCl (Cipro) 500 mg tablet Comments:   Reason for Stopping:            metFORMIN ER (GLUCOPHAGE XR) 500 mg tablet Comments:   Reason for Stopping:            colchicine (MITIGARE) 0.6 mg capsule Comments:   Reason for Stopping:            losartan (COZAAR) 25 mg tablet Comments:   Reason for Stopping:            metOLazone (ZAROXOLYN) 5 mg tablet Comments:   Reason for Stopping:            furosemide (LASIX) 80 mg tablet Comments:   Reason for Stopping:            diclofenac (VOLTAREN) 1 % gel Comments:   Reason for Stopping:            indomethacin (INDOCIN) 25 mg capsule Comments:   Reason for Stopping:              Last OV    No longer seeing Dr Nancy Bains who is no longer at the endocrine practice  No longert Goes to [facility]  Last a1c 6.6 LDL  39  metformin dose was increased 1000mg bid by Dr Nancy Bains  fsbs 130-145  Home /68 at gyn md office    Patient Active Problem List    Diagnosis Date Noted    MICHELLE (acute kidney injury) ([facility] 10/27/2020    Cellulitis of left lower leg 11/07/2019    Severe obesity ([facility] 09/30/2019    Peripheral vascular disease ([facility] 09/30/2019    Non-pressure chronic ulcer of left lower leg, limited to breakdown [facility] 08/05/2019    Controlled type 2 diabetes mellitus without complication ([facility] 76/14/3304    DDD (degenerative disc disease), lumbar 11/27/2017    Prediabetes 04/11/2016    Thrombocytopenia ([facility] 01/24/2015    HTN (hypertension) 01/17/2014    Bifascicular block 12/23/2010    Fatty liver 06/18/2010    Pure hypercholesterolemia 06/18/2010    Colon polyp 06/18/2010    Gout 06/14/2010    Hypothyroidism 06/14/2010    Osteoporosis 06/14/2010    Anxiety 06/14/2010    Leg edema 06/14/2010    RSD lower limb 06/14/2010     Current Outpatient Medications   Medication Sig Dispense Refill    predniSONE (DELTASONE) 10 mg tablet Take 10 mg by mouth daily (with breakfast). 4 every day x3d, 3 every day x 3d, 2 every day x 3d then 1 every day x 3d 30 Tab 0    omega-3 acid ethyl esters (LOVAZA) 1 gram capsule Take 2 Caps by mouth two (2) times daily (with meals). 360 Cap 3    potassium chloride SA (MICRO-K) 10 mEq capsule Take 2 Caps by mouth daily. 180 Cap 3    levothyroxine (SYNTHROID) 175 mcg tablet TAKE ONE DAILY BY MOUTH. TAKE AN EXTRA 1/2 PILL ON SUNDAYS. (7.5 TABLETS/WEEK  MCG) 96 Tab 3    famotidine (PEPCID) 40 mg tablet Take 1 Tab by mouth daily. 90 Tab 3    fluticasone propionate (FLONASE) 50 mcg/actuation nasal spray 2 Sprays by Both Nostrils route daily. Administer to right and left nostril. 3 Bottle 3    polyethylene glycol (MIRALAX) 17 gram/dose powder Take 17 g by mouth daily. 2108 g 3    atorvastatin (LIPITOR) 20 mg tablet TAKE 1 TABLET DAILY 90 Tab 3    loratadine (CLARITIN) 10 mg tablet Take 1 Tab by mouth daily.  Indications: inflammation of the nose due to an allergy 90 Tab 3    ZETIA 10 mg tablet Take 1 tablet by mouth daily. 90 Tab 3    ketoconazole (NIZORAL) 2 % topical cream Apply  to affected area daily as needed. 3    amLODIPine (NORVASC) 5 mg tablet Take 1 Tab by mouth daily. 90 Tab 3    docusate sodium (COLACE) 50 mg capsule Take 100 mg by mouth daily as needed for Constipation.  Biotin 2,500 mcg cap Take  by mouth.  L GASSERI/B BIFIDUM/B LONGUM ([employer] PO) Take 1 Tab by mouth daily.  vitamin E (AQUA GEMS) 400 unit capsule Take 800 Units by mouth daily.  lidocaine (LIDODERM) 5 %(700 mg/patch) 1 patch by TransDERmal route every twenty-four (24) hours. Apply patch to the affected area for 12 hours a day and remove for 12 hours a day.  traMADol (ULTRAM) 50 mg tablet Take 1 tablet by mouth every six (6) hours as needed for Pain. 12 tablet 0    MULTIVITAMIN WITH MINERALS (ONE-A-DAY 50 PLUS PO) Take 1 Tab by mouth daily.  aspirin 81 mg chewable tablet Take 81 mg by mouth daily.  colchicine 0.6 mg tablet Take 1 Tab by mouth daily. 30 Tab 5    cholecalciferol, vitamin D3, (VITAMIN D3) 2,000 unit tab Take 1 Tab by mouth daily.        Allergies   Allergen Reactions    Gabapentin Other (comments)    Celebrex [Celecoxib] Hives    Pcn [Penicillins] Unknown (comments)     Can't remember    Sulfa (Sulfonamide Antibiotics) Hives      Lab Results   Component Value Date/Time    WBC 4.1 11/03/2020 02:55 AM    HGB 7.5 (L) 11/03/2020 02:55 AM    HCT 23.6 (L) 11/03/2020 02:55 AM    PLATELET 84 (L) 49/86/8246 02:55 AM    .0 (H) 11/03/2020 02:55 AM     Lab Results   Component Value Date/Time    GFR est non-AA 15 (L) 11/03/2020 02:55 AM    GFR est AA 18 (L) 11/03/2020 02:55 AM    Creatinine 3.12 (H) 11/03/2020 02:55 AM    BUN 56 (H) 11/03/2020 02:55 AM    Sodium 140 11/03/2020 02:55 AM    Potassium 3.6 11/03/2020 02:55 AM    Chloride 107 11/03/2020 02:55 AM    CO2 24 11/03/2020 02:55 AM    Magnesium 2.1 11/02/2020 02:06 AM    Phosphorus 3.6 11/03/2020 02:55 AM    PTH, Intact 31 01/06/2016 09:55 AM        ROS    Physical Exam  Vitals signs and nursing note reviewed. Constitutional:       Appearance: She is well-developed. She is obese. Comments: Appears stated age, sitting in WC , NAD   Cardiovascular:      Rate and Rhythm: Normal rate and regular rhythm. Heart sounds: Normal heart sounds. No murmur. No friction rub. No gallop. Pulmonary:      Effort: Pulmonary effort is normal. No respiratory distress. Breath sounds: Normal breath sounds. No wheezing. Abdominal:      General: Bowel sounds are normal.      Palpations: Abdomen is soft. Musculoskeletal:      Comments: Pink swollen lift index finger pip and dip and swollen tender right medial ankle   Neurological:      Mental Status: She is alert. ASSESSMENT and PLAN  Diagnoses and all orders for this visit:    1. MICHELLE (acute kidney injury) ([facility]  -     METABOLIC PANEL, BASIC   Holding losartan and lasix and metolazone for now   Fu Dr Nydia Mccurdy --fax labs to Dr Radha Bueno  2. Acute gout of multiple sites, unspecified cause   pred taper    Stop colcrys  3. Pancreatic mass  -     [facility] and or bx once renal function improves  4. Anemia, unspecified type  -     CBC W/O DIFF    Probably d/t MICHELLE  Other orders  -     predniSONE (DELTASONE) 10 mg tablet; Take 10 mg by mouth daily (with breakfast). 4 every day x3d, 3 every day x 3d, 2 every day x 3d then 1 every day x 3d      Follow-up and Dispositions    · Return in about 3 months (around 2/12/2021) for medicare wellness.

## 2020-11-12 NOTE — TELEPHONE ENCOUNTER
----- Message from Avani Garcia sent at 11/12/2020 10:42 AM EST -----  Regarding: Dr Janelle Moeller Message/Vendor Calls    Caller's first and last name: self      Reason for call: wheelchair      Callback required yes/no and why: yes      Best contact number(s):[phone]      Details to clarify the request: Pt needs a wheelchair for her visit she is sitting in her car. No answer on front line or back line.        Message from [facility]

## 2020-11-13 LAB
BUN SERPL-MCNC: 36 MG/DL (ref 8–27)
BUN/CREAT SERPL: 23 (ref 12–28)
CALCIUM SERPL-MCNC: 9.4 MG/DL (ref 8.7–10.3)
CHLORIDE SERPL-SCNC: 108 MMOL/L (ref 96–106)
CO2 SERPL-SCNC: 19 MMOL/L (ref 20–29)
CREAT SERPL-MCNC: 1.54 MG/DL (ref 0.57–1)
ERYTHROCYTE [DISTWIDTH] IN BLOOD BY AUTOMATED COUNT: 14.4 % (ref 11.7–15.4)
GLUCOSE SERPL-MCNC: 146 MG/DL (ref 65–99)
HCT VFR BLD AUTO: 25.1 % (ref 34–46.6)
HGB BLD-MCNC: 8.2 G/DL (ref 11.1–15.9)
MCH RBC QN AUTO: 31.9 PG (ref 26.6–33)
MCHC RBC AUTO-ENTMCNC: 32.7 G/DL (ref 31.5–35.7)
MCV RBC AUTO: 98 FL (ref 79–97)
PLATELET # BLD AUTO: 105 X10E3/UL (ref 150–450)
POTASSIUM SERPL-SCNC: 4.7 MMOL/L (ref 3.5–5.2)
RBC # BLD AUTO: 2.57 X10E6/UL (ref 3.77–5.28)
SODIUM SERPL-SCNC: 141 MMOL/L (ref 134–144)
WBC # BLD AUTO: 6.8 X10E3/UL (ref 3.4–10.8)

## 2020-11-13 NOTE — TELEPHONE ENCOUNTER
Called, spoke to pt. Two identifiers confirmed. Notified pt of lab results/recommendations per Dr. Charanjit Hamilton. Pt verbalized understanding of information discussed w/ no further questions at this time.

## 2020-11-13 NOTE — TELEPHONE ENCOUNTER
Tell pt her anemia and kdney function are improving--continue current medicines. Stay  Off the diuretics and losartan for now. Fax labs to renal Dr. Lata Singh.

## 2020-11-17 ENCOUNTER — PATIENT OUTREACH (OUTPATIENT)
Dept: CASE MANAGEMENT | Age: 75
End: 2020-11-17

## 2020-11-17 NOTE — PROGRESS NOTES
Patient resolved from Transition of Care episode on 11/3. Patient/family has been provided the following resources and education related to COVID-19:                         Signs, symptoms and red flags related to COVID-19            CDC exposure and quarantine guidelines            [employer] exposure contact - [phone]            Contact for their local Department of Health                 Patient currently reports that the following symptoms have improved:  no symptoms     No further outreach scheduled with this CTN/ACM. Episode of Care resolved. Patient has this CTN/ACM contact information if future needs arise.

## 2020-11-23 ENCOUNTER — TELEPHONE (OUTPATIENT)
Dept: INTERNAL MEDICINE CLINIC | Age: 75
End: 2020-11-23

## 2020-11-23 NOTE — TELEPHONE ENCOUNTER
Patient states she needs a call back to discuss her Elevated Blood Pressure this morning when she went to a New Gastroenterologist. Patient states her Blood Pressure at the office was 192/80 & then when she got home it was 172/68. Patient states she was taken off of one of her Blood pressure medications while in the hospital & needs to discuss Plan of care. Patient also states she needs to get the name of the Kidney Specialist she is supposed to go see. Please call to advise.  Thank you

## 2020-11-24 ENCOUNTER — TELEPHONE (OUTPATIENT)
Dept: INTERNAL MEDICINE CLINIC | Age: 75
End: 2020-11-24

## 2020-11-24 RX ORDER — AMLODIPINE BESYLATE 10 MG/1
10 TABLET ORAL DAILY
Qty: 90 TAB | Refills: 1 | Status: SHIPPED | OUTPATIENT
Start: 2020-11-24 | End: 2021-05-12

## 2020-11-24 NOTE — TELEPHONE ENCOUNTER
Called, spoke to pt. Two identifiers confirmed. Pt stated her BP was checked this morning and was 170/72. Pt currently only taking norvasc. Notified pt a message will be sent to Dr. Kae Dale. Contact information provided to pt for Dr. Iram Fairbanks. Records faxed. Pt verbalized understanding of information discussed w/ no further questions at this time.

## 2020-11-24 NOTE — TELEPHONE ENCOUNTER
Clint Thakkar MD  You 3 hours ago (12:07 PM)      Increase the norvasc 5mg  to 2 tabs every day--I will escibe a 10 mg tab    Message text      Pt notified.

## 2020-11-24 NOTE — TELEPHONE ENCOUNTER
----- Message from [place] sent at 11/24/2020  3:23 PM EST -----  Regarding: DR. Alvarado/ telephone  Caller's first and last name and relationship (if not the patient):NA  Best contact number(s): [phone]  Whose call is being returned: Nurse called this morning, said she would call right back and pt was calling her back since she hasn't heard anything   Details to clarify the request:  If a Rx needs to be sent regarding BP , needs to be sent by 6pm.  Pt did state her last BP was 152/82 @ 3:20pm     Message from [facility]

## 2020-11-27 ENCOUNTER — TELEPHONE (OUTPATIENT)
Dept: INTERNAL MEDICINE CLINIC | Age: 75
End: 2020-11-27

## 2020-11-27 NOTE — TELEPHONE ENCOUNTER
Attempted to send fax to Oswaldo Guillaume by [facility] at [phone], it was busy.   Made a copy and mailed to Oswaldo Guillaume by [address] [facility] [address], [address] [facility]

## 2020-12-03 ENCOUNTER — TELEPHONE (OUTPATIENT)
Dept: INTERNAL MEDICINE CLINIC | Age: 75
End: 2020-12-03

## 2020-12-03 NOTE — TELEPHONE ENCOUNTER
[facility] states she would like to extend pt's OT for 2/wk for 2 more weeks. No need to call back unless there is a problem with these visits.

## 2020-12-23 ENCOUNTER — TELEPHONE (OUTPATIENT)
Dept: INTERNAL MEDICINE CLINIC | Age: 75
End: 2020-12-23

## 2020-12-23 NOTE — TELEPHONE ENCOUNTER
Spoke with patient using 2 identifiers. Patient was concerned about BP. 160/70  After exercising.  Patient was advised to recheck BP if BP is still high follow up with MD

## 2021-02-13 NOTE — PROGRESS NOTES
HISTORY OF PRESENT ILLNESS  Jesus Pitts is a 76 y.o. female.   HPI     Hx DM-2 htn HLD gout hypothyroid osteoporosis BRADY with fibrosis--[facility] hepatology, suspected cirrhosis-Dr Olmos and medicare wellness---  Had MICHELLE, acute gout of hand and incidental pancreatic mass last OV -APURVA  Was referred back to nephrologist for MICHELLE--Dr white GI for pancreatic mass at uncinate process -----  Repeat bun/cr 36/1.54  Creatinine was down 1.1 3-4 weeks ago-Dr Nemesio Carreno has fu next month  Will stat on allopurinial and colcrys per Dr Nemesio Carreno  [id number] systolic with home cuff  fsbs around 115-120  Last OV      [place] CG  Last a1c 6.7  [address] FU MICHELLE d/t n/v and poor po intake  Bun/cr 97/7.7 improved with IVF to 56/3.2 . losatan and lasx were stopped--Dr Joselito Brewer renal MD  Eating and drinking well now  No nausea  Has paid CG fulltime now since [place]/ [place] 30 d/c at 0 [facility] independent living  Has acute gout flare of left index finger and hand and right medial ankle--pt restarted daily colcrys  Incidental pancreatic mass 3 x2.8cm-no pain per pt  Elevated ca19.9 58   Seen by GI and hem/onc--outpt bx   Hb 7.5 plt 84k--liver cirrhosis    Patient Active Problem List    Diagnosis Date Noted    MICHELLE (acute kidney injury) ([address] 10/27/2020    Cellulitis of left lower leg 11/07/2019    Severe obesity ([address] 09/30/2019    Peripheral vascular disease ([address] 09/30/2019    Non-pressure chronic ulcer of left lower leg, limited to breakdown of skin ([address] 08/05/2019    Controlled type 2 diabetes mellitus without complication ([address] 62/44/9041    DDD (degenerative disc disease), lumbar 11/27/2017    Prediabetes 04/11/2016    Thrombocytopenia ([address] 01/24/2015    HTN (hypertension) 01/17/2014    Bifascicular block 12/23/2010    Fatty liver 06/18/2010    Pure hypercholesterolemia 06/18/2010    Colon polyp 06/18/2010    Gout 06/14/2010    Hypothyroidism 06/14/2010    Osteoporosis 06/14/2010    Anxiety 06/14/2010    Leg edema 06/14/2010    RSD lower limb 06/14/2010     Current Outpatient Medications   Medication Sig Dispense Refill    amLODIPine (NORVASC) 10 mg tablet Take 1 Tab by mouth daily. 90 Tab 1    predniSONE (DELTASONE) 10 mg tablet Take 10 mg by mouth daily (with breakfast). 4 every day x3d, 3 every day x 3d, 2 every day x 3d then 1 every day x 3d 30 Tab 0    colchicine 0.6 mg tablet Take 1 Tab by mouth daily. 30 Tab 5    omega-3 acid ethyl esters (LOVAZA) 1 gram capsule Take 2 Caps by mouth two (2) times daily (with meals). 360 Cap 3    potassium chloride SA (MICRO-K) 10 mEq capsule Take 2 Caps by mouth daily. 180 Cap 3    levothyroxine (SYNTHROID) 175 mcg tablet TAKE ONE DAILY BY MOUTH. TAKE AN EXTRA 1/2 PILL ON SUNDAYS. (7.5 TABLETS/WEEK  MCG) 96 Tab 3    famotidine (PEPCID) 40 mg tablet Take 1 Tab by mouth daily. 90 Tab 3    fluticasone propionate (FLONASE) 50 mcg/actuation nasal spray 2 Sprays by Both Nostrils route daily. Administer to right and left nostril. 3 Bottle 3    polyethylene glycol (MIRALAX) 17 gram/dose powder Take 17 g by mouth daily. 2108 g 3    atorvastatin (LIPITOR) 20 mg tablet TAKE 1 TABLET DAILY 90 Tab 3    loratadine (CLARITIN) 10 mg tablet Take 1 Tab by mouth daily. Indications: inflammation of the nose due to an allergy 90 Tab 3    ZETIA 10 mg tablet Take 1 tablet by mouth daily. 90 Tab 3    ketoconazole (NIZORAL) 2 % topical cream Apply  to affected area daily as needed. 3    docusate sodium (COLACE) 50 mg capsule Take 100 mg by mouth daily as needed for Constipation.  Biotin 2,500 mcg cap Take  by mouth.  L GASSERI/B BIFIDUM/B LONGUM (M2TECH PO) Take 1 Tab by mouth daily.  vitamin E (AQUA GEMS) 400 unit capsule Take 800 Units by mouth daily.  lidocaine (LIDODERM) 5 %(700 mg/patch) 1 patch by TransDERmal route every twenty-four (24) hours. Apply patch to the affected area for 12 hours a day and remove for 12 hours a day.       cholecalciferol, vitamin D3, (VITAMIN D3) 2,000 unit tab Take 1 Tab by mouth daily.  traMADol (ULTRAM) 50 mg tablet Take 1 tablet by mouth every six (6) hours as needed for Pain. 12 tablet 0    MULTIVITAMIN WITH MINERALS (ONE-A-DAY 50 PLUS PO) Take 1 Tab by mouth daily.  aspirin 81 mg chewable tablet Take 81 mg by mouth daily.        Allergies   Allergen Reactions    Gabapentin Other (comments)    Celebrex [Celecoxib] Hives    Pcn [Penicillins] Unknown (comments)     Can't remember    Sulfa (Sulfonamide Antibiotics) Hives     Social History     Tobacco Use    Smoking status: Never Smoker    Smokeless tobacco: Never Used   Substance Use Topics    Alcohol use: No      Lab Results   Component Value Date/Time    WBC 6.8 11/12/2020 12:14 PM    HGB 8.2 (L) 11/12/2020 12:14 PM    HCT 25.1 (L) 11/12/2020 12:14 PM    PLATELET 457 (L) 88/21/3280 12:14 PM    MCV 98 (H) 11/12/2020 12:14 PM     Lab Results   Component Value Date/Time    Hemoglobin A1c 6.7 (H) 06/03/2020 10:42 AM    Hemoglobin A1c 6.6 (H) 09/30/2019 09:41 AM    Hemoglobin A1c 5.8 (H) 03/23/2018 02:02 PM    Hemoglobin A1c, External 7.0 07/29/2020    Hemoglobin A1c, External 6.4 06/21/2016    Glucose 146 (H) 11/12/2020 12:14 PM    Glucose (POC) 146 (H) 11/03/2020 11:16 AM    Microalbumin/Creat ratio (mg/g creat) 1,190 (H) 10/27/2020 06:59 PM    Microalbumin,urine random 84.50 10/27/2020 06:59 PM    LDL, calculated 58 06/03/2020 10:42 AM    Creatinine 1.54 (H) 11/12/2020 12:14 PM      Lab Results   Component Value Date/Time    Cholesterol, total 152 06/03/2020 10:42 AM    HDL Cholesterol 56 06/03/2020 10:42 AM    LDL, calculated 58 06/03/2020 10:42 AM    LDL-C, External 41 07/29/2020    Triglyceride 191 (H) 06/03/2020 10:42 AM    CHOL/HDL Ratio 2.7 06/14/2010 11:16 AM     Lab Results   Component Value Date/Time    GFR est non-AA 33 (L) 11/12/2020 12:14 PM    GFR est AA 38 (L) 11/12/2020 12:14 PM    Creatinine 1.54 (H) 11/12/2020 12:14 PM    BUN 36 (H) 11/12/2020 12:14 PM    Sodium 141 11/12/2020 12:14 PM    Potassium 4.7 11/12/2020 12:14 PM    Chloride 108 (H) 11/12/2020 12:14 PM    CO2 19 (L) 11/12/2020 12:14 PM    Magnesium 2.1 11/02/2020 02:06 AM    Phosphorus 3.6 11/03/2020 02:55 AM    PTH, Intact 31 01/06/2016 09:55 AM        ROS    Physical Exam  Vitals signs and nursing note reviewed. Constitutional:       Appearance: She is well-developed. She is obese. Comments: Appears stated age   Cardiovascular:      Rate and Rhythm: Normal rate and regular rhythm. Heart sounds: Normal heart sounds. No murmur. No friction rub. No gallop. Pulmonary:      Effort: Pulmonary effort is normal. No respiratory distress. Breath sounds: Normal breath sounds. No wheezing. Abdominal:      General: Bowel sounds are normal.      Palpations: Abdomen is soft. Musculoskeletal:      Comments: 1 plus edema bl LE   Neurological:      Mental Status: She is alert. ASSESSMENT and PLAN  Diagnoses and all orders for this visit:    1. Pancreatic mass  -     REFERRAL TO GASTROENTEROLOGY-Dr London-appt 3/1   Should be able to get MRCP with kidney function back to baseline now   No symptoms    2. MICHELLE (acute kidney injury) ([facility]  -     CBC W/O DIFF  -     METABOLIC PANEL, COMPREHENSIVE   Fu renal MD--Cr down to 1.1 per pt  3. Essential hypertension  -     CBC W/O DIFF   Elevated   Restart lasix   Losartan on hold but pt will see renal MD in 3 weeks   4. Acquired hypothyroidism  -     TSH 3RD GENERATION    5. Cirrhosis of liver without ascites, unspecified hepatic cirrhosis type (HCC)   Restart lasix 80 mg every day but leave off metolazone for now  6. Controlled type 2 diabetes mellitus without complication, without long-term current use of insulin (HCC)  -     HEMOGLOBIN A1C WITH EAG   Controlled off metformin  7. IGTN (ingrowing toe nail)  -     REFERRAL TO PODIATRY    Other orders  -     furosemide (LASIX) 80 mg tablet;  Take 1 Tab by mouth daily.      Follow-up and Dispositions    · Return in about 3 months (around 5/15/2021) for VV fupancreatic mass dm-2 love HTN.

## 2021-02-15 ENCOUNTER — OFFICE VISIT (OUTPATIENT)
Dept: INTERNAL MEDICINE CLINIC | Age: 76
End: 2021-02-15
Payer: MEDICARE

## 2021-02-15 ENCOUNTER — TELEPHONE (OUTPATIENT)
Dept: INTERNAL MEDICINE CLINIC | Age: 76
End: 2021-02-15

## 2021-02-15 VITALS
DIASTOLIC BLOOD PRESSURE: 67 MMHG | OXYGEN SATURATION: 97 % | HEIGHT: 63 IN | BODY MASS INDEX: 33.84 KG/M2 | RESPIRATION RATE: 16 BRPM | TEMPERATURE: 96.4 F | HEART RATE: 73 BPM | SYSTOLIC BLOOD PRESSURE: 181 MMHG | WEIGHT: 191 LBS

## 2021-02-15 DIAGNOSIS — E03.9 ACQUIRED HYPOTHYROIDISM: ICD-10-CM

## 2021-02-15 DIAGNOSIS — N17.9 AKI (ACUTE KIDNEY INJURY) (HCC): ICD-10-CM

## 2021-02-15 DIAGNOSIS — K74.60 CIRRHOSIS OF LIVER WITHOUT ASCITES, UNSPECIFIED HEPATIC CIRRHOSIS TYPE (HCC): ICD-10-CM

## 2021-02-15 DIAGNOSIS — K86.89 PANCREATIC MASS: Primary | ICD-10-CM

## 2021-02-15 DIAGNOSIS — E11.9 CONTROLLED TYPE 2 DIABETES MELLITUS WITHOUT COMPLICATION, WITHOUT LONG-TERM CURRENT USE OF INSULIN (HCC): ICD-10-CM

## 2021-02-15 DIAGNOSIS — I10 ESSENTIAL HYPERTENSION: ICD-10-CM

## 2021-02-15 DIAGNOSIS — L60.0 IGTN (INGROWING TOE NAIL): ICD-10-CM

## 2021-02-15 PROCEDURE — 99214 OFFICE O/P EST MOD 30 MIN: CPT | Performed by: INTERNAL MEDICINE

## 2021-02-15 PROCEDURE — 1090F PRES/ABSN URINE INCON ASSESS: CPT | Performed by: INTERNAL MEDICINE

## 2021-02-15 PROCEDURE — 1101F PT FALLS ASSESS-DOCD LE1/YR: CPT | Performed by: INTERNAL MEDICINE

## 2021-02-15 PROCEDURE — G8753 SYS BP > OR = 140: HCPCS | Performed by: INTERNAL MEDICINE

## 2021-02-15 PROCEDURE — 3046F HEMOGLOBIN A1C LEVEL >9.0%: CPT | Performed by: INTERNAL MEDICINE

## 2021-02-15 PROCEDURE — 2022F DILAT RTA XM EVC RTNOPTHY: CPT | Performed by: INTERNAL MEDICINE

## 2021-02-15 PROCEDURE — G8417 CALC BMI ABV UP PARAM F/U: HCPCS | Performed by: INTERNAL MEDICINE

## 2021-02-15 PROCEDURE — G8427 DOCREV CUR MEDS BY ELIG CLIN: HCPCS | Performed by: INTERNAL MEDICINE

## 2021-02-15 PROCEDURE — G8536 NO DOC ELDER MAL SCRN: HCPCS | Performed by: INTERNAL MEDICINE

## 2021-02-15 PROCEDURE — 3017F COLORECTAL CA SCREEN DOC REV: CPT | Performed by: INTERNAL MEDICINE

## 2021-02-15 PROCEDURE — G8510 SCR DEP NEG, NO PLAN REQD: HCPCS | Performed by: INTERNAL MEDICINE

## 2021-02-15 PROCEDURE — G8754 DIAS BP LESS 90: HCPCS | Performed by: INTERNAL MEDICINE

## 2021-02-15 RX ORDER — FUROSEMIDE 40 MG/1
40 TABLET ORAL 2 TIMES DAILY
Qty: 180 TAB | Refills: 3 | Status: SHIPPED | OUTPATIENT
Start: 2021-02-15 | End: 2021-02-15 | Stop reason: SDUPTHER

## 2021-02-15 RX ORDER — FUROSEMIDE 80 MG/1
80 TABLET ORAL DAILY
Qty: 90 TAB | Refills: 0 | Status: SHIPPED | OUTPATIENT
Start: 2021-02-15 | End: 2021-06-03

## 2021-02-15 NOTE — PATIENT INSTRUCTIONS
Office Policies Phone calls/patient messages: Please allow up to 24 hours for someone in the office to contact you about your call or message. Be mindful your provider may be out of the office or your message may require further review. We encourage you to use [employer] for your messages as this is a faster, more efficient way to communicate with our office Medication Refills: 
         
Prescription medications require 48-72 business hours to process. We encourage you to use [employer] for your refills. For controlled medications: Please allow 72 business hours to process. Certain medications may require you to  a written prescription at our office. NO narcotic/controlled medications will be prescribed after 4pm Monday through Friday or on weekends Form/Paperwork Completion: 
         
Please note a $25 fee may incur for all paperwork for completed by our providers. We ask that you allow 7-10 business days. Pre-payment is due prior to picking up/faxing the completed form. You may also download your forms to [employer] to have your doctor print off.

## 2021-02-15 NOTE — TELEPHONE ENCOUNTER
Per Dr. Erika Gonsalves patient need an appointment with Dr. Kim Mail.  Telehealth visit is set for 3/1/21 with Dr. Kim Mail @ 10:00am

## 2021-02-16 LAB
ALBUMIN SERPL-MCNC: 4.2 G/DL (ref 3.7–4.7)
ALBUMIN/GLOB SERPL: 1.6 {RATIO} (ref 1.2–2.2)
ALP SERPL-CCNC: 180 IU/L (ref 39–117)
ALT SERPL-CCNC: 23 IU/L (ref 0–32)
AST SERPL-CCNC: 35 IU/L (ref 0–40)
BILIRUB SERPL-MCNC: 0.5 MG/DL (ref 0–1.2)
BUN SERPL-MCNC: 25 MG/DL (ref 8–27)
BUN/CREAT SERPL: 24 (ref 12–28)
CALCIUM SERPL-MCNC: 10.5 MG/DL (ref 8.7–10.3)
CHLORIDE SERPL-SCNC: 108 MMOL/L (ref 96–106)
CO2 SERPL-SCNC: 23 MMOL/L (ref 20–29)
CREAT SERPL-MCNC: 1.06 MG/DL (ref 0.57–1)
ERYTHROCYTE [DISTWIDTH] IN BLOOD BY AUTOMATED COUNT: 13.5 % (ref 11.7–15.4)
EST. AVERAGE GLUCOSE BLD GHB EST-MCNC: 108 MG/DL
GLOBULIN SER CALC-MCNC: 2.7 G/DL (ref 1.5–4.5)
GLUCOSE SERPL-MCNC: 107 MG/DL (ref 65–99)
HBA1C MFR BLD: 5.4 % (ref 4.8–5.6)
HCT VFR BLD AUTO: 31.1 % (ref 34–46.6)
HGB BLD-MCNC: 9.7 G/DL (ref 11.1–15.9)
MCH RBC QN AUTO: 28.6 PG (ref 26.6–33)
MCHC RBC AUTO-ENTMCNC: 31.2 G/DL (ref 31.5–35.7)
MCV RBC AUTO: 92 FL (ref 79–97)
MORPHOLOGY BLD-IMP: ABNORMAL
PLATELET # BLD AUTO: 94 X10E3/UL (ref 150–450)
POTASSIUM SERPL-SCNC: 3.8 MMOL/L (ref 3.5–5.2)
PROT SERPL-MCNC: 6.9 G/DL (ref 6–8.5)
RBC # BLD AUTO: 3.39 X10E6/UL (ref 3.77–5.28)
SODIUM SERPL-SCNC: 146 MMOL/L (ref 134–144)
TSH SERPL DL<=0.005 MIU/L-ACNC: 0.34 UIU/ML (ref 0.45–4.5)
WBC # BLD AUTO: 3.7 X10E3/UL (ref 3.4–10.8)

## 2021-02-16 NOTE — PROGRESS NOTES
Spoke with patient using 2 identifiers. Patient was informed of recent findings and  Dr. Tequila Vasquez recommendations. Mailed copy of patient's   Labs. Patient verbalized understanding.

## 2021-02-16 NOTE — PROGRESS NOTES
Tell pt her bood cell counts are stable--anemia is improving, platlets are low due to liver dz but stable. Kidneys back to baseline--restart lasix. Normal glucose average--diet controlled diabetes  Thyroid level is too high on levothyroxine 175 mg every day and extra 1/2 tab one day per week---lower the dose to 175 mcg every day except 1/2 tab qsaturday---not 1 1/2 tabs q Saturday.

## 2021-03-02 ENCOUNTER — TRANSCRIBE ORDER (OUTPATIENT)
Dept: SCHEDULING | Age: 76
End: 2021-03-02

## 2021-03-02 DIAGNOSIS — R93.3 ABNORMAL FINDING ON GI TRACT IMAGING: Primary | ICD-10-CM

## 2021-03-12 ENCOUNTER — HOSPITAL ENCOUNTER (OUTPATIENT)
Dept: MRI IMAGING | Age: 76
Discharge: HOME | End: 2021-03-12
Attending: INTERNAL MEDICINE
Payer: MEDICARE

## 2021-03-12 VITALS — WEIGHT: 190 LBS | BODY MASS INDEX: 33.66 KG/M2

## 2021-03-12 DIAGNOSIS — R93.3 ABNORMAL FINDING ON GI TRACT IMAGING: ICD-10-CM

## 2021-03-12 PROCEDURE — A9585 GADOBUTROL INJECTION: HCPCS | Performed by: INTERNAL MEDICINE

## 2021-03-12 PROCEDURE — 74183 MRI ABD W/O CNTR FLWD CNTR: CPT

## 2021-03-12 PROCEDURE — 74011250636 HC RX REV CODE- 250/636: Performed by: INTERNAL MEDICINE

## 2021-03-12 RX ADMIN — GADOBUTROL 9 ML: 604.72 INJECTION INTRAVENOUS at 10:14

## 2021-05-12 RX ORDER — AMLODIPINE BESYLATE 10 MG/1
10 TABLET ORAL DAILY
Qty: 90 TAB | Refills: 0 | Status: SHIPPED | OUTPATIENT
Start: 2021-05-12 | End: 2021-08-13

## 2021-05-16 NOTE — PROGRESS NOTES
HISTORY OF PRESENT ILLNESS  Rajwinder Palomares is a 68 y.o. female.   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 , liver cirrhosis with splenomegaly and portal HTN   plan is for repat MRI in June    Last OV     Hx DM-2 htn HLD gout hypothyroid osteoporosis BRADY with fibrosis--[facility] hepatology, suspected cirrhosis-Dr Olmos and medicare wellness---  Had MICHELLE, acute gout of hand and incidental pancreatic mass last OV -APURVA  Was referred back to nephrologist for MICHELLE--Dr white GI for pancreatic mass at uncinate process -----  Repeat bun/cr 36/1.54  Creatinine was down 1.1 3-4 weeks ago-Dr Wendy Rinaldi has fu next month  Will stat on allopurinial and colcrys per Dr Wendy Rinaldi  [id number] systolic with home cuff  fsbs around 115-120    Patient Active Problem List    Diagnosis Date Noted    MICHELLE (acute kidney injury) ([address] 10/27/2020    Cellulitis of left lower leg 11/07/2019    Severe obesity ([address] 09/30/2019    Peripheral vascular disease ([address] 09/30/2019    Non-pressure chronic ulcer of left lower leg, limited to breakdown of skin ([address] 08/05/2019    Controlled type 2 diabetes mellitus without complication ([address] 98/92/6809    DDD (degenerative disc disease), lumbar 11/27/2017    Prediabetes 04/11/2016    Thrombocytopenia ([address] 01/24/2015    HTN (hypertension) 01/17/2014    Bifascicular block 12/23/2010    Fatty liver 06/18/2010    Pure hypercholesterolemia 06/18/2010    Colon polyp 06/18/2010    Gout 06/14/2010    Hypothyroidism 06/14/2010    Osteoporosis 06/14/2010    Anxiety 06/14/2010    Leg edema 06/14/2010    RSD lower limb 06/14/2010     Current Outpatient Medications   Medication Sig Dispense Refill    amLODIPine (NORVASC) 10 mg tablet Take 1 Tab by mouth daily. 90 Tab 0    furosemide (LASIX) 80 mg tablet Take 1 Tab by mouth daily. 90 Tab 0    colchicine 0.6 mg tablet Take 1 Tab by mouth daily. 30 Tab 5    omega-3 acid ethyl esters (LOVAZA) 1 gram capsule Take 2 Caps by mouth two (2) times daily (with meals). 360 Cap 3    potassium chloride SA (MICRO-K) 10 mEq capsule Take 2 Caps by mouth daily. 180 Cap 3    levothyroxine (SYNTHROID) 175 mcg tablet TAKE ONE DAILY BY MOUTH. TAKE AN EXTRA 1/2 PILL ON SUNDAYS. (7.5 TABLETS/WEEK  MCG) 96 Tab 3    fluticasone propionate (FLONASE) 50 mcg/actuation nasal spray 2 Sprays by Both Nostrils route daily. Administer to right and left nostril. 3 Bottle 3    atorvastatin (LIPITOR) 20 mg tablet TAKE 1 TABLET DAILY 90 Tab 3    ZETIA 10 mg tablet Take 1 tablet by mouth daily. 90 Tab 3    ketoconazole (NIZORAL) 2 % topical cream Apply  to affected area daily as needed. 3    docusate sodium (COLACE) 50 mg capsule Take 100 mg by mouth daily as needed for Constipation.  Biotin 2,500 mcg cap Take  by mouth.  vitamin E (AQUA GEMS) 400 unit capsule Take 800 Units by mouth daily.  cholecalciferol, vitamin D3, (VITAMIN D3) 2,000 unit tab Take 1 Tab by mouth daily.  MULTIVITAMIN WITH MINERALS (ONE-A-DAY 50 PLUS PO) Take 1 Tab by mouth daily.  aspirin 81 mg chewable tablet Take 81 mg by mouth daily.  famotidine (PEPCID) 40 mg tablet Take 1 Tab by mouth daily. 90 Tab 3    polyethylene glycol (MIRALAX) 17 gram/dose powder Take 17 g by mouth daily. 2108 g 3    loratadine (CLARITIN) 10 mg tablet Take 1 Tab by mouth daily. Indications: inflammation of the nose due to an allergy 90 Tab 3    L GASSERI/B BIFIDUM/B LONGUM ([employer] PO) Take 1 Tab by mouth daily.  lidocaine (LIDODERM) 5 %(700 mg/patch) 1 patch by TransDERmal route every twenty-four (24) hours.  Apply patch to the affected area for 12 hours a day and remove for 12 hours a day.  traMADol (ULTRAM) 50 mg tablet Take 1 tablet by mouth every six (6) hours as needed for Pain. 12 tablet 0     Allergies   Allergen Reactions    Gabapentin Other (comments)    Celebrex [Celecoxib] Hives    Pcn [Penicillins] Unknown (comments)     Can't remember    Sulfa (Sulfonamide Antibiotics) Hives      Lab Results   Component Value Date/Time    WBC 3.7 02/15/2021 12:00 PM    HGB 9.7 (L) 02/15/2021 12:00 PM    HCT 31.1 (L) 02/15/2021 12:00 PM    PLATELET 94 (LL) 32/03/1509 12:00 PM    MCV 92 02/15/2021 12:00 PM     Lab Results   Component Value Date/Time    GFR est non-AA 51 (L) 02/15/2021 12:00 PM    GFR est AA 59 (L) 02/15/2021 12:00 PM    Creatinine 1.06 (H) 02/15/2021 12:00 PM    BUN 25 02/15/2021 12:00 PM    Sodium 146 (H) 02/15/2021 12:00 PM    Potassium 3.8 02/15/2021 12:00 PM    Chloride 108 (H) 02/15/2021 12:00 PM    CO2 23 02/15/2021 12:00 PM    Magnesium 2.1 11/02/2020 02:06 AM    Phosphorus 3.6 11/03/2020 02:55 AM    PTH, Intact 31 01/06/2016 09:55 AM     Lab Results   Component Value Date/Time    TSH 0.335 (L) 02/15/2021 12:00 PM    T4, Free 1.73 05/20/2019 02:58 PM         ROS    Physical Exam  Vitals signs and nursing note reviewed. Constitutional:       Appearance: She is well-developed. She is obese. Comments: Appears stated age   Cardiovascular:      Rate and Rhythm: Normal rate and regular rhythm. Heart sounds: Murmur present. No friction rub. No gallop. Comments: 2/6 QAMAR RUSB  Pulmonary:      Effort: Pulmonary effort is normal. No respiratory distress. Breath sounds: Normal breath sounds. No wheezing. Abdominal:      General: Bowel sounds are normal.      Palpations: Abdomen is soft. Neurological:      Mental Status: She is alert. ASSESSMENT and PLAN  Diagnoses and all orders for this visit:    1. Acquired hypothyroidism  -     TSH 3RD GENERATION; Future    2. Pancreatic cyst    3. Controlled type 2 diabetes mellitus without complication, without long-term current use of insulin (HCC)  -     HEMOGLOBIN A1C WITH EAG; Future    4. Heart murmur  -     ECHO ADULT COMPLETE; Future    5. Medicare annual wellness visit, subsequent           This is the Subsequent Medicare Annual Wellness Exam, performed 12 months or more after the Initial AWV or the last Subsequent AWV    I have reviewed the patient's medical history in detail and updated the computerized patient record. Assessment/Plan   Education and counseling provided:  Are appropriate based on today's review and evaluation  End-of-Life planning (with patient's consent)-see ACP note  covid and shingrix recommended    1. Acquired hypothyroidism  -     TSH 3RD GENERATION; Future   Will lower dose of Lt4 pending labs-discussed  2. Pancreatic cyst    Repeat scan per GI MD-reviewed last MRCP  3. Controlled type 2 diabetes mellitus without complication, without long-term current use of insulin (HCC)  -     HEMOGLOBIN A1C WITH EAG; Future   Off metformin   Pt requests referral to CDE for DM-2 and obesity  4. Heart murmur  -     ECHO ADULT COMPLETE; Future  5. Medicare annual wellness visit, subsequent       Depression Risk Factor Screening     3 most recent PHQ Screens 5/17/2021   Little interest or pleasure in doing things Not at all   Feeling down, depressed, irritable, or hopeless Not at all   Total Score PHQ 2 0       Alcohol Risk Screen    Do you average more than 1 drink per night or more than 7 drinks a week:  No    On any one occasion in the past three months have you have had more than 3 drinks containing alcohol:  No        Functional Ability and Level of Safety    Hearing: Hearing is good. Activities of Daily Living:   The home contains: handrails and grab bars  Patient needs help with:  transportation, shopping, preparing meals, laundry, housework, bathing and hygiene      Ambulation: with difficulty, uses a rollator     Fall Risk:  Fall Risk Assessment, last 12 mths 5/17/2021   Able to walk? Yes   Fall in past 12 months? 0   Do you feel unsteady? 0   Are you worried about falling 0      Abuse Screen:  Patient is not abused       Cognitive Screening    Has your family/caregiver stated any concerns about your memory: no     Cognitive Screening: Normal - serial 3    Health Maintenance Due     Health Maintenance Due   Topic Date Due    COVID-19 Vaccine (1) Never done    Shingrix Vaccine Age 50> (1 of 2) Never done    Foot Exam Q1  11/21/2018    Eye Exam Retinal or Dilated  09/17/2020    Medicare Yearly Exam  09/30/2020       Patient Care Team   Patient Care Team:  Stephanie Turcios MD as PCP - Geneva New MD as PCP - [facility] Empaneled Provider  Edward Duque MD as Consulting Provider (Endocrinology)  Clementina Aden MD (Obstetrics & Gynecology)  Bigg Rizvi MD (Ophthalmology)  Nora Palacio MD (Dermatology)  CATY Andrew MD (Orthopedic Surgery)    History     Patient Active Problem List   Diagnosis Code    Gout M10.9    Hypothyroidism E03.9    Osteoporosis M81.0    Anxiety F41.9    Leg edema R60.0    RSD lower limb G90.529    Fatty liver K76.0    Pure hypercholesterolemia E78.00    Colon polyp K63.5    Bifascicular block I45.2    HTN (hypertension) I10    Thrombocytopenia ([address] D69.6    Prediabetes R73.03    DDD (degenerative disc disease), lumbar M51.36    Controlled type 2 diabetes mellitus without complication ([address] L23.7    Non-pressure chronic ulcer of left lower leg, limited to breakdown of skin ([address] L97.921    Severe obesity ([address] E66.01    Peripheral vascular disease ([address] I73.9    Cellulitis of left lower leg L03. 80    MICHELLE (acute kidney injury) ([address] N17.9     Past Medical History:   Diagnosis Date    Arthritis     KNEE,gout    Endocrine disease     HYPOTHYROIDISM    Fracture     Left distal femur (age 12 - pt fell)    Fracture     Left tibia 1990 (pt fell)    Fracture     Right wrist fractured 2 times (pt fell)    GERD (gastroesophageal reflux disease)     Hypertension     Hypoglycemia     \"my body produces too much insulin\"    Liver disease     BRADY    Nausea & vomiting     when had gallbladder out    Osteoporosis     Other ill-defined conditions(799.89)     spinal stenosis    Other ill-defined conditions(799.89)     BBB    Other ill-defined conditions(799.89)     HIGH CHOLESTEROL    Other ill-defined conditions(799.89)     edema legs    Thyroid disease       Past Surgical History:   Procedure Laterality Date    COLONOSCOPY,REMV LESN,SNARE  2/11/2015         COLORECTAL SCRN; HI RISK IND  2/11/2015         HX CHOLECYSTECTOMY      HX HYSTERECTOMY      HX ORTHOPAEDIC      left leg surgery - plates, screws, wires, pins in place    HX UROLOGICAL      PESSURY    DC ABDOMEN SURGERY PROC UNLISTED      liver biopsy--BRADY and fibrosis    UPPER GI ENDOSCOPY,BIOPSY  11/17/2015          Current Outpatient Medications   Medication Sig Dispense Refill    amLODIPine (NORVASC) 10 mg tablet Take 1 Tab by mouth daily. 90 Tab 0    furosemide (LASIX) 80 mg tablet Take 1 Tab by mouth daily. 90 Tab 0    colchicine 0.6 mg tablet Take 1 Tab by mouth daily. 30 Tab 5    omega-3 acid ethyl esters (LOVAZA) 1 gram capsule Take 2 Caps by mouth two (2) times daily (with meals). 360 Cap 3    potassium chloride SA (MICRO-K) 10 mEq capsule Take 2 Caps by mouth daily. 180 Cap 3    levothyroxine (SYNTHROID) 175 mcg tablet TAKE ONE DAILY BY MOUTH. TAKE AN EXTRA 1/2 PILL ON SUNDAYS. (7.5 TABLETS/WEEK  MCG) 96 Tab 3    fluticasone propionate (FLONASE) 50 mcg/actuation nasal spray 2 Sprays by Both Nostrils route daily. Administer to right and left nostril. 3 Bottle 3    atorvastatin (LIPITOR) 20 mg tablet TAKE 1 TABLET DAILY 90 Tab 3    ZETIA 10 mg tablet Take 1 tablet by mouth daily.  90 Tab 3    ketoconazole (NIZORAL) 2 % topical cream Apply  to affected area daily as needed. 3    docusate sodium (COLACE) 50 mg capsule Take 100 mg by mouth daily as needed for Constipation.  Biotin 2,500 mcg cap Take  by mouth.  vitamin E (AQUA GEMS) 400 unit capsule Take 800 Units by mouth daily.  cholecalciferol, vitamin D3, (VITAMIN D3) 2,000 unit tab Take 1 Tab by mouth daily.  MULTIVITAMIN WITH MINERALS (ONE-A-DAY 50 PLUS PO) Take 1 Tab by mouth daily.  aspirin 81 mg chewable tablet Take 81 mg by mouth daily.  famotidine (PEPCID) 40 mg tablet Take 1 Tab by mouth daily. 90 Tab 3    polyethylene glycol (MIRALAX) 17 gram/dose powder Take 17 g by mouth daily. 2108 g 3    loratadine (CLARITIN) 10 mg tablet Take 1 Tab by mouth daily. Indications: inflammation of the nose due to an allergy 90 Tab 3    L GASSERI/B BIFIDUM/B LONGUM ([employer] PO) Take 1 Tab by mouth daily.  lidocaine (LIDODERM) 5 %(700 mg/patch) 1 patch by TransDERmal route every twenty-four (24) hours. Apply patch to the affected area for 12 hours a day and remove for 12 hours a day.  traMADol (ULTRAM) 50 mg tablet Take 1 tablet by mouth every six (6) hours as needed for Pain.  12 tablet 0     Allergies   Allergen Reactions    Gabapentin Other (comments)    Celebrex [Celecoxib] Hives    Pcn [Penicillins] Unknown (comments)     Can't remember    Sulfa (Sulfonamide Antibiotics) Hives       Family History   Problem Relation Age of Onset    Diabetes Mother     Heart Disease Mother     Lung Disease Father         copd     Social History     Tobacco Use    Smoking status: Never Smoker    Smokeless tobacco: Never Used   Substance Use Topics    Alcohol use: No         Diaz Sosa MD

## 2021-05-17 ENCOUNTER — OFFICE VISIT (OUTPATIENT)
Dept: INTERNAL MEDICINE CLINIC | Age: 76
End: 2021-05-17
Payer: MEDICARE

## 2021-05-17 ENCOUNTER — PATIENT OUTREACH (OUTPATIENT)
Dept: INTERNAL MEDICINE CLINIC | Age: 76
End: 2021-05-17

## 2021-05-17 VITALS
OXYGEN SATURATION: 97 % | BODY MASS INDEX: 34.91 KG/M2 | SYSTOLIC BLOOD PRESSURE: 145 MMHG | RESPIRATION RATE: 16 BRPM | WEIGHT: 197 LBS | HEART RATE: 64 BPM | DIASTOLIC BLOOD PRESSURE: 66 MMHG | TEMPERATURE: 97.1 F | HEIGHT: 63 IN

## 2021-05-17 DIAGNOSIS — K86.2 PANCREATIC CYST: ICD-10-CM

## 2021-05-17 DIAGNOSIS — E11.9 CONTROLLED TYPE 2 DIABETES MELLITUS WITHOUT COMPLICATION, WITHOUT LONG-TERM CURRENT USE OF INSULIN (HCC): ICD-10-CM

## 2021-05-17 DIAGNOSIS — Z00.00 MEDICARE ANNUAL WELLNESS VISIT, SUBSEQUENT: ICD-10-CM

## 2021-05-17 DIAGNOSIS — R01.1 HEART MURMUR: ICD-10-CM

## 2021-05-17 DIAGNOSIS — E03.9 ACQUIRED HYPOTHYROIDISM: Primary | ICD-10-CM

## 2021-05-17 PROCEDURE — G8427 DOCREV CUR MEDS BY ELIG CLIN: HCPCS | Performed by: INTERNAL MEDICINE

## 2021-05-17 PROCEDURE — G8754 DIAS BP LESS 90: HCPCS | Performed by: INTERNAL MEDICINE

## 2021-05-17 PROCEDURE — G0439 PPPS, SUBSEQ VISIT: HCPCS | Performed by: INTERNAL MEDICINE

## 2021-05-17 PROCEDURE — 1090F PRES/ABSN URINE INCON ASSESS: CPT | Performed by: INTERNAL MEDICINE

## 2021-05-17 PROCEDURE — G8417 CALC BMI ABV UP PARAM F/U: HCPCS | Performed by: INTERNAL MEDICINE

## 2021-05-17 PROCEDURE — G8536 NO DOC ELDER MAL SCRN: HCPCS | Performed by: INTERNAL MEDICINE

## 2021-05-17 PROCEDURE — G8753 SYS BP > OR = 140: HCPCS | Performed by: INTERNAL MEDICINE

## 2021-05-17 PROCEDURE — G8510 SCR DEP NEG, NO PLAN REQD: HCPCS | Performed by: INTERNAL MEDICINE

## 2021-05-17 PROCEDURE — 1101F PT FALLS ASSESS-DOCD LE1/YR: CPT | Performed by: INTERNAL MEDICINE

## 2021-05-17 PROCEDURE — 99214 OFFICE O/P EST MOD 30 MIN: CPT | Performed by: INTERNAL MEDICINE

## 2021-05-17 NOTE — PROGRESS NOTES
Received: Today  Message Contents   Jenny Phipps, RN             Dr Brock Angulo wants patient to meet with you. Lab Results   Component Value Date/Time    Hemoglobin A1c 5.4 02/15/2021 12:00 PM    Hemoglobin A1c 6.7 (H) 06/03/2020 10:42 AM    Hemoglobin A1c 6.6 (H) 09/30/2019 09:41 AM    Hemoglobin A1c, External 7.0 07/29/2020    Hemoglobin A1c, External 6.4 06/21/2016     It is noted that a1c was ordered today. Spoke to pt who stated she would get her labs done tomorrow. Pt scheduled appt for telephonic dsme on June 8th at 10:00, which is pending her lab results. If her a1c is same as February, she would like to cancel the appt. Lab Results   Component Value Date/Time    Hemoglobin A1c 5.9 (H) 05/19/2021 10:49 AM    Hemoglobin A1c 5.4 02/15/2021 12:00 PM    Hemoglobin A1c 6.7 (H) 06/03/2020 10:42 AM    Hemoglobin A1c, External 7.0 07/29/2020 12:00 AM    Hemoglobin A1c, External 6.4 06/21/2016 12:00 AM     Left pt message asking for a return call regarding keeping or canceling her scheduled telephonic dsme appt on 6/8/21 at 10:00. Her a1c was 5.9% on 5/19/21. Pt wants to keep education appt, but rescheduled to June 18th at 9:30. Pt has an appt with Dr Maryjane Rubinstein on June 10th. Advised pt to ask him about whether she needs to be on a renal diet.

## 2021-05-17 NOTE — PATIENT INSTRUCTIONS
Office Policies Phone calls/patient messages: Please allow up to 24 hours for someone in the office to contact you about your call or message. Be mindful your provider may be out of the office or your message may require further review. We encourage you to use [employer] for your messages as this is a faster, more efficient way to communicate with our office Medication Refills: 
         
Prescription medications require 48-72 business hours to process. We encourage you to use [employer] for your refills. For controlled medications: Please allow 72 business hours to process. Certain medications may require you to  a written prescription at our office. NO narcotic/controlled medications will be prescribed after 4pm Monday through Friday or on weekends Form/Paperwork Completion: 
         
Please note a $25 fee may incur for all paperwork for completed by our providers. We ask that you allow 7-10 business days. Pre-payment is due prior to picking up/faxing the completed form. You may also download your forms to [employer] to have your doctor print off. Medicare Wellness Visit, Female The best way to live healthy is to have a lifestyle where you eat a well-balanced diet, exercise regularly, limit alcohol use, and quit all forms of tobacco/nicotine, if applicable. Regular preventive services are another way to keep healthy. Preventive services (vaccines, screening tests, monitoring & exams) can help personalize your care plan, which helps you manage your own care. Screening tests can find health problems at the earliest stages, when they are easiest to treat. [facility] follows the current, evidence-based guidelines published by the [facility] (USPSTF) when recommending preventive services for our patients.  Because we follow these guidelines, sometimes recommendations change over time as research supports it. (For example, mammograms used to be recommended annually. Even though Medicare will still pay for an annual mammogram, the newer guidelines recommend a mammogram every two years for women of average risk). Of course, you and your doctor may decide to screen more often for some diseases, based on your risk and your co-morbidities (chronic disease you are already diagnosed with). Preventive services for you include: - Medicare offers their members a free annual wellness visit, which is time for you and your primary care provider to discuss and plan for your preventive service needs. Take advantage of this benefit every year! 
-All adults over the age of 72 should receive the recommended pneumonia vaccines. Current USPSTF guidelines recommend a series of two vaccines for the best pneumonia protection.  
-All adults should have a flu vaccine yearly and a tetanus vaccine every 10 years.  
-All adults age 48 and older should receive the shingles vaccines (series of two vaccines). -All adults age 38-68 who are overweight should have a diabetes screening test once every three years.  
-All adults born between [birth year] and 1965 should be screened once for Hepatitis C. 
-Other screening tests and preventive services for persons with diabetes include: an eye exam to screen for diabetic retinopathy, a kidney function test, a foot exam, and stricter control over your cholesterol.  
-Cardiovascular screening for adults with routine risk involves an electrocardiogram (ECG) at intervals determined by your doctor.  
-Colorectal cancer screenings should be done for adults age 54-65 with no increased risk factors for colorectal cancer. There are a number of acceptable methods of screening for this type of cancer. Each test has its own benefits and drawbacks. Discuss with your doctor what is most appropriate for you during your annual wellness visit.  The different tests include: colonoscopy (considered the best screening method), a fecal occult blood test, a fecal DNA test, and sigmoidoscopy. 
 
-A bone mass density test is recommended when a woman turns 65 to screen for osteoporosis. This test is only recommended one time, as a screening. Some providers will use this same test as a disease monitoring tool if you already have osteoporosis. -Breast cancer screenings are recommended every other year for women of normal risk, age 54-69. 
-Cervical cancer screenings for women over age 72 are only recommended with certain risk factors. Here is a list of your current Health Maintenance items (your personalized list of preventive services) with a due date: 
Health Maintenance Due Topic Date Due  
 COVID-19 Vaccine (1) Never done  Shingles Vaccine (1 of 2) Never done [facility]  11/21/2018 [facility] Eye Exam  09/17/2020 [facility] Annual Well Visit  09/30/2020

## 2021-05-18 ENCOUNTER — TELEPHONE (OUTPATIENT)
Dept: INTERNAL MEDICINE CLINIC | Age: 76
End: 2021-05-18

## 2021-05-18 NOTE — TELEPHONE ENCOUNTER
Left message for patient to call concerning paper work related to her getting diabetic shoes from [facility].

## 2021-05-20 LAB
EST. AVERAGE GLUCOSE BLD GHB EST-MCNC: 123 MG/DL
HBA1C MFR BLD: 5.9 % (ref 4.8–5.6)
TSH SERPL DL<=0.005 MIU/L-ACNC: 0.3 UIU/ML (ref 0.45–4.5)

## 2021-05-20 RX ORDER — LEVOTHYROXINE SODIUM 150 UG/1
150 TABLET ORAL
Qty: 90 TABLET | Refills: 3 | Status: SHIPPED | OUTPATIENT
Start: 2021-05-20 | End: 2022-06-15 | Stop reason: SDUPTHER

## 2021-05-20 NOTE — PROGRESS NOTES
Tell pt her glucose levels are controlled with diet  Her thyroid level is too high--lower dose of levothyroxine to 150 mcg every day -will escribe

## 2021-05-20 NOTE — PROGRESS NOTES
Called, spoke with Pt. Two pt identifiers confirmed. Pt given lab results per Dr. Pardo Course. Pt informed of new medication sent to the pharmacy. Pt asked for a letter be mailed to her.   -mailed letter to patient. Pt verbalized understanding of information discussed w/ no further questions at this time.

## 2021-05-25 ENCOUNTER — HOSPITAL ENCOUNTER (OUTPATIENT)
Dept: NON INVASIVE DIAGNOSTICS | Age: 76
Discharge: HOME | End: 2021-05-25
Attending: INTERNAL MEDICINE
Payer: MEDICARE

## 2021-05-25 VITALS
WEIGHT: 197.09 LBS | SYSTOLIC BLOOD PRESSURE: 145 MMHG | HEIGHT: 63 IN | BODY MASS INDEX: 34.92 KG/M2 | DIASTOLIC BLOOD PRESSURE: 66 MMHG

## 2021-05-25 DIAGNOSIS — R01.1 HEART MURMUR: ICD-10-CM

## 2021-05-25 LAB
ECHO AO ROOT DIAM: 2.54 CM
ECHO AV AREA PEAK VELOCITY: 1.58 CM2
ECHO AV AREA VTI: 1.66 CM2
ECHO AV AREA/BSA PEAK VELOCITY: 0.8 CM2/M2
ECHO AV AREA/BSA VTI: 0.9 CM2/M2
ECHO AV MEAN GRADIENT: 15.69 MMHG
ECHO AV PEAK GRADIENT: 30.98 MMHG
ECHO AV PEAK VELOCITY: 278.08 CM/S
ECHO AV VTI: 58.08 CM
ECHO EST RA PRESSURE: 10 MMHG
ECHO LA AREA 4C: 20.16 CM2
ECHO LA MAJOR AXIS: 3.29 CM
ECHO LA MINOR AXIS: 1.71 CM
ECHO LA VOL 4C: 61.73 ML (ref 22–52)
ECHO LA VOLUME INDEX A4C: 32.15 ML/M2 (ref 16–28)
ECHO LV E' LATERAL VELOCITY: 12.27 CM/S
ECHO LV E' SEPTAL VELOCITY: 10.55 CM/S
ECHO LV INTERNAL DIMENSION DIASTOLIC: 4.29 CM (ref 3.9–5.3)
ECHO LV INTERNAL DIMENSION SYSTOLIC: 3.07 CM
ECHO LV IVSD: 0.97 CM (ref 0.6–0.9)
ECHO LV MASS 2D: 139 G (ref 67–162)
ECHO LV MASS INDEX 2D: 72.4 G/M2 (ref 43–95)
ECHO LV POSTERIOR WALL DIASTOLIC: 1 CM (ref 0.6–0.9)
ECHO LVOT CARDIAC OUTPUT: 6.74 LITER/MINUTE
ECHO LVOT DIAM: 1.69 CM
ECHO LVOT PEAK GRADIENT: 15.43 MMHG
ECHO LVOT PEAK VELOCITY: 196.39 CM/S
ECHO LVOT SV: 96.2 ML
ECHO LVOT VTI: 42.9 CM
ECHO MV A VELOCITY: 114.3 CM/S
ECHO MV AREA PHT: 4.71 CM2
ECHO MV AREA VTI: 2.4 CM2
ECHO MV E DECELERATION TIME (DT): 191.63 MS
ECHO MV E VELOCITY: 133.2 CM/S
ECHO MV E/A RATIO: 1.17
ECHO MV E/E' LATERAL: 10.86
ECHO MV E/E' RATIO (AVERAGED): 11.74
ECHO MV E/E' SEPTAL: 12.63
ECHO MV MAX VELOCITY: 147.91 CM/S
ECHO MV MEAN GRADIENT: 4.04 MMHG
ECHO MV PEAK GRADIENT: 8.75 MMHG
ECHO MV PRESSURE HALF TIME (PHT): 46.74 MS
ECHO MV VTI: 40.07 CM
ECHO PV MAX VELOCITY: 204.53 CM/S
ECHO PV MEAN GRADIENT: 8.6 MMHG
ECHO PV PEAK INSTANTANEOUS GRADIENT SYSTOLIC: 16.73 MMHG
ECHO PV VTI: 40.77 CM
ECHO RIGHT VENTRICULAR SYSTOLIC PRESSURE (RVSP): 53.13 MMHG
ECHO TV REGURGITANT MAX VELOCITY: 327.23 CM/S
ECHO TV REGURGITANT MAX VELOCITY: 544.78 CM/S
ECHO TV REGURGITANT PEAK GRADIENT: 43.13 MMHG
LVOT MG: 8.51 MMHG

## 2021-05-25 PROCEDURE — 93306 TTE W/DOPPLER COMPLETE: CPT

## 2021-05-25 NOTE — PROGRESS NOTES
Tell pt normal EF.   Mild aortic valve stiffness and calcium on the valve---advise to see cardiologist in 3-6 months-Dr Miguel Dominguez or associate for aortic stenosis

## 2021-05-28 ENCOUNTER — TELEPHONE (OUTPATIENT)
Dept: INTERNAL MEDICINE CLINIC | Age: 76
End: 2021-05-28

## 2021-05-28 NOTE — TELEPHONE ENCOUNTER
Called patient. ID verified with Name and . Spoke with patient in regards to lab results. Provided patient with information for Dr. Giovanny Damon at this time. No further actions required at this time.

## 2021-05-28 NOTE — TELEPHONE ENCOUNTER
----- Message from Monalisa Mata MD sent at 5/25/2021  7:02 PM EDT -----  Tell pt normal EF.   Mild aortic valve stiffness and calcium on the valve---advise to see cardiologist in 3-6 months-Dr Giovani Vogt or associate for aortic stenosis

## 2021-06-03 RX ORDER — FUROSEMIDE 80 MG/1
80 TABLET ORAL DAILY
Qty: 90 TABLET | Refills: 0 | Status: SHIPPED | OUTPATIENT
Start: 2021-06-03 | End: 2021-06-14

## 2021-06-08 ENCOUNTER — TELEPHONE (OUTPATIENT)
Dept: INTERNAL MEDICINE CLINIC | Age: 76
End: 2021-06-08

## 2021-06-08 NOTE — TELEPHONE ENCOUNTER
[phone]      States needs call back regarding a form that was being discussed with nurse and that was supposed to be sent to patient.  (form--test results)      Pt states the form was test results. Please call patient.   [phone]

## 2021-06-18 ENCOUNTER — PATIENT OUTREACH (OUTPATIENT)
Dept: INTERNAL MEDICINE CLINIC | Age: 76
End: 2021-06-18

## 2021-06-18 DIAGNOSIS — E11.9 DIABETES MELLITUS TYPE 2, DIET-CONTROLLED (HCC): Primary | ICD-10-CM

## 2021-06-18 NOTE — PATIENT INSTRUCTIONS
Goals/Plan: 
-A1c was 5.9% on 5/19/21, which is an average blood sugar of 123 and is in the prediabetes range (very well controlled) 
-you want to get weight down by adding walking and eating healthier 
-the exercise goal is 150 minutes (2 & 1/2 hours) of moderate exercise weekly like a brisk walk; proceed as tolerated and use rollator as needed 
-strive to start following the healthy plate meal plan being mindful of what is a carb (fruit, dairy, grains, starchy vegetables) and portion size. As a rule, 1/2 cup of a carb food is a serving size (15 grams). The guideline is up to 45 grams of carb per meal (3 meals per day) or 3 servings per meal.  A snack guideline is- 1 oz. protein serving and may add 1 carb serving (15 grams) -be mindful of eating healthy fats 
-eat snacks with protein 
-start reading nutrition labels paying attention to serving size and total # of carbs in grams per serving 
-check fasting blood sugar most days of the week; your goal is <130 
-schedule cardiology appt as instructed by Dr Melissa Lundberg (letter re-mailed) and call [phone] 
-schedule endo appt for thyroid management and call [phone] [facility] location at [phone] or [facility] [place] location at  [phone] 
-get second covid shot as scheduled 6/22/21 
-get IV iron as scheduled 6/24/21 
-follow up with Dr Melissa Lundberg as scheduled on November 19th at 10:30 
-follow up with [facility] on August 16th at 10:00 
-call [facility] with any questions at [phone]

## 2021-06-18 NOTE — PROGRESS NOTES
Was asked by Katlyn Leon to provide patient education for T2D and obesity. Last A1c was   Lab Results   Component Value Date/Time    Hemoglobin A1c 5.9 (H) 05/19/2021 10:49 AM    Hemoglobin A1c 5.4 02/15/2021 12:00 PM    Hemoglobin A1c 6.7 (H) 06/03/2020 10:42 AM    Hemoglobin A1c, External 7.0 07/29/2020 12:00 AM    Hemoglobin A1c, External 6.4 06/21/2016 12:00 AM     Pt attributes increase in A1c to pandemic and inability to be active and go on trips. Education was provided telephonically. Pt was at [employer] at [facility] during education session. Previous diabetes or pre diabetes education - has been to a dietician in the past.     Presentation/Accompanied by - telephonic encounter    Medical History -    Patient Active Problem List   Diagnosis Code    Gout M10.9    Hypothyroidism E03.9    Osteoporosis M81.0    Anxiety F41.9    Leg edema R60.0    RSD lower limb G90.529    Fatty liver K76.0    Pure hypercholesterolemia E78.00    Colon polyp K63.5    Bifascicular block I45.2    HTN (hypertension) I10    Thrombocytopenia (HCC) D69.6    Prediabetes R73.03    DDD (degenerative disc disease), lumbar M51.36    Controlled type 2 diabetes mellitus without complication (HCC) F71.7    Non-pressure chronic ulcer of left lower leg, limited to breakdown of skin ([address] L97.921    Severe obesity ([address] E66.01    Peripheral vascular disease (HCC) I73.9    Cellulitis of left lower leg L03. 116    MICHELLE (acute kidney injury) ([address] N17.9     Social History - lives in independent living at [facility]; has a caregiver M-F all day named Maliha Theresa; is an ambassador at [facility]    Diabetes History/Diabetes Family History - pt has had hyperglycemia for about 5-6 years; mother had diabetes and a cousin    Symptoms of high blood sugar - none    Motivation - wants to talk about weight    What is the hardest, or causing the most concern about caring for your diabetes/prediabetes at this time? (e.g. following a diet, medication, stress) -diet    AADE 7 Self-Care Behaviors-    1) Healthy Eating/Food Recall- Inell Gunner, caregiver, cooks for pt; or pt goes to the dining room at [facility]; rarely eats after 5:00or 5:30; dinner is lightest meal; eats salmon and chicken for the most part and is tired of chicken  BK - 8:30-9:30 - this morning had 1 biscuit and gravy in [facility] dining room, water; or 1 cup instant pack oatmeal  like peaches and cream or stawberries and cream, skim milk, water; or grits; or eggs, 1 ww toast, and maybe turkey perez, and low carb ww bread  LN - 12-1:00-fried cabbage and green beans, cornbread; or tossed salad tomatoes, onions, cucumbers, skinny dressing, water; may have a hamburger, fries   DN - 5:00-fruit like strawberries and bluberries last night and bag of popcorn later; or prepackaged snacks of broccoli or carrots with cheese or nuts  SN - occasional Acuna ice cream at 1:00; macadamia nuts, apple, skinny popcorn, berry mix  RADHA - sanka once in a while, unsweet tea, not crazy about artificial sweetener, but will use the pink packet; does not drink SF drinks, but does like Crystal Light    2) Being Active- pt used to walk at the mall; currently pt has been walking, but not much due to back problems; goes to [facility] on Tuesday and gets on the [employer] and learns about weight lifting; uses a rollator    3) Self Monitoring Blood Glucose (SMBG)- had CGM Freestyle, but insurance does not pay; now has One Touch and checks fasting blood sugars, but not every morning; last check was 127    How to treat a low blood sugar -  n/a    4) Taking Medication-   Key Antihyperglycemic Medications     Patient is on no antihyperglycemic meds.         Understanding - n/a    5) Problem Solving- pt is ready and willing to manage her high blood sugars by making adjustments to her treatment plan    6) Reducing Risk-   Tobacco - does not smoke  Hypertension - takes med  Hyperlipidemia - takes med  Antiplatelet agent- takes med   Current Outpatient Medications   Medication Instructions    amLODIPine (NORVASC) 10 mg, Oral, DAILY    aspirin 81 mg, Oral, DAILY    atorvastatin (LIPITOR) 20 mg tablet TAKE 1 TABLET DAILY    Biotin 2,500 mcg cap Oral    cholecalciferol, vitamin D3, (VITAMIN D3) 2,000 unit tab 1 Tablet, Oral, DAILY    colchicine 0.6 mg, Oral, DAILY    docusate sodium (COLACE) 100 mg, Oral, DAILY AS NEEDED    famotidine (PEPCID) 40 mg, Oral, DAILY    fluticasone propionate (FLONASE) 50 mcg/actuation nasal spray 2 Sprays, Both Nostrils, DAILY, Administer to right and left nostril.  furosemide (LASIX) 80 mg, Oral, DAILY    ketoconazole (NIZORAL) 2 % topical cream Topical, DAILY AS NEEDED    L GASSERI/B BIFIDUM/B LONGUM ([employer] PO) 1 Tablet, Oral, DAILY    levothyroxine (SYNTHROID) 150 mcg, Oral, DAILY BEFORE BREAKFAST    lidocaine (LIDODERM) 5 %(700 mg/patch) 1 Patch, TransDERmal, EVERY 24 HOURS, Apply patch to the affected area for 12 hours a day and remove for 12 hours a day.  loratadine (CLARITIN) 10 mg, Oral, DAILY    MULTIVITAMIN WITH MINERALS (ONE-A-DAY 50 PLUS PO) 1 Tablet, Oral, DAILY    omega-3 acid ethyl esters (LOVAZA) 2,000 mg, Oral, 2 TIMES DAILY WITH MEALS    polyethylene glycol (MIRALAX) 17 g, Oral, DAILY    potassium chloride SA (MICRO-K) 10 mEq capsule 20 mEq, Oral, DAILY    traMADoL (ULTRAM) 50 mg, Oral, EVERY 6 HOURS AS NEEDED    vitamin E (AQUA GEMS) 800 Units, Oral, DAILY    ZETIA 10 mg tablet Take 1 tablet by mouth daily. Discussed possible complications of uncontrolled diabetes ie fatigue, dehydration, damage to vital organs. 7) Healthy Coping-   Support system - caregiver Libia Richter and son Pt's are supportive in helping pt attain and maintain her diabetes health.  Pt will take advantage of the support and education provided today and of the support of her health care team.    Barriers identified - health issues - spinal stenosis    Health Maintenance Due:  Health Maintenance Due   Topic Date Due    COVID-19 Vaccine (1) Never done    Shingrix Vaccine Age 50> (1 of 2) Never done    Foot Exam Q1  11/21/2018    Eye Exam Retinal or Dilated  09/17/2020     Advance Care Planning - Advance Care Planning:   Does patient have an Advance Directive: not on file; education provided       Resources provided:    -Living With Type 2 Diabetes Create Your Plate section    -Label Reading Basics for Diabetes    -Carb Counting and Meal Planning    -Learning About Low-Fat Eating    -Low and No-Carb Snack Ideas for Diabetics    -Food logs     -Know Diabetes by Heart      DSMT Preference- n/a    Summary of top problems for patient -     1. Inactivity    2. Lack of knowledge in regards to heart healthy eating    [employer], Referrals, and Durable Medical Equipment - has One Touch glucometer    Plan / Pt Goals -       Future Appointments   Date Time Provider Hilary Dickinson   6/24/2021  1:00 PM CHAIR 3 [address] REG   7/8/2021  1:00 PM CHAIR 3 [address] REG   7/22/2021  1:00 PM [facility] CHAIR 2 [address] REG   11/19/2021 10:30 AM Adolm Romberg, MD [facility] BS AMB         Last Appointment My Department:  5/17/2021    Chart was routed to Dr Jim Appiah.     Iraj Woods, RN, BSN, 1 [facility] (Certified Diabetes Care and ), [facility], [address] [place] [address]  ([phone]; [phone])

## 2021-06-18 NOTE — Clinical Note
NIKOLE-I talked with pt today regarding a1c of 5.9% and weight loss. She is starting to get more active as we are coming out of the pandemic and learned a lot today about heart healthy diet and healthy plate. She is going to add some walking and read nutrition labels. She is also going to follow up with me and call me with any questions. She wanted to let you know that she is getting her second covid shot next week and her first iron infusion. I gave her the phone number to cardiology and resent the letter you sent that she did not receive. She will also schedule an endo appt for thyroid.  Thanks for referral.

## 2021-06-18 NOTE — ACP (ADVANCE CARE PLANNING)
Advance Care Planning - Advance Care Planning:   Does patient have an Advance Directive: not on file; education provided       Primary Decision Maker: Rufina Hastings - [phone]  Advance Care Planning 6/18/2021   Patient's Healthcare Decision Maker is: Legal Next of Kin   Confirm Advance Directive -   Patient Would Like to Complete Advance Directive -

## 2021-06-24 ENCOUNTER — HOSPITAL ENCOUNTER (OUTPATIENT)
Dept: INFUSION THERAPY | Age: 76
Discharge: HOME | End: 2021-06-24
Payer: MEDICARE

## 2021-06-24 VITALS
BODY MASS INDEX: 35.25 KG/M2 | SYSTOLIC BLOOD PRESSURE: 138 MMHG | RESPIRATION RATE: 18 BRPM | TEMPERATURE: 98 F | HEART RATE: 58 BPM | DIASTOLIC BLOOD PRESSURE: 51 MMHG | OXYGEN SATURATION: 100 % | WEIGHT: 199 LBS

## 2021-06-24 PROCEDURE — 74011250636 HC RX REV CODE- 250/636: Performed by: INTERNAL MEDICINE

## 2021-06-24 PROCEDURE — 96365 THER/PROPH/DIAG IV INF INIT: CPT

## 2021-06-24 RX ORDER — SODIUM CHLORIDE 9 MG/ML
25 INJECTION, SOLUTION INTRAVENOUS AS NEEDED
Status: DISCONTINUED | OUTPATIENT
Start: 2021-06-24 | End: 2021-06-25 | Stop reason: HOSPADM

## 2021-06-24 RX ORDER — ALLOPURINOL 100 MG/1
300 TABLET ORAL DAILY
COMMUNITY
End: 2022-10-17

## 2021-06-24 RX ORDER — SODIUM CHLORIDE 0.9 % (FLUSH) 0.9 %
5-10 SYRINGE (ML) INJECTION AS NEEDED
Status: DISCONTINUED | OUTPATIENT
Start: 2021-06-24 | End: 2021-06-25 | Stop reason: HOSPADM

## 2021-06-24 RX ADMIN — Medication 10 ML: at 15:10

## 2021-06-24 RX ADMIN — IRON SUCROSE 300 MG: 20 INJECTION, SOLUTION INTRAVENOUS at 13:39

## 2021-06-24 NOTE — PROGRESS NOTES
Pt arrived to [facility] ambulatory in no acute distress at 1250 for Venofer D1.  Assessment unremarkable. IV established in R AC without issue and positive blood return noted.     Patient denied having any symptoms of COVID-19, i.e. SOB, coughing, fever, or generally not feeling well. Also denies having been exposed to COVID-19 recently or having had any recent contact with family/friend that has a pending COVID test.    Visit Vitals  BP (!) 152/53 (BP 1 Location: Left upper arm, BP Patient Position: Sitting)   Pulse (!) 57   Temp 98 °F (36.7 °C)   Resp 18   Wt 90.3 kg (199 lb)   SpO2 100%   Breastfeeding No   BMI 35.25 kg/m²       The following medications administered:  Medications Administered     iron sucrose (VENOFER) 300 mg in 0.9% sodium chloride 250 mL IVPB     Admin Date  06/24/2021 Action  New Bag Dose  300 mg Rate  176.7 mL/hr Route  IntraVENous Administered By  Cecilia Franklin RN          sodium chloride (NS) flush 5-10 mL     Admin Date  06/24/2021 Action  Given Dose  10 mL Route  IntraVENous Administered By  Cecilia Franklin RN              Visit Vitals  BP (!) 138/51   Pulse (!) 58   Temp 98 °F (36.7 °C)   Resp 18   Wt 90.3 kg (199 lb)   SpO2 100%   Breastfeeding No   BMI 35.25 kg/m²       Pt tolerated treatment well. Pt observed for 30 minutes post infusion. Discharge instructions reviewed, handouts given. IV flushed per policy and removed, 2x2 and coban placed.  Pt discharged ambulatory in no acute distress at 1540, accompanied by caregiver. Next appointment 7/8/21 at 1300.

## 2021-07-08 ENCOUNTER — HOSPITAL ENCOUNTER (OUTPATIENT)
Dept: INFUSION THERAPY | Age: 76
Discharge: HOME | End: 2021-07-08
Payer: MEDICARE

## 2021-07-08 VITALS
BODY MASS INDEX: 36.4 KG/M2 | WEIGHT: 205.5 LBS | RESPIRATION RATE: 18 BRPM | HEART RATE: 62 BPM | DIASTOLIC BLOOD PRESSURE: 65 MMHG | TEMPERATURE: 98 F | SYSTOLIC BLOOD PRESSURE: 145 MMHG | OXYGEN SATURATION: 100 %

## 2021-07-08 PROCEDURE — 74011250636 HC RX REV CODE- 250/636: Performed by: INTERNAL MEDICINE

## 2021-07-08 PROCEDURE — 96365 THER/PROPH/DIAG IV INF INIT: CPT

## 2021-07-08 PROCEDURE — 96366 THER/PROPH/DIAG IV INF ADDON: CPT

## 2021-07-08 RX ORDER — SODIUM CHLORIDE 0.9 % (FLUSH) 0.9 %
10-40 SYRINGE (ML) INJECTION AS NEEDED
Status: DISCONTINUED | OUTPATIENT
Start: 2021-07-08 | End: 2021-07-09 | Stop reason: HOSPADM

## 2021-07-08 RX ADMIN — Medication 10 ML: at 13:17

## 2021-07-08 RX ADMIN — Medication 10 ML: at 15:20

## 2021-07-08 RX ADMIN — IRON SUCROSE 300 MG: 20 INJECTION, SOLUTION INTRAVENOUS at 13:46

## 2021-07-08 NOTE — PROGRESS NOTES
Pt arrived to [facility] ambulatory in no acute distress at  for Venofer day 14. Assessment unremarkable except fatigue, dyspnea with exertion, and joint pain. #24g PIV established in [facility] without issue and positive blood return noted. Patient denied having any symptoms of COVID-19, i.e. SOB, coughing, fever, or generally not feeling well. Also denies having been exposed to COVID-19 recently or having had any recent contact with family/friend that has a pending COVID test.    Patient Vitals for the past 12 hrs:   Temp Pulse Resp BP SpO2   07/08/21 1550  62 18 (!) 145/65    07/08/21 1309 98 °F (36.7 °C) 65 18 (!) 167/59 100 %       The following medications administered:  Venofer 300 mg IV over 90 minutes    Pt tolerated treatment well. No adverse reaction noted after monitoring patient for 30 minutes post infusion. IV flushed per policy and removed, 2x2 and coban placed. Pt discharged ambulatory in no acute distress at 1550, accompanied by caregiver. Next appointment 7/22/21 @ 1300.

## 2021-07-09 ENCOUNTER — HOSPITAL ENCOUNTER (OUTPATIENT)
Dept: PREADMISSION TESTING | Age: 76
Discharge: HOME | End: 2021-07-09
Payer: MEDICARE

## 2021-07-09 PROCEDURE — U0005 INFEC AGEN DETEC AMPLI PROBE: HCPCS

## 2021-07-10 LAB
SARS-COV-2, XPLCVT: NOT DETECTED
SOURCE, COVRS: NORMAL

## 2021-07-13 ENCOUNTER — ANESTHESIA (OUTPATIENT)
Dept: ENDOSCOPY | Age: 76
End: 2021-07-13
Payer: MEDICARE

## 2021-07-13 ENCOUNTER — ANESTHESIA EVENT (OUTPATIENT)
Dept: ENDOSCOPY | Age: 76
End: 2021-07-13
Payer: MEDICARE

## 2021-07-13 ENCOUNTER — HOSPITAL ENCOUNTER (OUTPATIENT)
Age: 76
Setting detail: OUTPATIENT SURGERY
Discharge: HOME | End: 2021-07-13
Attending: INTERNAL MEDICINE | Admitting: INTERNAL MEDICINE
Payer: MEDICARE

## 2021-07-13 VITALS
SYSTOLIC BLOOD PRESSURE: 139 MMHG | WEIGHT: 205 LBS | HEIGHT: 62 IN | DIASTOLIC BLOOD PRESSURE: 44 MMHG | RESPIRATION RATE: 16 BRPM | HEART RATE: 68 BPM | OXYGEN SATURATION: 98 % | BODY MASS INDEX: 37.73 KG/M2 | TEMPERATURE: 98 F

## 2021-07-13 PROCEDURE — 74011000250 HC RX REV CODE- 250: Performed by: NURSE ANESTHETIST, CERTIFIED REGISTERED

## 2021-07-13 PROCEDURE — 76040000007: Performed by: INTERNAL MEDICINE

## 2021-07-13 PROCEDURE — 76060000032 HC ANESTHESIA 0.5 TO 1 HR: Performed by: INTERNAL MEDICINE

## 2021-07-13 PROCEDURE — 77030038665 HC SOL ELEVIEW INJ AGNT ARPH -B: Performed by: INTERNAL MEDICINE

## 2021-07-13 PROCEDURE — 77030013992 HC SNR POLYP ENDOSC BSC -B: Performed by: INTERNAL MEDICINE

## 2021-07-13 PROCEDURE — 74011250636 HC RX REV CODE- 250/636: Performed by: INTERNAL MEDICINE

## 2021-07-13 PROCEDURE — 77030003657 HC NDL SCLER BSC -B: Performed by: INTERNAL MEDICINE

## 2021-07-13 PROCEDURE — 88305 TISSUE EXAM BY PATHOLOGIST: CPT

## 2021-07-13 PROCEDURE — 2709999900 HC NON-CHARGEABLE SUPPLY: Performed by: INTERNAL MEDICINE

## 2021-07-13 PROCEDURE — 74011250636 HC RX REV CODE- 250/636: Performed by: NURSE ANESTHETIST, CERTIFIED REGISTERED

## 2021-07-13 PROCEDURE — 77030010936 HC CLP LIG BSC -C: Performed by: INTERNAL MEDICINE

## 2021-07-13 DEVICE — CLIP INT L235CM WRK CHAN DIA2.8MM OPN 11MM LCK MECHANISM MR: Type: IMPLANTABLE DEVICE | Site: TRANSVERSE COLON | Status: FUNCTIONAL

## 2021-07-13 RX ORDER — SODIUM CHLORIDE 0.9 % (FLUSH) 0.9 %
5-40 SYRINGE (ML) INJECTION EVERY 8 HOURS
Status: DISCONTINUED | OUTPATIENT
Start: 2021-07-13 | End: 2021-07-13 | Stop reason: HOSPADM

## 2021-07-13 RX ORDER — ATROPINE SULFATE 0.1 MG/ML
0.5 INJECTION INTRAVENOUS
Status: DISCONTINUED | OUTPATIENT
Start: 2021-07-13 | End: 2021-07-13 | Stop reason: HOSPADM

## 2021-07-13 RX ORDER — PROPOFOL 10 MG/ML
INJECTION, EMULSION INTRAVENOUS AS NEEDED
Status: DISCONTINUED | OUTPATIENT
Start: 2021-07-13 | End: 2021-07-13 | Stop reason: HOSPADM

## 2021-07-13 RX ORDER — SODIUM CHLORIDE 0.9 % (FLUSH) 0.9 %
5-40 SYRINGE (ML) INJECTION AS NEEDED
Status: DISCONTINUED | OUTPATIENT
Start: 2021-07-13 | End: 2021-07-13 | Stop reason: HOSPADM

## 2021-07-13 RX ORDER — DEXMEDETOMIDINE HYDROCHLORIDE 100 UG/ML
INJECTION, SOLUTION INTRAVENOUS AS NEEDED
Status: DISCONTINUED | OUTPATIENT
Start: 2021-07-13 | End: 2021-07-13 | Stop reason: HOSPADM

## 2021-07-13 RX ORDER — NALOXONE HYDROCHLORIDE 0.4 MG/ML
0.4 INJECTION, SOLUTION INTRAMUSCULAR; INTRAVENOUS; SUBCUTANEOUS
Status: DISCONTINUED | OUTPATIENT
Start: 2021-07-13 | End: 2021-07-13 | Stop reason: HOSPADM

## 2021-07-13 RX ORDER — FLUMAZENIL 0.1 MG/ML
0.2 INJECTION INTRAVENOUS
Status: DISCONTINUED | OUTPATIENT
Start: 2021-07-13 | End: 2021-07-13 | Stop reason: HOSPADM

## 2021-07-13 RX ORDER — GLYCOPYRROLATE 0.2 MG/ML
INJECTION INTRAMUSCULAR; INTRAVENOUS AS NEEDED
Status: DISCONTINUED | OUTPATIENT
Start: 2021-07-13 | End: 2021-07-13 | Stop reason: HOSPADM

## 2021-07-13 RX ORDER — SODIUM CHLORIDE 9 MG/ML
75 INJECTION, SOLUTION INTRAVENOUS CONTINUOUS
Status: DISCONTINUED | OUTPATIENT
Start: 2021-07-13 | End: 2021-07-13 | Stop reason: HOSPADM

## 2021-07-13 RX ORDER — MIDAZOLAM HYDROCHLORIDE 1 MG/ML
.25-5 INJECTION, SOLUTION INTRAMUSCULAR; INTRAVENOUS
Status: DISCONTINUED | OUTPATIENT
Start: 2021-07-13 | End: 2021-07-13 | Stop reason: HOSPADM

## 2021-07-13 RX ORDER — DEXTROMETHORPHAN/PSEUDOEPHED 2.5-7.5/.8
1.2 DROPS ORAL
Status: DISCONTINUED | OUTPATIENT
Start: 2021-07-13 | End: 2021-07-13 | Stop reason: HOSPADM

## 2021-07-13 RX ORDER — EPINEPHRINE 0.1 MG/ML
1 INJECTION INTRACARDIAC; INTRAVENOUS
Status: DISCONTINUED | OUTPATIENT
Start: 2021-07-13 | End: 2021-07-13 | Stop reason: HOSPADM

## 2021-07-13 RX ORDER — LIDOCAINE HYDROCHLORIDE 20 MG/ML
INJECTION, SOLUTION EPIDURAL; INFILTRATION; INTRACAUDAL; PERINEURAL AS NEEDED
Status: DISCONTINUED | OUTPATIENT
Start: 2021-07-13 | End: 2021-07-13 | Stop reason: HOSPADM

## 2021-07-13 RX ORDER — FENTANYL CITRATE 50 UG/ML
25 INJECTION, SOLUTION INTRAMUSCULAR; INTRAVENOUS
Status: DISCONTINUED | OUTPATIENT
Start: 2021-07-13 | End: 2021-07-13 | Stop reason: HOSPADM

## 2021-07-13 RX ADMIN — PROPOFOL 50 MG: 10 INJECTION, EMULSION INTRAVENOUS at 08:30

## 2021-07-13 RX ADMIN — PROPOFOL 50 MG: 10 INJECTION, EMULSION INTRAVENOUS at 08:34

## 2021-07-13 RX ADMIN — PROPOFOL 50 MG: 10 INJECTION, EMULSION INTRAVENOUS at 08:57

## 2021-07-13 RX ADMIN — PROPOFOL 50 MG: 10 INJECTION, EMULSION INTRAVENOUS at 08:24

## 2021-07-13 RX ADMIN — PROPOFOL 50 MG: 10 INJECTION, EMULSION INTRAVENOUS at 08:21

## 2021-07-13 RX ADMIN — PROPOFOL 50 MG: 10 INJECTION, EMULSION INTRAVENOUS at 08:23

## 2021-07-13 RX ADMIN — PROPOFOL 30 MG: 10 INJECTION, EMULSION INTRAVENOUS at 08:58

## 2021-07-13 RX ADMIN — PROPOFOL 50 MG: 10 INJECTION, EMULSION INTRAVENOUS at 08:19

## 2021-07-13 RX ADMIN — PROPOFOL 50 MG: 10 INJECTION, EMULSION INTRAVENOUS at 08:22

## 2021-07-13 RX ADMIN — PROPOFOL 50 MG: 10 INJECTION, EMULSION INTRAVENOUS at 08:38

## 2021-07-13 RX ADMIN — PROPOFOL 50 MG: 10 INJECTION, EMULSION INTRAVENOUS at 08:29

## 2021-07-13 RX ADMIN — PROPOFOL 50 MG: 10 INJECTION, EMULSION INTRAVENOUS at 08:18

## 2021-07-13 RX ADMIN — GLYCOPYRROLATE 0.2 MG: 0.2 INJECTION, SOLUTION INTRAMUSCULAR; INTRAVENOUS at 08:33

## 2021-07-13 RX ADMIN — PROPOFOL 100 MG: 10 INJECTION, EMULSION INTRAVENOUS at 08:45

## 2021-07-13 RX ADMIN — SODIUM CHLORIDE 80 MCG: 900 INJECTION, SOLUTION INTRAVENOUS at 08:24

## 2021-07-13 RX ADMIN — PROPOFOL 50 MG: 10 INJECTION, EMULSION INTRAVENOUS at 08:16

## 2021-07-13 RX ADMIN — PROPOFOL 50 MG: 10 INJECTION, EMULSION INTRAVENOUS at 08:33

## 2021-07-13 RX ADMIN — SODIUM CHLORIDE 80 MCG: 900 INJECTION, SOLUTION INTRAVENOUS at 08:49

## 2021-07-13 RX ADMIN — SODIUM CHLORIDE 80 MCG: 900 INJECTION, SOLUTION INTRAVENOUS at 08:40

## 2021-07-13 RX ADMIN — SODIUM CHLORIDE 75 ML/HR: 9 INJECTION, SOLUTION INTRAVENOUS at 08:05

## 2021-07-13 RX ADMIN — SODIUM CHLORIDE 80 MCG: 900 INJECTION, SOLUTION INTRAVENOUS at 08:54

## 2021-07-13 RX ADMIN — SODIUM CHLORIDE 80 MCG: 900 INJECTION, SOLUTION INTRAVENOUS at 09:03

## 2021-07-13 RX ADMIN — SODIUM CHLORIDE 80 MCG: 900 INJECTION, SOLUTION INTRAVENOUS at 08:33

## 2021-07-13 RX ADMIN — LIDOCAINE HYDROCHLORIDE 40 MG: 20 INJECTION, SOLUTION EPIDURAL; INFILTRATION; INTRACAUDAL; PERINEURAL at 08:24

## 2021-07-13 RX ADMIN — DEXMEDETOMIDINE HYDROCHLORIDE 10 MCG: 100 INJECTION, SOLUTION, CONCENTRATE INTRAVENOUS at 08:24

## 2021-07-13 RX ADMIN — PROPOFOL 50 MG: 10 INJECTION, EMULSION INTRAVENOUS at 08:37

## 2021-07-13 NOTE — ROUTINE PROCESS
Terrie Andres  1945  [id number]    Situation:  Verbal report received from: Fran Lunch  Procedure: Procedure(s):  COLONOSCOPY  ENDOSCOPIC POLYPECTOMY  RESOLUTION CLIP x2  INJECTION    Background:    Preoperative diagnosis: Personal history of colonic polyps [Z86.010]  Postoperative diagnosis: colon polyps, hemorrhoids    :  Dr. Venus Lindsay  Assistant(s): Endoscopy Technician-1: Solis Dexter  Endoscopy RN-1: Domingo Skinner    Specimens:   ID Type Source Tests Collected by Time Destination   1 : transverse Hot #1 polyp 70cm Preservative Colon, Irma Snider MD 7/13/2021 3396 Pathology   2 : Transverse Colon Polyps #2 75cm Preservative Colon, Pierce Hearing  Hoang Tinajero MD 7/13/2021 1909 Pathology   3 : Ascending Polyp Preservative Colon, Ascending  Hoang Tinajero MD 7/13/2021 0877 Pathology     H. Pylori  no    Assessment:  Intra-procedure medications{yes/ no default    Anesthesia gave intra-procedure sedation and medications, see anesthesia flow sheet yes    Intravenous fluids: NS@ KVO     Vital signs stable  yes    Abdominal assessment: round and soft  yes   Right upper arm firm and edematous at  This time. Order  For warm moist compress to site ordered per Dr Marielle Feliciano. Recommendation:.       Permission to share finding with family or friend yes

## 2021-07-13 NOTE — PROGRESS NOTES
Dr. Ankit Hale has re-evaluated patient and pt's condition is stable and improved. She is preparing to be discharged to home acommpanied by caretaker.

## 2021-07-13 NOTE — DISCHARGE INSTRUCTIONS
Peyton Eisenmenger  [id number]  1945    COLON DISCHARGE INSTRUCTIONS  Discomfort:  Redness at IV site- apply warm compress to area; if redness or soreness persist- contact your physician  There may be a slight amount of blood passed from the rectum  Gaseous discomfort- walking, belching will help relieve any discomfort  You may not operate a vehicle for 12 hours  You may not engage in an occupation involving machinery or appliances for rest of today  You may not drink alcoholic beverages for at least 12 hours  Avoid making any critical decisions for at least 24 hour  DIET:   High fiber diet. - however -  remember your colon is empty and a heavy meal will produce gas. Avoid these foods:  vegetables, fried / greasy foods, carbonated drinks for today  MEDICATION:         ACTIVITY:  You may not resume your normal daily activities until tomorrow AM; it is recommended that you spend the remainder of the day resting -  avoid any strenuous activity. CALL M.D. ANY SIGN OF:   Increasing pain, nausea, vomiting  Abdominal distension (swelling)  New increased bleeding (oral or rectal)  Fever (chills)  Pain in chest area  Bloody discharge from nose or mouth  Shortness of breath    You may not  take any Advil, Aspirin, Ibuprofen, Motrin, Aleve, or Goodys for 10 days, ONLY  Tylenol as needed for pain.     IMPRESSION:  -Single multilobular sessile 8mm polyp in proximal transverse colon at 70cm; this wasa lifted with 9cc Elevue, follwed by single piece snare cautery resection and placement of two hemoclips across polypectomy site  -Single sessile 4mm polyp in mid transverse colon at 75cm removed with hot snare cautery  -Ten sessile 2-4mm polyps in the ascending colon from 80-90cm removed with combination of cold snare and hot snare cautery  -Grade 1 internal hemorrhoids    Follow-up Instructions:   Call Dr. Hussein Mcdonald for the results of procedure / biopsy in 7-10 days  Telephone # [phone]  Repeat colonoscopy in 1 year    Derick Janet Boone MD

## 2021-07-13 NOTE — H&P
Gastroenterology Outpatient History and Physical    Patient: Shyanne Mays    Physician: Pao Wood MD    Chief Complaint: pers hx of colon adenoma  History of Present Illness: 74yo F with pers hx of colon adenoma. Last colonoscopy 2/2015.     History:  Past Medical History:   Diagnosis Date    Arthritis     KNEE,gout    Bundle branch block     Endocrine disease     HYPOTHYROIDISM    Fracture     Left distal femur (age 12 - pt fell)    Fracture     Left tibia 1990 (pt fell)    Fracture     Right wrist fractured 2 times (pt fell)    GERD (gastroesophageal reflux disease)     High cholesterol     Hypertension     Hypoglycemia     \"my body produces too much insulin\"    Liver disease     BRADY    Nausea & vomiting     when had gallbladder out    Osteoporosis     Other ill-defined conditions(799.89)     edema legs    Spinal stenosis     Thyroid disease       Past Surgical History:   Procedure Laterality Date    COLONOSCOPY,REMV LESN,SNARE  2/11/2015         COLORECTAL SCRN; HI RISK IND  2/11/2015         HX CHOLECYSTECTOMY      HX HYSTERECTOMY      HX ORTHOPAEDIC Left     leg surgery - plates, screws, wires, pins in place    HX UROLOGICAL      PESSURY    [place] ABDOMEN SURGERY PROC UNLISTED      liver biopsy--BRADY and fibrosis    UPPER GI ENDOSCOPY,BIOPSY  11/17/2015           Social History     Socioeconomic History    Marital status: SINGLE     Spouse name: Not on file    Number of children: Not on file    Years of education: Not on file    Highest education level: Not on file   Tobacco Use    Smoking status: Never Smoker    Smokeless tobacco: Never Used   Vaping Use    Vaping Use: Never used   Substance and Sexual Activity    Alcohol use: No    Drug use: Yes     Types: Prescription, OTC    Sexual activity: Not Currently     Social Determinants of Health     Financial Resource Strain:     Difficulty of Paying Living Expenses:    Food Insecurity:     Worried About Running Out of Food in the Last Year:    [address] in the Last Year:    Transportation Needs:     Lack of Transportation (Medical):  Lack of Transportation (Non-Medical):    Physical Activity:     Days of Exercise per Week:     Minutes of Exercise per Session:    Stress:     Feeling of Stress :    Social Connections:     Frequency of Communication with Friends and Family:     Frequency of Social Gatherings with Friends and Family:     Attends Jewish Services:     Active Member of Clubs or Organizations:     Attends Club or Organization Meetings:     Marital Status:       Family History   Problem Relation Age of Onset    Diabetes Mother     Heart Disease Mother     Emphysema Father       Patient Active Problem List   Diagnosis Code    Gout M10.9    Hypothyroidism E03.9    Osteoporosis M81.0    Anxiety F41.9    Leg edema R60.0    RSD lower limb G90.529    Fatty liver K76.0    Pure hypercholesterolemia E78.00    Colon polyp K63.5    Bifascicular block I45.2    HTN (hypertension) I10    Thrombocytopenia ([facility] [address] D69.6    Prediabetes R73.03    DDD (degenerative disc disease), lumbar M51.36    Controlled type 2 diabetes mellitus without complication (HCC) T98.8    Non-pressure chronic ulcer of left lower leg, limited to breakdown of skin ([facility] [address] L97.921    Severe obesity (HCC) E66.01    Peripheral vascular disease (HCC) I73.9    Cellulitis of left lower leg L03. 80    MICHELLE (acute kidney injury) ([facility] [address] N17.9       Allergies: Allergies   Allergen Reactions    Gabapentin Other (comments)     Suicidal ideation    Celebrex [Celecoxib] Hives    Pcn [Penicillins] Unknown (comments)     Can't remember, was a long time    Sulfa (Sulfonamide Antibiotics) Hives     Medications:   Prior to Admission medications    Medication Sig Start Date End Date Taking? Authorizing Provider   allopurinoL (ZYLOPRIM) 100 mg tablet Take 100 mg by mouth two (2) times a day.    Yes Provider, Historical   furosemide (LASIX) 80 mg tablet Take 1 Tab by mouth daily. 6/14/21  Yes Rose Nina MD   levothyroxine (SYNTHROID) 150 mcg tablet Take 1 Tablet by mouth Daily (before breakfast). 5/20/21  Yes Rose Nina MD   amLODIPine (NORVASC) 10 mg tablet Take 1 Tab by mouth daily. 5/12/21  Yes Rose Nina MD   colchicine 0.6 mg tablet Take 1 Tab by mouth daily. 11/11/20  Yes Rose Nina MD   omega-3 acid ethyl esters (LOVAZA) 1 gram capsule Take 2 Caps by mouth two (2) times daily (with meals). 11/10/20  Yes Rose Nina MD   potassium chloride SA (MICRO-K) 10 mEq capsule Take 2 Caps by mouth daily. 11/10/20  Yes Rose Nina MD   fluticasone propionate (FLONASE) 50 mcg/actuation nasal spray 2 Sprays by Both Nostrils route daily. Administer to right and left nostril. 4/6/20  Yes Rose Nina MD   atorvastatin (LIPITOR) 20 mg tablet TAKE 1 TABLET DAILY 2/27/20  Yes Rose Nina MD   loratadine (CLARITIN) 10 mg tablet Take 1 Tab by mouth daily. Indications: inflammation of the nose due to an allergy 2/27/20  Yes Rose Nina MD   ZETIA 10 mg tablet Take 1 tablet by mouth daily. 2/27/20  Yes Rose Nina MD   Biotin 2,500 mcg cap Take  by mouth. Yes Provider, Historical   L GASSERI/B BIFIDUM/B LONGUM ([employer] PO) Take 1 Tab by mouth daily. Yes Provider, Historical   vitamin E (AQUA GEMS) 400 unit capsule Take 800 Units by mouth daily. Yes Provider, Historical   cholecalciferol, vitamin D3, (VITAMIN D3) 2,000 unit tab Take 1 Tab by mouth daily. Yes Other, MD Janette   MULTIVITAMIN WITH MINERALS (ONE-A-DAY 50 PLUS PO) Take 1 Tab by mouth daily. Yes Provider, Historical   aspirin 81 mg chewable tablet Take 81 mg by mouth daily. Yes Provider, Historical   polyethylene glycol (MIRALAX) 17 gram/dose powder Take 17 g by mouth daily. Patient not taking: Reported on 7/12/2021 4/6/20   Rose Nina MD   ketoconazole (NIZORAL) 2 % topical cream Apply  to affected area daily as needed.  8/23/19   Provider, Historical   docusate sodium (COLACE) 50 mg capsule Take 100 mg by mouth daily as needed for Constipation. Provider, Diana   lidocaine (LIDODERM) 5 %(700 mg/patch) 1 patch by TransDERmal route every twenty-four (24) hours. Apply patch to the affected area for 12 hours a day and remove for 12 hours a day. Other, MD Janette     Physical Exam:   Vital Signs: not currently breastfeeding.   General: well developed, well nourished   HEENT: unremarkable   Heart: regular rhythm no mumur    Lungs: clear   Abdominal:  benign   Neurological: unremarkable   Extremities: no edema     Findings/Diagnosis: pers hx of colonic adenoma  Plan of Care/Planned Procedure: colonoscopy with conscious/deep sedation    Signed:  Linda Beasley MD 7/13/2021

## 2021-07-13 NOTE — PROCEDURES
NAME:  Preston Laser   :   1945   MRN:   [id number]     Date/Time:  2021 9:04 AM    Colonoscopy Operative Report    Procedure Type:   Colonoscopy with polypectomy (cold snare), polypectomy (snare cautery), hemoclip placement x 2     Indications:     Personal history of colon polyps (screening only)  Pre-operative Diagnosis: see indication above  Post-operative Diagnosis:  See findings below  :  Emelina Ba MD  Referring Provider: Bella Garrett MD    Exam:  Airway: clear, no airway problems anticipated  Heart: RRR, without gallops or rubs  Lungs: clear bilaterally without wheezes, crackles, or rhonchi  Abdomen: soft, nontender, nondistended, bowel sounds present  Mental Status: awake, alert and oriented to person, place and time    Sedation:  MAC anesthesia Propofol 830mg IV and Precedex 10mcg IV  Procedure Details:  After informed consent was obtained with all risks and benefits of procedure explained and preoperative exam completed, the patient was taken to the endoscopy suite and placed in the left lateral decubitus position. Upon sequential sedation as per above, a digital rectal exam was performed demonstrating internal hemorrhoids. The Olympus videocolonoscope  was inserted in the rectum and carefully advanced to the cecum, which was identified by the ileocecal valve and appendiceal orifice. The quality of preparation was adequate. The colonoscope was slowly withdrawn with careful evaluation between folds. Retroflexion in the rectum was completed demonstrating internal hemorrhoids.      Findings:     -Single multilobular sessile 8mm polyp in proximal transverse colon at 70cm; this wasa lifted with 9cc Elevue, follwed by single piece snare cautery resection and placement of two hemoclips across polypectomy site  -Single sessile 4mm polyp in mid transverse colon at 75cm removed with hot snare cautery  -Ten sessile 2-4mm polyps in the ascending colon from 80-90cm removed with combination of cold snare and hot snare cautery  -Grade 1 internal hemorrhoids    Specimen Removed:  #1 transverse colon polyp 70cm; #2 transverse colon poly 75cm; #3 ascending colon polyps (10)  Complications: None. EBL:  None. Impression:    -Single multilobular sessile 8mm polyp in proximal transverse colon at 70cm; this wasa lifted with 9cc Elevue, follwed by single piece snare cautery resection and placement of two hemoclips across polypectomy site  -Single sessile 4mm polyp in mid transverse colon at 75cm removed with hot snare cautery  -Ten sessile 2-4mm polyps in the ascending colon from 80-90cm removed with combination of cold snare and hot snare cautery  -Grade 1 internal hemorrhoids    Recommendations: --Await pathology. , -Repeat colonoscopy in 1 years. High fiber diet. NO aspirin for 10 days. You will receive a letter about the biopsy results in about 10 days. You may be asked to call your doctor's office for the results. Discharge Disposition:  Home in the company of a  when able to ambulate.     Balaji Jang MD

## 2021-07-13 NOTE — PROGRESS NOTES
Dr. Juan Pablo Dill has evaluated patient's right upper arm and site is sofetr at this time. He  Has ordered to elevate this site and will re-evaluate  Prior to discharge to home.

## 2021-07-13 NOTE — PROGRESS NOTES
Pt's condition continues to be stable at this time. She remains alert and denies c/o drowsiness or dizziness presently. Right upper arm is softer to touch and is elevated above heart level. Caretaker remains at bedside.

## 2021-07-13 NOTE — PROGRESS NOTES
Endoscope was pre-cleaned at the bedside immediately following procedure by Ambrose De Leon. For medications administered by anesthesia, see anesthesia chart.

## 2021-07-13 NOTE — ANESTHESIA POSTPROCEDURE EVALUATION
Procedure(s):  COLONOSCOPY  ENDOSCOPIC POLYPECTOMY  RESOLUTION CLIP x2  INJECTION. total IV anesthesia    Anesthesia Post Evaluation        Patient location during evaluation: PACU  Note status: Adequate. Level of consciousness: responsive to verbal stimuli and sleepy but conscious  Pain management: satisfactory to patient  Airway patency: patent  Anesthetic complications: no  Cardiovascular status: acceptable  Respiratory status: acceptable  Hydration status: acceptable  Comments: +Post-Anesthesia Evaluation and Assessment    Patient: Rochelle Olivas MRN: [id number]  SSN: [id number]   YOB: 1945  Age: 68 y.o. Sex: female      Cardiovascular Function/Vital Signs    BP (!) 169/43   Pulse 75   Temp 37.6 °C (99.7 °F)   Resp 21   Ht 5' 2\" (1.575 m)   Wt 93 kg (205 lb)   SpO2 98%   Breastfeeding No   BMI 37.49 kg/m²     Patient is status post Procedure(s):  COLONOSCOPY  ENDOSCOPIC POLYPECTOMY  RESOLUTION CLIP x2  INJECTION. Nausea/Vomiting: Controlled. Postoperative hydration reviewed and adequate. Pain:  Pain Scale 1: Numeric (0 - 10) (07/13/21 0752)  Pain Intensity 1: 0 (07/13/21 0752)   Managed. Neurological Status: At baseline. Mental Status and Level of Consciousness: Arousable. Pulmonary Status:       Adequate oxygenation and airway patent. Complications related to anesthesia: None    Post-anesthesia assessment completed. No concerns. Signed By: Tiffany Simon MD    7/13/2021  Post anesthesia nausea and vomiting:  controlled      INITIAL Post-op Vital signs: No vitals data found for the desired time range.

## 2021-07-13 NOTE — ANESTHESIA PREPROCEDURE EVALUATION
Anesthetic History     PONV          Review of Systems / Medical History  Patient summary reviewed, nursing notes reviewed and pertinent labs reviewed    Pulmonary  Within defined limits                 Neuro/Psych             Comments: spinal stenosis Cardiovascular    Hypertension        Dysrhythmias   Hyperlipidemia    Exercise tolerance: <4 METS  Comments: Hx Bifascicular block     GI/Hepatic/Renal     GERD      Liver disease    Comments: PERSONAL HX OF POLYPS   BRADY Syndrome  Fatty liver Endo/Other    Diabetes  Hypothyroidism  Morbid obesity, arthritis and anemia     Other Findings   Comments: Gout  Thrombocytopenia  RSD lower limb  Osteoporosis   DDD  Ambulates with a walker                    Physical Exam    Airway  Mallampati: III    Neck ROM: normal range of motion   Mouth opening: Normal     Cardiovascular  Regular rate and rhythm,  S1 and S2 normal,  no murmur, click, rub, or gallop             Dental    Dentition: Full upper dentures  Comments: No loose lower teeth   Pulmonary  Breath sounds clear to auscultation               Abdominal  GI exam deferred       Other Findings            Anesthetic Plan    ASA: 3  Anesthesia type: total IV anesthesia          Induction: Intravenous  Anesthetic plan and risks discussed with: Patient

## 2021-07-22 ENCOUNTER — HOSPITAL ENCOUNTER (OUTPATIENT)
Dept: INFUSION THERAPY | Age: 76
Discharge: HOME | End: 2021-07-22
Payer: MEDICARE

## 2021-07-22 VITALS
OXYGEN SATURATION: 98 % | HEART RATE: 60 BPM | SYSTOLIC BLOOD PRESSURE: 134 MMHG | DIASTOLIC BLOOD PRESSURE: 51 MMHG | TEMPERATURE: 97.8 F | RESPIRATION RATE: 18 BRPM

## 2021-07-22 PROCEDURE — 74011250636 HC RX REV CODE- 250/636: Performed by: INTERNAL MEDICINE

## 2021-07-22 PROCEDURE — 96365 THER/PROPH/DIAG IV INF INIT: CPT

## 2021-07-22 PROCEDURE — 96366 THER/PROPH/DIAG IV INF ADDON: CPT

## 2021-07-22 RX ADMIN — IRON SUCROSE 400 MG: 20 INJECTION, SOLUTION INTRAVENOUS at 14:01

## 2021-07-22 NOTE — PROGRESS NOTES
Outpatient Infusion Center Short Visit Progress Note    1300 Patient admitted to [facility] for Venofer D28 ambulatory in stable condition. Assessment completed. No new concerns voiced. Covid Screening      1. Do you have any symptoms of COVID-19? SOB, coughing, fever, or generally not feeling well ? NO  2. Have you been exposed to COVID-19 recently? NO  3. Have you had any recent contact with family/friend that has a pending COVID test? NO    Vital Signs:  Patient Vitals for the past 12 hrs:   Temp Pulse Resp BP SpO2   07/22/21 1704  (P) 62 (P) 18 (!) (P) 138/59    07/22/21 1302 97.8 °F (36.6 °C) 60 18 (!) 134/51 98 %         Peripheral IV 07/22/21 Anterior;Proximal;Right Forearm (Active)   Site Assessment Clean, dry, & intact 07/22/21 1300   Phlebitis Assessment 0 07/22/21 1300   Infiltration Assessment 0 07/22/21 1300   Dressing Status New 07/22/21 1300   Dressing Type Transparent 07/22/21 1300   Hub Color/Line Status Yellow; Flushed; Infusing 07/22/21 1300   Action Taken Open ports on tubing capped 07/22/21 1300   Alcohol Cap Used Yes 07/22/21 1300       Medications:  Medications Administered     iron sucrose (VENOFER) 400 mg in 0.9% sodium chloride 250 mL IVPB     Admin Date  07/22/2021 Action  New Bag Dose  400 mg Rate  108 mL/hr Route  IntraVENous Administered By  Natividad Ca A              Patient's PIV flushed and removed, bandage placed over site. Patient tolerated treatment well. Patient discharged from [facility] ambulatory in no distress at 1705. Patient aware of next appointment.     Future Appointments   Date Time Provider Hilray Dickinson   9/1/2021  9:20 AM Telma Arizmendi MD [facility] BS AMB   11/19/2021 10:30 AM Darren Recio MD [facility] BS AMB

## 2021-08-10 ENCOUNTER — TRANSCRIBE ORDER (OUTPATIENT)
Dept: SCHEDULING | Age: 76
End: 2021-08-10

## 2021-08-10 DIAGNOSIS — R93.3 ABNORMAL FINDING ON GI TRACT IMAGING: Primary | ICD-10-CM

## 2021-08-16 ENCOUNTER — PATIENT OUTREACH (OUTPATIENT)
Dept: INTERNAL MEDICINE CLINIC | Age: 76
End: 2021-08-16

## 2021-08-16 VITALS — BODY MASS INDEX: 35.67 KG/M2 | WEIGHT: 195 LBS

## 2021-08-16 NOTE — PROGRESS NOTES
Patient has graduated from the Complex Case Management  program on 8/16/21 for prediabetes. Patient/family has the ability to self-manage at this time. Care management goals have been completed. No further Ambulatory Care Manager follow up scheduled. Goals Addressed                 This Visit's Progress     COMPLETED: Patient verbalizes understanding of self -management goals of living with Diabetes. On track     8/16/21-dkw  -pt stated she is doing good; she weighed 195 at GYN appt this morning; this wt was entered in pt chart; BMD was ordered and Mammo will be ordered  -blood sugars have been ranging ; pt correlates the lower and higher numbers to eating less or more  -pt has been walking more; she is going to [facility] on Wed  -pt had colonoscopy and 19 polys; will go back in one year  -has been getting iron infusion for iron deficiency anemia  -has been drinking V8 Energy Dung Peach (12 carbs) and Cranberry [place] diet juice, but not daily; she heard it was good for anemia  -pt has cardiology appt coming up and will call [facility] for thyroid mgmt  -pt agreed to graduation from [facility] at this time; she will follow up with Dr Jose Navarro in November 6/18/21-dkw  -telephonic dsme was initiated 6/18/21 for a1c of 5.9% on 5/19/21  -pt wants to get weight down by adding walking and eating healthier  -follow healthy plate meal plan  -be mindful of healthy fats  -read food labels  -eat snacks with protein  -check fasting blood sugar most days of the week  -schedule cardiology appt as instructed by Dr Jose Navarro (letter re-mailed)  -schedule endo appt for thyroid management  -get second covid shot as scheduled 6/22/21  -get IV iron as scheduled 6/24/21  -follow up with Dr Jose Navarro as scheduled  -follow up with Tomeka Leigh on August 16th at 10:00  -call Tomeka Leigh with any questions at [phone]            Patient has Ambulatory Care Manager's contact information for any further questions, concerns, or needs.   Patients upcoming visits:    Future Appointments   Date Time Provider Hilary Dickinson   9/8/2021 10:40 AM Rosalie Baker MD [facility] BS AMB   9/9/2021  8:00 AM AKIKO [facility] IVETTE SMITH JANET   11/19/2021 10:30 AM Cesar Canavan, MD [facility] BS AMB

## 2021-08-17 ENCOUNTER — TRANSCRIBE ORDER (OUTPATIENT)
Dept: SCHEDULING | Age: 76
End: 2021-08-17

## 2021-08-17 DIAGNOSIS — M81.0 OSTEOPOROSIS: ICD-10-CM

## 2021-08-17 DIAGNOSIS — Z12.31 VISIT FOR SCREENING MAMMOGRAM: Primary | ICD-10-CM

## 2021-09-08 ENCOUNTER — OFFICE VISIT (OUTPATIENT)
Dept: CARDIOLOGY CLINIC | Age: 76
End: 2021-09-08
Payer: MEDICARE

## 2021-09-08 VITALS
DIASTOLIC BLOOD PRESSURE: 68 MMHG | RESPIRATION RATE: 18 BRPM | BODY MASS INDEX: 37.97 KG/M2 | SYSTOLIC BLOOD PRESSURE: 164 MMHG | OXYGEN SATURATION: 98 % | HEIGHT: 62 IN | HEART RATE: 66 BPM | WEIGHT: 206.3 LBS

## 2021-09-08 DIAGNOSIS — I10 ESSENTIAL HYPERTENSION: ICD-10-CM

## 2021-09-08 DIAGNOSIS — Z91.89 AT RISK FOR HEART DISEASE: ICD-10-CM

## 2021-09-08 DIAGNOSIS — I35.0 NONRHEUMATIC AORTIC VALVE STENOSIS: Primary | ICD-10-CM

## 2021-09-08 DIAGNOSIS — I27.20 PULMONARY HYPERTENSION (HCC): ICD-10-CM

## 2021-09-08 DIAGNOSIS — R60.0 LEG EDEMA: ICD-10-CM

## 2021-09-08 DIAGNOSIS — E78.00 PURE HYPERCHOLESTEROLEMIA: ICD-10-CM

## 2021-09-08 PROBLEM — I73.9 PERIPHERAL VASCULAR DISEASE (HCC): Status: RESOLVED | Noted: 2019-09-30 | Resolved: 2021-09-08

## 2021-09-08 PROCEDURE — G8417 CALC BMI ABV UP PARAM F/U: HCPCS | Performed by: INTERNAL MEDICINE

## 2021-09-08 PROCEDURE — 1090F PRES/ABSN URINE INCON ASSESS: CPT | Performed by: INTERNAL MEDICINE

## 2021-09-08 PROCEDURE — G8536 NO DOC ELDER MAL SCRN: HCPCS | Performed by: INTERNAL MEDICINE

## 2021-09-08 PROCEDURE — G8510 SCR DEP NEG, NO PLAN REQD: HCPCS | Performed by: INTERNAL MEDICINE

## 2021-09-08 PROCEDURE — 99203 OFFICE O/P NEW LOW 30 MIN: CPT | Performed by: INTERNAL MEDICINE

## 2021-09-08 PROCEDURE — 1101F PT FALLS ASSESS-DOCD LE1/YR: CPT | Performed by: INTERNAL MEDICINE

## 2021-09-08 PROCEDURE — G8753 SYS BP > OR = 140: HCPCS | Performed by: INTERNAL MEDICINE

## 2021-09-08 PROCEDURE — 93000 ELECTROCARDIOGRAM COMPLETE: CPT | Performed by: INTERNAL MEDICINE

## 2021-09-08 PROCEDURE — G8754 DIAS BP LESS 90: HCPCS | Performed by: INTERNAL MEDICINE

## 2021-09-08 PROCEDURE — G8427 DOCREV CUR MEDS BY ELIG CLIN: HCPCS | Performed by: INTERNAL MEDICINE

## 2021-09-08 RX ORDER — LOSARTAN POTASSIUM 25 MG/1
25 TABLET ORAL DAILY
COMMUNITY
Start: 2021-08-13 | End: 2021-09-24 | Stop reason: DRUGHIGH

## 2021-09-08 NOTE — PROGRESS NOTES
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 She has a PMHx of DM2, HTN, HLD, NAFLD, hypothyroidism. Here for evaluation of cardiac murmur. Had echo done which showed mild aortic stenosis. Reports shortness of breath with exertion. Has never smoked. Never had workup for SILVERIO in the past.  Admits to daytime somnolence, frequently wakes up at night to use the bathroom. She was admitted to [address] [place] [address] in 10/2020 for MICHELLE and dehydration. Was at risk for requiring dialysis, but did not get to that point. Had been found to have multiple pancreatic lesions and needed MRCP. Also noted to have multiple colonic polyps. Was noted to be anemic during admission, Hgb 7.2. Did receive blood transfusion, was started on iron supplementation. Did have MRCP and no pancreatic cancer was found. Does have cyst on her pancreas, which is being monitored by MRI by [facility]. Does have leg swelling, uses compression stockings and lasix 80 mg daily. Holds diuretic on days she is out. Sees nephrology. Current Outpatient Medications on File Prior to Visit   Medication Sig Dispense Refill    losartan (COZAAR) 25 mg tablet Take 25 mg by mouth daily.  amLODIPine (NORVASC) 10 mg tablet Take 1 Tab by mouth daily. 90 Tablet 3    allopurinoL (ZYLOPRIM) 100 mg tablet Take 100 mg by mouth two (2) times a day.  furosemide (LASIX) 80 mg tablet Take 1 Tab by mouth daily. 90 Tablet 3    levothyroxine (SYNTHROID) 150 mcg tablet Take 1 Tablet by mouth Daily (before breakfast). (Patient taking differently: Take 175 mcg by mouth Daily (before breakfast). ) 90 Tablet 3    colchicine 0.6 mg tablet Take 1 Tab by mouth daily. 30 Tab 5    omega-3 acid ethyl esters (LOVAZA) 1 gram capsule Take 2 Caps by mouth two (2) times daily (with meals). 360 Cap 3    potassium chloride SA (MICRO-K) 10 mEq capsule Take 2 Caps by mouth daily.  180 Cap 3    fluticasone propionate (FLONASE) 50 mcg/actuation nasal spray 2 Sprays by Both Nostrils route daily. Administer to right and left nostril. 3 Bottle 3    polyethylene glycol (MIRALAX) 17 gram/dose powder Take 17 g by mouth daily. 2108 g 3    atorvastatin (LIPITOR) 20 mg tablet TAKE 1 TABLET DAILY 90 Tab 3    loratadine (CLARITIN) 10 mg tablet Take 1 Tab by mouth daily. Indications: inflammation of the nose due to an allergy 90 Tab 3    ZETIA 10 mg tablet Take 1 tablet by mouth daily. 90 Tab 3    ketoconazole (NIZORAL) 2 % topical cream Apply  to affected area daily as needed. 3    docusate sodium (COLACE) 50 mg capsule Take 100 mg by mouth daily as needed for Constipation.  Biotin 2,500 mcg cap Take  by mouth.  L GASSERI/B BIFIDUM/B LONGUM ([employer] PO) Take 1 Tab by mouth daily.  vitamin E (AQUA GEMS) 400 unit capsule Take 800 Units by mouth daily.  lidocaine (LIDODERM) 5 %(700 mg/patch) 1 patch by TransDERmal route every twenty-four (24) hours. Apply patch to the affected area for 12 hours a day and remove for 12 hours a day.  cholecalciferol, vitamin D3, (VITAMIN D3) 2,000 unit tab Take 1 Tab by mouth daily.  MULTIVITAMIN WITH MINERALS (ONE-A-DAY 50 PLUS PO) Take 1 Tab by mouth daily.  aspirin 81 mg chewable tablet Take 81 mg by mouth daily. No current facility-administered medications on file prior to visit. Review of Symptoms:    Review of Systems   Constitutional: Negative for chills, fever and weight loss. HENT: Negative for nosebleeds. Eyes: Negative for blurred vision and double vision. Respiratory: Positive for shortness of breath. Negative for cough and wheezing. Cardiovascular: Positive for leg swelling. Negative for chest pain, palpitations, orthopnea and PND. Skin: Negative for rash. Neurological: Negative for dizziness and loss of consciousness.        Physical Exam:      General: Well developed, in no acute distress, cooperative and alert  Heart:  reg rate and rhythm; normal S1/S2; no murmurs, no gallops or rubs. Respiratory: Clear bilaterally x 4, no wheezing or rales  Extremities:  Normal cap refill, no cyanosis, atraumatic. No edema. Vascular: 2+ pulses symmetric in all extremities    Vitals:    09/08/21 1022 09/08/21 1042   BP: (!) 166/64 (!) 164/68   BP 1 Location: Right upper arm Left upper arm   BP Patient Position: Sitting Sitting   BP Cuff Size: Large adult Large adult   Pulse: 66    Resp: 18    Height: 5' 2\" (1.575 m)    Weight: 206 lb 4.8 oz (93.6 kg)    SpO2: 98%        ECG done today shows sinus rhythm; RBBB     Assessment:       ICD-10-CM ICD-9-CM    1. Nonrheumatic aortic valve stenosis  I35.0 424.1 SLEEP MEDICINE REFERRAL      NUCLEAR CARDIAC STRESS TEST   2. Essential hypertension  I10 401.9 AMB POC EKG ROUTINE W/ 12 LEADS, INTER & REP      SLEEP MEDICINE REFERRAL      NUCLEAR CARDIAC STRESS TEST   3. Pure hypercholesterolemia  E78.00 272.0 SLEEP MEDICINE REFERRAL      NUCLEAR CARDIAC STRESS TEST   4. Leg edema  R60.0 782.3    5. Pulmonary hypertension (HCC)  I27.20 416.8 SLEEP MEDICINE REFERRAL      NUCLEAR CARDIAC STRESS TEST   6. At risk for heart disease  Z91.89 V49.89 NUCLEAR CARDIAC STRESS TEST        Plan:     1. Nonrheumatic aortic valve stenosis  Echo done 5/2021 with preserved LVEF 55-60% with mild AS, mod pulmonary HTN  Discussed valvular disease, progression  Will plan to repeat echo in 1 year    2. Essential hypertension  BP elevated today. Recommend to monitor it at home and call for BP > 140/90.    3. Pure hypercholesterolemia  Continue statin therapy and low fat, low cholesterol diet  Lipids managed by PCP    4.  CV risk  Multiple risk factors for CAD  Has some shortness of breath with exertion  Will evaluate with lexiscan stress test -- cannot walk safely on the treadmill as she has to use a rollator for assistance    5.   Pulmonary hypertension  Moderate pulmonary hypertension, with some SOBOE, and risk factors for SILVERIO  Recommend sleep apnea workup, will refer to sleep medicine    F/u with Dr. Leighann Heath in 1 year if stress test normal    Nilesh Rosales NP       [facility]             Patient seen, examined by me personally. Plan discussed as detailed. Agree with note as outlined by  NP with modifications as noted. My independent physical exam reveals : Physical Exam  Nursing note reviewed. Cardiovascular:      Rate and Rhythm: Normal rate and regular rhythm. Heart sounds: Normal heart sounds. Pulmonary:      Breath sounds: Normal breath sounds. Musculoskeletal:      Right lower leg: No edema. Left lower leg: No edema. Skin:     General: Skin is warm. Neurological:      Mental Status: She is alert and oriented to person, place, and time. Psychiatric:         Mood and Affect: Mood normal.          No additional findings noted. Agree with plan as outlined above with modifications as noted.      Gilles Coles MD

## 2021-09-08 NOTE — LETTER
9/8/2021    Patient: Terrie Campos   YOB: 1945   Date of Visit: 9/8/2021     Maite Rodney MD  [address]  [facility]    Dear Maite Rodney MD,      Thank you for referring Ms. Yesenia Ponce to [address] [place] [address] for evaluation. My notes for this consultation are attached. If you have questions, please do not hesitate to call me. I look forward to following your patient along with you.       Sincerely,    Dionna Velasquez MD

## 2021-09-08 NOTE — PROGRESS NOTES
1. Have you been to the ER, urgent care clinic since your last visit? Hospitalized since your last visit? No    2. Have you seen or consulted any other health care providers outside of the [address] [place] [address] since your last visit? Include any pap smears or colon screening.  No       Chief Complaint   Patient presents with    New Patient     C/O Leg swelling when she doesnt take fluid pill

## 2021-09-10 RX ORDER — OMEGA-3-ACID ETHYL ESTERS 1 G/1
2 CAPSULE, LIQUID FILLED ORAL 2 TIMES DAILY WITH MEALS
Qty: 360 CAPSULE | Refills: 3 | Status: SHIPPED | OUTPATIENT
Start: 2021-09-10 | End: 2022-02-14 | Stop reason: CLARIF

## 2021-09-10 NOTE — TELEPHONE ENCOUNTER
Future Appointments:  Future Appointments   Date Time Provider Hilary Dickinson   9/21/2021  8:00 AM NUCLEAR, RCAM [facility] BS AMB   10/1/2021  9:00 AM AKIKO MRI 2 OPEN [address] JANET   10/8/2021 10:20 AM [facility] NICK 3 [facility]   10/8/2021  1:00 PM [facility] DEXA 1 [facility]   11/19/2021 10:30 AM Cesar Canavan, MD [facility] BS AMB   9/13/2022  9:15 AM Rosalie Baker MD [facility] BS AMB        Last Appointment With Me:  5/17/2021     Requested Prescriptions     Pending Prescriptions Disp Refills    omega-3 acid ethyl esters (LOVAZA) 1 gram capsule 360 Capsule 3     Sig: Take 2 Capsules by mouth two (2) times daily (with meals).

## 2021-09-21 ENCOUNTER — TELEPHONE (OUTPATIENT)
Dept: CARDIOLOGY CLINIC | Age: 76
End: 2021-09-21

## 2021-09-21 ENCOUNTER — ANCILLARY PROCEDURE (OUTPATIENT)
Dept: CARDIOLOGY CLINIC | Age: 76
End: 2021-09-21
Payer: MEDICARE

## 2021-09-21 VITALS
WEIGHT: 206 LBS | DIASTOLIC BLOOD PRESSURE: 70 MMHG | BODY MASS INDEX: 35.17 KG/M2 | SYSTOLIC BLOOD PRESSURE: 168 MMHG | HEIGHT: 64 IN

## 2021-09-21 DIAGNOSIS — I27.20 PULMONARY HYPERTENSION (HCC): ICD-10-CM

## 2021-09-21 DIAGNOSIS — I35.0 NONRHEUMATIC AORTIC VALVE STENOSIS: ICD-10-CM

## 2021-09-21 DIAGNOSIS — I10 ESSENTIAL HYPERTENSION: ICD-10-CM

## 2021-09-21 DIAGNOSIS — E78.00 PURE HYPERCHOLESTEROLEMIA: ICD-10-CM

## 2021-09-21 DIAGNOSIS — Z91.89 AT RISK FOR HEART DISEASE: ICD-10-CM

## 2021-09-21 LAB
STRESS BASELINE DIAS BP: 70 MMHG
STRESS BASELINE HR: 75 BPM
STRESS BASELINE SYS BP: 168 MMHG
STRESS PEAK DIAS BP: 70 MMHG
STRESS PEAK SYS BP: 168 MMHG
STRESS PERCENT HR ACHIEVED: 56 %
STRESS POST PEAK HR: 80 BPM
STRESS RATE PRESSURE PRODUCT: NORMAL BPM*MMHG
STRESS ST DEPRESSION: 0 MM
STRESS ST ELEVATION: 0 MM
STRESS TARGET HR: 144 BPM

## 2021-09-21 PROCEDURE — 78452 HT MUSCLE IMAGE SPECT MULT: CPT | Performed by: INTERNAL MEDICINE

## 2021-09-21 PROCEDURE — A9502 TC99M TETROFOSMIN: HCPCS

## 2021-09-21 PROCEDURE — 93015 CV STRESS TEST SUPVJ I&R: CPT | Performed by: INTERNAL MEDICINE

## 2021-09-21 NOTE — TELEPHONE ENCOUNTER
----- Message from Tyron Grigsby NP sent at 9/21/2021  1:46 PM EDT -----  Please call the patient and inform that stress test appears abnormal.  Will need to f/u with Dr. Sammy Navarro to discuss further.     Thanks,  Carin Pineda

## 2021-09-21 NOTE — PROGRESS NOTES
Please call the patient and inform that stress test appears abnormal.  Will need to f/u with Dr. Ariana Spence to discuss further.     Thanks,  Rocio Gleason

## 2021-09-22 NOTE — TELEPHONE ENCOUNTER
Verified patient with two patient identifiers. Spoke with patient and daughter on HPI    Discussed stress test was abnormal and an apt with DR. Gail Mcgovern is needed to discuss further. Patient verbalized understanding.     plz schedule an apt with Dr Gail Mcgovern, Thanks

## 2021-09-24 ENCOUNTER — OFFICE VISIT (OUTPATIENT)
Dept: CARDIOLOGY CLINIC | Age: 76
End: 2021-09-24
Payer: MEDICARE

## 2021-09-24 VITALS
BODY MASS INDEX: 36.82 KG/M2 | HEART RATE: 66 BPM | DIASTOLIC BLOOD PRESSURE: 74 MMHG | SYSTOLIC BLOOD PRESSURE: 148 MMHG | WEIGHT: 200.1 LBS | HEIGHT: 62 IN | OXYGEN SATURATION: 98 % | RESPIRATION RATE: 18 BRPM

## 2021-09-24 DIAGNOSIS — R94.39 ABNORMAL STRESS TEST: Primary | ICD-10-CM

## 2021-09-24 DIAGNOSIS — I35.0 NONRHEUMATIC AORTIC VALVE STENOSIS: ICD-10-CM

## 2021-09-24 DIAGNOSIS — E78.00 PURE HYPERCHOLESTEROLEMIA: ICD-10-CM

## 2021-09-24 DIAGNOSIS — I10 ESSENTIAL HYPERTENSION: ICD-10-CM

## 2021-09-24 DIAGNOSIS — I27.20 PULMONARY HYPERTENSION (HCC): ICD-10-CM

## 2021-09-24 PROCEDURE — G8536 NO DOC ELDER MAL SCRN: HCPCS | Performed by: INTERNAL MEDICINE

## 2021-09-24 PROCEDURE — G8754 DIAS BP LESS 90: HCPCS | Performed by: INTERNAL MEDICINE

## 2021-09-24 PROCEDURE — 99214 OFFICE O/P EST MOD 30 MIN: CPT | Performed by: INTERNAL MEDICINE

## 2021-09-24 PROCEDURE — 1101F PT FALLS ASSESS-DOCD LE1/YR: CPT | Performed by: INTERNAL MEDICINE

## 2021-09-24 PROCEDURE — 1090F PRES/ABSN URINE INCON ASSESS: CPT | Performed by: INTERNAL MEDICINE

## 2021-09-24 PROCEDURE — G8753 SYS BP > OR = 140: HCPCS | Performed by: INTERNAL MEDICINE

## 2021-09-24 PROCEDURE — G8417 CALC BMI ABV UP PARAM F/U: HCPCS | Performed by: INTERNAL MEDICINE

## 2021-09-24 PROCEDURE — G8427 DOCREV CUR MEDS BY ELIG CLIN: HCPCS | Performed by: INTERNAL MEDICINE

## 2021-09-24 PROCEDURE — G8510 SCR DEP NEG, NO PLAN REQD: HCPCS | Performed by: INTERNAL MEDICINE

## 2021-09-24 RX ORDER — METOPROLOL SUCCINATE 25 MG/1
25 TABLET, EXTENDED RELEASE ORAL DAILY
Qty: 90 TABLET | Refills: 3 | Status: SHIPPED
Start: 2021-09-24 | End: 2021-10-25 | Stop reason: SINTOL

## 2021-09-24 RX ORDER — LOSARTAN POTASSIUM 25 MG/1
50 TABLET ORAL DAILY
COMMUNITY
Start: 2021-09-20 | End: 2022-08-08

## 2021-09-24 NOTE — PROGRESS NOTES
Beuford Pain, FNP-BC    Subjective/HPI:     Connie Latham is a 68 y.o. female is here for routine f/u. She has a PMHx of aortic stenosis, DM2, HTN, HLD, NAFLD and hypothyroidism. Had stress test done given increased risk factors for heart disease. Here to discuss results. Still has HENLEY. Is scheduled for MRI on 10/1 for evaluation of pancreatic cysts. Follows with Dr. Liseth Larios with GI. Current Outpatient Medications on File Prior to Visit   Medication Sig Dispense Refill    losartan (COZAAR) 50 mg tablet Take 50 mg by mouth daily.  omega-3 acid ethyl esters (LOVAZA) 1 gram capsule Take 2 Capsules by mouth two (2) times daily (with meals). 360 Capsule 3    amLODIPine (NORVASC) 10 mg tablet Take 1 Tab by mouth daily. 90 Tablet 3    allopurinoL (ZYLOPRIM) 100 mg tablet Take 100 mg by mouth two (2) times a day.  furosemide (LASIX) 80 mg tablet Take 1 Tab by mouth daily. 90 Tablet 3    levothyroxine (SYNTHROID) 150 mcg tablet Take 1 Tablet by mouth Daily (before breakfast). (Patient taking differently: Take 175 mcg by mouth Daily (before breakfast). ) 90 Tablet 3    colchicine 0.6 mg tablet Take 1 Tab by mouth daily. 30 Tab 5    potassium chloride SA (MICRO-K) 10 mEq capsule Take 2 Caps by mouth daily. 180 Cap 3    fluticasone propionate (FLONASE) 50 mcg/actuation nasal spray 2 Sprays by Both Nostrils route daily. Administer to right and left nostril. 3 Bottle 3    polyethylene glycol (MIRALAX) 17 gram/dose powder Take 17 g by mouth daily. 2108 g 3    atorvastatin (LIPITOR) 20 mg tablet TAKE 1 TABLET DAILY 90 Tab 3    loratadine (CLARITIN) 10 mg tablet Take 1 Tab by mouth daily. Indications: inflammation of the nose due to an allergy 90 Tab 3    ZETIA 10 mg tablet Take 1 tablet by mouth daily. 90 Tab 3    ketoconazole (NIZORAL) 2 % topical cream Apply  to affected area daily as needed.   3    docusate sodium (COLACE) 50 mg capsule Take 100 mg by mouth daily as needed for Constipation.  Biotin 2,500 mcg cap Take  by mouth.  L GASSERI/B BIFIDUM/B LONGUM ([employer] PO) Take 1 Tab by mouth daily.  vitamin E (AQUA GEMS) 400 unit capsule Take 800 Units by mouth daily.  lidocaine (LIDODERM) 5 %(700 mg/patch) 1 patch by TransDERmal route every twenty-four (24) hours. Apply patch to the affected area for 12 hours a day and remove for 12 hours a day.  cholecalciferol, vitamin D3, (VITAMIN D3) 2,000 unit tab Take 1 Tab by mouth daily.  MULTIVITAMIN WITH MINERALS (ONE-A-DAY 50 PLUS PO) Take 1 Tab by mouth daily.  aspirin 81 mg chewable tablet Take 81 mg by mouth daily.  [DISCONTINUED] losartan (COZAAR) 25 mg tablet Take 25 mg by mouth daily. No current facility-administered medications on file prior to visit. Review of Symptoms:    Review of Systems   Constitutional: Negative for chills, fever and weight loss. HENT: Negative for nosebleeds. Eyes: Negative for blurred vision and double vision. Respiratory: Negative for cough, shortness of breath and wheezing. Cardiovascular: Negative for chest pain, palpitations, orthopnea, leg swelling and PND. Skin: Negative for rash. Neurological: Negative for dizziness and loss of consciousness. Physical Exam:      General: Well developed, in no acute distress, cooperative and alert  Heart:  reg rate and rhythm; normal S1/S2; no murmurs, no gallops or rubs. Respiratory: Clear bilaterally x 4, no wheezing or rales  Extremities:  Normal cap refill, no cyanosis, atraumatic. No edema.   Vascular: 2+ pulses symmetric in all extremities    Vitals:    09/24/21 0926 09/24/21 0941   BP: (!) 146/88 (!) 148/74   BP 1 Location: Left upper arm Right upper arm   BP Patient Position: Sitting Sitting   BP Cuff Size: Large adult Large adult   Pulse: 66    Resp: 18    Height: 5' 2\" (1.575 m)    Weight: 200 lb 1.6 oz (90.8 kg)    SpO2: 98%           Assessment:       ICD-10-CM ICD-9-CM 1. Abnormal stress test  R94.39 794.39    2. Nonrheumatic aortic valve stenosis  I35.0 424.1    3. Essential hypertension  I10 401.9    4. Pure hypercholesterolemia  E78.00 272.0    5. Pulmonary hypertension (HCC)  I27.20 416.8         Plan:     1. Abnormal stress test  Lexiscan stress test done 9/2021 with fixed inferior wall defect which is large in size. Echo done 5/2021 with preserved LVEF 55-60% with mild AS, mod TR, mod PHTN  With HNELEY  On amlodipine, ASA and statin therapy. Add BB. Discussed option of cardiac cath vs. Coronary CTA. Pt elects to proceed with cardiac catheterization for definitive assessment. CPT 74799    2. Nonrheumatic aortic valve stenosis  Echo done 5/2021 with preserved LVEF 55-60% with mild AS, mod pulmonary HTN  Continue present management  Repeat echo in 5/2022    3. Essential hypertension  BP controlled. Continue anti-hypertensive therapy and low sodium diet    4. Pure hypercholesterolemia  LDL 41 in 7/2020  Continue statin therapy and low fat, low cholesterol diet  Lipids managed by PCP    5. Pulmonary hypertension (HCC)  Moderate PHTN with HENLEY, has been referred to sleep medicine  Consider formal pulmonary evaluation    F/u with Dr. Tj Cano after procedure. Evelin Snwo NP       [facility]             Patient seen, examined by me personally. Plan discussed as detailed. Agree with note as outlined by  NP with modifications as noted. My independent physical exam reveals : Physical Exam  Vitals and nursing note reviewed. Cardiovascular:      Rate and Rhythm: Normal rate and regular rhythm. Heart sounds: Normal heart sounds. Musculoskeletal:      Right lower leg: No edema. Left lower leg: No edema. Neurological:      Mental Status: She is alert and oriented to person, place, and time. Psychiatric:         Mood and Affect: Mood normal.          No additional findings noted. Agree with plan as outlined above with modifications as noted.  Remains symptomatic. Stress test abnormal. Discussed cath/PCI. Has h/o thrombocytopenia. Last count 95k. Will await repeat labs.     Nikolai Ken MD

## 2021-09-24 NOTE — LETTER
9/24/2021    Patient: Danica Dose   YOB: 1945   Date of Visit: 9/24/2021     Verónica Nixon MD  [address]  [address]  [facility]    Dear Verónica Nixon MD,      Thank you for referring Ms. Jayashree Manjarrez to [address] [place] [address] for evaluation. My notes for this consultation are attached. If you have questions, please do not hesitate to call me. I look forward to following your patient along with you.       Sincerely,    Phuong Srivastava MD

## 2021-09-24 NOTE — H&P (VIEW-ONLY)
Benjamin Graff, [facility]    Subjective/HPI:     Kael Kahn is a 68 y.o. female is here for routine f/u. She has a PMHx of aortic stenosis, DM2, HTN, HLD, NAFLD and hypothyroidism. Had stress test done given increased risk factors for heart disease. Here to discuss results. Still has HENLEY. Is scheduled for MRI on 10/1 for evaluation of pancreatic cysts. Follows with Dr. Luly Monique with GI. Current Outpatient Medications on File Prior to Visit   Medication Sig Dispense Refill    losartan (COZAAR) 50 mg tablet Take 50 mg by mouth daily.  omega-3 acid ethyl esters (LOVAZA) 1 gram capsule Take 2 Capsules by mouth two (2) times daily (with meals). 360 Capsule 3    amLODIPine (NORVASC) 10 mg tablet Take 1 Tab by mouth daily. 90 Tablet 3    allopurinoL (ZYLOPRIM) 100 mg tablet Take 100 mg by mouth two (2) times a day.  furosemide (LASIX) 80 mg tablet Take 1 Tab by mouth daily. 90 Tablet 3    levothyroxine (SYNTHROID) 150 mcg tablet Take 1 Tablet by mouth Daily (before breakfast). (Patient taking differently: Take 175 mcg by mouth Daily (before breakfast). ) 90 Tablet 3    colchicine 0.6 mg tablet Take 1 Tab by mouth daily. 30 Tab 5    potassium chloride SA (MICRO-K) 10 mEq capsule Take 2 Caps by mouth daily. 180 Cap 3    fluticasone propionate (FLONASE) 50 mcg/actuation nasal spray 2 Sprays by Both Nostrils route daily. Administer to right and left nostril. 3 Bottle 3    polyethylene glycol (MIRALAX) 17 gram/dose powder Take 17 g by mouth daily. 2108 g 3    atorvastatin (LIPITOR) 20 mg tablet TAKE 1 TABLET DAILY 90 Tab 3    loratadine (CLARITIN) 10 mg tablet Take 1 Tab by mouth daily. Indications: inflammation of the nose due to an allergy 90 Tab 3    ZETIA 10 mg tablet Take 1 tablet by mouth daily. 90 Tab 3    ketoconazole (NIZORAL) 2 % topical cream Apply  to affected area daily as needed.   3    docusate sodium (COLACE) 50 mg capsule Take 100 mg by mouth daily as needed for Constipation.  Biotin 2,500 mcg cap Take  by mouth.  L GASSERI/B BIFIDUM/B LONGUM ([employer] PO) Take 1 Tab by mouth daily.  vitamin E (AQUA GEMS) 400 unit capsule Take 800 Units by mouth daily.  lidocaine (LIDODERM) 5 %(700 mg/patch) 1 patch by TransDERmal route every twenty-four (24) hours. Apply patch to the affected area for 12 hours a day and remove for 12 hours a day.  cholecalciferol, vitamin D3, (VITAMIN D3) 2,000 unit tab Take 1 Tab by mouth daily.  MULTIVITAMIN WITH MINERALS (ONE-A-DAY 50 PLUS PO) Take 1 Tab by mouth daily.  aspirin 81 mg chewable tablet Take 81 mg by mouth daily.  [DISCONTINUED] losartan (COZAAR) 25 mg tablet Take 25 mg by mouth daily. No current facility-administered medications on file prior to visit. Review of Symptoms:    Review of Systems   Constitutional: Negative for chills, fever and weight loss. HENT: Negative for nosebleeds. Eyes: Negative for blurred vision and double vision. Respiratory: Negative for cough, shortness of breath and wheezing. Cardiovascular: Negative for chest pain, palpitations, orthopnea, leg swelling and PND. Skin: Negative for rash. Neurological: Negative for dizziness and loss of consciousness. Physical Exam:      General: Well developed, in no acute distress, cooperative and alert  Heart:  reg rate and rhythm; normal S1/S2; no murmurs, no gallops or rubs. Respiratory: Clear bilaterally x 4, no wheezing or rales  Extremities:  Normal cap refill, no cyanosis, atraumatic. No edema.   Vascular: 2+ pulses symmetric in all extremities    Vitals:    09/24/21 0926 09/24/21 0941   BP: (!) 146/88 (!) 148/74   BP 1 Location: Left upper arm Right upper arm   BP Patient Position: Sitting Sitting   BP Cuff Size: Large adult Large adult   Pulse: 66    Resp: 18    Height: 5' 2\" (1.575 m)    Weight: 200 lb 1.6 oz (90.8 kg)    SpO2: 98%           Assessment:       ICD-10-CM ICD-9-CM 1. Abnormal stress test  R94.39 794.39    2. Nonrheumatic aortic valve stenosis  I35.0 424.1    3. Essential hypertension  I10 401.9    4. Pure hypercholesterolemia  E78.00 272.0    5. Pulmonary hypertension (HCC)  I27.20 416.8         Plan:     1. Abnormal stress test  Lexiscan stress test done 9/2021 with fixed inferior wall defect which is large in size. Echo done 5/2021 with preserved LVEF 55-60% with mild AS, mod TR, mod PHTN  With HENLEY  On amlodipine, ASA and statin therapy. Add BB. Discussed option of cardiac cath vs. Coronary CTA. Pt elects to proceed with cardiac catheterization for definitive assessment. CPT 40236    2. Nonrheumatic aortic valve stenosis  Echo done 5/2021 with preserved LVEF 55-60% with mild AS, mod pulmonary HTN  Continue present management  Repeat echo in 5/2022    3. Essential hypertension  BP controlled. Continue anti-hypertensive therapy and low sodium diet    4. Pure hypercholesterolemia  LDL 41 in 7/2020  Continue statin therapy and low fat, low cholesterol diet  Lipids managed by PCP    5. Pulmonary hypertension (HCC)  Moderate PHTN with HENLEY, has been referred to sleep medicine  Consider formal pulmonary evaluation    F/u with Dr. Tiffanie Arevalo after procedure. [facility], NP       [facility]             Patient seen, examined by me personally. Plan discussed as detailed. Agree with note as outlined by  NP with modifications as noted. My independent physical exam reveals : Physical Exam  Vitals and nursing note reviewed. Cardiovascular:      Rate and Rhythm: Normal rate and regular rhythm. Heart sounds: Normal heart sounds. Musculoskeletal:      Right lower leg: No edema. Left lower leg: No edema. Neurological:      Mental Status: She is alert and oriented to person, place, and time. Psychiatric:         Mood and Affect: Mood normal.          No additional findings noted. Agree with plan as outlined above with modifications as noted.  Remains symptomatic. Stress test abnormal. Discussed cath/PCI. Has h/o thrombocytopenia. Last count 95k. Will await repeat labs.     Lyly Duran MD

## 2021-09-24 NOTE — PROGRESS NOTES
1. Have you been to the ER, urgent care clinic since your last visit? Hospitalized since your last visit? No    2. Have you seen or consulted any other health care providers outside of the [address] [place] [address] since your last visit? Include any pap smears or colon screening.  No         Chief Complaint   Patient presents with    Results     Pt denies cardiac symptoms

## 2021-09-25 LAB
ALBUMIN SERPL-MCNC: 4.6 G/DL (ref 3.7–4.7)
ALBUMIN/GLOB SERPL: 2 {RATIO} (ref 1.2–2.2)
ALP SERPL-CCNC: 262 IU/L (ref 44–121)
ALT SERPL-CCNC: 51 IU/L (ref 0–32)
AST SERPL-CCNC: 69 IU/L (ref 0–40)
BILIRUB SERPL-MCNC: 0.7 MG/DL (ref 0–1.2)
BUN SERPL-MCNC: 44 MG/DL (ref 8–27)
BUN/CREAT SERPL: 31 (ref 12–28)
CALCIUM SERPL-MCNC: 9.8 MG/DL (ref 8.7–10.3)
CHLORIDE SERPL-SCNC: 104 MMOL/L (ref 96–106)
CO2 SERPL-SCNC: 25 MMOL/L (ref 20–29)
CREAT SERPL-MCNC: 1.42 MG/DL (ref 0.57–1)
ERYTHROCYTE [DISTWIDTH] IN BLOOD BY AUTOMATED COUNT: 14.4 % (ref 11.7–15.4)
GLOBULIN SER CALC-MCNC: 2.3 G/DL (ref 1.5–4.5)
GLUCOSE SERPL-MCNC: 160 MG/DL (ref 65–99)
HCT VFR BLD AUTO: 31.6 % (ref 34–46.6)
HGB BLD-MCNC: 10.4 G/DL (ref 11.1–15.9)
INR PPP: 1 (ref 0.9–1.2)
INTERPRETATION: NORMAL
MCH RBC QN AUTO: 33.5 PG (ref 26.6–33)
MCHC RBC AUTO-ENTMCNC: 32.9 G/DL (ref 31.5–35.7)
MCV RBC AUTO: 102 FL (ref 79–97)
MORPHOLOGY BLD-IMP: ABNORMAL
PLATELET # BLD AUTO: 88 X10E3/UL (ref 150–450)
POTASSIUM SERPL-SCNC: 4.6 MMOL/L (ref 3.5–5.2)
PROT SERPL-MCNC: 6.9 G/DL (ref 6–8.5)
PROTHROMBIN TIME: 10.5 SEC (ref 9.1–12)
RBC # BLD AUTO: 3.1 X10E6/UL (ref 3.77–5.28)
SODIUM SERPL-SCNC: 143 MMOL/L (ref 134–144)
WBC # BLD AUTO: 3.5 X10E3/UL (ref 3.4–10.8)

## 2021-09-27 NOTE — TELEPHONE ENCOUNTER
Pre-cath labs; reviewed. Will need pre-cath hydration. Plt counts are chronically low.   Okay to proceed

## 2021-09-30 ENCOUNTER — TELEPHONE (OUTPATIENT)
Dept: CARDIOLOGY CLINIC | Age: 76
End: 2021-09-30

## 2021-09-30 NOTE — TELEPHONE ENCOUNTER
Could not attach. Pt got a voicemail the other day and she could not understand the message that was left. Please call her [phone].

## 2021-10-01 ENCOUNTER — HOSPITAL ENCOUNTER (OUTPATIENT)
Dept: MRI IMAGING | Age: 76
Discharge: HOME | End: 2021-10-01
Attending: INTERNAL MEDICINE
Payer: MEDICARE

## 2021-10-01 VITALS — WEIGHT: 210 LBS | BODY MASS INDEX: 38.41 KG/M2

## 2021-10-01 DIAGNOSIS — R93.3 ABNORMAL FINDING ON GI TRACT IMAGING: ICD-10-CM

## 2021-10-01 PROCEDURE — A9585 GADOBUTROL INJECTION: HCPCS | Performed by: INTERNAL MEDICINE

## 2021-10-01 PROCEDURE — 74011250636 HC RX REV CODE- 250/636: Performed by: INTERNAL MEDICINE

## 2021-10-01 PROCEDURE — 74183 MRI ABD W/O CNTR FLWD CNTR: CPT

## 2021-10-01 RX ADMIN — GADOBUTROL 10 ML: 604.72 INJECTION INTRAVENOUS at 11:00

## 2021-10-04 ENCOUNTER — HOSPITAL ENCOUNTER (OUTPATIENT)
Age: 76
Discharge: HOME | End: 2021-10-04
Attending: INTERNAL MEDICINE | Admitting: INTERNAL MEDICINE
Payer: MEDICARE

## 2021-10-04 VITALS
RESPIRATION RATE: 14 BRPM | WEIGHT: 205 LBS | OXYGEN SATURATION: 99 % | BODY MASS INDEX: 38.71 KG/M2 | SYSTOLIC BLOOD PRESSURE: 128 MMHG | DIASTOLIC BLOOD PRESSURE: 43 MMHG | TEMPERATURE: 98 F | HEART RATE: 48 BPM | HEIGHT: 61 IN

## 2021-10-04 DIAGNOSIS — R07.9 CHEST PAIN, UNSPECIFIED TYPE: ICD-10-CM

## 2021-10-04 DIAGNOSIS — I20.8 ANGINA AT REST (HCC): ICD-10-CM

## 2021-10-04 PROBLEM — R94.39 ABNORMAL NUCLEAR STRESS TEST: Status: ACTIVE | Noted: 2021-10-04

## 2021-10-04 PROBLEM — Z98.890 S/P CARDIAC CATH: Status: ACTIVE | Noted: 2021-10-04

## 2021-10-04 LAB
GLUCOSE BLD STRIP.AUTO-MCNC: 154 MG/DL (ref 65–117)
SERVICE CMNT-IMP: ABNORMAL

## 2021-10-04 PROCEDURE — 93458 L HRT ARTERY/VENTRICLE ANGIO: CPT | Performed by: INTERNAL MEDICINE

## 2021-10-04 PROCEDURE — 77030004549 HC CATH ANGI DX PRF MRTM -A: Performed by: INTERNAL MEDICINE

## 2021-10-04 PROCEDURE — 77030008543 HC TBNG MON PRSS MRTM -A: Performed by: INTERNAL MEDICINE

## 2021-10-04 PROCEDURE — 99152 MOD SED SAME PHYS/QHP 5/>YRS: CPT | Performed by: INTERNAL MEDICINE

## 2021-10-04 PROCEDURE — 2709999900 HC NON-CHARGEABLE SUPPLY: Performed by: INTERNAL MEDICINE

## 2021-10-04 PROCEDURE — 82962 GLUCOSE BLOOD TEST: CPT

## 2021-10-04 PROCEDURE — C1894 INTRO/SHEATH, NON-LASER: HCPCS | Performed by: INTERNAL MEDICINE

## 2021-10-04 PROCEDURE — 74011000250 HC RX REV CODE- 250: Performed by: INTERNAL MEDICINE

## 2021-10-04 PROCEDURE — 74011250636 HC RX REV CODE- 250/636: Performed by: INTERNAL MEDICINE

## 2021-10-04 PROCEDURE — C1769 GUIDE WIRE: HCPCS | Performed by: INTERNAL MEDICINE

## 2021-10-04 PROCEDURE — 74011000636 HC RX REV CODE- 636: Performed by: INTERNAL MEDICINE

## 2021-10-04 PROCEDURE — 77030010221 HC SPLNT WR POS TELE -B: Performed by: INTERNAL MEDICINE

## 2021-10-04 PROCEDURE — 77030019698 HC SYR ANGI MDLON MRTM -A: Performed by: INTERNAL MEDICINE

## 2021-10-04 PROCEDURE — 77030042317 HC BND COMPR HEMSTAT -B: Performed by: INTERNAL MEDICINE

## 2021-10-04 PROCEDURE — 74011250637 HC RX REV CODE- 250/637: Performed by: INTERNAL MEDICINE

## 2021-10-04 PROCEDURE — 77030015766: Performed by: INTERNAL MEDICINE

## 2021-10-04 RX ORDER — VERAPAMIL HYDROCHLORIDE 2.5 MG/ML
INJECTION, SOLUTION INTRAVENOUS AS NEEDED
Status: DISCONTINUED | OUTPATIENT
Start: 2021-10-04 | End: 2021-10-04

## 2021-10-04 RX ORDER — MIDAZOLAM HYDROCHLORIDE 1 MG/ML
INJECTION, SOLUTION INTRAMUSCULAR; INTRAVENOUS AS NEEDED
Status: DISCONTINUED | OUTPATIENT
Start: 2021-10-04 | End: 2021-10-04

## 2021-10-04 RX ORDER — HEPARIN SODIUM 200 [USP'U]/100ML
INJECTION, SOLUTION INTRAVENOUS
Status: DISCONTINUED | OUTPATIENT
Start: 2021-10-04 | End: 2021-10-04

## 2021-10-04 RX ORDER — GUAIFENESIN 100 MG/5ML
81 LIQUID (ML) ORAL DAILY
Status: DISCONTINUED | OUTPATIENT
Start: 2021-10-04 | End: 2021-10-04 | Stop reason: HOSPADM

## 2021-10-04 RX ORDER — HEPARIN SODIUM 1000 [USP'U]/ML
INJECTION, SOLUTION INTRAVENOUS; SUBCUTANEOUS AS NEEDED
Status: DISCONTINUED | OUTPATIENT
Start: 2021-10-04 | End: 2021-10-04

## 2021-10-04 RX ORDER — FENTANYL CITRATE 50 UG/ML
INJECTION, SOLUTION INTRAMUSCULAR; INTRAVENOUS AS NEEDED
Status: DISCONTINUED | OUTPATIENT
Start: 2021-10-04 | End: 2021-10-04

## 2021-10-04 RX ORDER — LIDOCAINE HYDROCHLORIDE 10 MG/ML
INJECTION, SOLUTION EPIDURAL; INFILTRATION; INTRACAUDAL; PERINEURAL AS NEEDED
Status: DISCONTINUED | OUTPATIENT
Start: 2021-10-04 | End: 2021-10-04

## 2021-10-04 RX ADMIN — ASPIRIN 81 MG: 81 TABLET, CHEWABLE ORAL at 08:14

## 2021-10-04 NOTE — Clinical Note
TRANSFER - OUT REPORT:     Verbal report given to: zully. Report consisted of patient's Situation, Background, Assessment and   Recommendations(SBAR). Opportunity for questions and clarification was provided. Patient transported to: [facility].

## 2021-10-04 NOTE — INTERVAL H&P NOTE
Update History & Physical    The Patient's History and Physical of September 24, 2021 was reviewed with the patient and I examined the patient. There was no change. The surgical site was confirmed by the patient and me. Plan:  The risk, benefits, expected outcome, and alternative to the recommended procedure have been discussed with the patient. Patient understands and wants to proceed with the procedure.     Electronically signed by Geovany Murrell MD on 10/4/2021 at 11:47 AM

## 2021-10-04 NOTE — DISCHARGE INSTRUCTIONS
[address] [place] [address], [address] [facility] [address], [address] [facility]  [phone]        Patient ID:  Sol Toth  [id number]  62 y.o.  1945    Admit Date: 10/4/2021    Discharge Date: 10/4/2021     Admitting Physician: Loly Khan MD     Discharge Physician: Loly Khan MD    Admission Diagnoses:   Chest pain, unspecified type [R07.9]    Discharge Diagnoses: Active Problems:    Abnormal nuclear stress test (10/4/2021)      Angina at rest [facility]) (10/4/2021)      S/P cardiac cath (10/4/2021)      Overview: 10/4/2021 nonobstructive disease        Discharge Condition: Good    Cardiology Procedures this Admission:  Diagnostic left heart catheterization    Disposition: home    Reference discharge instructions provided by nursing for diet and activity. Signed:  Emmanuel Courtney NP  10/4/2021  3:14 PM        Radial Cardiac Catheterization/Angiography Discharge Instructions    It is normal to feel tired the first couple days. Take it easy and follow the physicians instructions. CHECK THE CATHETER INSERTION SITE DAILY:    Remove the wrist dressing 24 hours after the procedure. You may shower 24 hours after the procedure. Wash with soap and water and pat dry. Gentle cleaning of the site with soap and water is sufficient, cover with a dry clean dressing or bandage. Do not apply creams or powders to the area. No soaking the wrist for 3 days. Leave the puncture site open to air after 24 hours post-procedure. CALL THE PHYSICIANS:     If the site becomes red, swollen or feels warm to the touch  If there is bleeding or drainage or if there is unusual pain at the radial site. If there is any minor oozing, you may apply a band-aid and remove after 12 hours.    If the bleeding continues, hold pressure with the middle finger against the puncture site and the thumb against the back of the wrist,call 911 to be transported to the hospital.  DO NOT [address] [facility] ANYONE ELSE DRIVE YOU - CALL [phone]. ACTIVITY:   For the first 24 hours do not manipulate the wrist.  No lifting, pushing or pulling over 3-5 pounds with the affected wrist for 7 daysand no straining the insertion site. Do not life grocery bags or the garbage can, do not run the vacuum  or  for 7 days. Start with short walks as in the hospital and gradually increase as tolerated each day. It is recommended to walk 30 minutes 5-7 days per week. Follow your physicians instructions on activity. Avoid walking outside in extremes of heat or cold. Walk inside when it is cold and windy or hot and humid. Things to keep in mind:  No driving for at least 24 hours, or as designated by your physician. Limit the number of times you go up and down the stairs  Take rests and pace yourself with activity. Be careful and do not strain with bowel movements. MEDICATIONS:    Take all medications as prescribed  Call your physician if you have any questions  Keep an updated list of your medications with you at all times and give a list to your physician and pharmacist    SIGNS AND SYMPTOMS:   Be cautious of symptoms of angina or recurrent symptoms such as chest discomfort, unusual shortness of breath or fatigue. These could be symptoms of restenosis, a new blockage or a heart attack. If your symptoms are relieved with rest it is still recommended that you notify your physician of recurrent chest pain or discomfort. For CHEST PAIN or symptoms of angina not relieved with rest:  If the discomfort is not relieved with rest, and you have been prescribed Nitroglycerin, take as directed (taken under the tongue, one at a time 5 minutes apart for a total of 3 doses). If the discomfort is not relieved after the 3rd nitroglycerin, call 911. If you have not been prescribed Nitroglycerin  and your chest discomfort is not relieved with rest, call 911.      AFTER CARE:   Follow up with your physician as instructed. Follow a heart healthy diet with proper portion control, daily stress management, daily exercise, blood pressure and cholesterol control , and smoking cessation.

## 2021-10-06 NOTE — CARDIO/PULMONARY
Cardiac Rehab Note: chart review/referral     Cardiac cath 10/4/21:  \"nonobstructive disease\"    Smoking history assessed. Patient is a non smoker. Smoking Cessation Program link has not been added to the AVS.     Patient not seen at this time due to current condition as indicated in chart.

## 2021-10-08 ENCOUNTER — HOSPITAL ENCOUNTER (OUTPATIENT)
Dept: MAMMOGRAPHY | Age: 76
Discharge: HOME | End: 2021-10-08
Attending: NURSE PRACTITIONER
Payer: MEDICARE

## 2021-10-08 DIAGNOSIS — Z12.31 VISIT FOR SCREENING MAMMOGRAM: ICD-10-CM

## 2021-10-08 PROCEDURE — 77067 SCR MAMMO BI INCL CAD: CPT

## 2021-10-15 ENCOUNTER — TELEPHONE (OUTPATIENT)
Dept: CARDIOLOGY CLINIC | Age: 76
End: 2021-10-15

## 2021-10-15 NOTE — TELEPHONE ENCOUNTER
This writer contacted patient in reference to medication reaction. Two patient identifiers confirmed. Patient states rash been around the 25th of September when she was prescribed Metoprolol 25mg tablets. Patient states she initially thought rash could have been related to something else, maybe something she ate. Patient states Dr. Patel Knows, Nephology has since increase dosage to 50mg and rash as worsened. Patient c/o rash on bilateral arms legs and torso associated with itching. Patient would like to discuss medication options. Patient has not contacted Dr. Gerardo Dawson office, wanted to discuss with Dr. Raina Albright because he initially prescribed the medication.  Please advise

## 2021-10-15 NOTE — TELEPHONE ENCOUNTER
This writer contacted patient in reference to medication reaction. Two patient identifiers verified with patient. Mrs. Rick Davis was advised per NP Fritz Mora to hol medication until she is seen by Dr. Liliam Vasquez on 10/25/2021. Patient verbalized understanding. Patient also states Dr. Opal Abdul decrease Losartan to 25mg daily due to elevated kidney function. Medication list updated to reflect change.

## 2021-10-15 NOTE — TELEPHONE ENCOUNTER
Pt called regarding metoprolol 25 mg. Pt is having concerns because she is now breaking out in a rash. Dr. Ramiro Gaspar raised it 50 mg .  Call pt back at [phone]    Thanks  Lauro Douglas

## 2021-10-25 ENCOUNTER — OFFICE VISIT (OUTPATIENT)
Dept: CARDIOLOGY CLINIC | Age: 76
End: 2021-10-25
Payer: MEDICARE

## 2021-10-25 VITALS
BODY MASS INDEX: 37.89 KG/M2 | SYSTOLIC BLOOD PRESSURE: 142 MMHG | HEIGHT: 61 IN | HEART RATE: 62 BPM | OXYGEN SATURATION: 99 % | DIASTOLIC BLOOD PRESSURE: 56 MMHG | RESPIRATION RATE: 16 BRPM | WEIGHT: 200.7 LBS

## 2021-10-25 DIAGNOSIS — I10 ESSENTIAL HYPERTENSION: ICD-10-CM

## 2021-10-25 DIAGNOSIS — E78.00 PURE HYPERCHOLESTEROLEMIA: ICD-10-CM

## 2021-10-25 DIAGNOSIS — I27.20 PULMONARY HYPERTENSION (HCC): ICD-10-CM

## 2021-10-25 DIAGNOSIS — I35.0 NONRHEUMATIC AORTIC VALVE STENOSIS: Primary | ICD-10-CM

## 2021-10-25 PROCEDURE — G8417 CALC BMI ABV UP PARAM F/U: HCPCS | Performed by: INTERNAL MEDICINE

## 2021-10-25 PROCEDURE — 99214 OFFICE O/P EST MOD 30 MIN: CPT | Performed by: INTERNAL MEDICINE

## 2021-10-25 PROCEDURE — G8427 DOCREV CUR MEDS BY ELIG CLIN: HCPCS | Performed by: INTERNAL MEDICINE

## 2021-10-25 PROCEDURE — 1090F PRES/ABSN URINE INCON ASSESS: CPT | Performed by: INTERNAL MEDICINE

## 2021-10-25 PROCEDURE — G8753 SYS BP > OR = 140: HCPCS | Performed by: INTERNAL MEDICINE

## 2021-10-25 PROCEDURE — G8754 DIAS BP LESS 90: HCPCS | Performed by: INTERNAL MEDICINE

## 2021-10-25 PROCEDURE — G8510 SCR DEP NEG, NO PLAN REQD: HCPCS | Performed by: INTERNAL MEDICINE

## 2021-10-25 PROCEDURE — 1101F PT FALLS ASSESS-DOCD LE1/YR: CPT | Performed by: INTERNAL MEDICINE

## 2021-10-25 PROCEDURE — 93000 ELECTROCARDIOGRAM COMPLETE: CPT | Performed by: INTERNAL MEDICINE

## 2021-10-25 PROCEDURE — G8536 NO DOC ELDER MAL SCRN: HCPCS | Performed by: INTERNAL MEDICINE

## 2021-10-25 NOTE — PROGRESS NOTES
Chief Complaint   Patient presents with    Hypertension     10/04/2021 [facility] Abnormal Stress Test/Cardiac Cath    Hyperlipidemia     Visit Vitals  BP (!) 142/56 (BP 1 Location: Left arm, BP Patient Position: Sitting, BP Cuff Size: Large adult)   Pulse 62   Resp 16   Ht 5' 1\" (1.549 m)   Wt 200 lb 11.2 oz (91 kg)   SpO2 99%   BMI 37.92 kg/m²       1. Have you been to the ER, urgent care clinic since your last visit? Hospitalized since your last visit? Yes When: 10/04/2021 [facility] Abnormal Stress Test/Cardiac Cath    2. Have you seen or consulted any other health care providers outside of the [address] [place] [address] since your last visit? Include any pap smears or colon screening.  No

## 2021-10-25 NOTE — LETTER
10/25/2021    Patient: Rex Lawrence   YOB: 1945   Date of Visit: 10/25/2021     Dirk Patiño MD  [address]  [facility]    Dear Dirk Patiño MD,      Thank you for referring Ms. Luz Taveras to [address] [place] [address] for evaluation. My notes for this consultation are attached. If you have questions, please do not hesitate to call me. I look forward to following your patient along with you.       Sincerely,    Vincent Mak MD

## 2021-10-25 NOTE — PROGRESS NOTES
Marck Barriga, [facility]    Subjective/HPI:     Adali Diaz is a 68 y.o. female is here for a f/u appt after cardiac cath. She has a PMHx of aortic stenosis, DM2, HTN, HLD, NAFLD and hypothyroidism. She was symptomatic with HENLEY and had an abnormal stress test. Cardiac cath 10/4/21 with insignificant CAD, NL EF. She reports since starting Metoprolol she developed a rash and had to stop it. She has perhaps mild improvement in her HENLEY, though it persists. She has sleep study next month. Current Outpatient Medications on File Prior to Visit   Medication Sig Dispense Refill    losartan (COZAAR) 25 mg tablet Take 25 mg by mouth daily. Decreased by Dr. Yariel Sow to 25mg once daily      omega-3 acid ethyl esters (LOVAZA) 1 gram capsule Take 2 Capsules by mouth two (2) times daily (with meals). 360 Capsule 3    amLODIPine (NORVASC) 10 mg tablet Take 1 Tab by mouth daily. 90 Tablet 3    allopurinoL (ZYLOPRIM) 100 mg tablet Take 100 mg by mouth two (2) times a day.  furosemide (LASIX) 80 mg tablet Take 1 Tab by mouth daily. 90 Tablet 3    levothyroxine (SYNTHROID) 150 mcg tablet Take 1 Tablet by mouth Daily (before breakfast). (Patient taking differently: Take 125 mcg by mouth Daily (before breakfast). ) 90 Tablet 3    colchicine 0.6 mg tablet Take 1 Tab by mouth daily. (Patient taking differently: Take 0.6 mg by mouth daily as needed.) 30 Tab 5    potassium chloride SA (MICRO-K) 10 mEq capsule Take 2 Caps by mouth daily. 180 Cap 3    fluticasone propionate (FLONASE) 50 mcg/actuation nasal spray 2 Sprays by Both Nostrils route daily. Administer to right and left nostril. 3 Bottle 3    polyethylene glycol (MIRALAX) 17 gram/dose powder Take 17 g by mouth daily. (Patient taking differently: Take 17 g by mouth daily as needed.) 2108 g 3    atorvastatin (LIPITOR) 20 mg tablet TAKE 1 TABLET DAILY 90 Tab 3    loratadine (CLARITIN) 10 mg tablet Take 1 Tab by mouth daily.  Indications: inflammation of the nose due to an allergy 90 Tab 3    ZETIA 10 mg tablet Take 1 tablet by mouth daily. 90 Tab 3    ketoconazole (NIZORAL) 2 % topical cream Apply  to affected area daily as needed. 3    docusate sodium (COLACE) 50 mg capsule Take 100 mg by mouth daily.  Biotin 2,500 mcg cap Take  by mouth.  L GASSERI/B BIFIDUM/B LONGUM ([employer] PO) Take 1 Tab by mouth daily.  vitamin E (AQUA GEMS) 400 unit capsule Take 800 Units by mouth two (2) times a day.  lidocaine (LIDODERM) 5 %(700 mg/patch) 1 patch by TransDERmal route every twenty-four (24) hours. Apply patch to the affected area for 12 hours a day and remove for 12 hours a day.  cholecalciferol, vitamin D3, (VITAMIN D3) 2,000 unit tab Take 1 Tab by mouth daily.  MULTIVITAMIN WITH MINERALS (ONE-A-DAY 50 PLUS PO) Take 1 Tab by mouth daily.  aspirin 81 mg chewable tablet Take 81 mg by mouth daily.  metoprolol succinate (TOPROL-XL) 25 mg XL tablet Take 1 Tablet by mouth daily. (Patient not taking: Reported on 10/25/2021) 90 Tablet 3     No current facility-administered medications on file prior to visit. Review of Symptoms:    Review of Systems   Constitutional: Negative for chills, fever and weight loss. HENT: Negative for nosebleeds. Eyes: Negative for blurred vision and double vision. Respiratory: Positive for shortness of breath. Negative for cough and wheezing. Cardiovascular: Negative for chest pain, palpitations, orthopnea, leg swelling and PND. Skin: Negative for rash. Neurological: Negative for dizziness and loss of consciousness. Physical Exam:      General: Well developed, in no acute distress, cooperative and alert  Heart:  reg rate and rhythm; normal S1/S2; no murmurs, no gallops or rubs. Respiratory: Clear bilaterally x 4, no wheezing or rales  Extremities:  Normal cap refill, no cyanosis, atraumatic. No edema.   Vascular: 2+ pulses symmetric in all extremities    ECG:Sinus  Rhythm  -First degree A-V block   -Right bundle branch block with left axis -bifascicular block    Vitals:    10/25/21 0937   BP: (!) 142/56   BP 1 Location: Left arm   BP Patient Position: Sitting   BP Cuff Size: Large adult   Pulse: 62   Resp: 16   Height: 5' 1\" (1.549 m)   Weight: 200 lb 11.2 oz (91 kg)   SpO2: 99%          Assessment:       ICD-10-CM ICD-9-CM    1. Nonrheumatic aortic valve stenosis  I35.0 424.1 AMB POC EKG ROUTINE W/ 12 LEADS, INTER & REP   2. Pure hypercholesterolemia  E78.00 272.0 AMB POC EKG ROUTINE W/ 12 LEADS, INTER & REP   3. Essential hypertension  I10 401.9 AMB POC EKG ROUTINE W/ 12 LEADS, INTER & REP        Plan:     1. Abnormal stress test  Lexiscan stress test done 9/2021 with fixed inferior wall defect which is large in size. Echo done 5/2021 with preserved LVEF 55-60% with mild AS, mod TR, mod PHTN  She was taken for cardiac cath for persistent HENLEY despite anti anginal which showed on 10/4 insignificant CAD, NL EF. She developed a rash after starting Metoprolol and this has been d/c. She reports mild improvement in HENLEY. ECG SR without acute changes of significance. Recommended work on increasing exercise, work on weight loss. Cont on amlodipine, ASA and statin therapy. 2. Nonrheumatic aortic valve stenosis  Echo done 5/2021 with preserved LVEF 55-60% with mild AS, mod pulmonary HTN  Continue present management  Repeat echo in 5/2022    3. Essential hypertension  BP controlled. Continue anti-hypertensive therapy and low sodium diet    4. Pure hypercholesterolemia  LDL 41 in 7/2020  Continue statin therapy and low fat, low cholesterol diet  Lipids managed by PCP    5. Pulmonary hypertension (HCC)  Moderate PHTN with HENLEY, has been referred to sleep medicine with sleep study next month  Consider formal pulmonary evaluation    F/u with Dr. Gini Meyers in 6 months.      Robert Nova NP       [facility]             Patient seen, examined by me personally. Plan discussed as detailed. Agree with note as outlined by  NP with modifications as noted. My independent physical exam reveals : Physical Exam  Vitals and nursing note reviewed. Cardiovascular:      Rate and Rhythm: Normal rate and regular rhythm. Heart sounds: Normal heart sounds. Pulmonary:      Breath sounds: Normal breath sounds. Musculoskeletal:      Right lower leg: No edema. Left lower leg: No edema. Skin:     General: Skin is warm. Neurological:      Mental Status: She is alert and oriented to person, place, and time. Psychiatric:         Mood and Affect: Mood normal.          No additional findings noted. Agree with plan as outlined above with modifications as noted. Has moderate pulmonary HTN which could be secondary to sleep apnea. Obesity may also be contributing to her symptoms. Will hold statin as her LFT's he getting worse.     Paulett Siemens, MD

## 2021-11-18 PROBLEM — E11.22 TYPE 2 DIABETES MELLITUS WITH CHRONIC KIDNEY DISEASE (HCC): Status: ACTIVE | Noted: 2021-11-18

## 2021-11-18 PROBLEM — L97.921 NON-PRESSURE CHRONIC ULCER OF LEFT LOWER LEG, LIMITED TO BREAKDOWN OF SKIN (HCC): Status: RESOLVED | Noted: 2019-08-05 | Resolved: 2021-11-18

## 2021-11-19 ENCOUNTER — OFFICE VISIT (OUTPATIENT)
Dept: INTERNAL MEDICINE CLINIC | Age: 76
End: 2021-11-19
Payer: MEDICARE

## 2021-11-19 VITALS
TEMPERATURE: 97.2 F | SYSTOLIC BLOOD PRESSURE: 140 MMHG | HEART RATE: 75 BPM | HEIGHT: 61 IN | DIASTOLIC BLOOD PRESSURE: 60 MMHG | OXYGEN SATURATION: 99 % | RESPIRATION RATE: 16 BRPM | WEIGHT: 199 LBS | BODY MASS INDEX: 37.57 KG/M2

## 2021-11-19 DIAGNOSIS — K86.2 PANCREATIC CYST: ICD-10-CM

## 2021-11-19 DIAGNOSIS — I10 HYPERTENSION, UNSPECIFIED TYPE: ICD-10-CM

## 2021-11-19 DIAGNOSIS — E11.9 CONTROLLED TYPE 2 DIABETES MELLITUS WITHOUT COMPLICATION, WITHOUT LONG-TERM CURRENT USE OF INSULIN (HCC): Primary | ICD-10-CM

## 2021-11-19 DIAGNOSIS — K74.60 CIRRHOSIS OF LIVER WITHOUT ASCITES, UNSPECIFIED HEPATIC CIRRHOSIS TYPE (HCC): ICD-10-CM

## 2021-11-19 DIAGNOSIS — I35.0 AORTIC VALVE STENOSIS, ETIOLOGY OF CARDIAC VALVE DISEASE UNSPECIFIED: ICD-10-CM

## 2021-11-19 DIAGNOSIS — E03.9 ACQUIRED HYPOTHYROIDISM: ICD-10-CM

## 2021-11-19 DIAGNOSIS — Z23 NEEDS FLU SHOT: ICD-10-CM

## 2021-11-19 DIAGNOSIS — N17.9 AKI (ACUTE KIDNEY INJURY) (HCC): ICD-10-CM

## 2021-11-19 DIAGNOSIS — E78.00 PURE HYPERCHOLESTEROLEMIA: ICD-10-CM

## 2021-11-19 LAB
CHOLEST SERPL-MCNC: 227 MG/DL
CREAT UR-MCNC: 36.8 MG/DL
EST. AVERAGE GLUCOSE BLD GHB EST-MCNC: 111 MG/DL
HBA1C MFR BLD: 5.5 % (ref 4–5.6)
HDLC SERPL-MCNC: 63 MG/DL
HDLC SERPL: 3.6 {RATIO} (ref 0–5)
LDLC SERPL CALC-MCNC: 135.4 MG/DL (ref 0–100)
MICROALBUMIN UR-MCNC: <0.5 MG/DL
MICROALBUMIN/CREAT UR-RTO: <14 MG/G (ref 0–30)
TRIGL SERPL-MCNC: 143 MG/DL (ref ?–150)
TSH SERPL DL<=0.05 MIU/L-ACNC: 3.24 UIU/ML (ref 0.36–3.74)
VLDLC SERPL CALC-MCNC: 28.6 MG/DL

## 2021-11-19 PROCEDURE — 99213 OFFICE O/P EST LOW 20 MIN: CPT | Performed by: INTERNAL MEDICINE

## 2021-11-19 PROCEDURE — G0008 ADMIN INFLUENZA VIRUS VAC: HCPCS | Performed by: INTERNAL MEDICINE

## 2021-11-19 PROCEDURE — G8427 DOCREV CUR MEDS BY ELIG CLIN: HCPCS | Performed by: INTERNAL MEDICINE

## 2021-11-19 PROCEDURE — G8510 SCR DEP NEG, NO PLAN REQD: HCPCS | Performed by: INTERNAL MEDICINE

## 2021-11-19 PROCEDURE — 90694 VACC AIIV4 NO PRSRV 0.5ML IM: CPT | Performed by: INTERNAL MEDICINE

## 2021-11-19 PROCEDURE — G8417 CALC BMI ABV UP PARAM F/U: HCPCS | Performed by: INTERNAL MEDICINE

## 2021-11-19 PROCEDURE — 1101F PT FALLS ASSESS-DOCD LE1/YR: CPT | Performed by: INTERNAL MEDICINE

## 2021-11-19 PROCEDURE — 1090F PRES/ABSN URINE INCON ASSESS: CPT | Performed by: INTERNAL MEDICINE

## 2021-11-19 PROCEDURE — G8754 DIAS BP LESS 90: HCPCS | Performed by: INTERNAL MEDICINE

## 2021-11-19 PROCEDURE — G8753 SYS BP > OR = 140: HCPCS | Performed by: INTERNAL MEDICINE

## 2021-11-19 PROCEDURE — G8536 NO DOC ELDER MAL SCRN: HCPCS | Performed by: INTERNAL MEDICINE

## 2021-11-19 RX ORDER — LIDOCAINE 50 MG/G
PATCH TOPICAL
Qty: 90 EACH | Refills: 3 | Status: SHIPPED | OUTPATIENT
Start: 2021-11-19 | End: 2021-12-07 | Stop reason: SDUPTHER

## 2021-11-19 NOTE — PROGRESS NOTES
Elizabeth Hannah is a 68 y.o. female who presents for routine immunizations. She denies any symptoms , reactions or allergies that would exclude them from being immunized today. Risks and adverse reactions were discussed and the VIS was given to them. All questions were addressed. She was observed for 20min post injection. There were no reactions observed.     Malini Pedroza LPN

## 2021-11-19 NOTE — PROGRESS NOTES
HISTORY OF PRESENT ILLNESS  Gregoria Barger is a 68 y.o. female.   HPI      FU DM-2 htn HLD gout hypothyroid osteoporosis BRADY with fibrosis--[facility]ology, suspected cirrhosis-Dr Olmos , pancreatic cyst,  Aortic stenosis-mild ckd 3    Had cath for abnl nst-no sig CAD,  Mild AS--Dr Lexis Corrigan  Was hydrated for Cr 1.4 bun 44 prior to cath----seejong Dexter  Had recent MRCP--c/w microcystic serous cystadenoma--Dr London--benign  Last TSH 0.27--levothyroxine dose was lowered  Has CG now at [facility] independent living        Last a1c 5.9 LDL 58--lipitor stopped due to LFT elevation by RAFAEL HENNESSY per pt    Had colon polyps removed recently--villous adenoma-repeat 1 yr    Last OV  3 month fu Pancreatic Mass, dm-2 HTN MICHELLE, suspected liver cirrhosis     Lasix 80 mg every day was restarted last OV-MICHELLE has resolved  Had repeat labs last month from Dr Deanna Sarmiento after MRCP  Losartan was restarted by Dr Schaeffer Leak was not restarted  Still off metformin--fsbs 120-150 fating  Referred to GI MD Dr Rothman Labrum 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 , liver cirrhosis with splenomegaly and portal HTN              plan is for repat MRI in June       Patient Active Problem List    Diagnosis Date Noted    Type 2 diabetes mellitus with chronic kidney disease ([address] 11/18/2021    Abnormal nuclear stress test 10/04/2021    Angina at rest ([address] 10/04/2021    S/P cardiac cath 10/04/2021    Nonrheumatic aortic valve stenosis 09/08/2021    Pulmonary hypertension ([address] 09/08/2021    MICHELLE (acute kidney injury) ([address] 10/27/2020    Cellulitis of left lower leg 11/07/2019    Severe obesity ([address] 09/30/2019    Controlled type 2 diabetes mellitus without complication ([address] 12/44/9388    DDD (degenerative disc disease), lumbar 11/27/2017    Prediabetes 04/11/2016    Thrombocytopenia ([address] 01/24/2015    HTN (hypertension) 01/17/2014    Bifascicular block 12/23/2010    Fatty liver 06/18/2010    Pure hypercholesterolemia 06/18/2010    Colon polyp 06/18/2010    Gout 06/14/2010    Hypothyroidism 06/14/2010    Osteoporosis 06/14/2010    Anxiety 06/14/2010    Leg edema 06/14/2010    RSD lower limb 06/14/2010     Current Outpatient Medications   Medication Sig Dispense Refill    lidocaine (LIDODERM) 5 % Apply patch to the affected area for 12 hours a day and remove for 12 hours a day. 90 Each 3    losartan (COZAAR) 25 mg tablet Take 25 mg by mouth daily. Decreased by Dr. Cinthia Flynn to 25mg once daily      omega-3 acid ethyl esters (LOVAZA) 1 gram capsule Take 2 Capsules by mouth two (2) times daily (with meals). 360 Capsule 3    amLODIPine (NORVASC) 10 mg tablet Take 1 Tab by mouth daily. 90 Tablet 3    allopurinoL (ZYLOPRIM) 100 mg tablet Take 100 mg by mouth two (2) times a day.  furosemide (LASIX) 80 mg tablet Take 1 Tab by mouth daily. 90 Tablet 3    levothyroxine (SYNTHROID) 150 mcg tablet Take 1 Tablet by mouth Daily (before breakfast). (Patient taking differently: Take 125 mcg by mouth Daily (before breakfast). ) 90 Tablet 3    potassium chloride SA (MICRO-K) 10 mEq capsule Take 2 Caps by mouth daily. 180 Cap 3    atorvastatin (LIPITOR) 20 mg tablet TAKE 1 TABLET DAILY 90 Tab 3    loratadine (CLARITIN) 10 mg tablet Take 1 Tab by mouth daily. Indications: inflammation of the nose due to an allergy 90 Tab 3    ZETIA 10 mg tablet Take 1 tablet by mouth daily. 90 Tab 3    docusate sodium (COLACE) 50 mg capsule Take 100 mg by mouth daily.  Biotin 2,500 mcg cap Take  by mouth.  L GASSERI/B BIFIDUM/B LONGUM ([facility] PO) Take 1 Tab by mouth daily.  vitamin E (AQUA GEMS) 400 unit capsule Take 800 Units by mouth two (2) times a day.  cholecalciferol, vitamin D3, (VITAMIN D3) 2,000 unit tab Take 1 Tab by mouth daily.  MULTIVITAMIN WITH MINERALS (ONE-A-DAY 50 PLUS PO) Take 1 Tab by mouth daily.  aspirin 81 mg chewable tablet Take 81 mg by mouth daily.       colchicine 0.6 mg tablet Take 1 Tab by mouth daily. (Patient taking differently: Take 0.6 mg by mouth daily as needed.) 30 Tab 5    fluticasone propionate (FLONASE) 50 mcg/actuation nasal spray 2 Sprays by Both Nostrils route daily. Administer to right and left nostril. 3 Bottle 3    polyethylene glycol (MIRALAX) 17 gram/dose powder Take 17 g by mouth daily. (Patient taking differently: Take 17 g by mouth daily as needed.) 2108 g 3    ketoconazole (NIZORAL) 2 % topical cream Apply  to affected area daily as needed.   3     Allergies   Allergen Reactions    Gabapentin Other (comments)     Suicidal ideation    Metoprolol Rash    Celebrex [Celecoxib] Hives    Pcn [Penicillins] Unknown (comments)     Can't remember, was a long time    Sulfa (Sulfonamide Antibiotics) Hives      Lab Results   Component Value Date/Time    WBC 3.5 09/24/2021 10:55 AM    HGB 10.4 (L) 09/24/2021 10:55 AM    HCT 31.6 (L) 09/24/2021 10:55 AM    PLATELET 88 (LL) 21/58/3848 10:55 AM     (H) 09/24/2021 10:55 AM     Lab Results   Component Value Date/Time    Hemoglobin A1c 5.9 (H) 05/19/2021 10:49 AM    Hemoglobin A1c 5.4 02/15/2021 12:00 PM    Hemoglobin A1c 6.7 (H) 06/03/2020 10:42 AM    Hemoglobin A1c, External 7.0 07/29/2020 12:00 AM    Hemoglobin A1c, External 6.4 06/21/2016 12:00 AM    Glucose 160 (H) 09/24/2021 10:55 AM    Glucose (POC) 154 (H) 10/04/2021 08:02 AM    Microalbumin/Creat ratio (mg/g creat) 1,190 (H) 10/27/2020 06:59 PM    Microalbumin,urine random 84.50 10/27/2020 06:59 PM    LDL, calculated 58 06/03/2020 10:42 AM    Creatinine 1.42 (H) 09/24/2021 10:55 AM      Lab Results   Component Value Date/Time    Cholesterol, total 152 06/03/2020 10:42 AM    HDL Cholesterol 56 06/03/2020 10:42 AM    LDL, calculated 58 06/03/2020 10:42 AM    LDL-C, External 41 07/29/2020 12:00 AM    Triglyceride 191 (H) 06/03/2020 10:42 AM    CHOL/HDL Ratio 2.7 06/14/2010 11:16 AM     Lab Results   Component Value Date/Time    GFR est non-AA 36 (L) 09/24/2021 10:55 AM    GFR est AA 41 (L) 09/24/2021 10:55 AM    Creatinine 1.42 (H) 09/24/2021 10:55 AM    BUN 44 (H) 09/24/2021 10:55 AM    Sodium 143 09/24/2021 10:55 AM    Potassium 4.6 09/24/2021 10:55 AM    Chloride 104 09/24/2021 10:55 AM    CO2 25 09/24/2021 10:55 AM    Magnesium 2.1 11/02/2020 02:06 AM    Phosphorus 3.6 11/03/2020 02:55 AM    PTH, Intact 31 01/06/2016 09:55 AM        ROS    Physical Exam  Vitals and nursing note reviewed. Constitutional:       Appearance: She is well-developed. Comments: Appears stated age   Cardiovascular:      Rate and Rhythm: Normal rate and regular rhythm. Heart sounds: Normal heart sounds. No murmur heard. No friction rub. No gallop. Pulmonary:      Effort: Pulmonary effort is normal. No respiratory distress. Breath sounds: Normal breath sounds. No wheezing. Abdominal:      General: Bowel sounds are normal.      Palpations: Abdomen is soft. Neurological:      Mental Status: She is alert. ASSESSMENT and PLAN  Diagnoses and all orders for this visit:    1. Controlled type 2 diabetes mellitus without complication, without long-term current use of insulin (HCC)  -     HEMOGLOBIN A1C WITH EAG; Future  -     MICROALBUMIN, UR, RAND W/ MICROALB/CREAT RATIO; Future  -     HEMOGLOBIN A1C WITH EAG; Future  -     MICROALBUMIN, UR, RAND W/ MICROALB/CREAT RATIO; Future    Diet controlled--off metformin  2. Hypertension, unspecified type   controlled  3. Pure hypercholesterolemia  -     LIPID PANEL; Future  -     LIPID PANEL; Future   Restart lipitor  4. Acquired hypothyroidism  -     TSH 3RD GENERATION; Future  -     TSH 3RD GENERATION; Future    5. Cirrhosis of liver without ascites, unspecified hepatic cirrhosis type [facility])   Fu Dr Nathanael Reddy  6. Pancreatic cyst    7. Aortic valve stenosis, etiology of cardiac valve disease unspecified    Mild -[facility]  8. Needs flu shot  -     FLU (FLUAD QUAD INFLUENZA VACCINE,QUAD,ADJUVANTED)    9.  MICHELLE (acute kidney injury) ([facility]   Has fu Dr Amy Menendez    10. Chronic back and shoulder pain   Refill lidoderm patch  Other orders  -     lidocaine (LIDODERM) 5 %; Apply patch to the affected area for 12 hours a day and remove for 12 hours a day. Follow-up and Dispositions    · Return in about 6 months (around 5/19/2022) for medicare wellbness x3 min.

## 2021-11-22 ENCOUNTER — HOSPITAL ENCOUNTER (OUTPATIENT)
Dept: MAMMOGRAPHY | Age: 76
Discharge: HOME | End: 2021-11-22
Attending: NURSE PRACTITIONER
Payer: MEDICARE

## 2021-11-22 DIAGNOSIS — M81.0 OSTEOPOROSIS: ICD-10-CM

## 2021-11-22 PROCEDURE — 77080 DXA BONE DENSITY AXIAL: CPT

## 2021-11-30 LAB
ALBUMIN SERPL-MCNC: 3.9 G/DL (ref 3.7–4.7)
ALBUMIN/GLOB SERPL: 1.5 {RATIO} (ref 1.2–2.2)
ALP SERPL-CCNC: 279 IU/L (ref 44–121)
ALT SERPL-CCNC: 33 IU/L (ref 0–32)
AST SERPL-CCNC: 43 IU/L (ref 0–40)
BILIRUB SERPL-MCNC: 0.6 MG/DL (ref 0–1.2)
BUN SERPL-MCNC: 32 MG/DL (ref 8–27)
BUN/CREAT SERPL: 27 (ref 12–28)
CALCIUM SERPL-MCNC: 9.2 MG/DL (ref 8.7–10.3)
CHLORIDE SERPL-SCNC: 109 MMOL/L (ref 96–106)
CO2 SERPL-SCNC: 22 MMOL/L (ref 20–29)
CREAT SERPL-MCNC: 1.17 MG/DL (ref 0.57–1)
GLOBULIN SER CALC-MCNC: 2.6 G/DL (ref 1.5–4.5)
GLUCOSE SERPL-MCNC: 113 MG/DL (ref 65–99)
INTERPRETATION: NORMAL
POTASSIUM SERPL-SCNC: 4.2 MMOL/L (ref 3.5–5.2)
PROT SERPL-MCNC: 6.5 G/DL (ref 6–8.5)
SODIUM SERPL-SCNC: 144 MMOL/L (ref 134–144)

## 2021-11-30 NOTE — PROGRESS NOTES
Sugar mildly elevated at 113 depending on timing of last food intake. Kidney function improving. Electrolytes normal. Liver enzymes are still not normal. Follow up with PCP regarding this.  Cont to hold statin

## 2021-12-01 ENCOUNTER — TELEPHONE (OUTPATIENT)
Dept: CARDIOLOGY CLINIC | Age: 76
End: 2021-12-01

## 2021-12-01 NOTE — TELEPHONE ENCOUNTER
This writer contacted patient in reference to recent lab results. Two patient identifiers verified with patient. Mrs. Adrianna Riojas was informed of the following results per NP Nga Murphy     ----- Message from Miladys Milan NP sent at 11/30/2021  8:45 AM EST -----  Sugar mildly elevated at 113 depending on timing of last food intake. Kidney function improving. Electrolytes normal. Liver enzymes are still not normal. Follow up with PCP regarding this. Cont to hold statin    Patient verbalized understanding. Patient states she is seeing a Liver Specialist at [address] [place] [address] Dr. Stephanie Serrano and has a scheduled follow up appointment in January. Patient is requesting for labs to be forwarded to his office and to Dr. Morgan Cain office. Patient was advised Dr. Suad Brink will be able to see recent results, however labs will be forward to Dr. Stephanie Serrano at [address] [place] [address].  Patient verbalized understanding

## 2021-12-07 ENCOUNTER — TELEPHONE (OUTPATIENT)
Dept: INTERNAL MEDICINE CLINIC | Age: 76
End: 2021-12-07

## 2021-12-07 RX ORDER — LIDOCAINE 50 MG/G
PATCH TOPICAL
Qty: 90 EACH | Refills: 3 | Status: SHIPPED | OUTPATIENT
Start: 2021-12-07

## 2021-12-07 NOTE — TELEPHONE ENCOUNTER
Future Appointments:  Future Appointments   Date Time Provider Hilary Dickinson   4/29/2022  9:30 AM Chaz Rizo MD [facility] BS AMB   5/23/2022 10:00 AM Rasheed Orantes MD [facility] BS AMB        Last Appointment With Me:  11/19/2021     Requested Prescriptions     Pending Prescriptions Disp Refills    lidocaine (LIDODERM) 5 % 90 Each 3     Sig: Apply patch to the affected area for 12 hours a day and remove for 12 hours a day.

## 2021-12-22 DIAGNOSIS — E78.00 PURE HYPERCHOLESTEROLEMIA: Primary | ICD-10-CM

## 2021-12-22 DIAGNOSIS — J01.91 ACUTE RECURRENT SINUSITIS, UNSPECIFIED LOCATION: ICD-10-CM

## 2021-12-22 RX ORDER — EZETIMIBE 10 MG
TABLET ORAL
Qty: 90 TABLET | Refills: 3 | Status: SHIPPED | OUTPATIENT
Start: 2021-12-22

## 2021-12-22 RX ORDER — LORATADINE 10 MG/1
10 TABLET ORAL DAILY
Qty: 90 TABLET | Refills: 3 | Status: SHIPPED | OUTPATIENT
Start: 2021-12-22

## 2021-12-22 RX ORDER — ATORVASTATIN CALCIUM 20 MG/1
TABLET, FILM COATED ORAL
Qty: 90 TABLET | Refills: 3 | Status: SHIPPED | OUTPATIENT
Start: 2021-12-22

## 2021-12-22 NOTE — TELEPHONE ENCOUNTER
PCP: Vinay Comer MD    Last appt: 11/19/2021  Future Appointments   Date Time Provider Hilary Dickinson   4/29/2022  9:30 AM Taylor Pierre MD [facility] BS AMB   5/23/2022 10:00 AM Vinay Comer MD [facility] BS AMB       Requested Prescriptions      No prescriptions requested or ordered in this encounter       Prior labs and Blood pressures:  BP Readings from Last 3 Encounters:   11/19/21 (!) 140/60   10/25/21 (!) 142/56   10/04/21 (!) 128/43     Lab Results   Component Value Date/Time    Sodium 144 11/29/2021 10:01 AM    Potassium 4.2 11/29/2021 10:01 AM    Chloride 109 (H) 11/29/2021 10:01 AM    CO2 22 11/29/2021 10:01 AM    Anion gap 9 11/03/2020 02:55 AM    Glucose 113 (H) 11/29/2021 10:01 AM    BUN 32 (H) 11/29/2021 10:01 AM    Creatinine 1.17 (H) 11/29/2021 10:01 AM    BUN/Creatinine ratio 27 11/29/2021 10:01 AM    GFR est AA 52 (L) 11/29/2021 10:01 AM    GFR est non-AA 45 (L) 11/29/2021 10:01 AM    Calcium 9.2 11/29/2021 10:01 AM     Lab Results   Component Value Date/Time    Hemoglobin A1c 5.5 11/19/2021 11:01 AM    Hemoglobin A1c, External 7.0 07/29/2020 12:00 AM     Lab Results   Component Value Date/Time    Cholesterol, total 227 (H) 11/19/2021 11:01 AM    HDL Cholesterol 63 11/19/2021 11:01 AM    LDL, calculated 135.4 (H) 11/19/2021 11:01 AM    VLDL, calculated 28.6 11/19/2021 11:01 AM    Triglyceride 143 11/19/2021 11:01 AM    CHOL/HDL Ratio 3.6 11/19/2021 11:01 AM     Lab Results   Component Value Date/Time    Vitamin D 25-Hydroxy 46.3 07/26/2011 12:00 AM    VITAMIN D, 25-HYDROXY 32.9 01/06/2016 09:55 AM       Lab Results   Component Value Date/Time    TSH 3.24 11/19/2021 11:01 AM

## 2022-02-08 ENCOUNTER — TELEPHONE (OUTPATIENT)
Dept: INTERNAL MEDICINE CLINIC | Age: 77
End: 2022-02-08

## 2022-02-08 NOTE — TELEPHONE ENCOUNTER
Information printed and faxed to [address] at [phone] at this time. No further actions required at this time.

## 2022-02-08 NOTE — TELEPHONE ENCOUNTER
----- Message from Meri Montiel sent at 2/8/2022  9:03 AM EST -----  Subject: Message to Provider    QUESTIONS  Information for Provider? Denis Rand was hoping that she could get an updated   medication list sent over to her. Pt was getting prescriptions from two   locations and now pharmacy doesn't know what all she is on.  ---------------------------------------------------------------------------  --------------  [address]  What is the best way for the office to contact you? OK to leave message on   voicemail  Preferred Call Back Phone Number? [phone]  ---------------------------------------------------------------------------  --------------  SCRIPT ANSWERS  Relationship to Patient? Third Party  Representative Name?  [address], [address] in [place]

## 2022-02-14 ENCOUNTER — TELEPHONE (OUTPATIENT)
Dept: INTERNAL MEDICINE CLINIC | Age: 77
End: 2022-02-14

## 2022-02-14 RX ORDER — ICOSAPENT ETHYL 1000 MG/1
2 CAPSULE ORAL 2 TIMES DAILY WITH MEALS
Qty: 360 CAPSULE | Refills: 3 | Status: SHIPPED | OUTPATIENT
Start: 2022-02-14

## 2022-02-14 NOTE — TELEPHONE ENCOUNTER
Writer returned call to [employer]. Rep did not have any information in regards to recent phone call. [employer] rep states they had no documentation in regards to this information at this time. Writer called patient to confirm information received in previous message. Unable to reach patient at this time. No personal VM, left generic message for patient to return call to office at earliest convenience. Awaiting call back at this time.

## 2022-02-14 NOTE — TELEPHONE ENCOUNTER
Carleen//Adrian [facility] states patient's prescription for Omega-3 acid ethyl esters (LOVAZA) 1 gram capsule is Not Covered by Patient's Insurance & Alternative that is covered is Vasce[employer]. Please call if any questions or want a Medical Exception to be done otherwise No Call Back needed if Alternative medication will be prescribed.  Thank you      Call Back # is [phone]   Case#   [id number]

## 2022-02-14 NOTE — TELEPHONE ENCOUNTER
Patient called back in regards to information received from insurance company confirming information received in previous message. Carleen//Adrian [facility] Sv states patient's prescription for Omega-3 acid ethyl esters (LOVAZA) 1 gram capsule is Not Covered by Patient's Insurance & Alternative that is covered is [employer]. Please call if any questions or want a Medical Exception to be done otherwise No Call Back needed if Alternative medication will be prescribed.  Thank you

## 2022-03-18 PROBLEM — I27.20 PULMONARY HYPERTENSION (HCC): Status: ACTIVE | Noted: 2021-09-08

## 2022-03-18 PROBLEM — E11.9 CONTROLLED TYPE 2 DIABETES MELLITUS WITHOUT COMPLICATION (HCC): Status: ACTIVE | Noted: 2017-11-28

## 2022-03-19 PROBLEM — E66.01 SEVERE OBESITY (HCC): Status: ACTIVE | Noted: 2019-09-30

## 2022-03-19 PROBLEM — I35.0 NONRHEUMATIC AORTIC VALVE STENOSIS: Status: ACTIVE | Noted: 2021-09-08

## 2022-03-19 PROBLEM — Z98.890 S/P CARDIAC CATH: Status: ACTIVE | Noted: 2021-10-04

## 2022-03-19 PROBLEM — E11.22 TYPE 2 DIABETES MELLITUS WITH CHRONIC KIDNEY DISEASE (HCC): Status: ACTIVE | Noted: 2021-11-18

## 2022-03-19 PROBLEM — R94.39 ABNORMAL NUCLEAR STRESS TEST: Status: ACTIVE | Noted: 2021-10-04

## 2022-03-19 PROBLEM — L03.116 CELLULITIS OF LEFT LOWER LEG: Status: ACTIVE | Noted: 2019-11-07

## 2022-03-20 PROBLEM — N17.9 AKI (ACUTE KIDNEY INJURY) (HCC): Status: ACTIVE | Noted: 2020-10-27

## 2022-03-20 PROBLEM — I20.8 ANGINA AT REST (HCC): Status: ACTIVE | Noted: 2021-10-04

## 2022-03-20 PROBLEM — M51.36 DDD (DEGENERATIVE DISC DISEASE), LUMBAR: Status: ACTIVE | Noted: 2017-11-27

## 2022-05-20 PROBLEM — N18.31 STAGE 3A CHRONIC KIDNEY DISEASE (HCC): Status: ACTIVE | Noted: 2022-01-01

## 2022-05-20 PROBLEM — N18.31 STAGE 3A CHRONIC KIDNEY DISEASE (HCC): Status: ACTIVE | Noted: 2022-05-20

## 2022-05-21 NOTE — PROGRESS NOTES
HISTORY OF PRESENT ILLNESS  Pranav Whitaker is a 68 y.o. female. HPI        FU DM-2 htn HLD gout hypothyroid osteoporosis BRADY with fibrosis--[facility] hepatology, suspected cirrhosis-Dr Olmos , pancreatic cyst,  Aortic stenosis-mild ckd 3 and medicare wellness----  Last a1c 5.5 LDl 135  Dm-2 diet controlled  Last alk phos 279 ast/alt 43/33---statin on hold per CARD MD but restarted lipitor  Due for prevnar 21  Nephrologist Dr Dexter-increased losartan recently to 48 mng qd  [facility] hepatology-suspected cirrhosis--had labs last week -awaiting results    Saw RAFAEL HENNESSY last week--staretd on eliquis and PAF--will get echo.  Also bradycardia  Has osteopeorosis of hip --  Last OV     Had cath for abnl nst-no sig CAD,  Mild AS--Dr Vivien Murcia  Was hydrated for Cr 1.4 bun 44 prior to cath----sees ROHIT Dexter  Had recent MRCP--c/w microcystic serous cystadenoma--Dr London--benign  Last TSH 0.27--levothyroxine dose was lowered  Has CG now at [address] independent [address]           Last a1c 5.9 LDL 58--lipitor stopped due to LFT elevation by RAFAEL HENNESSY per pt     Had colon polyps removed recently--villous adenoma-repeat 1 yr    Patient Active Problem List    Diagnosis Date Noted    Stage 3a chronic kidney disease ([facility] 05/20/2022    Type 2 diabetes mellitus with chronic kidney disease ([facility] 11/18/2021    Abnormal nuclear stress test 10/04/2021    Angina at rest [facility]) 10/04/2021    S/P cardiac cath 10/04/2021    Nonrheumatic aortic valve stenosis 09/08/2021    Pulmonary hypertension ([address] 09/08/2021    MICHELLE (acute kidney injury) ([address] 10/27/2020    Cellulitis of left lower leg 11/07/2019    Severe obesity ([address] 09/30/2019    Controlled type 2 diabetes mellitus without complication ([address] 14/78/7508    DDD (degenerative disc disease), lumbar 11/27/2017    Prediabetes 04/11/2016    Thrombocytopenia ([facility] 01/24/2015    HTN (hypertension) 01/17/2014    Bifascicular block 12/23/2010    Fatty liver 06/18/2010    Pure hypercholesterolemia 06/18/2010    Colon polyp 06/18/2010    Gout 06/14/2010    Hypothyroidism 06/14/2010    Osteoporosis 06/14/2010    Anxiety 06/14/2010    Leg edema 06/14/2010    RSD lower limb 06/14/2010     Current Outpatient Medications   Medication Sig Dispense Refill    loratadine (Claritin) 10 mg tablet Take 1 Tablet by mouth daily. Indications: inflammation of the nose due to an allergy 90 Tablet 3    Zetia 10 mg tablet Take 1 tablet by mouth daily. 90 Tablet 3    atorvastatin (LIPITOR) 20 mg tablet TAKE 1 TABLET DAILY 90 Tablet 3    icosapent ethyL (VASCEPA) 1 gram capsule Take 2 Capsules by mouth two (2) times daily (with meals). 360 Capsule 3    lidocaine (LIDODERM) 5 % Apply patch to the affected area for 12 hours a day and remove for 12 hours a day. 90 Each 3    losartan (COZAAR) 25 mg tablet Take 25 mg by mouth daily. Decreased by Dr. Renata Pacheco to 25mg once daily      amLODIPine (NORVASC) 10 mg tablet Take 1 Tab by mouth daily. 90 Tablet 3    allopurinoL (ZYLOPRIM) 100 mg tablet Take 100 mg by mouth two (2) times a day.  furosemide (LASIX) 80 mg tablet Take 1 Tab by mouth daily. 90 Tablet 3    levothyroxine (SYNTHROID) 150 mcg tablet Take 1 Tablet by mouth Daily (before breakfast). (Patient taking differently: Take 125 mcg by mouth Daily (before breakfast). ) 90 Tablet 3    colchicine 0.6 mg tablet Take 1 Tab by mouth daily. (Patient taking differently: Take 0.6 mg by mouth daily as needed.) 30 Tab 5    potassium chloride SA (MICRO-K) 10 mEq capsule Take 2 Caps by mouth daily. 180 Cap 3    fluticasone propionate (FLONASE) 50 mcg/actuation nasal spray 2 Sprays by Both Nostrils route daily. Administer to right and left nostril. 3 Bottle 3    polyethylene glycol (MIRALAX) 17 gram/dose powder Take 17 g by mouth daily. (Patient taking differently: Take 17 g by mouth daily as needed.) 2108 g 3    ketoconazole (NIZORAL) 2 % topical cream Apply  to affected area daily as needed.   3    docusate sodium (COLACE) 50 mg capsule Take 100 mg by mouth daily.  Biotin 2,500 mcg cap Take  by mouth.  L GASSERI/B BIFIDUM/B LONGUM (GOODWIN PO) Take 1 Tab by mouth daily.  vitamin E (AQUA GEMS) 400 unit capsule Take 800 Units by mouth two (2) times a day.  cholecalciferol, vitamin D3, (VITAMIN D3) 2,000 unit tab Take 1 Tab by mouth daily.  MULTIVITAMIN WITH MINERALS (ONE-A-DAY 50 PLUS PO) Take 1 Tab by mouth daily.  aspirin 81 mg chewable tablet Take 81 mg by mouth daily.        Allergies   Allergen Reactions    Gabapentin Other (comments)     Suicidal ideation    Metoprolol Rash    Celebrex [Celecoxib] Hives    Pcn [Penicillins] Unknown (comments)     Can't remember, was a long time    Sulfa (Sulfonamide Antibiotics) Hives      Lab Results   Component Value Date/Time    WBC 3.5 09/24/2021 10:55 AM    HGB 10.4 (L) 09/24/2021 10:55 AM    HCT 31.6 (L) 09/24/2021 10:55 AM    PLATELET 88 (LL) 47/67/1422 10:55 AM     (H) 09/24/2021 10:55 AM     Lab Results   Component Value Date/Time    Hemoglobin A1c 5.5 11/19/2021 11:01 AM    Hemoglobin A1c 5.9 (H) 05/19/2021 10:49 AM    Hemoglobin A1c 5.4 02/15/2021 12:00 PM    Hemoglobin A1c, External 7.0 07/29/2020 12:00 AM    Hemoglobin A1c, External 6.4 06/21/2016 12:00 AM    Glucose 113 (H) 11/29/2021 10:01 AM    Glucose (POC) 154 (H) 10/04/2021 08:02 AM    Microalbumin/Creat ratio (mg/g creat) <14 11/19/2021 11:01 AM    Microalbumin,urine random <0.50 11/19/2021 11:01 AM    LDL, calculated 135.4 (H) 11/19/2021 11:01 AM    Creatinine 1.17 (H) 11/29/2021 10:01 AM      Lab Results   Component Value Date/Time    Cholesterol, total 227 (H) 11/19/2021 11:01 AM    HDL Cholesterol 63 11/19/2021 11:01 AM    LDL, calculated 135.4 (H) 11/19/2021 11:01 AM    LDL-C, External 41 07/29/2020 12:00 AM    Triglyceride 143 11/19/2021 11:01 AM    CHOL/HDL Ratio 3.6 11/19/2021 11:01 AM     Lab Results   Component Value Date/Time    GFR est non-AA 45 (L) 11/29/2021 10:01 AM    GFR est AA 52 (L) 11/29/2021 10:01 AM    Creatinine 1.17 (H) 11/29/2021 10:01 AM    BUN 32 (H) 11/29/2021 10:01 AM    Sodium 144 11/29/2021 10:01 AM    Potassium 4.2 11/29/2021 10:01 AM    Chloride 109 (H) 11/29/2021 10:01 AM    CO2 22 11/29/2021 10:01 AM    Magnesium 2.1 11/02/2020 02:06 AM    Phosphorus 3.6 11/03/2020 02:55 AM    PTH, Intact 31 01/06/2016 09:55 AM     Lab Results   Component Value Date/Time    TSH 3.24 11/19/2021 11:01 AM    T4, Free 1.73 05/20/2019 02:58 PM      Lab Results   Component Value Date/Time    Glucose 113 (H) 11/29/2021 10:01 AM    Glucose (POC) 154 (H) 10/04/2021 08:02 AM         ROS    Physical Exam  Vitals and nursing note reviewed. Constitutional:       Appearance: Normal appearance. She is well-developed. She is obese. Comments: Appears stated age   Cardiovascular:      Rate and Rhythm: Normal rate and regular rhythm. Heart sounds: Normal heart sounds. No murmur heard. No friction rub. No gallop. Pulmonary:      Effort: Pulmonary effort is normal. No respiratory distress. Breath sounds: Normal breath sounds. No wheezing. Abdominal:      General: Bowel sounds are normal.      Palpations: Abdomen is soft. Neurological:      Mental Status: She is alert. ASSESSMENT and PLAN  Diagnoses and all orders for this visit:    1. Controlled type 2 diabetes mellitus with complication, without long-term current use of insulin (HCC)  -     HEMOGLOBIN A1C WITH EAG; Future  -     REFERRAL TO ENDOCRINOLOGY    2. Cirrhosis of liver without ascites, unspecified hepatic cirrhosis type ([address]    3. Pulmonary hypertension ([facility]    4. Thrombocytopenia, unspecified ([facility]    5. Angina at rest [facility])    6. Hypertension, unspecified type    7. Pure hypercholesterolemia    8. Acquired hypothyroidism    9. Stage 3a chronic kidney disease ([address]    10.  Osteoporosis, unspecified osteoporosis type, unspecified pathological fracture presence  -     REFERRAL TO ENDOCRINOLOGY    11. Obesity, Class III, BMI 40-49.9 (morbid obesity) ([address]           This is the Subsequent Medicare Annual Wellness Exam, performed 12 months or more after the Initial AWV or the last Subsequent AWV    I have reviewed the patient's medical history in detail and updated the computerized patient record. Assessment/Plan   Education and counseling provided:  Are appropriate based on today's review and evaluation  End-of-Life planning (with patient's consent)  Pneumococcal Vaccine-prevnar 20    1. Controlled type 2 diabetes mellitus with complication, without long-term current use of insulin (HCC)  -     HEMOGLOBIN A1C WITH EAG; Future  -     REFERRAL TO ENDOCRINOLOGY   Diet controlled  2. Cirrhosis of liver without ascites, unspecified hepatic cirrhosis type ([address]  3. Pulmonary hypertension ([address]  4. Thrombocytopenia, unspecified (HCC)-fu hepatologist suspected cirrhosis of liver from BRADY  5. Angina at rest (HCC)-resolved  6. Hypertension, unspecified type  7. Pure hypercholesterolemia-lipid panel -on statin again  8. Acquired hypothyroidism  9. Stage 3a chronic kidney disease (HCC)-fu Dr Lonnie Sheehan  10. Osteoporosis, unspecified osteoporosis type, unspecified pathological fracture presence  -     REFERRAL TO ENDOCRINOLOGY-saw Dr Vicki Delgado in the past -per pt prolia was not recommended  11. Obesity, Class III, BMI 40-49.9 (morbid obesity) ([address]  12. PAF-now on eliquis-fu Dr Mosley     3 most recent [facility] Screens 11/19/2021   Little interest or pleasure in doing things Not at all   Feeling down, depressed, irritable, or hopeless Not at all   Total Score PHQ 2 0       Alcohol & Drug Abuse Risk Screen    Do you average more than 1 drink per night or more than 7 drinks a week:  No    On any one occasion in the past three months have you have had more than 3 drinks containing alcohol:  No          Functional Ability and Level of Safety    Hearing: Hearing is good. Activities of Daily Living: The home contains: handrails and grab bars  Patient needs help with:  phone, preparing meals, laundry, housework and walking      Ambulation: with difficulty, uses a walker     Fall Risk:  Fall Risk Assessment, last 12 mths 11/19/2021   Able to walk? Yes   Fall in past 12 months? 0   Do you feel unsteady?  0   Are you worried about falling 0      Abuse Screen:  Patient is not abused       Cognitive Screening    Has your family/caregiver stated any concerns about your memory: no     Cognitive Screening: Normal - Verbal Fluency Test    Health Maintenance Due     Health Maintenance Due   Topic Date Due    Shingrix Vaccine Age 49> (1 of 2) Never done    Pneumococcal 65+ years (2 - PCV) 10/05/2011    Foot Exam Q1  11/21/2018    Eye Exam Retinal or Dilated  09/17/2020    COVID-19 Vaccine (3 - Booster for Classie Queta series) 11/30/2021    Medicare Yearly Exam  05/18/2022       Patient Care Team   Patient Care Team:  Kika Trotter MD as PCP - London Dye MD as PCP - [address] [place] [address] Provider  Vicky Villarreal MD as PCP - GASTROENTEROLOGY (Internal Medicine Physician)  Daniel Lindsay MD (Obstetrics & Gynecology)  Lew Cheek MD (Ophthalmology)  Sharon Latham MD (Dermatology Physician)  CATY Villarreal MD (Inactive) (Orthopedic Surgery)  Dominga Connell MD as Physician (Cardiovascular Disease Physician)    History     Patient Active Problem List   Diagnosis Code    Gout M10.9    Hypothyroidism E03.9    Osteoporosis M81.0    Anxiety F41.9    Leg edema R60.0    RSD lower limb G90.529    Fatty liver K76.0    Pure hypercholesterolemia E78.00    Colon polyp K63.5    Bifascicular block I45.2    HTN (hypertension) I10    Thrombocytopenia ([address] D69.6    Prediabetes R73.03    DDD (degenerative disc disease), lumbar M51.36    Controlled type 2 diabetes mellitus without complication ([address] F22.8    Severe obesity ([address] E66.01    Cellulitis of left lower leg L03. 80    MICHELLE (acute kidney injury) ([facility] N17.9    Nonrheumatic aortic valve stenosis I35.0    Pulmonary hypertension (HCC) I27.20    Abnormal nuclear stress test R94.39    Angina at rest [facility]) I20.8    S/P cardiac cath Z98.890    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    Stage 3a chronic kidney disease ([facility] N18.31     Past Medical History:   Diagnosis Date    Abnormal nuclear stress test 10/4/2021    Angina at rest [facility]) 10/4/2021    Arthritis     KNEE,gout    Bundle branch block     Endocrine disease     HYPOTHYROIDISM    Fracture     Left distal femur (age 12 - pt fell)    Fracture     Left tibia 1990 (pt fell)    Fracture     Right wrist fractured 2 times (pt fell)    GERD (gastroesophageal reflux disease)     High cholesterol     Hypertension     Hypoglycemia     \"my body produces too much insulin\"    Liver disease     BRADY    Nausea & vomiting     when had gallbladder out    Osteoporosis     Other ill-defined conditions(799.89)     edema legs    S/P cardiac cath 10/4/2021    10/4/2021 nonobstructive disease    Spinal stenosis     Thyroid disease       Past Surgical History:   Procedure Laterality Date    COLONOSCOPY N/A 7/13/2021    COLONOSCOPY [facility] Rosibel Mendoza MD [facility]  2/11/2015         COLONOSCOPY,REMALECIA TINSLEY,SNARE  7/13/2021         COLONOSCPY,FLEX,W/DIR SUBMUC INJECT  7/13/2021         COLORECTAL SCRN; HI RISK IND  2/11/2015         HX CHOLECYSTECTOMY      HX HYSTERECTOMY      HX ORTHOPAEDIC Left     leg surgery - plates, screws, wires, pins in place    HX UROLOGICAL      PESSURY    NV ABDOMEN SURGERY PROC UNLISTED      liver biopsy--BRADY and fibrosis    UPPER GI ENDOSCOPY,BIOPSY  11/17/2015          Current Outpatient Medications   Medication Sig Dispense Refill    apixaban (Eliquis) 5 mg tablet Eliquis 5 mg tablet   Take 1 tablet twice a day by oral route.  icosapent ethyL (VASCEPA) 1 gram capsule Take 2 Capsules by mouth two (2) times daily (with meals). 360 Capsule 3    loratadine (Claritin) 10 mg tablet Take 1 Tablet by mouth daily. Indications: inflammation of the nose due to an allergy 90 Tablet 3    Zetia 10 mg tablet Take 1 tablet by mouth daily. 90 Tablet 3    atorvastatin (LIPITOR) 20 mg tablet TAKE 1 TABLET DAILY 90 Tablet 3    losartan (COZAAR) 25 mg tablet Take  by mouth daily.  amLODIPine (NORVASC) 10 mg tablet Take 1 Tab by mouth daily. 90 Tablet 3    allopurinoL (ZYLOPRIM) 100 mg tablet Take 100 mg by mouth two (2) times a day.  furosemide (LASIX) 80 mg tablet Take 1 Tab by mouth daily. 90 Tablet 3    levothyroxine (SYNTHROID) 150 mcg tablet Take 1 Tablet by mouth Daily (before breakfast). 90 Tablet 3    potassium chloride SA (MICRO-K) 10 mEq capsule Take 2 Caps by mouth daily. 180 Cap 3    Biotin 2,500 mcg cap Take  by mouth.  L GASSERI/B BIFIDUM/B LONGUM ([employer] PO) Take 1 Tab by mouth daily.  vitamin E (AQUA GEMS) 400 unit capsule Take 800 Units by mouth two (2) times a day.  cholecalciferol, vitamin D3, (VITAMIN D3) 2,000 unit tab Take 1 Tab by mouth daily.  MULTIVITAMIN WITH MINERALS (ONE-A-DAY 50 PLUS PO) Take 1 Tab by mouth daily.  lidocaine (LIDODERM) 5 % Apply patch to the affected area for 12 hours a day and remove for 12 hours a day. 90 Each 3    colchicine 0.6 mg tablet Take 1 Tab by mouth daily. (Patient taking differently: Take 0.6 mg by mouth daily as needed.) 30 Tab 5    fluticasone propionate (FLONASE) 50 mcg/actuation nasal spray 2 Sprays by Both Nostrils route daily. Administer to right and left nostril. 3 Bottle 3    polyethylene glycol (MIRALAX) 17 gram/dose powder Take 17 g by mouth daily. (Patient taking differently: Take 17 g by mouth daily as needed.) 2108 g 3    ketoconazole (NIZORAL) 2 % topical cream Apply  to affected area daily as needed.   3    docusate sodium (COLACE) 50 mg capsule Take 100 mg by mouth daily.  aspirin 81 mg chewable tablet Take 81 mg by mouth daily.  (Patient not taking: Reported on 5/23/2022)       Allergies   Allergen Reactions    Gabapentin Other (comments)     Suicidal ideation    Metoprolol Rash    Celebrex [Celecoxib] Hives    Pcn [Penicillins] Unknown (comments)     Can't remember, was a long time    Sulfa (Sulfonamide Antibiotics) Hives       Family History   Problem Relation Age of Onset    Diabetes Mother     Heart Disease Mother     Emphysema Father      Social History     Tobacco Use    Smoking status: Never Smoker    Smokeless tobacco: Never Used   Substance Use Topics    Alcohol use: No         Mendel Constable, MD

## 2022-05-23 ENCOUNTER — TELEPHONE (OUTPATIENT)
Dept: INTERNAL MEDICINE CLINIC | Age: 77
End: 2022-05-23

## 2022-05-23 ENCOUNTER — OFFICE VISIT (OUTPATIENT)
Dept: INTERNAL MEDICINE CLINIC | Age: 77
End: 2022-05-23
Payer: MEDICARE

## 2022-05-23 VITALS
HEART RATE: 74 BPM | OXYGEN SATURATION: 97 % | TEMPERATURE: 97.1 F | WEIGHT: 202 LBS | SYSTOLIC BLOOD PRESSURE: 138 MMHG | DIASTOLIC BLOOD PRESSURE: 80 MMHG | RESPIRATION RATE: 16 BRPM | BODY MASS INDEX: 40.72 KG/M2 | HEIGHT: 59 IN

## 2022-05-23 DIAGNOSIS — E66.01 OBESITY, CLASS III, BMI 40-49.9 (MORBID OBESITY) (HCC): ICD-10-CM

## 2022-05-23 DIAGNOSIS — Z00.00 MEDICARE ANNUAL WELLNESS VISIT, SUBSEQUENT: ICD-10-CM

## 2022-05-23 DIAGNOSIS — Z23 ENCOUNTER FOR IMMUNIZATION: ICD-10-CM

## 2022-05-23 DIAGNOSIS — I48.0 PAF (PAROXYSMAL ATRIAL FIBRILLATION) (HCC): ICD-10-CM

## 2022-05-23 DIAGNOSIS — E11.8 CONTROLLED TYPE 2 DIABETES MELLITUS WITH COMPLICATION, WITHOUT LONG-TERM CURRENT USE OF INSULIN (HCC): Primary | ICD-10-CM

## 2022-05-23 DIAGNOSIS — E78.00 PURE HYPERCHOLESTEROLEMIA: ICD-10-CM

## 2022-05-23 DIAGNOSIS — D69.6 THROMBOCYTOPENIA, UNSPECIFIED (HCC): ICD-10-CM

## 2022-05-23 DIAGNOSIS — I20.8 ANGINA AT REST (HCC): ICD-10-CM

## 2022-05-23 DIAGNOSIS — I10 HYPERTENSION, UNSPECIFIED TYPE: ICD-10-CM

## 2022-05-23 DIAGNOSIS — I27.20 PULMONARY HYPERTENSION (HCC): ICD-10-CM

## 2022-05-23 DIAGNOSIS — K74.60 CIRRHOSIS OF LIVER WITHOUT ASCITES, UNSPECIFIED HEPATIC CIRRHOSIS TYPE (HCC): ICD-10-CM

## 2022-05-23 DIAGNOSIS — E03.9 ACQUIRED HYPOTHYROIDISM: ICD-10-CM

## 2022-05-23 DIAGNOSIS — M81.0 OSTEOPOROSIS, UNSPECIFIED OSTEOPOROSIS TYPE, UNSPECIFIED PATHOLOGICAL FRACTURE PRESENCE: ICD-10-CM

## 2022-05-23 DIAGNOSIS — N18.31 STAGE 3A CHRONIC KIDNEY DISEASE (HCC): ICD-10-CM

## 2022-05-23 PROCEDURE — G8432 DEP SCR NOT DOC, RNG: HCPCS | Performed by: INTERNAL MEDICINE

## 2022-05-23 PROCEDURE — G0009 ADMIN PNEUMOCOCCAL VACCINE: HCPCS | Performed by: INTERNAL MEDICINE

## 2022-05-23 PROCEDURE — 90677 PCV20 VACCINE IM: CPT | Performed by: INTERNAL MEDICINE

## 2022-05-23 PROCEDURE — G0439 PPPS, SUBSEQ VISIT: HCPCS | Performed by: INTERNAL MEDICINE

## 2022-05-23 PROCEDURE — G8427 DOCREV CUR MEDS BY ELIG CLIN: HCPCS | Performed by: INTERNAL MEDICINE

## 2022-05-23 PROCEDURE — 99213 OFFICE O/P EST LOW 20 MIN: CPT | Performed by: INTERNAL MEDICINE

## 2022-05-23 PROCEDURE — 1101F PT FALLS ASSESS-DOCD LE1/YR: CPT | Performed by: INTERNAL MEDICINE

## 2022-05-23 PROCEDURE — G8752 SYS BP LESS 140: HCPCS | Performed by: INTERNAL MEDICINE

## 2022-05-23 PROCEDURE — G8754 DIAS BP LESS 90: HCPCS | Performed by: INTERNAL MEDICINE

## 2022-05-23 PROCEDURE — G8417 CALC BMI ABV UP PARAM F/U: HCPCS | Performed by: INTERNAL MEDICINE

## 2022-05-23 PROCEDURE — 1090F PRES/ABSN URINE INCON ASSESS: CPT | Performed by: INTERNAL MEDICINE

## 2022-05-23 PROCEDURE — G8536 NO DOC ELDER MAL SCRN: HCPCS | Performed by: INTERNAL MEDICINE

## 2022-05-23 NOTE — TELEPHONE ENCOUNTER
----- Message from Dayami Montiel Kingston sent at 5/23/2022  8:17 AM EDT -----  Subject: Message to Provider    QUESTIONS  Information for Provider? Dr Maryann Yan ([facility]) requesting the most   recent lipid panel please fax asap to [phone] Noemy Talbert phone   number is direct to omar  ---------------------------------------------------------------------------  --------------  [address]  What is the best way for the office to contact you? Do not leave any   message, patient will call back for answer  Preferred Call Back Phone Number? [phone]  ---------------------------------------------------------------------------  --------------  SCRIPT ANSWERS  Relationship to Patient? Third Party  Third Party Type? Physician Office?    Representative Name? Sienna Vera

## 2022-05-23 NOTE — PATIENT INSTRUCTIONS
Office Policies    Phone calls/patient messages:            Please allow up to 24 hours for someone in the office to contact you about your call or message. Be mindful your provider may be out of the office or your message may require further review. We encourage you to use [employer] for your messages as this is a faster, more efficient way to communicate with our office                         Medication Refills:            Prescription medications require 48-72 business hours to process. We encourage you to use [employer] for your refills. For controlled medications: Please allow 72 business hours to process. Certain medications may require you to  a written prescription at our office. NO narcotic/controlled medications will be prescribed after 4pm Monday through Friday or on weekends              Form/Paperwork Completion:            Please note a $25 fee may incur for all paperwork for completed by our providers. We ask that you allow 7-10 business days. Pre-payment is due prior to picking up/faxing the completed form. You may also download your forms to [employer] to have your doctor print off. Medicare Wellness Visit, Female     The best way to live healthy is to have a lifestyle where you eat a well-balanced diet, exercise regularly, limit alcohol use, and quit all forms of tobacco/nicotine, if applicable. Regular preventive services are another way to keep healthy. Preventive services (vaccines, screening tests, monitoring & exams) can help personalize your care plan, which helps you manage your own care. Screening tests can find health problems at the earliest stages, when they are easiest to treat. [facility] follows the current, evidence-based guidelines published by the [facility] (USPSTF) when recommending preventive services for our patients.  Because we follow these guidelines, sometimes recommendations change over time as research supports it. (For example, mammograms used to be recommended annually. Even though Medicare will still pay for an annual mammogram, the newer guidelines recommend a mammogram every two years for women of average risk). Of course, you and your doctor may decide to screen more often for some diseases, based on your risk and your co-morbidities (chronic disease you are already diagnosed with). Preventive services for you include:  - Medicare offers their members a free annual wellness visit, which is time for you and your primary care provider to discuss and plan for your preventive service needs. Take advantage of this benefit every year!  -All adults over the age of 72 should receive the recommended pneumonia vaccines. Current USPSTF guidelines recommend a series of two vaccines for the best pneumonia protection.   -All adults should have a flu vaccine yearly and a tetanus vaccine every 10 years.   -All adults age 48 and older should receive the shingles vaccines (series of two vaccines). -All adults age 38-68 who are overweight should have a diabetes screening test once every three years.   -All adults born between [birth year] and 1965 should be screened once for Hepatitis C.  -Other screening tests and preventive services for persons with diabetes include: an eye exam to screen for diabetic retinopathy, a kidney function test, a foot exam, and stricter control over your cholesterol.   -Cardiovascular screening for adults with routine risk involves an electrocardiogram (ECG) at intervals determined by your doctor.   -Colorectal cancer screenings should be done for adults age 54-65 with no increased risk factors for colorectal cancer. There are a number of acceptable methods of screening for this type of cancer. Each test has its own benefits and drawbacks. Discuss with your doctor what is most appropriate for you during your annual wellness visit.  The different tests include: colonoscopy (considered the best screening method), a fecal occult blood test, a fecal DNA test, and sigmoidoscopy.    -A bone mass density test is recommended when a woman turns 65 to screen for osteoporosis. This test is only recommended one time, as a screening. Some providers will use this same test as a disease monitoring tool if you already have osteoporosis. -Breast cancer screenings are recommended every other year for women of normal risk, age 54-69.  -Cervical cancer screenings for women over age 72 are only recommended with certain risk factors.      Here is a list of your current Health Maintenance items (your personalized list of preventive services) with a due date:  Health Maintenance Due   Topic Date Due    Shingles Vaccine (1 of 2) Never done    Pneumococcal Vaccine (2 - PCV) 10/05/2011    Diabetic Foot Care  11/21/2018    Eye Exam  09/17/2020    COVID-19 Vaccine (3 - Booster for Chelo Jean Carlosrenee series) 11/30/2021

## 2022-05-23 NOTE — PROGRESS NOTES
Adiel Luis is a 68 y.o. female who presents for routine immunizations. She denies any symptoms , reactions or allergies that would exclude them from being immunized today. Risks and adverse reactions were discussed and the VIS was given to them. All questions were addressed. She was observed for 10 min post injection. There were no reactions observed.     Sreedhar Rashid LPN

## 2022-05-23 NOTE — PROGRESS NOTES
1. \"Have you been to the ER, urgent care clinic since your last visit? Hospitalized since your last visit? \"   No    2. \"Have you seen or consulted any other health care providers outside of the [address] [place] [address] since your last visit? \" No     3. For patients aged 39-70: Has the patient had a colonoscopy / FIT/ Cologuard? Yes, Dr. Amanda Hong 2021      If the patient is female:    4. For patients aged 41-77: Has the patient had a mammogram within the past 2 years? Yes, Noted in chart 2021      5. For patients aged 21-65: Has the patient had a pap smear?   no

## 2022-05-24 LAB
CHOLEST SERPL-MCNC: 143 MG/DL
EST. AVERAGE GLUCOSE BLD GHB EST-MCNC: 123 MG/DL
HBA1C MFR BLD: 5.9 % (ref 4–5.6)
HDLC SERPL-MCNC: 56 MG/DL
HDLC SERPL: 2.6 {RATIO} (ref 0–5)
LDLC SERPL CALC-MCNC: 60.6 MG/DL (ref 0–100)
TRIGL SERPL-MCNC: 132 MG/DL (ref ?–150)
VLDLC SERPL CALC-MCNC: 26.4 MG/DL

## 2022-06-09 ENCOUNTER — HOSPITAL ENCOUNTER (OUTPATIENT)
Dept: GENERAL RADIOLOGY | Age: 77
Discharge: HOME | End: 2022-06-09
Payer: MEDICARE

## 2022-06-09 ENCOUNTER — TRANSCRIBE ORDER (OUTPATIENT)
Dept: REGISTRATION | Age: 77
End: 2022-06-09

## 2022-06-09 DIAGNOSIS — I49.5 SICK SINUS SYNDROME (HCC): ICD-10-CM

## 2022-06-09 DIAGNOSIS — I49.5 SICK SINUS SYNDROME (HCC): Primary | ICD-10-CM

## 2022-06-09 PROCEDURE — 71046 X-RAY EXAM CHEST 2 VIEWS: CPT

## 2022-06-15 RX ORDER — FUROSEMIDE 80 MG/1
80 TABLET ORAL DAILY
Qty: 90 TABLET | Refills: 3 | Status: CANCELLED | OUTPATIENT
Start: 2022-06-15

## 2022-06-15 RX ORDER — FUROSEMIDE 80 MG/1
80 TABLET ORAL DAILY
Qty: 90 TABLET | Refills: 3 | Status: SHIPPED | OUTPATIENT
Start: 2022-06-15 | End: 2022-08-08

## 2022-06-15 RX ORDER — LEVOTHYROXINE SODIUM 150 UG/1
150 TABLET ORAL
Qty: 90 TABLET | Refills: 3 | Status: SHIPPED | OUTPATIENT
Start: 2022-06-15

## 2022-06-15 NOTE — TELEPHONE ENCOUNTER
Future Appointments:  Future Appointments   Date Time Provider Hliary Dickinson   8/22/2022  2:50 PM Kathy Marroquin MD [facility] [address] [facility]        Last Appointment With Me:  5/23/2022     Requested Prescriptions     Pending Prescriptions Disp Refills    furosemide (LASIX) 80 mg tablet 90 Tablet 3     Sig: Take 1 Tablet by mouth daily.  levothyroxine (SYNTHROID) 150 mcg tablet 90 Tablet 3     Sig: Take 1 Tablet by mouth Daily (before breakfast).

## 2022-06-24 ENCOUNTER — OFFICE VISIT (OUTPATIENT)
Dept: INTERNAL MEDICINE CLINIC | Age: 77
End: 2022-06-24
Payer: MEDICARE

## 2022-06-24 VITALS
OXYGEN SATURATION: 100 % | DIASTOLIC BLOOD PRESSURE: 60 MMHG | TEMPERATURE: 97.2 F | HEART RATE: 53 BPM | BODY MASS INDEX: 42.09 KG/M2 | HEIGHT: 59 IN | RESPIRATION RATE: 16 BRPM | WEIGHT: 208.8 LBS | SYSTOLIC BLOOD PRESSURE: 120 MMHG

## 2022-06-24 DIAGNOSIS — I48.0 PAF (PAROXYSMAL ATRIAL FIBRILLATION) (HCC): ICD-10-CM

## 2022-06-24 DIAGNOSIS — H69.92 DISORDER OF LEFT EUSTACHIAN TUBE: ICD-10-CM

## 2022-06-24 DIAGNOSIS — H93.232 HEARING ABNORMALLY ACUTE, LEFT: Primary | ICD-10-CM

## 2022-06-24 PROCEDURE — G8417 CALC BMI ABV UP PARAM F/U: HCPCS | Performed by: INTERNAL MEDICINE

## 2022-06-24 PROCEDURE — 1090F PRES/ABSN URINE INCON ASSESS: CPT | Performed by: INTERNAL MEDICINE

## 2022-06-24 PROCEDURE — G8536 NO DOC ELDER MAL SCRN: HCPCS | Performed by: INTERNAL MEDICINE

## 2022-06-24 PROCEDURE — G8752 SYS BP LESS 140: HCPCS | Performed by: INTERNAL MEDICINE

## 2022-06-24 PROCEDURE — G8754 DIAS BP LESS 90: HCPCS | Performed by: INTERNAL MEDICINE

## 2022-06-24 PROCEDURE — G8427 DOCREV CUR MEDS BY ELIG CLIN: HCPCS | Performed by: INTERNAL MEDICINE

## 2022-06-24 PROCEDURE — G8432 DEP SCR NOT DOC, RNG: HCPCS | Performed by: INTERNAL MEDICINE

## 2022-06-24 PROCEDURE — 99213 OFFICE O/P EST LOW 20 MIN: CPT | Performed by: INTERNAL MEDICINE

## 2022-06-24 PROCEDURE — 1101F PT FALLS ASSESS-DOCD LE1/YR: CPT | Performed by: INTERNAL MEDICINE

## 2022-06-24 RX ORDER — DOXYCYCLINE 100 MG/1
100 TABLET ORAL 2 TIMES DAILY
Qty: 14 TABLET | Refills: 0 | Status: SHIPPED | OUTPATIENT
Start: 2022-06-24 | End: 2022-08-08

## 2022-06-24 RX ORDER — FLUTICASONE PROPIONATE 50 MCG
2 SPRAY, SUSPENSION (ML) NASAL DAILY
Qty: 3 EACH | Refills: 3 | Status: SHIPPED | OUTPATIENT
Start: 2022-06-24

## 2022-06-24 NOTE — TELEPHONE ENCOUNTER
Future Appointments:  Future Appointments   Date Time Provider Hilary Dickinson   2022  2:50 PM Kathy Marroquin MD [facility] [address] [facility]        Last Appointment With Me:  2022     Requested Prescriptions     Pending Prescriptions Disp Refills    fluticasone propionate (FLONASE) 50 mcg/actuation nasal spray 3 Each 3     Si Sprays by Both Nostrils route daily. Administer to right and left nostril.

## 2022-06-25 NOTE — PROGRESS NOTES
HISTORY OF PRESENT ILLNESS  Sadaf Faye is a 68 y.o. female. HPI     C/o left ear congestion and hearing water in ear x 7 d  No pain or hearing loss  Left ear feels clogged  No tinnitus  Patient Active Problem List    Diagnosis Date Noted    PAF (paroxysmal atrial fibrillation) ([address] 06/24/2022    Stage 3a chronic kidney disease ([address] 05/20/2022    Type 2 diabetes mellitus with chronic kidney disease ([address] 11/18/2021    Abnormal nuclear stress test 10/04/2021    Angina at rest [facility]) 10/04/2021    S/P cardiac cath 10/04/2021    Nonrheumatic aortic valve stenosis 09/08/2021    Pulmonary hypertension ([address] 09/08/2021    MICHELLE (acute kidney injury) ([address] 10/27/2020    Cellulitis of left lower leg 11/07/2019    Severe obesity ([address] 09/30/2019    Controlled type 2 diabetes mellitus without complication ([address] 46/48/5073    DDD (degenerative disc disease), lumbar 11/27/2017    Prediabetes 04/11/2016    Thrombocytopenia ([address] 01/24/2015    HTN (hypertension) 01/17/2014    Bifascicular block 12/23/2010    Fatty liver 06/18/2010    Pure hypercholesterolemia 06/18/2010    Colon polyp 06/18/2010    Gout 06/14/2010    Hypothyroidism 06/14/2010    Osteoporosis 06/14/2010    Anxiety 06/14/2010    Leg edema 06/14/2010    RSD lower limb 06/14/2010     Current Outpatient Medications   Medication Sig Dispense Refill    doxycycline (ADOXA) 100 mg tablet Take 1 Tablet by mouth two (2) times a day. 14 Tablet 0    furosemide (LASIX) 80 mg tablet Take 1 Tablet by mouth daily. 90 Tablet 3    levothyroxine (SYNTHROID) 150 mcg tablet Take 1 Tablet by mouth Daily (before breakfast). 90 Tablet 3    apixaban (Eliquis) 5 mg tablet Eliquis 5 mg tablet   Take 1 tablet twice a day by oral route.  icosapent ethyL (VASCEPA) 1 gram capsule Take 2 Capsules by mouth two (2) times daily (with meals). 360 Capsule 3    loratadine (Claritin) 10 mg tablet Take 1 Tablet by mouth daily.  Indications: inflammation of the nose due to an allergy 90 Tablet 3    Zetia 10 mg tablet Take 1 tablet by mouth daily. 90 Tablet 3    atorvastatin (LIPITOR) 20 mg tablet TAKE 1 TABLET DAILY 90 Tablet 3    lidocaine (LIDODERM) 5 % Apply patch to the affected area for 12 hours a day and remove for 12 hours a day. 90 Each 3    losartan (COZAAR) 25 mg tablet Take 50 mg by mouth daily.  amLODIPine (NORVASC) 10 mg tablet Take 1 Tab by mouth daily. 90 Tablet 3    allopurinoL (ZYLOPRIM) 100 mg tablet Take 100 mg by mouth two (2) times a day.  colchicine 0.6 mg tablet Take 1 Tab by mouth daily. (Patient taking differently: Take 0.6 mg by mouth daily as needed.) 30 Tab 5    potassium chloride SA (MICRO-K) 10 mEq capsule Take 2 Caps by mouth daily. 180 Cap 3    ketoconazole (NIZORAL) 2 % topical cream Apply  to affected area daily as needed. 3    docusate sodium (COLACE) 50 mg capsule Take 100 mg by mouth daily.  Biotin 2,500 mcg cap Take  by mouth.  L GASSERI/B BIFIDUM/B LONGUM ([employer] PO) Take 1 Tab by mouth daily.  vitamin E (AQUA GEMS) 400 unit capsule Take 800 Units by mouth two (2) times a day.  cholecalciferol, vitamin D3, (VITAMIN D3) 2,000 unit tab Take 1 Tab by mouth daily.  MULTIVITAMIN WITH MINERALS (ONE-A-DAY 50 PLUS PO) Take 1 Tab by mouth daily.  aspirin 81 mg chewable tablet Take 81 mg by mouth daily.  fluticasone propionate (FLONASE) 50 mcg/actuation nasal spray 2 Sprays by Both Nostrils route daily. Administer to right and left nostril. 3 Each 3    polyethylene glycol (MIRALAX) 17 gram/dose powder Take 17 g by mouth daily.  (Patient taking differently: Take 17 g by mouth daily as needed.) 2108 g 3     Allergies   Allergen Reactions    Gabapentin Other (comments)     Suicidal ideation    Metoprolol Rash    Celebrex [Celecoxib] Hives    Pcn [Penicillins] Unknown (comments)     Can't remember, was a long time    Sulfa (Sulfonamide Antibiotics) Hives      Lab Results Component Value Date/Time    WBC 3.5 09/24/2021 10:55 AM    HGB 10.4 (L) 09/24/2021 10:55 AM    HCT 31.6 (L) 09/24/2021 10:55 AM    PLATELET 88 (LL) 99/48/0221 10:55 AM     (H) 09/24/2021 10:55 AM     Lab Results   Component Value Date/Time    GFR est non-AA 45 (L) 11/29/2021 10:01 AM    GFR est AA 52 (L) 11/29/2021 10:01 AM    Creatinine 1.17 (H) 11/29/2021 10:01 AM    BUN 32 (H) 11/29/2021 10:01 AM    Sodium 144 11/29/2021 10:01 AM    Potassium 4.2 11/29/2021 10:01 AM    Chloride 109 (H) 11/29/2021 10:01 AM    CO2 22 11/29/2021 10:01 AM    Magnesium 2.1 11/02/2020 02:06 AM    Phosphorus 3.6 11/03/2020 02:55 AM    PTH, Intact 31 01/06/2016 09:55 AM        ROS    Physical Exam  Vitals and nursing note reviewed. Constitutional:       Appearance: Normal appearance. She is well-developed. She is obese. Comments: Appears stated age   HENT:      Right Ear: Tympanic membrane normal.      Left Ear: Tympanic membrane normal.      Ears:      Comments: Hearing intact b/l  Cardiovascular:      Rate and Rhythm: Normal rate and regular rhythm. Heart sounds: Normal heart sounds. No murmur heard. No friction rub. No gallop. Pulmonary:      Effort: Pulmonary effort is normal. No respiratory distress. Breath sounds: Normal breath sounds. No wheezing. Abdominal:      General: Bowel sounds are normal.      Palpations: Abdomen is soft. Neurological:      Mental Status: She is alert. ASSESSMENT and PLAN  Diagnoses and all orders for this visit:    1. Hearing abnormally acute, left  -     REFERRAL TO ENT-OTOLARYNGOLOGY    2. PAF ([facility]oxysmal [facility] fibrillation) [facility]    3. Disorder of left eustachian tube  -     REFERRAL TO ENT-OTOLARYNGOLOGY   otc decongestants recommended   Doxycycline x 10d   Other orders  -     doxycycline (ADOXA) 100 mg tablet; Take 1 Tablet by mouth two (2) times a day.

## 2022-06-27 ENCOUNTER — APPOINTMENT (OUTPATIENT)
Dept: GENERAL RADIOLOGY | Age: 77
End: 2022-06-27
Attending: INTERNAL MEDICINE
Payer: MEDICARE

## 2022-06-27 ENCOUNTER — HOSPITAL ENCOUNTER (OUTPATIENT)
Age: 77
Discharge: HOME | End: 2022-06-27
Attending: INTERNAL MEDICINE | Admitting: INTERNAL MEDICINE
Payer: MEDICARE

## 2022-06-27 VITALS
BODY MASS INDEX: 41.93 KG/M2 | WEIGHT: 208 LBS | HEART RATE: 68 BPM | TEMPERATURE: 98.4 F | SYSTOLIC BLOOD PRESSURE: 169 MMHG | RESPIRATION RATE: 18 BRPM | HEIGHT: 59 IN | OXYGEN SATURATION: 100 % | DIASTOLIC BLOOD PRESSURE: 34 MMHG

## 2022-06-27 DIAGNOSIS — I49.5 SICK SINUS SYNDROME (HCC): ICD-10-CM

## 2022-06-27 LAB
GLUCOSE BLD STRIP.AUTO-MCNC: 159 MG/DL (ref 65–117)
SERVICE CMNT-IMP: ABNORMAL

## 2022-06-27 PROCEDURE — C1893 INTRO/SHEATH, FIXED,NON-PEEL: HCPCS | Performed by: INTERNAL MEDICINE

## 2022-06-27 PROCEDURE — 33208 INSRT HEART PM ATRIAL & VENT: CPT | Performed by: INTERNAL MEDICINE

## 2022-06-27 PROCEDURE — 74011250636 HC RX REV CODE- 250/636: Performed by: INTERNAL MEDICINE

## 2022-06-27 PROCEDURE — C1785 PMKR, DUAL, RATE-RESP: HCPCS | Performed by: INTERNAL MEDICINE

## 2022-06-27 PROCEDURE — C1894 INTRO/SHEATH, NON-LASER: HCPCS | Performed by: INTERNAL MEDICINE

## 2022-06-27 PROCEDURE — 74011000250 HC RX REV CODE- 250: Performed by: INTERNAL MEDICINE

## 2022-06-27 PROCEDURE — 2709999900 HC NON-CHARGEABLE SUPPLY: Performed by: INTERNAL MEDICINE

## 2022-06-27 PROCEDURE — 82962 GLUCOSE BLOOD TEST: CPT

## 2022-06-27 PROCEDURE — C1898 LEAD, PMKR, OTHER THAN TRANS: HCPCS | Performed by: INTERNAL MEDICINE

## 2022-06-27 PROCEDURE — 74011250637 HC RX REV CODE- 250/637: Performed by: INTERNAL MEDICINE

## 2022-06-27 PROCEDURE — 77030018729 HC ELECTRD DEFIB PAD CARD -B: Performed by: INTERNAL MEDICINE

## 2022-06-27 PROCEDURE — C1892 INTRO/SHEATH,FIXED,PEEL-AWAY: HCPCS | Performed by: INTERNAL MEDICINE

## 2022-06-27 PROCEDURE — 77030002996 HC SUT SLK J&J -A: Performed by: INTERNAL MEDICINE

## 2022-06-27 PROCEDURE — 99153 MOD SED SAME PHYS/QHP EA: CPT | Performed by: INTERNAL MEDICINE

## 2022-06-27 PROCEDURE — 99152 MOD SED SAME PHYS/QHP 5/>YRS: CPT | Performed by: INTERNAL MEDICINE

## 2022-06-27 PROCEDURE — 77030022704 HC SUT VLOC COVD -B: Performed by: INTERNAL MEDICINE

## 2022-06-27 PROCEDURE — 71045 X-RAY EXAM CHEST 1 VIEW: CPT

## 2022-06-27 DEVICE — LEAD 5076-52 MRI US RCMCRD
Type: IMPLANTABLE DEVICE | Status: FUNCTIONAL
Brand: CAPSUREFIX NOVUS MRI™ SURESCAN®

## 2022-06-27 DEVICE — LEAD 5076-58 MRI US RCMCRD
Type: IMPLANTABLE DEVICE | Status: FUNCTIONAL
Brand: CAPSUREFIX NOVUS MRI™ SURESCAN®

## 2022-06-27 DEVICE — IPG W1DR01 AZURE XT DR MRI USA
Type: IMPLANTABLE DEVICE | Status: FUNCTIONAL
Brand: AZURE™ XT DR MRI SURESCAN™

## 2022-06-27 RX ORDER — NALOXONE HYDROCHLORIDE 0.4 MG/ML
0.4 INJECTION, SOLUTION INTRAMUSCULAR; INTRAVENOUS; SUBCUTANEOUS AS NEEDED
Status: DISCONTINUED | OUTPATIENT
Start: 2022-06-27 | End: 2022-06-27 | Stop reason: HOSPADM

## 2022-06-27 RX ORDER — SODIUM CHLORIDE 0.9 % (FLUSH) 0.9 %
5-40 SYRINGE (ML) INJECTION AS NEEDED
Status: DISCONTINUED | OUTPATIENT
Start: 2022-06-27 | End: 2022-06-27 | Stop reason: HOSPADM

## 2022-06-27 RX ORDER — ACETAMINOPHEN 325 MG/1
650 TABLET ORAL
Status: DISCONTINUED | OUTPATIENT
Start: 2022-06-27 | End: 2022-06-27 | Stop reason: HOSPADM

## 2022-06-27 RX ORDER — FENTANYL CITRATE 50 UG/ML
INJECTION, SOLUTION INTRAMUSCULAR; INTRAVENOUS AS NEEDED
Status: DISCONTINUED | OUTPATIENT
Start: 2022-06-27 | End: 2022-06-27 | Stop reason: HOSPADM

## 2022-06-27 RX ORDER — LIDOCAINE HYDROCHLORIDE AND EPINEPHRINE 10; 10 MG/ML; UG/ML
INJECTION, SOLUTION INFILTRATION; PERINEURAL AS NEEDED
Status: DISCONTINUED | OUTPATIENT
Start: 2022-06-27 | End: 2022-06-27 | Stop reason: HOSPADM

## 2022-06-27 RX ORDER — HEPARIN SODIUM 200 [USP'U]/100ML
INJECTION, SOLUTION INTRAVENOUS
Status: COMPLETED | OUTPATIENT
Start: 2022-06-27 | End: 2022-06-27

## 2022-06-27 RX ORDER — HYDROCODONE BITARTRATE AND ACETAMINOPHEN 5; 325 MG/1; MG/1
1 TABLET ORAL
Status: DISCONTINUED | OUTPATIENT
Start: 2022-06-27 | End: 2022-06-27 | Stop reason: HOSPADM

## 2022-06-27 RX ORDER — SODIUM CHLORIDE 0.9 % (FLUSH) 0.9 %
5-40 SYRINGE (ML) INJECTION EVERY 8 HOURS
Status: DISCONTINUED | OUTPATIENT
Start: 2022-06-27 | End: 2022-06-27 | Stop reason: HOSPADM

## 2022-06-27 RX ORDER — MIDAZOLAM HYDROCHLORIDE 1 MG/ML
INJECTION, SOLUTION INTRAMUSCULAR; INTRAVENOUS AS NEEDED
Status: DISCONTINUED | OUTPATIENT
Start: 2022-06-27 | End: 2022-06-27 | Stop reason: HOSPADM

## 2022-06-27 RX ADMIN — ACETAMINOPHEN 650 MG: 325 TABLET ORAL at 12:40

## 2022-06-27 NOTE — Clinical Note
TRANSFER - OUT REPORT:     Verbal report given to: corona lerner, (at bedside). Report consisted of patient's Situation, Background, Assessment and   Recommendations(SBAR). Opportunity for questions and clarification was provided. Patient transported with a Registered Nurse. Patient transported to: recovery.

## 2022-06-27 NOTE — PROGRESS NOTES
Patient ambulated in room with minimal assistance from RN; no complaints of discomfort, dizziness, sob. Left incision site clean dry and intact. Discharge instructions reviewed with patient and patients family; answered questions as needed. Transported patient in wheelchair to car.

## 2022-06-27 NOTE — Clinical Note
A Bovie was used. Mode: bipolar. Coagulation Settin.  Cut Settin. Site (pad location): lateral thigh. Laterality: right.

## 2022-06-27 NOTE — PROGRESS NOTES
TRANSFER - IN REPORT:    Verbal report received from fani jeter on Emile Pittman  being received from cath lab for routine progression of care. Report consisted of patients Situation, Background, Assessment and Recommendations(SBAR). Information from the following report(s) SBAR was reviewed with the receiving clinician. Opportunity for questions and clarification was provided. Assessment completed upon patients arrival to [address] [place] [address]. Cardiac Cath Lab Recovery Arrival Note:    Emile Pittman arrived to [facility] area. Patient procedure= pacer implant. Patient on cardiac monitor, non-invasive blood pressure, SPO2 monitor. On RA. IV  Of vanco on pump at 125 ml/hr. Patient status doing well without problems. Patient is A&Ox 4. Patient reports no complaints. PROCEDURE SITE CHECK:    Procedure site:without any bleeding and no hematoma, no pain/discomfort reported at procedure site. No change in patient status. Continue to monitor patient and status.

## 2022-06-27 NOTE — PROGRESS NOTES
Primary Nurse Janet Carmona and SANTOS gonzalez performed a dual skin assessment on this patient No impairment noted  Boris score is 23; patient refused meplix

## 2022-06-27 NOTE — PROGRESS NOTES
Cardiac Cath Lab Recovery Arrival Note:      Baldev Mccracken arrived to Cardiac Cath Lab, Recovery Area. Staff introduced to patient. Patient identifiers verified with NAME and DATE OF BIRTH. Procedure verified with patient. Consent forms reviewed and signed by patient or authorized representative and verified. Allergies verified. Patient and family oriented to department. Patient and family informed of procedure and plan of care. Questions answered with review. Patient prepped for procedure, per orders from physician, prior to arrival.    Patient on cardiac monitor, non-invasive blood pressure, SPO2 monitor. On RA. Patient is A&Ox 4. Patient reports no complaints. Patient in stretcher, in low position, with side rails up, call bell within reach, patient instructed to call if assistance as needed. Patient prep in: [address], [place]. Patient family has pager # none  Family in: son and caregiver in lobby.    Prep by: Jude Reyes

## 2022-06-27 NOTE — DISCHARGE INSTRUCTIONS
[facility]  [address]  [phone]  [email or web address]     NEW PACEMAKER IMPLANT DISCHARGE INSTRUCTIONS    Patient ID:  Ethan Miller  [id number]  11 y.o.  1945    Admit Date: 6/27/2022    Discharge Date: 6/27/2022     Admitting Physician: Sary Chapman MD     Discharge Physician: Sary Chapman MD    Admission Diagnoses:   Sick sinus syndrome ([address] [I49.5]  SSS (sick sinus syndrome) ([address] [I49.5]    Discharge Diagnoses: Active Problems:    SSS (sick sinus syndrome) ([address] (6/27/2022)        Discharge Condition: Good    Cardiology Procedures this Admission:  Pacemaker insertion. Disposition: home    Reference discharge instructions provided by nursing for diet and activity. Follow-up with device clinic in three weeks. Call [phone] to make an appointment. Signed:  Sary Chapman MD  6/27/2022  9:58 AM      DISCHARGE INSTRUCTIONS FOR PATIENTS WITH PACEMAKERS    1. Remember to call for an appointment for 3 weeks [phone] to check healing and implant programming. 2. Medic Alert Bracelets are available from your pharmacist to wear at all times if you choose to wear one. 3. Carry your ID card for pacemaker with you at all times. This card will be given to you in the hospital or mailed to you. 4. The pacemaker will bulge slightly under your skin. The bulge will decrease in size over the next few weeks. Please notify the doctor's office if you notice any of the following around your site:   A.  A bruise that does not go away. B.  Soreness or yellow, green, or brown drainage from the site. C. Any swelling from the site. D. If you have a fever of 100 degrees or higher that lasts for a few days. INCISION CARE       1.  Leave the dressing over your site until your follow up visit. 2.  You may not shower until after follow up visit. 3.  For comfort, wear loose fitting clothing.   1. 4.  Ice pack to affected shoulder for first 24 hours, wear your sling for 2 days. 2. 5.  Report any signs of infection, fever, pain, swelling, redness, oozing, or heat at site especially if these symptoms increase after the first 3 to 4 days. ACTIVITY PRECAUTIONS     1. Avoid rough contact with the implant site. 2. No driving for 14 days. 3. Avoid lifting your arm over your head, carrying anything on the affected side, or lifting over 10 pounds for 90 days. For the first 2 days only bend your arm at the elbow. 4. Any extreme activity such as golf, weight lifting or exercise biking should be restricted for 60 days. 5. Do not carry objects by holding them against your implant site. 6.  No shooting rifles or any type of gun with the affected shoulder permanently. SPECIAL PRECAUTIONS     1. You should avoid all strong magnetic fields, such as arc welding, large transformers, large motors. 2.  You may or may not (depending on your device) have an MRI which uses a strong magnet to take pictures. 3.  Treatments or surgery that requires diathermy or electrocautery should be discussed with your doctor before scheduled. 4. Avoid radio frequency transmitters, including radar. 5. Advise dentist or other medical personnel you see that you have a pacemaker. 6.  Cell phones and microwave oven use is okay. 7.  If you plan to move or take a trip to a new area, the doctor's office will give you a name of a doctor to contact for any problems. ANTIBIOTIC THERAPY    During the first 8 weeks after your pacemaker insertion, you may need antibiotics before any dental work or certain tests or operations. Let the dentist or doctor who is caring for you know that you have had an implanted device.

## 2022-08-05 ENCOUNTER — HOSPITAL ENCOUNTER (INPATIENT)
Age: 77
LOS: 3 days | Discharge: HOME HEALTH | DRG: 442 | End: 2022-08-08
Attending: EMERGENCY MEDICINE | Admitting: STUDENT IN AN ORGANIZED HEALTH CARE EDUCATION/TRAINING PROGRAM
Payer: MEDICARE

## 2022-08-05 ENCOUNTER — APPOINTMENT (OUTPATIENT)
Dept: GENERAL RADIOLOGY | Age: 77
DRG: 442 | End: 2022-08-05
Attending: STUDENT IN AN ORGANIZED HEALTH CARE EDUCATION/TRAINING PROGRAM
Payer: MEDICARE

## 2022-08-05 DIAGNOSIS — D64.9 SEVERE ANEMIA: Primary | ICD-10-CM

## 2022-08-05 LAB
ALBUMIN SERPL-MCNC: 3.4 G/DL (ref 3.5–5)
ALBUMIN/GLOB SERPL: 1.1 {RATIO} (ref 1.1–2.2)
ALP SERPL-CCNC: 104 U/L (ref 45–117)
ALT SERPL-CCNC: 29 U/L (ref 12–78)
ANION GAP SERPL CALC-SCNC: 10 MMOL/L (ref 5–15)
AST SERPL-CCNC: 39 U/L (ref 15–37)
ATRIAL RATE: 86 BPM
BASOPHILS # BLD: 0 K/UL (ref 0–0.1)
BASOPHILS NFR BLD: 0 % (ref 0–1)
BILIRUB SERPL-MCNC: 1.1 MG/DL (ref 0.2–1)
BNP SERPL-MCNC: 799 PG/ML
BUN SERPL-MCNC: 68 MG/DL (ref 6–20)
BUN/CREAT SERPL: 38 (ref 12–20)
CALCIUM SERPL-MCNC: 9.5 MG/DL (ref 8.5–10.1)
CALCULATED P AXIS, ECG09: 70 DEGREES
CALCULATED R AXIS, ECG10: -58 DEGREES
CALCULATED T AXIS, ECG11: 77 DEGREES
CHLORIDE SERPL-SCNC: 106 MMOL/L (ref 97–108)
CO2 SERPL-SCNC: 21 MMOL/L (ref 21–32)
CREAT SERPL-MCNC: 1.78 MG/DL (ref 0.55–1.02)
DIAGNOSIS, 93000: NORMAL
DIFFERENTIAL METHOD BLD: ABNORMAL
EOSINOPHIL # BLD: 0.4 K/UL (ref 0–0.4)
EOSINOPHIL NFR BLD: 3 % (ref 0–7)
ERYTHROCYTE [DISTWIDTH] IN BLOOD BY AUTOMATED COUNT: 18.6 % (ref 11.5–14.5)
FERRITIN SERPL-MCNC: 10 NG/ML (ref 8–252)
GLOBULIN SER CALC-MCNC: 3.1 G/DL (ref 2–4)
GLUCOSE BLD STRIP.AUTO-MCNC: 158 MG/DL (ref 65–117)
GLUCOSE SERPL-MCNC: 171 MG/DL (ref 65–100)
HCT VFR BLD AUTO: 16.3 % (ref 35–47)
HCT VFR BLD AUTO: 18.6 % (ref 35–47)
HEMOCCULT STL QL: POSITIVE
HGB BLD-MCNC: 4.2 G/DL (ref 11.5–16)
HGB BLD-MCNC: 5.3 G/DL (ref 11.5–16)
HISTORY CHECKED?,CKHIST: NORMAL
HISTORY CHECKED?,CKHIST: NORMAL
IMM GRANULOCYTES # BLD AUTO: 0.1 K/UL (ref 0–0.04)
IMM GRANULOCYTES NFR BLD AUTO: 1 % (ref 0–0.5)
LYMPHOCYTES # BLD: 1.2 K/UL (ref 0.8–3.5)
LYMPHOCYTES NFR BLD: 10 % (ref 12–49)
MCH RBC QN AUTO: 24 PG (ref 26–34)
MCHC RBC AUTO-ENTMCNC: 25.8 G/DL (ref 30–36.5)
MCV RBC AUTO: 93.1 FL (ref 80–99)
MONOCYTES # BLD: 0.9 K/UL (ref 0–1)
MONOCYTES NFR BLD: 7 % (ref 5–13)
NEUTS SEG # BLD: 9.8 K/UL (ref 1.8–8)
NEUTS SEG NFR BLD: 79 % (ref 32–75)
NRBC # BLD: 0.11 K/UL (ref 0–0.01)
NRBC BLD-RTO: 0.9 PER 100 WBC
P-R INTERVAL, ECG05: 298 MS
PATH REV BLD -IMP: NORMAL
PLATELET # BLD AUTO: 184 K/UL (ref 150–400)
PMV BLD AUTO: 11.5 FL (ref 8.9–12.9)
POTASSIUM SERPL-SCNC: 4 MMOL/L (ref 3.5–5.1)
PROT SERPL-MCNC: 6.5 G/DL (ref 6.4–8.2)
Q-T INTERVAL, ECG07: 408 MS
QRS DURATION, ECG06: 144 MS
QTC CALCULATION (BEZET), ECG08: 488 MS
RBC # BLD AUTO: 1.75 M/UL (ref 3.8–5.2)
RBC MORPH BLD: ABNORMAL
SERVICE CMNT-IMP: ABNORMAL
SODIUM SERPL-SCNC: 137 MMOL/L (ref 136–145)
TROPONIN-HIGH SENSITIVITY: 39 NG/L (ref 0–51)
VENTRICULAR RATE, ECG03: 86 BPM
WBC # BLD AUTO: 12.4 K/UL (ref 3.6–11)

## 2022-08-05 PROCEDURE — 86900 BLOOD TYPING SEROLOGIC ABO: CPT

## 2022-08-05 PROCEDURE — 65270000029 HC RM PRIVATE

## 2022-08-05 PROCEDURE — 99285 EMERGENCY DEPT VISIT HI MDM: CPT

## 2022-08-05 PROCEDURE — 86923 COMPATIBILITY TEST ELECTRIC: CPT

## 2022-08-05 PROCEDURE — 36430 TRANSFUSION BLD/BLD COMPNT: CPT

## 2022-08-05 PROCEDURE — 80053 COMPREHEN METABOLIC PANEL: CPT

## 2022-08-05 PROCEDURE — 71046 X-RAY EXAM CHEST 2 VIEWS: CPT

## 2022-08-05 PROCEDURE — 94762 N-INVAS EAR/PLS OXIMTRY CONT: CPT

## 2022-08-05 PROCEDURE — 93005 ELECTROCARDIOGRAM TRACING: CPT

## 2022-08-05 PROCEDURE — 82728 ASSAY OF FERRITIN: CPT

## 2022-08-05 PROCEDURE — 85018 HEMOGLOBIN: CPT

## 2022-08-05 PROCEDURE — 85025 COMPLETE CBC W/AUTO DIFF WBC: CPT

## 2022-08-05 PROCEDURE — P9016 RBC LEUKOCYTES REDUCED: HCPCS

## 2022-08-05 PROCEDURE — 36415 COLL VENOUS BLD VENIPUNCTURE: CPT

## 2022-08-05 PROCEDURE — 83880 ASSAY OF NATRIURETIC PEPTIDE: CPT

## 2022-08-05 PROCEDURE — 82272 OCCULT BLD FECES 1-3 TESTS: CPT

## 2022-08-05 PROCEDURE — 84484 ASSAY OF TROPONIN QUANT: CPT

## 2022-08-05 PROCEDURE — 82962 GLUCOSE BLOOD TEST: CPT

## 2022-08-05 RX ORDER — POLYETHYLENE GLYCOL 3350 17 G/17G
17 POWDER, FOR SOLUTION ORAL DAILY PRN
Status: DISCONTINUED | OUTPATIENT
Start: 2022-08-05 | End: 2022-08-08 | Stop reason: HOSPADM

## 2022-08-05 RX ORDER — SODIUM CHLORIDE 9 MG/ML
100 INJECTION, SOLUTION INTRAVENOUS CONTINUOUS
Status: DISCONTINUED | OUTPATIENT
Start: 2022-08-05 | End: 2022-08-08 | Stop reason: HOSPADM

## 2022-08-05 RX ORDER — SODIUM CHLORIDE 9 MG/ML
250 INJECTION, SOLUTION INTRAVENOUS AS NEEDED
Status: DISCONTINUED | OUTPATIENT
Start: 2022-08-05 | End: 2022-08-08 | Stop reason: HOSPADM

## 2022-08-05 RX ORDER — FLUTICASONE PROPIONATE 50 MCG
2 SPRAY, SUSPENSION (ML) NASAL DAILY
Status: DISCONTINUED | OUTPATIENT
Start: 2022-08-06 | End: 2022-08-08 | Stop reason: HOSPADM

## 2022-08-05 RX ORDER — ALLOPURINOL 100 MG/1
100 TABLET ORAL 2 TIMES DAILY
Status: DISCONTINUED | OUTPATIENT
Start: 2022-08-05 | End: 2022-08-08 | Stop reason: HOSPADM

## 2022-08-05 RX ORDER — LOSARTAN POTASSIUM 50 MG/1
50 TABLET ORAL DAILY
Status: DISCONTINUED | OUTPATIENT
Start: 2022-08-06 | End: 2022-08-05

## 2022-08-05 RX ORDER — LEVOTHYROXINE SODIUM 75 UG/1
150 TABLET ORAL
Status: DISCONTINUED | OUTPATIENT
Start: 2022-08-06 | End: 2022-08-08 | Stop reason: HOSPADM

## 2022-08-05 RX ORDER — AMLODIPINE BESYLATE 5 MG/1
10 TABLET ORAL DAILY
Status: DISCONTINUED | OUTPATIENT
Start: 2022-08-06 | End: 2022-08-05

## 2022-08-05 RX ORDER — BISACODYL 5 MG
10 TABLET, DELAYED RELEASE (ENTERIC COATED) ORAL
Status: COMPLETED | OUTPATIENT
Start: 2022-08-07 | End: 2022-08-07

## 2022-08-05 RX ORDER — ATORVASTATIN CALCIUM 20 MG/1
20 TABLET, FILM COATED ORAL
Status: DISCONTINUED | OUTPATIENT
Start: 2022-08-05 | End: 2022-08-08 | Stop reason: HOSPADM

## 2022-08-05 RX ORDER — PANTOPRAZOLE SODIUM 40 MG/1
40 TABLET, DELAYED RELEASE ORAL
Status: DISCONTINUED | OUTPATIENT
Start: 2022-08-05 | End: 2022-08-08 | Stop reason: HOSPADM

## 2022-08-05 NOTE — ED PROVIDER NOTES
EMERGENCY DEPARTMENT HISTORY AND PHYSICAL EXAM      Date: 8/5/2022  Patient Name: Kishor Beckett    History of Presenting Illness     Chief Complaint   Patient presents with    Abnormal Lab Results     Had blood work done yesterday at Cardiologist office (Dr. Dain Luque) told to come to ED for Blood transfusion for low hemoglobin. No bloody bm that pt is aware. Shortness of Breath     About 4 weeks but worse in past week or two; pt had a pacemaker put in and felt short of breath since then. History Provided By: Patient    HPI: Kishor Beckett, 68 y.o. female with PMHx significant for hypertension, hyperlipidemia, A. fib on Eliquis, recent pacemaker placement who presents with a chief complaint of shortness of breath and fatigue. Patient notes that she has felt this way essentially since the pacemaker was placed. She had outpatient labs done in her cardiologist office that were notable for severe anemia. She was therefore sent to the ED for evaluation. She denies any black or bloody bowel movements. No abdominal pain, productive cough, fever, chest pain. PCP: Vinay Comer MD    There are no other complaints, changes, or physical findings at this time.     Current Facility-Administered Medications   Medication Dose Route Frequency Provider Last Rate Last Admin    0.9% sodium chloride infusion 250 mL  250 mL IntraVENous PRN Dayday Leal MD        0.9% sodium chloride infusion 250 mL  250 mL IntraVENous PRN Ge Robles MD        allopurinoL (ZYLOPRIM) tablet 100 mg  100 mg Oral BID Ge Robles MD        atorvastatin (LIPITOR) tablet 20 mg  20 mg Oral QHS Ge Robles MD        [START ON 8/6/2022] fluticasone propionate (FLONASE) 50 mcg/actuation nasal spray 2 Spray  2 Spray Both Nostrils DAILY Ge Robles MD        [START ON 8/6/2022] levothyroxine (SYNTHROID) tablet 150 mcg  150 mcg Oral ACB Iman Ya MD        polyethylene glycol (MIRALAX) packet 17 g  17 g Oral DAILY PRN Ana Ramos MD        0.9% sodium chloride infusion  100 mL/hr IntraVENous CONTINUOUS Ana Ramos MD   Held at 08/05/22 1431    pantoprazole (PROTONIX) tablet 40 mg  40 mg Oral ACB&D Ana Ramos MD        Or    pantoprazole (PROTONIX) 40 mg in 0.9% sodium chloride 10 mL injection  40 mg IntraVENous ACB&D Ana Ramos MD         Current Outpatient Medications   Medication Sig Dispense Refill    fluticasone propionate (FLONASE) 50 mcg/actuation nasal spray 2 Sprays by Both Nostrils route daily. Administer to right and left nostril. 3 Each 3    doxycycline (ADOXA) 100 mg tablet Take 1 Tablet by mouth two (2) times a day. 14 Tablet 0    furosemide (LASIX) 80 mg tablet Take 1 Tablet by mouth daily. 90 Tablet 3    levothyroxine (SYNTHROID) 150 mcg tablet Take 1 Tablet by mouth Daily (before breakfast). 90 Tablet 3    apixaban (Eliquis) 5 mg tablet Eliquis 5 mg tablet   Take 1 tablet twice a day by oral route. icosapent ethyL (VASCEPA) 1 gram capsule Take 2 Capsules by mouth two (2) times daily (with meals). 360 Capsule 3    loratadine (Claritin) 10 mg tablet Take 1 Tablet by mouth daily. Indications: inflammation of the nose due to an allergy 90 Tablet 3    Zetia 10 mg tablet Take 1 tablet by mouth daily. 90 Tablet 3    atorvastatin (LIPITOR) 20 mg tablet TAKE 1 TABLET DAILY 90 Tablet 3    lidocaine (LIDODERM) 5 % Apply patch to the affected area for 12 hours a day and remove for 12 hours a day. 90 Each 3    losartan (COZAAR) 25 mg tablet Take 50 mg by mouth daily. amLODIPine (NORVASC) 10 mg tablet Take 1 Tab by mouth daily. 90 Tablet 3    allopurinoL (ZYLOPRIM) 100 mg tablet Take 100 mg by mouth two (2) times a day. colchicine 0.6 mg tablet Take 1 Tab by mouth daily. (Patient taking differently: Take 0.6 mg by mouth daily as needed.) 30 Tab 5    potassium chloride SA (MICRO-K) 10 mEq capsule Take 2 Caps by mouth daily.  180 Cap 3    polyethylene glycol (MIRALAX) 17 gram/dose powder Take 17 g by mouth daily. (Patient taking differently: Take 17 g by mouth daily as needed.) 2108 g 3    ketoconazole (NIZORAL) 2 % topical cream Apply  to affected area daily as needed. 3    docusate sodium (COLACE) 50 mg capsule Take 100 mg by mouth daily. Biotin 2,500 mcg cap Take  by mouth. L GASSERI/B BIFIDUM/B LONGUM (Maintenance Assistant PO) Take 1 Tab by mouth daily. vitamin E (AQUA GEMS) 400 unit capsule Take 800 Units by mouth two (2) times a day. cholecalciferol, vitamin D3, (VITAMIN D3) 2,000 unit tab Take 1 Tab by mouth daily. MULTIVITAMIN WITH MINERALS (ONE-A-DAY 50 PLUS PO) Take 1 Tab by mouth daily. aspirin 81 mg chewable tablet Take 81 mg by mouth daily.        Past History     Past Medical History:  Past Medical History:   Diagnosis Date    Abnormal nuclear stress test 10/4/2021    Angina at rest [facility]) 10/4/2021    Arthritis     KNEE,gout    Bundle branch block     Endocrine disease     HYPOTHYROIDISM    Fracture     Left distal femur (age 12 - pt fell)    Fracture     Left tibia 1990 (pt fell)    Fracture     Right wrist fractured 2 times (pt fell)    GERD (gastroesophageal reflux disease)     High cholesterol     Hypertension     Hypoglycemia     \"my body produces too much insulin\"    Liver disease     BRADY    Nausea & vomiting     when had gallbladder out    Osteoporosis     Other ill-defined conditions(799.89)     edema legs    S/P cardiac cath 10/4/2021    10/4/2021 nonobstructive disease    Spinal stenosis     Thyroid disease      Past Surgical History:  Past Surgical History:   Procedure Laterality Date    COLONOSCOPY N/A 7/13/2021    COLONOSCOPY performed by Cecilia Gutierrez MD at [address] [employer]  2/11/2015         COLONOSCOPY,GATITO [place]  7/13/2021         COLONOSCPY,FLEX,W/ [address] INJECT  7/13/2021         COLORECTAL SCRN; HI RISK IND  2/11/2015         HX CHOLECYSTECTOMY      HX HYSTERECTOMY      HX ORTHOPAEDIC Left     leg surgery - plates, screws, wires, pins in place    HX UROLOGICAL      PESSURY    OR ABDOMEN SURGERY PROC UNLISTED      liver biopsy--BRADY and fibrosis    UPPER GI ENDOSCOPY,BIOPSY  11/17/2015          Family History:  Family History   Problem Relation Age of Onset    Diabetes Mother     Heart Disease Mother     Emphysema Father      Social History:  Social History     Tobacco Use    Smoking status: Never    Smokeless tobacco: Never   Vaping Use    Vaping Use: Never used   Substance Use Topics    Alcohol use: No    Drug use: Yes     Types: Prescription, OTC     Allergies: Allergies   Allergen Reactions    Gabapentin Other (comments)     Suicidal ideation    Metoprolol Rash    Celebrex [Celecoxib] Hives    Pcn [Penicillins] Unknown (comments)     Can't remember, was a long time    Sulfa (Sulfonamide Antibiotics) Hives     Review of Systems   Review of Systems   Constitutional:  Positive for fatigue. Negative for chills and fever. HENT:  Negative for congestion, rhinorrhea and sore throat. Respiratory:  Positive for shortness of breath. Negative for cough. Cardiovascular:  Negative for chest pain. Gastrointestinal:  Negative for abdominal pain, nausea and vomiting. Genitourinary:  Negative for dysuria and urgency. Skin:  Negative for rash. Neurological:  Negative for dizziness, light-headedness and headaches. All other systems reviewed and are negative. Physical Exam   Physical Exam  Vitals and nursing note reviewed. Constitutional:       General: She is not in acute distress. Appearance: She is well-developed. HENT:      Head: Normocephalic and atraumatic. Eyes:      Conjunctiva/sclera: Conjunctivae normal.      Pupils: Pupils are equal, round, and reactive to light. Cardiovascular:      Rate and Rhythm: Normal rate and regular rhythm. Pulmonary:      Effort: Pulmonary effort is normal. No respiratory distress. Breath sounds: Normal breath sounds. No stridor.    Abdominal:      General: There is no distension. Palpations: Abdomen is soft. Tenderness: There is no abdominal tenderness. Musculoskeletal:         General: Normal range of motion. Cervical back: Normal range of motion. Skin:     General: Skin is warm and dry. Coloration: Skin is pale. Neurological:      Mental Status: She is alert and oriented to person, place, and time. Psychiatric:         Mood and Affect: Mood normal.         Thought Content: Thought content normal.     Diagnostic Study Results   Labs -     Recent Results (from the past 12 hour(s))   EKG, 12 LEAD, INITIAL    Collection Time: 08/05/22 10:22 AM   Result Value Ref Range    Ventricular Rate 86 BPM    Atrial Rate 86 BPM    P-R Interval 298 ms    QRS Duration 144 ms    Q-T Interval 408 ms    QTC Calculation (Bezet) 488 ms    Calculated P Axis 70 degrees    Calculated R Axis -58 degrees    Calculated T Axis 77 degrees    Diagnosis       Atrial-paced rhythm with prolonged AV conduction  Right bundle branch block  Left anterior fascicular block  Left ventricular hypertrophy with repolarization abnormality  When compared with ECG of 28-OCT-2020 00:21,  Electronic atrial pacemaker has replaced Sinus rhythm  Confirmed by Liliam Vasquez P.VMarquez ([id number]) on 8/5/2022 1:03:18 PM     CBC WITH AUTOMATED DIFF    Collection Time: 08/05/22 10:36 AM   Result Value Ref Range    WBC 12.4 (H) 3.6 - 11.0 K/uL    RBC 1.75 (L) 3.80 - 5.20 M/uL    HGB 4.2 (LL) 11.5 - 16.0 g/dL    HCT 16.3 (LL) 35.0 - 47.0 %    MCV 93.1 80.0 - 99.0 FL    MCH 24.0 (L) 26.0 - 34.0 PG    MCHC 25.8 (L) 30.0 - 36.5 g/dL    RDW 18.6 (H) 11.5 - 14.5 %    PLATELET 635 292 - 651 K/uL    MPV 11.5 8.9 - 12.9 FL    NRBC 0.9 (H) 0  WBC    ABSOLUTE NRBC 0.11 (H) 0.00 - 0.01 K/uL    NEUTROPHILS 79 (H) 32 - 75 %    LYMPHOCYTES 10 (L) 12 - 49 %    MONOCYTES 7 5 - 13 %    EOSINOPHILS 3 0 - 7 %    BASOPHILS 0 0 - 1 %    IMMATURE GRANULOCYTES 1 (H) 0.0 - 0.5 %    ABS.  NEUTROPHILS 9.8 (H) 1.8 - 8.0 K/UL ABS. LYMPHOCYTES 1.2 0.8 - 3.5 K/UL    ABS. MONOCYTES 0.9 0.0 - 1.0 K/UL    ABS. EOSINOPHILS 0.4 0.0 - 0.4 K/UL    ABS. BASOPHILS 0.0 0.0 - 0.1 K/UL    ABS. IMM. GRANS. 0.1 (H) 0.00 - 0.04 K/UL    DF SMEAR SCANNED      RBC COMMENTS POLYCHROMASIA  2+        RBC COMMENTS ANISOCYTOSIS  1+        RBC COMMENTS OVALOCYTES  1+        RBC COMMENTS POIKILOCYTOSIS  1+       METABOLIC PANEL, COMPREHENSIVE    Collection Time: 08/05/22 10:36 AM   Result Value Ref Range    Sodium 137 136 - 145 mmol/L    Potassium 4.0 3.5 - 5.1 mmol/L    Chloride 106 97 - 108 mmol/L    CO2 21 21 - 32 mmol/L    Anion gap 10 5 - 15 mmol/L    Glucose 171 (H) 65 - 100 mg/dL    BUN 68 (H) 6 - 20 MG/DL    Creatinine 1.78 (H) 0.55 - 1.02 MG/DL    BUN/Creatinine ratio 38 (H) 12 - 20      GFR est AA 33 (L) >60 ml/min/1.73m2    GFR est non-AA 28 (L) >60 ml/min/1.73m2    Calcium 9.5 8.5 - 10.1 MG/DL    Bilirubin, total 1.1 (H) 0.2 - 1.0 MG/DL    ALT (SGPT) 29 12 - 78 U/L    AST (SGOT) 39 (H) 15 - 37 U/L    Alk.  phosphatase 104 45 - 117 U/L    Protein, total 6.5 6.4 - 8.2 g/dL    Albumin 3.4 (L) 3.5 - 5.0 g/dL    Globulin 3.1 2.0 - 4.0 g/dL    A-G Ratio 1.1 1.1 - 2.2     TROPONIN-HIGH SENSITIVITY    Collection Time: 08/05/22 10:36 AM   Result Value Ref Range    Troponin-High Sensitivity 39 0 - 51 ng/L   NT-PRO BNP    Collection Time: 08/05/22 10:36 AM   Result Value Ref Range    NT pro- (H) <450 PG/ML   TYPE & SCREEN    Collection Time: 08/05/22 10:36 AM   Result Value Ref Range    Crossmatch Expiration 08/08/2022,2359     ABO/Rh(D) O POSITIVE     Antibody screen NEG     Unit number [id number]     Blood component type [facility]     Unit division 00     Status of unit ALLOCATED     Crossmatch result Compatible     Unit number [id number]     Blood component type [facility]     Unit division 00     Status of unit ISSUED     Crossmatch result Compatible     Unit number [id number]     Blood component type [facility]     Unit division 00     Status of unit ALLOCATED     Crossmatch result Compatible    OCCULT BLOOD, STOOL    Collection Time: 08/05/22 12:00 PM   Result Value Ref Range    Occult blood, stool Positive (A) NEG     RBC, ALLOCATE    Collection Time: 08/05/22 12:00 PM   Result Value Ref Range    HISTORY CHECKED? Historical check performed    RBC, ALLOCATE    Collection Time: 08/05/22  2:00 PM   Result Value Ref Range    HISTORY CHECKED? Historical check performed        Radiologic Studies -   XR CHEST PA LAT   Final Result      No acute process. XR CHEST PA LAT    Result Date: 8/5/2022  No acute process. Medical Decision Making   I am the first provider for this patient. I reviewed the vital signs, available nursing notes, past medical history, past surgical history, family history and social history. Vital Signs-Reviewed the patient's vital signs. Patient Vitals for the past 12 hrs:   Temp Pulse Resp BP SpO2   08/05/22 1545 -- 65 19 (!) 125/41 98 %   08/05/22 1445 97.8 °F (36.6 °C) 65 20 (!) 118/49 100 %   08/05/22 1421 97.7 °F (36.5 °C) 66 18 (!) 119/54 98 %   08/05/22 1315 -- 67 20 (!) 143/45 99 %   08/05/22 1245 -- 69 18 (!) 139/46 100 %   08/05/22 1017 98.1 °F (36.7 °C) 71 18 (!) 172/35 100 %       Pulse Oximetry Analysis - 99% on ra    Cardiac Monitor:   Rate: 61 bpm  Rhythm: Paced      ED EKG interpretation:  Rhythm: paced; and regular . Rate (approx.): 86; Other findings: no acute ischemic . This EKG was interpreted by SNOW Campo MD,ED Provider. Records Reviewed: Nursing Notes and Old Medical Records    Provider Notes (Medical Decision Making):   Patient presents for abnormal outpatient labs in the setting of ongoing fatigue and shortness of breath. Lab work here today with hemoglobin of 4.2. Suspect this is the source of her symptoms. Though she denies any black or bloody bowel movements will check occult stool. Will order blood transfusions. Hospitalist admission. ED Course:   Initial assessment performed.  The patients presenting problems have been discussed, and they are in agreement with the care plan formulated and outlined with them. I have encouraged them to ask questions as they arise throughout their visit. Procedures:  Procedures    Critical Care:  CRITICAL CARE NOTE :  IMPENDING DETERIORATION -Cardiovascular  ASSOCIATED RISK FACTORS - Hypotension, Shock, and Bleeding  MANAGEMENT- Bedside Assessment  INTERPRETATION -  ECG and Blood Pressure  INTERVENTIONS - hemodynamic mngmt  CASE REVIEW - Hospitalist/Intensivist  TREATMENT RESPONSE -Stable  PERFORMED BY - Self    NOTES   :    I have spent 30 minutes of critical care time involved in lab review, consultations with specialist, family decision- making, bedside attention and documentation excluding separately billed procedures. During this entire length of time I was immediately available to the patient . Thomas Andrade MD      Disposition:    Admission Note:  Patient is being admitted to the hospital by Dr. Nydia Sandhoff, Service: Hospitalist.  The results of their tests and reasons for their admission have been discussed with them and available family. They convey agreement and understanding for the need to be admitted and for their admission diagnosis. Diagnosis     Clinical Impression:   1. Severe anemia            Please note that this dictation was completed with [employer], the computer voice recognition software. Quite often unanticipated grammatical, syntax, homophones, and other interpretive errors are inadvertently transcribed by the computer software. Please disregard these errors.   Please excuse any errors that have escaped final proofreading

## 2022-08-05 NOTE — CONSENT
Informed Consent for Blood Component Transfusion Note    I have discussed with the patient the rationale for blood component transfusion; its benefits in treating or preventing fatigue, organ damage, or death; and its risk which includes mild transfusion reactions, rare risk of blood borne infection, or more serious but rare reactions. I have discussed the alternatives to transfusion, including the risk and consequences of not receiving transfusion. The patient had an opportunity to ask questions and had agreed to proceed with transfusion of blood components.     Electronically signed by Nitish Vogel MD on 8/5/22 at 11:58 AM

## 2022-08-05 NOTE — CONSULTS
Gastroenterology Attending Physician (for Joel Dillon) attestation statement and comments. This patient was seen and examined by me in a face-to-face visit today. I reviewed the medical record including lab work, imaging and other provider notes. I confirmed the history as described above. I spoke to the patient, reviewed the medical record including lab work, imaging and other provider notes. I discussed this case in detail with Vincenzo Antonio NP. I formulated an  assessment of this patient and developed a treatment plan. I agree with the above consultation note. I agree with the history, exam and assessment and plan as outlined in the note. I would like to add the followinyo F  w/ cirrhosis 2/2 BRADY seen for eval of profound anemia in setting of Eliquis use. S/p recent pacer placement but had persistent SOB. Eval by cardiologist noted anemia prompting referral to ER. Denies abd pain, N/V, D/C,or change in [address]. Last episode hematochezia per pt >3mo ago and resolved after 1-2 BMs. Last ELiquis this AM. PE w/ benign CV, pulm, abd exam  Multiple colon polyps removed 2021 w/ recc for repeat in 1 yr time. Last EGD  w/ . Labs and meds reviewed. DDx includes but is not limited to gastritis/gastropathy in setting of cirrhosis, exacerbated by anticoagulation, PUD, mass, AVM, or polyps  Plan:   1) EGD and colonoscopy on Monday w/ - orders already written, NPO after MN Monday,  CLD on , Golytely on   2) Hold Eliquis  3) serial H/H after transfusion completed w/ goal Hgb>7   4) BID PPI  5) Check pending INR  We will see on request only over weekend with plan for procedures as IP on Monday. Call if questions arise.   Neil Samuel MD            GI CONSULTATION NOTE  Millicent Mata NP   [phone] NP in-hospital cell phone M-F until 4:30  After 5pm or on weekends, please call  for physician on call    NAME: Nathalia Crook  : 1945   MRN: [id number]   Attending: Dr. Sissy Lawrence  Primary GI: Dr. Slava Rodriguez  Date/Time:  8/5/2022 2:28 PM  Assessment:   -Anemia, 4.2 on admission  No overt S&S of bleeding, last blood in stool was seen 3 months ago per patient  Heme + stool  BUN/creat chronically elevated  -H/o cirrhosis, h/o BRADY, compensated  Had liver bx with [facility]  2006 BRADY, moderate fibrosis with bridging, stage III  Will evaluate for varices with upcoming EGD  -Acute on Chronic kidney failure  Needs further management, with reversal if possible. If not reversible could lead to hepato-renal syndrome which could lead to rapid decompensation  -Obesity  -Anticoagulation, on Eliquis, last dose last night  GI Hx  -7/2021 CLN multilobular sessile 8mm polyp in proximal transverse colon, sessile 4mm polyp in ascending colon, grade 1 internal hemorrhoids. Rec repeat 1 year  -2015 EGD showing normal esophagus and duodenum. Diffuse gastritis with friability, no ulcers, no varices  -2015 CLN grade 1 internal hemorrhoids, ascending colon polyps (TA). Recommended repeat in 5 years      Plan:   -Plan for EGD/CLN on Monday with Dr. Rosalina Bernabe liquids today, diet as tolerated if stable Saturday  -Clear liquids Sunday, NPO Monday  -Bowel prep Sunday evening at 1600  -Supportive measure per primary team  -Trend hemoglobin, goal >7, transfuse as clinically indicated, will need hemoglobin greater than 7 to proceed with procedures  -PPI 40mg BID  -Avoid NSAID use  -Will order INR to calculate MELD  -Will see on request over the weekend. Please call GI with any further questions or concerns. Thank you! Plan discussed with Dr. Johanny Reyes  Subjective:     HISTORY OF PRESENT ILLNESS:     Rylie Sal is an 68 y.o.  female who we are asked to see for complaint of anemia. Patient reports she went to her cardiologist for increased SOB, about a month ago had a pacemaker placed and thought her SOB was related to that. Had blood work drawn and noted to have anemia.  Told to come to ER to get blood transfusion. No abdominal pain. No nausea or vomiting. Appetite and weight stable. No acid reflux of dysphagia. Reports has intermittent constipation. Takes stool softeners daily. Has bowel movements every few days. No recent hematochezia, last episode was 3 months ago, only lasted 1-2 bowel movements and then resolved. No melena per patient. Hasn't had a bowel movement for 3 days, but did have one today. Thinks was brown. Has h/o cirrhosis r/t BRADY. Last EGD 2015.  Last colonoscopy 2021 with multiple polyps      Past Medical History:   Diagnosis Date    Abnormal nuclear stress test 10/4/2021    Angina at rest [facility]) 10/4/2021    Arthritis     KNEE,gout    Bundle branch block     Endocrine disease     HYPOTHYROIDISM    Fracture     Left distal femur (age 12 - pt fell)    Fracture     Left tibia 1990 (pt fell)    Fracture     Right wrist fractured 2 times (pt fell)    GERD (gastroesophageal reflux disease)     High cholesterol     Hypertension     Hypoglycemia     \"my body produces too much insulin\"    Liver disease     BRADY    Nausea & vomiting     when had gallbladder out    Osteoporosis     Other ill-defined conditions(799.89)     edema legs    S/P cardiac cath 10/4/2021    10/4/2021 nonobstructive disease    Spinal stenosis     Thyroid disease       Past Surgical History:   Procedure Laterality Date    COLONOSCOPY N/A 7/13/2021    COLONOSCOPY performed by Lynne Rice MD at [address]  2/11/2015         COLONOSCOPY,REMV Janece Flight  7/13/2021         COLONOSCPY,FLEX,W/ [address] INJECT  7/13/2021         COLORECTAL SCRN; HI RISK IND  2/11/2015         HX CHOLECYSTECTOMY      HX HYSTERECTOMY      HX ORTHOPAEDIC Left     leg surgery - plates, screws, wires, pins in place    HX UROLOGICAL      [address] UNLISTED      liver biopsy--BRADY and fibrosis    UPPER GI ENDOSCOPY,BIOPSY  11/17/2015          Social History     Tobacco Use    Smoking status: Never    Smokeless tobacco: Never   Substance Use Topics    Alcohol use: No      Family History   Problem Relation Age of Onset    Diabetes Mother     Heart Disease Mother     Emphysema Father       Allergies   Allergen Reactions    Gabapentin Other (comments)     Suicidal ideation    Metoprolol Rash    Celebrex [Celecoxib] Hives    Pcn [Penicillins] Unknown (comments)     Can't remember, was a long time    Sulfa (Sulfonamide Antibiotics) Hives      Prior to Admission medications    Medication Sig Start Date End Date Taking? Authorizing Provider   fluticasone propionate (FLONASE) 50 mcg/actuation nasal spray 2 Sprays by Both Nostrils route daily. Administer to right and left nostril. 6/24/22   Fabian Vance MD   doxycycline (ADOXA) 100 mg tablet Take 1 Tablet by mouth two (2) times a day. 6/24/22   Fabian Vance MD   furosemide (LASIX) 80 mg tablet Take 1 Tablet by mouth daily. 6/15/22   Fabian Vance MD   levothyroxine (SYNTHROID) 150 mcg tablet Take 1 Tablet by mouth Daily (before breakfast). 6/15/22   Fabian Vance MD   apixaban (Eliquis) 5 mg tablet Eliquis 5 mg tablet   Take 1 tablet twice a day by oral route. Provider, Historical   icosapent ethyL (VASCEPA) 1 gram capsule Take 2 Capsules by mouth two (2) times daily (with meals). 2/14/22   Fabian Vance MD   loratadine (Claritin) 10 mg tablet Take 1 Tablet by mouth daily. Indications: inflammation of the nose due to an allergy 12/22/21   Fabian Vance MD   Zetia 10 mg tablet Take 1 tablet by mouth daily. 12/22/21   Fabian Vance MD   atorvastatin (LIPITOR) 20 mg tablet TAKE 1 TABLET DAILY 12/22/21   Fabian Vance MD   lidocaine (LIDODERM) 5 % Apply patch to the affected area for 12 hours a day and remove for 12 hours a day. 12/7/21   Fabian Vance MD   losartan (COZAAR) 25 mg tablet Take 50 mg by mouth daily. 9/20/21   Provider, Historical   amLODIPine (NORVASC) 10 mg tablet Take 1 Tab by mouth daily.  8/13/21   Fabian Vance MD   allopurinoL (ZYLOPRIM) 100 mg tablet Take 100 mg by mouth two (2) times a day. Provider, Historical   colchicine 0.6 mg tablet Take 1 Tab by mouth daily. Patient taking differently: Take 0.6 mg by mouth daily as needed. 11/11/20   Bon Umanzor MD   potassium chloride SA (MICRO-K) 10 mEq capsule Take 2 Caps by mouth daily. 11/10/20   Bon Umanzor MD   polyethylene glycol [facility]) 17 gram/dose powder Take 17 g by mouth daily. Patient taking differently: Take 17 g by mouth daily as needed. 4/6/20   Bon Umanzor MD   ketoconazole (NIZORAL) 2 % topical cream Apply  to affected area daily as needed. 8/23/19   Provider, Historical   docusate sodium (COLACE) 50 mg capsule Take 100 mg by mouth daily. Provider, Historical   Biotin 2,500 mcg cap Take  by mouth. Provider, Historical   L GASSERI/B BIFIDUM/B LONGUM (Exchangery PO) Take 1 Tab by mouth daily. Provider, Historical   vitamin E (AQUA GEMS) 400 unit capsule Take 800 Units by mouth two (2) times a day. Provider, Historical   cholecalciferol, vitamin D3, (VITAMIN D3) 2,000 unit tab Take 1 Tab by mouth daily. Other, MD Janette   MULTIVITAMIN WITH MINERALS (ONE-A-DAY 50 PLUS PO) Take 1 Tab by mouth daily. Provider, Historical   aspirin 81 mg chewable tablet Take 81 mg by mouth daily.     Provider, Historical       Patient Active Problem List   Diagnosis Code    Gout M10.9    Hypothyroidism E03.9    Osteoporosis M81.0    Anxiety F41.9    Leg edema R60.0    RSD lower limb G90.529    Fatty liver K76.0    Pure hypercholesterolemia E78.00    Colon polyp K63.5    Bifascicular block I45.2    HTN (hypertension) I10    Thrombocytopenia (HCC) D69.6    Prediabetes R73.03    DDD (degenerative disc disease), lumbar M51.36    Controlled type 2 diabetes mellitus without complication (HCC) J67.4    Severe obesity (HCC) E66.01    Cellulitis of left lower leg L03.116    MICHELLE (acute kidney injury) ([facility] [address] N17.9    Nonrheumatic aortic valve stenosis I35.0    Pulmonary hypertension ([facility]) I27.20    Abnormal nuclear stress test R94.39    Angina at rest [facility]) I20.8    S/P cardiac cath Z98.890    Type 2 diabetes mellitus with chronic kidney disease ([facility]) E11.22    Stage 3a chronic kidney disease ([facility]) N18.31    PAF (paroxysmal atrial fibrillation) ([facility]) I48.0    SSS (sick sinus syndrome) ([facility]) I49.5    Anemia D64.9       REVIEW OF SYSTEMS:    Constitutional: negative fever, negative chills, negative weight loss  Eyes:   negative visual changes  ENT:   negative sore throat, tongue or lip swelling   Respiratory:  negative cough, negative dyspnea  Cards:  negative for chest pain, palpitations, lower extremity edema  GI:   See HPI  :  negative for frequency, dysuria  Integument:  negative for rash and pruritus  Heme:  negative for easy bruising and gum/nose bleeding  Musculoskel: negative for myalgias,  back pain and muscle weakness  Neuro: negative for headaches, dizziness, vertigo  Psych: negative for feelings of anxiety, depression     Pertinent Positives: SOB      Objective:   VITALS:    Visit Vitals  BP (!) 119/54   Pulse 66   Temp 97.7 °F (36.5 °C)   Resp 18   Ht 4' 11\" (1.499 m)   Wt 106.6 kg (235 lb)   SpO2 98%   BMI 47.46 kg/m²       PHYSICAL EXAM:   General:          Alert, WD, WN, cooperative, no distress, appears stated age. Head:               Normocephalic, without obvious abnormality, atraumatic. Eyes:               Conjunctivae clear and pale, anicteric sclerae. Pupils are equal  Nose:               Nares normal. No drainage   Throat:             Lips, mucosa, and tongue normal.    Neck:               Supple, symmetrical,    Back:               Symmetric,  Lungs:             CTA bilaterally. No wheezing/rhonchi/rales. Chest wall:      No deformity. No Accessory muscle use. Heart:              Regular rate and rhythm,  no murmur, rub or gallop. Abdomen:        Soft, non-tender. Not distended.   Bowel sounds normal. No masses  Extremities:     Atraumatic, No cyanosis. No edema. No clubbing  Skin:                Texture, turgor normal. No rashes/lesions/jaundice  Lymph:            Cervical, supraclavicular normal.  Psych:             Not depressed. Not anxious or agitated. Neurologic:      EOMs intact. No facial asymmetry. No aphasia or slurred speech. Normal                        strength, A/O X 3. LAB DATA REVIEWED:    Recent Results (from the past 24 hour(s))   EKG, 12 LEAD, INITIAL    Collection Time: 08/05/22 10:22 AM   Result Value Ref Range    Ventricular Rate 86 BPM    Atrial Rate 86 BPM    P-R Interval 298 ms    QRS Duration 144 ms    Q-T Interval 408 ms    QTC Calculation (Bezet) 488 ms    Calculated P Axis 70 degrees    Calculated R Axis -58 degrees    Calculated T Axis 77 degrees    Diagnosis       Atrial-paced rhythm with prolonged AV conduction  Right bundle branch block  Left anterior fascicular block  Left ventricular hypertrophy with repolarization abnormality  When compared with ECG of 28-OCT-2020 00:21,  Electronic atrial pacemaker has replaced Sinus rhythm  Confirmed by Kalia Morales, P.VMarquez ([id number]) on 8/5/2022 1:03:18 PM     CBC WITH AUTOMATED DIFF    Collection Time: 08/05/22 10:36 AM   Result Value Ref Range    WBC 12.4 (H) 3.6 - 11.0 K/uL    RBC 1.75 (L) 3.80 - 5.20 M/uL    HGB 4.2 (LL) 11.5 - 16.0 g/dL    HCT 16.3 (LL) 35.0 - 47.0 %    MCV 93.1 80.0 - 99.0 FL    MCH 24.0 (L) 26.0 - 34.0 PG    MCHC 25.8 (L) 30.0 - 36.5 g/dL    RDW 18.6 (H) 11.5 - 14.5 %    PLATELET 092 159 - 960 K/uL    MPV 11.5 8.9 - 12.9 FL    NRBC 0.9 (H) 0  WBC    ABSOLUTE NRBC 0.11 (H) 0.00 - 0.01 K/uL    NEUTROPHILS 79 (H) 32 - 75 %    LYMPHOCYTES 10 (L) 12 - 49 %    MONOCYTES 7 5 - 13 %    EOSINOPHILS 3 0 - 7 %    BASOPHILS 0 0 - 1 %    IMMATURE GRANULOCYTES 1 (H) 0.0 - 0.5 %    ABS. NEUTROPHILS 9.8 (H) 1.8 - 8.0 K/UL    ABS. LYMPHOCYTES 1.2 0.8 - 3.5 K/UL    ABS. MONOCYTES 0.9 0.0 - 1.0 K/UL    ABS. EOSINOPHILS 0.4 0.0 - 0.4 K/UL    ABS.  BASOPHILS 0.0 0.0 - 0.1 K/UL    ABS. IMM. GRANS. 0.1 (H) 0.00 - 0.04 K/UL    DF SMEAR SCANNED      RBC COMMENTS POLYCHROMASIA  2+        RBC COMMENTS ANISOCYTOSIS  1+        RBC COMMENTS OVALOCYTES  1+        RBC COMMENTS POIKILOCYTOSIS  1+       METABOLIC PANEL, COMPREHENSIVE    Collection Time: 08/05/22 10:36 AM   Result Value Ref Range    Sodium 137 136 - 145 mmol/L    Potassium 4.0 3.5 - 5.1 mmol/L    Chloride 106 97 - 108 mmol/L    CO2 21 21 - 32 mmol/L    Anion gap 10 5 - 15 mmol/L    Glucose 171 (H) 65 - 100 mg/dL    BUN 68 (H) 6 - 20 MG/DL    Creatinine 1.78 (H) 0.55 - 1.02 MG/DL    BUN/Creatinine ratio 38 (H) 12 - 20      GFR est AA 33 (L) >60 ml/min/1.73m2    GFR est non-AA 28 (L) >60 ml/min/1.73m2    Calcium 9.5 8.5 - 10.1 MG/DL    Bilirubin, total 1.1 (H) 0.2 - 1.0 MG/DL    ALT (SGPT) 29 12 - 78 U/L    AST (SGOT) 39 (H) 15 - 37 U/L    Alk.  phosphatase 104 45 - 117 U/L    Protein, total 6.5 6.4 - 8.2 g/dL    Albumin 3.4 (L) 3.5 - 5.0 g/dL    Globulin 3.1 2.0 - 4.0 g/dL    A-G Ratio 1.1 1.1 - 2.2     TROPONIN-HIGH SENSITIVITY    Collection Time: 08/05/22 10:36 AM   Result Value Ref Range    Troponin-High Sensitivity 39 0 - 51 ng/L   NT-PRO BNP    Collection Time: 08/05/22 10:36 AM   Result Value Ref Range    NT pro- (H) <450 PG/ML   TYPE & SCREEN    Collection Time: 08/05/22 10:36 AM   Result Value Ref Range    Crossmatch Expiration 08/08/2022,2359     ABO/Rh(D) O POSITIVE     Antibody screen NEG     Unit number [id number]     Blood component type [facility]     Unit division 00     Status of unit ALLOCATED     Crossmatch result Compatible     Unit number [id number]     Blood component type [facility]     Unit division 00     Status of unit ISSUED     Crossmatch result Compatible     Unit number [id number]     Blood component type  LR     Unit division 00     Status of unit ALLOCATED     Crossmatch result Compatible    OCCULT BLOOD, STOOL    Collection Time: 08/05/22 12:00 PM   Result Value Ref Range    Occult blood, stool Positive (A) NEG     RBC, ALLOCATE    Collection Time: 08/05/22 12:00 PM   Result Value Ref Range    HISTORY CHECKED?  Historical check performed        IMAGING RESULTS:  I have personally reviewed the imaging reports      Total time spent with patient: 60minutes ________________________________________________________________________  Care Plan discussed with:  Patient Y   Family  Y   RN               Consultant:       CT  8/5/2022:  ________________________________________________________________________    ___________________________________________________  Consulting Provider: Dee Villegas NP      8/5/2022  2:28 PM

## 2022-08-05 NOTE — H&P
Hospitalist Admission Note    NAME: Gama Sanchez   :  1945   MRN:  [id number]     Date/Time:  2022 2:05 PM    Patient PCP: Shruti Tripp MD  ______________________________________________________________________  Given the patient's current clinical presentation, I have a high level of concern for decompensation if discharged from the emergency department. Complex decision making was performed, which includes reviewing the patient's available past medical records, laboratory results, and x-ray films. My assessment of this patient's clinical condition and my plan of care is as follows. Assessment / Plan:  Normocytic Anemia  Hgb 4.2 baseline is 8-10  Type and cross match 3 units prbc  Q6 h/h  IV protonix  Iron studies pending  Stool occult blood +ve  Will hold anticoagulation  Hold antihypertensives  NPO, IV fluids  GI consult  Hematology consult    CKD Stage 4  Cr. 1.78  IV fluids to volume resuscitate    HTN  Hold medications    Paroxysmal Atrial Fibrillation  Hold anticoagulation     Hypothyroidism  Cont. Home levothyroxine    DM  SSI    Sick Sinus Syndrome  Pacemaker placement 2022    History of Aortic valve stenosis  History of Cirrhosis of liver  History or BRADY  History of Pancreatic Cyst  History of Pulmonary HTN    Code Status: full    DVT Prophylaxis: SCD's  GI Prophylaxis: not indicated        Subjective:   CHIEF COMPLAINT: low blood count    HISTORY OF PRESENT ILLNESS:     Amauri Last is a 68 y.o.  female who presents with low blood counts. She was at her Cardiologists office yesterday and was found to have a Hemoglobin of 4.3. She was sent to the ED for further evaluation. She endorses sob, fatigue, lightheadedness, near syncope, cold intolerance for 1 month and her sob has increased over the last 1 week. She denies chest pain, palpitations, wheeze, cough, hemoptysis, hematemesis, hematuria, or melena.   She says that she has been treated with IV iron in [Normal] : normal rate, regular rhythm, normal S1 and S2 and no murmur heard the past.    We were asked to admit for work up and evaluation of the above problems.      Past Medical History:   Diagnosis Date    Abnormal nuclear stress test 10/4/2021    Angina at rest [facility]) 10/4/2021    Arthritis     KNEE,gout    Bundle branch block     Endocrine disease     HYPOTHYROIDISM    Fracture     Left distal femur (age 12 - pt fell)    Fracture     Left tibia 1990 (pt fell)    Fracture     Right wrist fractured 2 times (pt fell)    GERD (gastroesophageal reflux disease)     High cholesterol     Hypertension     Hypoglycemia     \"my body produces too much insulin\"    Liver disease     BRADY    Nausea & vomiting     when had gallbladder out    Osteoporosis     Other ill-defined conditions(799.89)     edema legs    S/P cardiac cath 10/4/2021    10/4/2021 nonobstructive disease    Spinal stenosis     Thyroid disease         Past Surgical History:   Procedure Laterality Date    COLONOSCOPY N/A 7/13/2021    COLONOSCOPY performed by Evelyn Juarez MD at [address] [place] [address],[address]  2/11/2015         COLONOSCOPY,REMV Maddi Lopez  7/13/2021         COLONOSCPY,FLEX,W/ [address] INJECT  7/13/2021         COLORECTAL SCRN; HI RISK IND  2/11/2015         HX CHOLECYSTECTOMY      HX HYSTERECTOMY      HX ORTHOPAEDIC Left     leg surgery - plates, screws, wires, pins in place    HX UROLOGICAL      PESSURY    NE ABDOMEN SURGERY PROC UNLISTED      liver biopsy--BRADY and fibrosis    UPPER GI ENDOSCOPY,BIOPSY  11/17/2015            Social History     Tobacco Use    Smoking status: Never    Smokeless tobacco: Never   Substance Use Topics    Alcohol use: No        Family History   Problem Relation Age of Onset    Diabetes Mother     Heart Disease Mother     Emphysema Father      Allergies   Allergen Reactions    Gabapentin Other (comments)     Suicidal ideation    Metoprolol Rash    Celebrex [Celecoxib] Hives    Pcn [Penicillins] Unknown (comments)     Can't remember, was a long time    Sulfa (Sulfonamide Antibiotics) Hives        Prior to Admission medications    Medication Sig Start Date End Date Taking? Authorizing Provider   fluticasone propionate (FLONASE) 50 mcg/actuation nasal spray 2 Sprays by Both Nostrils route daily. Administer to right and left nostril. 6/24/22   Kasandra Arias MD   doxycycline (ADOXA) 100 mg tablet Take 1 Tablet by mouth two (2) times a day. 6/24/22   Kasandra Arias MD   furosemide (LASIX) 80 mg tablet Take 1 Tablet by mouth daily. 6/15/22   Kasandra Arias MD   levothyroxine (SYNTHROID) 150 mcg tablet Take 1 Tablet by mouth Daily (before breakfast). 6/15/22   Kasandra Arias MD   apixaban (Eliquis) 5 mg tablet Eliquis 5 mg tablet   Take 1 tablet twice a day by oral route. Provider, Historical   icosapent ethyL (VASCEPA) 1 gram capsule Take 2 Capsules by mouth two (2) times daily (with meals). 2/14/22   Kasandra Arias MD   loratadine (Claritin) 10 mg tablet Take 1 Tablet by mouth daily. Indications: inflammation of the nose due to an allergy 12/22/21   Kasandra Arias MD   Zetia 10 mg tablet Take 1 tablet by mouth daily. 12/22/21   Kasandra Arias MD   atorvastatin (LIPITOR) 20 mg tablet TAKE 1 TABLET DAILY 12/22/21   Kasandra Arias MD   lidocaine (LIDODERM) 5 % Apply patch to the affected area for 12 hours a day and remove for 12 hours a day. 12/7/21   Kasandra Arias MD   losartan (COZAAR) 25 mg tablet Take 50 mg by mouth daily. 9/20/21   Provider, Historical   amLODIPine (NORVASC) 10 mg tablet Take 1 Tab by mouth daily. 8/13/21   Kasandra Arias MD   allopurinoL (ZYLOPRIM) 100 mg tablet Take 100 mg by mouth two (2) times a day. Provider, Historical   colchicine 0.6 mg tablet Take 1 Tab by mouth daily. Patient taking differently: Take 0.6 mg by mouth daily as needed. 11/11/20   Kasandra Arias MD   potassium chloride SA (MICRO-K) 10 mEq capsule Take 2 Caps by mouth daily. 11/10/20   Kasandra Arias MD   polyethylene glycol [facility]) 17 gram/dose powder Take 17 g by mouth daily.   Patient taking [Soft] : abdomen soft [Non Tender] : non-tender differently: Take 17 g by mouth daily as needed. 4/6/20   Fan Bosch MD   ketoconazole (NIZORAL) 2 % topical cream Apply  to affected area daily as needed. 8/23/19   Provider, Historical   docusate sodium (COLACE) 50 mg capsule Take 100 mg by mouth daily. Provider, Historical   Biotin 2,500 mcg cap Take  by mouth. Provider, Historical   L GASSERI/B BIFIDUM/B LONGUM ([place] PO) Take 1 Tab by mouth daily. Provider, Historical   vitamin E (AQUA GEMS) 400 unit capsule Take 800 Units by mouth two (2) times a day. Provider, Historical   cholecalciferol, vitamin D3, (VITAMIN D3) 2,000 unit tab Take 1 Tab by mouth daily. Other, MD Janette   MULTIVITAMIN WITH MINERALS (ONE-A-DAY 50 PLUS PO) Take 1 Tab by mouth daily. Provider, Historical   aspirin 81 mg chewable tablet Take 81 mg by mouth daily. Provider, Historical       REVIEW OF SYSTEMS:     I am not able to complete the review of systems because:    The patient is intubated and sedated    The patient has altered mental status due to his acute medical problems    The patient has baseline aphasia from prior stroke(s)    The patient has baseline dementia and is not reliable historian    The patient is in acute medical distress and unable to provide information           Total of 12 systems reviewed as follows:       POSITIVE= underlined text  Negative = text not underlined  General:  fever, chills, sweats, generalized weakness, weight loss/gain,      loss of appetite   Eyes:    blurred vision, eye pain, loss of vision, double vision  ENT:    rhinorrhea, pharyngitis   Respiratory:   cough, sputum production, SOB, HENLEY, wheezing, pleuritic pain   Cardiology:   chest pain, palpitations, orthopnea, PND, edema, syncope   Gastrointestinal:  abdominal pain , N/V, diarrhea, dysphagia, constipation, bleeding   Genitourinary:  frequency, urgency, dysuria, hematuria, incontinence   Muskuloskeletal :  arthralgia, myalgia, back pain  Hematology:  easy [Non-distended] : non-distended bruising, nose or gum bleeding, lymphadenopathy   Dermatological: rash, ulceration, pruritis, color change / jaundice  Endocrine:   hot flashes or polydipsia   Neurological:  headache, dizziness, confusion, focal weakness, paresthesia,     Speech difficulties, memory loss, gait difficulty      Objective:   VITALS:    Visit Vitals  BP (!) 143/45   Pulse 67   Temp 98.1 °F (36.7 °C)   Resp 20   Ht 4' 11\" (1.499 m)   Wt 106.6 kg (235 lb)   SpO2 99%   BMI 47.46 kg/m²       PHYSICAL EXAM:    General:    Alert, cooperative, no distress, appears stated age. Lungs:   Clear to auscultation bilaterally. No Wheezing or Rhonchi. No rales. Chest wall:  No tenderness  No Accessory muscle use. Heart:   Regular  rhythm,  No  murmur   No edema  Abdomen:   Soft, non-tender. Not distended. Bowel sounds normal  Extremities: No cyanosis. No clubbing,      Skin turgor normal, Capillary refill normal, Radial dial pulse 2+  Skin:     Not pale. Not Jaundiced  No rashes   Psych:  Good insight. Not depressed. Not anxious or agitated. Neurologic: No facial asymmetry. No aphasia or slurred speech. Symmetrical strength, Sensation grossly intact.  Alert and oriented X 4.     _______________________________________________________________________  Care Plan discussed with:    Comments   Patient y    [facility]:      _______________________________________________________________________  Expected  Disposition:   Home with Family    HH/PT/OT/RN    SNF/LTC    [facility]    ________________________________________________________________________  TOTAL TIME:  79 Minutes    Critical Care Provided     Minutes non procedure based      Comments     Reviewed previous records   >50% of visit spent in counseling and coordination of care  Discussion with patient and/or family and questions answered       ________________________________________________________________________  Signed: Clint Ganser, MD    Procedures: see electronic medical records for all procedures/Xrays and details which were not copied into this note but were reviewed prior to creation of Plan. LAB DATA REVIEWED:    Recent Results (from the past 24 hour(s))   EKG, 12 LEAD, INITIAL    Collection Time: 08/05/22 10:22 AM   Result Value Ref Range    Ventricular Rate 86 BPM    Atrial Rate 86 BPM    P-R Interval 298 ms    QRS Duration 144 ms    Q-T Interval 408 ms    QTC Calculation (Bezet) 488 ms    Calculated P Axis 70 degrees    Calculated R Axis -58 degrees    Calculated T Axis 77 degrees    Diagnosis       Atrial-paced rhythm with prolonged AV conduction  Right bundle branch block  Left anterior fascicular block  Left ventricular hypertrophy with repolarization abnormality  When compared with ECG of 28-OCT-2020 00:21,  Electronic atrial pacemaker has replaced Sinus rhythm  Confirmed by Darius Mckeon PJEREMIAH ([id number]) on 8/5/2022 1:03:18 PM     CBC WITH AUTOMATED DIFF    Collection Time: 08/05/22 10:36 AM   Result Value Ref Range    WBC 12.4 (H) 3.6 - 11.0 K/uL    RBC 1.75 (L) 3.80 - 5.20 M/uL    HGB 4.2 (LL) 11.5 - 16.0 g/dL    HCT 16.3 (LL) 35.0 - 47.0 %    MCV 93.1 80.0 - 99.0 FL    MCH 24.0 (L) 26.0 - 34.0 PG    MCHC 25.8 (L) 30.0 - 36.5 g/dL    RDW 18.6 (H) 11.5 - 14.5 %    PLATELET 698 944 - 852 K/uL    MPV 11.5 8.9 - 12.9 FL    NRBC 0.9 (H) 0  WBC    ABSOLUTE NRBC 0.11 (H) 0.00 - 0.01 K/uL    NEUTROPHILS 79 (H) 32 - 75 %    LYMPHOCYTES 10 (L) 12 - 49 %    MONOCYTES 7 5 - 13 %    EOSINOPHILS 3 0 - 7 %    BASOPHILS 0 0 - 1 %    IMMATURE GRANULOCYTES 1 (H) 0.0 - 0.5 %    ABS. NEUTROPHILS 9.8 (H) 1.8 - 8.0 K/UL    ABS. LYMPHOCYTES 1.2 0.8 - 3.5 K/UL    ABS. MONOCYTES 0.9 0.0 - 1.0 K/UL    ABS. EOSINOPHILS 0.4 0.0 - 0.4 K/UL    ABS. BASOPHILS 0.0 0.0 - 0.1 K/UL    ABS. IMM.  GRANS. 0.1 (H) 0.00 - 0.04 K/UL    DF SMEAR SCANNED      RBC COMMENTS POLYCHROMASIA  2+        RBC COMMENTS ANISOCYTOSIS  1+        RBC COMMENTS OVALOCYTES  1+ [No Masses] : no abdominal mass palpated RBC COMMENTS POIKILOCYTOSIS  1+       METABOLIC PANEL, COMPREHENSIVE    Collection Time: 08/05/22 10:36 AM   Result Value Ref Range    Sodium 137 136 - 145 mmol/L    Potassium 4.0 3.5 - 5.1 mmol/L    Chloride 106 97 - 108 mmol/L    CO2 21 21 - 32 mmol/L    Anion gap 10 5 - 15 mmol/L    Glucose 171 (H) 65 - 100 mg/dL    BUN 68 (H) 6 - 20 MG/DL    Creatinine 1.78 (H) 0.55 - 1.02 MG/DL    BUN/Creatinine ratio 38 (H) 12 - 20      GFR est AA 33 (L) >60 ml/min/1.73m2    GFR est non-AA 28 (L) >60 ml/min/1.73m2    Calcium 9.5 8.5 - 10.1 MG/DL    Bilirubin, total 1.1 (H) 0.2 - 1.0 MG/DL    ALT (SGPT) 29 12 - 78 U/L    AST (SGOT) 39 (H) 15 - 37 U/L    Alk. phosphatase 104 45 - 117 U/L    Protein, total 6.5 6.4 - 8.2 g/dL    Albumin 3.4 (L) 3.5 - 5.0 g/dL    Globulin 3.1 2.0 - 4.0 g/dL    A-G Ratio 1.1 1.1 - 2.2     TROPONIN-HIGH SENSITIVITY    Collection Time: 08/05/22 10:36 AM   Result Value Ref Range    Troponin-High Sensitivity 39 0 - 51 ng/L   NT-PRO BNP    Collection Time: 08/05/22 10:36 AM   Result Value Ref Range    NT pro- (H) <450 PG/ML   TYPE & SCREEN    Collection Time: 08/05/22 10:36 AM   Result Value Ref Range    Crossmatch Expiration 08/08/2022,2359     ABO/Rh(D) Jonjose eduardoanna Corcoran POSITIVE     Antibody screen NEG     Unit number [id number]     Blood component type [facility]     Unit division 00     Status of unit ALLOCATED     Crossmatch result Compatible     Unit number [id number]     Blood component type [facility]     Unit division 00     Status of unit ALLOCATED     Crossmatch result Compatible     Unit number [id number]     Blood component type [facility]     Unit division 00     Status of unit ALLOCATED     Crossmatch result Compatible    OCCULT BLOOD, STOOL    Collection Time: 08/05/22 12:00 PM   Result Value Ref Range    Occult blood, stool Positive (A) NEG     RBC, ALLOCATE    Collection Time: 08/05/22 12:00 PM   Result Value Ref Range    HISTORY CHECKED?  Historical check performed

## 2022-08-05 NOTE — H&P (VIEW-ONLY)
Gastroenterology Attending Physician (for Katie Byers) attestation statement and comments. This patient was seen and examined by me in a face-to-face visit today. I reviewed the medical record including lab work, imaging and other provider notes. I confirmed the history as described above. I spoke to the patient, reviewed the medical record including lab work, imaging and other provider notes. I discussed this case in detail with Kiana Perez NP. I formulated an  assessment of this patient and developed a treatment plan. I agree with the above consultation note. I agree with the history, exam and assessment and plan as outlined in the note. I would like to add the followinyo F  w/ cirrhosis 2/2 BRADY seen for eval of profound anemia in setting of Eliquis use. S/p recent pacer placement but had persistent SOB. Eval by cardiologist noted anemia prompting referral to ER. Denies abd pain, N/V, D/C,or change in [address]. Last episode hematochezia per pt >3mo ago and resolved after 1-2 BMs. Last ELiquis this AM. PE w/ benign CV, pulm, abd exam  Multiple colon polyps removed 2021 w/ recc for repeat in 1 yr time. Last EGD  w/ . Labs and meds reviewed. DDx includes but is not limited to gastritis/gastropathy in setting of cirrhosis, exacerbated by anticoagulation, PUD, mass, AVM, or polyps  Plan:   1) EGD and colonoscopy on Monday w/ - orders already written, NPO after MN Monday,  CLD on , Golytely on   2) Hold Eliquis  3) serial H/H after transfusion completed w/ goal Hgb>7   4) BID PPI  5) Check pending INR  We will see on request only over weekend with plan for procedures as IP on Monday. Call if questions arise.   Merlin Coop, MD            GI CONSULTATION NOTE  Alycia Rutledge NP   [phone] NP in-hospital cell phone M-F until 4:30  After 5pm or on weekends, please call  for physician on call    NAME: Gary Melendrez  : 1945   MRN: [id number]   Attending: Dr. Irena Wiley  Primary GI: Dr. Antonio Carbone  Date/Time:  8/5/2022 2:28 PM  Assessment:   -Anemia, 4.2 on admission  No overt S&S of bleeding, last blood in stool was seen 3 months ago per patient  Heme + stool  BUN/creat chronically elevated  -H/o cirrhosis, h/o BRADY, compensated  Had liver bx with [facility]  2006 BRADY, moderate fibrosis with bridging, stage III  Will evaluate for varices with upcoming EGD  -Acute on Chronic kidney failure  Needs further management, with reversal if possible. If not reversible could lead to hepato-renal syndrome which could lead to rapid decompensation  -Obesity  -Anticoagulation, on Eliquis, last dose last night  GI Hx  -7/2021 CLN multilobular sessile 8mm polyp in proximal transverse colon, sessile 4mm polyp in ascending colon, grade 1 internal hemorrhoids. Rec repeat 1 year  -2015 EGD showing normal esophagus and duodenum. Diffuse gastritis with friability, no ulcers, no varices  -2015 CLN grade 1 internal hemorrhoids, ascending colon polyps (TA). Recommended repeat in 5 years      Plan:   -Plan for EGD/CLN on Monday with Dr. Ann Greenfield liquids today, diet as tolerated if stable Saturday  -Clear liquids Sunday, NPO Monday  -Bowel prep Sunday evening at 1600  -Supportive measure per primary team  -Trend hemoglobin, goal >7, transfuse as clinically indicated, will need hemoglobin greater than 7 to proceed with procedures  -PPI 40mg BID  -Avoid NSAID use  -Will order INR to calculate MELD  -Will see on request over the weekend. Please call GI with any further questions or concerns. Thank you! Plan discussed with Dr. Sherin Harris  Subjective:     HISTORY OF PRESENT ILLNESS:     Beryle Piedra is an 68 y.o.  female who we are asked to see for complaint of anemia. Patient reports she went to her cardiologist for increased SOB, about a month ago had a pacemaker placed and thought her SOB was related to that. Had blood work drawn and noted to have anemia.  Told to come to ER to get blood transfusion. No abdominal pain. No nausea or vomiting. Appetite and weight stable. No acid reflux of dysphagia. Reports has intermittent constipation. Takes stool softeners daily. Has bowel movements every few days. No recent hematochezia, last episode was 3 months ago, only lasted 1-2 bowel movements and then resolved. No melena per patient. Hasn't had a bowel movement for 3 days, but did have one today. Thinks was brown. Has h/o cirrhosis r/t BRADY. Last EGD 2015.  Last colonoscopy 2021 with multiple polyps      Past Medical History:   Diagnosis Date    Abnormal nuclear stress test 10/4/2021    Angina at rest [facility]) 10/4/2021    Arthritis     KNEE,gout    Bundle branch block     Endocrine disease     HYPOTHYROIDISM    Fracture     Left distal femur (age 12 - pt fell)    Fracture     Left tibia 1990 (pt fell)    Fracture     Right wrist fractured 2 times (pt fell)    GERD (gastroesophageal reflux disease)     High cholesterol     Hypertension     Hypoglycemia     \"my body produces too much insulin\"    Liver disease     BRADY    Nausea & vomiting     when had gallbladder out    Osteoporosis     Other ill-defined conditions(799.89)     edema legs    S/P cardiac cath 10/4/2021    10/4/2021 nonobstructive disease    Spinal stenosis     Thyroid disease       Past Surgical History:   Procedure Laterality Date    COLONOSCOPY N/A 7/13/2021    COLONOSCOPY performed by Kamlesh Godinez MD at [address]  2/11/2015         COLONOSCOPY,GATIT[address]  7/13/2021         COLONOSCPY,FLEX,W/ [address] INJECT  7/13/2021         COLORECTAL SCRN; HI RISK IND  2/11/2015         HX CHOLECYSTECTOMY      HX HYSTERECTOMY      HX ORTHOPAEDIC Left     leg surgery - plates, screws, wires, pins in place    HX UROLOGICAL      [address] UNLISTED      liver biopsy--BRADY and fibrosis    UPPER GI ENDOSCOPY,BIOPSY  11/17/2015          Social History     Tobacco Use    Smoking status: Never    Smokeless tobacco: Never   Substance Use Topics    Alcohol use: No      Family History   Problem Relation Age of Onset    Diabetes Mother     Heart Disease Mother     Emphysema Father       Allergies   Allergen Reactions    Gabapentin Other (comments)     Suicidal ideation    Metoprolol Rash    Celebrex [Celecoxib] Hives    Pcn [Penicillins] Unknown (comments)     Can't remember, was a long time    Sulfa (Sulfonamide Antibiotics) Hives      Prior to Admission medications    Medication Sig Start Date End Date Taking? Authorizing Provider   fluticasone propionate (FLONASE) 50 mcg/actuation nasal spray 2 Sprays by Both Nostrils route daily. Administer to right and left nostril. 6/24/22   Rene Colón MD   doxycycline (ADOXA) 100 mg tablet Take 1 Tablet by mouth two (2) times a day. 6/24/22   Rene Colón MD   furosemide (LASIX) 80 mg tablet Take 1 Tablet by mouth daily. 6/15/22   Rene Colón MD   levothyroxine (SYNTHROID) 150 mcg tablet Take 1 Tablet by mouth Daily (before breakfast). 6/15/22   Rene Colón MD   apixaban (Eliquis) 5 mg tablet Eliquis 5 mg tablet   Take 1 tablet twice a day by oral route. Provider, Historical   icosapent ethyL (VASCEPA) 1 gram capsule Take 2 Capsules by mouth two (2) times daily (with meals). 2/14/22   Rene Colón MD   loratadine (Claritin) 10 mg tablet Take 1 Tablet by mouth daily. Indications: inflammation of the nose due to an allergy 12/22/21   Rene Colón MD   Zetia 10 mg tablet Take 1 tablet by mouth daily. 12/22/21   Rene Colón MD   atorvastatin (LIPITOR) 20 mg tablet TAKE 1 TABLET DAILY 12/22/21   Rene Colón MD   lidocaine (LIDODERM) 5 % Apply patch to the affected area for 12 hours a day and remove for 12 hours a day. 12/7/21   Rene Colón MD   losartan (COZAAR) 25 mg tablet Take 50 mg by mouth daily. 9/20/21   Provider, Historical   amLODIPine (NORVASC) 10 mg tablet Take 1 Tab by mouth daily.  8/13/21   Rene Colón MD   allopurinoL (ZYLOPRIM) 100 mg tablet Take 100 mg by mouth two (2) times a day. Provider, Historical   colchicine 0.6 mg tablet Take 1 Tab by mouth daily. Patient taking differently: Take 0.6 mg by mouth daily as needed. 11/11/20   Max Bronson MD   potassium chloride SA (MICRO-K) 10 mEq capsule Take 2 Caps by mouth daily. 11/10/20   Max Bronson MD   polyethylene glycol [facility]) 17 gram/dose powder Take 17 g by mouth daily. Patient taking differently: Take 17 g by mouth daily as needed. 4/6/20   Max Bronson MD   ketoconazole (NIZORAL) 2 % topical cream Apply  to affected area daily as needed. 8/23/19   Provider, Historical   docusate sodium (COLACE) 50 mg capsule Take 100 mg by mouth daily. Provider, Historical   Biotin 2,500 mcg cap Take  by mouth. Provider, Historical   L GASSERI/B BIFIDUM/B LONGUM ([employer] PO) Take 1 Tab by mouth daily. Provider, Historical   vitamin E (AQUA GEMS) 400 unit capsule Take 800 Units by mouth two (2) times a day. Provider, Historical   cholecalciferol, vitamin D3, (VITAMIN D3) 2,000 unit tab Take 1 Tab by mouth daily. Other, MD Janette   MULTIVITAMIN WITH MINERALS (ONE-A-DAY 50 PLUS PO) Take 1 Tab by mouth daily. Provider, Historical   aspirin 81 mg chewable tablet Take 81 mg by mouth daily.     Provider, Historical       Patient Active Problem List   Diagnosis Code    Gout M10.9    Hypothyroidism E03.9    Osteoporosis M81.0    Anxiety F41.9    Leg edema R60.0    RSD lower limb G90.529    Fatty liver K76.0    Pure hypercholesterolemia E78.00    Colon polyp K63.5    Bifascicular block I45.2    HTN (hypertension) I10    Thrombocytopenia (HCC) D69.6    Prediabetes R73.03    DDD (degenerative disc disease), lumbar M51.36    Controlled type 2 diabetes mellitus without complication (HCC) N73.0    Severe obesity (HCC) E66.01    Cellulitis of left lower leg L03.116    MICHELLE (acute kidney injury) ([facility] [address] N17.9    Nonrheumatic aortic valve stenosis I35.0    Pulmonary hypertension ([facility]) I27.20    Abnormal nuclear stress test R94.39    Angina at rest [facility]) I20.8    S/P cardiac cath Z98.890    Type 2 diabetes mellitus with chronic kidney disease ([facility]) E11.22    Stage 3a chronic kidney disease ([facility]) N18.31    PAF (paroxysmal atrial fibrillation) ([facility]) I48.0    SSS (sick sinus syndrome) ([facility]) I49.5    Anemia D64.9       REVIEW OF SYSTEMS:    Constitutional: negative fever, negative chills, negative weight loss  Eyes:   negative visual changes  ENT:   negative sore throat, tongue or lip swelling   Respiratory:  negative cough, negative dyspnea  Cards:  negative for chest pain, palpitations, lower extremity edema  GI:   See HPI  :  negative for frequency, dysuria  Integument:  negative for rash and pruritus  Heme:  negative for easy bruising and gum/nose bleeding  Musculoskel: negative for myalgias,  back pain and muscle weakness  Neuro: negative for headaches, dizziness, vertigo  Psych: negative for feelings of anxiety, depression     Pertinent Positives: SOB      Objective:   VITALS:    Visit Vitals  BP (!) 119/54   Pulse 66   Temp 97.7 °F (36.5 °C)   Resp 18   Ht 4' 11\" (1.499 m)   Wt 106.6 kg (235 lb)   SpO2 98%   BMI 47.46 kg/m²       PHYSICAL EXAM:   General:          Alert, WD, WN, cooperative, no distress, appears stated age. Head:               Normocephalic, without obvious abnormality, atraumatic. Eyes:               Conjunctivae clear and pale, anicteric sclerae. Pupils are equal  Nose:               Nares normal. No drainage   Throat:             Lips, mucosa, and tongue normal.    Neck:               Supple, symmetrical,    Back:               Symmetric,  Lungs:             CTA bilaterally. No wheezing/rhonchi/rales. Chest wall:      No deformity. No Accessory muscle use. Heart:              Regular rate and rhythm,  no murmur, rub or gallop. Abdomen:        Soft, non-tender. Not distended.   Bowel sounds normal. No masses  Extremities:     Atraumatic, No cyanosis. No edema. No clubbing  Skin:                Texture, turgor normal. No rashes/lesions/jaundice  Lymph:            Cervical, supraclavicular normal.  Psych:             Not depressed. Not anxious or agitated. Neurologic:      EOMs intact. No facial asymmetry. No aphasia or slurred speech. Normal                        strength, A/O X 3. LAB DATA REVIEWED:    Recent Results (from the past 24 hour(s))   EKG, 12 LEAD, INITIAL    Collection Time: 08/05/22 10:22 AM   Result Value Ref Range    Ventricular Rate 86 BPM    Atrial Rate 86 BPM    P-R Interval 298 ms    QRS Duration 144 ms    Q-T Interval 408 ms    QTC Calculation (Bezet) 488 ms    Calculated P Axis 70 degrees    Calculated R Axis -58 degrees    Calculated T Axis 77 degrees    Diagnosis       Atrial-paced rhythm with prolonged AV conduction  Right bundle branch block  Left anterior fascicular block  Left ventricular hypertrophy with repolarization abnormality  When compared with ECG of 28-OCT-2020 00:21,  Electronic atrial pacemaker has replaced Sinus rhythm  Confirmed by Tim Crews P.VMarquez ([id number]) on 8/5/2022 1:03:18 PM     CBC WITH AUTOMATED DIFF    Collection Time: 08/05/22 10:36 AM   Result Value Ref Range    WBC 12.4 (H) 3.6 - 11.0 K/uL    RBC 1.75 (L) 3.80 - 5.20 M/uL    HGB 4.2 (LL) 11.5 - 16.0 g/dL    HCT 16.3 (LL) 35.0 - 47.0 %    MCV 93.1 80.0 - 99.0 FL    MCH 24.0 (L) 26.0 - 34.0 PG    MCHC 25.8 (L) 30.0 - 36.5 g/dL    RDW 18.6 (H) 11.5 - 14.5 %    PLATELET 486 949 - 454 K/uL    MPV 11.5 8.9 - 12.9 FL    NRBC 0.9 (H) 0  WBC    ABSOLUTE NRBC 0.11 (H) 0.00 - 0.01 K/uL    NEUTROPHILS 79 (H) 32 - 75 %    LYMPHOCYTES 10 (L) 12 - 49 %    MONOCYTES 7 5 - 13 %    EOSINOPHILS 3 0 - 7 %    BASOPHILS 0 0 - 1 %    IMMATURE GRANULOCYTES 1 (H) 0.0 - 0.5 %    ABS. NEUTROPHILS 9.8 (H) 1.8 - 8.0 K/UL    ABS. LYMPHOCYTES 1.2 0.8 - 3.5 K/UL    ABS. MONOCYTES 0.9 0.0 - 1.0 K/UL    ABS. EOSINOPHILS 0.4 0.0 - 0.4 K/UL    ABS.  BASOPHILS 0.0 0.0 - 0.1 K/UL    ABS. IMM. GRANS. 0.1 (H) 0.00 - 0.04 K/UL    DF SMEAR SCANNED      RBC COMMENTS POLYCHROMASIA  2+        RBC COMMENTS ANISOCYTOSIS  1+        RBC COMMENTS OVALOCYTES  1+        RBC COMMENTS POIKILOCYTOSIS  1+       METABOLIC PANEL, COMPREHENSIVE    Collection Time: 08/05/22 10:36 AM   Result Value Ref Range    Sodium 137 136 - 145 mmol/L    Potassium 4.0 3.5 - 5.1 mmol/L    Chloride 106 97 - 108 mmol/L    CO2 21 21 - 32 mmol/L    Anion gap 10 5 - 15 mmol/L    Glucose 171 (H) 65 - 100 mg/dL    BUN 68 (H) 6 - 20 MG/DL    Creatinine 1.78 (H) 0.55 - 1.02 MG/DL    BUN/Creatinine ratio 38 (H) 12 - 20      GFR est AA 33 (L) >60 ml/min/1.73m2    GFR est non-AA 28 (L) >60 ml/min/1.73m2    Calcium 9.5 8.5 - 10.1 MG/DL    Bilirubin, total 1.1 (H) 0.2 - 1.0 MG/DL    ALT (SGPT) 29 12 - 78 U/L    AST (SGOT) 39 (H) 15 - 37 U/L    Alk.  phosphatase 104 45 - 117 U/L    Protein, total 6.5 6.4 - 8.2 g/dL    Albumin 3.4 (L) 3.5 - 5.0 g/dL    Globulin 3.1 2.0 - 4.0 g/dL    A-G Ratio 1.1 1.1 - 2.2     TROPONIN-HIGH SENSITIVITY    Collection Time: 08/05/22 10:36 AM   Result Value Ref Range    Troponin-High Sensitivity 39 0 - 51 ng/L   NT-PRO BNP    Collection Time: 08/05/22 10:36 AM   Result Value Ref Range    NT pro- (H) <450 PG/ML   TYPE & SCREEN    Collection Time: 08/05/22 10:36 AM   Result Value Ref Range    Crossmatch Expiration 08/08/2022,2359     ABO/Rh(D) O POSITIVE     Antibody screen NEG     Unit number [id number]     Blood component type [facility]     Unit division 00     Status of unit ALLOCATED     Crossmatch result Compatible     Unit number [id number]     Blood component type  LR     Unit division 00     Status of unit ISSUED     Crossmatch result Compatible     Unit number [id number]     Blood component type  LR     Unit division 00     Status of unit ALLOCATED     Crossmatch result Compatible    OCCULT BLOOD, STOOL    Collection Time: 08/05/22 12:00 PM   Result Value Ref Range    Occult blood, stool Positive (A) NEG     RBC, ALLOCATE    Collection Time: 08/05/22 12:00 PM   Result Value Ref Range    HISTORY CHECKED?  Historical check performed        IMAGING RESULTS:  I have personally reviewed the imaging reports      Total time spent with patient: 60minutes ________________________________________________________________________  Care Plan discussed with:  Patient Y   Family  Y   RN               Consultant:       CT  8/5/2022:  ________________________________________________________________________    ___________________________________________________  Consulting Provider: Kal Sal NP      8/5/2022  2:28 PM

## 2022-08-06 LAB
ANION GAP SERPL CALC-SCNC: 6 MMOL/L (ref 5–15)
BUN SERPL-MCNC: 60 MG/DL (ref 6–20)
BUN/CREAT SERPL: 41 (ref 12–20)
CALCIUM SERPL-MCNC: 9 MG/DL (ref 8.5–10.1)
CHLORIDE SERPL-SCNC: 109 MMOL/L (ref 97–108)
CO2 SERPL-SCNC: 25 MMOL/L (ref 21–32)
COMMENT, HOLDF: NORMAL
CREAT SERPL-MCNC: 1.46 MG/DL (ref 0.55–1.02)
ERYTHROCYTE [DISTWIDTH] IN BLOOD BY AUTOMATED COUNT: 18 % (ref 11.5–14.5)
GLUCOSE BLD STRIP.AUTO-MCNC: 139 MG/DL (ref 65–117)
GLUCOSE BLD STRIP.AUTO-MCNC: 162 MG/DL (ref 65–117)
GLUCOSE BLD STRIP.AUTO-MCNC: 176 MG/DL (ref 65–117)
GLUCOSE BLD STRIP.AUTO-MCNC: 218 MG/DL (ref 65–117)
GLUCOSE SERPL-MCNC: 141 MG/DL (ref 65–100)
HCT VFR BLD AUTO: 18.8 % (ref 35–47)
HCT VFR BLD AUTO: 21.5 % (ref 35–47)
HCT VFR BLD AUTO: 27 % (ref 35–47)
HCT VFR BLD AUTO: 27.3 % (ref 35–47)
HGB BLD-MCNC: 5.4 G/DL (ref 11.5–16)
HGB BLD-MCNC: 6.3 G/DL (ref 11.5–16)
HGB BLD-MCNC: 8.2 G/DL (ref 11.5–16)
HGB BLD-MCNC: 8.3 G/DL (ref 11.5–16)
HISTORY CHECKED?,CKHIST: NORMAL
HISTORY CHECKED?,CKHIST: NORMAL
INR PPP: 1.3 (ref 0.9–1.1)
IRON SATN MFR SERPL: 6 % (ref 20–50)
IRON SERPL-MCNC: 20 UG/DL (ref 35–150)
MCH RBC QN AUTO: 25.9 PG (ref 26–34)
MCHC RBC AUTO-ENTMCNC: 30.4 G/DL (ref 30–36.5)
MCV RBC AUTO: 85.4 FL (ref 80–99)
NRBC # BLD: 0.05 K/UL (ref 0–0.01)
NRBC BLD-RTO: 0.8 PER 100 WBC
PLATELET # BLD AUTO: 93 K/UL (ref 150–400)
PMV BLD AUTO: 10.2 FL (ref 8.9–12.9)
POTASSIUM SERPL-SCNC: 4.1 MMOL/L (ref 3.5–5.1)
PROTHROMBIN TIME: 12.9 SEC (ref 9–11.1)
RBC # BLD AUTO: 3.16 M/UL (ref 3.8–5.2)
SAMPLES BEING HELD,HOLD: NORMAL
SERVICE CMNT-IMP: ABNORMAL
SODIUM SERPL-SCNC: 140 MMOL/L (ref 136–145)
TIBC SERPL-MCNC: 362 UG/DL (ref 250–450)
WBC # BLD AUTO: 6.2 K/UL (ref 3.6–11)

## 2022-08-06 PROCEDURE — 85018 HEMOGLOBIN: CPT

## 2022-08-06 PROCEDURE — 85610 PROTHROMBIN TIME: CPT

## 2022-08-06 PROCEDURE — 74011250637 HC RX REV CODE- 250/637: Performed by: INTERNAL MEDICINE

## 2022-08-06 PROCEDURE — C9113 INJ PANTOPRAZOLE SODIUM, VIA: HCPCS | Performed by: INTERNAL MEDICINE

## 2022-08-06 PROCEDURE — 80048 BASIC METABOLIC PNL TOTAL CA: CPT

## 2022-08-06 PROCEDURE — 65270000029 HC RM PRIVATE

## 2022-08-06 PROCEDURE — 85027 COMPLETE CBC AUTOMATED: CPT

## 2022-08-06 PROCEDURE — 83540 ASSAY OF IRON: CPT

## 2022-08-06 PROCEDURE — P9016 RBC LEUKOCYTES REDUCED: HCPCS

## 2022-08-06 PROCEDURE — 82962 GLUCOSE BLOOD TEST: CPT

## 2022-08-06 PROCEDURE — 74011250636 HC RX REV CODE- 250/636: Performed by: INTERNAL MEDICINE

## 2022-08-06 PROCEDURE — 36415 COLL VENOUS BLD VENIPUNCTURE: CPT

## 2022-08-06 PROCEDURE — 36430 TRANSFUSION BLD/BLD COMPNT: CPT

## 2022-08-06 PROCEDURE — 74011000250 HC RX REV CODE- 250: Performed by: INTERNAL MEDICINE

## 2022-08-06 RX ORDER — SODIUM CHLORIDE 9 MG/ML
250 INJECTION, SOLUTION INTRAVENOUS AS NEEDED
Status: DISCONTINUED | OUTPATIENT
Start: 2022-08-06 | End: 2022-08-08 | Stop reason: HOSPADM

## 2022-08-06 RX ORDER — HYDRALAZINE HYDROCHLORIDE 20 MG/ML
20 INJECTION INTRAMUSCULAR; INTRAVENOUS
Status: DISCONTINUED | OUTPATIENT
Start: 2022-08-06 | End: 2022-08-08 | Stop reason: HOSPADM

## 2022-08-06 RX ADMIN — LEVOTHYROXINE SODIUM 150 MCG: 0.15 TABLET ORAL at 08:07

## 2022-08-06 RX ADMIN — SODIUM CHLORIDE 40 MG: 9 INJECTION INTRAMUSCULAR; INTRAVENOUS; SUBCUTANEOUS at 08:10

## 2022-08-06 RX ADMIN — ALLOPURINOL 100 MG: 100 TABLET ORAL at 17:43

## 2022-08-06 RX ADMIN — SODIUM CHLORIDE 40 MG: 9 INJECTION INTRAMUSCULAR; INTRAVENOUS; SUBCUTANEOUS at 17:43

## 2022-08-06 RX ADMIN — ATORVASTATIN CALCIUM 20 MG: 20 TABLET, FILM COATED ORAL at 22:14

## 2022-08-06 RX ADMIN — ALLOPURINOL 100 MG: 100 TABLET ORAL at 08:07

## 2022-08-06 RX ADMIN — SODIUM CHLORIDE 100 ML/HR: 9 INJECTION, SOLUTION INTRAVENOUS at 22:19

## 2022-08-06 RX ADMIN — FLUTICASONE PROPIONATE 2 SPRAY: 50 SPRAY, METERED NASAL at 09:16

## 2022-08-06 NOTE — PROGRESS NOTES
Orders received, chart reviewed, spoke to RN. Pt with hgb 6.3, on fourth unit of possible 6 units of PRBC. Will hold PT eval while pt transfusing and follow up tomorrow it pt hemodynamically stable for ADL assessment.

## 2022-08-06 NOTE — PROGRESS NOTES
TRANSFER - OUT REPORT:    Verbal report given to Brigette(name) on Bronwyn Banker  being transferred to Ortho(unit) for routine progression of care       Report consisted of patients Situation, Background, Assessment and   Recommendations(SBAR). Information from the following report(s) SBAR, Kardex, Intake/Output, MAR, and Recent Results was reviewed with the receiving nurse. Lines:   Peripheral IV 08/05/22 Right Antecubital (Active)        Opportunity for questions and clarification was provided.       Patient transported with:   Monitor  Tech

## 2022-08-06 NOTE — PROGRESS NOTES
Hospitalist Progress Note    NAME: Deangelo Marinelli   :  1945   MRN:  [id number]       Assessment / Plan:  Normocytic Anemia  Hgb 4.2 baseline is 8-10  Improved to 6.3 today  Will order 1 more unit of prbc  Q6 h/h  protonix  Iron studies pending  Stool occult blood +ve  Will hold anticoagulation  Hold antihypertensives, prn hydralazine  Clear liquids per GI  Plan for endoscopy and colonoscopy on Monday  Appreciate assistance  Hematology consult     MICHELLE on CKD stage 3 vs progression to Stage 4  Cr. 1.78  Labs pending today  IV fluids to volume resuscitate     HTN  Hold medications     Paroxysmal Atrial Fibrillation  Hold anticoagulation     Hypothyroidism  Cont. Home levothyroxine     DM  SSI     Sick Sinus Syndrome  Pacemaker placement 2022     History of Aortic valve stenosis  History of Cirrhosis of liver  History or BRADY  History of Pancreatic Cyst  History of Pulmonary HTN      40 or above Morbid obesity / Body mass index is 47.46 kg/m². Estimated discharge date:   Barriers:    Code status: Full  Prophylaxis: SCD's  Recommended Disposition: Home w/Family     Subjective:     Chief Complaint / Reason for Physician Visit  \"Low blood count\". Discussed with RN events overnight. Sob improved, no new complaints  Understands plan of care and agreeable  All questions answered to satisfaction. Review of Systems:  Symptom Y/N Comments  Symptom Y/N Comments   Fever/Chills n   Chest Pain n    Poor Appetite    Edema     Cough n   Abdominal Pain n    Sputum n   Joint Pain     SOB/HENLEY n   Pruritis/Rash     Nausea/vomit n   Tolerating PT/OT     Diarrhea    Tolerating Diet     Constipation    Other       Could NOT obtain due to:      Objective:     VITALS:   Last 24hrs VS reviewed since prior progress note.  Most recent are:  Patient Vitals for the past 24 hrs:   Temp Pulse Resp BP SpO2   22 0954 98.2 °F (36.8 °C) (!) 59 18 (!) 134/46 96 %   22 0939 98.2 °F (36.8 °C) (!) 59 18 (!) 123/44 98 %   08/06/22 0720 98.2 °F (36.8 °C) 61 18 (!) 126/46 99 %   08/06/22 0500 97.8 °F (36.6 °C) (!) 59 18 (!) 115/44 98 %   08/06/22 0336 -- 60 18 (!) 115/41 97 %   08/06/22 0219 98.3 °F (36.8 °C) (!) 59 18 (!) 116/43 97 %   08/06/22 0204 98.5 °F (36.9 °C) 60 18 (!) 126/47 97 %   08/06/22 0044 98.3 °F (36.8 °C) 60 18 (!) 129/42 97 %   08/05/22 2307 98.2 °F (36.8 °C) 66 20 (!) 133/49 99 %   08/05/22 2145 97.9 °F (36.6 °C) 64 21 (!) 152/53 97 %   08/05/22 2121 98.3 °F (36.8 °C) 60 20 (!) 152/51 98 %   08/05/22 1946 98 °F (36.7 °C) 67 19 (!) 138/44 99 %   08/05/22 1545 -- 65 19 (!) 125/41 98 %   08/05/22 1445 97.8 °F (36.6 °C) 65 20 (!) 118/49 100 %   08/05/22 1421 97.7 °F (36.5 °C) 66 18 (!) 119/54 98 %   08/05/22 1315 -- 67 20 (!) 143/45 99 %   08/05/22 1245 -- 69 18 (!) 139/46 100 %   08/05/22 1017 98.1 °F (36.7 °C) 71 18 (!) 172/35 100 %       Intake/Output Summary (Last 24 hours) at 8/6/2022 1006  Last data filed at 8/6/2022 0500  Gross per 24 hour   Intake 675.42 ml   Output --   Net 675.42 ml        I had a face to face encounter and independently examined this patient on 8/6/2022, as outlined below:  PHYSICAL EXAM:  General: WD, WN. Alert, cooperative, no acute distress    EENT:  EOMI. Anicteric sclerae. MMM  Resp:  CTA bilaterally, no wheezing or rales. No accessory muscle use  CV:  Regular  rhythm,  No edema  GI:  Soft, Non distended, Non tender. +Bowel sounds  Neurologic:  Alert and oriented X 3, normal speech,   Psych:   Good insight. Not anxious nor agitated  Skin:  No rashes.   No jaundice    Reviewed most current lab test results and cultures  YES  Reviewed most current radiology test results   YES  Review and summation of old records today    NO  Reviewed patient's current orders and MAR    YES  PMH/SH reviewed - no change compared to H&P  ________________________________________________________________________  Care Plan discussed with:    Comments   Patient y    Family      RN y    Care Manager Consultant                        Multidiciplinary team rounds were held today with , nursing, pharmacist and clinical coordinator. Patient's plan of care was discussed; medications were reviewed and discharge planning was addressed. ________________________________________________________________________  Total NON critical care TIME:  40   Minutes    Total CRITICAL CARE TIME Spent:   Minutes non procedure based      Comments   >50% of visit spent in counseling and coordination of care     ________________________________________________________________________  Vivien Singer MD     Procedures: see electronic medical records for all procedures/Xrays and details which were not copied into this note but were reviewed prior to creation of Plan. LABS:  I reviewed today's most current labs and imaging studies.   Pertinent labs include:  Recent Labs     08/06/22  0634 08/06/22 0149 08/05/22 1847 08/05/22  1036   WBC  --   --   --  12.4*   HGB 6.3* 5.4* 5.3* 4.2*   HCT 21.5* 18.8* 18.6* 16.3*   PLT  --   --   --  184     Recent Labs     08/06/22 0149 08/05/22  1036   NA  --  137   K  --  4.0   CL  --  106   CO2  --  21   GLU  --  171*   BUN  --  68*   CREA  --  1.78*   CA  --  9.5   ALB  --  3.4*   TBILI  --  1.1*   ALT  --  29   INR 1.3*  --        Signed: Vivien Singer MD

## 2022-08-06 NOTE — PROGRESS NOTES
Occupational Therapy Note  Orders received, chart reviewed, spoke to RN. Pt with hgb 6.3, on fourth unit of possible 6 units of PRBC. Will hold OT eval while pt transfusing and follow up tomorrow it pt hemodynamically stable for ADL assessment. Thank you.   Urban Aguilar, OTOCTAVIA/KERVIN

## 2022-08-06 NOTE — PROGRESS NOTES
End of Shift Note    Bedside shift change report given to Brigette GRAHAM (oncoming nurse) by Juan M Jackson RN (offgoing nurse). Report included the following information SBAR, Kardex, Intake/Output, MAR, Recent Results, and Cardiac Rhythm nsr/[place]    Shift worked:  day     Shift summary and any significant changes:     VS stable, pt voiding via pure wicc clear jill urine without difficulties, no complaints of pain. 1 unit of prbc given due to hgb 6.3, post PRBC hgb 8.2. orders given to not transfuse until less than 7 hgb. Concerns for physician to address:       Zone phone for oncoming shift:   [phone]       Activity:  Activity Level: Up with Assistance  Number times ambulated in hallways past shift: 0  Number of times OOB to chair past shift: 0    Cardiac:   Cardiac Monitoring: Yes      Cardiac Rhythm: Sinus Rhythm, Sinus [place]    Access:  Current line(s): PIV     Genitourinary:   Urinary status: voiding and external catheter    Respiratory:   O2 Device: None (Room air)  Chronic home O2 use?: NO  Incentive spirometer at bedside: N/A       GI:  Last Bowel Movement Date: 08/05/22  Current diet:  ADULT DIET Regular; Low Fat/Low Chol/High Fiber/SARAY  ADULT DIET Clear Liquid  DIET NPO  Passing flatus: YES  Tolerating current diet: YES       Pain Management:   Patient states pain is manageable on current regimen: YES    Skin:  Boris Score: 17  Interventions: float heels, increase time out of bed, PT/OT consult, limit briefs, and internal/external urinary devices    Patient Safety:  Fall Score:  Total Score: 3  Interventions: bed/chair alarm, assistive device (walker, cane, etc), gripper socks, pt to call before getting OOB, stay with me (per policy), and gait belt  High Fall Risk: Yes    Length of Stay:  Expected LOS: - - -  Actual LOS: 1      Sujatha Hopkins RN

## 2022-08-06 NOTE — PROGRESS NOTES
2:28 AM  Lab called with critical hgb result of 5.4, CATY humphriesserved to notify, orders received to transfuse additional unit after drawing and resulting hgb 1 hour post transfusion

## 2022-08-06 NOTE — PROGRESS NOTES
12:18 AM  Gt messaged about h/h order while patient still has a unit of blood running, ok to wait one hour after transfusion completes before drawing Hgb and starting next unit of blood

## 2022-08-06 NOTE — PROGRESS NOTES
Problem: Falls - Risk of  Goal: *Absence of Falls  Description: Document Ana Bundy Fall Risk and appropriate interventions in the flowsheet. Outcome: Progressing Towards Goal  Note: Fall Risk Interventions:  Mobility Interventions: Assess mobility with egress test, Bed/chair exit alarm, Communicate number of staff needed for ambulation/transfer, OT consult for ADLs, Patient to call before getting OOB, PT Consult for mobility concerns, PT Consult for assist device competence, Utilize walker, cane, or other assistive device, Utilize gait belt for transfers/ambulation, Strengthening exercises (ROM-active/passive)         Medication Interventions: Assess postural VS orthostatic hypotension, Bed/chair exit alarm, Evaluate medications/consider consulting pharmacy, Patient to call before getting OOB, Teach patient to arise slowly, Utilize gait belt for transfers/ambulation    Elimination Interventions: Call light in reach, Bed/chair exit alarm, Patient to call for help with toileting needs, Elevated toilet seat, Toilet paper/wipes in reach, Stay With Me (per policy), Toileting schedule/hourly rounds              Problem: Patient Education: Go to Patient Education Activity  Goal: Patient/Family Education  Outcome: Progressing Towards Goal     Problem: Pressure Injury - Risk of  Goal: *Prevention of pressure injury  Description: Document Boris Scale and appropriate interventions in the flowsheet.   Outcome: Progressing Towards Goal  Note: Pressure Injury Interventions:  Sensory Interventions: Assess changes in LOC, Assess need for specialty bed, Check visual cues for pain, Discuss PT/OT consult with provider, Chair cushion, Avoid rigorous massage over bony prominences, Float heels, Minimize linen layers, Monitor skin under medical devices, Maintain/enhance activity level, Keep linens dry and wrinkle-free, Pad between skin to skin, Pressure redistribution bed/mattress (bed type)    Moisture Interventions: Absorbent underpads, Check for incontinence Q2 hours and as needed, Assess need for specialty bed, Apply protective barrier, creams and emollients, Contain wound drainage, Limit adult briefs, Minimize layers, Moisture barrier, Offer toileting Q_hr, Maintain skin hydration (lotion/cream), Internal/External urinary devices    Activity Interventions: Assess need for specialty bed, Chair cushion, Increase time out of bed, Pressure redistribution bed/mattress(bed type), PT/OT evaluation    Mobility Interventions: Assess need for specialty bed, Chair cushion, Float heels, HOB 30 degrees or less, PT/OT evaluation, Pressure redistribution bed/mattress (bed type), Turn and reposition approx.  every two hours(pillow and wedges)    Nutrition Interventions: Document food/fluid/supplement intake, Offer support with meals,snacks and hydration, Discuss nutritional consult with provider    Friction and Shear Interventions: Apply protective barrier, creams and emollients, Foam dressings/transparent film/skin sealants, HOB 30 degrees or less, Lift sheet, Feet elevated on foot rest, Lift team/patient mobility team, Minimize layers                Problem: Patient Education: Go to Patient Education Activity  Goal: Patient/Family Education  Outcome: Progressing Towards Goal

## 2022-08-06 NOTE — PROGRESS NOTES
Problem: Falls - Risk of  Goal: *Absence of Falls  Description: Document Earma Sony Fall Risk and appropriate interventions in the flowsheet. Outcome: Progressing Towards Goal  Note: Fall Risk Interventions:  Mobility Interventions: Bed/chair exit alarm, Patient to call before getting OOB, PT Consult for mobility concerns, PT Consult for assist device competence, Utilize walker, cane, or other assistive device         Medication Interventions: Assess postural VS orthostatic hypotension, Bed/chair exit alarm, Evaluate medications/consider consulting pharmacy, Patient to call before getting OOB, Teach patient to arise slowly, Utilize gait belt for transfers/ambulation    Elimination Interventions: Bed/chair exit alarm, Call light in reach, Patient to call for help with toileting needs, Toileting schedule/hourly rounds, Stay With Me (per policy)              Problem: Patient Education: Go to Patient Education Activity  Goal: Patient/Family Education  Outcome: Progressing Towards Goal     Problem: Pressure Injury - Risk of  Goal: *Prevention of pressure injury  Description: Document Boris Scale and appropriate interventions in the flowsheet. Outcome: Progressing Towards Goal  Note: Pressure Injury Interventions:  Sensory Interventions: Assess changes in LOC, Assess need for specialty bed, Avoid rigorous massage over bony prominences, Float heels, Keep linens dry and wrinkle-free    Moisture Interventions: Absorbent underpads, Internal/External urinary devices, Maintain skin hydration (lotion/cream)    Activity Interventions: Increase time out of bed, PT/OT evaluation    Mobility Interventions: Assess need for specialty bed, Float heels, HOB 30 degrees or less, Pressure redistribution bed/mattress (bed type), PT/OT evaluation, Turn and reposition approx.  every two hours(pillow and wedges)    Nutrition Interventions: Document food/fluid/supplement intake    Friction and Shear Interventions: HOB 30 degrees or less, Lift sheet, Minimize layers                Problem: Patient Education: Go to Patient Education Activity  Goal: Patient/Family Education  Outcome: Progressing Towards Goal

## 2022-08-06 NOTE — PROGRESS NOTES
Bedside and Verbal shift change report given to Sujatha (oncoming nurse) by Prudencio Villasenor (offgoing nurse). Report included the following information SBAR, Kardex, Intake/Output, MAR, Recent Results, and Cardiac Rhythm [place] .

## 2022-08-06 NOTE — PROGRESS NOTES
notified via perfect serve regarding new orders for transfusion of PRBCs. MD notified that latest HGB has come up to 8.2, drawn post 4th blood transfusion this admission. Clarification if MD would like  another blood transfusion administered now or just transfuse if she is below 7? Call back number [phone] given. Order given to only transfuse if hgb below 7.

## 2022-08-07 LAB
ANION GAP SERPL CALC-SCNC: 8 MMOL/L (ref 5–15)
BUN SERPL-MCNC: 54 MG/DL (ref 6–20)
BUN/CREAT SERPL: 39 (ref 12–20)
CALCIUM SERPL-MCNC: 8.6 MG/DL (ref 8.5–10.1)
CHLORIDE SERPL-SCNC: 110 MMOL/L (ref 97–108)
CO2 SERPL-SCNC: 22 MMOL/L (ref 21–32)
CREAT SERPL-MCNC: 1.37 MG/DL (ref 0.55–1.02)
ERYTHROCYTE [DISTWIDTH] IN BLOOD BY AUTOMATED COUNT: 18.5 % (ref 11.5–14.5)
GLUCOSE BLD STRIP.AUTO-MCNC: 126 MG/DL (ref 65–117)
GLUCOSE BLD STRIP.AUTO-MCNC: 135 MG/DL (ref 65–117)
GLUCOSE BLD STRIP.AUTO-MCNC: 148 MG/DL (ref 65–117)
GLUCOSE BLD STRIP.AUTO-MCNC: 150 MG/DL (ref 65–117)
GLUCOSE SERPL-MCNC: 117 MG/DL (ref 65–100)
HCT VFR BLD AUTO: 26.4 % (ref 35–47)
HCT VFR BLD AUTO: 29.9 % (ref 35–47)
HCT VFR BLD AUTO: 32.4 % (ref 35–47)
HGB BLD-MCNC: 8 G/DL (ref 11.5–16)
HGB BLD-MCNC: 9 G/DL (ref 11.5–16)
HGB BLD-MCNC: 9.8 G/DL (ref 11.5–16)
MCH RBC QN AUTO: 26.4 PG (ref 26–34)
MCHC RBC AUTO-ENTMCNC: 30.3 G/DL (ref 30–36.5)
MCV RBC AUTO: 87.1 FL (ref 80–99)
NRBC # BLD: 0.03 K/UL (ref 0–0.01)
NRBC BLD-RTO: 0.6 PER 100 WBC
PLATELET # BLD AUTO: 86 K/UL (ref 150–400)
PMV BLD AUTO: 10.1 FL (ref 8.9–12.9)
POTASSIUM SERPL-SCNC: 3.9 MMOL/L (ref 3.5–5.1)
RBC # BLD AUTO: 3.03 M/UL (ref 3.8–5.2)
SERVICE CMNT-IMP: ABNORMAL
SODIUM SERPL-SCNC: 140 MMOL/L (ref 136–145)
WBC # BLD AUTO: 5.4 K/UL (ref 3.6–11)

## 2022-08-07 PROCEDURE — C9113 INJ PANTOPRAZOLE SODIUM, VIA: HCPCS | Performed by: INTERNAL MEDICINE

## 2022-08-07 PROCEDURE — 74011250637 HC RX REV CODE- 250/637: Performed by: INTERNAL MEDICINE

## 2022-08-07 PROCEDURE — 74011250636 HC RX REV CODE- 250/636: Performed by: INTERNAL MEDICINE

## 2022-08-07 PROCEDURE — 80048 BASIC METABOLIC PNL TOTAL CA: CPT

## 2022-08-07 PROCEDURE — 85018 HEMOGLOBIN: CPT

## 2022-08-07 PROCEDURE — 82962 GLUCOSE BLOOD TEST: CPT

## 2022-08-07 PROCEDURE — 85027 COMPLETE CBC AUTOMATED: CPT

## 2022-08-07 PROCEDURE — 36415 COLL VENOUS BLD VENIPUNCTURE: CPT

## 2022-08-07 PROCEDURE — 74011000250 HC RX REV CODE- 250: Performed by: INTERNAL MEDICINE

## 2022-08-07 PROCEDURE — 65270000029 HC RM PRIVATE

## 2022-08-07 RX ORDER — NYSTATIN 100000 [USP'U]/G
POWDER TOPICAL 2 TIMES DAILY
Status: DISCONTINUED | OUTPATIENT
Start: 2022-08-07 | End: 2022-08-08 | Stop reason: HOSPADM

## 2022-08-07 RX ADMIN — NYSTATIN: 100000 POWDER TOPICAL at 17:58

## 2022-08-07 RX ADMIN — BISACODYL 10 MG: 5 TABLET, COATED ORAL at 12:56

## 2022-08-07 RX ADMIN — SODIUM CHLORIDE 100 ML/HR: 9 INJECTION, SOLUTION INTRAVENOUS at 09:01

## 2022-08-07 RX ADMIN — LEVOTHYROXINE SODIUM 150 MCG: 0.15 TABLET ORAL at 09:00

## 2022-08-07 RX ADMIN — ALLOPURINOL 100 MG: 100 TABLET ORAL at 17:58

## 2022-08-07 RX ADMIN — ATORVASTATIN CALCIUM 20 MG: 20 TABLET, FILM COATED ORAL at 23:18

## 2022-08-07 RX ADMIN — POLYETHYLENE GLYCOL-3350 AND ELECTROLYTES 4000 ML: 236; 6.74; 5.86; 2.97; 22.74 POWDER, FOR SOLUTION ORAL at 17:58

## 2022-08-07 RX ADMIN — SODIUM CHLORIDE 40 MG: 9 INJECTION INTRAMUSCULAR; INTRAVENOUS; SUBCUTANEOUS at 09:00

## 2022-08-07 RX ADMIN — SODIUM CHLORIDE 40 MG: 9 INJECTION INTRAMUSCULAR; INTRAVENOUS; SUBCUTANEOUS at 17:58

## 2022-08-07 RX ADMIN — ALLOPURINOL 100 MG: 100 TABLET ORAL at 09:00

## 2022-08-07 RX ADMIN — FLUTICASONE PROPIONATE 2 SPRAY: 50 SPRAY, METERED NASAL at 09:00

## 2022-08-07 NOTE — PROGRESS NOTES
Transition of Care Plan  RUR: 18%  DISPOSITION: The disposition plan is pending PT/OT  Patient from [address]  F/U with PCP/Specialist    Transport: Caregiver     Reason for Admission:   Anemia                     RUR Score:    18%              PCP: First and Last name:   Keyanna Jose MD     Name of Practice:    Are you a current patient: Yes/No:    Approximate date of last visit:    Can you participate in a virtual visit if needed:     Do you (patient/family) have any concerns for transition/discharge? None                Plan for utilizing home health:   TBD    Current Advanced Directive/Advance Care Plan:  Full Code      Healthcare Decision Maker:   Click here to complete [address] including selection of the Healthcare Decision Maker Relationship (ie \"Primary\")            Primary Decision MakerVnohemi Marti - [phone]    Secondary Decision Maker: Aurelia Orosco Caregiver - [phone]    Transition of Care Plan:          CRM spoke with patient, introduced self, explained role, verified demographics, and offered assistance. Patient lives at [facility] independent living with a care giver who comes daily to assist with ADL's/IADL's. The patient is provided her medications by the facility. Care Management Interventions  PCP Verified by CM: Yes  Palliative Care Criteria Met (RRAT>21 & CHF Dx)?: No  Mode of Transport at Discharge:  Other (see comment) (caregiver)  Transition of Care Consult (CM Consult): Discharge Planning  MyChart Signup: No  Discharge Durable Medical Equipment: No  Health Maintenance Reviewed: Yes  Physical Therapy Consult: Yes  Occupational Therapy Consult: Yes  Speech Therapy Consult: No  Support Systems: Other (Comment)  Confirm Follow Up Transport: Family  [employer] Information Provided?: No  Discharge Location  Patient Expects to be Discharged to[de-identified] Home with family assistance    11:14 AM  PINO Barnes

## 2022-08-07 NOTE — PROGRESS NOTES
Hospitalist Progress Note    NAME: Alissa Forbes   :  1945   MRN:  [id number]       Assessment / Plan:  Normocytic Anemia  Hgb 4.2 baseline is 8-10  Improved to 8 today. Q12 h/h  Continue with protonix  Stool occult blood +ve  hold anticoagulation  Holding antihypertensives, prn hydralazine  Clear liquids per GI  Plan for endoscopy and colonoscopy on Monday. Bowel prep today. Hematology consult     MICHELLE on CKD stage 3 vs progression to Stage 4  Cr. 1.78  Labs pending today  IV fluids to volume resuscitate     HTN  Hold medications     Paroxysmal Atrial Fibrillation  Hold anticoagulation     Hypothyroidism  Cont. Home levothyroxine     DM  SSI     Sick Sinus Syndrome  Pacemaker placement 2022     History of Aortic valve stenosis  History of Cirrhosis of liver  History or BRADY  History of Pancreatic Cyst  History of Pulmonary HTN      40 or above Morbid obesity / Body mass index is 47.46 kg/m². Estimated discharge date:   Barriers:    Code status: Full  Prophylaxis: SCD's  Recommended Disposition: Home w/Family     Subjective:     Chief Complaint / Reason for Physician Visit  \"Low blood count\". Discussed with RN events overnight. Sob improved, no active complaints  Understands plan of care and agreeable  All questions answered to satisfaction. Review of Systems:  Symptom Y/N Comments  Symptom Y/N Comments   Fever/Chills n   Chest Pain n    Poor Appetite    Edema     Cough n   Abdominal Pain n    Sputum n   Joint Pain     SOB/HENLEY n   Pruritis/Rash     Nausea/vomit n   Tolerating PT/OT     Diarrhea    Tolerating Diet     Constipation    Other       Could NOT obtain due to:      Objective:     VITALS:   Last 24hrs VS reviewed since prior progress note.  Most recent are:  Patient Vitals for the past 24 hrs:   Temp Pulse Resp BP SpO2   22 0758 98.1 °F (36.7 °C) 62 18 (!) 106/48 100 %   22 0316 98 °F (36.7 °C) 60 18 (!) 135/56 95 %   22 2343 97.9 °F (36.6 °C) 60 18 (!) 131/45 96 %   08/06/22 1958 98.4 °F (36.9 °C) 82 18 (!) 141/52 98 %   08/06/22 1520 98 °F (36.7 °C) 65 16 (!) 126/49 98 %   08/06/22 1311 98.2 °F (36.8 °C) 61 18 (!) 127/39 99 %   08/06/22 1212 97.3 °F (36.3 °C) 60 18 (!) 132/49 96 %       Intake/Output Summary (Last 24 hours) at 8/7/2022 1206  Last data filed at 8/7/2022 5171  Gross per 24 hour   Intake 1380 ml   Output 1000 ml   Net 380 ml        I had a face to face encounter and independently examined this patient on 8/7/2022, as outlined below:  PHYSICAL EXAM:  General: WD, WN. Alert, cooperative, no acute distress    EENT:  EOMI. Anicteric sclerae. MMM  Resp:  CTA bilaterally, no wheezing or rales. No accessory muscle use  CV:  Regular  rhythm,  No edema  GI:  Soft, Non distended, Non tender. +Bowel sounds  Neurologic:  Alert and oriented X 3, normal speech,   Psych:   Good insight. Not anxious nor agitated  Skin:  No rashes. No jaundice    Reviewed most current lab test results and cultures  YES  Reviewed most current radiology test results   YES  Review and summation of old records today    NO  Reviewed patient's current orders and MAR    YES  PMH/ reviewed - no change compared to H&P  ________________________________________________________________________  Care Plan discussed with:    Comments   Patient y    Family      RN y    Care Manager     Consultant                        Multidiciplinary team rounds were held today with , nursing, pharmacist and clinical coordinator. Patient's plan of care was discussed; medications were reviewed and discharge planning was addressed.      ________________________________________________________________________  Total NON critical care TIME:  40   Minutes    Total CRITICAL CARE TIME Spent:   Minutes non procedure based      Comments   >50% of visit spent in counseling and coordination of care     ________________________________________________________________________  Ángel Velasco MD Procedures: see electronic medical records for all procedures/Xrays and details which were not copied into this note but were reviewed prior to creation of Plan. LABS:  I reviewed today's most current labs and imaging studies. Pertinent labs include:  Recent Labs     08/07/22 0215 08/06/22  1855 08/06/22  1320 08/05/22  1847 08/05/22  1036   WBC 5.4  --  6.2  --  12.4*   HGB 8.0* 8.3* 8.2*   < > 4.2*   HCT 26.4* 27.3* 27.0*   < > 16.3*   PLT 86*  --  93*  --  184    < > = values in this interval not displayed.      Recent Labs     08/07/22 0215 08/06/22  1320 08/06/22  0149 08/05/22  1036    140  --  137   K 3.9 4.1  --  4.0   * 109*  --  106   CO2 22 25  --  21   * 141*  --  171*   BUN 54* 60*  --  68*   CREA 1.37* 1.46*  --  1.78*   CA 8.6 9.0  --  9.5   ALB  --   --   --  3.4*   TBILI  --   --   --  1.1*   ALT  --   --   --  29   INR  --   --  1.3*  --        Signed: Sam Quezada MD

## 2022-08-07 NOTE — PROGRESS NOTES
End of Shift Note    Bedside shift change report given to Brigette GRAHAM (oncoming nurse) by Rox Romero RN (offgoing nurse). Report included the following information SBAR, Kardex, Intake/Output, MAR, Recent Results, and Cardiac Rhythm nsr/[place]    Shift worked:  day     Shift summary and any significant changes:     VS stable, pt voiding via pure wicc clear jill urine without difficulties, no complaints of pain. Repeat H&H Q6 reordered. Hgb increased to 9.0 and then to 9.8. BM today. Nystatin powder added to regimen. Concerns for physician to address:       Zone phone for oncoming shift:   [phone]       Activity:  Activity Level: Up with Assistance  Number times ambulated in hallways past shift: 0  Number of times OOB to chair past shift: 0    Cardiac:   Cardiac Monitoring: Yes      Cardiac Rhythm: Sinus Rhythm    Access:  Current line(s): PIV     Genitourinary:   Urinary status: voiding and external catheter    Respiratory:   O2 Device: None (Room air)  Chronic home O2 use?: NO  Incentive spirometer at bedside: N/A       GI:  Last Bowel Movement Date: 08/05/22  Current diet:  ADULT DIET Clear Liquid  DIET NPO  Passing flatus: YES  Tolerating current diet: YES       Pain Management:   Patient states pain is manageable on current regimen: YES    Skin:  Boris Score: 17  Interventions: float heels, increase time out of bed, PT/OT consult, limit briefs, and internal/external urinary devices    Patient Safety:  Fall Score:  Total Score: 3  Interventions: bed/chair alarm, assistive device (walker, cane, etc), gripper socks, pt to call before getting OOB, stay with me (per policy), and gait belt  High Fall Risk: Yes    Length of Stay:  Expected LOS: - - -  Actual LOS: 2      Sujatha Mandel, SANTOS

## 2022-08-07 NOTE — PROGRESS NOTES
Problem: Falls - Risk of  Goal: *Absence of Falls  Description: Document Mark Sol Fall Risk and appropriate interventions in the flowsheet. Outcome: Progressing Towards Goal  Note: Fall Risk Interventions:  Mobility Interventions: Assess mobility with egress test, Bed/chair exit alarm, Communicate number of staff needed for ambulation/transfer, Patient to call before getting OOB, PT Consult for mobility concerns, PT Consult for assist device competence, OT consult for ADLs, Utilize walker, cane, or other assistive device         Medication Interventions: Assess postural VS orthostatic hypotension, Bed/chair exit alarm, Evaluate medications/consider consulting pharmacy, Patient to call before getting OOB, Teach patient to arise slowly    Elimination Interventions: Bed/chair exit alarm, Call light in reach, Patient to call for help with toileting needs              Problem: Patient Education: Go to Patient Education Activity  Goal: Patient/Family Education  Outcome: Progressing Towards Goal     Problem: Pressure Injury - Risk of  Goal: *Prevention of pressure injury  Description: Document Boris Scale and appropriate interventions in the flowsheet.   Outcome: Progressing Towards Goal  Note: Pressure Injury Interventions:  Sensory Interventions: Assess changes in LOC, Assess need for specialty bed, Discuss PT/OT consult with provider, Float heels, Keep linens dry and wrinkle-free, Minimize linen layers, Maintain/enhance activity level    Moisture Interventions: Absorbent underpads, Apply protective barrier, creams and emollients, Assess need for specialty bed, Check for incontinence Q2 hours and as needed, Internal/External urinary devices, Limit adult briefs    Activity Interventions: Assess need for specialty bed, Increase time out of bed, Pressure redistribution bed/mattress(bed type), PT/OT evaluation    Mobility Interventions: Float heels, HOB 30 degrees or less, Pressure redistribution bed/mattress (bed type), PT/OT evaluation    Nutrition Interventions: Document food/fluid/supplement intake    Friction and Shear Interventions: Apply protective barrier, creams and emollients, Lift sheet, Lift team/patient mobility team, Minimize layers, HOB 30 degrees or less                Problem: Patient Education: Go to Patient Education Activity  Goal: Patient/Family Education  Outcome: Progressing Towards Goal

## 2022-08-07 NOTE — PROGRESS NOTES
End of Shift Note    Bedside shift change report given to Sujatha (oncoming nurse) by Alexander Pryor (offgoing nurse). Report included the following information SBAR, Kardex, Intake/Output, MAR, Recent Results, and Cardiac Rhythm Sinus Rhythm    Shift worked:  0892-3833     Shift summary and any significant changes:     Patient resting well overnight, hgb 8.0 this am. CLEAR LIQUID DIET     Concerns for physician to address:        Zone phone for oncoming shift:   [phone]       Activity:  Activity Level: Up with Assistance  Number times ambulated in hallways past shift: 0  Number of times OOB to chair past shift: 0    Cardiac:   Cardiac Monitoring: Yes      Cardiac Rhythm: Sinus Rhythm    Access:  Current line(s): PIV     Genitourinary:   Urinary status: external catheter    Respiratory:   O2 Device: None (Room air)  Chronic home O2 use?: NO  Incentive spirometer at bedside: NO       GI:  Last Bowel Movement Date: 08/05/22  Current diet:  ADULT DIET Clear Liquid  DIET NPO  Passing flatus: YES  Tolerating current diet: YES       Pain Management:   Patient states pain is manageable on current regimen: YES    Skin:  Boris Score: 17  Interventions: increase time out of bed    Patient Safety:  Fall Score:  Total Score: 3  Interventions: bed/chair alarm, assistive device (walker, cane, etc), gripper socks, and pt to call before getting OOB  High Fall Risk: Yes    Length of Stay:  Expected LOS: - - -  Actual LOS: 2      Alexander Pryor

## 2022-08-07 NOTE — PROGRESS NOTES
Dr. Tarsha Hunter notified via perfect serve per pt request for nystatin powder, she stated she utilizes nystatin powder at home as needed to her abdominal folds. Call back number [phone] given. Order given for nystatin powder 100,000 unit/gram powder BID to abdominal folds.

## 2022-08-07 NOTE — PROGRESS NOTES
Problem: Falls - Risk of  Goal: *Absence of Falls  Description: Document Domenic Pickard Fall Risk and appropriate interventions in the flowsheet. Outcome: Progressing Towards Goal  Note: Fall Risk Interventions:  Mobility Interventions: Assess mobility with egress test, Communicate number of staff needed for ambulation/transfer, OT consult for ADLs, Patient to call before getting OOB, PT Consult for mobility concerns, Strengthening exercises (ROM-active/passive), PT Consult for assist device competence, Utilize walker, cane, or other assistive device, Utilize gait belt for transfers/ambulation, Bed/chair exit alarm         Medication Interventions: Assess postural VS orthostatic hypotension, Bed/chair exit alarm, Evaluate medications/consider consulting pharmacy, Teach patient to arise slowly, Utilize gait belt for transfers/ambulation, Patient to call before getting OOB    Elimination Interventions: Bed/chair exit alarm, Call light in reach, Elevated toilet seat, Patient to call for help with toileting needs, Stay With Me (per policy), Toileting schedule/hourly rounds, Toilet paper/wipes in reach              Problem: Patient Education: Go to Patient Education Activity  Goal: Patient/Family Education  Outcome: Progressing Towards Goal     Problem: Pressure Injury - Risk of  Goal: *Prevention of pressure injury  Description: Document Boris Scale and appropriate interventions in the flowsheet.   Outcome: Progressing Towards Goal  Note: Pressure Injury Interventions:  Sensory Interventions: Assess changes in LOC, Assess need for specialty bed, Avoid rigorous massage over bony prominences, Chair cushion, Check visual cues for pain, Discuss PT/OT consult with provider, Float heels, Keep linens dry and wrinkle-free, Maintain/enhance activity level, Minimize linen layers, Monitor skin under medical devices, Pad between skin to skin, Pressure redistribution bed/mattress (bed type)    Moisture Interventions: Absorbent underpads, Apply protective barrier, creams and emollients, Assess need for specialty bed, Check for incontinence Q2 hours and as needed, Contain wound drainage, Limit adult briefs, Maintain skin hydration (lotion/cream), Moisture barrier, Offer toileting Q_hr, Minimize layers    Activity Interventions: Assess need for specialty bed, Chair cushion, Increase time out of bed, Pressure redistribution bed/mattress(bed type), PT/OT evaluation    Mobility Interventions: Assess need for specialty bed, Chair cushion, Float heels, Pressure redistribution bed/mattress (bed type), PT/OT evaluation, HOB 30 degrees or less, Turn and reposition approx.  every two hours(pillow and wedges)    Nutrition Interventions: Offer support with meals,snacks and hydration, Discuss nutritional consult with provider, Document food/fluid/supplement intake    Friction and Shear Interventions: Apply protective barrier, creams and emollients, Feet elevated on foot rest, Foam dressings/transparent film/skin sealants, HOB 30 degrees or less, Lift sheet, Lift team/patient mobility team, Minimize layers                Problem: Patient Education: Go to Patient Education Activity  Goal: Patient/Family Education  Outcome: Progressing Towards Goal

## 2022-08-08 ENCOUNTER — ANESTHESIA EVENT (OUTPATIENT)
Dept: ENDOSCOPY | Age: 77
DRG: 442 | End: 2022-08-08
Payer: MEDICARE

## 2022-08-08 ENCOUNTER — ANESTHESIA (OUTPATIENT)
Dept: ENDOSCOPY | Age: 77
DRG: 442 | End: 2022-08-08
Payer: MEDICARE

## 2022-08-08 VITALS
TEMPERATURE: 97.7 F | HEART RATE: 66 BPM | WEIGHT: 235 LBS | DIASTOLIC BLOOD PRESSURE: 52 MMHG | RESPIRATION RATE: 19 BRPM | SYSTOLIC BLOOD PRESSURE: 154 MMHG | OXYGEN SATURATION: 97 % | HEIGHT: 59 IN | BODY MASS INDEX: 47.37 KG/M2

## 2022-08-08 LAB
ANION GAP SERPL CALC-SCNC: 7 MMOL/L (ref 5–15)
BUN SERPL-MCNC: 37 MG/DL (ref 6–20)
BUN/CREAT SERPL: 31 (ref 12–20)
CALCIUM SERPL-MCNC: 8.6 MG/DL (ref 8.5–10.1)
CHLORIDE SERPL-SCNC: 111 MMOL/L (ref 97–108)
CO2 SERPL-SCNC: 24 MMOL/L (ref 21–32)
COMMENT, HOLDF: NORMAL
CREAT SERPL-MCNC: 1.18 MG/DL (ref 0.55–1.02)
ERYTHROCYTE [DISTWIDTH] IN BLOOD BY AUTOMATED COUNT: 18.2 % (ref 11.5–14.5)
ERYTHROCYTE [DISTWIDTH] IN BLOOD BY AUTOMATED COUNT: 18.5 % (ref 11.5–14.5)
GLUCOSE BLD STRIP.AUTO-MCNC: 104 MG/DL (ref 65–117)
GLUCOSE BLD STRIP.AUTO-MCNC: 130 MG/DL (ref 65–117)
GLUCOSE BLD STRIP.AUTO-MCNC: 134 MG/DL (ref 65–117)
GLUCOSE SERPL-MCNC: 127 MG/DL (ref 65–100)
HCT VFR BLD AUTO: 28.1 % (ref 35–47)
HCT VFR BLD AUTO: 30.5 % (ref 35–47)
HCT VFR BLD AUTO: 31.6 % (ref 35–47)
HGB BLD-MCNC: 8.6 G/DL (ref 11.5–16)
HGB BLD-MCNC: 9 G/DL (ref 11.5–16)
HGB BLD-MCNC: 9 G/DL (ref 11.5–16)
MCH RBC QN AUTO: 25.9 PG (ref 26–34)
MCH RBC QN AUTO: 26.2 PG (ref 26–34)
MCHC RBC AUTO-ENTMCNC: 29.5 G/DL (ref 30–36.5)
MCHC RBC AUTO-ENTMCNC: 30.6 G/DL (ref 30–36.5)
MCV RBC AUTO: 85.7 FL (ref 80–99)
MCV RBC AUTO: 87.6 FL (ref 80–99)
NRBC # BLD: 0 K/UL (ref 0–0.01)
NRBC # BLD: 0 K/UL (ref 0–0.01)
NRBC BLD-RTO: 0 PER 100 WBC
NRBC BLD-RTO: 0 PER 100 WBC
PLATELET # BLD AUTO: 92 K/UL (ref 150–400)
PLATELET # BLD AUTO: 94 K/UL (ref 150–400)
PMV BLD AUTO: 10 FL (ref 8.9–12.9)
PMV BLD AUTO: 10 FL (ref 8.9–12.9)
POTASSIUM SERPL-SCNC: 4.2 MMOL/L (ref 3.5–5.1)
RBC # BLD AUTO: 3.28 M/UL (ref 3.8–5.2)
RBC # BLD AUTO: 3.48 M/UL (ref 3.8–5.2)
SAMPLES BEING HELD,HOLD: NORMAL
SERVICE CMNT-IMP: ABNORMAL
SERVICE CMNT-IMP: ABNORMAL
SERVICE CMNT-IMP: NORMAL
SODIUM SERPL-SCNC: 142 MMOL/L (ref 136–145)
WBC # BLD AUTO: 6.9 K/UL (ref 3.6–11)
WBC # BLD AUTO: 8.1 K/UL (ref 3.6–11)

## 2022-08-08 PROCEDURE — 0DB68ZX EXCISION OF STOMACH, VIA NATURAL OR ARTIFICIAL OPENING ENDOSCOPIC, DIAGNOSTIC: ICD-10-PCS | Performed by: INTERNAL MEDICINE

## 2022-08-08 PROCEDURE — 85018 HEMOGLOBIN: CPT

## 2022-08-08 PROCEDURE — 74011000250 HC RX REV CODE- 250: Performed by: NURSE ANESTHETIST, CERTIFIED REGISTERED

## 2022-08-08 PROCEDURE — 88305 TISSUE EXAM BY PATHOLOGIST: CPT

## 2022-08-08 PROCEDURE — 77030039825 HC MSK NSL PAP SUPERNO2VA VYRM -B: Performed by: NURSE ANESTHETIST, CERTIFIED REGISTERED

## 2022-08-08 PROCEDURE — 36600 WITHDRAWAL OF ARTERIAL BLOOD: CPT

## 2022-08-08 PROCEDURE — 76060000032 HC ANESTHESIA 0.5 TO 1 HR: Performed by: INTERNAL MEDICINE

## 2022-08-08 PROCEDURE — 0DJD8ZZ INSPECTION OF LOWER INTESTINAL TRACT, VIA NATURAL OR ARTIFICIAL OPENING ENDOSCOPIC: ICD-10-PCS | Performed by: INTERNAL MEDICINE

## 2022-08-08 PROCEDURE — 74011250636 HC RX REV CODE- 250/636: Performed by: INTERNAL MEDICINE

## 2022-08-08 PROCEDURE — 77030019988 HC FCPS ENDOSC DISP BSC -B: Performed by: INTERNAL MEDICINE

## 2022-08-08 PROCEDURE — 2709999900 HC NON-CHARGEABLE SUPPLY: Performed by: INTERNAL MEDICINE

## 2022-08-08 PROCEDURE — 82962 GLUCOSE BLOOD TEST: CPT

## 2022-08-08 PROCEDURE — 74011000250 HC RX REV CODE- 250: Performed by: INTERNAL MEDICINE

## 2022-08-08 PROCEDURE — 85027 COMPLETE CBC AUTOMATED: CPT

## 2022-08-08 PROCEDURE — 76040000007: Performed by: INTERNAL MEDICINE

## 2022-08-08 PROCEDURE — 74011250637 HC RX REV CODE- 250/637: Performed by: INTERNAL MEDICINE

## 2022-08-08 PROCEDURE — 80048 BASIC METABOLIC PNL TOTAL CA: CPT

## 2022-08-08 PROCEDURE — 74011250636 HC RX REV CODE- 250/636: Performed by: NURSE ANESTHETIST, CERTIFIED REGISTERED

## 2022-08-08 RX ORDER — PROPOFOL 10 MG/ML
INJECTION, EMULSION INTRAVENOUS AS NEEDED
Status: DISCONTINUED | OUTPATIENT
Start: 2022-08-08 | End: 2022-08-08 | Stop reason: HOSPADM

## 2022-08-08 RX ORDER — ATROPINE SULFATE 0.1 MG/ML
0.5 INJECTION INTRAVENOUS
Status: DISCONTINUED | OUTPATIENT
Start: 2022-08-08 | End: 2022-08-08 | Stop reason: HOSPADM

## 2022-08-08 RX ORDER — NALOXONE HYDROCHLORIDE 0.4 MG/ML
0.4 INJECTION, SOLUTION INTRAMUSCULAR; INTRAVENOUS; SUBCUTANEOUS
Status: DISCONTINUED | OUTPATIENT
Start: 2022-08-08 | End: 2022-08-08 | Stop reason: HOSPADM

## 2022-08-08 RX ORDER — SODIUM CHLORIDE 0.9 % (FLUSH) 0.9 %
5-40 SYRINGE (ML) INJECTION AS NEEDED
Status: DISCONTINUED | OUTPATIENT
Start: 2022-08-08 | End: 2022-08-08 | Stop reason: HOSPADM

## 2022-08-08 RX ORDER — DEXTROMETHORPHAN/PSEUDOEPHED 2.5-7.5/.8
1.2 DROPS ORAL
Status: DISCONTINUED | OUTPATIENT
Start: 2022-08-08 | End: 2022-08-08 | Stop reason: HOSPADM

## 2022-08-08 RX ORDER — FLUMAZENIL 0.1 MG/ML
0.2 INJECTION INTRAVENOUS
Status: DISCONTINUED | OUTPATIENT
Start: 2022-08-08 | End: 2022-08-08 | Stop reason: HOSPADM

## 2022-08-08 RX ORDER — FENTANYL CITRATE 50 UG/ML
25 INJECTION, SOLUTION INTRAMUSCULAR; INTRAVENOUS
Status: DISCONTINUED | OUTPATIENT
Start: 2022-08-08 | End: 2022-08-08 | Stop reason: HOSPADM

## 2022-08-08 RX ORDER — SODIUM CHLORIDE 9 MG/ML
75 INJECTION, SOLUTION INTRAVENOUS CONTINUOUS
Status: DISPENSED | OUTPATIENT
Start: 2022-08-08 | End: 2022-08-08

## 2022-08-08 RX ORDER — PANTOPRAZOLE SODIUM 40 MG/1
40 TABLET, DELAYED RELEASE ORAL
Qty: 60 TABLET | Refills: 0 | Status: SHIPPED | OUTPATIENT
Start: 2022-08-08 | End: 2022-08-17 | Stop reason: SDUPTHER

## 2022-08-08 RX ORDER — EPINEPHRINE 0.1 MG/ML
1 INJECTION INTRACARDIAC; INTRAVENOUS
Status: DISCONTINUED | OUTPATIENT
Start: 2022-08-08 | End: 2022-08-08 | Stop reason: HOSPADM

## 2022-08-08 RX ORDER — LIDOCAINE HYDROCHLORIDE 20 MG/ML
INJECTION, SOLUTION EPIDURAL; INFILTRATION; INTRACAUDAL; PERINEURAL AS NEEDED
Status: DISCONTINUED | OUTPATIENT
Start: 2022-08-08 | End: 2022-08-08 | Stop reason: HOSPADM

## 2022-08-08 RX ORDER — SODIUM CHLORIDE 0.9 % (FLUSH) 0.9 %
5-40 SYRINGE (ML) INJECTION EVERY 8 HOURS
Status: DISCONTINUED | OUTPATIENT
Start: 2022-08-08 | End: 2022-08-08 | Stop reason: HOSPADM

## 2022-08-08 RX ORDER — NYSTATIN 100000 [USP'U]/G
POWDER TOPICAL 2 TIMES DAILY
Qty: 30 G | Refills: 0 | Status: SHIPPED | OUTPATIENT
Start: 2022-08-08

## 2022-08-08 RX ORDER — MIDAZOLAM HYDROCHLORIDE 1 MG/ML
.25-5 INJECTION, SOLUTION INTRAMUSCULAR; INTRAVENOUS
Status: DISCONTINUED | OUTPATIENT
Start: 2022-08-08 | End: 2022-08-08 | Stop reason: HOSPADM

## 2022-08-08 RX ADMIN — PROPOFOL 50 MG: 10 INJECTION, EMULSION INTRAVENOUS at 10:25

## 2022-08-08 RX ADMIN — ALLOPURINOL 100 MG: 100 TABLET ORAL at 11:45

## 2022-08-08 RX ADMIN — NYSTATIN: 100000 POWDER TOPICAL at 11:48

## 2022-08-08 RX ADMIN — PROPOFOL 50 MG: 10 INJECTION, EMULSION INTRAVENOUS at 10:13

## 2022-08-08 RX ADMIN — SODIUM CHLORIDE, PRESERVATIVE FREE 10 ML: 5 INJECTION INTRAVENOUS at 18:06

## 2022-08-08 RX ADMIN — SODIUM CHLORIDE 75 ML/HR: 9 INJECTION, SOLUTION INTRAVENOUS at 09:58

## 2022-08-08 RX ADMIN — PROPOFOL 50 MG: 10 INJECTION, EMULSION INTRAVENOUS at 10:21

## 2022-08-08 RX ADMIN — FLUTICASONE PROPIONATE 2 SPRAY: 50 SPRAY, METERED NASAL at 11:47

## 2022-08-08 RX ADMIN — NYSTATIN: 100000 POWDER TOPICAL at 18:04

## 2022-08-08 RX ADMIN — LIDOCAINE HYDROCHLORIDE 100 MG: 20 INJECTION, SOLUTION EPIDURAL; INFILTRATION; INTRACAUDAL; PERINEURAL at 10:13

## 2022-08-08 RX ADMIN — PANTOPRAZOLE SODIUM 40 MG: 40 TABLET, DELAYED RELEASE ORAL at 18:05

## 2022-08-08 RX ADMIN — ALLOPURINOL 100 MG: 100 TABLET ORAL at 18:02

## 2022-08-08 RX ADMIN — PROPOFOL 50 MG: 10 INJECTION, EMULSION INTRAVENOUS at 10:17

## 2022-08-08 RX ADMIN — PROPOFOL 50 MG: 10 INJECTION, EMULSION INTRAVENOUS at 10:30

## 2022-08-08 NOTE — PROGRESS NOTES
TRANSFER - OUT REPORT:    Verbal report given to SANTOS Granados(name) on Jim Moya  being transferred to [facility](unit) for routine progression of care       Report consisted of patients Situation, Background, Assessment and   Recommendations(SBAR). Information from the following report(s) SBAR and Procedure Summary was reviewed with the receiving nurse. Opportunity for questions and clarification was provided.

## 2022-08-08 NOTE — PROGRESS NOTES
1102 - PCP staff stated they attempted to call the patient to offer an appt for 8/10/22, patient declined. PCP staff is waiting for PCP to provide an additional date. Yadiel Orellana, Care Management Assistant    8/10/22 1053 - Followed up with PCP staff for update regarding PCP hospital follow up. Delivered MOON to patient. See scanned documents. Wolf Melgoza     Attempted to schedule hospital follow up PCP appointment. Sent a message to PCP office to find patient an appointment. Awaiting callback from PCP office.  Wolf Melgoza

## 2022-08-08 NOTE — DISCHARGE SUMMARY
Hospitalist Discharge Summary     Patient ID:  Tashi Morrison  [id number]  23 y.o.  1945 8/5/2022    PCP on record: Kristi Barney MD    Admit date: 8/5/2022  Discharge date and time: 8/8/2022    DISCHARGE DIAGNOSIS:    GI bleed/anemia/acute on chronic kidney disease stage III/hypertension/paroxysmal atrial fibrillation on anticoagulation/hypothyroidism/type 2 diabetes/sick sinus syndrome/history of aortic valve stenosis/history of liver cirrhosis/history of Beckman/history of pancreatic cyst/history of hypertension    CONSULTATIONS:  IP CONSULT TO HOSPITALIST  IP CONSULT TO GASTROENTEROLOGY  IP CONSULT TO CARDIOLOGY    Excerpted HPI from H&P of Callie Hendrickson MD:  Nathan Glaser is a 68 y.o.  female who presents with low blood counts. She was at her Cardiologists office yesterday and was found to have a Hemoglobin of 4.3. She was sent to the ED for further evaluation. She endorses sob, fatigue, lightheadedness, near syncope, cold intolerance for 1 month and her sob has increased over the last 1 week. She denies chest pain, palpitations, wheeze, cough, hemoptysis, hematemesis, hematuria, or melena. She says that she has been treated with IV iron in the past.     We were asked to admit for work up and evaluation of the above problems. ______________________________________________________________________  DISCHARGE SUMMARY/HOSPITAL COURSE:  for full details see H&P, daily progress notes, labs, consult notes.      Patient was subsequently admitted to [facility] for further evaluation as well as management, patient received packed unit RBC transfusion, patient's anticoagulation and aspirin were held, of note patient's antihypertensive medications were held, patient received fluid resuscitation, patient's noted improvement in serum creatinine, came to baseline, patient was evaluated by gastroenterology, received upper endoscopy as well as colonoscopy, no active signs of GI bleeding, case discussed with cardiology, currently will hold Eliquis, patient to follow-up with cardiology and gastroenterology, cardiology will make a decision as an outpatient about resuming anticoagulation, the plan was explained to the patient in detail who is agreeable to current plan.        _______________________________________________________________________  Patient seen and examined by me on discharge day. Pertinent Findings:  Gen:    Not in distress  Chest: Clear lungs  CVS:   Regular rhythm, s1/s2 no m/r/g  No edema  Abd:  Soft, not distended, not tender  Neuro:  Alert, Oriented x 4  _______________________________________________________________________  DISCHARGE MEDICATIONS:   Current Discharge Medication List        START taking these medications    Details   nystatin (MYCOSTATIN) powder Apply  to affected area two (2) times a day. Qty: 30 g, Refills: 0  Start date: 8/8/2022      pantoprazole (PROTONIX) 40 mg tablet Take 1 Tablet by mouth Before breakfast and dinner. Qty: 60 Tablet, Refills: 0  Start date: 8/8/2022           CONTINUE these medications which have NOT CHANGED    Details   fluticasone propionate (FLONASE) 50 mcg/actuation nasal spray 2 Sprays by Both Nostrils route daily. Administer to right and left nostril. Qty: 3 Each, Refills: 3      levothyroxine (SYNTHROID) 150 mcg tablet Take 1 Tablet by mouth Daily (before breakfast). Qty: 90 Tablet, Refills: 3      icosapent ethyL (VASCEPA) 1 gram capsule Take 2 Capsules by mouth two (2) times daily (with meals). Qty: 360 Capsule, Refills: 3      loratadine (Claritin) 10 mg tablet Take 1 Tablet by mouth daily. Indications: inflammation of the nose due to an allergy  Qty: 90 Tablet, Refills: 3    Associated Diagnoses: Acute recurrent sinusitis, unspecified location      Zetia 10 mg tablet Take 1 tablet by mouth daily.   Qty: 90 Tablet, Refills: 3    Associated Diagnoses: Pure hypercholesterolemia      atorvastatin (LIPITOR) 20 mg tablet TAKE 1 TABLET DAILY  Qty: 90 Tablet, Refills: 3    Associated Diagnoses: Pure hypercholesterolemia      lidocaine (LIDODERM) 5 % Apply patch to the affected area for 12 hours a day and remove for 12 hours a day. Qty: 90 Each, Refills: 3      amLODIPine (NORVASC) 10 mg tablet Take 1 Tab by mouth daily. Qty: 90 Tablet, Refills: 3      allopurinoL (ZYLOPRIM) 100 mg tablet Take 100 mg by mouth two (2) times a day. colchicine 0.6 mg tablet Take 1 Tab by mouth daily. Qty: 30 Tab, Refills: 5      potassium chloride SA (MICRO-K) 10 mEq capsule Take 2 Caps by mouth daily. Qty: 180 Cap, Refills: 3      polyethylene glycol (MIRALAX) 17 gram/dose powder Take 17 g by mouth daily. Qty: 2108 g, Refills: 3    Associated Diagnoses: Constipation, unspecified constipation type      ketoconazole (NIZORAL) 2 % topical cream Apply  to affected area daily as needed. Refills: 3      Biotin 2,500 mcg cap Take  by mouth. L GASSERI/B BIFIDUM/B LONGUM (Lumora PO) Take 1 Tab by mouth daily. vitamin E (AQUA GEMS) 400 unit capsule Take 800 Units by mouth two (2) times a day. cholecalciferol, vitamin D3, (VITAMIN D3) 2,000 unit tab Take 1 Tab by mouth daily. MULTIVITAMIN WITH MINERALS (ONE-A-DAY 50 PLUS PO) Take 1 Tab by mouth daily. STOP taking these medications       doxycycline (ADOXA) 100 mg tablet Comments:   Reason for Stopping:         furosemide (LASIX) 80 mg tablet Comments:   Reason for Stopping:         apixaban (Eliquis) 5 mg tablet Comments:   Reason for Stopping:         losartan (COZAAR) 25 mg tablet Comments:   Reason for Stopping:         docusate sodium (COLACE) 50 mg capsule Comments:   Reason for Stopping:         aspirin 81 mg chewable tablet Comments:   Reason for Stopping:                 Patient Follow Up Instructions:    Activity: PT/OT per Home Health  Diet: Cardiac Diet  Wound Care: As directed    Follow-up with PCP/Cardiology/Gastroenterology in 2 weeks.  Follow-up tests/labs As per above physicians  Follow-up Information       Follow up With Specialties Details Why Contact Info    Guera Bhatti MD Internal Medicine Physician   [address]  [facility]  [phone]      Luis Manuel Alcaraz MD Internal Medicine Physician   [address]      Guera Bhatti MD Internal Medicine Physician Follow up in 1 week(s)  [id number] [facility] [phone]      Guera Bhatti MD Internal Medicine Physician   [address]  [facility]  [phone]      Luis Manuel Alcaraz MD Internal Medicine Physician   [address] [facility] [address]      Jacki Mota MD Gastroenterology Follow up in 1 week(s)  Tabitha Wolf MD Clinical Cardiac Electrophysiology Physician, [address] [facility] Vascular Surgery, Cardiovascular Disease Physician Follow up in 1 week(s)  Brandee Saenz [phone]            ________________________________________________________________    Risk of deterioration: Low    Condition at Discharge:  Stable  __________________________________________________________________    Disposition  Home with family and home health services    ____________________________________________________________________    Code Status: Full Code  ___________________________________________________________________      Total time in minutes spent coordinating this discharge (includes going over instructions, follow-up, prescriptions, and preparing report for sign off to her PCP) :  45 minutes    Signed:   Lucinda Barahona MD

## 2022-08-08 NOTE — PROCEDURES
[facility]                   Endoscopic Gastroduodenoscopy Procedure Note      8/8/2022  Nella Krabbe  1945  [id number]    Procedure: Endoscopic Gastroduodenoscopy with biopsy    Indication:  chronic GI blood loss      Pre-operative Diagnosis: see indication above    Post-operative Diagnosis: see findings below    : MAHSA Rutherford MD    Referring Provider:  Loan Zavaleta MD      Anesthesia/Sedation:  MAC anesthesia Propofol    Airway assessment: No airway problems anticipated    Pre-Procedural Exam:      Airway: clear, no airway problems anticipated  Heart: RRR, without gallops or rubs  Lungs: clear bilaterally without wheezes, crackles, or rhonchi  Abdomen: soft, nontender, nondistended, bowel sounds present  Mental Status: awake, alert and oriented to person, place and time       Procedure Details     After infomed consent was obtained for the procedure, with all risks and benefits of procedure explained the patient was taken to the endoscopy suite and placed in the left lateral decubitus position. Following sequential administration of sedation as per above, the endoscope was inserted into the mouth and advanced under direct vision to second portion of the duodenum. A careful inspection was made as the gastroscope was withdrawn, including a retroflexed view of the proximal stomach; findings and interventions are described below. Findings:   Esophagus: normal mucosa. No varices seen  Stomach: Portal hypertensive gastropathy of the cardia and antrum with friability. Biopsied the antrum. Duodenum: normal    Therapies:  biopsy of stomach antrum    Specimens: biopsies of gastric antrum           Complications:   None; patient tolerated the procedure well. EBL:  None.             Impression:      Normal esophagus---no varices seen  Portal hypertensive gastropathy---suspect this is the cause of the anemia given the Eliquis  Normal duodenum    Recommendations:  -Await pathology. , -Need to discuss need to anticoagulate with her cardiologist.    Jayne Osorio., MD8/8/2022

## 2022-08-08 NOTE — PROGRESS NOTES
Endoscopy Case End Note:    1037:  Procedure scope was pre-cleaned, per protocol, at bedside by Love GONG      0494:  Report received from anesthesia - Rosa Sauceda CRNA. See anesthesia flowsheet for intra-procedure vital signs and events. Dentures transported with patient to recovery room.

## 2022-08-08 NOTE — INTERVAL H&P NOTE
Update History & Physical    The Patient's Consult/History and Physical of August 5, 2022 was reviewed with the patient and I examined the patient. There was no change. Patient has been transfused and hgb stable 8.6. Plan:  The risk, benefits, expected outcome, and alternative to the recommended procedure have been discussed with the patient. Patient understands and wants to proceed with the procedure.     Electronically signed by Car Casey MD on 8/8/2022 at 9:54 AM

## 2022-08-08 NOTE — ROUTINE PROCESS
Beryle Arthur  1945  [id number]    Situation:  Verbal report received from: ALEX Shea RN  Procedure: Procedure(s):  ESOPHAGOGASTRODUODENOSCOPY (EGD)  COLONOSCOPY  ESOPHAGOGASTRODUODENAL (EGD) BIOPSY    Background:    Preoperative diagnosis: anemia  Postoperative diagnosis: EGD-portal hypertensive gastropathy  COLON- hemorrhoids    :  Dr. Antonio Carbone  Assistant(s): Endoscopy Technician-1: Stephanie Hull  Endoscopy RN-1: Nereida Salmon RN  Endoscopy RN-2: Nereida Salmon RN    Specimens:   ID Type Source Tests Collected by Time Destination   1 : Stomach Antrum BX Preservative Stomach, Antrum  Eve Sorensen MD 8/8/2022 1019 Pathology     H. Pylori  no    Assessment:  Intra-procedure medications     Anesthesia gave intra-procedure sedation and medications, see anesthesia flow sheet yes    Intravenous fluids: NS@ KVO     Vital signs stable   yes    Abdominal assessment: round and soft    yes    Recommendation:    Return to floor  yes, inpatient room 8292  Family or Jody Bale, friend  Permission to share finding with family or friend yes

## 2022-08-08 NOTE — PROGRESS NOTES
Physician Progress Note      Monique Oliver  CSN #:                  [id number]  :                       1945  ADMIT DATE:       2022 11:34 AM  DISCH DATE:  RESPONDING  PROVIDER #:        Karen Shelley MD          QUERY TEXT:    Pt admitted with anemia Hgb of 4.2 (baseline Hgb 8-10) from Cardiology office. If possible, please document in progress notes and discharge summary further specificity regarding the acuity and type of anemia:    The medical record reflects the following:  Risk Factors: 77yoF w/ hx of anemia, PAFib on Eliquis, CKD stage 4  Clinical Indicators: found with Hgb of 4.2, endorses SOB fatigue  lightheadedness, near syncope cold intolerance for 1 Month with increased SOB in the past week    Treatment: Hold Eliquis, s/p PRBC transfusions, H/H monitoring, GI consult w/ EGD, Colonoscopy. Thank you,  Lucinda Thorpe RN, CDI  Options provided:  -- Anemia due to acute blood loss  -- Anemia due to chronic blood loss  -- Anemia due to iron deficiency  -- Other - I will add my own diagnosis  -- Disagree - Not applicable / Not valid  -- Disagree - Clinically unable to determine / Unknown  -- Refer to Clinical Documentation Reviewer    PROVIDER RESPONSE TEXT:    This patient has acute blood loss anemia.     Query created by: Lesvia Reece on 2022 4:32 PM      Electronically signed by:  Karen Shelley MD 2022 4:38 PM

## 2022-08-08 NOTE — ANESTHESIA PREPROCEDURE EVALUATION
Anesthetic History     PONV          Review of Systems / Medical History  Patient summary reviewed, nursing notes reviewed and pertinent labs reviewed    Pulmonary  Within defined limits                 Neuro/Psych             Comments: spinal stenosis Cardiovascular    Hypertension        Dysrhythmias   CAD and hyperlipidemia    Exercise tolerance: <4 METS  Comments: Hx Bifascicular block     GI/Hepatic/Renal     GERD      Liver disease    Comments: PERSONAL HX OF POLYPS   BRADY Syndrome  Fatty liver Endo/Other    Diabetes  Hypothyroidism  Morbid obesity, arthritis and anemia     Other Findings   Comments: Gout  Thrombocytopenia  RSD lower limb  Osteoporosis   DDD  Ambulates with a walker                      Physical Exam    Airway  Mallampati: III    Neck ROM: normal range of motion   Mouth opening: Normal     Cardiovascular  Regular rate and rhythm,  S1 and S2 normal,  no murmur, click, rub, or gallop             Dental    Dentition: Full upper dentures  Comments: No loose lower teeth   Pulmonary      Decreased breath sounds: bibasilar           Abdominal  GI exam deferred       Other Findings            Anesthetic Plan    ASA: 3  Anesthesia type: total IV anesthesia          Induction: Intravenous  Anesthetic plan and risks discussed with: Patient

## 2022-08-08 NOTE — PROGRESS NOTES
.I have reviewed discharge instructions with the patient. The patient verbalized understanding. IV removed. Pt belongings with pt.

## 2022-08-08 NOTE — PROGRESS NOTES
Physician Progress Note      Stevie Stevenson  CSN #:                  [id number]  :                       1945  ADMIT DATE:       2022 11:34 AM  DISCH DATE:  RESPONDING  PROVIDER #:        Demi Russo MD          QUERY TEXT:    Pt admitted with GI bleed w/ anemia. Pt underwent EGD and colonoscopy. EGD findings per GI noted 'Portal hypertensive gastropathy---suspect this is the cause of the anemia given the Eliquis'. If possible, please document in progress notes and discharge summary the likely etiology of anemia. The medical record reflects the following:  Risk Factors: 77yoF w/ HTN, CKD stage 4, Anemia (BL Hgb 8-10), on Eliquis  Clinical Indicators: Hgb of 4.3 on admit with increasing SOB, fatigue  lightheadedness, near syncope cold intolerance for 1 Month.  EGD note per Dr Antonio Carbone:  Portal hypertensive gastropathy---suspect this is the cause of the anemia given the Eliquis    Treatment: H/H  q 12 hrs, PRBC transfusions, EGD, Colonoscopy, Eliquis on hold. Thank you,  Lyly Rosa RN, CDI  Options provided:  -- GI bleed due to Hypertensive gastropathy due to Eliquis  -- GI bleed is unrelated to Hypertensive gastropathy  -- Other - I will add my own diagnosis  -- Disagree - Not applicable / Not valid  -- Disagree - Clinically unable to determine / Unknown  -- Refer to Clinical Documentation Reviewer    PROVIDER RESPONSE TEXT:    This patient has GI bleed due to Hypertensive gastropathy due to Eliquis.     Query created by: Noa Cid on 2022 4:40 PM      Electronically signed by:  Demi Russo MD 2022 4:44 PM

## 2022-08-08 NOTE — PROCEDURES
[facility]                  Colonoscopy Operative Report    8/8/2022      Rubye Pulse  [id number]  1945    Procedure Type:   Colonoscopy --diagnostic     Indications:   Chronic blood loss anemia      Pre-operative Diagnosis: see indication above    Post-operative Diagnosis:  See findings below    :  Keiko Montalvo MD    Referring Provider: Griselda Velazco MD      Sedation:  MAC anesthesia Propofol    Pre-Procedural Exam:      Airway: clear,  No airway problems anticipated  Heart: RRR, without gallops or rubs  Lungs: clear bilaterally without wheezes, crackles, or rhonchi  Abdomen: soft, nontender, nondistended, bowel sounds present  Mental Status: awake, alert and oriented to person, place and time     Procedure Details:  After informed consent was obtained with all risks and benefits of procedure explained and preoperative exam completed, the patient was taken to the endoscopy suite and placed in the left lateral decubitus position. Upon sequential sedation as per above, a digital rectal exam was performed . The Olympus videocolonoscope  was inserted in the rectum and carefully advanced to the cecum, which was identified by the ileocecal valve and appendiceal orifice. The cecum was identified by the ileocecal valve and appendiceal orifice. The quality of preparation was good. The colonoscope was slowly withdrawn with careful evaluation between folds. Retroflexion in the rectum was completed demonstrating internal hemorrhoids. Findings:   Rectum: Grade 1 internal hemorrhoid(s); Sigmoid: normal  Descending Colon: normal  Transverse Colon: normal  Ascending Colon: normal  Cecum: normal  Terminal Ileum: not intubated      Specimen Removed:  none    Complications: None. EBL:  None. Impression:    normal colonic mucosa throughout  hemorrhoids internal, Moderate in size    Recommendations: --  Regular diet.   Resume normal medication but best if does not need Eliquis   No repeat screening colonoscopy indicated due to age. Vipul Loving., MD    8/8/2022     MAHSA Askew MD  [facility], [address] [place] [address]  [phone]  [email or web address]

## 2022-08-08 NOTE — ANESTHESIA POSTPROCEDURE EVALUATION
Procedure(s):  ESOPHAGOGASTRODUODENOSCOPY (EGD)  COLONOSCOPY  ESOPHAGOGASTRODUODENAL (EGD) BIOPSY. total IV anesthesia    Anesthesia Post Evaluation        Patient location during evaluation: PACU  Note status: Adequate. Level of consciousness: responsive to verbal stimuli and sleepy but conscious  Pain management: satisfactory to patient  Airway patency: patent  Anesthetic complications: no  Cardiovascular status: acceptable  Respiratory status: acceptable  Hydration status: acceptable  Comments: +Post-Anesthesia Evaluation and Assessment    Patient: Beryle Piedra MRN: [id number]  SSN: [id number]   YOB: 1945  Age: 68 y.o. Sex: female      Cardiovascular Function/Vital Signs    BP (!) 152/81   Pulse 67   Temp 37 °C (98.6 °F)   Resp 14   Ht 4' 11\" (1.499 m)   Wt 106.6 kg (235 lb)   SpO2 100%   BMI 47.46 kg/m²     Patient is status post Procedure(s):  ESOPHAGOGASTRODUODENOSCOPY (EGD)  COLONOSCOPY  ESOPHAGOGASTRODUODENAL (EGD) BIOPSY. Nausea/Vomiting: Controlled. Postoperative hydration reviewed and adequate. Pain:  Pain Scale 1: Visual (08/08/22 1039)  Pain Intensity 1: 0 (08/08/22 1039)   Managed. Neurological Status: At baseline. Mental Status and Level of Consciousness: Arousable. Pulmonary Status:   O2 Device: None (08/08/22 1040)   Adequate oxygenation and airway patent. Complications related to anesthesia: None    Post-anesthesia assessment completed. No concerns. Signed By: Bob Councilman, MD    8/8/2022  Post anesthesia nausea and vomiting:  controlled      INITIAL Post-op Vital signs: No vitals data found for the desired time range.

## 2022-08-08 NOTE — PROGRESS NOTES
Problem: Falls - Risk of  Goal: *Absence of Falls  Description: Document Todd Rodrigues Fall Risk and appropriate interventions in the flowsheet. Outcome: Progressing Towards Goal  Note: Fall Risk Interventions:  Mobility Interventions: Assess mobility with egress test, Communicate number of staff needed for ambulation/transfer, OT consult for ADLs, Patient to call before getting OOB, PT Consult for assist device competence, PT Consult for mobility concerns, Utilize walker, cane, or other assistive device, Utilize gait belt for transfers/ambulation         Medication Interventions: Assess postural VS orthostatic hypotension, Bed/chair exit alarm, Teach patient to arise slowly, Utilize gait belt for transfers/ambulation    Elimination Interventions: Bed/chair exit alarm, Call light in reach, Patient to call for help with toileting needs              Problem: Patient Education: Go to Patient Education Activity  Goal: Patient/Family Education  Outcome: Progressing Towards Goal     Problem: Pressure Injury - Risk of  Goal: *Prevention of pressure injury  Description: Document Boris Scale and appropriate interventions in the flowsheet.   Outcome: Progressing Towards Goal  Note: Pressure Injury Interventions:  Sensory Interventions: Assess changes in LOC, Check visual cues for pain, Discuss PT/OT consult with provider, Float heels, Keep linens dry and wrinkle-free, Maintain/enhance activity level, Minimize linen layers, Pressure redistribution bed/mattress (bed type)    Moisture Interventions: Absorbent underpads, Apply protective barrier, creams and emollients, Limit adult briefs, Maintain skin hydration (lotion/cream), Minimize layers    Activity Interventions: Increase time out of bed, Pressure redistribution bed/mattress(bed type), PT/OT evaluation    Mobility Interventions: HOB 30 degrees or less, Float heels, Pressure redistribution bed/mattress (bed type), PT/OT evaluation    Nutrition Interventions: Offer support with meals,snacks and hydration    Friction and Shear Interventions: Apply protective barrier, creams and emollients, Feet elevated on foot rest, HOB 30 degrees or less, Lift sheet, Minimize layers                Problem: Patient Education: Go to Patient Education Activity  Goal: Patient/Family Education  Outcome: Progressing Towards Goal     Problem: Discharge Planning  Goal: *Discharge to safe environment  Outcome: Progressing Towards Goal

## 2022-08-08 NOTE — PROGRESS NOTES
Physical Therapy    Chart reviewed and orders acknowledged. Attempted to see pt for PT evaluation this AM but pt ANTONIA for endoscopy. Will defer and follow up later when available.     William Bundy PT, DPT, NCS

## 2022-08-08 NOTE — PROGRESS NOTES
1:35 AM  Unsuccessful x 2 on drawing timed h/h, RRT RN called to assist    1:56 AM  RRT RN Liseth Lawrence at bedside to attempt labs, unsuccessfulx2

## 2022-08-08 NOTE — PROGRESS NOTES
TRANSFER - OUT REPORT:    Verbal report given to RN(name) on Sanjuana Wagoner  being transferred to obs (unit) for routine progression of care       Report consisted of patients Situation, Background, Assessment and   Recommendations(SBAR). Information from the following report(s) SBAR, Kardex, Procedure Summary, Intake/Output, MAR, Accordion, Recent Results, and Med Rec Status was reviewed with the receiving nurse. Lines:   Peripheral IV 08/05/22 Right Antecubital (Active)   Site Assessment Clean, dry, & intact 08/08/22 1100   Phlebitis Assessment 0 08/08/22 1100   Infiltration Assessment 0 08/08/22 1100   Dressing Status Clean, dry, & intact 08/08/22 1100   Dressing Type Tape;Transparent 08/08/22 1100   Hub Color/Line Status Capped 08/08/22 1100   Action Taken Dressing changed;Catheter retaped 08/06/22 2649   Alcohol Cap Used Yes 08/08/22 1100        Opportunity for questions and clarification was provided.       Patient transported with:   Monitor  Patient-specific medications from Pharmacy

## 2022-08-08 NOTE — PERIOP NOTES
Post op instructions given to patient verbally. Opportunity for questions were given.  Patient verbalizes understanding

## 2022-08-08 NOTE — PROGRESS NOTES
Occupational Therapy    Patient chart reviewed up to date. Note patient currently unavailable 2/2 ANTONIA to endoscopy. Plan to defer and re-attempt evaluation as able and medically appropriate. Thank you.   Fabi Zavala, OTR/L

## 2022-08-08 NOTE — PROGRESS NOTES
Problem: Falls - Risk of  Goal: *Absence of Falls  Description: Document Leafy Williamson Fall Risk and appropriate interventions in the flowsheet. Outcome: Progressing Towards Goal  Note: Fall Risk Interventions:  Mobility Interventions: Communicate number of staff needed for ambulation/transfer         Medication Interventions: Patient to call before getting OOB    Elimination Interventions: Patient to call for help with toileting needs              Problem: Pressure Injury - Risk of  Goal: *Prevention of pressure injury  Description: Document Boris Scale and appropriate interventions in the flowsheet.   Outcome: Progressing Towards Goal  Note: Pressure Injury Interventions:  Sensory Interventions: Assess changes in LOC    Moisture Interventions: Absorbent underpads    Activity Interventions: Increase time out of bed    Mobility Interventions: Float heels    Nutrition Interventions: Document food/fluid/supplement intake    Friction and Shear Interventions: Lift sheet, Minimize layers

## 2022-08-08 NOTE — PROGRESS NOTES
.. TRANSFER - IN REPORT:    Verbal report received from Oswald(name) on Berkshire Mems  being received from [facility](unit) for ordered procedure      Report consisted of patients Situation, Background, Assessment and   Recommendations(SBAR). Information from the following report(s) Procedure Summary, Intake/Output, MAR, and Accordion was reviewed with the receiving nurse. Opportunity for questions and clarification was provided. Assessment completed upon patients arrival to unit and care assumed.

## 2022-08-09 LAB
ABO + RH BLD: NORMAL
BLD PROD TYP BPU: NORMAL
BLOOD GROUP ANTIBODIES SERPL: NORMAL
BPU ID: NORMAL
CROSSMATCH RESULT,%XM: NORMAL
SPECIMEN EXP DATE BLD: NORMAL
STATUS OF UNIT,%ST: NORMAL
UNIT DIVISION, %UDIV: 0

## 2022-08-16 NOTE — PROGRESS NOTES
HISTORY OF PRESENT ILLNESS  Gala Linares is a 68 y.o. female. HPI  FU DM-2 htn HLD gout hypothyroid osteoporosis BECKMAN with fibrosis--[facility] hepatology, suspected cirrhosis-Dr HILL , pancreatic cyst,  PAF Aortic stenosis-mild ckd 3 here with paid CG  Here for APURVA-admitted for anemia hb 4.3 with sob , fatigue, dizziness , feeling cold, love  Hydrated, prbcs -hb up to 9.0  Had egd/colon-portal hypertensive gastropathy suspected cause of anemia on eliquis per gi. Colon-hemorrhoids. On PPI   Eliquis and aspirin on hold -CARD MD aware but pt restarted aspirin  No brbpr or melena  Feels overall back to baseline  bun/cr 37/1/18 after IVF--lasix and losartan on hold-Nephrologist Dr ROHIT Philip Trevor right calf edema worse since hospitalization-not painful  Weight up a few lbs  Pt restarted her lasix 80 mg every day  Sees vascaular Dr Jaquan Bess next week for CVI    Admit date: 8/5/2022  Discharge date and time: 8/8/2022     DISCHARGE DIAGNOSIS:     GI bleed/anemia/acute on chronic kidney disease stage III/hypertension/paroxysmal atrial fibrillation on anticoagulation/hypothyroidism/type 2 diabetes/sick sinus syndrome/history of aortic valve stenosis/history of liver cirrhosis/history of Beckman/history of pancreatic cyst/history of hypertension     CONSULTATIONS:  IP CONSULT TO HOSPITALIST  IP CONSULT TO GASTROENTEROLOGY  IP CONSULT TO CARDIOLOGY     Excerpted HPI from H&P of Yue Akbar MD:  Amber Castellano is a 68 y.o.  female who presents with low blood counts. She was at her Cardiologists office yesterday and was found to have a Hemoglobin of 4.3. She was sent to the ED for further evaluation. She endorses sob, fatigue, lightheadedness, near syncope, cold intolerance for 1 month and her sob has increased over the last 1 week. She denies chest pain, palpitations, wheeze, cough, hemoptysis, hematemesis, hematuria, or melena.   She says that she has been treated with IV iron in the past.     We were asked to admit for work up and evaluation of the above problems. ______________________________________________________________________  DISCHARGE SUMMARY/HOSPITAL COURSE:  for full details see H&P, daily progress notes, labs, consult notes. Patient was subsequently admitted to [facility] for further evaluation as well as management, patient received packed unit RBC transfusion, patient's anticoagulation and aspirin were held, of note patient's antihypertensive medications were held, patient received fluid resuscitation, patient's noted improvement in serum creatinine, came to baseline, patient was evaluated by gastroenterology, received upper endoscopy as well as colonoscopy, no active signs of GI bleeding, case discussed with cardiology, currently will hold Eliquis, patient to follow-up with cardiology and gastroenterology, cardiology will make a decision as an outpatient about resuming anticoagulation, the plan was explained to the patient in detail who is agreeable to current plan.           _______________________________________________________________________  Patient seen and examined by me on discharge day. Pertinent Findings:  Gen:    Not in distress  Chest:  Clear lungs  CVS:    Regular rhythm, s1/s2 no m/r/g  No edema  Abd:     Soft, not distended, not tender  Neuro:  Alert, Oriented x 4  _______________________________________________________________________  DISCHARGE MEDICATIONS:   Current Discharge Medication List              START taking these medications     Details   nystatin (MYCOSTATIN) powder Apply  to affected area two (2) times a day. Qty: 30 g, Refills: 0  Start date: 8/8/2022       pantoprazole (PROTONIX) 40 mg tablet Take 1 Tablet by mouth Before breakfast and dinner.   Qty: 60 Tablet, Refills: 0  Start date: 8/8/2022                   CONTINUE these medications which have NOT CHANGED     Details   fluticasone propionate (FLONASE) 50 mcg/actuation nasal spray 2 Sprays by Both Nostrils route daily. Administer to right and left nostril. Qty: 3 Each, Refills: 3       levothyroxine (SYNTHROID) 150 mcg tablet Take 1 Tablet by mouth Daily (before breakfast). Qty: 90 Tablet, Refills: 3       icosapent ethyL (VASCEPA) 1 gram capsule Take 2 Capsules by mouth two (2) times daily (with meals). Qty: 360 Capsule, Refills: 3       loratadine (Claritin) 10 mg tablet Take 1 Tablet by mouth daily. Indications: inflammation of the nose due to an allergy  Qty: 90 Tablet, Refills: 3     Associated Diagnoses: Acute recurrent sinusitis, unspecified location       Zetia 10 mg tablet Take 1 tablet by mouth daily. Qty: 90 Tablet, Refills: 3     Associated Diagnoses: Pure hypercholesterolemia       atorvastatin (LIPITOR) 20 mg tablet TAKE 1 TABLET DAILY  Qty: 90 Tablet, Refills: 3     Associated Diagnoses: Pure hypercholesterolemia       lidocaine (LIDODERM) 5 % Apply patch to the affected area for 12 hours a day and remove for 12 hours a day. Qty: 90 Each, Refills: 3       amLODIPine (NORVASC) 10 mg tablet Take 1 Tab by mouth daily. Qty: 90 Tablet, Refills: 3       allopurinoL (ZYLOPRIM) 100 mg tablet Take 100 mg by mouth two (2) times a day. colchicine 0.6 mg tablet Take 1 Tab by mouth daily. Qty: 30 Tab, Refills: 5       potassium chloride SA (MICRO-K) 10 mEq capsule Take 2 Caps by mouth daily. Qty: 180 Cap, Refills: 3       polyethylene glycol (MIRALAX) 17 gram/dose powder Take 17 g by mouth daily. Qty: 2108 g, Refills: 3     Associated Diagnoses: Constipation, unspecified constipation type       ketoconazole (NIZORAL) 2 % topical cream Apply  to affected area daily as needed. Refills: 3       Biotin 2,500 mcg cap Take  by mouth. L GASSERI/B BIFIDUM/B LONGUM (Whotever PO) Take 1 Tab by mouth daily. vitamin E (AQUA GEMS) 400 unit capsule Take 800 Units by mouth two (2) times a day.        cholecalciferol, vitamin D3, (VITAMIN D3) 2,000 unit tab Take 1 Tab by mouth daily. MULTIVITAMIN WITH MINERALS (ONE-A-DAY 50 PLUS PO) Take 1 Tab by mouth daily. STOP taking these medications         doxycycline (ADOXA) 100 mg tablet Comments:   Reason for Stopping:            furosemide (LASIX) 80 mg tablet Comments:   Reason for Stopping:            apixaban (Eliquis) 5 mg tablet Comments:   Reason for Stopping:            losartan (COZAAR) 25 mg tablet Comments:   Reason for Stopping:            docusate sodium (COLACE) 50 mg capsule Comments:   Reason for Stopping:            aspirin 81 mg chewable tablet Comments:   Reason for Stopping:                       Patient Follow Up Instructions: Activity: PT/OT per Home Health  Diet: Cardiac Diet  Wound Care: As directed     Follow-up with PCP/Cardiology/Gastroenterology in 2 weeks.   Follow-up tests/labs As per above physicians  Follow-up Information       Patient Active Problem List    Diagnosis Date Noted    Anemia 08/05/2022    SSS (sick sinus syndrome) ([facility] 06/27/2022    PAF (paroxysmal atrial fibrillation) ([facility] 06/24/2022    Stage 3a chronic kidney disease ([facility] 05/20/2022    Type 2 diabetes mellitus with chronic kidney disease ([facility] 11/18/2021    Abnormal nuclear stress test 10/04/2021    Angina at rest [facility]) 10/04/2021    S/P cardiac cath 10/04/2021    Nonrheumatic aortic valve stenosis 09/08/2021    Pulmonary hypertension ([facility] 09/08/2021    MICHELLE (acute kidney injury) ([facility] [facility] 10/27/2020    Cellulitis of left lower leg 11/07/2019    Severe obesity ([facility] [facility] 09/30/2019    Controlled type 2 diabetes mellitus without complication ([facility] [facility] 02/24/1118    DDD (degenerative disc disease), lumbar 11/27/2017    Prediabetes 04/11/2016    Thrombocytopenia ([address] 01/24/2015    HTN (hypertension) 01/17/2014    Bifascicular block 12/23/2010    Fatty liver 06/18/2010    Pure hypercholesterolemia 06/18/2010    Colon polyp 06/18/2010    Gout 06/14/2010    Hypothyroidism 06/14/2010    Osteoporosis 06/14/2010    Anxiety 06/14/2010 Leg edema 06/14/2010    RSD lower limb 06/14/2010     Current Outpatient Medications   Medication Sig Dispense Refill    nystatin (MYCOSTATIN) powder Apply  to affected area two (2) times a day. 30 g 0    pantoprazole (PROTONIX) 40 mg tablet Take 1 Tablet by mouth Before breakfast and dinner. 60 Tablet 0    fluticasone propionate (FLONASE) 50 mcg/actuation nasal spray 2 Sprays by Both Nostrils route daily. Administer to right and left nostril. 3 Each 3    levothyroxine (SYNTHROID) 150 mcg tablet Take 1 Tablet by mouth Daily (before breakfast). 90 Tablet 3    icosapent ethyL (VASCEPA) 1 gram capsule Take 2 Capsules by mouth two (2) times daily (with meals). 360 Capsule 3    loratadine (Claritin) 10 mg tablet Take 1 Tablet by mouth daily. Indications: inflammation of the nose due to an allergy 90 Tablet 3    Zetia 10 mg tablet Take 1 tablet by mouth daily. 90 Tablet 3    atorvastatin (LIPITOR) 20 mg tablet TAKE 1 TABLET DAILY 90 Tablet 3    lidocaine (LIDODERM) 5 % Apply patch to the affected area for 12 hours a day and remove for 12 hours a day. 90 Each 3    amLODIPine (NORVASC) 10 mg tablet Take 1 Tab by mouth daily. 90 Tablet 3    allopurinoL (ZYLOPRIM) 100 mg tablet Take 100 mg by mouth two (2) times a day. colchicine 0.6 mg tablet Take 1 Tab by mouth daily. 30 Tab 5    potassium chloride SA (MICRO-K) 10 mEq capsule Take 2 Caps by mouth daily. 180 Cap 3    polyethylene glycol (MIRALAX) 17 gram/dose powder Take 17 g by mouth daily. 2108 g 3    ketoconazole (NIZORAL) 2 % topical cream Apply  to affected area daily as needed. 3    Biotin 2,500 mcg cap Take  by mouth. L GASSERI/B BIFIDUM/B LONGUM (Styky PO) Take 1 Tab by mouth daily. vitamin E (AQUA GEMS) 400 unit capsule Take 800 Units by mouth two (2) times a day. cholecalciferol, vitamin D3, (VITAMIN D3) 2,000 unit tab Take 1 Tab by mouth daily.       MULTIVITAMIN WITH MINERALS (ONE-A-DAY 50 PLUS PO) Take 1 Tab by mouth daily. Allergies   Allergen Reactions    Gabapentin Other (comments)     Suicidal ideation    Metoprolol Rash    Celebrex [Celecoxib] Hives    Pcn [Penicillins] Unknown (comments)     Can't remember, was a long time    Sulfa (Sulfonamide Antibiotics) Hives      Lab Results   Component Value Date/Time    WBC 6.9 08/08/2022 04:32 PM    HGB 9.0 (L) 08/08/2022 04:32 PM    HCT 30.5 (L) 08/08/2022 04:32 PM    PLATELET 94 (L) 25/10/0842 04:32 PM    MCV 87.6 08/08/2022 04:32 PM     Lab Results   Component Value Date/Time    Hemoglobin A1c 5.9 (H) 05/23/2022 11:18 AM    Hemoglobin A1c 5.5 11/19/2021 11:01 AM    Hemoglobin A1c 5.9 (H) 05/19/2021 10:49 AM    Hemoglobin A1c, External 7.0 07/29/2020 12:00 AM    Hemoglobin A1c, External 6.4 06/21/2016 12:00 AM    Glucose 127 (H) 08/08/2022 03:44 AM    Glucose (POC) 104 08/08/2022 04:56 PM    Microalbumin/Creat ratio (mg/g creat) <14 11/19/2021 11:01 AM    Microalbumin,urine random <0.50 11/19/2021 11:01 AM    LDL, calculated 60.6 05/23/2022 11:18 AM    Creatinine 1.18 (H) 08/08/2022 03:44 AM      Lab Results   Component Value Date/Time    GFR est non-AA 44 (L) 08/08/2022 03:44 AM    GFR est AA 54 (L) 08/08/2022 03:44 AM    Creatinine 1.18 (H) 08/08/2022 03:44 AM    BUN 37 (H) 08/08/2022 03:44 AM    Sodium 142 08/08/2022 03:44 AM    Potassium 4.2 08/08/2022 03:44 AM    Chloride 111 (H) 08/08/2022 03:44 AM    CO2 24 08/08/2022 03:44 AM    Magnesium 2.1 11/02/2020 02:06 AM    Phosphorus 3.6 11/03/2020 02:55 AM    PTH, Intact 31 01/06/2016 09:55 AM     Lab Results   Component Value Date/Time    Glucose 127 (H) 08/08/2022 03:44 AM    Glucose (POC) 104 08/08/2022 04:56 PM         ROS    Physical Exam  Vitals and nursing note reviewed. Constitutional:       Appearance: Normal appearance. She is well-developed. Comments: Appears stated age   Cardiovascular:      Rate and Rhythm: Normal rate and regular rhythm. Heart sounds: Normal heart sounds. No murmur heard.     No friction rub. No gallop. Pulmonary:      Effort: Pulmonary effort is normal. No respiratory distress. Breath sounds: Normal breath sounds. No wheezing. Abdominal:      General: Bowel sounds are normal.      Palpations: Abdomen is soft. Musculoskeletal:      Right lower leg: Edema present. Left lower leg: Edema present. Comments: Right > left LLE edema , wearing support stockings, negative Homans   Neurological:      Mental Status: She is alert. ASSESSMENT and PLAN    ICD-10-CM ICD-9-CM    1. Iron deficiency anemia, unspecified iron deficiency anemia type  D50.9 280.9 CBC W/O DIFF  Stop aspirin for now  Off eliquis  Refill protonix  Pt will need to notify MD for melena or BRBPR--d/w pt      2. MICHELLE (acute kidney injury) ([facility]  F56.0 333.4 METABOLIC PANEL, BASIC  Restarted lasix due to leg edema  Fu Dr Ruddy Mayorga      3. Leg edema, right  R60.0 782.3 DUPLEX LOWER EXT VENOUS RIGHT  Continue support stocking  Waqar vascular MD   4        PAF--fu Dr Tim Crews and decide about [address] [place] [address], ? Watchman?

## 2022-08-17 ENCOUNTER — OFFICE VISIT (OUTPATIENT)
Dept: INTERNAL MEDICINE CLINIC | Age: 77
End: 2022-08-17
Payer: MEDICARE

## 2022-08-17 VITALS
WEIGHT: 218.2 LBS | DIASTOLIC BLOOD PRESSURE: 59 MMHG | TEMPERATURE: 97.8 F | SYSTOLIC BLOOD PRESSURE: 125 MMHG | OXYGEN SATURATION: 97 % | RESPIRATION RATE: 24 BRPM | BODY MASS INDEX: 43.99 KG/M2 | HEART RATE: 77 BPM | HEIGHT: 59 IN

## 2022-08-17 DIAGNOSIS — N17.9 AKI (ACUTE KIDNEY INJURY) (HCC): ICD-10-CM

## 2022-08-17 DIAGNOSIS — D50.9 IRON DEFICIENCY ANEMIA, UNSPECIFIED IRON DEFICIENCY ANEMIA TYPE: Primary | ICD-10-CM

## 2022-08-17 DIAGNOSIS — R60.0 LEG EDEMA, RIGHT: ICD-10-CM

## 2022-08-17 LAB
ANION GAP SERPL CALC-SCNC: 7 MMOL/L (ref 5–15)
BUN SERPL-MCNC: 20 MG/DL (ref 6–20)
BUN/CREAT SERPL: 16 (ref 12–20)
CALCIUM SERPL-MCNC: 9.8 MG/DL (ref 8.5–10.1)
CHLORIDE SERPL-SCNC: 109 MMOL/L (ref 97–108)
CO2 SERPL-SCNC: 27 MMOL/L (ref 21–32)
CREAT SERPL-MCNC: 1.27 MG/DL (ref 0.55–1.02)
ERYTHROCYTE [DISTWIDTH] IN BLOOD BY AUTOMATED COUNT: 17.9 % (ref 11.5–14.5)
GLUCOSE SERPL-MCNC: 145 MG/DL (ref 65–100)
HCT VFR BLD AUTO: 34 % (ref 35–47)
HGB BLD-MCNC: 10 G/DL (ref 11.5–16)
MCH RBC QN AUTO: 27.2 PG (ref 26–34)
MCHC RBC AUTO-ENTMCNC: 29.4 G/DL (ref 30–36.5)
MCV RBC AUTO: 92.4 FL (ref 80–99)
NRBC # BLD: 0 K/UL (ref 0–0.01)
NRBC BLD-RTO: 0 PER 100 WBC
PLATELET # BLD AUTO: 100 K/UL (ref 150–400)
PMV BLD AUTO: 10.2 FL (ref 8.9–12.9)
POTASSIUM SERPL-SCNC: 4 MMOL/L (ref 3.5–5.1)
RBC # BLD AUTO: 3.68 M/UL (ref 3.8–5.2)
SODIUM SERPL-SCNC: 143 MMOL/L (ref 136–145)
WBC # BLD AUTO: 5.2 K/UL (ref 3.6–11)

## 2022-08-17 PROCEDURE — 99214 OFFICE O/P EST MOD 30 MIN: CPT | Performed by: INTERNAL MEDICINE

## 2022-08-17 PROCEDURE — G8432 DEP SCR NOT DOC, RNG: HCPCS | Performed by: INTERNAL MEDICINE

## 2022-08-17 PROCEDURE — 1090F PRES/ABSN URINE INCON ASSESS: CPT | Performed by: INTERNAL MEDICINE

## 2022-08-17 PROCEDURE — 1101F PT FALLS ASSESS-DOCD LE1/YR: CPT | Performed by: INTERNAL MEDICINE

## 2022-08-17 PROCEDURE — G8752 SYS BP LESS 140: HCPCS | Performed by: INTERNAL MEDICINE

## 2022-08-17 PROCEDURE — G8536 NO DOC ELDER MAL SCRN: HCPCS | Performed by: INTERNAL MEDICINE

## 2022-08-17 PROCEDURE — 1111F DSCHRG MED/CURRENT MED MERGE: CPT | Performed by: INTERNAL MEDICINE

## 2022-08-17 PROCEDURE — G8754 DIAS BP LESS 90: HCPCS | Performed by: INTERNAL MEDICINE

## 2022-08-17 PROCEDURE — G8417 CALC BMI ABV UP PARAM F/U: HCPCS | Performed by: INTERNAL MEDICINE

## 2022-08-17 PROCEDURE — G8427 DOCREV CUR MEDS BY ELIG CLIN: HCPCS | Performed by: INTERNAL MEDICINE

## 2022-08-17 RX ORDER — FUROSEMIDE 80 MG/1
80 TABLET ORAL DAILY
COMMUNITY
Start: 2022-08-16

## 2022-08-17 RX ORDER — LOSARTAN POTASSIUM 50 MG/1
TABLET ORAL
COMMUNITY
Start: 2022-08-16 | End: 2022-08-22

## 2022-08-17 RX ORDER — PANTOPRAZOLE SODIUM 40 MG/1
40 TABLET, DELAYED RELEASE ORAL
Qty: 60 TABLET | Refills: 5 | Status: SHIPPED | OUTPATIENT
Start: 2022-08-17

## 2022-08-17 NOTE — PROGRESS NOTES
Chief Complaint   Patient presents with    Hospital Follow Up       1. Have you been to the ER, urgent care clinic since your last visit? Hospitalized since your last visit? ER anemia. Colonoscopy / Endoscope. 2. Have you seen or consulted any other health care providers outside of the [address] [place] [address] since your last visit? Include any pap smears or colon screening.  No    Pt c/o bilateral leg swelling    Pt scheduled for 12:30pm Stat for US

## 2022-08-18 ENCOUNTER — HOSPITAL ENCOUNTER (OUTPATIENT)
Dept: ULTRASOUND IMAGING | Age: 77
Discharge: HOME | End: 2022-08-18
Attending: INTERNAL MEDICINE
Payer: MEDICARE

## 2022-08-18 ENCOUNTER — TELEPHONE (OUTPATIENT)
Dept: INTERNAL MEDICINE CLINIC | Age: 77
End: 2022-08-18

## 2022-08-18 DIAGNOSIS — R60.0 LEG EDEMA, RIGHT: ICD-10-CM

## 2022-08-18 PROCEDURE — 93971 EXTREMITY STUDY: CPT

## 2022-08-18 NOTE — TELEPHONE ENCOUNTER
Pt states they did not find a blood clot with the ultra sound. Pt states her legs are still swollen and she needs to know if Dr. Evan Enriquez wants her to increase lasix?       Please call pt to advise

## 2022-08-18 NOTE — TELEPHONE ENCOUNTER
Patient returned call to office in regards to message. Writer informed patient, per provider, \"Take 1.5 lasix x 3 -5 days then back to 1 every day--elevate legs and stockings\". Patient states that she understands the information received and has no further questions or concerns at this time.

## 2022-08-21 NOTE — PROGRESS NOTES
Telll pt anmiea is improving and kidney function is stable on lasix.  Fu with specialists as schedu;ed

## 2022-08-22 ENCOUNTER — OFFICE VISIT (OUTPATIENT)
Dept: ENDOCRINOLOGY | Age: 77
End: 2022-08-22
Payer: MEDICARE

## 2022-08-22 VITALS
HEIGHT: 59 IN | WEIGHT: 210.8 LBS | SYSTOLIC BLOOD PRESSURE: 159 MMHG | DIASTOLIC BLOOD PRESSURE: 54 MMHG | HEART RATE: 74 BPM | BODY MASS INDEX: 42.5 KG/M2

## 2022-08-22 DIAGNOSIS — M81.0 AGE-RELATED OSTEOPOROSIS WITHOUT CURRENT PATHOLOGICAL FRACTURE: ICD-10-CM

## 2022-08-22 DIAGNOSIS — E03.9 ACQUIRED HYPOTHYROIDISM: Primary | ICD-10-CM

## 2022-08-22 DIAGNOSIS — E55.9 HYPOVITAMINOSIS D: ICD-10-CM

## 2022-08-22 DIAGNOSIS — N18.32 TYPE 2 DIABETES MELLITUS WITH STAGE 3B CHRONIC KIDNEY DISEASE, WITHOUT LONG-TERM CURRENT USE OF INSULIN (HCC): ICD-10-CM

## 2022-08-22 DIAGNOSIS — E11.22 TYPE 2 DIABETES MELLITUS WITH STAGE 3B CHRONIC KIDNEY DISEASE, WITHOUT LONG-TERM CURRENT USE OF INSULIN (HCC): ICD-10-CM

## 2022-08-22 PROCEDURE — 1111F DSCHRG MED/CURRENT MED MERGE: CPT | Performed by: INTERNAL MEDICINE

## 2022-08-22 PROCEDURE — 1101F PT FALLS ASSESS-DOCD LE1/YR: CPT | Performed by: INTERNAL MEDICINE

## 2022-08-22 PROCEDURE — G8417 CALC BMI ABV UP PARAM F/U: HCPCS | Performed by: INTERNAL MEDICINE

## 2022-08-22 PROCEDURE — 99205 OFFICE O/P NEW HI 60 MIN: CPT | Performed by: INTERNAL MEDICINE

## 2022-08-22 PROCEDURE — G8754 DIAS BP LESS 90: HCPCS | Performed by: INTERNAL MEDICINE

## 2022-08-22 PROCEDURE — 1123F ACP DISCUSS/DSCN MKR DOCD: CPT | Performed by: INTERNAL MEDICINE

## 2022-08-22 PROCEDURE — G8427 DOCREV CUR MEDS BY ELIG CLIN: HCPCS | Performed by: INTERNAL MEDICINE

## 2022-08-22 PROCEDURE — 1090F PRES/ABSN URINE INCON ASSESS: CPT | Performed by: INTERNAL MEDICINE

## 2022-08-22 PROCEDURE — G8432 DEP SCR NOT DOC, RNG: HCPCS | Performed by: INTERNAL MEDICINE

## 2022-08-22 PROCEDURE — 3044F HG A1C LEVEL LT 7.0%: CPT | Performed by: INTERNAL MEDICINE

## 2022-08-22 PROCEDURE — G8536 NO DOC ELDER MAL SCRN: HCPCS | Performed by: INTERNAL MEDICINE

## 2022-08-22 PROCEDURE — G8753 SYS BP > OR = 140: HCPCS | Performed by: INTERNAL MEDICINE

## 2022-08-22 NOTE — PROGRESS NOTES
Called patient. ID verified with Name and . Writer spoke with patient in regards to recent lab results. Writer informed patient, per provider, \"Telll pt anmiea is improving and kidney function is stable on lasix. Fu with specialists as schedu;ed\"    Patient states that she understands the information received and has no further questions or concerns at this time.

## 2022-08-22 NOTE — LETTER
8/22/2022    Patient: Deangelo Marinelli   YOB: 1945   Date of Visit: 8/22/2022     Charla Anderson MD  [address] [facility] [address] [place] [address]    Dear Charla Anderson MD,      Thank you for referring Ms. Laura Jack to [facility] for evaluation. My notes for this consultation are attached. If you have questions, please do not hesitate to call me. I look forward to following your patient along with you.       Sincerely,    Jose Jones MD

## 2022-08-22 NOTE — PROGRESS NOTES
Chief Complaint   Patient presents with    Diabetes    Thyroid Problem    Osteoporosis     Pcp and pharmacy verified     History of Present Illness: Jim Moya is a 68 y.o. female who I was asked to see in consult by Dr. Laurie Solorzano for evaluation of hypothyroidism and osteoporosis. Pt was diagnosed with hypothyroidism \"20+ years ago\". She is currently taking Levoxyl 150mcg every morning, with her her other AM meds (including zetia, allopurinol, lasix and pantoprazole). She is not taking any MVI or iron supplements. She was first diagnosed with osteoporosis \"in 1999. They put me on Fosamax for a while, then they took me off of it they said I was on it too long I have not taken any in several years. Pt was previously seen by Dr. Sumeet Roberts and reviewing his notes   Osteoporosis:  Diagnosed in 2002. Says she was started on Fosamax initially and then this was changed to [facility] [place] in 2007. She took this until 2011. She then had about a 2 year drug holiday. After BMD in 7/2013, alendronate was resumed until early 2016. BMD in 8/2014 showed BMD to be stable. Remained at very high fx risk. 8/2016 study showed worsening of BMD although values at left femoral neck and L-spine appears stable to improved vs 2012.   2018 study done showed BMD stable vs 2016. She had a DXA scan in November 2021 did not show any clinically significant changes from 2018,    She has not had any bone fractures since the 1990s. Pt reports she is taking Vitamin D \"I think I take one pill every day and two vitamin E every day. \"    Pt notes that \"I was told that because of my gout levels, I should not have reclast.    Pt has a STEPHAN-BSO in the 80s. \"I had that in my 27. I was having a lot of bleeding. \" She was on HRT for a time, but they were stopped \"almost 20 years ago. \"    She was recently admitted for anemia. She was at her Cardiologists office and was found to have a Hemoglobin of 4.3.  She was admitted for evaluation, received packed unit RBC transfusion, patient's anticoagulation and aspirin were held. Patient was evaluated by gastroenterology, received upper endoscopy as well as colonoscopy, no active signs of GI bleeding. She is still off the elequis, losartan and ASA. She saw the GI doctor, Dr. Ana Carcamo said everything was looking ok\". She is not aware of any further bleeding since her hospitalization. Pt notes she had an issue of anemia in the past and has required iron infusions in the past.    Pt has a hx of diabetes, but she is not on any DM medications and her last A1C in May 2022 was 5.9%. \"She was taken off Diabetes Medications two years ago. I was on Metformin but my nephrologist said I was almost had to go on dialysis and he stopped the Metformin. \"  She is followed by Dr. Petey Franklin    She will test her BGs \"once in a while\". When she has tested it was in the 120-150s. She denies issues of hypoglycemia.       Past Medical History:   Diagnosis Date    Abnormal nuclear stress test 10/4/2021    Angina at rest [facility]) 10/4/2021    Arthritis     KNEE,gout    Bundle branch block     Endocrine disease     HYPOTHYROIDISM    Fracture     Left distal femur (age 12 - pt fell)    Fracture     Left tibia 1990 (pt fell)    Fracture     Right wrist fractured 2 times (pt fell)    GERD (gastroesophageal reflux disease)     High cholesterol     Hypertension     Hypoglycemia     \"my body produces too much insulin\"    Liver disease     BRADY    Nausea & vomiting     when had gallbladder out    Osteoporosis     Other ill-defined conditions(799.89)     edema legs    S/P cardiac cath 10/4/2021    10/4/2021 nonobstructive disease    Spinal stenosis     Thyroid disease      Past Surgical History:   Procedure Laterality Date    COLONOSCOPY N/A 7/13/2021    COLONOSCOPY performed by Raymond Clayton MD at [facility] ENDOSCOPY    COLONOSCOPY N/A 8/8/2022    COLONOSCOPY performed by Paras Baeza MD at [place] [address] [address]  2/11/2015         COLONOSCOPY,REMV JACQUELINEN,SNARE  7/13/2021         COLONOSCPY,FLEX,W/DIR SUBMUC INJECT  7/13/2021         COLORECTAL SCRN; HI RISK IND  2/11/2015         HX CHOLECYSTECTOMY      HX HYSTERECTOMY      HX ORTHOPAEDIC Left     leg surgery - plates, screws, wires, pins in place    HX UROLOGICAL      PESSURY    MT ABDOMEN SURGERY PROC UNLISTED      liver biopsy--BRADY and fibrosis    UPPER GI ENDOSCOPY,BIOPSY  11/17/2015          Current Outpatient Medications   Medication Sig    furosemide (LASIX) 80 mg tablet Take 80 mg by mouth daily. pantoprazole (PROTONIX) 40 mg tablet Take 1 Tablet by mouth Before breakfast and dinner. nystatin (MYCOSTATIN) powder Apply  to affected area two (2) times a day. fluticasone propionate (FLONASE) 50 mcg/actuation nasal spray 2 Sprays by Both Nostrils route daily. Administer to right and left nostril.    levothyroxine (SYNTHROID) 150 mcg tablet Take 1 Tablet by mouth Daily (before breakfast). icosapent ethyL (VASCEPA) 1 gram capsule Take 2 Capsules by mouth two (2) times daily (with meals). loratadine (Claritin) 10 mg tablet Take 1 Tablet by mouth daily. Indications: inflammation of the nose due to an allergy    Zetia 10 mg tablet Take 1 tablet by mouth daily. atorvastatin (LIPITOR) 20 mg tablet TAKE 1 TABLET DAILY    lidocaine (LIDODERM) 5 % Apply patch to the affected area for 12 hours a day and remove for 12 hours a day. amLODIPine (NORVASC) 10 mg tablet Take 1 Tab by mouth daily. allopurinoL (ZYLOPRIM) 100 mg tablet Take 100 mg by mouth two (2) times a day. potassium chloride SA (MICRO-K) 10 mEq capsule Take 2 Caps by mouth daily. polyethylene glycol (MIRALAX) 17 gram/dose powder Take 17 g by mouth daily. ketoconazole (NIZORAL) 2 % topical cream Apply  to affected area daily as needed. Biotin 2,500 mcg cap Take  by mouth. L GASSERI/B BIFIDUM/B LONGUM ([employer] PO) Take 1 Tab by mouth daily.     vitamin E (AQUA GEMS) 400 unit capsule Take 800 Units by mouth two (2) times a day. cholecalciferol, vitamin D3, 50 mcg (2,000 unit) tab Take 1 Tab by mouth daily. MULTIVITAMIN WITH MINERALS (ONE-A-DAY 50 PLUS PO) Take 1 Tab by mouth daily. No current facility-administered medications for this visit.      Allergies   Allergen Reactions    Gabapentin Other (comments)     Suicidal ideation    Metoprolol Rash    Celebrex [Celecoxib] Hives    Eliquis [Apixaban] Other (comments)     Caused excessive anemia    Pcn [Penicillins] Unknown (comments)     Can't remember, was a long time    Sulfa (Sulfonamide Antibiotics) Hives     Family History   Problem Relation Age of Onset    Diabetes Mother     Heart Disease Mother     Emphysema Father     No Known Problems Brother     Stroke Maternal Grandmother     No Known Problems Maternal Grandfather     No Known Problems Paternal Grandmother     No Known Problems Paternal Grandfather      Social History     Socioeconomic History    Marital status: SINGLE     Spouse name: Not on file    Number of children: Not on file    Years of education: Not on file    Highest education level: Not on file   Occupational History    Not on file   Tobacco Use    Smoking status: Never    Smokeless tobacco: Never   Vaping Use    Vaping Use: Never used   Substance and Sexual Activity    Alcohol use: No    Drug use: Yes     Types: Prescription, OTC    Sexual activity: Not Currently   Other Topics Concern    Not on file   Social History Narrative    Not on file     Social Determinants of Health     Financial Resource Strain: Low Risk     Difficulty of Paying Living Expenses: Not hard at all   Food Insecurity: No Food Insecurity    Worried About Running Out of Food in the Last Year: Never true    Ran Out of Food in the Last Year: Never true   Transportation Needs: Not on file   Physical Activity: Not on file   Stress: Not on file   Social Connections: Not on file   Intimate Partner Violence: Not on file   Housing Stability: Not on file     Review of Systems:  Constitutional Symptoms: no fevers, chills, weight loss  Eyes: no blurry vision or double vision  Cardiovascular: no chest pain or palpitations  Respiratory: no cough or shortness of breath  Gastrointestinal: no dysphagia or abdominal pain  Musculoskeletal: no joint pains or weakness  Integumentary: no rashes  Neurological: no numbness, tingling, or headaches  Psychiatric: no depression or anxiety  Endocrine: no heat or cold intolerance, no polyuria or polydipsia    Physical Examination:  Blood pressure (!) 159/54, pulse 74, height 4' 11\" (1.499 m), weight 210 lb 12.8 oz (95.6 kg). General: pleasant, no distress, good eye contact  HEENT: no exopthalmos, no periorbital edema, no scleral/conjunctival injection, EOMI, no lid lag or stare  Neck: supple, no thyromegaly, masses, lymph nodes, or carotid bruits, no supraclavicular or dorsocervical fat pads  Cardiovascular: regular, normal rate, normal S1 and S2, no murmurs/rubs/gallops, 2+ dorsalis pedis pulses bilaterally  Respiratory: clear to auscultation bilaterally  Gastrointestinal: soft, nontender, nondistended, no masses, no hepatosplenomegaly  Musculoskeletal: no proximal muscle weakness in upper or lower extremities  Integumentary: no acanthosis nigricans, no rashes, 1+ LE edema, she has sores on her legs bilaterally.   Neurological: reflexes 2+ at biceps, no tremor  Psychiatric: normal mood and affect  Diabetic foot exam:     Left Foot:   Visual Exam: callous - present   Pulse DP: 2+ (normal)   Filament test: normal sensation    Vibratory sensation: normal      Right Foot:   Visual Exam: callous - present   Pulse DP: 2+ (normal)   Filament test: normal sensation    Vibratory sensation: normal      Data Reviewed:   Component      Latest Ref Rng & Units 8/17/2022 5/23/2022   Sodium      136 - 145 mmol/L 143    Potassium      3.5 - 5.1 mmol/L 4.0    Chloride      97 - 108 mmol/L 109 (H)    CO2      21 - 32 mmol/L 27 Anion gap      5 - 15 mmol/L 7    Glucose      65 - 100 mg/dL 145 (H)    BUN      6 - 20 MG/DL 20    Creatinine      0.55 - 1.02 MG/DL 1.27 (H)    BUN/Creatinine ratio      12 - 20   16    GFR est AA      >60 ml/min/1.73m2 49 (L)    GFR est non-AA      >60 ml/min/1.73m2 41 (L)    Calcium      8.5 - 10.1 MG/DL 9.8    Hemoglobin A1c, (calculated)      4.0 - 5.6 %  5.9 (H)   Est. average glucose      mg/dL  123     Insulin, Free uU/mL 13  Reference Range:   Pubertal Children and   Adults (fasting): 0 - 17   Insulin, Total uU/mL 13       Triglycerides 0 - 150 mg/dL 122    Cholesterol 100 - 200 mg/dL 148    LDL Calculated 0 - 130 mg/dL 69    Cholesterol, HDL 40 - 150 mg/dL 57    Non-HDL 0 - 160 mg/dL 91      Narrative & Impression   Bone Mineral Density     Indication:  Osteoporosis  Age: 68  Sex: Female. Menopause status: postmenopausal.  Hormone replacement therapy: None     Number of falls in the past year:   None. Risk factors for osteoporosis:  Hyperparathyroidism     Current medication for osteoporosis: Calcium and vitamin D. Comparison: 2018     Technique: Imaging was performed on the CONSTRVCT       Excluded sites: L3 and L4 for spondylitic change      Findings:     Fractures identified on Lateral scanogram:  0     Femoral Neck Left:  Bone mineral density (gm/cm2):  0.627 g/cm?  % of peak bone mass:  60%  % for age matched controls:  74%  T-score:  -3.0   Z-score:  -1.6      Total Hip Left:  Bone mineral density (gm/cm2):  0.661 g/cm?  % of peak bone mass:  66%  % for age matched controls:  76%  T-score:  -2.8   Z-score:  -1.6      Lumbar Spine:  L1 plus L2  Bone mineral density (gm/cm2):  0.010  % of peak bone mass:  86  % for age matched controls:  95  T-score:  -1.4  Z-score:  -0.5     33% Radius Left:  Bone mineral density (gm/cm2):  0.833 g/cm?  % of peak bone mass:  95%  % for age matched controls:  95%  T-score:  -0.5   Z-score:  1.9        Assessment/Plan:   1. Acquired hypothyroidism    2. Age-related osteoporosis without current pathological fracture    3. Hypovitaminosis D    4. Type 2 diabetes mellitus with stage 3b chronic kidney disease, without long-term current use of insulin (HCC)    1) Pt is clinically euthyroid on the Levoxyl 150mcg daily. I will order TFTs and adjust her dose as needed. 2) We discussed the treatments for osteoporosis. With her low renal function I would be very hesitant to restart a bisphosphonate. Particularly since her DXA has been stable since 2018 and she has not had any fractures since 1990s. We discussed denosumab, the MOA and potential side effects. Her Ca last week was 9.8. I will order a Vitamin D to ensure her level is >30 and will then send her for Prolia every 6 months. 3) I will order a Vitamin D level and increase her Vitamin D dose as needed based on the result. Our goal is to keep the Vitamin D > 30.  4) Her A1C was 5.9% in May 2022. She is not taking any DM medications and when she has tested her BGs they have been ok. We can not test her A1C at this time as she just had a pRBC, which will falsely lower her A1C. I will not start pt on an DM medications at this time. Pt voices understanding and agreement with the plan.   RTC 4 months    Copy sent to:  Dr. Naldo Whittaker

## 2022-08-23 ENCOUNTER — TELEPHONE (OUTPATIENT)
Dept: ENDOCRINOLOGY | Age: 77
End: 2022-08-23

## 2022-08-23 NOTE — TELEPHONE ENCOUNTER
8/23/2022  8:55 AM        Pt called and stated she was returning 's call but I don't see a call from Deedee Dominguez. #[phone]      Thanks,  Darren Chan

## 2022-08-23 NOTE — TELEPHONE ENCOUNTER
Spoke with pt regarding her labs. Pt to continue the LT4 150mcg daily and continue her Vitamin D daily. I will order the Prolia every 6 months. Pt voices understanding and agreement with the plan.

## 2022-08-25 ENCOUNTER — TELEPHONE (OUTPATIENT)
Dept: ENDOCRINOLOGY | Age: 77
End: 2022-08-25

## 2022-08-25 NOTE — TELEPHONE ENCOUNTER
8/25/2022  4:16 PM      Pt called and stated she was returning  call regarding her labs. PT#[phone]      Thanks,  Lynn Banks

## 2022-08-26 NOTE — TELEPHONE ENCOUNTER
Called ptJOSEY. I explained that her Vitamin D and TFTs looked good. Pt instructed to continue the Levoxyl 150mcg daily.

## 2022-08-31 ENCOUNTER — DOCUMENTATION ONLY (OUTPATIENT)
Dept: ENDOCRINOLOGY | Age: 77
End: 2022-08-31

## 2022-09-23 DIAGNOSIS — I10 PRIMARY HYPERTENSION: Primary | ICD-10-CM

## 2022-09-23 DIAGNOSIS — R60.0 LEG EDEMA: ICD-10-CM

## 2022-09-23 NOTE — TELEPHONE ENCOUNTER
PCP: Griselda Velazco MD    Last appt: 8/17/2022  Future Appointments   Date Time Provider Hilary Dickinson   1/5/2023  9:30 AM Bird Griffin MD [facility] [address] [facility]       Requested Prescriptions     Pending Prescriptions Disp Refills    amLODIPine (NORVASC) 10 mg tablet 90 Tablet 3     Sig: Take 1 Tablet by mouth daily. potassium chloride SA (MICRO-K) 10 mEq capsule 180 Capsule 3     Sig: Take 2 Capsules by mouth daily.

## 2022-09-24 RX ORDER — AMLODIPINE BESYLATE 10 MG/1
10 TABLET ORAL DAILY
Qty: 90 TABLET | Refills: 3 | Status: SHIPPED | OUTPATIENT
Start: 2022-09-24

## 2022-09-24 RX ORDER — POTASSIUM CHLORIDE 750 MG/1
20 CAPSULE, EXTENDED RELEASE ORAL DAILY
Qty: 180 CAPSULE | Refills: 3 | Status: SHIPPED | OUTPATIENT
Start: 2022-09-24

## 2022-09-27 ENCOUNTER — TRANSCRIBE ORDER (OUTPATIENT)
Dept: SCHEDULING | Age: 77
End: 2022-09-27

## 2022-09-27 DIAGNOSIS — Z12.31 SCREENING MAMMOGRAM FOR HIGH-RISK PATIENT: Primary | ICD-10-CM

## 2022-10-06 ENCOUNTER — HOSPITAL ENCOUNTER (INPATIENT)
Age: 77
LOS: 11 days | Discharge: HOME HEALTH | DRG: 378 | End: 2022-10-17
Attending: EMERGENCY MEDICINE | Admitting: INTERNAL MEDICINE
Payer: MEDICARE

## 2022-10-06 ENCOUNTER — APPOINTMENT (OUTPATIENT)
Dept: GENERAL RADIOLOGY | Age: 77
DRG: 378 | End: 2022-10-06
Attending: EMERGENCY MEDICINE
Payer: MEDICARE

## 2022-10-06 DIAGNOSIS — D50.0 IRON DEFICIENCY ANEMIA SECONDARY TO BLOOD LOSS (CHRONIC): ICD-10-CM

## 2022-10-06 DIAGNOSIS — D64.9 SEVERE ANEMIA: ICD-10-CM

## 2022-10-06 DIAGNOSIS — K92.2 GASTROINTESTINAL HEMORRHAGE, UNSPECIFIED GASTROINTESTINAL HEMORRHAGE TYPE: Primary | ICD-10-CM

## 2022-10-06 LAB
ALBUMIN SERPL-MCNC: 3.2 G/DL (ref 3.5–5)
ALBUMIN/GLOB SERPL: 1.1 {RATIO} (ref 1.1–2.2)
ALP SERPL-CCNC: 116 U/L (ref 45–117)
ALT SERPL-CCNC: 25 U/L (ref 12–78)
ANION GAP SERPL CALC-SCNC: 7 MMOL/L (ref 5–15)
AST SERPL-CCNC: 33 U/L (ref 15–37)
ATRIAL RATE: 76 BPM
BASOPHILS # BLD: 0 K/UL (ref 0–0.1)
BASOPHILS NFR BLD: 0 % (ref 0–1)
BILIRUB SERPL-MCNC: 1.2 MG/DL (ref 0.2–1)
BUN SERPL-MCNC: 62 MG/DL (ref 6–20)
BUN/CREAT SERPL: 32 (ref 12–20)
CALCIUM SERPL-MCNC: 9 MG/DL (ref 8.5–10.1)
CALCULATED R AXIS, ECG10: -46 DEGREES
CALCULATED T AXIS, ECG11: 74 DEGREES
CHLORIDE SERPL-SCNC: 110 MMOL/L (ref 97–108)
CO2 SERPL-SCNC: 23 MMOL/L (ref 21–32)
CREAT SERPL-MCNC: 1.91 MG/DL (ref 0.55–1.02)
DIAGNOSIS, 93000: NORMAL
DIFFERENTIAL METHOD BLD: ABNORMAL
EOSINOPHIL # BLD: 0.1 K/UL (ref 0–0.4)
EOSINOPHIL NFR BLD: 2 % (ref 0–7)
ERYTHROCYTE [DISTWIDTH] IN BLOOD BY AUTOMATED COUNT: 18.7 % (ref 11.5–14.5)
GLOBULIN SER CALC-MCNC: 3 G/DL (ref 2–4)
GLUCOSE SERPL-MCNC: 199 MG/DL (ref 65–100)
HCT VFR BLD AUTO: 16.4 % (ref 35–47)
HEMOCCULT STL QL: POSITIVE
HGB BLD-MCNC: 4.5 G/DL (ref 11.5–16)
HISTORY CHECKED?,CKHIST: NORMAL
IMM GRANULOCYTES # BLD AUTO: 0 K/UL (ref 0–0.04)
IMM GRANULOCYTES NFR BLD AUTO: 0 % (ref 0–0.5)
IRON SATN MFR SERPL: 5 % (ref 20–50)
IRON SERPL-MCNC: 16 UG/DL (ref 35–150)
LYMPHOCYTES # BLD: 0.3 K/UL (ref 0.8–3.5)
LYMPHOCYTES NFR BLD: 5 % (ref 12–49)
MCH RBC QN AUTO: 29.2 PG (ref 26–34)
MCHC RBC AUTO-ENTMCNC: 27.4 G/DL (ref 30–36.5)
MCV RBC AUTO: 106.5 FL (ref 80–99)
MONOCYTES # BLD: 0.2 K/UL (ref 0–1)
MONOCYTES NFR BLD: 4 % (ref 5–13)
NEUTS SEG # BLD: 5.6 K/UL (ref 1.8–8)
NEUTS SEG NFR BLD: 89 % (ref 32–75)
NRBC # BLD: 0 K/UL (ref 0–0.01)
NRBC BLD-RTO: 0 PER 100 WBC
P-R INTERVAL, ECG05: 258 MS
PLATELET # BLD AUTO: 132 K/UL (ref 150–400)
PMV BLD AUTO: 10 FL (ref 8.9–12.9)
POTASSIUM SERPL-SCNC: 4.1 MMOL/L (ref 3.5–5.1)
PROT SERPL-MCNC: 6.2 G/DL (ref 6.4–8.2)
Q-T INTERVAL, ECG07: 450 MS
QRS DURATION, ECG06: 144 MS
QTC CALCULATION (BEZET), ECG08: 506 MS
RBC # BLD AUTO: 1.54 M/UL (ref 3.8–5.2)
RBC MORPH BLD: ABNORMAL
RBC MORPH BLD: ABNORMAL
SODIUM SERPL-SCNC: 140 MMOL/L (ref 136–145)
TIBC SERPL-MCNC: 342 UG/DL (ref 250–450)
VENTRICULAR RATE, ECG03: 76 BPM
WBC # BLD AUTO: 6.2 K/UL (ref 3.6–11)

## 2022-10-06 PROCEDURE — 80053 COMPREHEN METABOLIC PANEL: CPT

## 2022-10-06 PROCEDURE — 86970 RBC PRETX INCUBATJ W/CHEMICL: CPT

## 2022-10-06 PROCEDURE — 86880 COOMBS TEST DIRECT: CPT

## 2022-10-06 PROCEDURE — 86860 RBC ANTIBODY ELUTION: CPT

## 2022-10-06 PROCEDURE — 85025 COMPLETE CBC W/AUTO DIFF WBC: CPT

## 2022-10-06 PROCEDURE — 86900 BLOOD TYPING SEROLOGIC ABO: CPT

## 2022-10-06 PROCEDURE — 74011250636 HC RX REV CODE- 250/636: Performed by: INTERNAL MEDICINE

## 2022-10-06 PROCEDURE — C9113 INJ PANTOPRAZOLE SODIUM, VIA: HCPCS | Performed by: EMERGENCY MEDICINE

## 2022-10-06 PROCEDURE — 86901 BLOOD TYPING SEROLOGIC RH(D): CPT

## 2022-10-06 PROCEDURE — 86902 BLOOD TYPE ANTIGEN DONOR EA: CPT

## 2022-10-06 PROCEDURE — 83540 ASSAY OF IRON: CPT

## 2022-10-06 PROCEDURE — 86850 RBC ANTIBODY SCREEN: CPT

## 2022-10-06 PROCEDURE — 86920 COMPATIBILITY TEST SPIN: CPT

## 2022-10-06 PROCEDURE — 96374 THER/PROPH/DIAG INJ IV PUSH: CPT

## 2022-10-06 PROCEDURE — 86904 BLOOD TYPING PATIENT SERUM: CPT

## 2022-10-06 PROCEDURE — 71045 X-RAY EXAM CHEST 1 VIEW: CPT

## 2022-10-06 PROCEDURE — 86870 RBC ANTIBODY IDENTIFICATION: CPT

## 2022-10-06 PROCEDURE — 65270000046 HC RM TELEMETRY

## 2022-10-06 PROCEDURE — 86978 RBC PRETREATMENT SERUM: CPT

## 2022-10-06 PROCEDURE — 86905 BLOOD TYPING RBC ANTIGENS: CPT

## 2022-10-06 PROCEDURE — 82272 OCCULT BLD FECES 1-3 TESTS: CPT

## 2022-10-06 PROCEDURE — 93005 ELECTROCARDIOGRAM TRACING: CPT

## 2022-10-06 PROCEDURE — 74011000250 HC RX REV CODE- 250: Performed by: EMERGENCY MEDICINE

## 2022-10-06 PROCEDURE — 74011250636 HC RX REV CODE- 250/636: Performed by: EMERGENCY MEDICINE

## 2022-10-06 PROCEDURE — 99285 EMERGENCY DEPT VISIT HI MDM: CPT

## 2022-10-06 PROCEDURE — 74011000250 HC RX REV CODE- 250: Performed by: INTERNAL MEDICINE

## 2022-10-06 PROCEDURE — C9113 INJ PANTOPRAZOLE SODIUM, VIA: HCPCS | Performed by: INTERNAL MEDICINE

## 2022-10-06 PROCEDURE — 86972 RBC PRETX INCUBATJ W/DENSITY: CPT

## 2022-10-06 PROCEDURE — 94761 N-INVAS EAR/PLS OXIMETRY MLT: CPT

## 2022-10-06 PROCEDURE — 36415 COLL VENOUS BLD VENIPUNCTURE: CPT

## 2022-10-06 RX ORDER — ACETAMINOPHEN 650 MG/1
650 SUPPOSITORY RECTAL
Status: DISCONTINUED | OUTPATIENT
Start: 2022-10-06 | End: 2022-10-17 | Stop reason: HOSPADM

## 2022-10-06 RX ORDER — ALLOPURINOL 100 MG/1
100 TABLET ORAL 2 TIMES DAILY
Status: DISCONTINUED | OUTPATIENT
Start: 2022-10-06 | End: 2022-10-17 | Stop reason: HOSPADM

## 2022-10-06 RX ORDER — LEVOTHYROXINE SODIUM 150 UG/1
150 TABLET ORAL
Status: DISCONTINUED | OUTPATIENT
Start: 2022-10-07 | End: 2022-10-17 | Stop reason: HOSPADM

## 2022-10-06 RX ORDER — FUROSEMIDE 40 MG/1
80 TABLET ORAL DAILY
Status: DISCONTINUED | OUTPATIENT
Start: 2022-10-07 | End: 2022-10-17 | Stop reason: HOSPADM

## 2022-10-06 RX ORDER — ONDANSETRON 4 MG/1
4 TABLET, ORALLY DISINTEGRATING ORAL
Status: DISCONTINUED | OUTPATIENT
Start: 2022-10-06 | End: 2022-10-17 | Stop reason: HOSPADM

## 2022-10-06 RX ORDER — ACETAMINOPHEN 325 MG/1
650 TABLET ORAL
Status: DISCONTINUED | OUTPATIENT
Start: 2022-10-06 | End: 2022-10-17 | Stop reason: HOSPADM

## 2022-10-06 RX ORDER — EZETIMIBE 10 MG/1
10 TABLET ORAL DAILY
Status: DISCONTINUED | OUTPATIENT
Start: 2022-10-07 | End: 2022-10-17 | Stop reason: HOSPADM

## 2022-10-06 RX ORDER — POLYETHYLENE GLYCOL 3350 17 G/17G
17 POWDER, FOR SOLUTION ORAL DAILY PRN
Status: DISCONTINUED | OUTPATIENT
Start: 2022-10-06 | End: 2022-10-08

## 2022-10-06 RX ORDER — POTASSIUM CHLORIDE 750 MG/1
10 TABLET, FILM COATED, EXTENDED RELEASE ORAL DAILY
Status: DISCONTINUED | OUTPATIENT
Start: 2022-10-07 | End: 2022-10-17 | Stop reason: HOSPADM

## 2022-10-06 RX ORDER — ATORVASTATIN CALCIUM 20 MG/1
20 TABLET, FILM COATED ORAL DAILY
Status: DISCONTINUED | OUTPATIENT
Start: 2022-10-07 | End: 2022-10-17 | Stop reason: HOSPADM

## 2022-10-06 RX ORDER — SODIUM CHLORIDE 0.9 % (FLUSH) 0.9 %
5-40 SYRINGE (ML) INJECTION AS NEEDED
Status: DISCONTINUED | OUTPATIENT
Start: 2022-10-06 | End: 2022-10-17 | Stop reason: SDUPTHER

## 2022-10-06 RX ORDER — ONDANSETRON 2 MG/ML
4 INJECTION INTRAMUSCULAR; INTRAVENOUS
Status: DISCONTINUED | OUTPATIENT
Start: 2022-10-06 | End: 2022-10-17 | Stop reason: HOSPADM

## 2022-10-06 RX ORDER — SODIUM CHLORIDE 0.9 % (FLUSH) 0.9 %
5-40 SYRINGE (ML) INJECTION EVERY 8 HOURS
Status: DISCONTINUED | OUTPATIENT
Start: 2022-10-06 | End: 2022-10-17 | Stop reason: SDUPTHER

## 2022-10-06 RX ORDER — SODIUM CHLORIDE 9 MG/ML
250 INJECTION, SOLUTION INTRAVENOUS AS NEEDED
Status: DISCONTINUED | OUTPATIENT
Start: 2022-10-06 | End: 2022-10-08

## 2022-10-06 RX ORDER — AMLODIPINE BESYLATE 5 MG/1
10 TABLET ORAL DAILY
Status: DISCONTINUED | OUTPATIENT
Start: 2022-10-07 | End: 2022-10-17 | Stop reason: HOSPADM

## 2022-10-06 RX ORDER — SODIUM CHLORIDE 9 MG/ML
125 INJECTION, SOLUTION INTRAVENOUS CONTINUOUS
Status: DISCONTINUED | OUTPATIENT
Start: 2022-10-06 | End: 2022-10-12

## 2022-10-06 RX ADMIN — SODIUM CHLORIDE, PRESERVATIVE FREE 10 ML: 5 INJECTION INTRAVENOUS at 21:04

## 2022-10-06 RX ADMIN — SODIUM CHLORIDE 125 ML/HR: 9 INJECTION, SOLUTION INTRAVENOUS at 15:55

## 2022-10-06 RX ADMIN — SODIUM CHLORIDE 40 MG: 9 INJECTION INTRAMUSCULAR; INTRAVENOUS; SUBCUTANEOUS at 21:04

## 2022-10-06 RX ADMIN — SODIUM CHLORIDE, PRESERVATIVE FREE 10 ML: 5 INJECTION INTRAVENOUS at 16:20

## 2022-10-06 RX ADMIN — SODIUM CHLORIDE 40 MG: 9 INJECTION, SOLUTION INTRAMUSCULAR; INTRAVENOUS; SUBCUTANEOUS at 13:23

## 2022-10-06 NOTE — ED NOTES
Pt presents to ER after being referred by Dr. Jacky Gasca, her \"kidney doctor\" for a low HGB. Assisted to position of comfort, call bell within reach.

## 2022-10-06 NOTE — ED PROVIDER NOTES
EMERGENCY DEPARTMENT HISTORY AND PHYSICAL EXAM      Date: 10/6/2022  Patient Name: Juan Alberto Dunbar    History of Presenting Illness     Chief Complaint   Patient presents with    Abnormal Lab Results     Pt ambulatory into triage with a cc of abnormal lab work; pt was told HGB was 5; pt is complaining of shortness of breath        History Provided By: Patient    HPI: Juan Alberto Dunbar, 68 y.o. female with a past medical history significant for Bundle-branch block, GERD, history of recent GI bleed with anemia, medical problems as stated below presents to the ED with cc of presenting with complaints of abnormal blood work, weakness, dyspnea on exertion over the last 1 to 2 weeks. Patient was admitted several months ago with an acute GI bleed and symptomatic anemia requiring multiple blood transfusions. At the time patient was on Eliquis which has been discontinued since that time. She reports feeling weak and short of breath and lightheaded over the last week. She went to her outpatient provider's office and had blood work performed and was told to come to the ER for evaluation. Patient reports dark brown stool but has not noticed any blood. She is not having any abdominal pain and is not vomiting.  P.o. intake is normal.    There are no associated symptoms. No other exacerbating or ameliorating factors. PCP: Heydi Moeller MD    No current facility-administered medications on file prior to encounter. Current Outpatient Medications on File Prior to Encounter   Medication Sig Dispense Refill    amLODIPine (NORVASC) 10 mg tablet Take 1 Tablet by mouth daily. 90 Tablet 3    potassium chloride SA (MICRO-K) 10 mEq capsule Take 2 Capsules by mouth daily. 180 Capsule 3    furosemide (LASIX) 80 mg tablet Take 80 mg by mouth daily. pantoprazole (PROTONIX) 40 mg tablet Take 1 Tablet by mouth Before breakfast and dinner. 60 Tablet 5    nystatin (MYCOSTATIN) powder Apply  to affected area two (2) times a day.  30 g 0    fluticasone propionate (FLONASE) 50 mcg/actuation nasal spray 2 Sprays by Both Nostrils route daily. Administer to right and left nostril. 3 Each 3    levothyroxine (SYNTHROID) 150 mcg tablet Take 1 Tablet by mouth Daily (before breakfast). 90 Tablet 3    icosapent ethyL (VASCEPA) 1 gram capsule Take 2 Capsules by mouth two (2) times daily (with meals). 360 Capsule 3    loratadine (Claritin) 10 mg tablet Take 1 Tablet by mouth daily. Indications: inflammation of the nose due to an allergy 90 Tablet 3    Zetia 10 mg tablet Take 1 tablet by mouth daily. 90 Tablet 3    atorvastatin (LIPITOR) 20 mg tablet TAKE 1 TABLET DAILY 90 Tablet 3    lidocaine (LIDODERM) 5 % Apply patch to the affected area for 12 hours a day and remove for 12 hours a day. 90 Each 3    allopurinoL (ZYLOPRIM) 100 mg tablet Take 100 mg by mouth two (2) times a day. polyethylene glycol (MIRALAX) 17 gram/dose powder Take 17 g by mouth daily. 2108 g 3    ketoconazole (NIZORAL) 2 % topical cream Apply  to affected area daily as needed. 3    Biotin 2,500 mcg cap Take  by mouth. L GASSERI/B BIFIDUM/B LONGUM (Recordant PO) Take 1 Tab by mouth daily. vitamin E (AQUA GEMS) 400 unit capsule Take 800 Units by mouth two (2) times a day. cholecalciferol, vitamin D3, 50 mcg (2,000 unit) tab Take 1 Tab by mouth daily. MULTIVITAMIN WITH MINERALS (ONE-A-DAY 50 PLUS PO) Take 1 Tab by mouth daily.          Past History     Past Medical History:  Past Medical History:   Diagnosis Date    Abnormal nuclear stress test 10/4/2021    Angina at rest [facility]) 10/4/2021    Arthritis     KNEE,gout    Bundle branch block     Endocrine disease     HYPOTHYROIDISM    Fracture     Left distal femur (age 12 - pt fell)    Fracture     Left tibia 1990 (pt fell)    Fracture     Right wrist fractured 2 times (pt fell)    GERD (gastroesophageal reflux disease)     High cholesterol     Hypertension     Hypoglycemia     \"my body produces too much insulin\"    Liver disease     BRADY    Nausea & vomiting     when had gallbladder out    Osteoporosis     Other ill-defined conditions(799.89)     edema legs    S/P cardiac cath 10/4/2021    10/4/2021 nonobstructive disease    Spinal stenosis     Thyroid disease        Past Surgical History:  Past Surgical History:   Procedure Laterality Date    COLONOSCOPY N/A 7/13/2021    COLONOSCOPY performed by Yvonne Barrera MD at [facility] ENDOSCOPY    COLONOSCOPY N/A 8/8/2022    COLONOSCOPY performed by Maurice Kwon MD at [address]  2/11/2015         COLONOSCOPY,REMV Lauren Saldana  7/13/2021         COLONOSCPY,FLEX,W/ [address] INJECT  7/13/2021         COLORECTAL SCRN; HI RISK IND  2/11/2015         HX CHOLECYSTECTOMY      HX HYSTERECTOMY      HX ORTHOPAEDIC Left     leg surgery - plates, screws, wires, pins in place    HX UROLOGICAL      PESSURY    SD ABDOMEN SURGERY PROC UNLISTED      liver biopsy--BRADY and fibrosis    UPPER GI ENDOSCOPY,BIOPSY  11/17/2015            Family History:  Family History   Problem Relation Age of Onset    Diabetes Mother     Heart Disease Mother     Emphysema Father     No Known Problems Brother     Stroke Maternal Grandmother     No Known Problems Maternal Grandfather     No Known Problems Paternal Grandmother     No Known Problems Paternal Grandfather        Social History:  Social History     Tobacco Use    Smoking status: Never    Smokeless tobacco: Never   Vaping Use    Vaping Use: Never used   Substance Use Topics    Alcohol use: No    Drug use: Yes     Types: Prescription, OTC       Allergies:   Allergies   Allergen Reactions    Gabapentin Other (comments)     Suicidal ideation    Metoprolol Rash    Celebrex [Celecoxib] Hives    Eliquis [Apixaban] Other (comments)     Caused excessive anemia    Pcn [Penicillins] Unknown (comments)     Can't remember, was a long time    Sulfa (Sulfonamide Antibiotics) Hives         Review of Systems   Review of Systems   Constitutional:  Positive for fatigue. Negative for chills, diaphoresis and fever. HENT:  Negative for ear pain and sore throat. Eyes:  Negative for pain and redness. Respiratory:  Negative for cough and shortness of breath. Cardiovascular:  Negative for chest pain and leg swelling. Gastrointestinal:  Negative for abdominal pain, diarrhea, nausea and vomiting. Endocrine: Negative for cold intolerance and heat intolerance. Genitourinary:  Negative for flank pain and hematuria. Musculoskeletal:  Negative for back pain and neck stiffness. Skin:  Negative for rash and wound. Neurological:  Positive for weakness. Negative for dizziness, syncope and headaches. All other systems reviewed and are negative. Physical Exam   Physical Exam  Vitals and nursing note reviewed. Constitutional:       General: She is in acute distress. Appearance: She is well-developed. She is obese. She is ill-appearing. Comments: Pale skin   HENT:      Head: Normocephalic and atraumatic. Mouth/Throat:      Pharynx: No oropharyngeal exudate. Eyes:      Conjunctiva/sclera: Conjunctivae normal.      Pupils: Pupils are equal, round, and reactive to light. Cardiovascular:      Rate and Rhythm: Normal rate and regular rhythm. Heart sounds: No murmur heard. Pulmonary:      Effort: Pulmonary effort is normal. No respiratory distress. Breath sounds: Normal breath sounds. No wheezing. Abdominal:      General: Bowel sounds are normal. There is no distension. Palpations: Abdomen is soft. Tenderness: There is no abdominal tenderness. Genitourinary:     Rectum: Guaiac result positive. Comments: Dark brown heme positive stool  Musculoskeletal:         General: No deformity. Normal range of motion. Cervical back: Normal range of motion. Skin:     General: Skin is warm and dry. Findings: No rash.    Neurological:      Mental Status: She is alert and oriented to person, place, and time. Cranial Nerves: No cranial nerve deficit. Sensory: No sensory deficit. Motor: No weakness. Coordination: Coordination normal.      Gait: Gait normal.   Psychiatric:         Behavior: Behavior normal.       Diagnostic Study Results     Labs -     Recent Results (from the past 24 hour(s))   EKG, 12 LEAD, INITIAL    Collection Time: 10/06/22 11:21 AM   Result Value Ref Range    Ventricular Rate 76 BPM    Atrial Rate 76 BPM    P-R Interval 258 ms    QRS Duration 144 ms    Q-T Interval 450 ms    QTC Calculation (Bezet) 506 ms    Calculated R Axis -46 degrees    Calculated T Axis 74 degrees    Diagnosis       Atrial-paced rhythm with prolonged AV conduction  Right bundle branch block  Left anterior fascicular block  ** Bifascicular block **  Left ventricular hypertrophy with repolarization abnormality  Cannot rule out Septal infarct (cited on or before 06-OCT-2022)  When compared with ECG of 05-AUG-2022 10:22,  Serial changes of Septal infarct present     CBC WITH AUTOMATED DIFF    Collection Time: 10/06/22 12:09 PM   Result Value Ref Range    WBC 6.2 3.6 - 11.0 K/uL    RBC 1.54 (L) 3.80 - 5.20 M/uL    HGB 4.5 (LL) 11.5 - 16.0 g/dL    HCT 16.4 (LL) 35.0 - 47.0 %    .5 (H) 80.0 - 99.0 FL    MCH 29.2 26.0 - 34.0 PG    MCHC 27.4 (L) 30.0 - 36.5 g/dL    RDW 18.7 (H) 11.5 - 14.5 %    PLATELET 984 (L) 245 - 400 K/uL    MPV 10.0 8.9 - 12.9 FL    NRBC 0.0 0  WBC    ABSOLUTE NRBC 0.00 0.00 - 0.01 K/uL    NEUTROPHILS 89 (H) 32 - 75 %    LYMPHOCYTES 5 (L) 12 - 49 %    MONOCYTES 4 (L) 5 - 13 %    EOSINOPHILS 2 0 - 7 %    BASOPHILS 0 0 - 1 %    IMMATURE GRANULOCYTES 0 0.0 - 0.5 %    ABS. NEUTROPHILS 5.6 1.8 - 8.0 K/UL    ABS. LYMPHOCYTES 0.3 (L) 0.8 - 3.5 K/UL    ABS. MONOCYTES 0.2 0.0 - 1.0 K/UL    ABS. EOSINOPHILS 0.1 0.0 - 0.4 K/UL    ABS. BASOPHILS 0.0 0.0 - 0.1 K/UL    ABS. IMM.  GRANS. 0.0 0.00 - 0.04 K/UL    DF MANUAL      RBC COMMENTS ANISOCYTOSIS  PRESENT        RBC COMMENTS HYPOCHROMIA  PRESENT       METABOLIC PANEL, COMPREHENSIVE    Collection Time: 10/06/22 12:09 PM   Result Value Ref Range    Sodium 140 136 - 145 mmol/L    Potassium 4.1 3.5 - 5.1 mmol/L    Chloride 110 (H) 97 - 108 mmol/L    CO2 23 21 - 32 mmol/L    Anion gap 7 5 - 15 mmol/L    Glucose 199 (H) 65 - 100 mg/dL    BUN 62 (H) 6 - 20 MG/DL    Creatinine 1.91 (H) 0.55 - 1.02 MG/DL    BUN/Creatinine ratio 32 (H) 12 - 20      eGFR 27 (L) >60 ml/min/1.73m2    Calcium 9.0 8.5 - 10.1 MG/DL    Bilirubin, total 1.2 (H) 0.2 - 1.0 MG/DL    ALT (SGPT) 25 12 - 78 U/L    AST (SGOT) 33 15 - 37 U/L    Alk. phosphatase 116 45 - 117 U/L    Protein, total 6.2 (L) 6.4 - 8.2 g/dL    Albumin 3.2 (L) 3.5 - 5.0 g/dL    Globulin 3.0 2.0 - 4.0 g/dL    A-G Ratio 1.1 1.1 - 2.2     TYPE & SCREEN    Collection Time: 10/06/22 12:09 PM   Result Value Ref Range    Crossmatch Expiration 10/09/2022,2359     ABO/Rh(D) Magdalena Kline POSITIVE     Antibody screen POS     Antibody ID PENDING    RBC, ALLOCATE    Collection Time: 10/06/22  1:15 PM   Result Value Ref Range    HISTORY CHECKED? Historical check performed    OCCULT BLOOD, STOOL    Collection Time: 10/06/22  2:17 PM   Result Value Ref Range    Occult blood, stool Positive (A) NEG         Radiologic Studies -   XR CHEST PORT   Final Result      No acute process on portable chest.           CT Results  (Last 48 hours)      None          CXR Results  (Last 48 hours)                 10/06/22 1412  XR CHEST PORT Final result    Impression:      No acute process on portable chest.           Narrative:  EXAM:  XR CHEST PORT       INDICATION: GI bleed       COMPARISON: 8/5/2022       TECHNIQUE: Upright portable chest AP view       FINDINGS: A left subclavian pacemaker is in place. There is unchanged mild   elevation left hemidiaphragm. The cardiomediastinal and hilar contours are   within normal limits. The pulmonary vasculature is within normal limits. The lungs and pleural spaces are clear.  The visualized bones and upper abdomen   are age-appropriate. Medical Decision Making   I am the first provider for this patient. I reviewed the vital signs, available nursing notes, past medical history, past surgical history, family history and social history. Vital Signs-Reviewed the patient's vital signs. Patient Vitals for the past 12 hrs:   Temp Pulse Resp BP SpO2   10/06/22 1430 97.6 °F (36.4 °C) 81 17 (!) 141/46 97 %   10/06/22 1111 98.1 °F (36.7 °C) 87 18 (!) 134/43 99 %       Records Reviewed: Nursing records and medical records reviewed    MDM:  Patient presents with melena. Currently stable vitals without tachycardia. Exam nonperitoneal.  DDx: upper gi bleed 2/2 PUD, Esophageal varices; Iron intake, pepto bismol. Will get labs, obtain two large bore IV's, provide IVF resuscitation, administer IV PPI, send type and screen and transfuse as needed. Will perform rectal exam and monitor closely. Provider Notes (Medical Decision Making):   Patient is a 57-year-old female presenting with likely upper GI bleed. Significant elevation of BUN, creatinine is also slightly elevated. May also be prerenal pattern. Hemoglobin is dropped from 10-4.5 but patient's blood pressure remained stable. I have ordered 2 units of RBCs, IV Protonix, maintenance fluids. Patient is mentating well, GIs been consulted and is seeing the patient in the ER. We will keep n.p.o. and have patient remain ready for emergent endoscopy if needed. 2 large-bore IVs established. ED Course:   Initial assessment performed. The patients presenting problems have been discussed, and they are in agreement with the care plan formulated and outlined with them. I have encouraged them to ask questions as they arise throughout their visit.     ED Course as of 10/06/22 1557   Thu Oct 06, 2022   1076 I have discussed with the patient the rationale for blood component transfusion; its benefits in treating or preventing fatigue, organ damage, or death; and its risk which includes mild transfusion reactions, rare risk of blood borne infection, or more serious but rare reactions. I have discussed the alternatives to transfusion, including the risk and consequences of not receiving transfusion. The patient had an opportunity to ask questions and had agreed to proceed with transfusion of blood components. [CC]      ED Course User Index  [CC] Cornelia Benavides MD       Medications Administered       pantoprazole (PROTONIX) 40 mg in 0.9% sodium chloride 10 mL injection       Admin Date  10/06/2022 Action  Given Dose  40 mg Route  IntraVENous Administered By  Julian Tam RN                        Critical Care:  I have spent 40 minutes of critical care time in evaluating and treating this patient. This includes time spent at bedside, time with family and decision makers, documentation, review of labs and imaging, and/or consultation with specialists. It does not include time spent on separately billed procedures. This patient presents with a critical illness or injury that acutely impairs one or more vital organ systems such that there is a high probability of imminent or life threatening deterioration in the patient's condition. This case involved decision making of high complexity to assess, manipulate, and support vital organ system failure and/or to prevent further life threatening deterioration of the patient's condition. Failure to initiate these interventions on an urgent basis would likely result in sudden, clinically significant or life threatening deterioration in the patient's condition. Abnormal findings supporting critical care: Severe anemia, upper GI bleed  Interventions to support critical care: Blood transfusion  Failure to intervene may result in: Hemorrhagic shock        Disposition:  Admit Note:  3:57 PM  Pt is being admitted by Dr. Glenny Campo.  The results of their tests and reason(s) for their admission have been discussed with pt and/or available family. They convey agreement and understanding for the need to be admitted and for admission diagnosis. Diagnosis     Clinical Impression:   1. Gastrointestinal hemorrhage, unspecified gastrointestinal hemorrhage type    2. Severe anemia        Attestations:    Kristal Anderson MD    Please note that this dictation was completed with [employer], the computer voice recognition software. Quite often unanticipated grammatical, syntax, homophones, and other interpretive errors are inadvertently transcribed by the computer software. Please disregard these errors. Please excuse any errors that have escaped final proofreading. Thank you.

## 2022-10-06 NOTE — ED NOTES
Bedside and Verbal shift change report given to Dav Acuna RN by Yovana Hunter RN. Report included the following information SBAR, Kardex, ED Summary, MAR, and Recent Results.

## 2022-10-06 NOTE — CONSULTS
GI CONSULTATION NOTE  Caren Ordoñez NP  [phone] NP in-hospital cell phone M-F until 4:30  After 5pm or on weekends, please call  for physician on call    NAME: Melvin Vance   :  1945   MRN:  [id number]   Attending:  Dr. Anh Olsen  Primary GI:  Dr. Gloriajean Aschoff  Date/Time:  10/6/2022 3:21 PM  Assessment:   Acute on chronic anemia   Hgb 4.5; was 10.0 on 2022  BUN/Cr ratio 32  FOBT +  Iron 20, TIBC 362, Iron % saturation 6 (2022)    Hx cirrhosis secondary to BRADY, compensated   Had liver bx at [address] [place] [address]   BRADY, moderate fibrosis with bridging, stage III    GI History:  2022- Colonoscopy with normal colonic mucosa throughout, internal hemorrhoids   2022- EGD showing portal hypertensive gastropathy    Plan:   Plan for repeat EGD tomorrow with Dr. Sarah Cedeno; obtain consent  Details and risks of the procedure to include (but not limited to) anesthesia, bleeding, infection, and perforation were discussed. Patient understands and is in agreement with the plan  NPO after midnight  CLD for now   Serial H&H; goal for hgb >7.0  Monitor for s/s of bleeding; transfuse as clinically indicated  Continue PPI  Iron profile ordered   Symptomatic care per primary team  Plan discussed with Dr. Garcia Screen:     HISTORY OF PRESENT ILLNESS:     Melvin Vance is an 68 y.o.  female who we are asked to see for complaint of anemia. Patient presents for anemia. She states that she was seen by her nephrologist yesterday and had labs drawn. They called her today to tell her that she was severely anemic and recommend she go the the ED. She has a hx of the same. Was admitted in 2022 for anemia and had an EGD and CLN. During that admission her blood thinner was discontinued. She denies any GI bleeding. No black or bloody stools. Denies any overt GERD symptoms. ONly complaint at this time is SOB.       Past Medical History:   Diagnosis Date    Abnormal nuclear stress test 10/4/2021    Angina at rest [facility]) 10/4/2021    Arthritis     KNEE,gout    Bundle branch block     Endocrine disease     HYPOTHYROIDISM    Fracture     Left distal femur (age 12 - pt fell)    Fracture     Left tibia 1990 (pt fell)    Fracture     Right wrist fractured 2 times (pt fell)    GERD (gastroesophageal reflux disease)     High cholesterol     Hypertension     Hypoglycemia     \"my body produces too much insulin\"    Liver disease     BRADY    Nausea & vomiting     when had gallbladder out    Osteoporosis     Other ill-defined conditions(799.89)     edema legs    S/P cardiac cath 10/4/2021    10/4/2021 nonobstructive disease    Spinal stenosis     Thyroid disease       Past Surgical History:   Procedure Laterality Date    COLONOSCOPY N/A 7/13/2021    COLONOSCOPY performed by Suman Campoverde MD at [facility] ENDOSCOPY    COLONOSCOPY N/A 8/8/2022    COLONOSCOPY performed by Sary Box MD at [place] [address] [address]  2/11/2015         COLONOSCOPY,REMV [address]  7/13/2021         COLONOSCPY,FLEX,W/ [address] INJECT  7/13/2021         COLORECTAL SCRN; HI RISK IND  2/11/2015         HX CHOLECYSTECTOMY      HX HYSTERECTOMY      HX ORTHOPAEDIC Left     leg surgery - plates, screws, wires, pins in place    HX UROLOGICAL      PESSURY    NH ABDOMEN SURGERY PROC UNLISTED      liver biopsy--BRADY and fibrosis    UPPER GI ENDOSCOPY,BIOPSY  11/17/2015          Social History     Tobacco Use    Smoking status: Never    Smokeless tobacco: Never   Substance Use Topics    Alcohol use: No      Family History   Problem Relation Age of Onset    Diabetes Mother     Heart Disease Mother     Emphysema Father     No Known Problems Brother     Stroke Maternal Grandmother     No Known Problems Maternal Grandfather     No Known Problems Paternal Grandmother     No Known Problems Paternal Grandfather       Allergies   Allergen Reactions    Gabapentin Other (comments)     Suicidal ideation Metoprolol Rash    Celebrex [Celecoxib] Hives    Eliquis [Apixaban] Other (comments)     Caused excessive anemia    Pcn [Penicillins] Unknown (comments)     Can't remember, was a long time    Sulfa (Sulfonamide Antibiotics) Hives      Prior to Admission medications    Medication Sig Start Date End Date Taking? Authorizing Provider   amLODIPine (NORVASC) 10 mg tablet Take 1 Tablet by mouth daily. 9/24/22   Adalid Portillo MD   potassium chloride SA (MICRO-K) 10 mEq capsule Take 2 Capsules by mouth daily. 9/24/22   Adalid Portillo MD   furosemide (LASIX) 80 mg tablet Take 80 mg by mouth daily. 8/16/22   Provider, Historical   pantoprazole (PROTONIX) 40 mg tablet Take 1 Tablet by mouth Before breakfast and dinner. 8/17/22   Adalid Portillo MD   nystatin (MYCOSTATIN) powder Apply  to affected area two (2) times a day. 8/8/22   Anuja Grimaldo MD   fluticasone propionate (FLONASE) 50 mcg/actuation nasal spray 2 Sprays by Both Nostrils route daily. Administer to right and left nostril. 6/24/22   Adalid Portillo MD   levothyroxine (SYNTHROID) 150 mcg tablet Take 1 Tablet by mouth Daily (before breakfast). 6/15/22   Adalid Portillo MD   icosapent ethyL (VASCEPA) 1 gram capsule Take 2 Capsules by mouth two (2) times daily (with meals). 2/14/22   Adalid Portillo MD   loratadine (Claritin) 10 mg tablet Take 1 Tablet by mouth daily. Indications: inflammation of the nose due to an allergy 12/22/21   Adalid Portillo MD   Zetia 10 mg tablet Take 1 tablet by mouth daily. 12/22/21   Adalid Portillo MD   atorvastatin (LIPITOR) 20 mg tablet TAKE 1 TABLET DAILY 12/22/21   Adalid Portillo MD   lidocaine (LIDODERM) 5 % Apply patch to the affected area for 12 hours a day and remove for 12 hours a day. 12/7/21   Adalid Portillo MD   allopurinoL (ZYLOPRIM) 100 mg tablet Take 100 mg by mouth two (2) times a day. Provider, Historical   polyethylene glycol (MIRALAX) 17 gram/dose powder Take 17 g by mouth daily.  4/6/20   Adalid Portillo MD ketoconazole (NIZORAL) 2 % topical cream Apply  to affected area daily as needed. 8/23/19   Provider, Historical   Biotin 2,500 mcg cap Take  by mouth. Provider, Historical   L GASSERI/B BIFIDUM/B LONGUM (NineSixFive PO) Take 1 Tab by mouth daily. Provider, Historical   vitamin E (AQUA GEMS) 400 unit capsule Take 800 Units by mouth two (2) times a day. Provider, Historical   cholecalciferol, vitamin D3, 50 mcg (2,000 unit) tab Take 1 Tab by mouth daily. Other, MD Janette   MULTIVITAMIN WITH MINERALS (ONE-A-DAY 50 PLUS PO) Take 1 Tab by mouth daily.     Provider, Historical       Patient Active Problem List   Diagnosis Code    Gout M10.9    Hypothyroidism E03.9    Osteoporosis M81.0    Anxiety F41.9    Leg edema R60.0    RSD lower limb G90.529    Fatty liver K76.0    Pure hypercholesterolemia E78.00    Colon polyp K63.5    Bifascicular block I45.2    HTN (hypertension) I10    Thrombocytopenia (HCC) D69.6    Prediabetes R73.03    DDD (degenerative disc disease), lumbar M51.36    Controlled type 2 diabetes mellitus without complication ([facility]) W60.1    Severe obesity ([facility]) E66.01    Cellulitis of left lower leg L03.116    MICHELLE (acute kidney injury) ([facility] [address] N17.9    Nonrheumatic aortic valve stenosis I35.0    Pulmonary hypertension (HCC) I27.20    Abnormal nuclear stress test R94.39    Angina at rest 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    Stage 3a chronic kidney disease (HCC) N18.31    PAF (paroxysmal atrial fibrillation) ([facility]) I48.0    SSS (sick sinus syndrome) ([facility]) I49.5    Anemia D64.9       REVIEW OF SYSTEMS:    Constitutional: negative fever, negative chills, negative weight loss  Eyes:   negative visual changes  ENT:   negative sore throat, tongue or lip swelling   Respiratory:  negative cough, negative dyspnea  Cards:  negative for chest pain, palpitations, lower extremity edema  GI:   See HPI  :  negative for frequency, dysuria  Integument:  negative for rash and pruritus  Heme:  negative for easy bruising and gum/nose bleeding  Musculoskel: negative for myalgias,  back pain and muscle weakness  Neuro: negative for headaches, dizziness, vertigo  Psych: negative for feelings of anxiety, depression     Objective:   VITALS:    Visit Vitals  BP (!) 141/46 (BP 1 Location: Right upper arm, BP Patient Position: At rest)   Pulse 81   Temp 97.6 °F (36.4 °C)   Resp 17   Ht 4' 11\" (1.499 m)   Wt 97.5 kg (215 lb)   SpO2 97%   BMI 43.42 kg/m²       PHYSICAL EXAM:   General:           Pleasant elderly  female. NAD  Head:               Normocephalic, without obvious abnormality, atraumatic. Eyes:               Conjunctivae clear and pale, anicteric sclerae. Pupils are equal  Nose:               Nares normal. No drainage or sinus tenderness. Throat:             Lips, mucosa, and tongue normal.  No Thrush  Neck:               Supple, symmetrical,  no adenopathy, thyroid: non tender  Back:               Symmetric,  No CVA tenderness. Lungs:             CTA bilaterally. No wheezing/rhonchi/rales. Chest wall:      No tenderness or deformity. No Accessory muscle use. Heart:              Regular rate and rhythm,  no murmur, rub or gallop. Abdomen:        Soft, non-tender. Not distended. Bowel sounds normal. No masses  Extremities:     Atraumatic, No cyanosis. No edema. No clubbing  Skin:                Texture, turgor normal. No rashes/lesions/jaundice  Psych:             Good insight. Not depressed. Not anxious or agitated. Neurologic:      EOMs intact. No facial asymmetry. No aphasia or slurred speech. A/O X 3.      LAB DATA REVIEWED:    Recent Results (from the past 24 hour(s))   EKG, 12 LEAD, INITIAL    Collection Time: 10/06/22 11:21 AM   Result Value Ref Range    Ventricular Rate 76 BPM    Atrial Rate 76 BPM    P-R Interval 258 ms    QRS Duration 144 ms    Q-T Interval 450 ms    QTC Calculation (Bezet) 506 ms    Calculated R Axis -46 degrees    Calculated T Axis 74 degrees    Diagnosis       Atrial-paced rhythm with prolonged AV conduction  Right bundle branch block  Left anterior fascicular block  ** Bifascicular block **  Left ventricular hypertrophy with repolarization abnormality  Cannot rule out Septal infarct (cited on or before 06-OCT-2022)  When compared with ECG of 05-AUG-2022 10:22,  Serial changes of Septal infarct present     CBC WITH AUTOMATED DIFF    Collection Time: 10/06/22 12:09 PM   Result Value Ref Range    WBC 6.2 3.6 - 11.0 K/uL    RBC 1.54 (L) 3.80 - 5.20 M/uL    HGB 4.5 (LL) 11.5 - 16.0 g/dL    HCT 16.4 (LL) 35.0 - 47.0 %    .5 (H) 80.0 - 99.0 FL    MCH 29.2 26.0 - 34.0 PG    MCHC 27.4 (L) 30.0 - 36.5 g/dL    RDW 18.7 (H) 11.5 - 14.5 %    PLATELET 615 (L) 359 - 400 K/uL    MPV 10.0 8.9 - 12.9 FL    NRBC 0.0 0  WBC    ABSOLUTE NRBC 0.00 0.00 - 0.01 K/uL    NEUTROPHILS 89 (H) 32 - 75 %    LYMPHOCYTES 5 (L) 12 - 49 %    MONOCYTES 4 (L) 5 - 13 %    EOSINOPHILS 2 0 - 7 %    BASOPHILS 0 0 - 1 %    IMMATURE GRANULOCYTES 0 0.0 - 0.5 %    ABS. NEUTROPHILS 5.6 1.8 - 8.0 K/UL    ABS. LYMPHOCYTES 0.3 (L) 0.8 - 3.5 K/UL    ABS. MONOCYTES 0.2 0.0 - 1.0 K/UL    ABS. EOSINOPHILS 0.1 0.0 - 0.4 K/UL    ABS. BASOPHILS 0.0 0.0 - 0.1 K/UL    ABS. IMM.  GRANS. 0.0 0.00 - 0.04 K/UL    DF MANUAL      RBC COMMENTS ANISOCYTOSIS  PRESENT        RBC COMMENTS HYPOCHROMIA  PRESENT       METABOLIC PANEL, COMPREHENSIVE    Collection Time: 10/06/22 12:09 PM   Result Value Ref Range    Sodium 140 136 - 145 mmol/L    Potassium 4.1 3.5 - 5.1 mmol/L    Chloride 110 (H) 97 - 108 mmol/L    CO2 23 21 - 32 mmol/L    Anion gap 7 5 - 15 mmol/L    Glucose 199 (H) 65 - 100 mg/dL    BUN 62 (H) 6 - 20 MG/DL    Creatinine 1.91 (H) 0.55 - 1.02 MG/DL    BUN/Creatinine ratio 32 (H) 12 - 20      eGFR 27 (L) >60 ml/min/1.73m2    Calcium 9.0 8.5 - 10.1 MG/DL    Bilirubin, total 1.2 (H) 0.2 - 1.0 MG/DL    ALT (SGPT) 25 12 - 78 U/L    AST (SGOT) 33 15 - 37 U/L    Alk. phosphatase 116 45 - 117 U/L    Protein, total 6.2 (L) 6.4 - 8.2 g/dL    Albumin 3.2 (L) 3.5 - 5.0 g/dL    Globulin 3.0 2.0 - 4.0 g/dL    A-G Ratio 1.1 1.1 - 2.2     TYPE & SCREEN    Collection Time: 10/06/22 12:09 PM   Result Value Ref Range    Crossmatch Expiration 10/09/2022,2359     ABO/Rh(D) Tally Stade POSITIVE     Antibody screen POS     Antibody ID PENDING    RBC, ALLOCATE    Collection Time: 10/06/22  1:15 PM   Result Value Ref Range    HISTORY CHECKED?  Historical check performed    OCCULT BLOOD, STOOL    Collection Time: 10/06/22  2:17 PM   Result Value Ref Range    Occult blood, stool Positive (A) NEG         IMAGING RESULTS:  I have personally reviewed the imaging reports      Total time spent with patient: 25 minutes ________________________________________________________________________  Care Plan discussed with:  Patient x   Family     RN               Consultant:  Dr. Gerri Ware ED      CT  10/6/2022:  ________________________________________________________________________    ___________________________________________________  Consulting Provider: Zenobia Berger NP      10/6/2022  3:21 PM

## 2022-10-06 NOTE — PROGRESS NOTES
1600 TRANSFER - IN REPORT:    Verbal report received from Jasvir Singh, [facility] (name) on Tarik Thomas  being received from ED (unit) for routine progression of care      Report consisted of patients Situation, Background, Assessment and   Recommendations(SBAR). Information from the following report(s) SBAR, Kardex, ED Summary, Intake/Output, MAR, and Recent Results was reviewed with the receiving nurse. Opportunity for questions and clarification was provided. Assessment completed upon patients arrival to unit and care assumed. [id number] with blood bank regarding blood allocation. Per blood bank, 4 more tubes are needed to be sent to [employer] to find compatible blood. STAT 4 pink tubes drawn and sent to lab for processing. 1730 Consent for EGD signed and placed on pt's chart. 1900 Bedside shift change report given to Rema Fleming RN (oncoming nurse) by Mari Contreras RN (offgoing nurse). Report included the following information SBAR, Kardex, Intake/Output, MAR, Recent Results, and Cardiac Rhythm NSR .

## 2022-10-06 NOTE — H&P
Hospitalist Admission Note    NAME: Ana Fitzpatrick   :  1945   MRN:  [id number]     Date/Time:  10/6/2022 7:12 PM    Patient PCP: Alla Azul MD  ______________________________________________________________________  Given the patient's current clinical presentation, I have a high level of concern for decompensation if discharged from the emergency department. Complex decision making was performed, which includes reviewing the patient's available past medical records, laboratory results, and x-ray films. My assessment of this patient's clinical condition and my plan of care is as follows. Assessment / Plan:  Acute on chronic blood loss anemia  History of cirrhosis secondary to Agueda Musterik  History of recent EGD showing portal hypertensive gastropathy and colonoscopy showed normal mucosa with internal hemorrhoids  -Hemoglobin is 4.5 and 2 units of PRBCs ordered. Check serial hemoglobin. Insert and maintain 2 large-bore IV lines at all times. Keep n.p.o. from midnight  -GI is planning on patient for scope in a.m.  -Appreciate recommendations from GI  -She is currently hemodynamically stable    Acute kidney injury  -Baseline creatinine is around 1-1.1 currently creatinine is 1.9. Start IV fluids with normal saline. Repeat BMP in AM.  Hold home Lasix and resume once creatinine is trending down.   -Check urinalysis as well    Hypertension  GERD  Dyslipidemia  Hypothyroidism  Gouty arthritis  Atrial fibrillation  History of sick sinus syndrome s/p pacemaker placement  -Continue Norvasc, PPI, Lipitor, levothyroxine, allopurinol  -Her anticoagulation was discontinued previously due to anemia        Code Status: Full code  Surrogate Decision Maker: Son    DVT Prophylaxis: SCDs due to anemia  GI Prophylaxis: not indicated    Baseline: From home, independent of ADLs      Subjective:   CHIEF COMPLAINT: Shortness of breath    HISTORY OF PRESENT ILLNESS:     Kenzie Jimenez is a 68 y.o.   female who presents with past medical history of hypertension, GERD, dyslipidemia is coming the hospital chief complaints of shortness of breath. Patient reports being able to control about 2 weeks ago when she started not feeling well. She had lab work which revealed low hemoglobin and was advised to come into the emergency department for further evaluation. She reports fairly recent admission to the hospital for GI bleed and had an endoscopy at that time and her home Eliquis was discontinued at that time. She reports some brownish stool. She also reports exertional shortness of breath with no cough or phlegm. Does not report any chest pain. On arrival to ED, noted to have stable vitals. On labs hemoglobin is 4.5, platelet count is 798. BMP shows a creatinine of 1.91. Total bilirubin is 1.2. Chest x-ray shows no acute process. We were asked to admit for work up and evaluation of the above problems.      Past Medical History:   Diagnosis Date    Abnormal nuclear stress test 10/4/2021    Angina at rest [facility]) 10/4/2021    Arthritis     KNEE,gout    Bundle branch block     Endocrine disease     HYPOTHYROIDISM    Fracture     Left distal femur (age 12 - pt fell)    Fracture     Left tibia 1990 (pt fell)    Fracture     Right wrist fractured 2 times (pt fell)    GERD (gastroesophageal reflux disease)     High cholesterol     Hypertension     Hypoglycemia     \"my body produces too much insulin\"    Liver disease     BRADY    Nausea & vomiting     when had gallbladder out    Osteoporosis     Other ill-defined conditions(799.89)     edema legs    S/P cardiac cath 10/4/2021    10/4/2021 nonobstructive disease    Spinal stenosis     Thyroid disease         Past Surgical History:   Procedure Laterality Date    COLONOSCOPY N/A 7/13/2021    COLONOSCOPY performed by Gt Gómez MD at [facility] ENDOSCOPY    COLONOSCOPY N/A 8/8/2022    COLONOSCOPY performed by Naldo Alvarez MD at [place] [id number] [address],SNARE  2/11/2015         COLONOSCOPY,REMV JACQUELINEN,SNARE  7/13/2021         COLONOSCPY,FLEX,W/DIR SUBMUC INJECT  7/13/2021         COLORECTAL SCRN; HI RISK IND  2/11/2015         HX CHOLECYSTECTOMY      HX HYSTERECTOMY      HX ORTHOPAEDIC Left     leg surgery - plates, screws, wires, pins in place    HX UROLOGICAL      PESSURY    MS ABDOMEN SURGERY PROC UNLISTED      liver biopsy--BRADY and fibrosis    UPPER GI ENDOSCOPY,BIOPSY  11/17/2015            Social History     Tobacco Use    Smoking status: Never    Smokeless tobacco: Never   Substance Use Topics    Alcohol use: No        Family History   Problem Relation Age of Onset    Diabetes Mother     Heart Disease Mother     Emphysema Father     No Known Problems Brother     Stroke Maternal Grandmother     No Known Problems Maternal Grandfather     No Known Problems Paternal Grandmother     No Known Problems Paternal Grandfather      Allergies   Allergen Reactions    Gabapentin Other (comments)     Suicidal ideation    Metoprolol Rash    Celebrex [Celecoxib] Hives    Eliquis [Apixaban] Other (comments)     Caused excessive anemia    Pcn [Penicillins] Unknown (comments)     Can't remember, was a long time    Sulfa (Sulfonamide Antibiotics) Hives        Prior to Admission medications    Medication Sig Start Date End Date Taking? Authorizing Provider   amLODIPine (NORVASC) 10 mg tablet Take 1 Tablet by mouth daily. 9/24/22   Clarisse Jeffery MD   potassium chloride SA (MICRO-K) 10 mEq capsule Take 2 Capsules by mouth daily. 9/24/22   Clarisse Jeffery MD   furosemide (LASIX) 80 mg tablet Take 80 mg by mouth daily. 8/16/22   Provider, Historical   pantoprazole (PROTONIX) 40 mg tablet Take 1 Tablet by mouth Before breakfast and dinner. 8/17/22   Clarisse Jeffery MD   nystatin (MYCOSTATIN) powder Apply  to affected area two (2) times a day. 8/8/22   Albert Mott MD   fluticasone propionate (FLONASE) 50 mcg/actuation nasal spray 2 Sprays by Both Nostrils route daily. Administer to right and left nostril. 6/24/22   Diego Valladares MD   levothyroxine (SYNTHROID) 150 mcg tablet Take 1 Tablet by mouth Daily (before breakfast). 6/15/22   Diego Valladares MD   icosapent ethyL (VASCEPA) 1 gram capsule Take 2 Capsules by mouth two (2) times daily (with meals). 2/14/22   Diego Valladares MD   loratadine (Claritin) 10 mg tablet Take 1 Tablet by mouth daily. Indications: inflammation of the nose due to an allergy 12/22/21   Diego Valladares MD   Zetia 10 mg tablet Take 1 tablet by mouth daily. 12/22/21   Diego Valladares MD   atorvastatin (LIPITOR) 20 mg tablet TAKE 1 TABLET DAILY 12/22/21   Diego Valladares MD   lidocaine (LIDODERM) 5 % Apply patch to the affected area for 12 hours a day and remove for 12 hours a day. 12/7/21   Diego Valladares MD   allopurinoL (ZYLOPRIM) 100 mg tablet Take 100 mg by mouth two (2) times a day. Provider, Historical   polyethylene glycol (MIRALAX) 17 gram/dose powder Take 17 g by mouth daily. 4/6/20   Diego Valladares MD   ketoconazole (NIZORAL) 2 % topical cream Apply  to affected area daily as needed. 8/23/19   Provider, Historical   Biotin 2,500 mcg cap Take  by mouth. Provider, Historical   L GASSERI/B BIFIDUM/B LONGUM (IMANIN PO) Take 1 Tab by mouth daily. Provider, Historical   vitamin E (AQUA GEMS) 400 unit capsule Take 800 Units by mouth two (2) times a day. Provider, Historical   cholecalciferol, vitamin D3, 50 mcg (2,000 unit) tab Take 1 Tab by mouth daily. Other, MD Janette   MULTIVITAMIN WITH MINERALS (ONE-A-DAY 50 PLUS PO) Take 1 Tab by mouth daily. Provider, Historical       REVIEW OF SYSTEMS:     I am not able to complete the review of systems because:    The patient is intubated and sedated    The patient has altered mental status due to his acute medical problems    The patient has baseline aphasia from prior stroke(s)    The patient has baseline dementia and is not reliable historian    The patient is in acute medical distress and unable to provide information           Total of 12 systems reviewed as follows:       POSITIVE= underlined text  Negative = text not underlined  General:  fever, chills, sweats, generalized weakness, weight loss/gain,      loss of appetite   Eyes:    blurred vision, eye pain, loss of vision, double vision  ENT:    rhinorrhea, pharyngitis   Respiratory:   cough, sputum production, SOB, HENLEY, wheezing, pleuritic pain   Cardiology:   chest pain, palpitations, orthopnea, PND, edema, syncope   Gastrointestinal:  abdominal pain , N/V, diarrhea, dysphagia, constipation, bleeding   Genitourinary:  frequency, urgency, dysuria, hematuria, incontinence   Muskuloskeletal :  arthralgia, myalgia, back pain  Hematology:  easy bruising, nose or gum bleeding, lymphadenopathy   Dermatological: rash, ulceration, pruritis, color change / jaundice  Endocrine:   hot flashes or polydipsia   Neurological:  headache, dizziness, confusion, focal weakness, paresthesia,     Speech difficulties, memory loss, gait difficulty  Psychological: Feelings of anxiety, depression, agitation    Objective:   VITALS:    Visit Vitals  BP (!) 133/51 (BP 1 Location: Right arm, BP Patient Position: At rest)   Pulse 68   Temp 98 °F (36.7 °C)   Resp 17   Ht 4' 11\" (1.499 m)   Wt 97.5 kg (215 lb)   SpO2 98%   BMI 43.42 kg/m²       PHYSICAL EXAM:    General:    no distress, appears stated age. HEENT: Atraumatic, anicteric sclerae, pink conjunctivae     No oral ulcers, mucosa moist  Neck:  Supple, symmetrical,  thyroid: non tender  Lungs:   Clear to auscultation bilaterally. No Wheezing or Rhonchi. No rales. Chest wall:  No tenderness  No Accessory muscle use. Heart:   Regular  rhythm,  No  murmur   No edema  Abdomen:   Soft, non-tender. Not distended. Bowel sounds normal  Extremities: No cyanosis. No clubbing,      Skin turgor normal, Capillary refill normal, Radial dial pulse 2+  Skin:     Not pale.   Not Jaundiced  No rashes   Psych:  Not anxious or agitated. Neurologic: EOMs intact. No facial asymmetry. No aphasia or slurred speech. Symmetrical strength, Sensation grossly intact. Alert and awake.    _______________________________________________________________________  Care Plan discussed with:    Comments   Patient y    Family      RN y    Care Manager                    Consultant:      _______________________________________________________________________  Expected  Disposition:   Home with Family y   HH/PT/OT/RN    SNF/LTC    DIAMOND    ________________________________________________________________________  TOTAL TIME:  61  Minutes    Critical Care Provided     Minutes non procedure based      Comments    y Reviewed previous records   >50% of visit spent in counseling and coordination of care y Discussion with patient and/or family and questions answered       ________________________________________________________________________  Signed: Adrienne Caban MD    Procedures: see electronic medical records for all procedures/Xrays and details which were not copied into this note but were reviewed prior to creation of Plan.     LAB DATA REVIEWED:    Recent Results (from the past 24 hour(s))   EKG, 12 LEAD, INITIAL    Collection Time: 10/06/22 11:21 AM   Result Value Ref Range    Ventricular Rate 76 BPM    Atrial Rate 76 BPM    P-R Interval 258 ms    QRS Duration 144 ms    Q-T Interval 450 ms    QTC Calculation (Bezet) 506 ms    Calculated R Axis -46 degrees    Calculated T Axis 74 degrees    Diagnosis       Atrial-paced rhythm with prolonged AV conduction  Right bundle branch block  Left anterior fascicular block  ** Bifascicular block **  Left ventricular hypertrophy with repolarization abnormality  When compared with ECG of 05-AUG-2022 10:22,  No significant change  Confirmed by Maria Elena Hernandez ([id number]) on 10/6/2022 5:10:07 PM     CBC WITH AUTOMATED DIFF    Collection Time: 10/06/22 12:09 PM   Result Value Ref Range    WBC 6.2 3.6 - 11.0 K/uL    RBC 1.54 (L) 3.80 - 5.20 M/uL    HGB 4.5 (LL) 11.5 - 16.0 g/dL    HCT 16.4 (LL) 35.0 - 47.0 %    .5 (H) 80.0 - 99.0 FL    MCH 29.2 26.0 - 34.0 PG    MCHC 27.4 (L) 30.0 - 36.5 g/dL    RDW 18.7 (H) 11.5 - 14.5 %    PLATELET 935 (L) 201 - 400 K/uL    MPV 10.0 8.9 - 12.9 FL    NRBC 0.0 0  WBC    ABSOLUTE NRBC 0.00 0.00 - 0.01 K/uL    NEUTROPHILS 89 (H) 32 - 75 %    LYMPHOCYTES 5 (L) 12 - 49 %    MONOCYTES 4 (L) 5 - 13 %    EOSINOPHILS 2 0 - 7 %    BASOPHILS 0 0 - 1 %    IMMATURE GRANULOCYTES 0 0.0 - 0.5 %    ABS. NEUTROPHILS 5.6 1.8 - 8.0 K/UL    ABS. LYMPHOCYTES 0.3 (L) 0.8 - 3.5 K/UL    ABS. MONOCYTES 0.2 0.0 - 1.0 K/UL    ABS. EOSINOPHILS 0.1 0.0 - 0.4 K/UL    ABS. BASOPHILS 0.0 0.0 - 0.1 K/UL    ABS. IMM. GRANS. 0.0 0.00 - 0.04 K/UL    DF MANUAL      RBC COMMENTS ANISOCYTOSIS  PRESENT        RBC COMMENTS HYPOCHROMIA  PRESENT       METABOLIC PANEL, COMPREHENSIVE    Collection Time: 10/06/22 12:09 PM   Result Value Ref Range    Sodium 140 136 - 145 mmol/L    Potassium 4.1 3.5 - 5.1 mmol/L    Chloride 110 (H) 97 - 108 mmol/L    CO2 23 21 - 32 mmol/L    Anion gap 7 5 - 15 mmol/L    Glucose 199 (H) 65 - 100 mg/dL    BUN 62 (H) 6 - 20 MG/DL    Creatinine 1.91 (H) 0.55 - 1.02 MG/DL    BUN/Creatinine ratio 32 (H) 12 - 20      eGFR 27 (L) >60 ml/min/1.73m2    Calcium 9.0 8.5 - 10.1 MG/DL    Bilirubin, total 1.2 (H) 0.2 - 1.0 MG/DL    ALT (SGPT) 25 12 - 78 U/L    AST (SGOT) 33 15 - 37 U/L    Alk. phosphatase 116 45 - 117 U/L    Protein, total 6.2 (L) 6.4 - 8.2 g/dL    Albumin 3.2 (L) 3.5 - 5.0 g/dL    Globulin 3.0 2.0 - 4.0 g/dL    A-G Ratio 1.1 1.1 - 2.2     TYPE & SCREEN    Collection Time: 10/06/22 12:09 PM   Result Value Ref Range    Crossmatch Expiration 10/09/2022,2356     ABO/Rh(D) Zoie Goo POSITIVE     Antibody screen POS     Antibody ID PENDING    RBC, ALLOCATE    Collection Time: 10/06/22  1:15 PM   Result Value Ref Range    HISTORY CHECKED?  Historical check performed    OCCULT BLOOD, STOOL    Collection Time: 10/06/22  2:17 PM   Result Value Ref Range    Occult blood, stool Positive (A) NEG

## 2022-10-07 LAB
ANION GAP SERPL CALC-SCNC: 6 MMOL/L (ref 5–15)
BUN SERPL-MCNC: 57 MG/DL (ref 6–20)
BUN/CREAT SERPL: 33 (ref 12–20)
CALCIUM SERPL-MCNC: 8.3 MG/DL (ref 8.5–10.1)
CHLORIDE SERPL-SCNC: 114 MMOL/L (ref 97–108)
CO2 SERPL-SCNC: 22 MMOL/L (ref 21–32)
CREAT SERPL-MCNC: 1.73 MG/DL (ref 0.55–1.02)
ERYTHROCYTE [DISTWIDTH] IN BLOOD BY AUTOMATED COUNT: 18.8 % (ref 11.5–14.5)
GLUCOSE SERPL-MCNC: 120 MG/DL (ref 65–100)
HCT VFR BLD AUTO: 12.1 % (ref 35–47)
HGB BLD-MCNC: 3.4 G/DL (ref 11.5–16)
HGB BLD-MCNC: 3.4 G/DL (ref 11.5–16)
MCH RBC QN AUTO: 28.8 PG (ref 26–34)
MCHC RBC AUTO-ENTMCNC: 28.1 G/DL (ref 30–36.5)
MCV RBC AUTO: 102.5 FL (ref 80–99)
NRBC # BLD: 0.02 K/UL (ref 0–0.01)
NRBC BLD-RTO: 0.6 PER 100 WBC
PLATELET # BLD AUTO: 112 K/UL (ref 150–400)
PMV BLD AUTO: 10.1 FL (ref 8.9–12.9)
POTASSIUM SERPL-SCNC: 3.8 MMOL/L (ref 3.5–5.1)
RBC # BLD AUTO: 1.18 M/UL (ref 3.8–5.2)
SODIUM SERPL-SCNC: 142 MMOL/L (ref 136–145)
WBC # BLD AUTO: 3.5 K/UL (ref 3.6–11)

## 2022-10-07 PROCEDURE — 86902 BLOOD TYPE ANTIGEN DONOR EA: CPT

## 2022-10-07 PROCEDURE — 86921 COMPATIBILITY TEST INCUBATE: CPT

## 2022-10-07 PROCEDURE — 74011000250 HC RX REV CODE- 250: Performed by: INTERNAL MEDICINE

## 2022-10-07 PROCEDURE — 86922 COMPATIBILITY TEST ANTIGLOB: CPT

## 2022-10-07 PROCEDURE — P9016 RBC LEUKOCYTES REDUCED: HCPCS

## 2022-10-07 PROCEDURE — 74011250636 HC RX REV CODE- 250/636: Performed by: NURSE PRACTITIONER

## 2022-10-07 PROCEDURE — C9113 INJ PANTOPRAZOLE SODIUM, VIA: HCPCS | Performed by: INTERNAL MEDICINE

## 2022-10-07 PROCEDURE — 74011250637 HC RX REV CODE- 250/637: Performed by: INTERNAL MEDICINE

## 2022-10-07 PROCEDURE — 36430 TRANSFUSION BLD/BLD COMPNT: CPT

## 2022-10-07 PROCEDURE — 74011250636 HC RX REV CODE- 250/636: Performed by: INTERNAL MEDICINE

## 2022-10-07 PROCEDURE — 65270000046 HC RM TELEMETRY

## 2022-10-07 PROCEDURE — 36415 COLL VENOUS BLD VENIPUNCTURE: CPT

## 2022-10-07 PROCEDURE — 85027 COMPLETE CBC AUTOMATED: CPT

## 2022-10-07 PROCEDURE — 80048 BASIC METABOLIC PNL TOTAL CA: CPT

## 2022-10-07 PROCEDURE — 85018 HEMOGLOBIN: CPT

## 2022-10-07 RX ORDER — SODIUM CHLORIDE 9 MG/ML
250 INJECTION, SOLUTION INTRAVENOUS AS NEEDED
Status: DISCONTINUED | OUTPATIENT
Start: 2022-10-07 | End: 2022-10-08

## 2022-10-07 RX ORDER — BALSAM PERU/CASTOR OIL
OINTMENT (GRAM) TOPICAL 2 TIMES DAILY
Status: DISCONTINUED | OUTPATIENT
Start: 2022-10-07 | End: 2022-10-17 | Stop reason: HOSPADM

## 2022-10-07 RX ADMIN — LEVOTHYROXINE SODIUM 150 MCG: 0.15 TABLET ORAL at 11:14

## 2022-10-07 RX ADMIN — SODIUM CHLORIDE 125 ML/HR: 9 INJECTION, SOLUTION INTRAVENOUS at 08:45

## 2022-10-07 RX ADMIN — IRON SUCROSE 100 MG: 20 INJECTION, SOLUTION INTRAVENOUS at 12:16

## 2022-10-07 RX ADMIN — SODIUM CHLORIDE 40 MG: 9 INJECTION INTRAMUSCULAR; INTRAVENOUS; SUBCUTANEOUS at 11:13

## 2022-10-07 RX ADMIN — AMLODIPINE BESYLATE 10 MG: 5 TABLET ORAL at 11:14

## 2022-10-07 RX ADMIN — POTASSIUM CHLORIDE 10 MEQ: 750 TABLET, FILM COATED, EXTENDED RELEASE ORAL at 11:14

## 2022-10-07 RX ADMIN — EZETIMIBE 10 MG: 10 TABLET ORAL at 11:14

## 2022-10-07 RX ADMIN — SODIUM CHLORIDE, PRESERVATIVE FREE 10 ML: 5 INJECTION INTRAVENOUS at 23:23

## 2022-10-07 RX ADMIN — SODIUM CHLORIDE 125 ML/HR: 9 INJECTION, SOLUTION INTRAVENOUS at 00:16

## 2022-10-07 RX ADMIN — ALLOPURINOL 100 MG: 100 TABLET ORAL at 11:14

## 2022-10-07 RX ADMIN — SODIUM CHLORIDE, PRESERVATIVE FREE 10 ML: 5 INJECTION INTRAVENOUS at 15:58

## 2022-10-07 RX ADMIN — ATORVASTATIN CALCIUM 20 MG: 20 TABLET, FILM COATED ORAL at 11:14

## 2022-10-07 RX ADMIN — SODIUM CHLORIDE 40 MG: 9 INJECTION INTRAMUSCULAR; INTRAVENOUS; SUBCUTANEOUS at 23:20

## 2022-10-07 RX ADMIN — ALLOPURINOL 100 MG: 100 TABLET ORAL at 17:33

## 2022-10-07 NOTE — PROGRESS NOTES
Hospitalist Progress Note    NAME: She Low   :  1945   MRN:  [id number]       Assessment / Plan:  Acute on chronic blood loss anemia  History of cirrhosis secondary to Terrea Regino  History of recent EGD showing portal hypertensive gastropathy and colonoscopy showed normal mucosa with internal hemorrhoids    Didn't get transfusion yet d/t her blood having lots of antibodies, [facility]. Will order 1 more units, needs 3 units for now  EGD cancelled d/t low hemoblogin today  No BM for 2 days, no active bleeding currently  Iron study s/o CARLINE, iv iron ordered. Hematology consult in AM    Acute kidney injury  Creatinine improving, repeat in Am    Hypertension  GERD  Dyslipidemia  Hypothyroidism  Gouty arthritis  Atrial fibrillation  History of sick sinus syndrome s/p pacemaker placement  -Continue Norvasc, PPI, Lipitor, levothyroxine, allopurinol  -Her anticoagulation was discontinued previously due to anemia     Code Status: Full code  Surrogate Decision Maker: Son     DVT Prophylaxis: SCDs due to anemia  GI Prophylaxis: not indicated     Baseline: From home, independent of ADLs    MAYURI: 10/11  Barriers: Needs Endoscopy/ colonoscopy  Stable Hb     Subjective:     Chief Complaint / Reason for Physician Visit  Seen and evaluated in ED. She denies any bm in last 2 days, no other bleeding  Denies any belly pain    Review of Systems:  Symptom Y/N Comments  Symptom Y/N Comments   Fever/Chills n   Chest Pain n    Poor Appetite n   Edema n    Cough n   Abdominal Pain nn    Sputum    Joint Pain     SOB/HENLEY    Pruritis/Rash     Nausea/vomit    Tolerating PT/OT     Diarrhea    Tolerating Diet     Constipation    Other       Could NOT obtain due to:      Objective:     VITALS:   Last 24hrs VS reviewed since prior progress note.  Most recent are:  Patient Vitals for the past 24 hrs:   Temp Pulse Resp BP SpO2   10/07/22 1600 -- 73 -- -- --   10/07/22 1440 98.5 °F (36.9 °C) 65 22 (!) 123/37 97 % 10/07/22 1424 98.4 °F (36.9 °C) 65 16 (!) 144/40 96 %   10/07/22 1200 -- 73 -- -- --   10/07/22 1127 98 °F (36.7 °C) 71 18 (!) 137/39 98 %   10/07/22 0810 98.3 °F (36.8 °C) 64 17 (!) 123/42 97 %   10/07/22 0735 -- 61 16 -- 96 %   10/07/22 0704 -- 60 25 -- 91 %   10/07/22 0259 98.6 °F (37 °C) 63 18 (!) 112/40 98 %   10/06/22 2353 98.3 °F (36.8 °C) 63 21 (!) 123/36 97 %   10/06/22 1909 98.3 °F (36.8 °C) 68 14 (!) 128/39 99 %     No intake or output data in the 24 hours ending 10/07/22 1627     I had a face to face encounter and independently examined this patient on 10/7/2022, as outlined below:  PHYSICAL EXAM:  General: Awake, No acute distress  EENT:  EOMI. Anicteric sclerae. MMM  Resp:  CTA bilaterally, no wheezing or rales. No accessory muscle use  CV:  Regular  rhythm,  No edema  GI:  Soft, Non distended, Non tender. +Bowel sounds  Neurologic:  Alert and oriented X 3, normal speech,   Psych:   Not anxious nor agitated  Skin:  No rashes. No jaundice    Reviewed most current lab test results and cultures  YES  Reviewed most current radiology test results   YES  Review and summation of old records today    NO  Reviewed patient's current orders and MAR    YES  PMH/SH reviewed - no change compared to H&P  ________________________________________________________________________  Care Plan discussed with:    Comments   Patient y    Family      RN y    Care Manager     Consultant                       y Multidiciplinary team rounds were held today with , nursing, pharmacist and clinical coordinator. Patient's plan of care was discussed; medications were reviewed and discharge planning was addressed.      ________________________________________________________________________  Total NON critical care TIME:  36   Minutes    Total CRITICAL CARE TIME Spent:   Minutes non procedure based      Comments   >50% of visit spent in counseling and coordination of care ________________________________________________________________________  Gaston Russell MD     Procedures: see electronic medical records for all procedures/Xrays and details which were not copied into this note but were reviewed prior to creation of Plan. LABS:  I reviewed today's most current labs and imaging studies.   Pertinent labs include:  Recent Labs     10/07/22  0458 10/06/22  1209   WBC 3.5* 6.2   HGB 3.4*  3.4* 4.5*   HCT 12.1* 16.4*   * 132*     Recent Labs     10/07/22  0458 10/06/22  1209    140   K 3.8 4.1   * 110*   CO2 22 23   * 199*   BUN 57* 62*   CREA 1.73* 1.91*   CA 8.3* 9.0   ALB  --  3.2*   TBILI  --  1.2*   ALT  --  25       Signed: Gaston Russell MD

## 2022-10-07 NOTE — PROGRESS NOTES
End of Shift Note    Bedside shift change report given to 18 Brandee May (oncoming nurse) by Bob Ledesma RN (offgoing nurse).   Report included the following information SBAR, Kardex, and ED Summary    Shift worked:  4P-7P   Shift summary and any significant changes:    -NEW ADMIT  -BLOOD TRANSFUSION ONGOING       Concerns for physician to address:  NONE   Zone phone for oncoming shift:   [phone]     Patient Information  Jake Louis  68 y.o.  10/6/2022 11:15 AM by Butch Cotto MD. Jake Louis was admitted from Home    Problem List  Patient Active Problem List    Diagnosis Date Noted    Acute anemia 10/06/2022    Anemia 08/05/2022    SSS (sick sinus syndrome) ([address] 06/27/2022    PAF (paroxysmal atrial fibrillation) ([address] 06/24/2022    Stage 3a chronic kidney disease ([address] 05/20/2022    Type 2 diabetes mellitus with chronic kidney disease ([address] 11/18/2021    Abnormal nuclear stress test 10/04/2021    Angina at rest ([address] 10/04/2021    S/P cardiac cath 10/04/2021    Nonrheumatic aortic valve stenosis 09/08/2021    Pulmonary hypertension ([address] 09/08/2021    MICHELLE (acute kidney injury) ([address] 10/27/2020    Cellulitis of left lower leg 11/07/2019    Severe obesity ([address] 09/30/2019    Controlled type 2 diabetes mellitus without complication ([address] 81/22/6182    DDD (degenerative disc disease), lumbar 11/27/2017    Prediabetes 04/11/2016    Thrombocytopenia ([address] 01/24/2015    HTN (hypertension) 01/17/2014    Bifascicular block 12/23/2010    Fatty liver 06/18/2010    Pure hypercholesterolemia 06/18/2010    Colon polyp 06/18/2010    Gout 06/14/2010    Hypothyroidism 06/14/2010    Osteoporosis 06/14/2010    Anxiety 06/14/2010    Leg edema 06/14/2010    RSD lower limb 06/14/2010     Past Medical History:   Diagnosis Date    Abnormal nuclear stress test 10/4/2021    Angina at rest [facility]) 10/4/2021    Arthritis     KNEE,gout    Bundle branch block     Endocrine disease     HYPOTHYROIDISM    Fracture     Left distal femur (age 12 - pt fell)    Fracture     Left tibia 1990 (pt fell)    Fracture     Right wrist fractured 2 times (pt fell)    GERD (gastroesophageal reflux disease)     High cholesterol     Hypertension     Hypoglycemia     \"my body produces too much insulin\"    Liver disease     BRADY    Nausea & vomiting     when had gallbladder out    Osteoporosis     Other ill-defined conditions(799.89)     edema legs    S/P cardiac cath 10/4/2021    10/4/2021 nonobstructive disease    Spinal stenosis     Thyroid disease        Core Measures:  CVA: No No  CHF:No No  PNA:No No    Activity:  Activity Level: Bed Rest  Number times ambulated in hallways past shift: 0  Number of times OOB to chair past shift: 0    Cardiac:   Cardiac Monitoring: Yes      Cardiac Rhythm: Atrial Paced    Access:   Current line(s): PIV     Genitourinary:   Urinary status: voiding and external catheter    Respiratory:   O2 Device: None (Room air)  Chronic home O2 use?: N/A  Incentive spirometer at bedside: N/A       GI:     Current diet:  ADULT DIET Regular  DIET ONE TIME MESSAGE  DIET NPO  Passing flatus: YES  Tolerating current diet: YES       Pain Management:   Patient states pain is manageable on current regimen: N/A    Skin:  Boris Score: 19  Interventions: increase time out of bed    Patient Safety:  Fall Score:  Total Score: 3  Interventions: bed/chair alarm, assistive device (walker, cane, etc), and gripper socks  High Fall Risk: Yes  @Rollbelt  @dexterity to release roll belt  Yes/No ( must document dexterity  here by stating Yes or No here, otherwise this is a restraint and must follow restraint documentation policy.)    DVT prophylaxis:  DVT prophylaxis Med- Yes    WOUND: BLISTER LOWER EXTREMITY BLISTERS    Active Consults:  IP CONSULT TO GASTROENTEROLOGY  IP CONSULT TO HOSPITALIST    Length of Stay:  Expected LOS: 2d 16h  Actual LOS: 1  Discharge Plan: No HOME      Ryan Shipley RN

## 2022-10-07 NOTE — PROGRESS NOTES
Patient being transported to room 3117. Report called in to [facility], RN. [facility] just arrived. Blood administration to being prior to transfer and RN will monitor patient for 15 minutes per hospital policy. Once the 15 minutes is done patient will be transferred to room 3117 with RN and Transport.

## 2022-10-07 NOTE — PERIOP NOTES
Called to get report on patient for EGD today - patient's primary nurse, Sean Brar, informed me that patient's hgb is 3.4 and [facility] to a lot of [facility]. Charge nurse, Darby Tejada, aware and states she will let Dr. Alexis Escalante know.

## 2022-10-07 NOTE — PROGRESS NOTES
GI PROGRESS NOTE  Camilo Bowers NP  [phone] NP in-hospital cell phone M-F until 4:30  After 5pm or on weekends, please call  for physician on call    NAME:Char Evans :1945 GGT:[id number]   ATTG: Dr. Jane Heaton  PCP: Lisa Cochran MD  Date/Time:  10/7/2022 9:45 AM   Primary GI: Dr. Parth Olson  Reason for following: Anemia     Assessment:     Acute on chronic anemia   Hgb 3.4; was 10.0 on 2022  BUN/Cr ratio 33  FOBT +  Iron 16, TIBC 342, Iron % saturation 5     Hx cirrhosis secondary to BRADY, compensated   Had liver bx at [facility]   BRADY, moderate fibrosis with bridging, stage III     GI History:  2022- Colonoscopy with normal colonic mucosa throughout, internal hemorrhoids   2022- EGD showing portal hypertensive gastropathy     Plan:     Plan for repeat EGD with capsule Monday with Dr. Genaro Miguel; obtain consent. Unable to complete today secondary to hgb of 3.4. Awaiting PRBC transfusions   Details and risks of the procedure to include (but not limited to) anesthesia, bleeding, infection, and perforation were discussed. Patient understands and is in agreement with the plan  NPO after midnight    CLD for now   Recommend IV iron infusions  Recommend hematology consultation   Serial H&H; goal for hgb >7.0  Monitor for s/s of bleeding; transfuse as clinically indicated  Continue PPI  Symptomatic care per primary team  Will see patient over the weekend by request.  Please call with any questions or concerns. Will follow-up with patient on Monday for EGD. *Plan of care discussed with Dr. Ash Purdy      Subjective:   Discussed with RN events overnight. Patient resting in bed. Family at bedside. Patient denies any new complaints. Updated on plan of care. Patient verbalized understanding.      Review of Systems:  Symptom Y/N Comments  Symptom Y/N Comments   Fever/Chills n   Chest Pain n    Cough n   Headaches n    Sputum n   Joint Pain n    SOB/HENLEY n   Pruritis/Rash n Tolerating Diet y   Other       Could NOT obtain due to:      Objective:   VITALS:   Last 24hrs VS reviewed since prior progress note. Most recent are:  Visit Vitals  BP (!) 123/42 (BP 1 Location: Right upper arm, BP Patient Position: At rest)   Pulse 64   Temp 98.3 °F (36.8 °C)   Resp 17   Ht 4' 11\" (1.499 m)   Wt 97.5 kg (215 lb)   SpO2 97%   BMI 43.42 kg/m²     No intake or output data in the 24 hours ending 10/07/22 0945  PHYSICAL EXAM:  General: Pleasant elderly  female. NAD   HEENT: NC, Atraumatic. Anicteric sclerae. Lungs:  CTA Bilaterally. No Wheezing/Rhonchi/Rales. Heart:  Regular  rhythm,  No murmur, No Rubs, No Gallops  Abdomen: Soft, Non distended, Non tender. +Bowel sounds, no HSM  Extremities: No c/c/e  Neurologic:  Alert and oriented X 3. No acute neurological distress   Psych:   Good insight. Not anxious nor agitated. Lab and Radiology Data Reviewed: (see below)    Medications Reviewed: (see below)  PMH/SH reviewed - no change compared to H&P  ________________________________________________________________________  Total time spent with patient: 15 minutes ________________________________________________________________________  Care Plan discussed with:  Patient x   Family  x   RN               Consultant:       Jerry Rashid NP     Procedures: see electronic medical records for all procedures/Xrays and details which were not copied into this note but were reviewed prior to creation of Plan. LABS:  Recent Labs     10/07/22  0458 10/06/22  1209   WBC 3.5* 6.2   HGB 3.4*  3.4* 4.5*   HCT 12.1* 16.4*   * 132*     Recent Labs     10/07/22  0458 10/06/22  1209    140   K 3.8 4.1   * 110*   CO2 22 23   BUN 57* 62*   CREA 1.73* 1.91*   * 199*   CA 8.3* 9.0     Recent Labs     10/06/22  1209      TP 6.2*   ALB 3.2*   GLOB 3.0     No results for input(s): INR, PTP, APTT, INREXT in the last 72 hours.    Recent Labs     10/06/22  1553   TIBC 342 PSAT 5*      No results found for: FOL, RBCF  No results for input(s): PH, PCO2, PO2 in the last 72 hours. No results for input(s): CPK, CKMB in the last 72 hours.     No lab exists for component: TROPONINI  Lab Results   Component Value Date/Time    Color YELLOW/STRAW 10/27/2020 02:54 PM    Appearance CLOUDY (A) 10/27/2020 02:54 PM    Specific gravity 1.015 10/27/2020 02:54 PM    pH (UA) 5.0 10/27/2020 02:54 PM    Protein 100 (A) 10/27/2020 02:54 PM    Glucose Negative 10/27/2020 02:54 PM    Ketone Negative 10/27/2020 02:54 PM    Bilirubin NEGATIVE 11/04/2012 10:39 PM    Urobilinogen 1.0 10/27/2020 02:54 PM    Nitrites Negative 10/27/2020 02:54 PM    Leukocyte Esterase LARGE (A) 10/27/2020 02:54 PM    Epithelial cells MANY (A) 10/27/2020 02:54 PM    Bacteria 3+ (A) 10/27/2020 02:54 PM    WBC >100 (H) 10/27/2020 02:54 PM    RBC 5-10 10/27/2020 02:54 PM       MEDICATIONS:  Current Facility-Administered Medications   Medication Dose Route Frequency    0.9% sodium chloride infusion 250 mL  250 mL IntraVENous PRN    pantoprazole (PROTONIX) 40 mg in 0.9% sodium chloride 10 mL injection  40 mg IntraVENous Q12H    0.9% sodium chloride infusion  125 mL/hr IntraVENous CONTINUOUS    allopurinoL (ZYLOPRIM) tablet 100 mg  100 mg Oral BID    amLODIPine (NORVASC) tablet 10 mg  10 mg Oral DAILY    atorvastatin (LIPITOR) tablet 20 mg  20 mg Oral DAILY    [Held by provider] furosemide (LASIX) tablet 80 mg  80 mg Oral DAILY    levothyroxine (SYNTHROID) tablet 150 mcg  150 mcg Oral ACB    potassium chloride SR (KLOR-CON 10) tablet 10 mEq  10 mEq Oral DAILY    ezetimibe (ZETIA) tablet 10 mg  10 mg Oral DAILY    sodium chloride (NS) flush 5-40 mL  5-40 mL IntraVENous Q8H    sodium chloride (NS) flush 5-40 mL  5-40 mL IntraVENous PRN    acetaminophen (TYLENOL) tablet 650 mg  650 mg Oral Q6H PRN    Or    acetaminophen (TYLENOL) suppository 650 mg  650 mg Rectal Q6H PRN    polyethylene glycol (MIRALAX) packet 17 g  17 g Oral DAILY PRN ondansetron (ZOFRAN ODT) tablet 4 mg  4 mg Oral Q8H PRN    Or    ondansetron (ZOFRAN) injection 4 mg  4 mg IntraVENous Q6H PRN     Current Outpatient Medications   Medication Sig    amLODIPine (NORVASC) 10 mg tablet Take 1 Tablet by mouth daily. potassium chloride SA (MICRO-K) 10 mEq capsule Take 2 Capsules by mouth daily. furosemide (LASIX) 80 mg tablet Take 80 mg by mouth daily. pantoprazole (PROTONIX) 40 mg tablet Take 1 Tablet by mouth Before breakfast and dinner. nystatin (MYCOSTATIN) powder Apply  to affected area two (2) times a day. fluticasone propionate (FLONASE) 50 mcg/actuation nasal spray 2 Sprays by Both Nostrils route daily. Administer to right and left nostril.    levothyroxine (SYNTHROID) 150 mcg tablet Take 1 Tablet by mouth Daily (before breakfast). icosapent ethyL (VASCEPA) 1 gram capsule Take 2 Capsules by mouth two (2) times daily (with meals). loratadine (Claritin) 10 mg tablet Take 1 Tablet by mouth daily. Indications: inflammation of the nose due to an allergy    Zetia 10 mg tablet Take 1 tablet by mouth daily. atorvastatin (LIPITOR) 20 mg tablet TAKE 1 TABLET DAILY    lidocaine (LIDODERM) 5 % Apply patch to the affected area for 12 hours a day and remove for 12 hours a day. allopurinoL (ZYLOPRIM) 100 mg tablet Take 100 mg by mouth two (2) times a day. polyethylene glycol (MIRALAX) 17 gram/dose powder Take 17 g by mouth daily. ketoconazole (NIZORAL) 2 % topical cream Apply  to affected area daily as needed. Biotin 2,500 mcg cap Take  by mouth. L GASSERI/B BIFIDUM/B LONGUM (Affinity Edge PO) Take 1 Tab by mouth daily. vitamin E (AQUA GEMS) 400 unit capsule Take 800 Units by mouth two (2) times a day. cholecalciferol, vitamin D3, 50 mcg (2,000 unit) tab Take 1 Tab by mouth daily. MULTIVITAMIN WITH MINERALS (ONE-A-DAY 50 PLUS PO) Take 1 Tab by mouth daily.

## 2022-10-08 LAB
ANION GAP SERPL CALC-SCNC: 7 MMOL/L (ref 5–15)
BUN SERPL-MCNC: 50 MG/DL (ref 6–20)
BUN/CREAT SERPL: 29 (ref 12–20)
CALCIUM SERPL-MCNC: 8.4 MG/DL (ref 8.5–10.1)
CHLORIDE SERPL-SCNC: 113 MMOL/L (ref 97–108)
CO2 SERPL-SCNC: 22 MMOL/L (ref 21–32)
COMMENT, HOLDF: NORMAL
CREAT SERPL-MCNC: 1.7 MG/DL (ref 0.55–1.02)
ERYTHROCYTE [DISTWIDTH] IN BLOOD BY AUTOMATED COUNT: 20.6 % (ref 11.5–14.5)
GLUCOSE SERPL-MCNC: 137 MG/DL (ref 65–100)
HCT VFR BLD AUTO: 20.2 % (ref 35–47)
HCT VFR BLD AUTO: 27 % (ref 35–47)
HGB BLD-MCNC: 6 G/DL (ref 11.5–16)
HGB BLD-MCNC: 8.2 G/DL (ref 11.5–16)
HISTORY CHECKED?,CKHIST: NORMAL
MCH RBC QN AUTO: 28.9 PG (ref 26–34)
MCHC RBC AUTO-ENTMCNC: 30.4 G/DL (ref 30–36.5)
MCV RBC AUTO: 95.1 FL (ref 80–99)
NRBC # BLD: 0.02 K/UL (ref 0–0.01)
NRBC BLD-RTO: 0.4 PER 100 WBC
PLATELET # BLD AUTO: 111 K/UL (ref 150–400)
PMV BLD AUTO: 10.7 FL (ref 8.9–12.9)
POTASSIUM SERPL-SCNC: 4 MMOL/L (ref 3.5–5.1)
RBC # BLD AUTO: 2.84 M/UL (ref 3.8–5.2)
SAMPLES BEING HELD,HOLD: NORMAL
SODIUM SERPL-SCNC: 142 MMOL/L (ref 136–145)
WBC # BLD AUTO: 4.9 K/UL (ref 3.6–11)

## 2022-10-08 PROCEDURE — P9016 RBC LEUKOCYTES REDUCED: HCPCS

## 2022-10-08 PROCEDURE — 85027 COMPLETE CBC AUTOMATED: CPT

## 2022-10-08 PROCEDURE — 80048 BASIC METABOLIC PNL TOTAL CA: CPT

## 2022-10-08 PROCEDURE — C9113 INJ PANTOPRAZOLE SODIUM, VIA: HCPCS | Performed by: INTERNAL MEDICINE

## 2022-10-08 PROCEDURE — 85018 HEMOGLOBIN: CPT

## 2022-10-08 PROCEDURE — 74011250637 HC RX REV CODE- 250/637: Performed by: INTERNAL MEDICINE

## 2022-10-08 PROCEDURE — 36415 COLL VENOUS BLD VENIPUNCTURE: CPT

## 2022-10-08 PROCEDURE — 74011250636 HC RX REV CODE- 250/636: Performed by: INTERNAL MEDICINE

## 2022-10-08 PROCEDURE — 65270000046 HC RM TELEMETRY

## 2022-10-08 PROCEDURE — 36430 TRANSFUSION BLD/BLD COMPNT: CPT

## 2022-10-08 PROCEDURE — 74011250637 HC RX REV CODE- 250/637: Performed by: STUDENT IN AN ORGANIZED HEALTH CARE EDUCATION/TRAINING PROGRAM

## 2022-10-08 PROCEDURE — 74011250636 HC RX REV CODE- 250/636: Performed by: NURSE PRACTITIONER

## 2022-10-08 PROCEDURE — 74011000250 HC RX REV CODE- 250: Performed by: INTERNAL MEDICINE

## 2022-10-08 RX ORDER — POLYETHYLENE GLYCOL 3350 17 G/17G
17 POWDER, FOR SOLUTION ORAL DAILY
Status: DISCONTINUED | OUTPATIENT
Start: 2022-10-08 | End: 2022-10-17 | Stop reason: HOSPADM

## 2022-10-08 RX ADMIN — SODIUM CHLORIDE, PRESERVATIVE FREE 10 ML: 5 INJECTION INTRAVENOUS at 13:49

## 2022-10-08 RX ADMIN — CASTOR OIL AND BALSAM, PERU: 788; 87 OINTMENT TOPICAL at 13:41

## 2022-10-08 RX ADMIN — SODIUM CHLORIDE, PRESERVATIVE FREE 10 ML: 5 INJECTION INTRAVENOUS at 23:47

## 2022-10-08 RX ADMIN — IRON SUCROSE 100 MG: 20 INJECTION, SOLUTION INTRAVENOUS at 09:50

## 2022-10-08 RX ADMIN — AMLODIPINE BESYLATE 10 MG: 5 TABLET ORAL at 09:54

## 2022-10-08 RX ADMIN — ATORVASTATIN CALCIUM 20 MG: 20 TABLET, FILM COATED ORAL at 09:55

## 2022-10-08 RX ADMIN — SODIUM CHLORIDE 40 MG: 9 INJECTION INTRAMUSCULAR; INTRAVENOUS; SUBCUTANEOUS at 22:49

## 2022-10-08 RX ADMIN — POLYETHYLENE GLYCOL 3350 17 G: 17 POWDER, FOR SOLUTION ORAL at 13:42

## 2022-10-08 RX ADMIN — POTASSIUM CHLORIDE 10 MEQ: 750 TABLET, FILM COATED, EXTENDED RELEASE ORAL at 09:54

## 2022-10-08 RX ADMIN — SODIUM CHLORIDE 40 MG: 9 INJECTION INTRAMUSCULAR; INTRAVENOUS; SUBCUTANEOUS at 09:49

## 2022-10-08 RX ADMIN — CASTOR OIL AND BALSAM, PERU: 788; 87 OINTMENT TOPICAL at 01:35

## 2022-10-08 RX ADMIN — ALLOPURINOL 100 MG: 100 TABLET ORAL at 09:54

## 2022-10-08 RX ADMIN — ALLOPURINOL 100 MG: 100 TABLET ORAL at 18:39

## 2022-10-08 RX ADMIN — SODIUM CHLORIDE, PRESERVATIVE FREE 10 ML: 5 INJECTION INTRAVENOUS at 05:21

## 2022-10-08 RX ADMIN — EZETIMIBE 10 MG: 10 TABLET ORAL at 09:55

## 2022-10-08 RX ADMIN — CASTOR OIL AND BALSAM, PERU: 788; 87 OINTMENT TOPICAL at 22:48

## 2022-10-08 RX ADMIN — LEVOTHYROXINE SODIUM 150 MCG: 0.15 TABLET ORAL at 09:55

## 2022-10-08 NOTE — PROGRESS NOTES
Hospitalist Progress Note    NAME: Steele Carrel   :  1945   MRN:  [id number]       Assessment / Plan:  Acute on chronic blood loss anemia  History of cirrhosis secondary to [place]  History of recent EGD showing portal hypertensive gastropathy and colonoscopy showed normal mucosa with internal hemorrhoids    S/p 2 units, Hb 6 this morning, getting 3rd unit now. Repeat CBC after transfusion   EGD cancelled 10/7 d/t low hemoblogin today  No BM for 3 days, no active bleeding currently  Iron study s/o CARLINE, iv iron ordered. Hematology consult if EGD doenst show bleeding    Acute kidney injury  Creatinine improving, repeat in Am    Hypertension  GERD  Dyslipidemia  Hypothyroidism  Gouty arthritis  Atrial fibrillation  History of sick sinus syndrome s/p pacemaker placement  -Continue Norvasc, PPI, Lipitor, levothyroxine, allopurinol  -Her anticoagulation was discontinued previously due to anemia     Code Status: Full code  Surrogate Decision Maker: Son     DVT Prophylaxis: SCDs due to anemia  GI Prophylaxis: not indicated     Baseline: From home, independent of ADLs    MAYURI: 10/11  Barriers: Needs Endoscopy/ colonoscopy  Stable Hb     Subjective:     Chief Complaint / Reason for Physician Visit  Seen in her room. No bm for last 3 days  No belly pain  Eager about getting endoscpy Monday    Review of Systems:  Symptom Y/N Comments  Symptom Y/N Comments   Fever/Chills n   Chest Pain n    Poor Appetite n   Edema n    Cough n   Abdominal Pain nn    Sputum    Joint Pain     SOB/HENLEY    Pruritis/Rash     Nausea/vomit    Tolerating PT/OT     Diarrhea    Tolerating Diet     Constipation    Other       Could NOT obtain due to:      Objective:     VITALS:   Last 24hrs VS reviewed since prior progress note.  Most recent are:  Patient Vitals for the past 24 hrs:   Temp Pulse Resp BP SpO2   10/08/22 1211 98.5 °F (36.9 °C) 62 20 (!) 123/54 --   10/08/22 1146 98.6 °F (37 °C) 61 19 (!) 140/54 94 %   10/08/22 0810 98 °F (36.7 °C) 66 18 (!) 123/46 94 %   10/08/22 0800 -- 65 -- -- --   10/08/22 0328 97.9 °F (36.6 °C) 61 -- (!) 125/47 96 %   10/08/22 0123 98.7 °F (37.1 °C) 66 18 (!) 116/56 97 %   10/08/22 0106 98.1 °F (36.7 °C) 60 18 (!) 108/54 97 %   10/08/22 0036 98.8 °F (37.1 °C) 61 18 (!) 133/59 96 %   10/08/22 0006 98.8 °F (37.1 °C) 62 18 (!) 110/53 96 %   10/07/22 2336 98.6 °F (37 °C) 70 20 132/60 97 %   10/07/22 2306 98.6 °F (37 °C) 62 22 (!) 141/62 96 %   10/07/22 2246 98.7 °F (37.1 °C) 63 20 132/63 96 %   10/07/22 2234 98.7 °F (37.1 °C) 67 20 (!) 149/65 98 %   10/07/22 2219 98.7 °F (37.1 °C) 63 18 (!) 132/57 98 %   10/07/22 2132 98.6 °F (37 °C) 62 20 (!) 142/63 98 %   10/07/22 1953 97.9 °F (36.6 °C) 73 17 (!) 126/49 97 %   10/07/22 1632 98.6 °F (37 °C) 75 18 (!) 142/82 98 %   10/07/22 1600 -- 73 -- -- --   10/07/22 1440 98.5 °F (36.9 °C) 65 22 (!) 123/37 97 %   10/07/22 1424 98.4 °F (36.9 °C) 65 16 (!) 144/40 96 %       Intake/Output Summary (Last 24 hours) at 10/8/2022 1307  Last data filed at 10/8/2022 0123  Gross per 24 hour   Intake 770 ml   Output --   Net 770 ml        I had a face to face encounter and independently examined this patient on 10/8/2022, as outlined below:  PHYSICAL EXAM:  General: Awake, No acute distress  EENT:  EOMI. Anicteric sclerae. MMM  Resp:  CTA bilaterally, no wheezing or rales. No accessory muscle use  CV:  Regular  rhythm,  No edema  GI:  Soft, Non distended, Non tender. +Bowel sounds  Neurologic:  Alert and oriented X 3, normal speech,   Psych:   Not anxious nor agitated  Skin:  No rashes.   No jaundice    Reviewed most current lab test results and cultures  YES  Reviewed most current radiology test results   YES  Review and summation of old records today    NO  Reviewed patient's current orders and MAR    YES  PMH/SH reviewed - no change compared to H&P  ________________________________________________________________________  Care Plan discussed with:    Comments   Patient y    Family      RN y Care Manager     Consultant                       y Multidiciplinary team rounds were held today with , nursing, pharmacist and clinical coordinator. Patient's plan of care was discussed; medications were reviewed and discharge planning was addressed. ________________________________________________________________________  Total NON critical care TIME:  36   Minutes    Total CRITICAL CARE TIME Spent:   Minutes non procedure based      Comments   >50% of visit spent in counseling and coordination of care     ________________________________________________________________________  Patti Bullock MD     Procedures: see electronic medical records for all procedures/Xrays and details which were not copied into this note but were reviewed prior to creation of Plan. LABS:  I reviewed today's most current labs and imaging studies.   Pertinent labs include:  Recent Labs     10/08/22  0348 10/07/22  0458 10/06/22  1209   WBC  --  3.5* 6.2   HGB 6.0* 3.4*  3.4* 4.5*   HCT 20.2* 12.1* 16.4*   PLT  --  112* 132*     Recent Labs     10/08/22  0348 10/07/22  0458 10/06/22  1209    142 140   K 4.0 3.8 4.1   * 114* 110*   CO2 22 22 23   * 120* 199*   BUN 50* 57* 62*   CREA 1.70* 1.73* 1.91*   CA 8.4* 8.3* 9.0   ALB  --   --  3.2*   TBILI  --   --  1.2*   ALT  --   --  25       Signed: Patti Bullock MD

## 2022-10-08 NOTE — PROGRESS NOTES
Transition of Care- Anticipate discharge home- [address] [facility] 22%  Transportation at Discharge- Family    Cm contacted patient via phone, introduced self, explained role and offered support. Patient lives at [address] [facility] living with a caregiver who comes daily to assist with ADLs/IADL's. Reason for Admission:    Acute on chronic blood loss anemia                   RUR Score:       22%                Plan for utilizing home health:     Not at this time       PCP: First and Last name:  Heydi Moeller MD                         Current Advanced Directive/Advance Care Plan: Full Code      Healthcare Decision Maker:   Click here to complete [address] including selection of the Healthcare Decision Maker Relationship (ie \"Primary\")             Primary Decision MakerTama Luisan - [phone]    Secondary Decision Maker: Shaw Davalos - Caregiver - [phone]                        Care Management Interventions  PCP Verified by CM:  Yes  Mode of Transport at Discharge: Self  Transition of Care Consult (CM Consult): Discharge Planning  Support Systems: Child(paige)  Confirm Follow Up Transport: Family  Discharge Location  Patient Expects to be Discharged to[de-identified] Home

## 2022-10-08 NOTE — PROGRESS NOTES
Problem: Falls - Risk of  Goal: *Absence of Falls  Description: Document Kennedy Casas Fall Risk and appropriate interventions in the flowsheet.   Outcome: Progressing Towards Goal  Note: Fall Risk Interventions:  Mobility Interventions: Bed/chair exit alarm, Communicate number of staff needed for ambulation/transfer, OT consult for ADLs, Patient to call before getting OOB, PT Consult for mobility concerns, PT Consult for assist device competence         Medication Interventions: Bed/chair exit alarm, Evaluate medications/consider consulting pharmacy    Elimination Interventions: Bed/chair exit alarm, Call light in reach

## 2022-10-08 NOTE — PROGRESS NOTES
End of Shift Note    Bedside shift change report given to SANTOS Montelongo (oncoming nurse) by Kathia Brian RN (offgoing nurse).   Report included the following information SBAR, Kardex, and ED Summary    Shift worked:  7p - 7a   Shift summary and any significant changes:    Blood transfusion done     Concerns for physician to address: Noted above   Zone phone for oncoming shift:   [phone]     Patient Information  Joya Astudillo  68 y.o.  10/6/2022 11:15 AM by Arias Card MD. Joya Astudillo was admitted from Home    Problem List  Patient Active Problem List    Diagnosis Date Noted    Acute anemia 10/06/2022    Anemia 08/05/2022    SSS (sick sinus syndrome) ([address] 06/27/2022    PAF (paroxysmal atrial fibrillation) ([address] 06/24/2022    Stage 3a chronic kidney disease ([address] 05/20/2022    Type 2 diabetes mellitus with chronic kidney disease ([address] 11/18/2021    Abnormal nuclear stress test 10/04/2021    Angina at rest ([address] 10/04/2021    S/P cardiac cath 10/04/2021    Nonrheumatic aortic valve stenosis 09/08/2021    Pulmonary hypertension ([address] 09/08/2021    MICHELLE (acute kidney injury) ([address] 10/27/2020    Cellulitis of left lower leg 11/07/2019    Severe obesity ([address] 09/30/2019    Controlled type 2 diabetes mellitus without complication ([address] 02/45/6190    DDD (degenerative disc disease), lumbar 11/27/2017    Prediabetes 04/11/2016    Thrombocytopenia ([address] 01/24/2015    HTN (hypertension) 01/17/2014    Bifascicular block 12/23/2010    Fatty liver 06/18/2010    Pure hypercholesterolemia 06/18/2010    Colon polyp 06/18/2010    Gout 06/14/2010    Hypothyroidism 06/14/2010    Osteoporosis 06/14/2010    Anxiety 06/14/2010    Leg edema 06/14/2010    RSD lower limb 06/14/2010     Past Medical History:   Diagnosis Date    Abnormal nuclear stress test 10/4/2021    Angina at rest [facility]) 10/4/2021    Arthritis     KNEE,gout    Bundle branch block     Endocrine disease     HYPOTHYROIDISM    Fracture     Left distal femur (age 12 - pt fell)    Fracture     Left tibia 1990 (pt fell)    Fracture     Right wrist fractured 2 times (pt fell)    GERD (gastroesophageal reflux disease)     High cholesterol     Hypertension     Hypoglycemia     \"my body produces too much insulin\"    Liver disease     BRADY    Nausea & vomiting     when had gallbladder out    Osteoporosis     Other ill-defined conditions(799.89)     edema legs    S/P cardiac cath 10/4/2021    10/4/2021 nonobstructive disease    Spinal stenosis     Thyroid disease        Core Measures:  CVA: No No  CHF:No No  PNA:No No    Activity:  Activity Level: Bed Rest  Number times ambulated in hallways past shift: 0  Number of times OOB to chair past shift: 0    Cardiac:   Cardiac Monitoring: Yes      Cardiac Rhythm: Atrial Paced    Access:   Current line(s): PIV     Genitourinary:   Urinary status: voiding and external catheter    Respiratory:   O2 Device: None (Room air)  Chronic home O2 use?: N/A  Incentive spirometer at bedside: N/A       GI:  Last Bowel Movement Date: 10/05/22  Current diet:  ADULT DIET Regular  DIET ONE TIME MESSAGE  DIET NPO  Passing flatus: YES  Tolerating current diet: YES       Pain Management:   Patient states pain is manageable on current regimen: N/A    Skin:  Boris Score: 19  Interventions: increase time out of bed    Patient Safety:  Fall Score:  Total Score: 3  Interventions: bed/chair alarm, assistive device (walker, cane, etc), and gripper socks  High Fall Risk: Yes  @Rollbelt  @dexterity to release roll belt  Yes/No ( must document dexterity  here by stating Yes or No here, otherwise this is a restraint and must follow restraint documentation policy.)    DVT prophylaxis:  DVT prophylaxis Med- Yes    WOUND: BLISTER LOWER EXTREMITY BLISTERS    Active Consults:  IP CONSULT TO GASTROENTEROLOGY  IP CONSULT TO HOSPITALIST    Length of Stay:  Expected LOS: 2d 16h  Actual LOS: 2  Discharge Plan: No HOME      Vicente Cartagena RN

## 2022-10-08 NOTE — PROGRESS NOTES
End of Shift Note    Bedside shift change report given to [address] (oncoming nurse) by Simon Petersen RN (offgoing nurse).   Report included the following information SBAR, Kardex, and ED Summary    Shift worked:  DAYS   Shift summary and any significant changes:    -BLOOD TRANSFUSION NUMBER 3 COMPLETED; NO REACTION  -HGB 8.2     Concerns for physician to address:  NONE   Zone phone for oncoming shift:   [phone]     Patient Information  Kika Eller  68 y.o.  10/6/2022 11:15 AM by Petey Olivo MD. Kika Eller was admitted from Home    Problem List  Patient Active Problem List    Diagnosis Date Noted    Acute anemia 10/06/2022    Anemia 08/05/2022    SSS (sick sinus syndrome) ([address] 06/27/2022    PAF (paroxysmal atrial fibrillation) ([address] 06/24/2022    Stage 3a chronic kidney disease ([address] 05/20/2022    Type 2 diabetes mellitus with chronic kidney disease ([address] 11/18/2021    Abnormal nuclear stress test 10/04/2021    Angina at rest ([address] 10/04/2021    S/P cardiac cath 10/04/2021    Nonrheumatic aortic valve stenosis 09/08/2021    Pulmonary hypertension ([address] 09/08/2021    MICHELLE (acute kidney injury) ([address] 10/27/2020    Cellulitis of left lower leg 11/07/2019    Severe obesity ([address] 09/30/2019    Controlled type 2 diabetes mellitus without complication ([address] 38/74/2295    DDD (degenerative disc disease), lumbar 11/27/2017    Prediabetes 04/11/2016    Thrombocytopenia ([address] 01/24/2015    HTN (hypertension) 01/17/2014    Bifascicular block 12/23/2010    Fatty liver 06/18/2010    Pure hypercholesterolemia 06/18/2010    Colon polyp 06/18/2010    Gout 06/14/2010    Hypothyroidism 06/14/2010    Osteoporosis 06/14/2010    Anxiety 06/14/2010    Leg edema 06/14/2010    RSD lower limb 06/14/2010     Past Medical History:   Diagnosis Date    Abnormal nuclear stress test 10/4/2021    Angina at rest [facility]) 10/4/2021    Arthritis     KNEE,gout    Bundle branch block     Endocrine disease     HYPOTHYROIDISM    Fracture Left distal femur (age 12 - pt fell)    Fracture     Left tibia 1990 (pt fell)    Fracture     Right wrist fractured 2 times (pt fell)    GERD (gastroesophageal reflux disease)     High cholesterol     Hypertension     Hypoglycemia     \"my body produces too much insulin\"    Liver disease     BRADY    Nausea & vomiting     when had gallbladder out    Osteoporosis     Other ill-defined conditions(799.89)     edema legs    S/P cardiac cath 10/4/2021    10/4/2021 nonobstructive disease    Spinal stenosis     Thyroid disease        Core Measures:  CVA: No No  CHF:No No  PNA:No No    Activity:  Activity Level: Bed Rest  Number times ambulated in hallways past shift: 0  Number of times OOB to chair past shift: 0    Cardiac:   Cardiac Monitoring: Yes      Cardiac Rhythm: Atrial Paced    Access:   Current line(s): PIV     Genitourinary:   Urinary status: voiding and external catheter    Respiratory:   O2 Device: None (Room air)  Chronic home O2 use?: N/A  Incentive spirometer at bedside: N/A       GI:  Last Bowel Movement Date: 10/05/22  Current diet:  ADULT DIET Regular  DIET ONE TIME MESSAGE  DIET NPO  Passing flatus: YES  Tolerating current diet: YES       Pain Management:   Patient states pain is manageable on current regimen: N/A    Skin:  Boris Score: 19  Interventions: increase time out of bed    Patient Safety:  Fall Score:  Total Score: 3  Interventions: bed/chair alarm, assistive device (walker, cane, etc), and gripper socks  High Fall Risk: Yes  @Rollbelt  @dexterity to release roll belt  Yes/No ( must document dexterity  here by stating Yes or No here, otherwise this is a restraint and must follow restraint documentation policy.)    DVT prophylaxis:  DVT prophylaxis Med- Yes    WOUND: BLISTER LOWER EXTREMITY BLISTERS    Active Consults:  IP CONSULT TO GASTROENTEROLOGY  IP CONSULT TO HOSPITALIST    Length of Stay:  Expected LOS: 2d 16h  Actual LOS: 2  Discharge Plan: No HOME      Mary Thomas RN

## 2022-10-09 LAB
ABO + RH BLD: NORMAL
ANION GAP SERPL CALC-SCNC: 6 MMOL/L (ref 5–15)
ANTI-COMPLEMENT (C3B,C3D): NORMAL
ANTIGENS PRESENT BLD: NORMAL
ANTIGENS PRESENT RBC DONR: NORMAL
BLD GP AB SCN SERPL QL ELUTION: NORMAL
BLD PROD TYP BPU: NORMAL
BLOOD GROUP ANTIBODIES SERPL: NORMAL
BLOOD GROUP ANTIBODIES SERPL: NORMAL
BPU ID: NORMAL
BUN SERPL-MCNC: 44 MG/DL (ref 6–20)
BUN/CREAT SERPL: 29 (ref 12–20)
CALCIUM SERPL-MCNC: 8.5 MG/DL (ref 8.5–10.1)
CHLORIDE SERPL-SCNC: 115 MMOL/L (ref 97–108)
CO2 SERPL-SCNC: 22 MMOL/L (ref 21–32)
CREAT SERPL-MCNC: 1.5 MG/DL (ref 0.55–1.02)
CROSSMATCH RESULT,%XM: NORMAL
DAT IGG-SP REAG RBC QL: NORMAL
DAT POLY-SP REAG RBC QL: NORMAL
ERYTHROCYTE [DISTWIDTH] IN BLOOD BY AUTOMATED COUNT: 21.2 % (ref 11.5–14.5)
ERYTHROCYTE [DISTWIDTH] IN BLOOD BY AUTOMATED COUNT: 21.6 % (ref 11.5–14.5)
GLUCOSE SERPL-MCNC: 154 MG/DL (ref 65–100)
HCT VFR BLD AUTO: 24.8 % (ref 35–47)
HCT VFR BLD AUTO: 32.2 % (ref 35–47)
HGB BLD-MCNC: 7.3 G/DL (ref 11.5–16)
HGB BLD-MCNC: 9.6 G/DL (ref 11.5–16)
MCH RBC QN AUTO: 28.1 PG (ref 26–34)
MCH RBC QN AUTO: 28.5 PG (ref 26–34)
MCHC RBC AUTO-ENTMCNC: 29.4 G/DL (ref 30–36.5)
MCHC RBC AUTO-ENTMCNC: 29.8 G/DL (ref 30–36.5)
MCV RBC AUTO: 95.4 FL (ref 80–99)
MCV RBC AUTO: 95.5 FL (ref 80–99)
NRBC # BLD: 0.03 K/UL (ref 0–0.01)
NRBC # BLD: 0.04 K/UL (ref 0–0.01)
NRBC BLD-RTO: 0.5 PER 100 WBC
NRBC BLD-RTO: 0.8 PER 100 WBC
PLATELET # BLD AUTO: 106 K/UL (ref 150–400)
PLATELET # BLD AUTO: 129 K/UL (ref 150–400)
PMV BLD AUTO: 10 FL (ref 8.9–12.9)
PMV BLD AUTO: 10 FL (ref 8.9–12.9)
POTASSIUM SERPL-SCNC: 4.2 MMOL/L (ref 3.5–5.1)
RBC # BLD AUTO: 2.6 M/UL (ref 3.8–5.2)
RBC # BLD AUTO: 3.37 M/UL (ref 3.8–5.2)
SODIUM SERPL-SCNC: 143 MMOL/L (ref 136–145)
SPECIMEN EXP DATE BLD: NORMAL
STATUS OF UNIT,%ST: NORMAL
UNIT DIVISION, %UDIV: 0
WBC # BLD AUTO: 5 K/UL (ref 3.6–11)
WBC # BLD AUTO: 6.4 K/UL (ref 3.6–11)

## 2022-10-09 PROCEDURE — 74011250637 HC RX REV CODE- 250/637: Performed by: STUDENT IN AN ORGANIZED HEALTH CARE EDUCATION/TRAINING PROGRAM

## 2022-10-09 PROCEDURE — 74011250637 HC RX REV CODE- 250/637: Performed by: INTERNAL MEDICINE

## 2022-10-09 PROCEDURE — 74011250636 HC RX REV CODE- 250/636: Performed by: NURSE PRACTITIONER

## 2022-10-09 PROCEDURE — 65270000046 HC RM TELEMETRY

## 2022-10-09 PROCEDURE — C9113 INJ PANTOPRAZOLE SODIUM, VIA: HCPCS | Performed by: INTERNAL MEDICINE

## 2022-10-09 PROCEDURE — 80048 BASIC METABOLIC PNL TOTAL CA: CPT

## 2022-10-09 PROCEDURE — 74011000250 HC RX REV CODE- 250: Performed by: INTERNAL MEDICINE

## 2022-10-09 PROCEDURE — 36415 COLL VENOUS BLD VENIPUNCTURE: CPT

## 2022-10-09 PROCEDURE — 74011250636 HC RX REV CODE- 250/636: Performed by: INTERNAL MEDICINE

## 2022-10-09 PROCEDURE — 85027 COMPLETE CBC AUTOMATED: CPT

## 2022-10-09 RX ADMIN — ALLOPURINOL 100 MG: 100 TABLET ORAL at 17:39

## 2022-10-09 RX ADMIN — SODIUM CHLORIDE, PRESERVATIVE FREE 10 ML: 5 INJECTION INTRAVENOUS at 17:39

## 2022-10-09 RX ADMIN — SODIUM CHLORIDE, PRESERVATIVE FREE 10 ML: 5 INJECTION INTRAVENOUS at 22:00

## 2022-10-09 RX ADMIN — CASTOR OIL AND BALSAM, PERU: 788; 87 OINTMENT TOPICAL at 08:57

## 2022-10-09 RX ADMIN — SODIUM CHLORIDE, PRESERVATIVE FREE 10 ML: 5 INJECTION INTRAVENOUS at 05:33

## 2022-10-09 RX ADMIN — CASTOR OIL AND BALSAM, PERU: 788; 87 OINTMENT TOPICAL at 21:57

## 2022-10-09 RX ADMIN — ATORVASTATIN CALCIUM 20 MG: 20 TABLET, FILM COATED ORAL at 08:56

## 2022-10-09 RX ADMIN — IRON SUCROSE 100 MG: 20 INJECTION, SOLUTION INTRAVENOUS at 08:52

## 2022-10-09 RX ADMIN — SODIUM CHLORIDE 40 MG: 9 INJECTION INTRAMUSCULAR; INTRAVENOUS; SUBCUTANEOUS at 21:57

## 2022-10-09 RX ADMIN — POTASSIUM CHLORIDE 10 MEQ: 750 TABLET, FILM COATED, EXTENDED RELEASE ORAL at 08:55

## 2022-10-09 RX ADMIN — EZETIMIBE 10 MG: 10 TABLET ORAL at 08:55

## 2022-10-09 RX ADMIN — ALLOPURINOL 100 MG: 100 TABLET ORAL at 08:56

## 2022-10-09 RX ADMIN — LEVOTHYROXINE SODIUM 150 MCG: 0.15 TABLET ORAL at 08:56

## 2022-10-09 RX ADMIN — POLYETHYLENE GLYCOL 3350 17 G: 17 POWDER, FOR SOLUTION ORAL at 08:51

## 2022-10-09 RX ADMIN — SODIUM CHLORIDE 125 ML/HR: 9 INJECTION, SOLUTION INTRAVENOUS at 05:32

## 2022-10-09 RX ADMIN — AMLODIPINE BESYLATE 10 MG: 5 TABLET ORAL at 08:56

## 2022-10-09 RX ADMIN — SODIUM CHLORIDE 40 MG: 9 INJECTION INTRAMUSCULAR; INTRAVENOUS; SUBCUTANEOUS at 08:52

## 2022-10-09 NOTE — PROGRESS NOTES
End of Shift Note    Bedside shift change report given to 18 Brandee May (oncoming nurse) by Maurisio Valencia RN (offgoing nurse).   Report included the following information SBAR, Kardex, and ED Summary    Shift worked:  DAYS   Shift summary and any significant changes:    -HGB WENT UP 9.2     Concerns for physician to address:  NONE   Zone phone for oncoming shift:   [phone]     Patient Information  Susy Espinal  68 y.o.  10/6/2022 11:15 AM by Darnell Kent MD. Susy Espinal was admitted from Home    Problem List  Patient Active Problem List    Diagnosis Date Noted    Acute anemia 10/06/2022    Anemia 08/05/2022    SSS (sick sinus syndrome) ([address] 06/27/2022    PAF (paroxysmal atrial fibrillation) ([address] 06/24/2022    Stage 3a chronic kidney disease ([address] 05/20/2022    Type 2 diabetes mellitus with chronic kidney disease ([address] 11/18/2021    Abnormal nuclear stress test 10/04/2021    Angina at rest [facility]) 10/04/2021    S/P cardiac cath 10/04/2021    Nonrheumatic aortic valve stenosis 09/08/2021    Pulmonary hypertension ([address] 09/08/2021    MICHELLE (acute kidney injury) ([address] 10/27/2020    Cellulitis of left lower leg 11/07/2019    Severe obesity ([address] 09/30/2019    Controlled type 2 diabetes mellitus without complication ([address] 10/19/0461    DDD (degenerative disc disease), lumbar 11/27/2017    Prediabetes 04/11/2016    Thrombocytopenia ([address] 01/24/2015    HTN (hypertension) 01/17/2014    Bifascicular block 12/23/2010    Fatty liver 06/18/2010    Pure hypercholesterolemia 06/18/2010    Colon polyp 06/18/2010    Gout 06/14/2010    Hypothyroidism 06/14/2010    Osteoporosis 06/14/2010    Anxiety 06/14/2010    Leg edema 06/14/2010    RSD lower limb 06/14/2010     Past Medical History:   Diagnosis Date    Abnormal nuclear stress test 10/4/2021    Angina at rest [facility]) 10/4/2021    Arthritis     KNEE,gout    Bundle branch block     Endocrine disease     HYPOTHYROIDISM    Fracture     Left distal femur (age 12 - pt fell) Fracture     Left tibia 1990 (pt fell)    Fracture     Right wrist fractured 2 times (pt fell)    GERD (gastroesophageal reflux disease)     High cholesterol     Hypertension     Hypoglycemia     \"my body produces too much insulin\"    Liver disease     BRADY    Nausea & vomiting     when had gallbladder out    Osteoporosis     Other ill-defined conditions(799.89)     edema legs    S/P cardiac cath 10/4/2021    10/4/2021 nonobstructive disease    Spinal stenosis     Thyroid disease        Core Measures:  CVA: No No  CHF:No No  PNA:No No    Activity:  Activity Level: Bed Rest  Number times ambulated in hallways past shift: 0  Number of times OOB to chair past shift: 0    Cardiac:   Cardiac Monitoring: Yes      Cardiac Rhythm: Atrial Paced    Access:   Current line(s): PIV     Genitourinary:   Urinary status: voiding and external catheter    Respiratory:   O2 Device: None (Room air)  Chronic home O2 use?: N/A  Incentive spirometer at bedside: N/A       GI:  Last Bowel Movement Date: 10/04/22  Current diet:  ADULT DIET Regular  DIET ONE TIME MESSAGE  DIET NPO  Passing flatus: YES  Tolerating current diet: YES       Pain Management:   Patient states pain is manageable on current regimen: N/A    Skin:  Boris Score: 18  Interventions: increase time out of bed    Patient Safety:  Fall Score:  Total Score: 3  Interventions: bed/chair alarm, assistive device (walker, cane, etc), and gripper socks  High Fall Risk: Yes  @Rollbelt  @dexterity to release roll belt  Yes/No ( must document dexterity  here by stating Yes or No here, otherwise this is a restraint and must follow restraint documentation policy.)    DVT prophylaxis:  DVT prophylaxis Med- Yes    WOUND: BLISTER LOWER EXTREMITY BLISTERS    Active Consults:  IP CONSULT TO GASTROENTEROLOGY  IP CONSULT TO HEMATOLOGY  IP CONSULT TO HOSPITALIST    Length of Stay:  Expected LOS: 2d 16h  Actual LOS: 3  Discharge Plan: No HOME      Meliza Gaston RN

## 2022-10-09 NOTE — PROGRESS NOTES
Problem: Falls - Risk of  Goal: *Absence of Falls  Description: Document Ashli Ground Fall Risk and appropriate interventions in the flowsheet.   Outcome: Progressing Towards Goal  Note: Fall Risk Interventions:  Mobility Interventions: Bed/chair exit alarm         Medication Interventions: Assess postural VS orthostatic hypotension, Bed/chair exit alarm    Elimination Interventions: Bed/chair exit alarm

## 2022-10-09 NOTE — PROGRESS NOTES
End of Shift Note    Bedside shift change report given to Bertha Perry RN (oncoming nurse) by Anita Vance RN (offgoing nurse).   Report included the following information SBAR, Kardex, and ED Summary    Shift worked:  7p - 7a   Shift summary and any significant changes:    None     Concerns for physician to address: None   Zone phone for oncoming shift:   [phone]     Patient Information  Joon Or  68 y.o.  10/6/2022 11:15 AM by Thais Leung MD. Joon Or was admitted from Home    Problem List  Patient Active Problem List    Diagnosis Date Noted    Acute anemia 10/06/2022    Anemia 08/05/2022    SSS (sick sinus syndrome) ([address] 06/27/2022    PAF (paroxysmal atrial fibrillation) ([address] 06/24/2022    Stage 3a chronic kidney disease ([address] 05/20/2022    Type 2 diabetes mellitus with chronic kidney disease ([address] 11/18/2021    Abnormal nuclear stress test 10/04/2021    Angina at rest [facility]) 10/04/2021    S/P cardiac cath 10/04/2021    Nonrheumatic aortic valve stenosis 09/08/2021    Pulmonary hypertension ([address] 09/08/2021    MICHELLE (acute kidney injury) ([address] 10/27/2020    Cellulitis of left lower leg 11/07/2019    Severe obesity ([address] 09/30/2019    Controlled type 2 diabetes mellitus without complication ([address] 80/75/8608    DDD (degenerative disc disease), lumbar 11/27/2017    Prediabetes 04/11/2016    Thrombocytopenia ([address] 01/24/2015    HTN (hypertension) 01/17/2014    Bifascicular block 12/23/2010    Fatty liver 06/18/2010    Pure hypercholesterolemia 06/18/2010    Colon polyp 06/18/2010    Gout 06/14/2010    Hypothyroidism 06/14/2010    Osteoporosis 06/14/2010    Anxiety 06/14/2010    Leg edema 06/14/2010    RSD lower limb 06/14/2010     Past Medical History:   Diagnosis Date    Abnormal nuclear stress test 10/4/2021    Angina at rest [facility]) 10/4/2021    Arthritis     KNEE,gout    Bundle branch block     Endocrine disease     HYPOTHYROIDISM    Fracture     Left distal femur (age 12 - pt fell)    Fracture Left tibia 1990 (pt fell)    Fracture     Right wrist fractured 2 times (pt fell)    GERD (gastroesophageal reflux disease)     High cholesterol     Hypertension     Hypoglycemia     \"my body produces too much insulin\"    Liver disease     BRADY    Nausea & vomiting     when had gallbladder out    Osteoporosis     Other ill-defined conditions(799.89)     edema legs    S/P cardiac cath 10/4/2021    10/4/2021 nonobstructive disease    Spinal stenosis     Thyroid disease        Core Measures:  CVA: No No  CHF:No No  PNA:No No    Activity:  Activity Level: Bed Rest  Number times ambulated in hallways past shift: 0  Number of times OOB to chair past shift: 0    Cardiac:   Cardiac Monitoring: Yes      Cardiac Rhythm: Atrial Paced    Access:   Current line(s): PIV     Genitourinary:   Urinary status: voiding and external catheter    Respiratory:   O2 Device: None (Room air)  Chronic home O2 use?: N/A  Incentive spirometer at bedside: N/A       GI:  Last Bowel Movement Date:  (patient reports constipation. ..)  Current diet:  ADULT DIET Regular  DIET ONE TIME MESSAGE  DIET NPO  Passing flatus: YES  Tolerating current diet: YES       Pain Management:   Patient states pain is manageable on current regimen: N/A    Skin:  Boris Score: 18  Interventions: increase time out of bed    Patient Safety:  Fall Score:  Total Score: 3  Interventions: bed/chair alarm, assistive device (walker, cane, etc), and gripper socks  High Fall Risk: Yes  @Rollbelt  @dexterity to release roll belt  Yes/No ( must document dexterity  here by stating Yes or No here, otherwise this is a restraint and must follow restraint documentation policy.)    DVT prophylaxis:  DVT prophylaxis Med- Yes    WOUND: BLISTER LOWER EXTREMITY BLISTERS    Active Consults:  IP CONSULT TO GASTROENTEROLOGY  IP CONSULT TO HOSPITALIST    Length of Stay:  Expected LOS: 2d 16h  Actual LOS: 3  Discharge Plan: No HOME      Michelle Live RN

## 2022-10-09 NOTE — PROGRESS NOTES
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 No active GI bleed  For EGD tomorrow  No BM for 3 days, no active bleeding currently  Iron study s/o CARLINE, iv iron ordered. Heme onc Consulted    Acute kidney injury  Creatinine improving, repeat in Am    Hypertension  GERD  Dyslipidemia  Hypothyroidism  Gouty arthritis  Atrial fibrillation  History of sick sinus syndrome s/p pacemaker placement  -Continue Norvasc, PPI, Lipitor, levothyroxine, allopurinol  -Her anticoagulation was discontinued previously due to anemia     Code Status: Full code  Surrogate Decision Maker: Son     DVT Prophylaxis: SCDs due to anemia  GI Prophylaxis: not indicated     Baseline: From home, independent of ADLs    MAYURI: 10/11  Barriers: Needs Endoscopy/ colonoscopy  Stable Hb     Subjective:     Chief Complaint / Reason for Physician Visit  Follow-up acute blood loss anemia  Review of Systems:  Symptom Y/N Comments  Symptom Y/N Comments   Fever/Chills n   Chest Pain n    Poor Appetite n   Edema n    Cough n   Abdominal Pain nn    Sputum    Joint Pain     SOB/HENLEY    Pruritis/Rash     Nausea/vomit    Tolerating PT/OT     Diarrhea    Tolerating Diet     Constipation    Other       Could NOT obtain due to:      Objective:     VITALS:   Last 24hrs VS reviewed since prior progress note.  Most recent are:  Patient Vitals for the past 24 hrs:   Temp Pulse Resp BP SpO2   10/09/22 0757 -- 61 -- -- --   10/09/22 0353 98.4 °F (36.9 °C) 61 16 129/60 97 %   10/09/22 0001 98.1 °F (36.7 °C) 66 19 (!) 109/51 97 %   10/08/22 2042 98.5 °F (36.9 °C) 65 18 (!) 144/69 97 %   10/08/22 1600 -- 64 -- -- --   10/08/22 1514 98.3 °F (36.8 °C) 60 20 (!) 135/49 94 %   10/08/22 1211 98.5 °F (36.9 °C) 62 20 (!) 123/54 --   10/08/22 1200 -- 62 -- -- --   10/08/22 1146 98.6 °F (37 °C) 61 19 (!) 140/54 94 %         Intake/Output Summary (Last 24 hours) at 10/9/2022 0855  Last data filed at 10/9/2022 0536  Gross per 24 hour   Intake 340 ml   Output 850 ml   Net -510 ml          I had a face to face encounter and independently examined this patient on 10/9/2022, as outlined below:  PHYSICAL EXAM:  General: Awake, No acute distress  EENT:  EOMI. Anicteric sclerae. MMM  Resp:  CTA bilaterally, no wheezing or rales. No accessory muscle use  CV:  Regular  rhythm,  No edema  GI:  Soft, Non distended, Non tender. +Bowel sounds  Neurologic:  Alert and oriented X 3, normal speech,   Psych:   Not anxious nor agitated  Skin:  No rashes. No jaundice    Reviewed most current lab test results and cultures  YES  Reviewed most current radiology test results   YES  Review and summation of old records today    NO  Reviewed patient's current orders and MAR    YES  PMH/ reviewed - no change compared to H&P  ________________________________________________________________________  Care Plan discussed with:    Comments   Patient y    Family      RN y    Care Manager     Consultant                       y Multidiciplinary team rounds were held today with , nursing, pharmacist and clinical coordinator. Patient's plan of care was discussed; medications were reviewed and discharge planning was addressed.      ________________________________________________________________________  Total NON critical care TIME:  36   Minutes    Total CRITICAL CARE TIME Spent:   Minutes non procedure based      Comments   >50% of visit spent in counseling and coordination of care     ________________________________________________________________________  Erasto Caballero MD     Procedures: see electronic medical records for all procedures/Xrays and details which were not copied into this note but were reviewed prior to creation of Plan. LABS:  I reviewed today's most current labs and imaging studies.   Pertinent labs include:  Recent Labs     10/09/22  0243 10/08/22  1717 10/08/22  0348 10/07/22  0458   WBC 5.0 4.9  --  3.5*   HGB 7.3* 8.2* 6.0* 3.4*  3.4*   HCT 24.8* 27.0* 20.2* 12.1*   * 111*  --  112*       Recent Labs     10/09/22  0243 10/08/22  0348 10/07/22  0458 10/06/22  1209    142 142 140   K 4.2 4.0 3.8 4.1   * 113* 114* 110*   CO2 22 22 22 23   * 137* 120* 199*   BUN 44* 50* 57* 62*   CREA 1.50* 1.70* 1.73* 1.91*   CA 8.5 8.4* 8.3* 9.0   ALB  --   --   --  3.2*   TBILI  --   --   --  1.2*   ALT  --   --   --  25         Signed: Spike Morales MD

## 2022-10-09 NOTE — CONSULTS
[address] [facility] [address], [address] [facility], [address] [place] [address], [address] [place] [address]  [phone]          Brief Note      Consult received  Iron def anemia    Full note to follow.       Signed by: Sandy King MD                     October 9, 2022

## 2022-10-10 ENCOUNTER — ANESTHESIA EVENT (OUTPATIENT)
Dept: ENDOSCOPY | Age: 77
DRG: 378 | End: 2022-10-10
Payer: MEDICARE

## 2022-10-10 ENCOUNTER — ANESTHESIA (OUTPATIENT)
Dept: ENDOSCOPY | Age: 77
DRG: 378 | End: 2022-10-10
Payer: MEDICARE

## 2022-10-10 LAB
ERYTHROCYTE [DISTWIDTH] IN BLOOD BY AUTOMATED COUNT: 21.4 % (ref 11.5–14.5)
HCT VFR BLD AUTO: 25 % (ref 35–47)
HGB BLD-MCNC: 7.4 G/DL (ref 11.5–16)
MCH RBC QN AUTO: 29.2 PG (ref 26–34)
MCHC RBC AUTO-ENTMCNC: 29.6 G/DL (ref 30–36.5)
MCV RBC AUTO: 98.8 FL (ref 80–99)
NRBC # BLD: 0.03 K/UL (ref 0–0.01)
NRBC BLD-RTO: 0.6 PER 100 WBC
PLATELET # BLD AUTO: 103 K/UL (ref 150–400)
PMV BLD AUTO: 10.4 FL (ref 8.9–12.9)
RBC # BLD AUTO: 2.53 M/UL (ref 3.8–5.2)
WBC # BLD AUTO: 4.6 K/UL (ref 3.6–11)

## 2022-10-10 PROCEDURE — 74011250636 HC RX REV CODE- 250/636: Performed by: ANESTHESIOLOGY

## 2022-10-10 PROCEDURE — 77030039825 HC MSK NSL PAP SUPERNO2VA VYRM -B: Performed by: ANESTHESIOLOGY

## 2022-10-10 PROCEDURE — C9113 INJ PANTOPRAZOLE SODIUM, VIA: HCPCS | Performed by: INTERNAL MEDICINE

## 2022-10-10 PROCEDURE — 2709999900 HC NON-CHARGEABLE SUPPLY: Performed by: INTERNAL MEDICINE

## 2022-10-10 PROCEDURE — 82607 VITAMIN B-12: CPT

## 2022-10-10 PROCEDURE — 85027 COMPLETE CBC AUTOMATED: CPT

## 2022-10-10 PROCEDURE — 76040000019: Performed by: INTERNAL MEDICINE

## 2022-10-10 PROCEDURE — 0DJ08ZZ INSPECTION OF UPPER INTESTINAL TRACT, VIA NATURAL OR ARTIFICIAL OPENING ENDOSCOPIC: ICD-10-PCS | Performed by: INTERNAL MEDICINE

## 2022-10-10 PROCEDURE — 77030018766 HC KT ENDOSC IMAG GVIM -F: Performed by: INTERNAL MEDICINE

## 2022-10-10 PROCEDURE — 65270000046 HC RM TELEMETRY

## 2022-10-10 PROCEDURE — 74011250637 HC RX REV CODE- 250/637: Performed by: INTERNAL MEDICINE

## 2022-10-10 PROCEDURE — 74011000250 HC RX REV CODE- 250: Performed by: ANESTHESIOLOGY

## 2022-10-10 PROCEDURE — 99222 1ST HOSP IP/OBS MODERATE 55: CPT | Performed by: INTERNAL MEDICINE

## 2022-10-10 PROCEDURE — 82746 ASSAY OF FOLIC ACID SERUM: CPT

## 2022-10-10 PROCEDURE — 74011250636 HC RX REV CODE- 250/636: Performed by: INTERNAL MEDICINE

## 2022-10-10 PROCEDURE — 76060000031 HC ANESTHESIA FIRST 0.5 HR: Performed by: INTERNAL MEDICINE

## 2022-10-10 PROCEDURE — 77030008565 HC TBNG SUC IRR ERBE -B: Performed by: INTERNAL MEDICINE

## 2022-10-10 PROCEDURE — 36415 COLL VENOUS BLD VENIPUNCTURE: CPT

## 2022-10-10 PROCEDURE — 74011000250 HC RX REV CODE- 250: Performed by: INTERNAL MEDICINE

## 2022-10-10 PROCEDURE — 74011250636 HC RX REV CODE- 250/636: Performed by: NURSE PRACTITIONER

## 2022-10-10 RX ORDER — NALOXONE HYDROCHLORIDE 0.4 MG/ML
0.4 INJECTION, SOLUTION INTRAMUSCULAR; INTRAVENOUS; SUBCUTANEOUS
Status: DISCONTINUED | OUTPATIENT
Start: 2022-10-10 | End: 2022-10-10 | Stop reason: HOSPADM

## 2022-10-10 RX ORDER — SODIUM CHLORIDE 9 MG/ML
75 INJECTION, SOLUTION INTRAVENOUS CONTINUOUS
Status: DISCONTINUED | OUTPATIENT
Start: 2022-10-10 | End: 2022-10-10 | Stop reason: HOSPADM

## 2022-10-10 RX ORDER — DEXTROMETHORPHAN/PSEUDOEPHED 2.5-7.5/.8
1.2 DROPS ORAL
Status: DISCONTINUED | OUTPATIENT
Start: 2022-10-10 | End: 2022-10-10 | Stop reason: HOSPADM

## 2022-10-10 RX ORDER — ATROPINE SULFATE 0.1 MG/ML
0.5 INJECTION INTRAVENOUS
Status: DISCONTINUED | OUTPATIENT
Start: 2022-10-10 | End: 2022-10-10 | Stop reason: HOSPADM

## 2022-10-10 RX ORDER — FLUMAZENIL 0.1 MG/ML
0.2 INJECTION INTRAVENOUS
Status: DISCONTINUED | OUTPATIENT
Start: 2022-10-10 | End: 2022-10-10 | Stop reason: HOSPADM

## 2022-10-10 RX ORDER — SODIUM CHLORIDE 0.9 % (FLUSH) 0.9 %
5-40 SYRINGE (ML) INJECTION AS NEEDED
Status: DISCONTINUED | OUTPATIENT
Start: 2022-10-10 | End: 2022-10-10 | Stop reason: SDUPTHER

## 2022-10-10 RX ORDER — SODIUM CHLORIDE 9 MG/ML
25 INJECTION, SOLUTION INTRAVENOUS CONTINUOUS
Status: DISPENSED | OUTPATIENT
Start: 2022-10-10 | End: 2022-10-10

## 2022-10-10 RX ORDER — SODIUM CHLORIDE 0.9 % (FLUSH) 0.9 %
5-40 SYRINGE (ML) INJECTION EVERY 8 HOURS
Status: DISCONTINUED | OUTPATIENT
Start: 2022-10-10 | End: 2022-10-10 | Stop reason: SDUPTHER

## 2022-10-10 RX ORDER — EPINEPHRINE 0.1 MG/ML
1 INJECTION INTRACARDIAC; INTRAVENOUS
Status: DISCONTINUED | OUTPATIENT
Start: 2022-10-10 | End: 2022-10-10 | Stop reason: HOSPADM

## 2022-10-10 RX ORDER — SUCRALFATE 1 G/1
1 TABLET ORAL
Status: DISCONTINUED | OUTPATIENT
Start: 2022-10-10 | End: 2022-10-17 | Stop reason: HOSPADM

## 2022-10-10 RX ORDER — PROPOFOL 10 MG/ML
INJECTION, EMULSION INTRAVENOUS AS NEEDED
Status: DISCONTINUED | OUTPATIENT
Start: 2022-10-10 | End: 2022-10-10 | Stop reason: HOSPADM

## 2022-10-10 RX ORDER — LIDOCAINE HYDROCHLORIDE 20 MG/ML
INJECTION, SOLUTION EPIDURAL; INFILTRATION; INTRACAUDAL; PERINEURAL AS NEEDED
Status: DISCONTINUED | OUTPATIENT
Start: 2022-10-10 | End: 2022-10-10 | Stop reason: HOSPADM

## 2022-10-10 RX ADMIN — SODIUM CHLORIDE, PRESERVATIVE FREE 10 ML: 5 INJECTION INTRAVENOUS at 21:57

## 2022-10-10 RX ADMIN — LIDOCAINE HYDROCHLORIDE 100 MG: 20 INJECTION, SOLUTION EPIDURAL; INFILTRATION; INTRACAUDAL; PERINEURAL at 11:33

## 2022-10-10 RX ADMIN — CASTOR OIL AND BALSAM, PERU: 788; 87 OINTMENT TOPICAL at 21:57

## 2022-10-10 RX ADMIN — PROPOFOL 150 MG: 10 INJECTION, EMULSION INTRAVENOUS at 11:47

## 2022-10-10 RX ADMIN — SUCRALFATE 1 G: 1 TABLET ORAL at 21:57

## 2022-10-10 RX ADMIN — ALLOPURINOL 100 MG: 100 TABLET ORAL at 18:17

## 2022-10-10 RX ADMIN — IRON SUCROSE 300 MG: 20 INJECTION, SOLUTION INTRAVENOUS at 16:25

## 2022-10-10 RX ADMIN — SODIUM CHLORIDE 40 MG: 9 INJECTION INTRAMUSCULAR; INTRAVENOUS; SUBCUTANEOUS at 21:57

## 2022-10-10 RX ADMIN — SODIUM CHLORIDE, PRESERVATIVE FREE 10 ML: 5 INJECTION INTRAVENOUS at 06:26

## 2022-10-10 RX ADMIN — SUCRALFATE 1 G: 1 TABLET ORAL at 16:25

## 2022-10-10 RX ADMIN — SODIUM CHLORIDE 125 ML/HR: 9 INJECTION, SOLUTION INTRAVENOUS at 03:13

## 2022-10-10 RX ADMIN — SODIUM CHLORIDE 125 ML/HR: 9 INJECTION, SOLUTION INTRAVENOUS at 22:02

## 2022-10-10 NOTE — PERIOP NOTES
1238: Patient left with transportation team to return to room 3117 at this time. Patient's chart is on the back of the stretcher. Procedure nurse provided primary RN with SBAR report prior to transport. VSS. No complaints or concerns at this time.

## 2022-10-10 NOTE — PERIOP NOTES
Endoscopy Case End Note:    1142:  Procedure scope was pre-cleaned, per protocol, at bedside by DARCI Greene. 1149:  Report received from anesthesia - Dr. Yves Mccormack DO. See anesthesia flowsheet for intra-procedure vital signs and events. 1150:  Glasses returned to patient.

## 2022-10-10 NOTE — PROGRESS NOTES
Hospitalist Progress Note    NAME: Juan Alberto Dunbar   :  1945   MRN:  [id number]       Assessment / Plan:  Acute on chronic blood loss anemia  History of cirrhosis secondary to Miryam Brisk  History of recent EGD showing portal hypertensive gastropathy and colonoscopy showed normal mucosa with internal hemorrhoids  Deficiency anemia  S/p unit in total with hemoglobin of 3.4 repeat CBC after transfusion showed hemoglobin of 7.3>9.6. No active GI bleed  For EGD tomorrow  No BM for 3 days, no active bleeding currently  Iron study s/o CARLINE, iv iron ordered. 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   -- PPI BID  -- Sucralfate QID x 10 days     Acute kidney injury  Creatinine improving, repeat in Am    Hypertension  GERD  Dyslipidemia  Hypothyroidism  Gouty arthritis  Atrial fibrillation  History of sick sinus syndrome s/p pacemaker placement  -Continue Norvasc, PPI, Lipitor, levothyroxine, allopurinol  -Her anticoagulation was discontinued previously due to anemia     Code Status: Full code  Surrogate Decision Maker: Son     DVT Prophylaxis: SCDs due to anemia  GI Prophylaxis: not indicated     Baseline: From home, independent of ADLs    MAYURI: 10/11  Barriers: stable Hb     Subjective:     Chief Complaint / Reason for Physician Visit  Follow-up acute blood loss anemia  After EGD, no acute event  Review of Systems:  Symptom Y/N Comments  Symptom Y/N Comments   Fever/Chills n   Chest Pain n    Poor Appetite n   Edema n    Cough n   Abdominal Pain nn    Sputum    Joint Pain     SOB/HENLEY    Pruritis/Rash     Nausea/vomit    Tolerating PT/OT     Diarrhea    Tolerating Diet     Constipation    Other       Could NOT obtain due to: Objective:     VITALS:   Last 24hrs VS reviewed since prior progress note. Most recent are:  Patient Vitals for the past 24 hrs:   Temp Pulse Resp BP SpO2   10/10/22 0343 98.6 °F (37 °C) 60 16 136/61 --   10/09/22 2357 98.2 °F (36.8 °C) 65 18 132/62 98 %   10/09/22 2048 98.2 °F (36.8 °C) 63 18 132/60 98 %   10/09/22 1600 -- 63 -- -- --   10/09/22 1150 -- 60 -- -- --   10/09/22 1144 98.1 °F (36.7 °C) 61 18 (!) 148/57 96 %       No intake or output data in the 24 hours ending 10/10/22 0855       I had a face to face encounter and independently examined this patient on 10/10/2022, as outlined below:  PHYSICAL EXAM:  General: Awake, No acute distress  EENT:  EOMI. Anicteric sclerae. MMM  Resp:  CTA bilaterally, no wheezing or rales. No accessory muscle use  CV:  Regular  rhythm,  No edema  GI:  Soft, Non distended, Non tender. +Bowel sounds  Neurologic:  Alert and oriented X 3, normal speech,   Psych:   Not anxious nor agitated  Skin:  No rashes. No jaundice    Reviewed most current lab test results and cultures  YES  Reviewed most current radiology test results   YES  Review and summation of old records today    NO  Reviewed patient's current orders and MAR    YES  PMH/ reviewed - no change compared to H&P  ________________________________________________________________________  Care Plan discussed with:    Comments   Patient y    Family      RN y    Care Manager     Consultant                       y Multidiciplinary team rounds were held today with , nursing, pharmacist and clinical coordinator. Patient's plan of care was discussed; medications were reviewed and discharge planning was addressed.      ________________________________________________________________________  Total NON critical care TIME:  36   Minutes    Total CRITICAL CARE TIME Spent:   Minutes non procedure based      Comments   >50% of visit spent in counseling and coordination of care ________________________________________________________________________  Rock Davis MD     Procedures: see electronic medical records for all procedures/Xrays and details which were not copied into this note but were reviewed prior to creation of Plan. LABS:  I reviewed today's most current labs and imaging studies.   Pertinent labs include:  Recent Labs     10/09/22  1608 10/09/22  0243 10/08/22  1717   WBC 6.4 5.0 4.9   HGB 9.6* 7.3* 8.2*   HCT 32.2* 24.8* 27.0*   * 106* 111*       Recent Labs     10/09/22  0243 10/08/22  0348    142   K 4.2 4.0   * 113*   CO2 22 22   * 137*   BUN 44* 50*   CREA 1.50* 1.70*   CA 8.5 8.4*         Signed: Rock Davis MD

## 2022-10-10 NOTE — PROGRESS NOTES
TRANSFER - OUT REPORT:    Verbal report given to Sowmya(name) on Dale Ling  being transferred to neuro(unit) for routine progression of care       Report consisted of patients Situation, Background, Assessment and   Recommendations(SBAR). Information from the following report(s) Procedure Summary, Intake/Output, and MAR was reviewed with the receiving nurse. Lines:   Peripheral IV 08/35/30 Left Basilic (Active)   Site Assessment Clean, dry, & intact 10/10/22 0812   Phlebitis Assessment 0 10/10/22 0812   Infiltration Assessment 0 10/10/22 0812   Dressing Status Clean, dry, & intact 10/10/22 0812   Dressing Type Transparent 10/10/22 0812   Hub Color/Line Status Green; Infusing 10/10/22 0812   Alcohol Cap Used No 10/09/22 0845        Opportunity for questions and clarification was provided.

## 2022-10-10 NOTE — ANESTHESIA POSTPROCEDURE EVALUATION
Procedure(s):  ESOPHAGOGASTRODUODENOSCOPY (EGD)  CAPSULE W ESOPHAGOGASTRODUODENOSCOPY (EGD)  ENDOSCOPIC ARGON PLASMA COAGULATION. total IV anesthesia    Anesthesia Post Evaluation        Patient location during evaluation: PACU  Note status: Adequate. Level of consciousness: responsive to verbal stimuli and sleepy but conscious  Pain management: satisfactory to patient  Airway patency: patent  Anesthetic complications: no  Cardiovascular status: acceptable  Respiratory status: acceptable  Hydration status: acceptable  Comments: +Post-Anesthesia Evaluation and Assessment    Patient: Zee Bhatti MRN: [id number]  SSN: [id number]   YOB: 1945  Age: 68 y.o. Sex: female      Cardiovascular Function/Vital Signs    BP (!) 148/35   Pulse 68   Temp 36.6 °C (97.8 °F)   Resp 21   Ht 4' 11\" (1.499 m)   Wt 97.5 kg (215 lb)   SpO2 97%   Breastfeeding No   BMI 43.42 kg/m²     Patient is status post Procedure(s):  ESOPHAGOGASTRODUODENOSCOPY (EGD)  CAPSULE W ESOPHAGOGASTRODUODENOSCOPY (EGD)  ENDOSCOPIC ARGON PLASMA COAGULATION. Nausea/Vomiting: Controlled. Postoperative hydration reviewed and adequate. Pain:  Pain Scale 1: Numeric (0 - 10) (10/10/22 1230)  Pain Intensity 1: 0 (10/10/22 1230)   Managed. Neurological Status: At baseline. Mental Status and Level of Consciousness: Arousable. Pulmonary Status:   O2 Device: None (Room air) (10/10/22 1230)   Adequate oxygenation and airway patent. Complications related to anesthesia: None    Post-anesthesia assessment completed. No concerns. Signed By: Guy Ferrari DO    10/10/2022  Post anesthesia nausea and vomiting:  controlled      INITIAL Post-op Vital signs:   Vitals Value Taken Time   /35 10/10/22 1230   Temp 36.6 °C (97.8 °F) 10/10/22 1200   Pulse 68 10/10/22 1233   Resp 21 10/10/22 1233   SpO2 97 % 10/10/22 1233   Vitals shown include unvalidated device data.

## 2022-10-10 NOTE — ROUTINE PROCESS
Clarence Wildbernabe  1945  [id number]    Situation:  Verbal report received from: Elma Jiménez, RN  Procedure: Procedure(s):  ESOPHAGOGASTRODUODENOSCOPY (EGD)  CAPSULE W ESOPHAGOGASTRODUODENOSCOPY (EGD)  ENDOSCOPIC ARGON PLASMA COAGULATION    Background:    Preoperative diagnosis: anemia  Postoperative diagnosis: GAVE    :  Dr. Vignesh Boone  Assistant(s): Endoscopy Technician-1: Gin Franklin  Endoscopy RN-1: Alejandro Turpin RN; Surekha Dotson RN    Specimens: * No specimens in log *  H. Pylori  no    Assessment:  Intra-procedure medications   Anesthesia gave intra-procedure sedation and medications, see anesthesia flow sheet yes    Intravenous fluids: NS@ KVO     Vital signs stable     Abdominal assessment: round and soft     Recommendation:  Return to floor  Permission to share finding with family or friend  n/a

## 2022-10-10 NOTE — PROGRESS NOTES
Physical Therapy Screening:    [facility] therapy based on results obtained during the nursing admission assessment. The patients chart was reviewed and the patient is appropriate for a skilled therapy evaluation if there is a decline in functional mobility from baseline. Please order a consult for physical therapy if you are in agreement and would like an evaluation to be completed. Thank you.     Jayant Villarreal

## 2022-10-10 NOTE — PROCEDURES
NAME:  Theoplcricket Thomas   :   1945   MRN:   [id number]     Date/Time:  10/10/2022 11:44 AM    Esophagogastroduodenoscopy (EGD) Procedure Note    Procedure: Esophagogastroduodenoscopy with argon plasma coagulation, pill camera deployment    Indication: CARLINE  Pre-operative Diagnosis: see indication above  Post-operative Diagnosis: see findings below  :  Lul Mendoza MD  Referring Provider:   Jg Feliciano MD    Exam:  Airway: clear, no airway problems anticipated  Heart: RRR, without gallops or rubs  Lungs: clear bilaterally without wheezes, crackles, or rhonchi  Abdomen: soft, nontender, nondistended, bowel sounds present  Mental Status: awake, alert and oriented to person, place and time     Anethesia/Sedation:  MAC anesthesia Propofol  Procedure Details   After informed consent was obtained for the procedure, with all risks and benefits of procedure explained the patient was taken to the endoscopy suite and placed in the left lateral decubitus position. Following sequential administration of sedation as per above, the [id number] gastroscope was inserted into the mouth and advanced under direct vision to third portion of the duodenum. A careful inspection was made as the gastroscope was withdrawn, including a retroflexed view of the proximal stomach; findings and interventions are described below. Findings:   OROPHARYNX: Cords and pyriform recesses normal.   ESOPHAGUS: The esophagus is normal. The proximal, mid, and distal portions are normal. The Z-Line is intact. No esophageal varices. STOMACH: The fundus on antegrade and retroflex views is normal. 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 Antrum treated extensively with APC. DUODENUM: The bulb, second and third portions are normal. Pill cam deployed in bulb. Therapies:  APC to Antrum    Specimens: none    EBL:  None.          Complications:   None; patient tolerated the procedure well. 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   -- PPI BID  -- Sucralfate QID x 10 days    Discharge disposition:  back to wards    Leda Damian MD

## 2022-10-10 NOTE — PROGRESS NOTES
.. TRANSFER - IN REPORT:    Verbal report received from Rae(name) on [facility]  being received from [facility](unit) for ordered procedure      Report consisted of patients Situation, Background, Assessment and   Recommendations(SBAR). Information from the following report(s) Intake/Output, MAR, Accordion, and Recent Results was reviewed with the receiving nurse. Opportunity for questions and clarification was provided. Assessment completed upon patients arrival to unit and care assumed.

## 2022-10-10 NOTE — PROGRESS NOTES
Problem: Falls - Risk of  Goal: *Absence of Falls  Description: Document Shylaia Fothergill Fall Risk and appropriate interventions in the flowsheet. Outcome: Progressing Towards Goal  Note: Fall Risk Interventions:  Mobility Interventions: Bed/chair exit alarm, Patient to call before getting OOB         Medication Interventions: Bed/chair exit alarm, Patient to call before getting OOB, Teach patient to arise slowly    Elimination Interventions: Bed/chair exit alarm, Call light in reach, Patient to call for help with toileting needs, Stay With Me (per policy), Toilet paper/wipes in reach              Problem: Fluid Volume - Risk of, Imbalanced  Goal: *Balanced intake and output  Outcome: Progressing Towards Goal     Problem: Pressure Injury - Risk of  Goal: *Prevention of pressure injury  Description: Document Boris Scale and appropriate interventions in the flowsheet.   Outcome: Progressing Towards Goal  Note: Pressure Injury Interventions:       Moisture Interventions: Apply protective barrier, creams and emollients, Check for incontinence Q2 hours and as needed, Limit adult briefs, Maintain skin hydration (lotion/cream), Internal/External urinary devices    Activity Interventions: Pressure redistribution bed/mattress(bed type)    Mobility Interventions: Pressure redistribution bed/mattress (bed type), HOB 30 degrees or less, PT/OT evaluation    Nutrition Interventions: Document food/fluid/supplement intake    Friction and Shear Interventions: Minimize layers, Lift sheet, HOB 30 degrees or less

## 2022-10-10 NOTE — PERIOP NOTES
Pill Cam Progress Note    [place] Holiday  1945  ENDO/PL    Date of Procedure: 10/10/2022    Outcome of Pill Cam Swallow: insertd 10/10/202 @ 1142    Condition of patient after procedure: stable,     Belt was applied by: KERVIN Abdul    Capsule ingestion time: 1142. Patient may drink clear liquids at 1342    Patient may have light snack/lunch at 1542. Pill cam recording complete at 1942.  Pill cam may be removed at this time    Endoscopy RN will  equipment after [facility] SANTOS Brooks  10/10/22

## 2022-10-10 NOTE — PROGRESS NOTES
0935: Patient arrived to [facility] and placed on monitors. No oxygen requirements. She is alert and oriented, able to sign consents. Belongings with pt include: glasses, tele box, and bracelet. No dentures or hearing aids.

## 2022-10-10 NOTE — CONSULTS
[facility]  [address], [address] [facility] [place], [address] [facility]  [phone]      Hematology/ Oncology Consult Note      Reason for consult:     Shanna Ron is a 68 y.o. female who we have been asked to see by Dr. Sherley Mosre for chronic anemia. Subjective:     Shanna Ron is a 68year old woman admitted to [address] [place] [address] for acute blood loss anemia, GIB and history of BRADY cirrhosis. Patient is currently undergoing a GI evaluation with EGD and pill cam study. She has received supportive blood transfusions during this admission. PMH includes CARLINE, gout, OA, anxiety, morbid obesity, hypertension, thrombocytopenia, diabetes, cellulitis, MICHELLE, CKD stage 3, atrial fibrillation. Patient seen at bedside s/p EGD. She reports GI told her she had bleeding ulcers. She is anxious to restart her diet. She is currently undergoing pill cam study. Review of Systems:  Pertinent items are noted in the History of Present Illness.        Past Medical History:   Diagnosis Date    Abnormal nuclear stress test 10/4/2021    Angina at rest [facility]) 10/4/2021    Arthritis     KNEE,gout    Bundle branch block     Endocrine disease     HYPOTHYROIDISM    Fracture     Left distal femur (age 12 - pt fell)    Fracture     Left tibia 1990 (pt fell)    Fracture     Right wrist fractured 2 times (pt fell)    GERD (gastroesophageal reflux disease)     High cholesterol     Hypertension     Hypoglycemia     \"my body produces too much insulin\"    Liver disease     BRADY    Nausea & vomiting     when had gallbladder out    Osteoporosis     Other ill-defined conditions(799.89)     edema legs    S/P cardiac cath 10/4/2021    10/4/2021 nonobstructive disease    Spinal stenosis     Thyroid disease      Past Surgical History:   Procedure Laterality Date    COLONOSCOPY N/A 7/13/2021    COLONOSCOPY performed by Chas Allen MD at [facility] ENDOSCOPY    COLONOSCOPY N/A 8/8/2022 COLONOSCOPY performed by Salvador Aguilera MD at [address]  2/11/2015         COLONOSCOPY,REMV LESN,SNARE  7/13/2021         COLONOSCPY,FLEX,W/DIR SUBMUC INJECT  7/13/2021         COLORECTAL SCRN; HI RISK IND  2/11/2015         HX CHOLECYSTECTOMY      HX HYSTERECTOMY      HX ORTHOPAEDIC Left     leg surgery - plates, screws, wires, pins in place    HX UROLOGICAL      PESSURY    AL ABDOMEN SURGERY PROC UNLISTED      liver biopsy--BRADY and fibrosis    UPPER GI ENDOSCOPY,BIOPSY  11/17/2015           Family History   Problem Relation Age of Onset    Diabetes Mother     Heart Disease Mother     Emphysema Father     No Known Problems Brother     Stroke Maternal Grandmother     No Known Problems Maternal Grandfather     No Known Problems Paternal Grandmother     No Known Problems Paternal Grandfather      Social History     Tobacco Use    Smoking status: Never    Smokeless tobacco: Never   Substance Use Topics    Alcohol use: No      Current Facility-Administered Medications   Medication Dose Route Frequency Provider Last Rate Last Admin    0.9% sodium chloride infusion  25 mL/hr IntraVENous CONTINUOUS Christine Abreu MD        sucralfate (CARAFATE) tablet 1 g  1 g Oral AC&HS Christine Abreu MD        polyethylene glycol (MIRALAX) packet 17 g  17 g Oral DAILY Mary Wilkes MD   17 g at 10/09/22 0851    balsam peru-castor oiL (VENELEX) ointment   Topical BID Claus Epstein MD   Given at 10/09/22 2157    pantoprazole (PROTONIX) 40 mg in 0.9% sodium chloride 10 mL injection  40 mg IntraVENous Q12H Claus Epstein MD   40 mg at 10/09/22 2157    0.9% sodium chloride infusion  125 mL/hr IntraVENous CONTINUOUS Claus Epstein MD   IV Completed at 10/10/22 1218    allopurinoL (ZYLOPRIM) tablet 100 mg  100 mg Oral BID Claus Epstein MD   100 mg at 10/09/22 1739    amLODIPine (NORVASC) tablet 10 mg  10 mg Oral DAILY Claus Epstein MD   10 mg at 10/09/22 0856    atorvastatin (LIPITOR) tablet 20 mg  20 mg Oral DAILY Janet Ji MD   20 mg at 10/09/22 0856    [Held by provider] furosemide (LASIX) tablet 80 mg  80 mg Oral DAILY Janet Ji MD        levothyroxine (SYNTHROID) tablet 150 mcg  150 mcg Oral ACB Janet Ji MD   150 mcg at 10/09/22 0856    potassium chloride SR (KLOR-CON 10) tablet 10 mEq  10 mEq Oral DAILY Janet Ji MD   10 mEq at 10/09/22 0855    ezetimibe (ZETIA) tablet 10 mg  10 mg Oral DAILY Janet Ji MD   10 mg at 10/09/22 0855    sodium chloride (NS) flush 5-40 mL  5-40 mL IntraVENous Q8H Janet Ji MD   10 mL at 10/10/22 9037    sodium chloride (NS) flush 5-40 mL  5-40 mL IntraVENous PRN Janet Ji MD        acetaminophen (TYLENOL) tablet 650 mg  650 mg Oral Q6H PRN Janet Ji MD        Or    acetaminophen (TYLENOL) suppository 650 mg  650 mg Rectal Q6H PRN Janet Ji MD        ondansetron (ZOFRAN ODT) tablet 4 mg  4 mg Oral Q8H PRN Janet Ji MD        Or    ondansetron (ZOFRAN) injection 4 mg  4 mg IntraVENous Q6H PRN Janet Ji MD            Allergies   Allergen Reactions    Gabapentin Other (comments)     Suicidal ideation    Metoprolol Rash    Celebrex [Celecoxib] Hives    Eliquis [Apixaban] Other (comments)     Caused excessive anemia    Pcn [Penicillins] Unknown (comments)     Can't remember, was a long time    Sulfa (Sulfonamide Antibiotics) Hives          Objective:     Patient Vitals for the past 8 hrs:   BP Temp Pulse Resp SpO2   10/10/22 1230 (!) 148/35 -- 68 21 97 %   10/10/22 1227 (!) 146/38 -- 69 17 --   10/10/22 1224 (!) 146/36 -- 68 21 97 %   10/10/22 1221 (!) 143/41 -- 69 22 97 %   10/10/22 1218 (!) 145/41 -- 69 23 98 %   10/10/22 1215 (!) 123/27 -- 71 20 --   10/10/22 1212 (!) 141/113 -- 70 19 --   10/10/22 1209 (!) 141/36 -- 69 21 99 %   10/10/22 1203 (!) 160/30 -- 69 20 98 %   10/10/22 1200 (!) 153/34 97.8 °F (36.6 °C) 68 19 97 %   10/10/22 1153 (!) 122/56 -- 70 20 99 %   10/10/22 1148 124/63 -- 70 18 100 %   10/10/22 1000 (!) 179/52 -- 74 23 98 %   10/10/22 0935 (!) 183/47 98.1 °F (36.7 °C) 73 22 98 %   10/10/22 0916 (!) 161/67 98.4 °F (36.9 °C) 64 16 96 %       Lab Results   Component Value Date/Time    WBC 4.6 10/10/2022 05:52 AM    HGB 7.4 (L) 10/10/2022 05:52 AM    HCT 25.0 (L) 10/10/2022 05:52 AM    PLATELET 338 (L) 46/63/2861 05:52 AM    MCV 98.8 10/10/2022 05:52 AM       Physical Exam:   Visit Vitals  BP (!) 148/35   Pulse 68   Temp 97.8 °F (36.6 °C)   Resp 21   Ht 4' 11\" (1.499 m)   Wt 215 lb (97.5 kg)   SpO2 97%   Breastfeeding No   BMI 43.42 kg/m²     General appearance: alert, cooperative, no distress, appears stated age, morbid obesity  Head: Normocephalic, without obvious abnormality, atraumatic  Extremities: extremities normal, atraumatic, no cyanosis or edema  Skin: Skin color, texture, turgor normal. 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, treated with APC extensively  also, pill camera deployed in duodenum, given degree of recurrent anemia  -Iron profile : Iron 16, TIBC 342, Iron % 5  -Has history of iron infusions, denies following with hematology and thinks her PCP has ordered them in the past        Plan:     > Likely cause of chronic anemia is multifactorial given chronic GIB, BRADY cirrhosis and CKD   > Has received some IV Venofer (2 doses 100 mg) - will give x2 doses IV venofer 30 mg   > Check B12 and folate levels   > Await final results of GI work up   > Will likely need OP iron infusions   > Will arrange OP follow up at discharge     Discussed with Dr. Candi Capps    Thank you for allowing us to participate in the care of Ms. Siu, will continue to follow.        Sinan Mendenhall NP

## 2022-10-10 NOTE — ANESTHESIA PREPROCEDURE EVALUATION
Anesthetic History     PONV          Review of Systems / Medical History  Patient summary reviewed, nursing notes reviewed and pertinent labs reviewed    Pulmonary  Within defined limits                 Neuro/Psych             Comments: spinal stenosis Cardiovascular    Hypertension        Dysrhythmias   CAD and hyperlipidemia    Exercise tolerance: <4 METS  Comments: Hx Bifascicular block    TTE (05/2021): LV: Estimated LVEF is 55 - 60%. LA: Mildly dilated left atrium. Mild aortic valve stenosis is present. TV: Moderate tricuspid valve regurgitation is present. Right Ventricular Arterial Pressure (RVSP) is 58 mmHg. Pulmonary hypertension found to be moderate.        GI/Hepatic/Renal     GERD      Liver disease    Comments: PERSONAL HX OF POLYPS   BRADY Syndrome  Fatty liver Endo/Other    Diabetes: type 2  Hypothyroidism  Morbid obesity, arthritis and anemia     Other Findings   Comments: Gout  Thrombocytopenia  RSD lower limb  Osteoporosis   DDD  Ambulates with a walker                      Physical Exam    Airway  Mallampati: III    Neck ROM: normal range of motion   Mouth opening: Normal     Cardiovascular  Regular rate and rhythm,  S1 and S2 normal,  no murmur, click, rub, or gallop             Dental    Dentition: Full upper dentures  Comments: No loose lower teeth   Pulmonary      Decreased breath sounds: bibasilar           Abdominal  GI exam deferred       Other Findings            Anesthetic Plan    ASA: 3  Anesthesia type: total IV anesthesia          Induction: Intravenous  Anesthetic plan and risks discussed with: Patient

## 2022-10-10 NOTE — PROGRESS NOTES
End of Shift Note    Bedside shift change report given to Reva Dorsey (oncoming nurse) by Michelle Live RN (offgoing nurse).   Report included the following information SBAR, Kardex, and ED Summary    Shift worked:  7p - 7a   Shift summary and any significant changes:    Capsule study in AM.    NPO all night     Concerns for physician to address:  Noted above   Zone phone for oncoming shift:   [phone]     Patient Information  Shanna Ron  68 y.o.  10/6/2022 11:15 AM by Viet Hartman MD. Shanna Ron was admitted from Home    Problem List  Patient Active Problem List    Diagnosis Date Noted    Acute anemia 10/06/2022    Anemia 08/05/2022    SSS (sick sinus syndrome) ([address] 06/27/2022    PAF (paroxysmal atrial fibrillation) ([address] 06/24/2022    Stage 3a chronic kidney disease ([address] 05/20/2022    Type 2 diabetes mellitus with chronic kidney disease ([address] 11/18/2021    Abnormal nuclear stress test 10/04/2021    Angina at rest ([address] 10/04/2021    S/P cardiac cath 10/04/2021    Nonrheumatic aortic valve stenosis 09/08/2021    Pulmonary hypertension ([address] 09/08/2021    MICHELLE (acute kidney injury) ([address] 10/27/2020    Cellulitis of left lower leg 11/07/2019    Severe obesity ([address] 09/30/2019    Controlled type 2 diabetes mellitus without complication ([address] 75/77/9747    DDD (degenerative disc disease), lumbar 11/27/2017    Prediabetes 04/11/2016    Thrombocytopenia ([address] 01/24/2015    HTN (hypertension) 01/17/2014    Bifascicular block 12/23/2010    Fatty liver 06/18/2010    Pure hypercholesterolemia 06/18/2010    Colon polyp 06/18/2010    Gout 06/14/2010    Hypothyroidism 06/14/2010    Osteoporosis 06/14/2010    Anxiety 06/14/2010    Leg edema 06/14/2010    RSD lower limb 06/14/2010     Past Medical History:   Diagnosis Date    Abnormal nuclear stress test 10/4/2021    Angina at rest [facility]) 10/4/2021    Arthritis     KNEE,gout    Bundle branch block     Endocrine disease     HYPOTHYROIDISM    Fracture     Left distal femur (age 12 - pt fell)    Fracture     Left tibia 1990 (pt fell)    Fracture     Right wrist fractured 2 times (pt fell)    GERD (gastroesophageal reflux disease)     High cholesterol     Hypertension     Hypoglycemia     \"my body produces too much insulin\"    Liver disease     BRADY    Nausea & vomiting     when had gallbladder out    Osteoporosis     Other ill-defined conditions(799.89)     edema legs    S/P cardiac cath 10/4/2021    10/4/2021 nonobstructive disease    Spinal stenosis     Thyroid disease        Core Measures:  CVA: No No  CHF:No No  PNA:No No    Activity:  Activity Level: Bed Rest  Number times ambulated in hallways past shift: 0  Number of times OOB to chair past shift: 0    Cardiac:   Cardiac Monitoring: Yes      Cardiac Rhythm: Atrial Fib    Access:   Current line(s): PIV     Genitourinary:   Urinary status: voiding and external catheter    Respiratory:   O2 Device: None (Room air)  Chronic home O2 use?: N/A  Incentive spirometer at bedside: N/A       GI:  Last Bowel Movement Date: 10/09/22  Current diet:  DIET ONE TIME MESSAGE  DIET NPO  Passing flatus: YES  Tolerating current diet: YES       Pain Management:   Patient states pain is manageable on current regimen: N/A    Skin:  Boris Score: 18  Interventions: increase time out of bed    Patient Safety:  Fall Score:  Total Score: 3  Interventions: bed/chair alarm, assistive device (walker, cane, etc), and gripper socks  High Fall Risk: Yes  @Rollbelt  @dexterity to release roll belt  Yes/No ( must document dexterity  here by stating Yes or No here, otherwise this is a restraint and must follow restraint documentation policy.)    DVT prophylaxis:  DVT prophylaxis Med- Yes    WOUND: BLISTER LOWER EXTREMITY BLISTERS    Active Consults:  IP CONSULT TO GASTROENTEROLOGY  IP CONSULT TO HEMATOLOGY  IP CONSULT TO HOSPITALIST    Length of Stay:  Expected LOS: 2d 16h  Actual LOS: 4  Discharge Plan: No HOME      Heriberto Espinoza RN

## 2022-10-10 NOTE — PROGRESS NOTES
Report given to Loly Ley RN in the pre-procedure phase. Celina Jerez Rn and SANTOS Kent, were not present during time out or intra-procedure phase of care.

## 2022-10-11 LAB
ANION GAP SERPL CALC-SCNC: 4 MMOL/L (ref 5–15)
BUN SERPL-MCNC: 29 MG/DL (ref 6–20)
BUN/CREAT SERPL: 25 (ref 12–20)
CALCIUM SERPL-MCNC: 8.6 MG/DL (ref 8.5–10.1)
CHLORIDE SERPL-SCNC: 118 MMOL/L (ref 97–108)
CO2 SERPL-SCNC: 21 MMOL/L (ref 21–32)
CREAT SERPL-MCNC: 1.15 MG/DL (ref 0.55–1.02)
ERYTHROCYTE [DISTWIDTH] IN BLOOD BY AUTOMATED COUNT: 22 % (ref 11.5–14.5)
FOLATE SERPL-MCNC: 49.6 NG/ML (ref 5–21)
GLUCOSE SERPL-MCNC: 145 MG/DL (ref 65–100)
HCT VFR BLD AUTO: 24.8 % (ref 35–47)
HGB BLD-MCNC: 7.2 G/DL (ref 11.5–16)
MCH RBC QN AUTO: 28.8 PG (ref 26–34)
MCHC RBC AUTO-ENTMCNC: 29 G/DL (ref 30–36.5)
MCV RBC AUTO: 99.2 FL (ref 80–99)
NRBC # BLD: 0.03 K/UL (ref 0–0.01)
NRBC BLD-RTO: 0.6 PER 100 WBC
PLATELET # BLD AUTO: 102 K/UL (ref 150–400)
PMV BLD AUTO: 9.9 FL (ref 8.9–12.9)
POTASSIUM SERPL-SCNC: 4.3 MMOL/L (ref 3.5–5.1)
RBC # BLD AUTO: 2.5 M/UL (ref 3.8–5.2)
SODIUM SERPL-SCNC: 143 MMOL/L (ref 136–145)
VIT B12 SERPL-MCNC: 1140 PG/ML (ref 193–986)
WBC # BLD AUTO: 5.1 K/UL (ref 3.6–11)

## 2022-10-11 PROCEDURE — 74011000250 HC RX REV CODE- 250: Performed by: INTERNAL MEDICINE

## 2022-10-11 PROCEDURE — 74011250637 HC RX REV CODE- 250/637: Performed by: INTERNAL MEDICINE

## 2022-10-11 PROCEDURE — C9113 INJ PANTOPRAZOLE SODIUM, VIA: HCPCS | Performed by: INTERNAL MEDICINE

## 2022-10-11 PROCEDURE — 74011250636 HC RX REV CODE- 250/636: Performed by: INTERNAL MEDICINE

## 2022-10-11 PROCEDURE — 74011250637 HC RX REV CODE- 250/637: Performed by: STUDENT IN AN ORGANIZED HEALTH CARE EDUCATION/TRAINING PROGRAM

## 2022-10-11 PROCEDURE — 74011250637 HC RX REV CODE- 250/637: Performed by: NURSE PRACTITIONER

## 2022-10-11 PROCEDURE — 74011250636 HC RX REV CODE- 250/636: Performed by: NURSE PRACTITIONER

## 2022-10-11 PROCEDURE — 97165 OT EVAL LOW COMPLEX 30 MIN: CPT

## 2022-10-11 PROCEDURE — 97535 SELF CARE MNGMENT TRAINING: CPT

## 2022-10-11 PROCEDURE — 85027 COMPLETE CBC AUTOMATED: CPT

## 2022-10-11 PROCEDURE — 76937 US GUIDE VASCULAR ACCESS: CPT

## 2022-10-11 PROCEDURE — 65270000046 HC RM TELEMETRY

## 2022-10-11 PROCEDURE — 80048 BASIC METABOLIC PNL TOTAL CA: CPT

## 2022-10-11 PROCEDURE — 99232 SBSQ HOSP IP/OBS MODERATE 35: CPT | Performed by: INTERNAL MEDICINE

## 2022-10-11 RX ORDER — FAMOTIDINE 20 MG/1
20 TABLET, FILM COATED ORAL 2 TIMES DAILY
Status: DISCONTINUED | OUTPATIENT
Start: 2022-10-11 | End: 2022-10-11 | Stop reason: DRUGHIGH

## 2022-10-11 RX ORDER — AMMONIUM LACTATE 12 G/100G
LOTION TOPICAL DAILY
Status: DISCONTINUED | OUTPATIENT
Start: 2022-10-12 | End: 2022-10-17 | Stop reason: HOSPADM

## 2022-10-11 RX ORDER — FAMOTIDINE 20 MG/1
20 TABLET, FILM COATED ORAL DAILY
Status: COMPLETED | OUTPATIENT
Start: 2022-10-11 | End: 2022-10-13

## 2022-10-11 RX ADMIN — SODIUM CHLORIDE, PRESERVATIVE FREE 10 ML: 5 INJECTION INTRAVENOUS at 14:00

## 2022-10-11 RX ADMIN — FAMOTIDINE 20 MG: 20 TABLET, FILM COATED ORAL at 12:49

## 2022-10-11 RX ADMIN — CASTOR OIL AND BALSAM, PERU: 788; 87 OINTMENT TOPICAL at 20:34

## 2022-10-11 RX ADMIN — ATORVASTATIN CALCIUM 20 MG: 20 TABLET, FILM COATED ORAL at 09:24

## 2022-10-11 RX ADMIN — CASTOR OIL AND BALSAM, PERU: 788; 87 OINTMENT TOPICAL at 12:55

## 2022-10-11 RX ADMIN — POLYETHYLENE GLYCOL 3350 17 G: 17 POWDER, FOR SOLUTION ORAL at 09:32

## 2022-10-11 RX ADMIN — SODIUM CHLORIDE 40 MG: 9 INJECTION INTRAMUSCULAR; INTRAVENOUS; SUBCUTANEOUS at 20:31

## 2022-10-11 RX ADMIN — POTASSIUM CHLORIDE 10 MEQ: 750 TABLET, FILM COATED, EXTENDED RELEASE ORAL at 09:23

## 2022-10-11 RX ADMIN — SODIUM CHLORIDE, PRESERVATIVE FREE 10 ML: 5 INJECTION INTRAVENOUS at 20:34

## 2022-10-11 RX ADMIN — SUCRALFATE 1 G: 1 TABLET ORAL at 09:23

## 2022-10-11 RX ADMIN — SUCRALFATE 1 G: 1 TABLET ORAL at 16:30

## 2022-10-11 RX ADMIN — AMLODIPINE BESYLATE 10 MG: 5 TABLET ORAL at 09:23

## 2022-10-11 RX ADMIN — SODIUM CHLORIDE 125 ML/HR: 9 INJECTION, SOLUTION INTRAVENOUS at 07:03

## 2022-10-11 RX ADMIN — ALLOPURINOL 100 MG: 100 TABLET ORAL at 09:24

## 2022-10-11 RX ADMIN — EZETIMIBE 10 MG: 10 TABLET ORAL at 09:24

## 2022-10-11 RX ADMIN — SUCRALFATE 1 G: 1 TABLET ORAL at 20:30

## 2022-10-11 RX ADMIN — LEVOTHYROXINE SODIUM 150 MCG: 0.15 TABLET ORAL at 07:04

## 2022-10-11 RX ADMIN — SODIUM CHLORIDE 40 MG: 9 INJECTION INTRAMUSCULAR; INTRAVENOUS; SUBCUTANEOUS at 09:24

## 2022-10-11 RX ADMIN — SUCRALFATE 1 G: 1 TABLET ORAL at 12:49

## 2022-10-11 RX ADMIN — IRON SUCROSE 300 MG: 20 INJECTION, SOLUTION INTRAVENOUS at 16:50

## 2022-10-11 RX ADMIN — ALLOPURINOL 100 MG: 100 TABLET ORAL at 17:45

## 2022-10-11 NOTE — PROGRESS NOTES
GI PROGRESS NOTE  Da Marion NP  [phone] NP in-hospital cell phone M-F until 4:30  After 5pm or on weekends, please call  for physician on call    NAME:Char Diop :1945 [id number]   ATTG: Dr. Janell Mon  PCP: Diego Valladares MD  Date/Time:  10/11/2022 1230 AM   Primary GI: Dr. 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, treated with APC extensively  -- also, pill camera deployed in duodenum, given degree of recurrent anemia        Hx cirrhosis secondary to BRADY, compensated   Had liver bx at [facility]   BRADY, moderate fibrosis with bridging, stage III     GI History:  2022- Colonoscopy with normal colonic mucosa throughout, internal hemorrhoids   2022- EGD showing portal hypertensive gastropathy     Plan:     Pill cam downloading today, will read tomorrow for results   Serial H&H; goal for hgb >7.0  Monitor for s/s of bleeding; transfuse as clinically indicated  Continue PPI BID, added carafate yesterday, added 3 doses of H2A today  Symptomatic care per primary team  *Plan of care discussed with Dr. Rascon Credit:   Discussed with RN events overnight. DOing well today but is having some epigastric burning discomfort, not terrible, tolerating food okay    Review of Systems:  Symptom Y/N Comments  Symptom Y/N Comments   Fever/Chills n   Chest Pain n    Cough n   Headaches n    Sputum n   Joint Pain n    SOB/HENLEY n   Pruritis/Rash n    Tolerating Diet y   Other       Could NOT obtain due to:      Objective:   VITALS:   Last 24hrs VS reviewed since prior progress note.  Most recent are:  Visit Vitals  BP (!) 145/59   Pulse 60   Temp 97.4 °F (36.3 °C)   Resp 18   Ht 4' 11\" (1.499 m)   Wt 97.5 kg (215 lb)   SpO2 94%   Breastfeeding No   BMI 43.42 kg/m²     No intake or output data in the 24 hours ending 10/11/22 1242  PHYSICAL EXAM:  General: Pleasant elderly  female. NAD   HEENT: NC, Atraumatic. Anicteric sclerae. Lungs:  CTA Bilaterally. No Wheezing/Rhonchi/Rales. Heart:  Regular  rhythm,  No murmur, No Rubs, No Gallops  Abdomen: Soft, Non distended, Non tender. +Bowel sounds, no HSM  Extremities: No c/c/e  Neurologic:  Alert and oriented X 3. No acute neurological distress   Psych:   Good insight. Not anxious nor agitated. Lab and Radiology Data Reviewed: (see below)    Medications Reviewed: (see below)  PMH/SH reviewed - no change compared to H&P  ________________________________________________________________________  Total time spent with patient: 20 minutes ________________________________________________________________________  Care Plan discussed with:  Patient y   Family     RN y              Consultant: José Miguel Jules NP     Procedures: see electronic medical records for all procedures/Xrays and details which were not copied into this note but were reviewed prior to creation of Plan. LABS:  Recent Labs     10/11/22  1102 10/10/22  0552   WBC 5.1 4.6   HGB 7.2* 7.4*   HCT 24.8* 25.0*   * 103*       Recent Labs     10/11/22  1102 10/09/22  0243    143   K 4.3 4.2   * 115*   CO2 21 22   BUN 29* 44*   CREA 1.15* 1.50*   * 154*   CA 8.6 8.5       No results for input(s): AP, TBIL, TP, ALB, GLOB, GGT, AML, LPSE in the last 72 hours. No lab exists for component: SGOT, GPT, AMYP, HLPSE    No results for input(s): INR, PTP, APTT, INREXT, INREXT in the last 72 hours. No results for input(s): FE, TIBC, PSAT, FERR in the last 72 hours. No results found for: FOL, RBCF  No results for input(s): PH, PCO2, PO2 in the last 72 hours. No results for input(s): CPK, CKMB in the last 72 hours.     No lab exists for component: TROPONINI  Lab Results   Component Value Date/Time    Color YELLOW/STRAW 10/27/2020 02:54 PM    Appearance CLOUDY (A) 10/27/2020 02:54 PM    Specific gravity 1.015 10/27/2020 02:54 PM    pH (UA) 5.0 10/27/2020 02:54 PM    Protein 100 (A) 10/27/2020 02:54 PM    Glucose Negative 10/27/2020 02:54 PM    Ketone Negative 10/27/2020 02:54 PM    Bilirubin NEGATIVE 11/04/2012 10:39 PM    Urobilinogen 1.0 10/27/2020 02:54 PM    Nitrites Negative 10/27/2020 02:54 PM    Leukocyte Esterase LARGE (A) 10/27/2020 02:54 PM    Epithelial cells MANY (A) 10/27/2020 02:54 PM    Bacteria 3+ (A) 10/27/2020 02:54 PM    WBC >100 (H) 10/27/2020 02:54 PM    RBC 5-10 10/27/2020 02:54 PM       MEDICATIONS:  Current Facility-Administered Medications   Medication Dose Route Frequency    famotidine (PEPCID) tablet 20 mg  20 mg Oral DAILY    sucralfate (CARAFATE) tablet 1 g  1 g Oral AC&HS    iron sucrose (VENOFER) 300 mg in 0.9% sodium chloride 250 mL IVPB  300 mg IntraVENous Q24H    polyethylene glycol (MIRALAX) packet 17 g  17 g Oral DAILY    balsam peru-castor oiL (VENELEX) ointment   Topical BID    pantoprazole (PROTONIX) 40 mg in 0.9% sodium chloride 10 mL injection  40 mg IntraVENous Q12H    0.9% sodium chloride infusion  125 mL/hr IntraVENous CONTINUOUS    allopurinoL (ZYLOPRIM) tablet 100 mg  100 mg Oral BID    amLODIPine (NORVASC) tablet 10 mg  10 mg Oral DAILY    atorvastatin (LIPITOR) tablet 20 mg  20 mg Oral DAILY    [Held by provider] furosemide (LASIX) tablet 80 mg  80 mg Oral DAILY    levothyroxine (SYNTHROID) tablet 150 mcg  150 mcg Oral ACB    potassium chloride SR (KLOR-CON 10) tablet 10 mEq  10 mEq Oral DAILY    ezetimibe (ZETIA) tablet 10 mg  10 mg Oral DAILY    sodium chloride (NS) flush 5-40 mL  5-40 mL IntraVENous Q8H    sodium chloride (NS) flush 5-40 mL  5-40 mL IntraVENous PRN    acetaminophen (TYLENOL) tablet 650 mg  650 mg Oral Q6H PRN    Or    acetaminophen (TYLENOL) suppository 650 mg  650 mg Rectal Q6H PRN    ondansetron (ZOFRAN ODT) tablet 4 mg  4 mg Oral Q8H PRN    Or    [facility]etron [facility]) injection 4 mg  4 mg IntraVENous Q6H PRN

## 2022-10-11 NOTE — PROGRESS NOTES
End of Shift Note     Bedside shift change report given to SANTOS Clifton (oncoming nurse) by Earl Hein RN (offgoing nurse).   Report included the following information SBAR, Kardex, and ED Summary     Shift worked: 7a-7p   Shift summary and any significant changes:     Hgb 7.2      Concerns for physician to address: none   Zone phone for oncoming shift:   [phone]      Patient Information  Ana Fitzpatrick  68 y.o.  10/6/2022 11:15 AM by Shant Smith MD. Ana Fitzpatrick was admitted from Home     Problem List          Patient Active Problem List     Diagnosis Date Noted    Acute anemia 10/06/2022    Anemia 08/05/2022    SSS (sick sinus syndrome) ([address] 06/27/2022    PAF (paroxysmal atrial fibrillation) ([address] 06/24/2022    Stage 3a chronic kidney disease ([address] 05/20/2022    Type 2 diabetes mellitus with chronic kidney disease ([address] 11/18/2021    Abnormal nuclear stress test 10/04/2021    Angina at rest [facility]) 10/04/2021    S/P cardiac cath 10/04/2021    Nonrheumatic aortic valve stenosis 09/08/2021    Pulmonary hypertension ([address] 09/08/2021    MICHELLE (acute kidney injury) ([address] 10/27/2020    Cellulitis of left lower leg 11/07/2019    Severe obesity ([address] 09/30/2019    Controlled type 2 diabetes mellitus without complication ([address] 58/10/6935    DDD (degenerative disc disease), lumbar 11/27/2017    Prediabetes 04/11/2016    Thrombocytopenia ([address] 01/24/2015    HTN (hypertension) 01/17/2014    Bifascicular block 12/23/2010    Fatty liver 06/18/2010    Pure hypercholesterolemia 06/18/2010    Colon polyp 06/18/2010    Gout 06/14/2010    Hypothyroidism 06/14/2010    Osteoporosis 06/14/2010    Anxiety 06/14/2010    Leg edema 06/14/2010    RSD lower limb 06/14/2010              Past Medical History:   Diagnosis Date    Abnormal nuclear stress test 10/4/2021    Angina at rest [facility]) 10/4/2021    Arthritis       KNEE,gout    Bundle branch block      Endocrine disease       HYPOTHYROIDISM    Fracture       Left distal femur (age 12 - pt fell)    Fracture       Left tibia 1990 (pt fell)    Fracture       Right wrist fractured 2 times (pt fell)    GERD (gastroesophageal reflux disease)      High cholesterol      Hypertension      Hypoglycemia       \"my body produces too much insulin\"    Liver disease       BRADY    Nausea & vomiting       when had gallbladder out    Osteoporosis      Other ill-defined conditions(799.89)       edema legs    S/P cardiac cath 10/4/2021     10/4/2021 nonobstructive disease    Spinal stenosis      Thyroid disease           Core Measures:  CVA: No No  CHF:No No  PNA:No No     Activity:  Activity Level: Bed Rest  Number times ambulated in hallways past shift: 0  Number of times OOB to chair past shift: 0     Cardiac:   Cardiac Monitoring: Yes      Cardiac Rhythm: Atrial Fib     Access:   Current line(s): PIV      Genitourinary:   Urinary status: voiding and external catheter     Respiratory:   O2 Device: None (Room air)  Chronic home O2 use?: N/A  Incentive spirometer at bedside: N/A     GI:  Last Bowel Movement Date: 10/09/22  Current diet:  DIET ONE TIME MESSAGE  DIET NPO  Passing flatus: YES  Tolerating current diet: YES     Pain Management:   Patient states pain is manageable on current regimen: N/A     Skin:  Boris Score: 18  Interventions: increase time out of bed    Patient Safety:  Fall Score:  Total Score: 3  Interventions: bed/chair alarm, assistive device (walker, cane, etc), and gripper socks  High Fall Risk: Yes     DVT prophylaxis:  DVT prophylaxis Med- Yes     WOUND: BLISTER LOWER EXTREMITY BLISTERS     Active Consults:  IP CONSULT TO GASTROENTEROLOGY  IP CONSULT TO HEMATOLOGY  IP CONSULT TO HOSPITALIST     Length of Stay:  Expected LOS: 2d 16h  Actual LOS: 5  Discharge Plan: HOME

## 2022-10-11 NOTE — PROGRESS NOTES
Problem: Falls - Risk of  Goal: *Absence of Falls  Description: Document Cherylle Cockayne Fall Risk and appropriate interventions in the flowsheet. Outcome: Progressing Towards Goal  Note: Fall Risk Interventions:  Mobility Interventions: Bed/chair exit alarm         Medication Interventions: Bed/chair exit alarm    Elimination Interventions: Bed/chair exit alarm              Problem: Patient Education: Go to Patient Education Activity  Goal: Patient/Family Education  Outcome: Progressing Towards Goal     Problem: Fluid Volume - Risk of, Imbalanced  Goal: *Balanced intake and output  Outcome: Progressing Towards Goal     Problem: Patient Education: Go to Patient Education Activity  Goal: Patient/Family Education  Outcome: Progressing Towards Goal     Problem: Pressure Injury - Risk of  Goal: *Prevention of pressure injury  Description: Document Boris Scale and appropriate interventions in the flowsheet.   Outcome: Progressing Towards Goal  Note: Pressure Injury Interventions:       Moisture Interventions: Absorbent underpads    Activity Interventions: Pressure redistribution bed/mattress(bed type)    Mobility Interventions: Pressure redistribution bed/mattress (bed type)    Nutrition Interventions: Document food/fluid/supplement intake    Friction and Shear Interventions: Minimize layers                Problem: Patient Education: Go to Patient Education Activity  Goal: Patient/Family Education  Outcome: Progressing Towards Goal

## 2022-10-11 NOTE — PROGRESS NOTES
Hospitalist Progress Note    NAME: Derek Llanos   :  1945   MRN:  [id number]       Assessment / Plan:  Acute on chronic blood loss anemia  History of cirrhosis secondary to Zach Quiroz  History of recent EGD showing portal hypertensive gastropathy and colonoscopy showed normal mucosa with internal hemorrhoids  Deficiency anemia  S/p unit in total with hemoglobin of 3.4 repeat CBC after transfusion showed hemoglobin of 7.3>9.6. No active GI bleed  For EGD tomorrow  No BM for 3 days, no active bleeding currently  Iron study s/o CARLINE, iv iron ordered. 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   -- PPI BID  -- Sucralfate QID x 10 days     Acute kidney injury  Creatinine improving, repeat in Am    Hypertension  GERD  Dyslipidemia  Hypothyroidism  Gouty arthritis  Atrial fibrillation  History of sick sinus syndrome s/p pacemaker placement  -Continue Norvasc, PPI, Lipitor, levothyroxine, allopurinol  -Her anticoagulation was discontinued previously due to anemia     Code Status: Full code  Surrogate Decision Maker: Son     DVT Prophylaxis: SCDs due to anemia  GI Prophylaxis: not indicated     Baseline: From home, independent of ADLs    MAYURI: 10/12  Barriers: stable Hb  and PillCam results with GI clearance     Subjective:     Chief Complaint / Reason for Physician Visit  Follow-up acute blood loss anemia  No acute events  Review of Systems:  Symptom Y/N Comments  Symptom Y/N Comments   Fever/Chills n   Chest Pain n    Poor Appetite n   Edema n    Cough n   Abdominal Pain nn    Sputum    Joint Pain     SOB/HENLEY Pruritis/Rash     Nausea/vomit    Tolerating PT/OT     Diarrhea    Tolerating Diet     Constipation    Other       Could NOT obtain due to:      Objective:     VITALS:   Last 24hrs VS reviewed since prior progress note. Most recent are:  Patient Vitals for the past 24 hrs:   Temp Pulse Resp BP SpO2   10/11/22 1543 98.2 °F (36.8 °C) 61 18 137/61 96 %   10/11/22 1242 97.9 °F (36.6 °C) 60 18 (!) 132/57 95 %   10/11/22 0800 97.4 °F (36.3 °C) 60 18 (!) 145/59 94 %   10/10/22 2000 97.8 °F (36.6 °C) 61 19 (!) 138/59 97 %   10/10/22 1724 98.2 °F (36.8 °C) 65 20 (!) 161/70 97 %       No intake or output data in the 24 hours ending 10/11/22 1607       I had a face to face encounter and independently examined this patient on 10/11/2022, as outlined below:  PHYSICAL EXAM:  General: Awake, No acute distress  EENT:  EOMI. Anicteric sclerae. MMM  Resp:  CTA bilaterally, no wheezing or rales. No accessory muscle use  CV:  Regular  rhythm,  No edema  GI:  Soft, Non distended, Non tender. +Bowel sounds  Neurologic:  Alert and oriented X 3, normal speech,   Psych:   Not anxious nor agitated  Skin:  No rashes. No jaundice    Reviewed most current lab test results and cultures  YES  Reviewed most current radiology test results   YES  Review and summation of old records today    NO  Reviewed patient's current orders and MAR    YES  PMH/ reviewed - no change compared to H&P  ________________________________________________________________________  Care Plan discussed with:    Comments   Patient y    Family      RN y    Care Manager     Consultant                       y Multidiciplinary team rounds were held today with , nursing, pharmacist and clinical coordinator. Patient's plan of care was discussed; medications were reviewed and discharge planning was addressed.      ________________________________________________________________________  Total NON critical care TIME:  35  Minutes    Total CRITICAL CARE TIME Spent: Minutes non procedure based      Comments   >50% of visit spent in counseling and coordination of care     ________________________________________________________________________  Nikhil Sarmiento MD     Procedures: see electronic medical records for all procedures/Xrays and details which were not copied into this note but were reviewed prior to creation of Plan. LABS:  I reviewed today's most current labs and imaging studies.   Pertinent labs include:  Recent Labs     10/11/22  1102 10/10/22  0552 10/09/22  1608   WBC 5.1 4.6 6.4   HGB 7.2* 7.4* 9.6*   HCT 24.8* 25.0* 32.2*   * 103* 129*       Recent Labs     10/11/22  1102 10/09/22  0243    143   K 4.3 4.2   * 115*   CO2 21 22   * 154*   BUN 29* 44*   CREA 1.15* 1.50*   CA 8.6 8.5         Signed: Nikhil Sarmiento MD

## 2022-10-11 NOTE — PROGRESS NOTES
End of Shift Note     Bedside shift change report given to SANTOS Clifton (oncoming nurse) by Jaclyn Delgadillo RN (offgoing nurse).   Report included the following information SBAR, Kardex, and ED Summary     Shift worked: 7a-7p   Shift summary and any significant changes:     Capsule study/EGD complete      Concerns for physician to address: none   Zone phone for oncoming shift:   [phone]      Patient Information  Fabienne Morse  68 y.o.  10/6/2022 11:15 AM by Oscar Vasquez MD. Fabienne Morse was admitted from Home     Problem List       Patient Active Problem List     Diagnosis Date Noted    Acute anemia 10/06/2022    Anemia 08/05/2022    SSS (sick sinus syndrome) ([address] 06/27/2022    PAF (paroxysmal atrial fibrillation) ([address] 06/24/2022    Stage 3a chronic kidney disease ([address] 05/20/2022    Type 2 diabetes mellitus with chronic kidney disease ([address] 11/18/2021    Abnormal nuclear stress test 10/04/2021    Angina at rest [facility]) 10/04/2021    S/P cardiac cath 10/04/2021    Nonrheumatic aortic valve stenosis 09/08/2021    Pulmonary hypertension ([address] 09/08/2021    MICHELLE (acute kidney injury) ([address] 10/27/2020    Cellulitis of left lower leg 11/07/2019    Severe obesity ([address] 09/30/2019    Controlled type 2 diabetes mellitus without complication ([address] 79/44/7930    DDD (degenerative disc disease), lumbar 11/27/2017    Prediabetes 04/11/2016    Thrombocytopenia ([address] 01/24/2015    HTN (hypertension) 01/17/2014    Bifascicular block 12/23/2010    Fatty liver 06/18/2010    Pure hypercholesterolemia 06/18/2010    Colon polyp 06/18/2010    Gout 06/14/2010    Hypothyroidism 06/14/2010    Osteoporosis 06/14/2010    Anxiety 06/14/2010    Leg edema 06/14/2010    RSD lower limb 06/14/2010           Past Medical History:   Diagnosis Date    Abnormal nuclear stress test 10/4/2021    Angina at rest [facility]) 10/4/2021    Arthritis       KNEE,gout    Bundle branch block      Endocrine disease       HYPOTHYROIDISM    Fracture       Left distal femur (age 12 - pt fell)    Fracture       Left tibia 1990 (pt fell)    Fracture       Right wrist fractured 2 times (pt fell)    GERD (gastroesophageal reflux disease)      High cholesterol      Hypertension      Hypoglycemia       \"my body produces too much insulin\"    Liver disease       BRADY    Nausea & vomiting       when had gallbladder out    Osteoporosis      Other ill-defined conditions(799.89)       edema legs    S/P cardiac cath 10/4/2021     10/4/2021 nonobstructive disease    Spinal stenosis      Thyroid disease           Core Measures:  CVA: No No  CHF:No No  PNA:No No     Activity:  Activity Level: Bed Rest  Number times ambulated in hallways past shift: 0  Number of times OOB to chair past shift: 0     Cardiac:   Cardiac Monitoring: Yes      Cardiac Rhythm: Atrial Fib     Access:   Current line(s): PIV      Genitourinary:   Urinary status: voiding and external catheter     Respiratory:   O2 Device: None (Room air)  Chronic home O2 use?: N/A  Incentive spirometer at bedside: N/A     GI:  Last Bowel Movement Date: 10/09/22  Current diet:  DIET ONE TIME MESSAGE  DIET NPO  Passing flatus: YES  Tolerating current diet: YES     Pain Management:   Patient states pain is manageable on current regimen: N/A     Skin:  Boris Score: 18  Interventions: increase time out of bed    Patient Safety:  Fall Score:  Total Score: 3  Interventions: bed/chair alarm, assistive device (walker, cane, etc), and gripper socks  High Fall Risk: Yes     DVT prophylaxis:  DVT prophylaxis Med- Yes     WOUND: BLISTER LOWER EXTREMITY BLISTERS     Active Consults:  IP CONSULT TO GASTROENTEROLOGY  IP CONSULT TO HEMATOLOGY  IP CONSULT TO HOSPITALIST     Length of Stay:  Expected LOS: 2d 16h  Actual LOS: 4  Discharge Plan: HOME

## 2022-10-11 NOTE — PROGRESS NOTES
Problem: Self Care Deficits Care Plan (Adult)  Goal: *Acute Goals and Plan of Care (Insert Text)  Description: FUNCTIONAL STATUS PRIOR TO ADMISSION: Patient was modified independent using a rollator for functional mobility. Patient required setup assistance for basic and instrumental ADLs with daily aid 8-4p assist with setup of items and S for ADLs as needed. [place] SUPPORT: from [place], aide 8-4p daily, does not drive    Occupational Therapy Goals  Initiated 10/11/2022  1. Patient will perform grooming standing with supervision/set-up within 7 day(s). 2.  Patient will perform upper body dressing with supervision/set-up within 7 day(s). 3.  Patient will perform lower body dressing with supervision/set-up within 7 day(s). 4.  Patient will perform toilet transfers with supervision/set-up within 7 day(s). 5.  Patient will perform all aspects of toileting with supervision/set-up within 7 day(s). 6.  Patient will participate in upper extremity therapeutic exercise/activities with supervision/set-up for 10 minutes within 7 day(s). 7.  Patient will utilize energy conservation techniques during functional activities with verbal cues within 7 day(s). Outcome: Progressing Towards Goal   OCCUPATIONAL THERAPY EVALUATION  Patient: Norah Holiday (1 y.o. female)  Date: 10/11/2022  Primary Diagnosis: Acute anemia [D64.9]  Procedure(s) (LRB):  ESOPHAGOGASTRODUODENOSCOPY (EGD) (N/A)  CAPSULE W ESOPHAGOGASTRODUODENOSCOPY (EGD) (N/A)  ENDOSCOPIC ARGON PLASMA COAGULATION (N/A) 1 Day Post-Op   Precautions:        ASSESSMENT  Based on the objective data described below, the patient presents with impaired balance, GW, fair activity tolerance and overall lethargy with decreased independence with ADLs and functional mobility s/p admission with anemia, currently 7.2 on evaluation. Currently agreeable for bathroom mobility with RW with increased time due to mild HENLEY.  Educated on pacing and activity modifications, below baseline and will benefit from increased assist and rehab, patient declining SNF and requesting increased aide hours [facility] and [facility]. .    Current Level of Function Impacting Discharge (ADLs/self-care): up to min A for balance with RW, up to total A for LB ADLs       Other factors to consider for discharge: alone at night at [facility]     Patient will benefit from skilled therapy intervention to address the above noted impairments. PLAN :  Recommendations and Planned Interventions: self care training, functional mobility training, therapeutic exercise, balance training, therapeutic activities, endurance activities, patient education, home safety training, and family training/education    Frequency/Duration: Patient will be followed by occupational therapy 4 times a week to address goals. Recommendation for discharge: (in order for the patient to meet his/her long term goals)  Occupational therapy at least 2 days/week in the home AND ensure assist and/or supervision for safety with 24/7 A at this time, if activity/balance improves may lessen    This discharge recommendation:  A follow-up discussion with the attending provider and/or case management is planned    IF patient discharges home will need the following DME: RW (has rollator)       SUBJECTIVE:   Patient stated I want to do  more but I'm so tired.     OBJECTIVE DATA SUMMARY:   HISTORY:   Past Medical History:   Diagnosis Date    Abnormal nuclear stress test 10/4/2021    Angina at rest [facility]) 10/4/2021    Arthritis     KNEE,gout    Bundle branch block     Endocrine disease     HYPOTHYROIDISM    Fracture     Left distal femur (age 12 - pt fell)    Fracture     Left tibia 1990 (pt fell)    Fracture     Right wrist fractured 2 times (pt fell)    GERD (gastroesophageal reflux disease)     High cholesterol     Hypertension     Hypoglycemia     \"my body produces too much insulin\"    Liver disease     BRADY    Nausea & vomiting     when had gallbladder out    Osteoporosis     Other ill-defined conditions(799.89)     edema legs    S/P cardiac cath 10/4/2021    10/4/2021 nonobstructive disease    Spinal stenosis     Thyroid disease      Past Surgical History:   Procedure Laterality Date    COLONOSCOPY N/A 7/13/2021    COLONOSCOPY performed by Peña Moore MD at [facility] ENDOSCOPY    COLONOSCOPY N/A 8/8/2022    COLONOSCOPY performed by Kerwin Malone MD at [facility] [address]  2/11/2015         Alexei Armenta  7/13/2021         COLONOSCPY,FLEX,W/ [address] INJECT  7/13/2021         COLORECTAL SCRN; HI RISK IND  2/11/2015         HX CHOLECYSTECTOMY      HX HYSTERECTOMY      HX ORTHOPAEDIC Left     leg surgery - plates, screws, wires, pins in place    HX UROLOGICAL      [address] UNLISTED      liver biopsy--BRADY and fibrosis    UPPER GI ENDOSCOPY,BIOPSY  11/17/2015            Expanded or extensive additional review of patient history:     Home Situation  Home Environment: Independent living  # Steps to Enter: 0  One/Two Story Residence: Other (Comment) (facility with 13 floors)  Living Alone: Yes  Support Systems: Caregiver/Home Care Staff (caregiver 8-4 daily)  Patient Expects to be Discharged to[de-identified] Group home  Current DME Used/Available at Home: ada Jones    Hand dominance: Right    EXAMINATION OF PERFORMANCE DEFICITS:  Cognitive/Behavioral Status:  Neurologic State: Alert  Orientation Level: Oriented X4  Cognition: Follows commands             Skin: intact, LEs edema, total body     Edema: total body    Hearing:   Auditory  Auditory Impairment: None    Vision/Perceptual:                                Corrective Lenses: Glasses    Range of Motion:  B UE  AROM: Generally decreased, functional                         Strength:  B UE  Strength: Generally decreased, functional                Coordination:  Coordination: Generally decreased, functional  Fine Motor Skills-Upper: Left Intact; Right Intact         Tone & Sensation:  B UE                            Balance:  Sitting: Intact  Standing: Impaired; With support    Functional Mobility and Transfers for ADLs:  Bed Mobility:  Supine to Sit: Contact guard assistance  Sit to Supine: Contact guard assistance    Transfers:  Sit to Stand: Contact guard assistance  Stand to Sit: Contact guard assistance  Bed to Chair: Minimum assistance  Bathroom Mobility: Minimum assistance  Toilet Transfer : Minimum assistance  Assistive Device : Walker, rolling    ADL Assessment:  Feeding: Independent    Oral Facial Hygiene/Grooming: Setup    Bathing: Moderate assistance         Upper Body Dressing: Setup    Lower Body Dressing: Total assistance    Toileting: Moderate assistance (for CM)   Patient instructed and indicated understanding the benefits of maintaining activity tolerance, functional mobility, and independence with self care tasks during acute stay  to ensure safe return home and to baseline. Encouraged patient to increase frequency and duration OOB, be out of bed for all meals, perform daily ADLs (as approved by RN/MD regarding bathing etc), and performing functional mobility to/from bathroom. Completed OT evaluation and ADLs seated EOB and standing as able with RW for balance. Educated on safety and endurance training with encouragement for full participation in ADLs while in hospital. Good understanding noted. ADL Intervention and task modifications: Tolerated up to bathroom with RW for toileting with setup for hygiene  Patient instructed and indicated understanding the benefits of maintaining activity tolerance, functional mobility, and independence with self care tasks during acute stay  to ensure safe return home and to baseline.  Encouraged patient to increase frequency and duration OOB, be out of bed for all meals, perform daily ADLs (as approved by RN/MD regarding bathing etc), and performing functional mobility to/from bathroom. Patient instructed and indicated understanding home modifications (ie raise height of ADL objects, appropriate chair heights, recliner safety), safety (ie change of floor surfaces, clear pathways), to increase independence and fall prevention with RW.     Educated on activity modification, building strength and endurance with ADLs and fall prevention. Therapeutic Exercise:         Occupational Therapy Evaluation Charge Determination   History Examination Decision-Making   MEDIUM Complexity : Expanded review of history including physical, cognitive and psychosocial  history  MEDIUM Complexity : 3-5 performance deficits relating to physical, cognitive , or psychosocial skils that result in activity limitations and / or participation restrictions LOW Complexity : No comorbidities that affect functional and no verbal or physical assistance needed to complete eval tasks       Based on the above components, the patient evaluation is determined to be of the following complexity level: LOW   Pain Rating:  none    Activity Tolerance:   Fair, requires frequent rest breaks, and observed SOB with activity    After treatment patient left in no apparent distress:    Supine in bed, Heels elevated for pressure relief, Call bell within reach, and Caregiver / family present    COMMUNICATION/EDUCATION:   The patients plan of care was discussed with: Registered nurse. Home safety education was provided and the patient/caregiver indicated understanding., Patient/family have participated as able in goal setting and plan of care. , and Patient/family agree to work toward stated goals and plan of care. This patients plan of care is appropriate for delegation to [facility].     Thank you for this referral.  Yaron Peters, SELVIN  Time Calculation: 20 mins

## 2022-10-11 NOTE — PROGRESS NOTES
End of Shift Note    Bedside shift change report given to Pancho Jara RN (oncoming nurse) by Saint Ina, RN (offgoing nurse). Report included the following information SBAR, Kardex, Intake/Output, and MAR    Shift worked:  Night   Shift summary and any significant changes:     Uneventful night, call bell and phone within reach of patient.  IV fluids ongoing/ CHG bathed, skin care dome , bed alarm on 2, able to make needs known        Concerns for physician to address:  none   Zone phone for oncoming shift:   1974     Patient Information  Tarik Thomas  68 y.o.  10/6/2022 11:15 AM by Seema Davalos MD. Tarik Thomas was admitted from Home    Problem List  Patient Active Problem List    Diagnosis Date Noted    Acute anemia 10/06/2022    Anemia 08/05/2022    SSS (sick sinus syndrome) ([address] 06/27/2022    PAF (paroxysmal atrial fibrillation) ([address] 06/24/2022    Stage 3a chronic kidney disease ([address] 05/20/2022    Type 2 diabetes mellitus with chronic kidney disease ([address] 11/18/2021    Abnormal nuclear stress test 10/04/2021    Angina at rest ([address] 10/04/2021    S/P cardiac cath 10/04/2021    Nonrheumatic aortic valve stenosis 09/08/2021    Pulmonary hypertension ([address] 09/08/2021    MICHELLE (acute kidney injury) ([address] 10/27/2020    Cellulitis of left lower leg 11/07/2019    Severe obesity ([address] 09/30/2019    Controlled type 2 diabetes mellitus without complication ([address] 44/50/8455    DDD (degenerative disc disease), lumbar 11/27/2017    Prediabetes 04/11/2016    Thrombocytopenia ([address] 01/24/2015    HTN (hypertension) 01/17/2014    Bifascicular block 12/23/2010    Fatty liver 06/18/2010    Pure hypercholesterolemia 06/18/2010    Colon polyp 06/18/2010    Gout 06/14/2010    Hypothyroidism 06/14/2010    Osteoporosis 06/14/2010    Anxiety 06/14/2010    Leg edema 06/14/2010    RSD lower limb 06/14/2010     Past Medical History:   Diagnosis Date    Abnormal nuclear stress test 10/4/2021    Angina at rest [facility]) 10/4/2021 Arthritis     KNEE,gout    Bundle branch block     Endocrine disease     HYPOTHYROIDISM    Fracture     Left distal femur (age 12 - pt fell)    Fracture     Left tibia 1990 (pt fell)    Fracture     Right wrist fractured 2 times (pt fell)    GERD (gastroesophageal reflux disease)     High cholesterol     Hypertension     Hypoglycemia     \"my body produces too much insulin\"    Liver disease     BRADY    Nausea & vomiting     when had gallbladder out    Osteoporosis     Other ill-defined conditions(799.89)     edema legs    S/P cardiac cath 10/4/2021    10/4/2021 nonobstructive disease    Spinal stenosis     Thyroid disease          Activity:  Activity Level: Up with Assistance  Number times ambulated in hallways past shift: 0  Number of times OOB to chair past shift: 0    Cardiac:   Cardiac Monitoring: Yes      Cardiac Rhythm: Atrial Fib    Access:   Current line(s): PIV     Genitourinary:   Urinary status: external catheter    Respiratory:   O2 Device: None (Room air)  Chronic home O2 use?: NO  Incentive spirometer at bedside: NO       GI:  Last Bowel Movement Date: 10/09/22  Current diet:  DIET ONE TIME MESSAGE  Passing flatus: YES  Tolerating current diet: YES       Pain Management:   Patient states pain is manageable on current regimen: YES    Skin:  Boris Score: 17  Interventions: float heels    Patient Safety:  Fall Score:  Total Score: 3  Interventions: gripper socks and pt to call before getting OOB  High Fall Risk: Yes  @Rollbelt  @dexterity to release roll belt  Yes/No ( must document dexterity  here by stating Yes or No here, otherwise this is a restraint and must follow restraint documentation policy.)    DVT prophylaxis:  DVT prophylaxis Med- Yes  DVT prophylaxis SCD or MARIETTA- Yes     Wounds: (If Applicable)  Wounds- Yes  Location Lower extremity    Active Consults:  IP CONSULT TO GASTROENTEROLOGY  IP CONSULT TO HEMATOLOGY  IP CONSULT TO HOSPITALIST    Length of Stay:  Expected LOS: 2d 16h  Actual LOS: 5  Discharge Plan: Marianela Wagner RN

## 2022-10-11 NOTE — PROGRESS NOTES
Endurance Catheter insertion note     Inserted 20 G, 6 cm long  Endurance Extended Dwell Peripheral Catheter into right forearm using ultrasound guidance and sterile technique. Positive blood return. Patient tolerated procedure well. Denies questions or concerns at this time. The Endurance catheter is an extended dwell peripheral catheter and may remain in place 29 days. Blood samples can be obtained from this catheter. To obtain labs, a tourniquet may be used, flush with 10ml NS, waste 3ml, draw labs, flush with 20ml NS. Dressings needs to be changed with central line dressing kit using bio patch and stat lock every 7 days by nurse. Primary nurse, Roland Son, SANTOS notified.           Javid Aguayo RN BSN, Vascular Access Team. PICC Nurse

## 2022-10-11 NOTE — WOUND CARE
Wound care Nurse consult: consult placed for BLE blisters. Patient is a 67 y/o CF admitted 10/6/22 for anemia. Past Medical History:   Diagnosis Date    Abnormal nuclear stress test 10/4/2021    Angina at rest [facility]) 10/4/2021    Arthritis     KNEE,gout    Bundle branch block     Endocrine disease     HYPOTHYROIDISM    Fracture     Left distal femur (age 12 - pt fell)    Fracture     Left tibia 1990 (pt fell)    Fracture     Right wrist fractured 2 times (pt fell)    GERD (gastroesophageal reflux disease)     High cholesterol     Hypertension     Hypoglycemia     \"my body produces too much insulin\"    Liver disease     BRADY    Nausea & vomiting     when had gallbladder out    Osteoporosis     Other ill-defined conditions(799.89)     edema legs    S/P cardiac cath 10/4/2021    10/4/2021 nonobstructive disease    Spinal stenosis     Thyroid disease      Chronic edema of legs currently under control due to bed bound with legs elevated. NO blisters or bullae found. Chronic edema has caused skin changes and yellow plaques of skin. Currently no weeping. Recommend:    BLE daily, wash with soap and water, no wipes. Apply Lac Hydrin lotion. Keep legs elevated in and out of bed.     Oniel Pisano RN, [employer]

## 2022-10-11 NOTE — PROGRESS NOTES
[facility]  [address], [address] [facility] [place], [address]  [phone]      Hematology/ Oncology Progress Note      Reason for consult:     Ana Fitzpatrick is a 68 y.o. female who we have been asked to see by Dr. Ceferino Chatman for chronic anemia. Subjective:     Ana Fitzpatrick is a 68year old woman admitted to [facility] for acute blood loss anemia, GIB and history of BRADY cirrhosis. Patient is currently undergoing a GI evaluation with EGD and pill cam study. She has received supportive blood transfusions during this admission. PMH includes CARLINE, gout, OA, anxiety, morbid obesity, hypertension, thrombocytopenia, diabetes, cellulitis, MICHELLE, CKD stage 3, atrial fibrillation. Patient seen at bedside s/p EGD. She reports GI told her she had bleeding ulcers. She is anxious to restart her diet. She is currently undergoing pill cam study. 10/11/2022 Patient seen at bedside, discussed CARLINE. Has had iron infusions in the past but cannot remember who ordered them. Discussed following as OP to check labs, may require future iron infusions. Review of Systems:  Pertinent items are noted in the History of Present Illness.        Past Medical History:   Diagnosis Date    Abnormal nuclear stress test 10/4/2021    Angina at rest [facility]) 10/4/2021    Arthritis     KNEE,gout    Bundle branch block     Endocrine disease     HYPOTHYROIDISM    Fracture     Left distal femur (age 12 - pt fell)    Fracture     Left tibia 1990 (pt fell)    Fracture     Right wrist fractured 2 times (pt fell)    GERD (gastroesophageal reflux disease)     High cholesterol     Hypertension     Hypoglycemia     \"my body produces too much insulin\"    Liver disease     BRADY    Nausea & vomiting     when had gallbladder out    Osteoporosis     Other ill-defined conditions(799.89)     edema legs    S/P cardiac cath 10/4/2021    10/4/2021 nonobstructive disease    Spinal stenosis     Thyroid disease      Past Surgical History:   Procedure Laterality Date    COLONOSCOPY N/A 7/13/2021    COLONOSCOPY performed by Rasheed Mcdonald MD at [facility] ENDOSCOPY    COLONOSCOPY N/A 8/8/2022    COLONOSCOPY performed by Ez Marks MD at [address]  2/11/2015         [facility]         COLONOSCPY,FLEX,W/ [address] INJECT  7/13/2021         COLORECTAL SCRN; HI RISK IND  2/11/2015         HX CHOLECYSTECTOMY      HX HYSTERECTOMY      HX ORTHOPAEDIC Left     leg surgery - plates, screws, wires, pins in place    HX UROLOGICAL      PESSURY    MI ABDOMEN SURGERY PROC UNLISTED      liver biopsy--BRADY and fibrosis    UPPER GI ENDOSCOPY,BIOPSY  11/17/2015           Family History   Problem Relation Age of Onset    Diabetes Mother     Heart Disease Mother     Emphysema Father     No Known Problems Brother     Stroke Maternal Grandmother     No Known Problems Maternal Grandfather     No Known Problems Paternal Grandmother     No Known Problems Paternal Grandfather      Social History     Tobacco Use    Smoking status: Never    Smokeless tobacco: Never   Substance Use Topics    Alcohol use: No      Current Facility-Administered Medications   Medication Dose Route Frequency Provider Last Rate Last Admin    famotidine (PEPCID) tablet 20 mg  20 mg Oral DAILY James Devlin NP        sucralfate (CARAFATE) tablet 1 g  1 g Oral AC&HS Aleksander Parson MD   1 g at 10/11/22 0923    iron sucrose (VENOFER) 300 mg in 0.9% sodium chloride 250 mL IVPB  300 mg IntraVENous Q24H Ester GALEANA .7 mL/hr at 10/10/22 1625 300 mg at 10/10/22 1625    polyethylene glycol (MIRALAX) packet 17 g  17 g Oral DAILY Susanne Domínguez MD   17 g at 10/11/22 0932    balsam peru-castor oiL (VENELEX) ointment   Topical BID Kevin Garcia MD   Given at 10/10/22 2157    pantoprazole (PROTONIX) 40 mg in 0.9% sodium chloride 10 mL injection  40 mg IntraVENous Q12H Markie Beebe MD   40 mg at 10/11/22 0924    0.9% sodium chloride infusion  125 mL/hr IntraVENous CONTINUOUS Markie Beebe  mL/hr at 10/11/22 0703 125 mL/hr at 10/11/22 0703    allopurinoL (ZYLOPRIM) tablet 100 mg  100 mg Oral BID Markie Beebe MD   100 mg at 10/11/22 0924    amLODIPine (NORVASC) tablet 10 mg  10 mg Oral DAILY Markie Beebe MD   10 mg at 10/11/22 5051    atorvastatin (LIPITOR) tablet 20 mg  20 mg Oral DAILY Markie Beebe MD   20 mg at 10/11/22 8247    [Held by provider] furosemide (LASIX) tablet 80 mg  80 mg Oral DAILY Markie Beebe MD        levothyroxine (SYNTHROID) tablet 150 mcg  150 mcg Oral ACB Markie Beebe MD   150 mcg at 10/11/22 0704    potassium chloride SR (KLOR-CON 10) tablet 10 mEq  10 mEq Oral DAILY Markie Beebe MD   10 mEq at 10/11/22 6410    ezetimibe (ZETIA) tablet 10 mg  10 mg Oral DAILY Markie Beebe MD   10 mg at 10/11/22 4659    sodium chloride (NS) flush 5-40 mL  5-40 mL IntraVENous Q8H Markie Beebe MD   10 mL at 10/10/22 2157    sodium chloride (NS) flush 5-40 mL  5-40 mL IntraVENous PRN Markie Beebe MD        acetaminophen (TYLENOL) tablet 650 mg  650 mg Oral Q6H PRN Markie Beebe MD        Or    acetaminophen (TYLENOL) suppository 650 mg  650 mg Rectal Q6H PRN Markie Beebe MD        ondansetron (ZOFRAN ODT) tablet 4 mg  4 mg Oral Q8H PRN Markie Beebe MD        Or    ondansetron (ZOFRAN) injection 4 mg  4 mg IntraVENous Q6H PRN Markie Beebe MD            Allergies   Allergen Reactions    Gabapentin Other (comments)     Suicidal ideation    Metoprolol Rash    Celebrex [Celecoxib] Hives    Eliquis [Apixaban] Other (comments)     Caused excessive anemia    Pcn [Penicillins] Unknown (comments)     Can't remember, was a long time    Sulfa (Sulfonamide Antibiotics) Hives          Objective:     Patient Vitals for the past 8 hrs:   BP Temp Pulse Resp SpO2   10/11/22 0800 (!) 145/59 97.4 °F (36.3 °C) 60 18 94 %       Lab Results   Component Value Date/Time    WBC 5.1 10/11/2022 11:02 AM    HGB 7.2 (L) 10/11/2022 11:02 AM    HCT 24.8 (L) 10/11/2022 11:02 AM    PLATELET 507 (L) 54/02/8334 11:02 AM    MCV 99.2 (H) 10/11/2022 11:02 AM       Physical Exam:   Visit Vitals  BP (!) 145/59   Pulse 60   Temp 97.4 °F (36.3 °C)   Resp 18   Ht 4' 11\" (1.499 m)   Wt 215 lb (97.5 kg)   SpO2 94%   Breastfeeding No   BMI 43.42 kg/m²     General appearance: alert, cooperative, no distress, appears stated age, morbid obesity  Head: Normocephalic, without obvious abnormality, atraumatic  Extremities: extremities normal, atraumatic, no cyanosis or edema  Skin: Skin color, texture, turgor normal. 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, treated with APC extensively  also, pill camera deployed in duodenum, given degree of recurrent anemia  -Iron profile : Iron 16, TIBC 342, Iron % 5  -Has history of iron infusions, denies following with hematology and thinks her PCP has ordered them in the past   -Pill cam study results pending        Plan:     > Likely cause of chronic anemia is multifactorial given chronic GIB, BRADY cirrhosis and CKD   > Has received some IV Venofer (2 doses 100 mg) - will give x2 doses IV venofer 30 mg   > Check B12 and folate levels   > Await final results of GI work up - likely has some chronic GIB  > Will likely need OP iron infusions   > Will arrange OP follow up at discharge     Thank you for allowing us to participate in the care of Ms. Chase Diop, will arrange close OP follow up to check labs. Will sign off, please call with questions.        Nayeli Aguilar NP

## 2022-10-11 NOTE — PROGRESS NOTES
Problem: Falls - Risk of  Goal: *Absence of Falls  Description: Document Rolan Biggserik Fall Risk and appropriate interventions in the flowsheet. Outcome: Progressing Towards Goal  Note: Fall Risk Interventions:  Mobility Interventions: Bed/chair exit alarm, Patient to call before getting OOB         Medication Interventions: Bed/chair exit alarm, Patient to call before getting OOB, Teach patient to arise slowly    Elimination Interventions: Bed/chair exit alarm, Call light in reach, Patient to call for help with toileting needs, Stay With Me (per policy), Toilet paper/wipes in reach, Toileting schedule/hourly rounds              Problem: Fluid Volume - Risk of, Imbalanced  Goal: *Balanced intake and output  Outcome: Progressing Towards Goal     Problem: Pressure Injury - Risk of  Goal: *Prevention of pressure injury  Description: Document Boris Scale and appropriate interventions in the flowsheet.   Outcome: Progressing Towards Goal  Note: Pressure Injury Interventions:       Moisture Interventions: Absorbent underpads, Apply protective barrier, creams and emollients    Activity Interventions: Chair cushion, Increase time out of bed, Pressure redistribution bed/mattress(bed type)    Mobility Interventions: Pressure redistribution bed/mattress (bed type)    Nutrition Interventions: Document food/fluid/supplement intake    Friction and Shear Interventions: Minimize layers, Apply protective barrier, creams and emollients

## 2022-10-12 ENCOUNTER — ANESTHESIA EVENT (OUTPATIENT)
Dept: ENDOSCOPY | Age: 77
DRG: 378 | End: 2022-10-12
Payer: MEDICARE

## 2022-10-12 ENCOUNTER — APPOINTMENT (OUTPATIENT)
Dept: GENERAL RADIOLOGY | Age: 77
DRG: 378 | End: 2022-10-12
Attending: INTERNAL MEDICINE
Payer: MEDICARE

## 2022-10-12 LAB
BASOPHILS # BLD: 0 K/UL (ref 0–0.1)
BASOPHILS NFR BLD: 0 % (ref 0–1)
DIFFERENTIAL METHOD BLD: ABNORMAL
EOSINOPHIL # BLD: 0.2 K/UL (ref 0–0.4)
EOSINOPHIL NFR BLD: 5 % (ref 0–7)
ERYTHROCYTE [DISTWIDTH] IN BLOOD BY AUTOMATED COUNT: 22.3 % (ref 11.5–14.5)
HCT VFR BLD AUTO: 23.6 % (ref 35–47)
HGB BLD-MCNC: 7.1 G/DL (ref 11.5–16)
IMM GRANULOCYTES # BLD AUTO: 0 K/UL (ref 0–0.04)
IMM GRANULOCYTES NFR BLD AUTO: 1 % (ref 0–0.5)
LYMPHOCYTES # BLD: 0.6 K/UL (ref 0.8–3.5)
LYMPHOCYTES NFR BLD: 13 % (ref 12–49)
MCH RBC QN AUTO: 30 PG (ref 26–34)
MCHC RBC AUTO-ENTMCNC: 30.1 G/DL (ref 30–36.5)
MCV RBC AUTO: 99.6 FL (ref 80–99)
MONOCYTES # BLD: 0.5 K/UL (ref 0–1)
MONOCYTES NFR BLD: 10 % (ref 5–13)
NEUTS SEG # BLD: 3.2 K/UL (ref 1.8–8)
NEUTS SEG NFR BLD: 71 % (ref 32–75)
NRBC # BLD: 0 K/UL (ref 0–0.01)
NRBC BLD-RTO: 0 PER 100 WBC
PLATELET # BLD AUTO: 94 K/UL (ref 150–400)
PMV BLD AUTO: 10.6 FL (ref 8.9–12.9)
RBC # BLD AUTO: 2.37 M/UL (ref 3.8–5.2)
RBC MORPH BLD: ABNORMAL
WBC # BLD AUTO: 4.5 K/UL (ref 3.6–11)
WBC MORPH BLD: ABNORMAL

## 2022-10-12 PROCEDURE — 36415 COLL VENOUS BLD VENIPUNCTURE: CPT

## 2022-10-12 PROCEDURE — 74011000250 HC RX REV CODE- 250: Performed by: INTERNAL MEDICINE

## 2022-10-12 PROCEDURE — 74011250637 HC RX REV CODE- 250/637: Performed by: STUDENT IN AN ORGANIZED HEALTH CARE EDUCATION/TRAINING PROGRAM

## 2022-10-12 PROCEDURE — 85025 COMPLETE CBC W/AUTO DIFF WBC: CPT

## 2022-10-12 PROCEDURE — 65270000046 HC RM TELEMETRY

## 2022-10-12 PROCEDURE — 0DJ08ZZ INSPECTION OF UPPER INTESTINAL TRACT, VIA NATURAL OR ARTIFICIAL OPENING ENDOSCOPIC: ICD-10-PCS | Performed by: INTERNAL MEDICINE

## 2022-10-12 PROCEDURE — 74018 RADEX ABDOMEN 1 VIEW: CPT

## 2022-10-12 PROCEDURE — C9113 INJ PANTOPRAZOLE SODIUM, VIA: HCPCS | Performed by: INTERNAL MEDICINE

## 2022-10-12 PROCEDURE — 97530 THERAPEUTIC ACTIVITIES: CPT

## 2022-10-12 PROCEDURE — 74011250636 HC RX REV CODE- 250/636: Performed by: INTERNAL MEDICINE

## 2022-10-12 PROCEDURE — 97161 PT EVAL LOW COMPLEX 20 MIN: CPT

## 2022-10-12 PROCEDURE — 74011250637 HC RX REV CODE- 250/637: Performed by: INTERNAL MEDICINE

## 2022-10-12 PROCEDURE — 74011250637 HC RX REV CODE- 250/637: Performed by: NURSE PRACTITIONER

## 2022-10-12 RX ADMIN — SUCRALFATE 1 G: 1 TABLET ORAL at 17:11

## 2022-10-12 RX ADMIN — CASTOR OIL AND BALSAM, PERU: 788; 87 OINTMENT TOPICAL at 09:48

## 2022-10-12 RX ADMIN — SODIUM CHLORIDE 40 MG: 9 INJECTION INTRAMUSCULAR; INTRAVENOUS; SUBCUTANEOUS at 09:39

## 2022-10-12 RX ADMIN — SUCRALFATE 1 G: 1 TABLET ORAL at 21:47

## 2022-10-12 RX ADMIN — LEVOTHYROXINE SODIUM 150 MCG: 0.15 TABLET ORAL at 06:05

## 2022-10-12 RX ADMIN — CASTOR OIL AND BALSAM, PERU: 788; 87 OINTMENT TOPICAL at 21:49

## 2022-10-12 RX ADMIN — SODIUM CHLORIDE, PRESERVATIVE FREE 10 ML: 5 INJECTION INTRAVENOUS at 17:13

## 2022-10-12 RX ADMIN — AMLODIPINE BESYLATE 10 MG: 5 TABLET ORAL at 09:44

## 2022-10-12 RX ADMIN — SODIUM CHLORIDE, PRESERVATIVE FREE 10 ML: 5 INJECTION INTRAVENOUS at 21:49

## 2022-10-12 RX ADMIN — SUCRALFATE 1 G: 1 TABLET ORAL at 09:44

## 2022-10-12 RX ADMIN — EZETIMIBE 10 MG: 10 TABLET ORAL at 09:44

## 2022-10-12 RX ADMIN — SODIUM CHLORIDE 40 MG: 9 INJECTION INTRAMUSCULAR; INTRAVENOUS; SUBCUTANEOUS at 21:47

## 2022-10-12 RX ADMIN — ALLOPURINOL 100 MG: 100 TABLET ORAL at 09:44

## 2022-10-12 RX ADMIN — POTASSIUM CHLORIDE 10 MEQ: 750 TABLET, FILM COATED, EXTENDED RELEASE ORAL at 09:44

## 2022-10-12 RX ADMIN — FAMOTIDINE 20 MG: 20 TABLET, FILM COATED ORAL at 09:44

## 2022-10-12 RX ADMIN — SODIUM CHLORIDE, PRESERVATIVE FREE 10 ML: 5 INJECTION INTRAVENOUS at 06:06

## 2022-10-12 RX ADMIN — ATORVASTATIN CALCIUM 20 MG: 20 TABLET, FILM COATED ORAL at 09:44

## 2022-10-12 RX ADMIN — Medication: at 09:33

## 2022-10-12 RX ADMIN — ALLOPURINOL 100 MG: 100 TABLET ORAL at 17:11

## 2022-10-12 RX ADMIN — POLYETHYLENE GLYCOL 3350 17 G: 17 POWDER, FOR SOLUTION ORAL at 09:44

## 2022-10-12 RX ADMIN — SUCRALFATE 1 G: 1 TABLET ORAL at 12:24

## 2022-10-12 NOTE — PROGRESS NOTES
Problem: Falls - Risk of  Goal: *Absence of Falls  Description: Document Melvi Fountain Fall Risk and appropriate interventions in the flowsheet.   Outcome: Progressing Towards Goal  Note: Fall Risk Interventions:  Mobility Interventions: Bed/chair exit alarm         Medication Interventions: Assess postural VS orthostatic hypotension, Bed/chair exit alarm    Elimination Interventions: Bed/chair exit alarm

## 2022-10-12 NOTE — PROGRESS NOTES
Spiritual Care Assessment/Progress Note  [facility]      NAME: Femi Espinal      MRN: [id number]  AGE: 68 y.o.  SEX: female  Pentecostal Affiliation: Rastafarian   Language: English     10/12/2022     Total Time (in minutes): 26     Spiritual Assessment begun in [facility] through conversation with:         [x]Patient        [] Family    [] Friend(s)        Reason for Consult: Initial/Spiritual assessment, patient floor     Spiritual beliefs: (Please include comment if needed)     [x] Identifies with a tashi tradition:         [] Supported by a tashi community:            [] Claims no spiritual orientation:           [] Seeking spiritual identity:                [] Adheres to an individual form of spirituality:           [] Not able to assess:                           Identified resources for coping:      [x] Prayer                               [] Music                  [] Guided Imagery     [x] Family/friends                 [] Pet visits     [] Devotional reading                         [] Unknown     [] Other:                                                Interventions offered during this visit: (See comments for more details)    Patient Interventions: Coping skills reviewed/reinforced, Initial/Spiritual assessment, patient floor, Normalization of emotional/spiritual concerns, Life review/legacy, Pentecostal beliefs/image of God discussed, Integration of medical assessment with existing values and beliefs, Affirmation of tashi, Catharsis/review of pertinent events in supportive environment, Iconic (affirming the presence of God/Higher Power)           Plan of Care:     [] Support spiritual and/or cultural needs    [] Support AMD and/or advance care planning process      [] Support grieving process   [] Coordinate Rites and/or Rituals    [] Coordination with community clergy   [] No spiritual needs identified at this time   [] Detailed Plan of Care below (See Comments)  [] Make referral to Music Therapy  [] Make referral to Pet Therapy     [] Make referral to Addiction services  [] Make referral to [facility]  [] Make referral to Spiritual Care Partner  [] No future visits requested        [x] Contact Spiritual Care for further referrals     Comments:  Patient received visit warmly when  visited in [phone] for initial spiritual assessment. Patient stated she was doing well today and appeared in good spirit. Stated that her tashi in God was strong and it has brought her through many difficult times. She shared several tashi stories about how her medical conditions have improved drastically to the amazement of her medical team. She has a wonderful care giver who is like a daughter to her, and she take very good care of her.  offered supportive listening presence and affirmed her thoughts and feelings. Patient asked for a Bible, when  asked how he may support. A Bible and devotional were later taken to her in her room. She was grateful for the support and material provided. Patient expressed appreciation for the visit.       Visited by: Jagruti marshall: [phone])

## 2022-10-12 NOTE — PROGRESS NOTES
End of Shift Note    Bedside shift change report given to Richy Howell RN (oncoming nurse) by Geri King RN (offgoing nurse).   Report included the following information SBAR, Kardex, and ED Summary    Shift worked:  DAYS   Shift summary and any significant changes:    -HGB 7.2  -NPO AT MIDNIGHT FOR SOMETHING     Concerns for physician to address:  NONE   Zone phone for oncoming shift:   [phone]     Patient Information  Melvin Vance  68 y.o.  10/6/2022 11:15 AM by Cira Fleming MD. Melvin Vance was admitted from Home    Problem List  Patient Active Problem List    Diagnosis Date Noted    Acute anemia 10/06/2022    Anemia 08/05/2022    SSS (sick sinus syndrome) ([address] 06/27/2022    PAF (paroxysmal atrial fibrillation) ([address] 06/24/2022    Stage 3a chronic kidney disease ([address] 05/20/2022    Type 2 diabetes mellitus with chronic kidney disease ([address] 11/18/2021    Abnormal nuclear stress test 10/04/2021    Angina at rest ([address] 10/04/2021    S/P cardiac cath 10/04/2021    Nonrheumatic aortic valve stenosis 09/08/2021    Pulmonary hypertension ([address] 09/08/2021    MICHELLE (acute kidney injury) ([address] 10/27/2020    Cellulitis of left lower leg 11/07/2019    Severe obesity ([address] 09/30/2019    Controlled type 2 diabetes mellitus without complication ([address] 57/69/7283    DDD (degenerative disc disease), lumbar 11/27/2017    Prediabetes 04/11/2016    Thrombocytopenia ([address] 01/24/2015    HTN (hypertension) 01/17/2014    Bifascicular block 12/23/2010    Fatty liver 06/18/2010    Pure hypercholesterolemia 06/18/2010    Colon polyp 06/18/2010    Gout 06/14/2010    Hypothyroidism 06/14/2010    Osteoporosis 06/14/2010    Anxiety 06/14/2010    Leg edema 06/14/2010    RSD lower limb 06/14/2010     Past Medical History:   Diagnosis Date    Abnormal nuclear stress test 10/4/2021    Angina at rest [facility]) 10/4/2021    Arthritis     KNEE,gout    Bundle branch block     Endocrine disease     HYPOTHYROIDISM    Fracture     Left distal femur (age 12 - pt fell)    Fracture     Left tibia 1990 (pt fell)    Fracture     Right wrist fractured 2 times (pt fell)    GERD (gastroesophageal reflux disease)     High cholesterol     Hypertension     Hypoglycemia     \"my body produces too much insulin\"    Liver disease     BRADY    Nausea & vomiting     when had gallbladder out    Osteoporosis     Other ill-defined conditions(799.89)     edema legs    S/P cardiac cath 10/4/2021    10/4/2021 nonobstructive disease    Spinal stenosis     Thyroid disease        Core Measures:  CVA: No No  CHF:No No  PNA:No No    Activity:  Activity Level: Up with Assistance  Number times ambulated in hallways past shift: 0  Number of times OOB to chair past shift: 0    Cardiac:   Cardiac Monitoring: Yes      Cardiac Rhythm: Atrial Fib    Access:   Current line(s): PIV     Genitourinary:   Urinary status: voiding and external catheter    Respiratory:   O2 Device: None (Room air)  Chronic home O2 use?: N/A  Incentive spirometer at bedside: N/A       GI:  Last Bowel Movement Date: 10/09/22  Current diet:  DIET ONE TIME MESSAGE  DIET ONE TIME MESSAGE  ADULT DIET Regular; [facility] [place] (GERD/Peptic Ulcer)  DIET NPO  Passing flatus: YES  Tolerating current diet: YES       Pain Management:   Patient states pain is manageable on current regimen: N/A    Skin:  Boris Score: 19  Interventions: increase time out of bed    Patient Safety:  Fall Score:  Total Score: 3  Interventions: bed/chair alarm, assistive device (walker, cane, etc), and gripper socks  High Fall Risk: Yes  @Rollbelt  @dexterity to release roll belt  Yes/No ( must document dexterity  here by stating Yes or No here, otherwise this is a restraint and must follow restraint documentation policy.)    DVT prophylaxis:  DVT prophylaxis Med- Yes    WOUND: BLISTER LOWER EXTREMITY BLISTERS    Active Consults:  IP CONSULT TO GASTROENTEROLOGY  IP CONSULT TO HEMATOLOGY  IP CONSULT TO HOSPITALIST    Length of Stay:  Expected LOS: 3d 0h  Actual LOS: 6  Discharge Plan: No HOME      Maurisio Valencia RN

## 2022-10-12 NOTE — PROGRESS NOTES
Problem: Mobility Impaired (Adult and Pediatric)  Goal: *Acute Goals and Plan of Care (Insert Text)  Description: FUNCTIONAL STATUS PRIOR TO ADMISSION: Patient was modified independent using a rollator for functional mobility. Patient required setup assistance for basic and instrumental ADLs with daily aid 8-4p assist with setup of items and S for ADLs as needed. [place]: from [place], aide 8-4p daily, does not drive    Physical Therapy Goals  Initiated 10/12/2022  1. Patient will move from supine to sit and sit to supine  in bed with supervision/set-up within 7 day(s). 2.  Patient will transfer from bed to chair and chair to bed with supervision/set-up using the least restrictive device within 7 day(s). 3.  Patient will perform sit to stand with supervision/set-up within 7 day(s). 4.  Patient will ambulate with supervision/set-up for 50 feet with the least restrictive device within 7 day(s). Outcome: Progressing Towards Goal     PHYSICAL THERAPY EVALUATION  Patient: Lawrence Mccain (89 y.o. female)  Date: 10/12/2022  Primary Diagnosis: Acute anemia [D64.9]  Procedure(s) (LRB):  ESOPHAGOGASTRODUODENOSCOPY (EGD) (N/A)  CAPSULE W ESOPHAGOGASTRODUODENOSCOPY (EGD) (N/A)  ENDOSCOPIC ARGON PLASMA COAGULATION (N/A) 2 Days Post-Op   Precautions: Fall       ASSESSMENT  Based on the objective data described below, the patient presents with general deconditioning, impaired balance, impaired activity tolerance, and dyspnea on exertion. Able to ambulate to bathroom with rolling walker and minimal assist. Does have leg length discrepancy at baseline and uses built up shoe on L foot sometimes. Has supportive caregiver and declining SNF and reporting caregiver will provide necessary assist. Will benefit from HHPT and rolling walker for home use. Acute PT will continue to follow and progress as able.     Current Level of Function Impacting Discharge (mobility/balance): minimal assist with rolling walker    Other factors to consider for discharge: reports caregiver will stay at night with her and provide necessary assist, declines SNF     Patient will benefit from skilled therapy intervention to address the above noted impairments. PLAN :  Recommendations and Planned Interventions: bed mobility training, transfer training, gait training, therapeutic exercises, neuromuscular re-education, patient and family training/education, and therapeutic activities      Frequency/Duration: Patient will be followed by physical therapy:  4 times a week to address goals. Recommendation for discharge: (in order for the patient to meet his/her long term goals)  Physical therapy at least 2 days/week in the home AND ensure assist and/or supervision for safety with functional mobility and gait (needs assist with all mobility at this time - reports caregiver can provide this assist)    This discharge recommendation:  Has been made in collaboration with the attending provider and/or case management    IF patient discharges home will need the following DME: rolling walker     SUBJECTIVE:   Patient stated [facility] caregiver will stay with me. She is like a daughter.     OBJECTIVE DATA SUMMARY:   HISTORY:    Past Medical History:   Diagnosis Date    Abnormal nuclear stress test 10/4/2021    Angina at rest [facility]) 10/4/2021    Arthritis     KNEE,gout    Bundle branch block     Endocrine disease     HYPOTHYROIDISM    Fracture     Left distal femur (age 12 - pt fell)    Fracture     Left tibia 1990 (pt fell)    Fracture     Right wrist fractured 2 times (pt fell)    GERD (gastroesophageal reflux disease)     High cholesterol     Hypertension     Hypoglycemia     \"my body produces too much insulin\"    Liver disease     BRADY    Nausea & vomiting     when had gallbladder out    Osteoporosis     Other ill-defined conditions(799.89)     edema legs    S/P cardiac cath 10/4/2021    10/4/2021 nonobstructive disease    Spinal stenosis     Thyroid disease      Past Surgical History:   Procedure Laterality Date    COLONOSCOPY N/A 7/13/2021    COLONOSCOPY performed by Rasheed Mcdonald MD at [facility] ENDOSCOPY    COLONOSCOPY N/A 8/8/2022    COLONOSCOPY performed by Ez Marks MD at [place] [address] [address]  2/11/2015         Jose Moran  7/13/2021         COLONOSCPY,FLEX,W/ [address] INJECT  7/13/2021         COLORECTAL SCRN; HI RISK IND  2/11/2015         HX CHOLECYSTECTOMY      HX HYSTERECTOMY      HX ORTHOPAEDIC Left     leg surgery - plates, screws, wires, pins in place    HX UROLOGICAL      [address] UNLISTED      liver biopsy--BRADY and fibrosis    UPPER GI ENDOSCOPY,BIOPSY  11/17/2015          Personal factors and/or comorbidities impacting plan of care: caregiver, acute on chronic anemia    Home Situation  Home Environment: Independent living  # Steps to Enter: 0  One/Two Story Residence: Other (Comment) (facility with 13 floors)  Living Alone: Yes  Support Systems: Assisted Living, Caregiver/Home Care Staff  Patient Expects to be Discharged to[de-identified] Home with home health  Current DME Used/Available at Home: Chantel Sathish, rollator    EXAMINATION/PRESENTATION/DECISION MAKING:   Critical Behavior:  Neurologic State: Alert  Orientation Level: Oriented X4  Cognition: Appropriate decision making, Appropriate for age attention/concentration, Appropriate safety awareness     Hearing: Auditory  Auditory Impairment: None  Skin:  chronic B LE changes below knees  Range Of Motion:  AROM: Generally decreased, functional  PROM: Generally decreased, functional  Strength:    Strength: Generally decreased, functional  Tone & Sensation:   Tone: Normal    Coordination:  Coordination: Generally decreased, functional  Vision:    Wears corrective lenses  Functional Mobility:  Bed Mobility:  Supine to Sit: Minimum assistance; Additional time;Bed Modified (sleeps in recliner)  Transfers:  Sit to Stand: Contact guard assistance; Adaptive equipment  Stand to Sit: Contact guard assistance; Adaptive equipment  Balance:   Sitting: Intact  Standing: Impaired; With support  Standing - Static: Constant support; Fair  Standing - Dynamic : Constant support; Fair  Ambulation/Gait Training:  Distance (ft): 20 Feet (ft) (10 x 2)  Assistive Device: Walker, rolling;Gait belt  Ambulation - Level of Assistance: Minimal assistance; Adaptive equipment; Additional time  Gait Description (WDL): Exceptions to WDL  Gait Abnormalities: Decreased step clearance;Shuffling gait (R hip elevated in setting of leg discrepency (L LE shorter than R LE))  Base of Support: Center of gravity altered; Widened  Speed/Keerthi: Shuffled; Slow  Step Length: Left shortened;Right shortened       Physical Therapy Evaluation Charge Determination   History Examination Presentation Decision-Making   MEDIUM  Complexity : 1-2 comorbidities / personal factors will impact the outcome/ POC  LOW Complexity : 1-2 Standardized tests and measures addressing body structure, function, activity limitation and / or participation in recreation  LOW Complexity : Stable, uncomplicated  LOW Complexity : FOTO score of       Based on the above components, the patient evaluation is determined to be of the following complexity level: LOW     Pain Rating:  Did not interfere    Activity Tolerance:   Fair, requires rest breaks, and observed SOB with activity    After treatment patient left in no apparent distress:   Sitting in chair, Call bell within reach, and Bed / chair alarm activated    COMMUNICATION/EDUCATION:   The patients plan of care was discussed with: Occupational therapist and Registered nurse. Reviewed importance of slow transitions in setting of acute on chronic anemia. Fall prevention education was provided and the patient/caregiver indicated understanding., Patient/family have participated as able in goal setting and plan of care. , and Patient/family agree to work toward stated goals and plan of care.     Thank you for this referral.  Joselyn Mac, PT   Time Calculation: 26 mins

## 2022-10-12 NOTE — PROCEDURES
Capsule endoscopy Report    Date of Procedure:  10/11/22  Date of reading:  10/12/22    Indication: occult GI bleeding; symptomatic anemia    Previous endoscopies: EGD with GAVE    Referring provider:  Jg Feliciano MD    Medications: none    Procedure report:   PillCam video casule endoscopy was checked, synced to the recording device, and placed into duodenum via EGD. Findings:  Findings:  1st gastric image:   00:04:41  1st duodenal image:  00:05:13  1st cecal image:   N/A    Gastric transit time:   N/A (capsule placed via EGD)  Small bowel transit time:  N/A - capsule remained in distal small bowel at 8 hours    Extent of procedure: distal small bowel    Additional findings:  -- @ 9 minutes in, there is a small AVM   -- @ 1 hr 34 minutes there is active oozing blood, but unclear if underlying AVM or other lesion.      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

## 2022-10-12 NOTE — PROGRESS NOTES
Transition of Care Plan:    RUR: 21% - \"high risk\"  LOS: 6 days   Disposition: Return to [address] with [place] (RN/PT/OT), DME (RW), resumed private duty caregiver support, and follow up apts  Follow up appointments: PCP & specialist as indicated  DME needed: DME (RW); CM working to process referral for RW. If approved, RW will be delivered to pt's home address  Transportation at Discharge: Pt's caregiver will provide transportation at d/c; CM working to confirm arrival time  Keys or means to access home: Pt has access to her home       IM Medicare Letter: 2nd IM needed prior to d/c  Is patient a  and connected with the [place]? No              Caregiver Contact: Pt's son Domingo Alvarado: [phone]) vs caregiver (aTe Aquino: [phone])  Discharge Caregiver contacted prior to discharge? To be contacted prior to [address] needed?: Not at this time    Update - 12:39 PM: PCP follow up apt secured for 10/18/22 at 3:00 PM; apt information reflected in AVS. [facility]'s information also reflected in AVS for reference. HH/DME referrals pending. Initial note: Chart reviewed, IDR completed. MD reported [facility] for this afternoon pending G. I clearance. PT/OT recommendations for [facility] [place] & DME (RW)  acknowledged. CM assistant informed of need for PCP follow up apt. Pt will return to [address] with [facility] [place] & resumed private duty caregiver support. CM received call from RN reporting pt has identified a preference for [place] & would like to discuss need for RW. CM spoke with pt on bedside phone regarding information detailed above. Pt identified [address] as preference for services. FOC offered, copy to be placed on chart. Pt requested for CM to secure DME (RW); request acknowledged. Pt informed if RW is approved, it will be delivered post d/c to her home address; pt verbalized understanding. Pt reported her caregiver will provide transportation at d/c.  No immediate questions/concerns identified. Updates on the status of HH to be reported out once available.     Arlee Bernheim, MSW  Care Manager, [facility]

## 2022-10-12 NOTE — PROGRESS NOTES
Problem: Falls - Risk of  Goal: *Absence of Falls  Description: Document Nikhil Jo Fall Risk and appropriate interventions in the flowsheet. Outcome: Progressing Towards Goal  Note: Fall Risk Interventions:  Mobility Interventions: Bed/chair exit alarm         Medication Interventions: Bed/chair exit alarm    Elimination Interventions: Call light in reach, Bed/chair exit alarm              Problem: Patient Education: Go to Patient Education Activity  Goal: Patient/Family Education  Outcome: Progressing Towards Goal     Problem: Fluid Volume - Risk of, Imbalanced  Goal: *Balanced intake and output  Outcome: Progressing Towards Goal     Problem: Patient Education: Go to Patient Education Activity  Goal: Patient/Family Education  Outcome: Progressing Towards Goal     Problem: Patient Education: Go to Patient Education Activity  Goal: Patient/Family Education  Outcome: Progressing Towards Goal     Problem: Pressure Injury - Risk of  Goal: *Prevention of pressure injury  Description: Document Boris Scale and appropriate interventions in the flowsheet.   Outcome: Progressing Towards Goal  Note: Pressure Injury Interventions:       Moisture Interventions: Absorbent underpads    Activity Interventions: Increase time out of bed    Mobility Interventions: Pressure redistribution bed/mattress (bed type)    Nutrition Interventions: Document food/fluid/supplement intake    Friction and Shear Interventions: HOB 30 degrees or less, Minimize layers                Problem: Patient Education: Go to Patient Education Activity  Goal: Patient/Family Education  Outcome: Progressing Towards Goal     Problem: Patient Education: Go to Patient Education Activity  Goal: Patient/Family Education  Outcome: Progressing Towards Goal

## 2022-10-12 NOTE — PROGRESS NOTES
[facility] [address] [facility] follow-up PCP transitional care appointment has been scheduled with Dr. Briseida Harvey on 10/18/22 at 1500. Pending patient discharge. Moy Latif, Care Management Assistant     Attempted to schedule hospital follow up PCP appointment. Sent a message to PCP office to find patient an appointment. Awaiting callback from PCP office.  Wolf Melgoza

## 2022-10-12 NOTE — PROGRESS NOTES
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 No active GI bleed  - pill endoscopy images reviewed 10/12/22 by GI. 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, treated with APC extensively  -- also, pill camera deployed in duodenum, given degree of recurrent anemia  -- PPI BID  -- Sucralfate QID x 10 days     Acute kidney injury  Creatinine improving, trend    Hypertension  GERD  Dyslipidemia  Hypothyroidism  Gouty arthritis  Atrial fibrillation  History of sick sinus syndrome s/p pacemaker placement  -Continue Norvasc, PPI, Lipitor, levothyroxine, allopurinol  -Her anticoagulation was discontinued previously due to anemia     Code Status: Full code  Surrogate Decision Maker: Son     DVT Prophylaxis: SCDs due to anemia  GI Prophylaxis: not indicated     Baseline: From home, independent of ADLs    MAYURI: 10/14  Barriers: GI clearance     Subjective:     Chief Complaint / Reason for Physician Visit  Follow-up acute blood loss anemia  No acute events. Patient notes generalized weakness today, stable throughout admission per patient. Notes a brown BM yesterday. No black/bloody BM. Review of Systems:  Full ROS assessed and negative except as noted above. Objective:     VITALS:   Last 24hrs VS reviewed since prior progress note.  Most recent are:  Patient Vitals for the past 24 hrs:   Temp Pulse Resp BP SpO2   10/12/22 1606 98 °F (36.7 °C) 68 16 (!) 146/63 98 %   10/12/22 1142 98.1 °F (36.7 °C) 65 18 (!) 149/67 98 %   10/12/22 0813 98 °F (36.7 °C) 60 18 (!) 145/67 97 %   10/12/22 0356 98.2 °F (36.8 °C) 64 18 (!) 158/69 95 %   10/11/22 2354 98.1 °F (36.7 °C) 65 20 (!) 164/72 96 %   10/11/22 2013 98.6 °F (37 °C) 62 16 (!) 160/68 98 %       No intake or output data in the 24 hours ending 10/12/22 1906       I had a face to face encounter and independently examined this patient on 10/12/2022, as outlined below:    PHYSICAL EXAM:  General: Awake, No acute distress  EENT:  EOMI. Anicteric sclerae. MMM  Resp:  CTA bilaterally, no wheezing or rales. No accessory muscle use  CV:  Regular  rhythm,  No edema  GI:  Soft, Non distended, Non tender. +Bowel sounds  Neurologic:  Alert and oriented X 3, normal speech,   Psych:   Not anxious nor agitated  Skin:  No rashes. No jaundice    Reviewed most current lab test results and cultures  YES  Reviewed most current radiology test results   YES  Review and summation of old records today    NO  Reviewed patient's current orders and MAR    YES  PMH/ reviewed - no change compared to H&P  ________________________________________________________________________  Care Plan discussed with:    Comments   Patient y    Family      RN y    Care Manager     Consultant                       y Multidiciplinary team rounds were held today with , nursing, pharmacist and clinical coordinator. Patient's plan of care was discussed; medications were reviewed and discharge planning was addressed. ________________________________________________________________________    Procedures: see electronic medical records for all procedures/Xrays and details which were not copied into this note but were reviewed prior to creation of Plan. LABS:  I reviewed today's most current labs and imaging studies.   Pertinent labs include:  Recent Labs     10/12/22  0445 10/11/22  1102 10/10/22  0552   WBC 4.5 5.1 4.6   HGB 7.1* 7.2* 7.4*   HCT 23.6* 24.8* 25.0*   PLT 94* 102* 103*       Recent Labs     10/11/22  1102      K 4.3   *   CO2 21   *   BUN 29*   CREA 1.15*   CA 8.6         Signed: Jcarlos Barksdale MD

## 2022-10-12 NOTE — PROGRESS NOTES
End of Shift Note    Bedside shift change report given to  Jorden Omer, RN (oncoming nurse) by Desi Mckeon RN (offgoing nurse). Report included the following information SBAR, Kardex, Intake/Output, and MAR    Shift worked:  Night   Shift summary and any significant changes:     Uneventful night, call bell and phone within reach of patient.  I, bed alarm on 2, able to make needs known        Concerns for physician to address:  none   Zone phone for oncoming shift:   [phone]     Patient Information  Tejal Parks  68 y.o.  10/6/2022 11:15 AM by Tiana De La Fuente MD. Tejal Parks was admitted from Home    Problem List  Patient Active Problem List    Diagnosis Date Noted    Acute anemia 10/06/2022    Anemia 08/05/2022    SSS (sick sinus syndrome) ([address] 06/27/2022    PAF (paroxysmal atrial fibrillation) ([address] 06/24/2022    Stage 3a chronic kidney disease ([address] 05/20/2022    Type 2 diabetes mellitus with chronic kidney disease ([address] 11/18/2021    Abnormal nuclear stress test 10/04/2021    Angina at rest [facility]) 10/04/2021    S/P cardiac cath 10/04/2021    Nonrheumatic aortic valve stenosis 09/08/2021    Pulmonary hypertension ([address] 09/08/2021    MICHELLE (acute kidney injury) ([address] 10/27/2020    Cellulitis of left lower leg 11/07/2019    Severe obesity ([address] 09/30/2019    Controlled type 2 diabetes mellitus without complication ([address] 40/36/0620    DDD (degenerative disc disease), lumbar 11/27/2017    Prediabetes 04/11/2016    Thrombocytopenia ([address] 01/24/2015    HTN (hypertension) 01/17/2014    Bifascicular block 12/23/2010    Fatty liver 06/18/2010    Pure hypercholesterolemia 06/18/2010    Colon polyp 06/18/2010    Gout 06/14/2010    Hypothyroidism 06/14/2010    Osteoporosis 06/14/2010    Anxiety 06/14/2010    Leg edema 06/14/2010    RSD lower limb 06/14/2010     Past Medical History:   Diagnosis Date    Abnormal nuclear stress test 10/4/2021    Angina at rest [facility]) 10/4/2021    Arthritis     KNEE,gout    Bundle branch block     Endocrine disease     HYPOTHYROIDISM    Fracture     Left distal femur (age 12 - pt fell)    Fracture     Left tibia 1990 (pt fell)    Fracture     Right wrist fractured 2 times (pt fell)    GERD (gastroesophageal reflux disease)     High cholesterol     Hypertension     Hypoglycemia     \"my body produces too much insulin\"    Liver disease     BRADY    Nausea & vomiting     when had gallbladder out    Osteoporosis     Other ill-defined conditions(799.89)     edema legs    S/P cardiac cath 10/4/2021    10/4/2021 nonobstructive disease    Spinal stenosis     Thyroid disease          Activity:  Activity Level: Up with Assistance  Number times ambulated in hallways past shift: 0  Number of times OOB to chair past shift: 0    Cardiac:   Cardiac Monitoring: Yes      Cardiac Rhythm: Atrial Fib    Access:   Current line(s): PIV     Genitourinary:   Urinary status: external catheter    Respiratory:   O2 Device: None (Room air)  Chronic home O2 use?: NO  Incentive spirometer at bedside: NO       GI:  Last Bowel Movement Date: 10/09/22  Current diet:  DIET ONE TIME MESSAGE  ADULT DIET Regular  DIET ONE TIME MESSAGE  Passing flatus: YES  Tolerating current diet: YES       Pain Management:   Patient states pain is manageable on current regimen: YES    Skin:  Boris Score: 17  Interventions: float heels    Patient Safety:  Fall Score:  Total Score: 3  Interventions: gripper socks and pt to call before getting OOB  High Fall Risk: Yes  @Rollbelt  @dexterity to release roll belt  Yes/No ( must document dexterity  here by stating Yes or No here, otherwise this is a restraint and must follow restraint documentation policy.)    DVT prophylaxis:  DVT prophylaxis Med- Yes  DVT prophylaxis SCD or MARIETTA- Yes     Wounds: (If Applicable)  Wounds- Yes  Location Lower extremity    Active Consults:  IP CONSULT TO GASTROENTEROLOGY  IP CONSULT TO HEMATOLOGY  IP CONSULT TO HOSPITALIST    Length of Stay:  Expected LOS: 3d 0h  Actual LOS: 6  Discharge Plan: Marianela Camejo RN

## 2022-10-13 ENCOUNTER — ANESTHESIA (OUTPATIENT)
Dept: ENDOSCOPY | Age: 77
DRG: 378 | End: 2022-10-13
Payer: MEDICARE

## 2022-10-13 LAB
ANION GAP SERPL CALC-SCNC: 3 MMOL/L (ref 5–15)
BUN SERPL-MCNC: 27 MG/DL (ref 6–20)
BUN/CREAT SERPL: 22 (ref 12–20)
CALCIUM SERPL-MCNC: 9.3 MG/DL (ref 8.5–10.1)
CHLORIDE SERPL-SCNC: 118 MMOL/L (ref 97–108)
CO2 SERPL-SCNC: 21 MMOL/L (ref 21–32)
CREAT SERPL-MCNC: 1.22 MG/DL (ref 0.55–1.02)
ERYTHROCYTE [DISTWIDTH] IN BLOOD BY AUTOMATED COUNT: 23.3 % (ref 11.5–14.5)
GLUCOSE SERPL-MCNC: 126 MG/DL (ref 65–100)
HCT VFR BLD AUTO: 27.8 % (ref 35–47)
HGB BLD-MCNC: 8.3 G/DL (ref 11.5–16)
MCH RBC QN AUTO: 30.2 PG (ref 26–34)
MCHC RBC AUTO-ENTMCNC: 29.9 G/DL (ref 30–36.5)
MCV RBC AUTO: 101.1 FL (ref 80–99)
NRBC # BLD: 0.02 K/UL (ref 0–0.01)
NRBC BLD-RTO: 0.5 PER 100 WBC
PLATELET # BLD AUTO: 95 K/UL (ref 150–400)
PMV BLD AUTO: 10 FL (ref 8.9–12.9)
POTASSIUM SERPL-SCNC: 4.5 MMOL/L (ref 3.5–5.1)
RBC # BLD AUTO: 2.75 M/UL (ref 3.8–5.2)
SODIUM SERPL-SCNC: 142 MMOL/L (ref 136–145)
WBC # BLD AUTO: 4.2 K/UL (ref 3.6–11)

## 2022-10-13 PROCEDURE — 97530 THERAPEUTIC ACTIVITIES: CPT

## 2022-10-13 PROCEDURE — 74011250637 HC RX REV CODE- 250/637: Performed by: INTERNAL MEDICINE

## 2022-10-13 PROCEDURE — 85027 COMPLETE CBC AUTOMATED: CPT

## 2022-10-13 PROCEDURE — 97535 SELF CARE MNGMENT TRAINING: CPT

## 2022-10-13 PROCEDURE — 36415 COLL VENOUS BLD VENIPUNCTURE: CPT

## 2022-10-13 PROCEDURE — C9113 INJ PANTOPRAZOLE SODIUM, VIA: HCPCS | Performed by: INTERNAL MEDICINE

## 2022-10-13 PROCEDURE — 74011250637 HC RX REV CODE- 250/637: Performed by: NURSE PRACTITIONER

## 2022-10-13 PROCEDURE — 74011250636 HC RX REV CODE- 250/636: Performed by: INTERNAL MEDICINE

## 2022-10-13 PROCEDURE — 80048 BASIC METABOLIC PNL TOTAL CA: CPT

## 2022-10-13 PROCEDURE — 74011000250 HC RX REV CODE- 250: Performed by: INTERNAL MEDICINE

## 2022-10-13 PROCEDURE — 74011250637 HC RX REV CODE- 250/637: Performed by: STUDENT IN AN ORGANIZED HEALTH CARE EDUCATION/TRAINING PROGRAM

## 2022-10-13 PROCEDURE — 97116 GAIT TRAINING THERAPY: CPT

## 2022-10-13 PROCEDURE — 65270000046 HC RM TELEMETRY

## 2022-10-13 RX ORDER — FUROSEMIDE 40 MG/1
80 TABLET ORAL ONCE
Status: COMPLETED | OUTPATIENT
Start: 2022-10-13 | End: 2022-10-13

## 2022-10-13 RX ADMIN — LEVOTHYROXINE SODIUM 150 MCG: 0.15 TABLET ORAL at 06:35

## 2022-10-13 RX ADMIN — ALLOPURINOL 100 MG: 100 TABLET ORAL at 09:15

## 2022-10-13 RX ADMIN — SODIUM CHLORIDE, PRESERVATIVE FREE 10 ML: 5 INJECTION INTRAVENOUS at 21:37

## 2022-10-13 RX ADMIN — SODIUM CHLORIDE, PRESERVATIVE FREE 10 ML: 5 INJECTION INTRAVENOUS at 06:36

## 2022-10-13 RX ADMIN — FUROSEMIDE 80 MG: 40 TABLET ORAL at 18:40

## 2022-10-13 RX ADMIN — SODIUM CHLORIDE 40 MG: 9 INJECTION INTRAMUSCULAR; INTRAVENOUS; SUBCUTANEOUS at 21:34

## 2022-10-13 RX ADMIN — Medication: at 09:17

## 2022-10-13 RX ADMIN — CASTOR OIL AND BALSAM, PERU: 788; 87 OINTMENT TOPICAL at 09:17

## 2022-10-13 RX ADMIN — EZETIMIBE 10 MG: 10 TABLET ORAL at 09:14

## 2022-10-13 RX ADMIN — FAMOTIDINE 20 MG: 20 TABLET, FILM COATED ORAL at 09:15

## 2022-10-13 RX ADMIN — SODIUM CHLORIDE, PRESERVATIVE FREE 10 ML: 5 INJECTION INTRAVENOUS at 14:06

## 2022-10-13 RX ADMIN — SUCRALFATE 1 G: 1 TABLET ORAL at 21:34

## 2022-10-13 RX ADMIN — SUCRALFATE 1 G: 1 TABLET ORAL at 18:40

## 2022-10-13 RX ADMIN — SUCRALFATE 1 G: 1 TABLET ORAL at 09:15

## 2022-10-13 RX ADMIN — CASTOR OIL AND BALSAM, PERU: 788; 87 OINTMENT TOPICAL at 21:00

## 2022-10-13 RX ADMIN — ATORVASTATIN CALCIUM 20 MG: 20 TABLET, FILM COATED ORAL at 09:20

## 2022-10-13 RX ADMIN — POLYETHYLENE GLYCOL 3350 17 G: 17 POWDER, FOR SOLUTION ORAL at 11:47

## 2022-10-13 RX ADMIN — POTASSIUM CHLORIDE 10 MEQ: 750 TABLET, FILM COATED, EXTENDED RELEASE ORAL at 09:14

## 2022-10-13 RX ADMIN — SUCRALFATE 1 G: 1 TABLET ORAL at 11:45

## 2022-10-13 RX ADMIN — AMLODIPINE BESYLATE 10 MG: 5 TABLET ORAL at 09:15

## 2022-10-13 RX ADMIN — ALLOPURINOL 100 MG: 100 TABLET ORAL at 18:40

## 2022-10-13 RX ADMIN — SODIUM CHLORIDE 40 MG: 9 INJECTION INTRAMUSCULAR; INTRAVENOUS; SUBCUTANEOUS at 09:17

## 2022-10-13 NOTE — ANESTHESIA PREPROCEDURE EVALUATION
Anesthetic History     PONV          Review of Systems / Medical History  Patient summary reviewed, nursing notes reviewed and pertinent labs reviewed    Pulmonary  Within defined limits                 Neuro/Psych             Comments: spinal stenosis Cardiovascular    Hypertension: well controlled        Dysrhythmias   CAD and hyperlipidemia    Exercise tolerance: <4 METS  Comments: Hx Bifascicular block    TTE (05/2021): LV: Estimated LVEF is 55 - 60%. LA: Mildly dilated left atrium. Mild aortic valve stenosis is present. TV: Moderate tricuspid valve regurgitation is present. Right Ventricular Arterial Pressure (RVSP) is 58 mmHg. Pulmonary hypertension found to be moderate.        GI/Hepatic/Renal     GERD: well controlled    Renal disease: CRI  Liver disease    Comments: PERSONAL HX OF POLYPS   BRADY Syndrome  Fatty liver Endo/Other    Diabetes: well controlled, type 2  Hypothyroidism: poorly controlled  Morbid obesity, arthritis and anemia     Other Findings   Comments: Gout  Thrombocytopenia  RSD lower limb  Osteoporosis   DDD  Ambulates with a walker   AVM (arteriovenous malformation) of small bowel, acquired with hemorrhage                     Physical Exam    Airway  Mallampati: III    Neck ROM: normal range of motion   Mouth opening: Normal     Cardiovascular  Regular rate and rhythm,  S1 and S2 normal,  no murmur, click, rub, or gallop             Dental    Dentition: Full upper dentures and Poor dentition  Comments: No loose lower teeth   Pulmonary      Decreased breath sounds: bibasilar           Abdominal  GI exam deferred       Other Findings            Anesthetic Plan    ASA: 3  Anesthesia type: total IV anesthesia and general          Induction: Intravenous  Anesthetic plan and risks discussed with: Patient

## 2022-10-13 NOTE — PROGRESS NOTES
Problem: Falls - Risk of  Goal: *Absence of Falls  Description: Document Roxann Osunater Fall Risk and appropriate interventions in the flowsheet. Outcome: Progressing Towards Goal  Note: Fall Risk Interventions:  Mobility Interventions: Bed/chair exit alarm         Medication Interventions: Assess postural VS orthostatic hypotension    Elimination Interventions: Bed/chair exit alarm              Problem: Patient Education: Go to Patient Education Activity  Goal: Patient/Family Education  Outcome: Progressing Towards Goal     Problem: Fluid Volume - Risk of, Imbalanced  Goal: *Balanced intake and output  Outcome: Progressing Towards Goal     Problem: Patient Education: Go to Patient Education Activity  Goal: Patient/Family Education  Outcome: Progressing Towards Goal     Problem: Patient Education: Go to Patient Education Activity  Goal: Patient/Family Education  Outcome: Progressing Towards Goal     Problem: Pressure Injury - Risk of  Goal: *Prevention of pressure injury  Description: Document Boris Scale and appropriate interventions in the flowsheet.   Outcome: Progressing Towards Goal  Note: Pressure Injury Interventions:       Moisture Interventions: Minimize layers    Activity Interventions: Increase time out of bed    Mobility Interventions: HOB 30 degrees or less    Nutrition Interventions: Document food/fluid/supplement intake    Friction and Shear Interventions: Minimize layers                Problem: Patient Education: Go to Patient Education Activity  Goal: Patient/Family Education  Outcome: Progressing Towards Goal     Problem: Patient Education: Go to Patient Education Activity  Goal: Patient/Family Education  Outcome: Progressing Towards Goal     Problem: Patient Education: Go to Patient Education Activity  Goal: Patient/Family Education  Outcome: Progressing Towards Goal

## 2022-10-13 NOTE — PROGRESS NOTES
Problem: Falls - Risk of  Goal: *Absence of Falls  Description: Document Melvi Fountain Fall Risk and appropriate interventions in the flowsheet.   Outcome: Progressing Towards Goal  Note: Fall Risk Interventions:  Mobility Interventions: Bed/chair exit alarm         Medication Interventions: Assess postural VS orthostatic hypotension    Elimination Interventions: Bed/chair exit alarm

## 2022-10-13 NOTE — PROGRESS NOTES
Problem: Mobility Impaired (Adult and Pediatric)  Goal: *Acute Goals and Plan of Care (Insert Text)  Description: FUNCTIONAL STATUS PRIOR TO ADMISSION: Patient was modified independent using a rollator for functional mobility. Patient required setup assistance for basic and instrumental ADLs with daily aid 8-4p assist with setup of items and S for ADLs as needed. [place]: from [place], aide 8-4p daily, does not drive    Physical Therapy Goals  Initiated 10/12/2022  1. Patient will move from supine to sit and sit to supine  in bed with supervision/set-up within 7 day(s). 2.  Patient will transfer from bed to chair and chair to bed with supervision/set-up using the least restrictive device within 7 day(s). 3.  Patient will perform sit to stand with supervision/set-up within 7 day(s). 4.  Patient will ambulate with supervision/set-up for 50 feet with the least restrictive device within 7 day(s). Outcome: Progressing Towards Goal     PHYSICAL THERAPY TREATMENT  Patient: Quentin Silva (14 y.o. female)  Date: 10/13/2022  Diagnosis: Acute anemia [D64.9] <principal problem not specified>  Procedure(s) (LRB):  ESOPHAGOGASTRODUODENOSCOPY (EGD) (N/A)  CAPSULE W ESOPHAGOGASTRODUODENOSCOPY (EGD) (N/A)  ENDOSCOPIC ARGON PLASMA COAGULATION (N/A) 3 Days Post-Op  Precautions:    Chart, physical therapy assessment, plan of care and goals were reviewed. ASSESSMENT  Patient continues with skilled PT services and is progressing towards goals. Patient is pleasant and motivated to participate. Able to progress gait distance with rolling walker this date and demonstrating improved steadiness with assistive device. Continues to benefit from rolling walker as opposed to a rollator like she uses at home and patient agreeable for one for home use. She has supportive caregiver that is willing and able to provide necessary assist at home.  Recommend she discharge home with increased assist from caregiver and HHPT to maximize functional abilities and decrease caregiver burden. Acute PT will continue to follow and progress as able. Current Level of Function Impacting Discharge (mobility/balance): CGA to min A with gait with rolling walker    Other factors to consider for discharge: lives alone but caregiver able to provide necessary assist, pending push enteroscopy         PLAN :  Patient continues to benefit from skilled intervention to address the above impairments. Continue treatment per established plan of care. to address goals. Recommendation for discharge: (in order for the patient to meet his/her long term goals)  Physical therapy at least 2 days/week in the home AND ensure assist and/or supervision for safety with functional mobility as needed    This discharge recommendation:  Has been made in collaboration with the attending provider and/or case management    IF patient discharges home will need the following DME: rolling walker     SUBJECTIVE:   Patient stated I am not dizzy yet.     OBJECTIVE DATA SUMMARY:   Critical Behavior:  Neurologic State: Alert  Orientation Level: Oriented X4  Cognition: Appropriate decision making, Appropriate for age attention/concentration, Appropriate safety awareness     Functional Mobility Training:  Bed Mobility:  Supine to Sit: Contact guard assistance; Additional time;Bed Modified (sleeps in recliner)  Transfers:  Sit to Stand: Contact guard assistance; Adaptive equipment  Stand to Sit: Contact guard assistance; Adaptive equipment  Balance:  Sitting: Intact  Standing: Impaired; With support  Standing - Static: Constant support;Good  Standing - Dynamic : Constant support; Fair  Ambulation/Gait Training:  Distance (ft): 50 Feet (ft) (10, 10, 30 - seated rest breaks in between attempts)  Assistive Device: Walker, rolling;Gait belt  Ambulation - Level of Assistance: Minimal assistance;Contact guard assistance; Adaptive equipment  Gait Abnormalities: Decreased step clearance;Shuffling gait (R hip elevated in setting of leg discrepency (L LE shorter than R LE))  Base of Support: Widened  Speed/Keerthi: Shuffled; Slow  Step Length: Left shortened;Right shortened    Pain Rating:  No complaints    Activity Tolerance:   Fair and requires rest breaks    After treatment patient left in no apparent distress:   Sitting in chair, Call bell within reach, Bed / chair alarm activated, and Caregiver / family present    COMMUNICATION/COLLABORATION:   The patients plan of care was discussed with: Occupational therapist and Registered nurse. Reviewed importance of slow transitions given low blood counts and use of rolling walker - patient reporting understanding.      Joselyn Mac, PT   Time Calculation: 24 mins

## 2022-10-13 NOTE — PROGRESS NOTES
End of Shift Note    Bedside shift change report given to  Bertha Perry RN (oncoming nurse) by Merry Diaz RN (offgoing nurse). Report included the following information SBAR, Kardex, Intake/Output, and MAR    Shift worked:  Night   Shift summary and any significant changes:     Uneventful night, call bell and phone within reach of patient. I, bed alarm on 2, able to make needs known . KUB done at bedside.  NPO        Concerns for physician to address:  none   Zone phone for oncoming shift:   [phone]     Patient Information  Joon Or  68 y.o.  10/6/2022 11:15 AM by Thais Leung MD. Joon Or was admitted from Home    Problem List  Patient Active Problem List    Diagnosis Date Noted    Acute anemia 10/06/2022    Anemia 08/05/2022    SSS (sick sinus syndrome) ([address] 06/27/2022    PAF (paroxysmal atrial fibrillation) ([address] 06/24/2022    Stage 3a chronic kidney disease ([address] 05/20/2022    Type 2 diabetes mellitus with chronic kidney disease ([address] 11/18/2021    Abnormal nuclear stress test 10/04/2021    Angina at rest [facility]) 10/04/2021    S/P cardiac cath 10/04/2021    Nonrheumatic aortic valve stenosis 09/08/2021    Pulmonary hypertension ([address] 09/08/2021    MICHELLE (acute kidney injury) ([address] 10/27/2020    Cellulitis of left lower leg 11/07/2019    Severe obesity ([address] 09/30/2019    Controlled type 2 diabetes mellitus without complication ([address] 25/79/9447    DDD (degenerative disc disease), lumbar 11/27/2017    Prediabetes 04/11/2016    Thrombocytopenia ([address] 01/24/2015    HTN (hypertension) 01/17/2014    Bifascicular block 12/23/2010    Fatty liver 06/18/2010    Pure hypercholesterolemia 06/18/2010    Colon polyp 06/18/2010    Gout 06/14/2010    Hypothyroidism 06/14/2010    Osteoporosis 06/14/2010    Anxiety 06/14/2010    Leg edema 06/14/2010    RSD lower limb 06/14/2010     Past Medical History:   Diagnosis Date    Abnormal nuclear stress test 10/4/2021    Angina at rest [facility]) 10/4/2021    Arthritis KNEE,gout    Bundle branch block     Endocrine disease     HYPOTHYROIDISM    Fracture     Left distal femur (age 12 - pt fell)    Fracture     Left tibia 1990 (pt fell)    Fracture     Right wrist fractured 2 times (pt fell)    GERD (gastroesophageal reflux disease)     High cholesterol     Hypertension     Hypoglycemia     \"my body produces too much insulin\"    Liver disease     BRADY    Nausea & vomiting     when had gallbladder out    Osteoporosis     Other ill-defined conditions(799.89)     edema legs    S/P cardiac cath 10/4/2021    10/4/2021 nonobstructive disease    Spinal stenosis     Thyroid disease          Activity:  Activity Level: Up with Assistance  Number times ambulated in hallways past shift: 0  Number of times OOB to chair past shift: 0    Cardiac:   Cardiac Monitoring: Yes      Cardiac Rhythm: Atrial Fib    Access:   Current line(s): PIV     Genitourinary:   Urinary status: external catheter    Respiratory:   O2 Device: None (Room air)  Chronic home O2 use?: NO  Incentive spirometer at bedside: NO       GI:  Last Bowel Movement Date: 10/09/22  Current diet:  DIET ONE TIME MESSAGE  DIET ONE TIME MESSAGE  DIET NPO  Passing flatus: YES  Tolerating current diet: YES       Pain Management:   Patient states pain is manageable on current regimen: YES    Skin:  Boris Score: 19  Interventions: float heels    Patient Safety:  Fall Score:  Total Score: 3  Interventions: gripper socks and pt to call before getting OOB  High Fall Risk: Yes  @Rollbelt  @dexterity to release roll belt  Yes/No ( must document dexterity  here by stating Yes or No here, otherwise this is a restraint and must follow restraint documentation policy.)    DVT prophylaxis:  DVT prophylaxis Med- Yes  DVT prophylaxis SCD or MARIETTA- Yes     Wounds: (If Applicable)  Wounds- Yes  Location Lower extremity    Active Consults:  IP CONSULT TO GASTROENTEROLOGY  IP CONSULT TO HEMATOLOGY  IP CONSULT TO HOSPITALIST    Length of Stay:  Expected LOS: 3d 0h  Actual LOS: 7  Discharge Plan: Marianela May RN

## 2022-10-13 NOTE — PROGRESS NOTES
Problem: Self Care Deficits Care Plan (Adult)  Goal: *Acute Goals and Plan of Care (Insert Text)  Description: FUNCTIONAL STATUS PRIOR TO ADMISSION: Patient was modified independent using a rollator for functional mobility. Patient required setup assistance for basic and instrumental ADLs with daily aid 8-4p assist with setup of items and S for ADLs as needed. [place] SUPPORT: from [place], aide 8-4p daily, does not drive    Occupational Therapy Goals  Initiated 10/11/2022  1. Patient will perform grooming standing with supervision/set-up within 7 day(s). 2.  Patient will perform upper body dressing with supervision/set-up within 7 day(s). 3.  Patient will perform lower body dressing with supervision/set-up within 7 day(s). 4.  Patient will perform toilet transfers with supervision/set-up within 7 day(s). 5.  Patient will perform all aspects of toileting with supervision/set-up within 7 day(s). 6.  Patient will participate in upper extremity therapeutic exercise/activities with supervision/set-up for 10 minutes within 7 day(s). 7.  Patient will utilize energy conservation techniques during functional activities with verbal cues within 7 day(s). Outcome: Progressing Towards Goal   OCCUPATIONAL THERAPY TREATMENT  Patient: Susy Espinal (71 y.o. female)  Date: 10/13/2022  Diagnosis: Acute anemia [D64.9] <principal problem not specified>  Procedure(s) (LRB):  ESOPHAGOGASTRODUODENOSCOPY (EGD) (N/A)  CAPSULE W ESOPHAGOGASTRODUODENOSCOPY (EGD) (N/A)  ENDOSCOPIC ARGON PLASMA COAGULATION (N/A) 3 Days Post-Op  Precautions:    Chart, occupational therapy assessment, plan of care, and goals were reviewed. ASSESSMENT  Patient continues with skilled OT services and is progressing towards goals. Patient received semisupine in bed with caregiver present in room and agreeable for therapy.  Overall, patient completed functional mobility with contact guard assist using rolling walker and ADLs with stand-by to mod assist. Patient came EOB with additional time and contact guard assist demonstrating intact sitting balance. She required mod assist to don gown around her back and reported her caregiver assists at baseline with caregiver confirming. Patient stood and walked to bathroom with contact guard assist using rolling walker with cues for safety awareness. She completed toileting and grooming with stand-by to min assist including standing at sink for approx 5 mins. Following seated rest break, patient walked from recliner to door and back using rolling walker with additional time, tolerating well. Patient was left sitting in recliner chair with all needs met, VSS, caregiver present in room, and chair alarmed. Patient would continue to benefit from skilled OT services during acute hospital stay. Recommend patient return home with increased assist from caregiver and HHOT/PT. Current Level of Function Impacting Discharge (ADLs): stand-by to mod assist for self-care, contact guard assist for functional mobility using rolling walker     Other factors to consider for discharge: fall risk, good caregiver support          PLAN :  Patient continues to benefit from skilled intervention to address the above impairments. Continue treatment per established plan of care to address goals. Recommendation for discharge: (in order for the patient to meet his/her long term goals)  Home with 24/7 supervision and assist from family and HHOT/PT    This discharge recommendation:  Has been made in collaboration with the attending provider and/or case management    IF patient discharges home will need the following DME: rolling walker        SUBJECTIVE:   Patient stated I could be naked at home and be happy.     OBJECTIVE DATA SUMMARY:   Cognitive/Behavioral Status:  Neurologic State: Alert  Orientation Level: Oriented X4  Cognition: Follows commands  Perception: Appears intact  Perseveration: No perseveration noted  Safety/Judgement: Awareness of environment;Decreased insight into deficits    Functional Mobility and Transfers for ADLs:  Bed Mobility:  Supine to Sit: Contact guard assistance; Additional time;Bed Modified (sleeps in recliner)    Transfers:  Sit to Stand: Contact guard assistance; Adaptive equipment  Stand to Sit: Contact guard assistance   Functional Transfers  Bathroom Mobility: Contact guard assistance  Toilet Transfer : Contact guard assistance  Cues: Tactile cues provided;Verbal cues provided    Balance:  Sitting: Intact  Standing: Impaired; With support  Standing - Static: Constant support;Good  Standing - Dynamic : Constant support; Fair    ADL Intervention:    Grooming  Position Performed: Standing (at sink)  Washing Hands: Stand-by assistance  Brushing Teeth: Set-up; Stand-by assistance  Cues: Verbal cues provided    Upper Body Dressing Assistance  Shirt simulation with hospital gown: Moderate assistance  Cues: Physical assistance; Tactile cues provided;Verbal cues provided    Toileting  Bladder Hygiene: Contact guard assistance  Bowel Hygiene: Minimum assistance  Cues: Tactile cues provided;Verbal cues provided    Cognitive Retraining  Safety/Judgement: Awareness of environment;Decreased insight into deficits    Pain:  Patient did not c/o pain during session. Activity Tolerance:   Fair and requires rest breaks    After treatment patient left in no apparent distress:   Sitting in chair, Call bell within reach, Bed / chair alarm activated, and Caregiver / family present    COMMUNICATION/COLLABORATION:   The patients plan of care was discussed with: Physical therapist and Registered nurse.      EUN Frias/L  Time Calculation: 24 mins

## 2022-10-13 NOTE — PROGRESS NOTES
GI PROGRESS NOTE  Gail Gibson, NP  [phone] NP in-hospital cell phone M-F until 4:30  After 5pm or on weekends, please call  for physician on call    NAME:Char Raza :1945 ZPX:[id number]   ATTG: Dr. Morelia Disla  PCP: Bev Mcdermott MD  Date/Time:  10/13/2022 1230 AM   Primary GI: Dr. 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 Monitor for further S&S of GI bleed  -Serial H&H as clinically indicated. Goal hemoglobin >7, transfuse as clinically indicated  -Avoid NSAIDs  -Will continue to follow. Please call GI with any further questions or concerns. Thank you! *Plan of care discussed with Dr. Ozzy Oro     Subjective:   Discussed with RN events overnight.   DOing well today but is having some epigastric burning discomfort, not terrible, tolerating food okay    Review of Systems:  Symptom Y/N Comments  Symptom Y/N Comments   Fever/Chills n   Chest Pain n    Cough n   Headaches n    Sputum n   Joint Pain n    SOB/HENLEY n   Pruritis/Rash n    Tolerating Diet y   Other       Could NOT obtain due to:      Objective: VITALS:   Last 24hrs VS reviewed since prior progress note. Most recent are:  Visit Vitals  BP (!) 146/63   Pulse 70   Temp 98.9 °F (37.2 °C)   Resp 18   Ht 4' 11\" (1.499 m)   Wt 97.5 kg (215 lb)   SpO2 97%   Breastfeeding No   BMI 43.42 kg/m²     No intake or output data in the 24 hours ending 10/13/22 1242  PHYSICAL EXAM:  General: Pleasant elderly  female. NAD   HEENT: NC, Atraumatic. Anicteric sclerae. Lungs:  CTA Bilaterally. No Wheezing/Rhonchi/Rales. Heart:  Regular  rhythm,  No murmur, No Rubs, No Gallops  Abdomen: Soft, Non distended, Non tender. +Bowel sounds, no HSM  Extremities: No c/c/e  Neurologic:  Alert and oriented X 3. No acute neurological distress   Psych:   Good insight. Not anxious nor agitated. Lab and Radiology Data Reviewed: (see below)    Medications Reviewed: (see below)  PMH/SH reviewed - no change compared to H&P  ________________________________________________________________________  Total time spent with patient: 20 minutes ________________________________________________________________________  Care Plan discussed with:  Patient y   Family  Y   RN Ervin              Consultant:       Chapincito Ocampo NP     Procedures: see electronic medical records for all procedures/Xrays and details which were not copied into this note but were reviewed prior to creation of Plan. LABS:  Recent Labs     10/13/22  1139 10/12/22  0445   WBC 4.2 4.5   HGB 8.3* 7.1*   HCT 27.8* 23.6*   PLT 95* 94*       Recent Labs     10/13/22  1139 10/11/22  1102    143   K 4.5 4.3   * 118*   CO2 21 21   BUN 27* 29*   CREA 1.22* 1.15*   * 145*   CA 9.3 8.6       No results for input(s): AP, TBIL, TP, ALB, GLOB, GGT, AML, LPSE in the last 72 hours. No lab exists for component: SGOT, GPT, AMYP, HLPSE    No results for input(s): INR, PTP, APTT, INREXT, INREXT in the last 72 hours. No results for input(s): FE, TIBC, PSAT, FERR in the last 72 hours.      Lab Results Component Value Date/Time    Folate 49.6 (H) 10/10/2022 11:07 AM     No results for input(s): PH, PCO2, PO2 in the last 72 hours. No results for input(s): CPK, CKMB in the last 72 hours.     No lab exists for component: TROPONINI  Lab Results   Component Value Date/Time    Color YELLOW/STRAW 10/27/2020 02:54 PM    Appearance CLOUDY (A) 10/27/2020 02:54 PM    Specific gravity 1.015 10/27/2020 02:54 PM    pH (UA) 5.0 10/27/2020 02:54 PM    Protein 100 (A) 10/27/2020 02:54 PM    Glucose Negative 10/27/2020 02:54 PM    Ketone Negative 10/27/2020 02:54 PM    Bilirubin NEGATIVE 11/04/2012 10:39 PM    Urobilinogen 1.0 10/27/2020 02:54 PM    Nitrites Negative 10/27/2020 02:54 PM    Leukocyte Esterase LARGE (A) 10/27/2020 02:54 PM    Epithelial cells MANY (A) 10/27/2020 02:54 PM    Bacteria 3+ (A) 10/27/2020 02:54 PM    WBC >100 (H) 10/27/2020 02:54 PM    RBC 5-10 10/27/2020 02:54 PM       MEDICATIONS:  Current Facility-Administered Medications   Medication Dose Route Frequency    ammonium lactate (LAC-HYDRIN) 12 % lotion   Topical DAILY    sucralfate (CARAFATE) tablet 1 g  1 g Oral AC&HS    polyethylene glycol (MIRALAX) packet 17 g  17 g Oral DAILY    balsam peru-castor oiL (VENELEX) ointment   Topical BID    pantoprazole (PROTONIX) 40 mg in 0.9% sodium chloride 10 mL injection  40 mg IntraVENous Q12H    allopurinoL (ZYLOPRIM) tablet 100 mg  100 mg Oral BID    amLODIPine (NORVASC) tablet 10 mg  10 mg Oral DAILY    atorvastatin (LIPITOR) tablet 20 mg  20 mg Oral DAILY    [Held by provider] furosemide (LASIX) tablet 80 mg  80 mg Oral DAILY    levothyroxine (SYNTHROID) tablet 150 mcg  150 mcg Oral ACB    potassium chloride SR (KLOR-CON 10) tablet 10 mEq  10 mEq Oral DAILY    ezetimibe (ZETIA) tablet 10 mg  10 mg Oral DAILY    sodium chloride (NS) flush 5-40 mL  5-40 mL IntraVENous Q8H    sodium chloride (NS) flush 5-40 mL  5-40 mL IntraVENous PRN    acetaminophen (TYLENOL) tablet 650 mg  650 mg Oral Q6H PRN    Or acetaminophen (TYLENOL) suppository 650 mg  650 mg Rectal Q6H PRN    ondansetron (ZOFRAN ODT) tablet 4 mg  4 mg Oral Q8H PRN    Or    ondansetron (ZOFRAN) injection 4 mg  4 mg IntraVENous Q6H PRN

## 2022-10-13 NOTE — PROGRESS NOTES
Hospitalist Progress Note    NAME: Kika Eller   :  1945   MRN:  [id number]       Assessment / Plan:  Acute on chronic blood loss anemia  History of cirrhosis secondary to Farheen Antoine  History of recent EGD showing portal hypertensive gastropathy and colonoscopy showed normal mucosa with internal hemorrhoids  Deficiency anemia  S/p unit in total with hemoglobin of 3.4 repeat CBC after transfusion showed hemoglobin of 7.3>9.6. No active GI bleed  - pill endoscopy images reviewed 10/12/22 by GI. 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, treated with APC extensively  -- also, pill camera deployed in duodenum, given degree of recurrent anemia  -- PPI BID  -- Sucralfate QID x 10 days     Acute kidney injury - resolved  Creatinine improved  - resumed home lasix today    Hypertension  GERD  Dyslipidemia  Hypothyroidism  Gouty arthritis  Atrial fibrillation  History of sick sinus syndrome s/p pacemaker placement  -Continue Norvasc, PPI, Lipitor, levothyroxine, allopurinol  -Her anticoagulation was discontinued previously due to anemia     Code Status: Full code  Surrogate Decision Maker: Son     DVT Prophylaxis: SCDs due to anemia  GI Prophylaxis: not indicated     Baseline: From home, independent of ADLs    MAYURI: 10/15  Barriers: GI clearance     Subjective:     Chief Complaint / Reason for Physician Visit  Follow-up acute blood loss anemia  No new events. Endoscopy now scheduled for tomorrow per GI. Patient with no bowel movement in last 24 hours. No melena or hematochezia. Review of Systems:  Full ROS assessed and negative except as noted above. Objective:     VITALS:   Last 24hrs VS reviewed since prior progress note.  Most recent are:  Patient Vitals for the past 24 hrs:   Temp Pulse Resp BP SpO2   10/13/22 1545 -- 61 -- -- --   10/13/22 1536 97.7 °F (36.5 °C) 69 18 (!) 157/67 97 %   10/13/22 1209 98.9 °F (37.2 °C) 70 18 (!) 146/63 97 %   10/13/22 1200 -- 63 -- -- --   10/13/22 0827 97.7 °F (36.5 °C) 72 18 128/61 96 %   10/12/22 2339 98.4 °F (36.9 °C) 66 18 (!) 158/70 95 %   10/12/22 1951 98.2 °F (36.8 °C) 65 18 (!) 164/70 96 %       No intake or output data in the 24 hours ending 10/13/22 1820       I had a face to face encounter and independently examined this patient on 10/13/2022, as outlined below:    PHYSICAL EXAM:  General: Awake, No acute distress  EENT:  EOMI. Anicteric sclerae. MMM  Resp:  CTA bilaterally, no wheezing or rales. No accessory muscle use  CV:  Regular  rhythm,  No edema  GI:  Soft, Non distended, Non tender. +Bowel sounds  Neurologic:  Alert and oriented X 3, normal speech,   Psych:   Not anxious nor agitated  Skin:  No rashes. No jaundice    Reviewed most current lab test results and cultures  YES  Reviewed most current radiology test results   YES  Review and summation of old records today    NO  Reviewed patient's current orders and MAR    YES  PMH/ reviewed - no change compared to H&P  ________________________________________________________________________  Care Plan discussed with:    Comments   Patient y    Family      RN y    Care Manager     Consultant                       y Multidiciplinary team rounds were held today with , nursing, pharmacist and clinical coordinator. Patient's plan of care was discussed; medications were reviewed and discharge planning was addressed. ________________________________________________________________________    Procedures: see electronic medical records for all procedures/Xrays and details which were not copied into this note but were reviewed prior to creation of Plan. LABS:  I reviewed today's most current labs and imaging studies.   Pertinent labs include:  Recent Labs     10/13/22  1139 10/12/22  0445 10/11/22  1102   WBC 4.2 4.5 5.1   HGB 8.3* 7.1* 7.2* HCT 27.8* 23.6* 24.8*   PLT 95* 94* 102*       Recent Labs     10/13/22  1139 10/11/22  1102    143   K 4.5 4.3   * 118*   CO2 21 21   * 145*   BUN 27* 29*   CREA 1.22* 1.15*   CA 9.3 8.6         Signed: Te Welsh MD

## 2022-10-13 NOTE — PROGRESS NOTES
End of Shift Note    Bedside shift change report given to Syed Mckeon (oncoming nurse) by Patrizia Salazar RN (offgoing nurse).   Report included the following information SBAR, Kardex, and ED Summary    Shift worked:  7A-4P   Shift summary and any significant changes:    -HGB 8.3  -NPO AT MIDNIGHT PUSH TEST     Concerns for physician to address:  NONE   Zone phone for oncoming shift:   [phone]     Patient Information  Norah Garg  68 y.o.  10/6/2022 11:15 AM by Enio Dudley MD. Norah Garg was admitted from Home    Problem List  Patient Active Problem List    Diagnosis Date Noted    Acute anemia 10/06/2022    Anemia 08/05/2022    SSS (sick sinus syndrome) ([address] 06/27/2022    PAF (paroxysmal atrial fibrillation) ([address] 06/24/2022    Stage 3a chronic kidney disease ([address] 05/20/2022    Type 2 diabetes mellitus with chronic kidney disease ([address] 11/18/2021    Abnormal nuclear stress test 10/04/2021    Angina at rest ([address] 10/04/2021    S/P cardiac cath 10/04/2021    Nonrheumatic aortic valve stenosis 09/08/2021    Pulmonary hypertension ([address] 09/08/2021    MICHELLE (acute kidney injury) ([address] 10/27/2020    Cellulitis of left lower leg 11/07/2019    Severe obesity ([address] 09/30/2019    Controlled type 2 diabetes mellitus without complication ([address] 90/00/5460    DDD (degenerative disc disease), lumbar 11/27/2017    Prediabetes 04/11/2016    Thrombocytopenia ([address] 01/24/2015    HTN (hypertension) 01/17/2014    Bifascicular block 12/23/2010    Fatty liver 06/18/2010    Pure hypercholesterolemia 06/18/2010    Colon polyp 06/18/2010    Gout 06/14/2010    Hypothyroidism 06/14/2010    Osteoporosis 06/14/2010    Anxiety 06/14/2010    Leg edema 06/14/2010    RSD lower limb 06/14/2010     Past Medical History:   Diagnosis Date    Abnormal nuclear stress test 10/4/2021    Angina at rest [facility]) 10/4/2021    Arthritis     KNEE,gout    Bundle branch block     Endocrine disease     HYPOTHYROIDISM    Fracture     Left distal femur (age 12 - pt fell)    Fracture     Left tibia 1990 (pt fell)    Fracture     Right wrist fractured 2 times (pt fell)    GERD (gastroesophageal reflux disease)     High cholesterol     Hypertension     Hypoglycemia     \"my body produces too much insulin\"    Liver disease     BRADY    Nausea & vomiting     when had gallbladder out    Osteoporosis     Other ill-defined conditions(799.89)     edema legs    S/P cardiac cath 10/4/2021    10/4/2021 nonobstructive disease    Spinal stenosis     Thyroid disease        Core Measures:  CVA: No No  CHF:No No  PNA:No No    Activity:  Activity Level: Up with Assistance  Number times ambulated in hallways past shift: 0  Number of times OOB to chair past shift: 0    Cardiac:   Cardiac Monitoring: Yes      Cardiac Rhythm: Atrial Fib    Access:   Current line(s): PIV     Genitourinary:   Urinary status: voiding and external catheter    Respiratory:   O2 Device: None (Room air)  Chronic home O2 use?: N/A  Incentive spirometer at bedside: N/A       GI:  Last Bowel Movement Date: 10/09/22  Current diet:  DIET ONE TIME MESSAGE  DIET ONE TIME MESSAGE  ADULT DIET Regular; GI Shannon (GERD/Peptic Ulcer)  DIET NPO  Passing flatus: YES  Tolerating current diet: YES       Pain Management:   Patient states pain is manageable on current regimen: N/A    Skin:  Boris Score: 19  Interventions: increase time out of bed    Patient Safety:  Fall Score:  Total Score: 3  Interventions: bed/chair alarm, assistive device (walker, cane, etc), and gripper socks  High Fall Risk: Yes  @Rollbelt  @dexterity to release roll belt  Yes/No ( must document dexterity  here by stating Yes or No here, otherwise this is a restraint and must follow restraint documentation policy.)    DVT prophylaxis:  DVT prophylaxis Med- Yes    WOUND: BLISTER LOWER EXTREMITY BLISTERS    Active Consults:  IP CONSULT TO GASTROENTEROLOGY  IP CONSULT TO HEMATOLOGY  IP CONSULT TO HOSPITALIST    Length of Stay:  Expected LOS: 3d 0h  Actual LOS: 7  Discharge Plan: No HOME      Bert Espinal RN

## 2022-10-14 LAB
ANION GAP SERPL CALC-SCNC: 6 MMOL/L (ref 5–15)
BASOPHILS # BLD: 0 K/UL (ref 0–0.1)
BASOPHILS NFR BLD: 1 % (ref 0–1)
BUN SERPL-MCNC: 25 MG/DL (ref 6–20)
BUN/CREAT SERPL: 20 (ref 12–20)
CALCIUM SERPL-MCNC: 9.5 MG/DL (ref 8.5–10.1)
CHLORIDE SERPL-SCNC: 116 MMOL/L (ref 97–108)
CO2 SERPL-SCNC: 22 MMOL/L (ref 21–32)
CREAT SERPL-MCNC: 1.23 MG/DL (ref 0.55–1.02)
DIFFERENTIAL METHOD BLD: ABNORMAL
EOSINOPHIL # BLD: 0.2 K/UL (ref 0–0.4)
EOSINOPHIL NFR BLD: 6 % (ref 0–7)
ERYTHROCYTE [DISTWIDTH] IN BLOOD BY AUTOMATED COUNT: 23.6 % (ref 11.5–14.5)
GLUCOSE SERPL-MCNC: 128 MG/DL (ref 65–100)
HCT VFR BLD AUTO: 25.2 % (ref 35–47)
HGB BLD-MCNC: 7.4 G/DL (ref 11.5–16)
IMM GRANULOCYTES # BLD AUTO: 0 K/UL (ref 0–0.04)
IMM GRANULOCYTES NFR BLD AUTO: 1 % (ref 0–0.5)
LYMPHOCYTES # BLD: 0.6 K/UL (ref 0.8–3.5)
LYMPHOCYTES NFR BLD: 15 % (ref 12–49)
MAGNESIUM SERPL-MCNC: 2.5 MG/DL (ref 1.6–2.4)
MCH RBC QN AUTO: 29.7 PG (ref 26–34)
MCHC RBC AUTO-ENTMCNC: 29.4 G/DL (ref 30–36.5)
MCV RBC AUTO: 101.2 FL (ref 80–99)
MONOCYTES # BLD: 0.4 K/UL (ref 0–1)
MONOCYTES NFR BLD: 11 % (ref 5–13)
NEUTS SEG # BLD: 2.6 K/UL (ref 1.8–8)
NEUTS SEG NFR BLD: 66 % (ref 32–75)
NRBC # BLD: 0 K/UL (ref 0–0.01)
NRBC BLD-RTO: 0 PER 100 WBC
PHOSPHATE SERPL-MCNC: 2.7 MG/DL (ref 2.6–4.7)
PLATELET # BLD AUTO: 100 K/UL (ref 150–400)
PMV BLD AUTO: 10.4 FL (ref 8.9–12.9)
POTASSIUM SERPL-SCNC: 4 MMOL/L (ref 3.5–5.1)
RBC # BLD AUTO: 2.49 M/UL (ref 3.8–5.2)
RBC MORPH BLD: ABNORMAL
SODIUM SERPL-SCNC: 144 MMOL/L (ref 136–145)
WBC # BLD AUTO: 3.8 K/UL (ref 3.6–11)

## 2022-10-14 PROCEDURE — 85025 COMPLETE CBC W/AUTO DIFF WBC: CPT

## 2022-10-14 PROCEDURE — 74011000250 HC RX REV CODE- 250: Performed by: INTERNAL MEDICINE

## 2022-10-14 PROCEDURE — 77030008565 HC TBNG SUC IRR ERBE -B: Performed by: INTERNAL MEDICINE

## 2022-10-14 PROCEDURE — 0W3P8ZZ CONTROL BLEEDING IN GASTROINTESTINAL TRACT, VIA NATURAL OR ARTIFICIAL OPENING ENDOSCOPIC: ICD-10-PCS | Performed by: INTERNAL MEDICINE

## 2022-10-14 PROCEDURE — 65270000046 HC RM TELEMETRY

## 2022-10-14 PROCEDURE — 74011250637 HC RX REV CODE- 250/637: Performed by: STUDENT IN AN ORGANIZED HEALTH CARE EDUCATION/TRAINING PROGRAM

## 2022-10-14 PROCEDURE — 74011250637 HC RX REV CODE- 250/637: Performed by: INTERNAL MEDICINE

## 2022-10-14 PROCEDURE — 36415 COLL VENOUS BLD VENIPUNCTURE: CPT

## 2022-10-14 PROCEDURE — 74011250636 HC RX REV CODE- 250/636: Performed by: ANESTHESIOLOGY

## 2022-10-14 PROCEDURE — 77030022875 HC PRB AR PLSM COAG ERBE -C: Performed by: INTERNAL MEDICINE

## 2022-10-14 PROCEDURE — 74011250636 HC RX REV CODE- 250/636: Performed by: INTERNAL MEDICINE

## 2022-10-14 PROCEDURE — 2709999900 HC NON-CHARGEABLE SUPPLY: Performed by: INTERNAL MEDICINE

## 2022-10-14 PROCEDURE — 76060000032 HC ANESTHESIA 0.5 TO 1 HR: Performed by: INTERNAL MEDICINE

## 2022-10-14 PROCEDURE — 76040000007: Performed by: INTERNAL MEDICINE

## 2022-10-14 PROCEDURE — 84100 ASSAY OF PHOSPHORUS: CPT

## 2022-10-14 PROCEDURE — 74011000250 HC RX REV CODE- 250: Performed by: ANESTHESIOLOGY

## 2022-10-14 PROCEDURE — 80048 BASIC METABOLIC PNL TOTAL CA: CPT

## 2022-10-14 PROCEDURE — 77030039825 HC MSK NSL PAP SUPERNO2VA VYRM -B: Performed by: ANESTHESIOLOGY

## 2022-10-14 PROCEDURE — C9113 INJ PANTOPRAZOLE SODIUM, VIA: HCPCS | Performed by: INTERNAL MEDICINE

## 2022-10-14 PROCEDURE — 83735 ASSAY OF MAGNESIUM: CPT

## 2022-10-14 RX ORDER — PROPOFOL 10 MG/ML
INJECTION, EMULSION INTRAVENOUS AS NEEDED
Status: DISCONTINUED | OUTPATIENT
Start: 2022-10-14 | End: 2022-10-14 | Stop reason: HOSPADM

## 2022-10-14 RX ORDER — NALOXONE HYDROCHLORIDE 0.4 MG/ML
0.4 INJECTION, SOLUTION INTRAMUSCULAR; INTRAVENOUS; SUBCUTANEOUS
Status: DISCONTINUED | OUTPATIENT
Start: 2022-10-14 | End: 2022-10-14 | Stop reason: HOSPADM

## 2022-10-14 RX ORDER — FLUMAZENIL 0.1 MG/ML
0.2 INJECTION INTRAVENOUS
Status: DISCONTINUED | OUTPATIENT
Start: 2022-10-14 | End: 2022-10-14 | Stop reason: HOSPADM

## 2022-10-14 RX ORDER — ATROPINE SULFATE 0.1 MG/ML
0.5 INJECTION INTRAVENOUS
Status: DISCONTINUED | OUTPATIENT
Start: 2022-10-14 | End: 2022-10-14 | Stop reason: HOSPADM

## 2022-10-14 RX ORDER — SODIUM CHLORIDE 0.9 % (FLUSH) 0.9 %
5-40 SYRINGE (ML) INJECTION AS NEEDED
Status: DISCONTINUED | OUTPATIENT
Start: 2022-10-14 | End: 2022-10-17 | Stop reason: HOSPADM

## 2022-10-14 RX ORDER — LIDOCAINE HYDROCHLORIDE 20 MG/ML
INJECTION, SOLUTION EPIDURAL; INFILTRATION; INTRACAUDAL; PERINEURAL AS NEEDED
Status: DISCONTINUED | OUTPATIENT
Start: 2022-10-14 | End: 2022-10-14 | Stop reason: HOSPADM

## 2022-10-14 RX ORDER — EPINEPHRINE 0.1 MG/ML
1 INJECTION INTRACARDIAC; INTRAVENOUS
Status: DISCONTINUED | OUTPATIENT
Start: 2022-10-14 | End: 2022-10-14 | Stop reason: HOSPADM

## 2022-10-14 RX ORDER — SODIUM CHLORIDE 0.9 % (FLUSH) 0.9 %
5-40 SYRINGE (ML) INJECTION EVERY 8 HOURS
Status: DISCONTINUED | OUTPATIENT
Start: 2022-10-14 | End: 2022-10-17 | Stop reason: HOSPADM

## 2022-10-14 RX ORDER — ALBUTEROL SULFATE 0.83 MG/ML
2.5 SOLUTION RESPIRATORY (INHALATION)
Status: ACTIVE | OUTPATIENT
Start: 2022-10-14 | End: 2022-10-14

## 2022-10-14 RX ORDER — SODIUM CHLORIDE 9 MG/ML
25 INJECTION, SOLUTION INTRAVENOUS CONTINUOUS
Status: DISPENSED | OUTPATIENT
Start: 2022-10-14 | End: 2022-10-14

## 2022-10-14 RX ORDER — DEXTROMETHORPHAN/PSEUDOEPHED 2.5-7.5/.8
1.2 DROPS ORAL
Status: DISCONTINUED | OUTPATIENT
Start: 2022-10-14 | End: 2022-10-14 | Stop reason: HOSPADM

## 2022-10-14 RX ADMIN — SODIUM CHLORIDE, PRESERVATIVE FREE 10 ML: 5 INJECTION INTRAVENOUS at 15:53

## 2022-10-14 RX ADMIN — SUCRALFATE 1 G: 1 TABLET ORAL at 15:52

## 2022-10-14 RX ADMIN — SODIUM CHLORIDE 40 MG: 9 INJECTION INTRAMUSCULAR; INTRAVENOUS; SUBCUTANEOUS at 22:18

## 2022-10-14 RX ADMIN — SODIUM CHLORIDE, PRESERVATIVE FREE 10 ML: 5 INJECTION INTRAVENOUS at 06:30

## 2022-10-14 RX ADMIN — SODIUM CHLORIDE 40 MG: 9 INJECTION INTRAMUSCULAR; INTRAVENOUS; SUBCUTANEOUS at 09:33

## 2022-10-14 RX ADMIN — SUCRALFATE 1 G: 1 TABLET ORAL at 22:18

## 2022-10-14 RX ADMIN — ALLOPURINOL 100 MG: 100 TABLET ORAL at 17:56

## 2022-10-14 RX ADMIN — SODIUM CHLORIDE 25 ML/HR: 9 INJECTION, SOLUTION INTRAVENOUS at 08:03

## 2022-10-14 RX ADMIN — LIDOCAINE HYDROCHLORIDE 100 MG: 20 INJECTION, SOLUTION EPIDURAL; INFILTRATION; INTRACAUDAL; PERINEURAL at 08:05

## 2022-10-14 RX ADMIN — ATORVASTATIN CALCIUM 20 MG: 20 TABLET, FILM COATED ORAL at 09:33

## 2022-10-14 RX ADMIN — AMLODIPINE BESYLATE 10 MG: 5 TABLET ORAL at 09:33

## 2022-10-14 RX ADMIN — CASTOR OIL AND BALSAM, PERU: 788; 87 OINTMENT TOPICAL at 22:20

## 2022-10-14 RX ADMIN — SODIUM CHLORIDE, PRESERVATIVE FREE 20 ML: 5 INJECTION INTRAVENOUS at 08:54

## 2022-10-14 RX ADMIN — PROPOFOL 250 MG: 10 INJECTION, EMULSION INTRAVENOUS at 08:29

## 2022-10-14 RX ADMIN — SODIUM CHLORIDE, PRESERVATIVE FREE 10 ML: 5 INJECTION INTRAVENOUS at 15:52

## 2022-10-14 RX ADMIN — POLYETHYLENE GLYCOL 3350 17 G: 17 POWDER, FOR SOLUTION ORAL at 09:33

## 2022-10-14 RX ADMIN — Medication: at 09:41

## 2022-10-14 RX ADMIN — ALLOPURINOL 100 MG: 100 TABLET ORAL at 09:33

## 2022-10-14 RX ADMIN — CASTOR OIL AND BALSAM, PERU: 788; 87 OINTMENT TOPICAL at 09:42

## 2022-10-14 RX ADMIN — SUCRALFATE 1 G: 1 TABLET ORAL at 09:33

## 2022-10-14 RX ADMIN — SUCRALFATE 1 G: 1 TABLET ORAL at 11:17

## 2022-10-14 RX ADMIN — SODIUM CHLORIDE, PRESERVATIVE FREE 10 ML: 5 INJECTION INTRAVENOUS at 22:19

## 2022-10-14 RX ADMIN — EZETIMIBE 10 MG: 10 TABLET ORAL at 09:32

## 2022-10-14 RX ADMIN — FUROSEMIDE 80 MG: 40 TABLET ORAL at 09:33

## 2022-10-14 RX ADMIN — POTASSIUM CHLORIDE 10 MEQ: 750 TABLET, FILM COATED, EXTENDED RELEASE ORAL at 09:32

## 2022-10-14 RX ADMIN — LEVOTHYROXINE SODIUM 150 MCG: 0.15 TABLET ORAL at 06:11

## 2022-10-14 NOTE — PROGRESS NOTES
Comprehensive Nutrition Assessment    Type and Reason for Visit: Initial, RD nutrition re-screen/LOS    Nutrition Recommendations/Plan:   Continue clear liquids with advancement as tolerated per GI. Continue Ensure Clear po TID with meals. Please document % meals and supplements consumed in flowsheet I/O's under intake. Malnutrition Assessment:  Malnutrition Status:  No malnutrition (10/14/22 1402)      Nutrition Assessment:    10/14: Chart reviewed; med noted for chronic anemia on admission with low H/H 7.4/25. 2. Pt was NPO this morning for push enteroscopy. Diet resumed to clear liquids by GI; anticipate diet will continue to be progressed as tolerated. Pt reports tolerating liquids without difficulty. Pt also has an order for Ensure Clear TID with meals. No recent weight loss identified. Pt reports that she had missed lasix the past several days so gained some fluid weight. No data found. Last Weight Metric  Weight Loss Metrics 10/6/2022 8/22/2022 8/17/2022 8/5/2022 6/27/2022 6/24/2022 5/23/2022   Today's Wt 215 lb 210 lb 12.8 oz 218 lb 3.2 oz 235 lb 208 lb 208 lb 12.8 oz 202 lb   BMI 43.42 kg/m2 42.58 kg/m2 44.07 kg/m2 47.46 kg/m2 42.01 kg/m2 42.17 kg/m2 40.8 kg/m2      Nutrition Related Findings:    BM: 10/13; Labs: H/H 7.4/25.2; Meds: reviewed Wound Type: None    Current Nutrition Intake & Therapies:        ADULT DIET Clear Liquid  ADULT ORAL NUTRITION SUPPLEMENT AM Snack, PM Snack, HS Snack; Clear Liquid    Anthropometric Measures:  Height: 4' 11\" (149.9 cm)  Ideal Body Weight (IBW): 95 lbs (43 kg)     Current Body Wt:  97.5 kg (214 lb 15.2 oz), 226.3 % IBW. Current BMI (kg/m2): 43.4                          BMI Category: Obese class 3 (BMI 40.0 or greater)    Estimated Daily Nutrient Needs:  Energy Requirements Based On: Formula  Weight Used for Energy Requirements: Current  Energy (kcal/day): 1600 (BMR x 1. 2AF)  Weight Used for Protein Requirements: Current  Protein (g/day): 98 (1.0 g/kg bw)  Method Used for Fluid Requirements: 1 ml/kcal  Fluid (ml/day): 1600 ml/day    Nutrition Diagnosis:   Inadequate protein-energy intake related to altered GI function as evidenced by NPO or clear liquid status due to medical condition    Nutrition Interventions:   Food and/or Nutrient Delivery: Continue current diet, Continue oral nutrition supplement  Nutrition Education/Counseling: No recommendations at this time  Coordination of Nutrition Care: Continue to monitor while inpatient       Goals:     Goals:  (Progress diet beyond clear liquids next 24-72 hrs)       Nutrition Monitoring and Evaluation:   Behavioral-Environmental Outcomes: None identified  Food/Nutrient Intake Outcomes: Diet advancement/tolerance, Food and nutrient intake, Supplement intake  Physical Signs/Symptoms Outcomes: Biochemical data, Skin, Weight, GI status    Discharge Planning:     Too soon to determine    Julissa Kaur RD  Contact:

## 2022-10-14 NOTE — PROGRESS NOTES
Problem: Falls - Risk of  Goal: *Absence of Falls  Description: Document Rusty Bruno Fall Risk and appropriate interventions in the flowsheet. Outcome: Progressing Towards Goal  Note: Fall Risk Interventions:  Mobility Interventions: Bed/chair exit alarm         Medication Interventions: Bed/chair exit alarm    Elimination Interventions: Bed/chair exit alarm              Problem: Patient Education: Go to Patient Education Activity  Goal: Patient/Family Education  Outcome: Progressing Towards Goal     Problem: Fluid Volume - Risk of, Imbalanced  Goal: *Balanced intake and output  Outcome: Progressing Towards Goal     Problem: Patient Education: Go to Patient Education Activity  Goal: Patient/Family Education  Outcome: Progressing Towards Goal     Problem: Pressure Injury - Risk of  Goal: *Prevention of pressure injury  Description: Document Boris Scale and appropriate interventions in the flowsheet.   Outcome: Progressing Towards Goal  Note: Pressure Injury Interventions:       Moisture Interventions: Minimize layers    Activity Interventions: Increase time out of bed    Mobility Interventions: HOB 30 degrees or less    Nutrition Interventions: Document food/fluid/supplement intake    Friction and Shear Interventions: Apply protective barrier, creams and emollients, Minimize layers                Problem: Patient Education: Go to Patient Education Activity  Goal: Patient/Family Education  Outcome: Progressing Towards Goal     Problem: Patient Education: Go to Patient Education Activity  Goal: Patient/Family Education  Outcome: Progressing Towards Goal     Problem: Patient Education: Go to Patient Education Activity  Goal: Patient/Family Education  Outcome: Progressing Towards Goal     Problem: Anemia Care Plan (Adult and Pediatric)  Goal: *Labs within defined limits  Outcome: Progressing Towards Goal  Goal: *Tolerates increased activity  Outcome: Progressing Towards Goal     Problem: Patient Education: Go to Patient Education Activity  Goal: Patient/Family Education  Outcome: Progressing Towards Goal

## 2022-10-14 NOTE — PERIOP NOTES
Endoscopy Case End Note:    0826:  Procedure scope was pre-cleaned, per protocol, at bedside by [facility] ET.      0834:  Report received from anesthesia - Dr. Jacqui Stokes . See anesthesia flowsheet for intra-procedure vital signs and events. 1743:  Glasses  returned to patient.

## 2022-10-14 NOTE — H&P
Date of Surgery Update:  Kika Eller was seen and examined. History and physical has been reviewed. Significant clinical changes have occurred as noted:  EGD with GAVE, capsule with oozing blood in small bowel.     Signed By: Sherry Siddiqi MD     October 14, 2022 8:02 AM Refill request received from [facility] via fax for a refill of Losartan/ HCTZ. Order pended.    Last OV/VV on 4/29/2020  Next scheduled appointment with PCP 9/1/2020

## 2022-10-14 NOTE — PROGRESS NOTES
Problem: Falls - Risk of  Goal: *Absence of Falls  Description: Document Tashi Schwartz Fall Risk and appropriate interventions in the flowsheet. Outcome: Progressing Towards Goal  Note: Fall Risk Interventions:  Mobility Interventions: Bed/chair exit alarm         Medication Interventions: Assess postural VS orthostatic hypotension    Elimination Interventions: Bed/chair exit alarm              Problem: Patient Education: Go to Patient Education Activity  Goal: Patient/Family Education  Outcome: Progressing Towards Goal     Problem: Fluid Volume - Risk of, Imbalanced  Goal: *Balanced intake and output  Outcome: Progressing Towards Goal     Problem: Patient Education: Go to Patient Education Activity  Goal: Patient/Family Education  Outcome: Progressing Towards Goal     Problem: Pressure Injury - Risk of  Goal: *Prevention of pressure injury  Description: Document Boris Scale and appropriate interventions in the flowsheet.   Outcome: Progressing Towards Goal  Note: Pressure Injury Interventions:       Moisture Interventions: Minimize layers    Activity Interventions: Increase time out of bed    Mobility Interventions: HOB 30 degrees or less    Nutrition Interventions: Document food/fluid/supplement intake    Friction and Shear Interventions: Minimize layers                Problem: Patient Education: Go to Patient Education Activity  Goal: Patient/Family Education  Outcome: Progressing Towards Goal     Problem: Patient Education: Go to Patient Education Activity  Goal: Patient/Family Education  Outcome: Progressing Towards Goal     Problem: Patient Education: Go to Patient Education Activity  Goal: Patient/Family Education  Outcome: Progressing Towards Goal

## 2022-10-14 NOTE — PROGRESS NOTES
End of Shift Note     Bedside shift change report given to James Holland RN (oncoming nurse) by Corina Beck RN (offgoing nurse). Report included the following information SBAR, Kardex, Intake/Output, and MAR     Shift worked: days   Shift summary and any significant changes:     EGD completed, unable to find source of bleeding.      if bleeding continues, may need transfer to [facility] for enteroscopy with single balloon      Concerns for physician to address:  none   Zone phone for oncoming shift:   [phone]      Patient Information  Quentin Silva  68 y.o.  10/6/2022 11:15 AM by Jill Ontiveros MD. Quentin Silva was admitted from Home     Problem List       Patient Active Problem List     Diagnosis Date Noted    Acute anemia 10/06/2022    Anemia 08/05/2022    SSS (sick sinus syndrome) ([address] 06/27/2022    PAF (paroxysmal atrial fibrillation) ([address] 06/24/2022    Stage 3a chronic kidney disease ([address] 05/20/2022    Type 2 diabetes mellitus with chronic kidney disease ([address] 11/18/2021    Abnormal nuclear stress test 10/04/2021    Angina at rest [facility]) 10/04/2021    S/P cardiac cath 10/04/2021    Nonrheumatic aortic valve stenosis 09/08/2021    Pulmonary hypertension ([address] 09/08/2021    MICHELLE (acute kidney injury) ([address] 10/27/2020    Cellulitis of left lower leg 11/07/2019    Severe obesity ([address] 09/30/2019    Controlled type 2 diabetes mellitus without complication ([address] 07/52/9972    DDD (degenerative disc disease), lumbar 11/27/2017    Prediabetes 04/11/2016    Thrombocytopenia ([address] 01/24/2015    HTN (hypertension) 01/17/2014    Bifascicular block 12/23/2010    Fatty liver 06/18/2010    Pure hypercholesterolemia 06/18/2010    Colon polyp 06/18/2010    Gout 06/14/2010    Hypothyroidism 06/14/2010    Osteoporosis 06/14/2010    Anxiety 06/14/2010    Leg edema 06/14/2010    RSD lower limb 06/14/2010           Past Medical History:   Diagnosis Date    Abnormal nuclear stress test 10/4/2021    Angina at rest [facility]) 10/4/2021 Arthritis       KNEE,gout    Bundle branch block      Endocrine disease       HYPOTHYROIDISM    Fracture       Left distal femur (age 12 - pt fell)    Fracture       Left tibia 1990 (pt fell)    Fracture       Right wrist fractured 2 times (pt fell)    GERD (gastroesophageal reflux disease)      High cholesterol      Hypertension      Hypoglycemia       \"my body produces too much insulin\"    Liver disease       BRADY    Nausea & vomiting       when had gallbladder out    Osteoporosis      Other ill-defined conditions(799.89)       edema legs    S/P cardiac cath 10/4/2021     10/4/2021 nonobstructive disease    Spinal stenosis      Thyroid disease              Activity:  Activity Level: Up with Assistance  Number times ambulated in hallways past shift: 0  Number of times OOB to chair past shift: 0     Cardiac:   Cardiac Monitoring: Yes      Cardiac Rhythm: AV Paced     Access:   Current line(s): PIV      Genitourinary:   Urinary status: external catheter     Respiratory:   O2 Device: None (Room air)  Chronic home O2 use?: NO  Incentive spirometer at bedside: NO     GI:  Last Bowel Movement Date: 10/13/22  Current diet:  DIET ONE TIME MESSAGE  DIET ONE TIME MESSAGE  DIET NPO  Passing flatus: YES  Tolerating current diet: YES     Pain Management:   Patient states pain is manageable on current regimen: YES     Skin:  Boris Score: 19  Interventions: float heels    Patient Safety:  Fall Score:  Total Score: 3  Interventions: gripper socks and pt to call before getting OOB  High Fall Risk: Yes  @Rollbelt  @dexterity to release roll belt  Yes/No ( must document dexterity  here by stating Yes or No here, otherwise this is a restraint and must follow restraint documentation policy.)     DVT prophylaxis:  DVT prophylaxis Med- Yes  DVT prophylaxis SCD or MARIETTA- Yes      Wounds: (If Applicable)  Wounds- Yes  Location Lower extremity     Active Consults:  IP CONSULT TO GASTROENTEROLOGY  IP CONSULT TO HEMATOLOGY  IP CONSULT TO HOSPITALIST     Length of Stay:  Expected LOS: 3d 0h  Actual LOS: 8  Discharge Plan: No

## 2022-10-14 NOTE — PROGRESS NOTES
Hospitalist Progress Note    NAME: Zeynep Mac   :  1945   MRN:  [id number]       Assessment / Plan:  Acute on chronic blood loss anemia  Atypical GAVE  History of cirrhosis secondary to [place]  History of recent EGD showing portal hypertensive gastropathy and colonoscopy showed normal mucosa with internal hemorrhoids  Deficiency anemia  S/p unit in total with hemoglobin of 3.4 repeat CBC after transfusion showed hemoglobin of 7.3>9.6. No active GI bleed  - pill endoscopy images reviewed 10/12/22 by GI. 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 Showed prior atypical GAVE s/p treatment with APC. No clear second lesions identified  - monitoring CBC  -- will consider CT enterography to characterize distal lesion as renal function improves, or if rapid bleeding -> CTA, to further localize bleeding  -- if bleeding continues, may need transfer to [facility] for enteroscopy with single balloon. Iron study s/o CARLINE, iv iron ordered. Heme onc Consulted    Acute kidney injury - resolved  Creatinine improved  - resumed home lasix     Hypertension  GERD  Dyslipidemia  Hypothyroidism  Gouty arthritis  Atrial fibrillation  History of sick sinus syndrome s/p pacemaker placement  -Continue Norvasc, PPI, Lipitor, levothyroxine, allopurinol  -Her anticoagulation was discontinued previously due to anemia     Code Status: Full code  Surrogate Decision Maker: Son     DVT Prophylaxis: SCDs due to anemia  GI Prophylaxis: not indicated     Baseline: From home, independent of ADLs    MAYURI: 10/16  Barriers: GI clearance     Subjective:     Chief Complaint / Reason for Physician Visit  Follow-up acute blood loss anemia  Saw after enteroscopy. States she had a bowel movement but didn't notice color. Denies bloody stool. No other complaints. Review of Systems:  Full ROS assessed and negative except as noted above.     Objective:     VITALS:   Last 24hrs VS reviewed since prior progress note. Most recent are:  Patient Vitals for the past 24 hrs:   Temp Pulse Resp BP SpO2   10/14/22 1545 98.3 °F (36.8 °C) 61 16 135/62 97 %   10/14/22 1200 -- 76 -- -- --   10/14/22 1130 97.6 °F (36.4 °C) 62 20 (!) 151/68 98 %   10/14/22 0928 98.2 °F (36.8 °C) 62 20 (!) 143/64 96 %   10/14/22 0858 -- 66 24 (!) 168/61 98 %   10/14/22 0854 -- 65 22 (!) 83/67 98 %   10/14/22 0851 -- 64 16 (!) 182/49 97 %   10/14/22 0848 -- 65 22 (!) 168/49 96 %   10/14/22 0845 -- 73 20 (!) 144/65 94 %   10/14/22 0842 98.1 °F (36.7 °C) 65 (!) 7 (!) 153/82 96 %   10/14/22 0841 -- -- -- (!) 133/48 92 %   10/14/22 0837 -- 60 21 101/63 100 %   10/14/22 0833 -- 60 21 (!) 152/44 100 %   10/14/22 0800 -- 70 -- -- --   10/14/22 0737 98.1 °F (36.7 °C) 67 22 (!) 170/62 98 %   10/14/22 0400 98 °F (36.7 °C) 68 18 (!) 142/74 96 %   10/13/22 2339 98 °F (36.7 °C) 60 18 (!) 154/70 97 %   10/13/22 1939 98.1 °F (36.7 °C) 67 18 (!) 144/64 98 %         Intake/Output Summary (Last 24 hours) at 10/14/2022 1633  Last data filed at 10/14/2022 0849  Gross per 24 hour   Intake 300 ml   Output 1150 ml   Net -850 ml          I had a face to face encounter and independently examined this patient on 10/14/2022, as outlined below:    PHYSICAL EXAM:  General: Awake, No acute distress  EENT:  EOMI. Anicteric sclerae. MMM  Resp:  CTA bilaterally, no wheezing or rales. No accessory muscle use  CV:  Regular  rhythm,  No edema  GI:  Soft, Non distended, Non tender. +Bowel sounds  Neurologic:  Alert and oriented X 3, normal speech,   Psych:   Not anxious nor agitated  Skin:  No rashes.   No jaundice    Reviewed most current lab test results and cultures  YES  Reviewed most current radiology test results   YES  Review and summation of old records today    NO  Reviewed patient's current orders and MAR    YES  PMH/SH reviewed - no change compared to H&P  ________________________________________________________________________  Care Plan discussed with:    Comments Patient y    Family      RN y    Care Manager     Consultant                       y Multidiciplinary team rounds were held today with , nursing, pharmacist and clinical coordinator. Patient's plan of care was discussed; medications were reviewed and discharge planning was addressed. ________________________________________________________________________    Procedures: see electronic medical records for all procedures/Xrays and details which were not copied into this note but were reviewed prior to creation of Plan. LABS:  I reviewed today's most current labs and imaging studies. Pertinent labs include:  Recent Labs     10/14/22  0452 10/13/22  1139 10/12/22  0445   WBC 3.8 4.2 4.5   HGB 7.4* 8.3* 7.1*   HCT 25.2* 27.8* 23.6*   * 95* 94*       Recent Labs     10/14/22  0452 10/13/22  1139    142   K 4.0 4.5   * 118*   CO2 22 21   * 126*   BUN 25* 27*   CREA 1.23* 1.22*   CA 9.5 9.3   MG 2.5*  --    PHOS 2.7  --        Enteroscopy 10/14/22  Impression:  -- atypical GAVE in antrum, treated with APC previously, now  with some superficial ulceration and inflammation as it heals. A few lingering spots treated today with APC as above.   -- no clear second, more distal lesion identified to correspond to pill camera images, though  pediatric colonoscopy slid fairly easily through the duodenum and to proximal jejunum    Signed: Margaux Price MD

## 2022-10-14 NOTE — PROGRESS NOTES
Susy Teddy  1945  [id number]    Situation:  Verbal report received from: [address], RN  Procedure: Procedure(s):  EGD+PUSH enteroscopy ([facility])  ENDOSCOPIC ARGON PLASMA COAGULATION    Background:    Preoperative diagnosis: AVM (arteriovenous malformation) of small bowel, acquired with hemorrhage [K55.21]  Postoperative diagnosis: AVM's, GAVE    :  Dr. Jorden Montes  Assistant(s): Endoscopy Technician-1: Brianda Florian  Endoscopy RN-1: Nannette Pineda RN  Endoscopy RN-2: Feng Handy RN    Specimens: * No specimens in log *  H. Pylori  no    Assessment:  Intra-procedure medications   \  Anesthesia gave intra-procedure sedation and medications, see anesthesia flow sheet yes    Intravenous fluids: NS@ KVO     Vital signs stable yes    Abdominal assessment: round and soft yes    Recommendation:  Discharge patient per MD order n/a.   Return to floor yes  Family or Orlie Alla, caregiver  Permission to share finding with family or friend yes

## 2022-10-14 NOTE — PROCEDURES
NAME:  Kaylin Graham   :   1945   MRN:   [id number]     Date/Time:  10/14/2022 11:44 AM    EGD + push Enterosocpy Procedure Note    Procedure: Push enteroscopy with argon plasma coagulation    Indication: CARLINE  Pre-operative Diagnosis: see indication above  Post-operative Diagnosis: see findings below  :  Ashutosh Medeiros MD  Referring Provider:   Kita Vasquez MD    Exam:  Airway: clear, no airway problems anticipated  Heart: RRR, without gallops or rubs  Lungs: clear bilaterally without wheezes, crackles, or rhonchi  Abdomen: soft, nontender, nondistended, bowel sounds present  Mental Status: awake, alert and oriented to person, place and time     Anethesia/Sedation:  MAC anesthesia Propofol  Procedure Details   After informed consent was obtained for the procedure, with all risks and benefits of procedure explained the patient was taken to the endoscopy suite and placed in the left lateral decubitus position. Following sequential administration of sedation as per above, the pediatric colonoscope was inserted into the mouth and advanced under direct vision to the proximal jejunum. A careful inspection was made as the gastroscope was withdrawn, including a retroflexed view of the proximal stomach; findings and interventions are described below. Findings:   OROPHARYNX: Cords and pyriform recesses normal.   ESOPHAGUS: The esophagus is normal. The proximal, mid, and distal portions are normal. The Z-Line is intact. No esophageal varices. STOMACH: The fundus on antegrade and retroflex views is normal. 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 Antrum treated extensively with APC during last EGD, is now healing over, in some areas there are superficial ulcers and inflammation. A few additional areas treated with APC. SMALL INTESTINE: colonoscope smoothly passes to the distal duodenum, and the proximal jejunum.  Mid duodenum, in the distal third portion a single AVM located, treated with APC. There is a second more distal lesion, which was seen on withdrawal of the endoscope, also treated with APC, though it could be related to scope trauma. Therapies:  APC to Antrum, duodenum    Specimens: none    EBL:  minimal.         Complications:   None; patient tolerated the procedure well. Impression:  -- atypical GAVE in antrum, treated with APC previously, now  with some superficial ulceration and inflammation as it heals. A few lingering spots treated today with APC as above. -- no clear second, more distal lesion identified to correspond to pill camera images, though  pediatric colonoscopy slid fairly easily through the duodenum and to proximal jejunum    Recommendations:  -- serial H/H  -- continue PPI BID  -- continue sucralfate QID  -- will consider CT enterography to characterize distal lesion as renal function improves, or if rapid bleeding CTA, to further localize bleeding  -- if bleeding continues, may need transfer to [facility] for enteroscopy with single balloon.      Discharge disposition:  back to wards    Vinh Childs MD

## 2022-10-14 NOTE — PERIOP NOTES
TRANSFER - OUT REPORT:    Verbal report given to [facility] RN(name) on Tejal Parks  being transferred to [facility](unit) for routine progression of care       Report consisted of patients Situation, Background, Assessment and   Recommendations(SBAR). Information from the following report(s) SBAR was reviewed with the receiving nurse. Lines:   Extended Dwell Peripheral IV (Active)   Criteria for Appropriate Use Limited/no vessel suitable for conventional peripheral access 10/12/22 1951   Site Assessment Clean, dry, & intact 10/13/22 0827   Phlebitis Assessment 0 10/13/22 0827   Infiltration Assessment 0 10/13/22 0827   External Catheter Length (cm) 0 centimeters 10/11/22 1109   Line Status Blood return noted;Brisk blood return;Capped;Normal saline locked 10/11/22 1109   Alcohol Cap Used Yes 10/11/22 1109   Date of Last Dressing Change 10/11/22 10/11/22 1109   Dressing Type Disk with Chlorhexadine gluconate (CHG); Transparent 10/11/22 1109   Dressing Status Clean, dry, & intact 10/13/22 0827   Dressing Intervention Dressing reinforced 10/13/22 0827        Opportunity for questions and clarification was provided.       Patient transported with:   chart

## 2022-10-14 NOTE — PROGRESS NOTES
End of Shift Note    Bedside shift change report given to Florentin Jeong RN (oncoming nurse) by Rachael Jaramillo RN (offgoing nurse). Report included the following information SBAR, Kardex, Intake/Output, and MAR    Shift worked:  Night   Shift summary and any significant changes:     Uneventful night, call bell and phone within reach of patient. I, bed alarm on 2, able to make needs known.  NPO   For EGD today     Concerns for physician to address:  none   Zone phone for oncoming shift:   [phone]     Patient Information  Maliha Sigala  68 y.o.  10/6/2022 11:15 AM by Leonidas Tatum MD. Maliha Sigala was admitted from Home    Problem List  Patient Active Problem List    Diagnosis Date Noted    Acute anemia 10/06/2022    Anemia 08/05/2022    SSS (sick sinus syndrome) ([address] 06/27/2022    PAF (paroxysmal atrial fibrillation) ([address] 06/24/2022    Stage 3a chronic kidney disease ([address] 05/20/2022    Type 2 diabetes mellitus with chronic kidney disease ([address] 11/18/2021    Abnormal nuclear stress test 10/04/2021    Angina at rest [facility]) 10/04/2021    S/P cardiac cath 10/04/2021    Nonrheumatic aortic valve stenosis 09/08/2021    Pulmonary hypertension ([address] 09/08/2021    MICHELLE (acute kidney injury) ([address] 10/27/2020    Cellulitis of left lower leg 11/07/2019    Severe obesity ([address] 09/30/2019    Controlled type 2 diabetes mellitus without complication ([address] 77/29/2670    DDD (degenerative disc disease), lumbar 11/27/2017    Prediabetes 04/11/2016    Thrombocytopenia ([address] 01/24/2015    HTN (hypertension) 01/17/2014    Bifascicular block 12/23/2010    Fatty liver 06/18/2010    Pure hypercholesterolemia 06/18/2010    Colon polyp 06/18/2010    Gout 06/14/2010    Hypothyroidism 06/14/2010    Osteoporosis 06/14/2010    Anxiety 06/14/2010    Leg edema 06/14/2010    RSD lower limb 06/14/2010     Past Medical History:   Diagnosis Date    Abnormal nuclear stress test 10/4/2021    Angina at rest [facility]) 10/4/2021    Arthritis     KNEE,gout Bundle branch block     Endocrine disease     HYPOTHYROIDISM    Fracture     Left distal femur (age 12 - pt fell)    Fracture     Left tibia 1990 (pt fell)    Fracture     Right wrist fractured 2 times (pt fell)    GERD (gastroesophageal reflux disease)     High cholesterol     Hypertension     Hypoglycemia     \"my body produces too much insulin\"    Liver disease     BRADY    Nausea & vomiting     when had gallbladder out    Osteoporosis     Other ill-defined conditions(799.89)     edema legs    S/P cardiac cath 10/4/2021    10/4/2021 nonobstructive disease    Spinal stenosis     Thyroid disease          Activity:  Activity Level: Up with Assistance  Number times ambulated in hallways past shift: 0  Number of times OOB to chair past shift: 0    Cardiac:   Cardiac Monitoring: Yes      Cardiac Rhythm: AV Paced    Access:   Current line(s): PIV     Genitourinary:   Urinary status: external catheter    Respiratory:   O2 Device: None (Room air)  Chronic home O2 use?: NO  Incentive spirometer at bedside: NO       GI:  Last Bowel Movement Date: 10/13/22  Current diet:  DIET ONE TIME MESSAGE  DIET ONE TIME MESSAGE  DIET NPO  Passing flatus: YES  Tolerating current diet: YES       Pain Management:   Patient states pain is manageable on current regimen: YES    Skin:  Boris Score: 19  Interventions: float heels    Patient Safety:  Fall Score:  Total Score: 3  Interventions: gripper socks and pt to call before getting OOB  High Fall Risk: Yes  @Rollbelt  @dexterity to release roll belt  Yes/No ( must document dexterity  here by stating Yes or No here, otherwise this is a restraint and must follow restraint documentation policy.)    DVT prophylaxis:  DVT prophylaxis Med- Yes  DVT prophylaxis SCD or MARIETTA- Yes     Wounds: (If Applicable)  Wounds- Yes  Location Lower extremity    Active Consults:  IP CONSULT TO GASTROENTEROLOGY  IP CONSULT TO HEMATOLOGY  IP CONSULT TO HOSPITALIST    Length of Stay:  Expected LOS: 3d 0h  Actual LOS: 8  Discharge Plan: Marianela Camejo RN

## 2022-10-14 NOTE — ANESTHESIA POSTPROCEDURE EVALUATION
Procedure(s):  EGD+PUSH enteroscopy (PCF)  ENDOSCOPIC ARGON PLASMA COAGULATION. total IV anesthesia    Anesthesia Post Evaluation        Patient location during evaluation: PACU  Note status: Adequate. Level of consciousness: responsive to verbal stimuli and sleepy but conscious  Pain management: satisfactory to patient  Airway patency: patent  Anesthetic complications: no  Cardiovascular status: acceptable  Respiratory status: acceptable  Hydration status: acceptable  Comments: +Post-Anesthesia Evaluation and Assessment    Patient: Derek Llanos MRN: [id number]  SSN: [id number]   YOB: 1945  Age: 68 y.o. Sex: female      Cardiovascular Function/Vital Signs    BP (!) 144/65   Pulse 73   Temp 36.7 °C (98.1 °F)   Resp 20   Ht 4' 11\" (1.499 m)   Wt 97.5 kg (215 lb)   SpO2 94%   Breastfeeding No   BMI 43.42 kg/m²     Patient is status post Procedure(s):  EGD+PUSH enteroscopy (PCF)  ENDOSCOPIC ARGON PLASMA COAGULATION. Nausea/Vomiting: Controlled. Postoperative hydration reviewed and adequate. Pain:  Pain Scale 1: Numeric (0 - 10) (10/14/22 0842)  Pain Intensity 1: 0 (10/14/22 0842)   Managed. Neurological Status: At baseline. Mental Status and Level of Consciousness: Arousable. Pulmonary Status:   O2 Device: None (Room air) (10/14/22 0845)   Adequate oxygenation and airway patent. Complications related to anesthesia: None    Post-anesthesia assessment completed. No concerns. Signed By: Gloria Cardoza DO    10/14/2022  Post anesthesia nausea and vomiting:  controlled      INITIAL Post-op Vital signs:   Vitals Value Taken Time   /49 10/14/22 0848   Temp 36.7 °C (98.1 °F) 10/14/22 0842   Pulse 64 10/14/22 0851   Resp 16 10/14/22 0851   SpO2 97 % 10/14/22 0851   Vitals shown include unvalidated device data.

## 2022-10-15 LAB
ANION GAP SERPL CALC-SCNC: 4 MMOL/L (ref 5–15)
BASOPHILS # BLD: 0 K/UL (ref 0–0.1)
BASOPHILS NFR BLD: 1 % (ref 0–1)
BUN SERPL-MCNC: 24 MG/DL (ref 6–20)
BUN/CREAT SERPL: 20 (ref 12–20)
CALCIUM SERPL-MCNC: 9.5 MG/DL (ref 8.5–10.1)
CHLORIDE SERPL-SCNC: 116 MMOL/L (ref 97–108)
CO2 SERPL-SCNC: 24 MMOL/L (ref 21–32)
CREAT SERPL-MCNC: 1.23 MG/DL (ref 0.55–1.02)
DIFFERENTIAL METHOD BLD: ABNORMAL
EOSINOPHIL # BLD: 0.2 K/UL (ref 0–0.4)
EOSINOPHIL NFR BLD: 6 % (ref 0–7)
ERYTHROCYTE [DISTWIDTH] IN BLOOD BY AUTOMATED COUNT: 23.8 % (ref 11.5–14.5)
GLUCOSE SERPL-MCNC: 108 MG/DL (ref 65–100)
HCT VFR BLD AUTO: 25.4 % (ref 35–47)
HGB BLD-MCNC: 7.8 G/DL (ref 11.5–16)
IMM GRANULOCYTES # BLD AUTO: 0 K/UL (ref 0–0.04)
IMM GRANULOCYTES NFR BLD AUTO: 1 % (ref 0–0.5)
LYMPHOCYTES # BLD: 0.6 K/UL (ref 0.8–3.5)
LYMPHOCYTES NFR BLD: 16 % (ref 12–49)
MAGNESIUM SERPL-MCNC: 2.1 MG/DL (ref 1.6–2.4)
MCH RBC QN AUTO: 30.8 PG (ref 26–34)
MCHC RBC AUTO-ENTMCNC: 30.7 G/DL (ref 30–36.5)
MCV RBC AUTO: 100.4 FL (ref 80–99)
MONOCYTES # BLD: 0.4 K/UL (ref 0–1)
MONOCYTES NFR BLD: 10 % (ref 5–13)
NEUTS SEG # BLD: 2.4 K/UL (ref 1.8–8)
NEUTS SEG NFR BLD: 66 % (ref 32–75)
NRBC # BLD: 0 K/UL (ref 0–0.01)
NRBC BLD-RTO: 0 PER 100 WBC
PHOSPHATE SERPL-MCNC: 2.8 MG/DL (ref 2.6–4.7)
PLATELET # BLD AUTO: 100 K/UL (ref 150–400)
PMV BLD AUTO: 10.7 FL (ref 8.9–12.9)
POTASSIUM SERPL-SCNC: 3.6 MMOL/L (ref 3.5–5.1)
RBC # BLD AUTO: 2.53 M/UL (ref 3.8–5.2)
RBC MORPH BLD: ABNORMAL
SODIUM SERPL-SCNC: 144 MMOL/L (ref 136–145)
WBC # BLD AUTO: 3.6 K/UL (ref 3.6–11)

## 2022-10-15 PROCEDURE — 83735 ASSAY OF MAGNESIUM: CPT

## 2022-10-15 PROCEDURE — 74011250637 HC RX REV CODE- 250/637: Performed by: INTERNAL MEDICINE

## 2022-10-15 PROCEDURE — 74011000250 HC RX REV CODE- 250: Performed by: INTERNAL MEDICINE

## 2022-10-15 PROCEDURE — 84100 ASSAY OF PHOSPHORUS: CPT

## 2022-10-15 PROCEDURE — 65270000046 HC RM TELEMETRY

## 2022-10-15 PROCEDURE — 74011250636 HC RX REV CODE- 250/636: Performed by: INTERNAL MEDICINE

## 2022-10-15 PROCEDURE — C9113 INJ PANTOPRAZOLE SODIUM, VIA: HCPCS | Performed by: INTERNAL MEDICINE

## 2022-10-15 PROCEDURE — 36415 COLL VENOUS BLD VENIPUNCTURE: CPT

## 2022-10-15 PROCEDURE — 80048 BASIC METABOLIC PNL TOTAL CA: CPT

## 2022-10-15 PROCEDURE — 74011250637 HC RX REV CODE- 250/637: Performed by: STUDENT IN AN ORGANIZED HEALTH CARE EDUCATION/TRAINING PROGRAM

## 2022-10-15 PROCEDURE — 85025 COMPLETE CBC W/AUTO DIFF WBC: CPT

## 2022-10-15 RX ADMIN — CASTOR OIL AND BALSAM, PERU: 788; 87 OINTMENT TOPICAL at 22:09

## 2022-10-15 RX ADMIN — CASTOR OIL AND BALSAM, PERU: 788; 87 OINTMENT TOPICAL at 08:19

## 2022-10-15 RX ADMIN — SODIUM CHLORIDE 40 MG: 9 INJECTION INTRAMUSCULAR; INTRAVENOUS; SUBCUTANEOUS at 08:19

## 2022-10-15 RX ADMIN — POTASSIUM CHLORIDE 10 MEQ: 750 TABLET, FILM COATED, EXTENDED RELEASE ORAL at 08:19

## 2022-10-15 RX ADMIN — SUCRALFATE 1 G: 1 TABLET ORAL at 17:31

## 2022-10-15 RX ADMIN — ALLOPURINOL 100 MG: 100 TABLET ORAL at 08:18

## 2022-10-15 RX ADMIN — SODIUM CHLORIDE 40 MG: 9 INJECTION INTRAMUSCULAR; INTRAVENOUS; SUBCUTANEOUS at 22:05

## 2022-10-15 RX ADMIN — LEVOTHYROXINE SODIUM 150 MCG: 0.15 TABLET ORAL at 06:04

## 2022-10-15 RX ADMIN — SUCRALFATE 1 G: 1 TABLET ORAL at 12:48

## 2022-10-15 RX ADMIN — SUCRALFATE 1 G: 1 TABLET ORAL at 22:05

## 2022-10-15 RX ADMIN — SODIUM CHLORIDE, PRESERVATIVE FREE 10 ML: 5 INJECTION INTRAVENOUS at 22:10

## 2022-10-15 RX ADMIN — ALLOPURINOL 100 MG: 100 TABLET ORAL at 17:31

## 2022-10-15 RX ADMIN — SODIUM CHLORIDE, PRESERVATIVE FREE 10 ML: 5 INJECTION INTRAVENOUS at 06:05

## 2022-10-15 RX ADMIN — SODIUM CHLORIDE, PRESERVATIVE FREE 10 ML: 5 INJECTION INTRAVENOUS at 12:49

## 2022-10-15 RX ADMIN — POLYETHYLENE GLYCOL 3350 17 G: 17 POWDER, FOR SOLUTION ORAL at 08:19

## 2022-10-15 RX ADMIN — EZETIMIBE 10 MG: 10 TABLET ORAL at 08:18

## 2022-10-15 RX ADMIN — FUROSEMIDE 80 MG: 40 TABLET ORAL at 08:18

## 2022-10-15 RX ADMIN — ATORVASTATIN CALCIUM 20 MG: 20 TABLET, FILM COATED ORAL at 08:19

## 2022-10-15 RX ADMIN — AMLODIPINE BESYLATE 10 MG: 5 TABLET ORAL at 08:18

## 2022-10-15 RX ADMIN — SUCRALFATE 1 G: 1 TABLET ORAL at 08:18

## 2022-10-15 RX ADMIN — Medication: at 08:20

## 2022-10-15 NOTE — PROGRESS NOTES
End of Shift Note     Bedside shift change report given to Nurys Villalta RN (oncoming nurse) by Bhupendra Olson RN (offgoing nurse).   Report included the following information SBAR, Kardex, Intake/Output, and MAR     Shift worked: 7p-7a   Shift summary and any significant changes:     none   Concerns for physician to address:  none   Zone phone for oncoming shift:   [phone]      Patient Information  Ousmane Gill  68 y.o.  10/6/2022 11:15 AM by Ozzy Nails MD. Ousmane Gill was admitted from Home     Problem List       Patient Active Problem List     Diagnosis Date Noted    Acute anemia 10/06/2022    Anemia 08/05/2022    SSS (sick sinus syndrome) ([address] 06/27/2022    PAF (paroxysmal atrial fibrillation) ([address] 06/24/2022    Stage 3a chronic kidney disease ([address] 05/20/2022    Type 2 diabetes mellitus with chronic kidney disease ([address] 11/18/2021    Abnormal nuclear stress test 10/04/2021    Angina at rest [facility]) 10/04/2021    S/P cardiac cath 10/04/2021    Nonrheumatic aortic valve stenosis 09/08/2021    Pulmonary hypertension ([address] 09/08/2021    MICHELLE (acute kidney injury) ([address] 10/27/2020    Cellulitis of left lower leg 11/07/2019    Severe obesity ([address] 09/30/2019    Controlled type 2 diabetes mellitus without complication ([address] 45/64/6883    DDD (degenerative disc disease), lumbar 11/27/2017    Prediabetes 04/11/2016    Thrombocytopenia ([address] 01/24/2015    HTN (hypertension) 01/17/2014    Bifascicular block 12/23/2010    Fatty liver 06/18/2010    Pure hypercholesterolemia 06/18/2010    Colon polyp 06/18/2010    Gout 06/14/2010    Hypothyroidism 06/14/2010    Osteoporosis 06/14/2010    Anxiety 06/14/2010    Leg edema 06/14/2010    RSD lower limb 06/14/2010           Past Medical History:   Diagnosis Date    Abnormal nuclear stress test 10/4/2021    Angina at rest [facility]) 10/4/2021    Arthritis       KNEE,gout    Bundle branch block      Endocrine disease       HYPOTHYROIDISM    Fracture       Left distal femur (age 12 - pt fell)    Fracture       Left tibia 1990 (pt fell)    Fracture       Right wrist fractured 2 times (pt fell)    GERD (gastroesophageal reflux disease)      High cholesterol      Hypertension      Hypoglycemia       \"my body produces too much insulin\"    Liver disease       BRADY    Nausea & vomiting       when had gallbladder out    Osteoporosis      Other ill-defined conditions(799.89)       edema legs    S/P cardiac cath 10/4/2021     10/4/2021 nonobstructive disease    Spinal stenosis      Thyroid disease              Activity:  Activity Level: Up with Assistance  Number times ambulated in hallways past shift: 0  Number of times OOB to chair past shift: 0     Cardiac:   Cardiac Monitoring: Yes      Cardiac Rhythm: AV Paced     Access:   Current line(s): PIV      Genitourinary:   Urinary status: external catheter     Respiratory:   O2 Device: None (Room air)  Chronic home O2 use?: NO  Incentive spirometer at bedside: NO     GI:  Last Bowel Movement Date: 10/13/22  Current diet:  DIET ONE TIME MESSAGE  DIET ONE TIME MESSAGE  DIET NPO  Passing flatus: YES  Tolerating current diet: YES     Pain Management:   Patient states pain is manageable on current regimen: YES     Skin:  Boris Score: 19  Interventions: float heels    Patient Safety:  Fall Score:  Total Score: 3  Interventions: gripper socks and pt to call before getting OOB  High Fall Risk: Yes  @Rollbelt  @dexterity to release roll belt  Yes/No ( must document dexterity  here by stating Yes or No here, otherwise this is a restraint and must follow restraint documentation policy.)     DVT prophylaxis:  DVT prophylaxis Med- Yes  DVT prophylaxis SCD or MARIETTA- Yes      Wounds: (If Applicable)  Wounds- Yes  Location Lower extremity     Active Consults:  IP CONSULT TO GASTROENTEROLOGY  IP CONSULT TO HEMATOLOGY  IP CONSULT TO HOSPITALIST     Length of Stay:  Expected LOS: 3d 0h  Actual LOS: 8  Discharge Plan: No

## 2022-10-15 NOTE — PROGRESS NOTES
End of Shift Note     Bedside shift change report given to Arlet Todd RN (oncoming nurse) by Derick Mace RN (offgoing nurse). Report included the following information SBAR, Kardex, Intake/Output, and MAR     Shift worked: 7p-7a   Shift summary and any significant changes:     EGD completed, unable to find source of bleeding.      if bleeding continues, may need transfer to [facility] for enteroscopy with single balloon      Concerns for physician to address:  none   Zone phone for oncoming shift:   [phone]      Patient Information  Shanna Ron  68 y.o.  10/6/2022 11:15 AM by Viet Hartman MD. Shanna Ron was admitted from Home     Problem List       Patient Active Problem List     Diagnosis Date Noted    Acute anemia 10/06/2022    Anemia 08/05/2022    SSS (sick sinus syndrome) ([address] 06/27/2022    PAF (paroxysmal atrial fibrillation) ([address] 06/24/2022    Stage 3a chronic kidney disease ([address] 05/20/2022    Type 2 diabetes mellitus with chronic kidney disease ([address] 11/18/2021    Abnormal nuclear stress test 10/04/2021    Angina at rest [facility]) 10/04/2021    S/P cardiac cath 10/04/2021    Nonrheumatic aortic valve stenosis 09/08/2021    Pulmonary hypertension ([address] 09/08/2021    MICHELLE (acute kidney injury) ([address] 10/27/2020    Cellulitis of left lower leg 11/07/2019    Severe obesity ([address] 09/30/2019    Controlled type 2 diabetes mellitus without complication ([address] 14/61/5012    DDD (degenerative disc disease), lumbar 11/27/2017    Prediabetes 04/11/2016    Thrombocytopenia ([address] 01/24/2015    HTN (hypertension) 01/17/2014    Bifascicular block 12/23/2010    Fatty liver 06/18/2010    Pure hypercholesterolemia 06/18/2010    Colon polyp 06/18/2010    Gout 06/14/2010    Hypothyroidism 06/14/2010    Osteoporosis 06/14/2010    Anxiety 06/14/2010    Leg edema 06/14/2010    RSD lower limb 06/14/2010           Past Medical History:   Diagnosis Date    Abnormal nuclear stress test 10/4/2021    Angina at rest [facility]) 10/4/2021 Arthritis       KNEE,gout    Bundle branch block      Endocrine disease       HYPOTHYROIDISM    Fracture       Left distal femur (age 12 - pt fell)    Fracture       Left tibia 1990 (pt fell)    Fracture       Right wrist fractured 2 times (pt fell)    GERD (gastroesophageal reflux disease)      High cholesterol      Hypertension      Hypoglycemia       \"my body produces too much insulin\"    Liver disease       BRADY    Nausea & vomiting       when had gallbladder out    Osteoporosis      Other ill-defined conditions(799.89)       edema legs    S/P cardiac cath 10/4/2021     10/4/2021 nonobstructive disease    Spinal stenosis      Thyroid disease              Activity:  Activity Level: Up with Assistance  Number times ambulated in hallways past shift: 0  Number of times OOB to chair past shift: 0     Cardiac:   Cardiac Monitoring: Yes      Cardiac Rhythm: AV Paced     Access:   Current line(s): PIV      Genitourinary:   Urinary status: external catheter     Respiratory:   O2 Device: None (Room air)  Chronic home O2 use?: NO  Incentive spirometer at bedside: NO     GI:  Last Bowel Movement Date: 10/13/22  Current diet:  DIET ONE TIME MESSAGE  DIET ONE TIME MESSAGE  DIET NPO  Passing flatus: YES  Tolerating current diet: YES     Pain Management:   Patient states pain is manageable on current regimen: YES     Skin:  Boris Score: 19  Interventions: float heels    Patient Safety:  Fall Score:  Total Score: 3  Interventions: gripper socks and pt to call before getting OOB  High Fall Risk: Yes  @Rollbelt  @dexterity to release roll belt  Yes/No ( must document dexterity  here by stating Yes or No here, otherwise this is a restraint and must follow restraint documentation policy.)     DVT prophylaxis:  DVT prophylaxis Med- Yes  DVT prophylaxis SCD or MARIETTA- Yes      Wounds: (If Applicable)  Wounds- Yes  Location Lower extremity     Active Consults:  IP CONSULT TO GASTROENTEROLOGY  IP CONSULT TO HEMATOLOGY  IP CONSULT TO HOSPITALIST     Length of Stay:  Expected LOS: 3d 0h  Actual LOS: 8  Discharge Plan: No

## 2022-10-15 NOTE — PROGRESS NOTES
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 No active GI bleed  - pill endoscopy images reviewed 10/12/22 by GI. 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 Showed prior atypical GAVE s/p treatment with APC. No clear second lesions identified  - monitoring CBC  -- will consider CT enterography to characterize distal lesion as renal function improves, or if rapid bleeding -> CTA, to further localize bleeding  -- if bleeding continues, may need transfer to [facility] for enteroscopy with single balloon. - HgB stable 10/15/22    Iron study s/o CARLINE, iv iron ordered. Heme onc Consulted    Acute kidney injury - resolved  Creatinine improved  - resumed home lasix     Hypertension  GERD  Dyslipidemia  Hypothyroidism  Gouty arthritis  Atrial fibrillation  History of sick sinus syndrome s/p pacemaker placement  -Continue Norvasc, PPI, Lipitor, levothyroxine, allopurinol  -Her anticoagulation was discontinued previously due to anemia     Code Status: Full code  Surrogate Decision Maker: Son     DVT Prophylaxis: SCDs due to anemia  GI Prophylaxis: not indicated     Baseline: From home, independent of ADLs    MAYURI: 10/16  Barriers: GI clearance     Subjective:     Chief Complaint / Reason for Physician Visit  Follow-up acute blood loss anemia  No complaints today. States she had a brown bowel movement yesterday, denies melena and hematochezia. Review of Systems:  Full ROS assessed and negative except as noted above.     Objective:     VITALS:   Last 24hrs VS reviewed since prior progress note. Most recent are:  Patient Vitals for the past 24 hrs:   Temp Pulse Resp BP SpO2   10/15/22 1159 98 °F (36.7 °C) 61 16 131/63 97 %   10/15/22 0832 98.2 °F (36.8 °C) 60 18 (!) 141/65 95 %   10/15/22 0430 98.9 °F (37.2 °C) 65 17 (!) 132/55 98 %   10/14/22 2323 98.9 °F (37.2 °C) 63 18 (!) 146/67 96 %   10/14/22 2025 98 °F (36.7 °C) 62 20 (!) 149/61 98 %       No intake or output data in the 24 hours ending 10/15/22 1623       I had a face to face encounter and independently examined this patient on 10/15/2022, as outlined below:    PHYSICAL EXAM:  General: Awake, No acute distress  EENT:  EOMI. Anicteric sclerae. MMM  Resp:  CTA bilaterally, no wheezing or rales. No accessory muscle use  CV:  Regular  rhythm,  No edema  GI:  Soft, Non distended, Non tender. +Bowel sounds  Neurologic:  Alert and oriented X 3, normal speech,   Psych:   Not anxious nor agitated  Skin:  No rashes. No jaundice    Reviewed most current lab test results and cultures  YES  Reviewed most current radiology test results   YES  Review and summation of old records today    NO  Reviewed patient's current orders and MAR    YES  PMH/ reviewed - no change compared to H&P  ________________________________________________________________________  Care Plan discussed with:    Comments   Patient y    Family      RN y    Care Manager     Consultant                       n Multidiciplinary team rounds were held today with , nursing, pharmacist and clinical coordinator. Patient's plan of care was discussed; medications were reviewed and discharge planning was addressed. ________________________________________________________________________    Procedures: see electronic medical records for all procedures/Xrays and details which were not copied into this note but were reviewed prior to creation of Plan. LABS:  I reviewed today's most current labs and imaging studies.   Pertinent labs include:  Recent Labs 10/15/22  0428 10/14/22  0452 10/13/22  1139   WBC 3.6 3.8 4.2   HGB 7.8* 7.4* 8.3*   HCT 25.4* 25.2* 27.8*   * 100* 95*       Recent Labs     10/15/22  0428 10/14/22  0452 10/13/22  1139    144 142   K 3.6 4.0 4.5   * 116* 118*   CO2 24 22 21   * 128* 126*   BUN 24* 25* 27*   CREA 1.23* 1.23* 1.22*   CA 9.5 9.5 9.3   MG 2.1 2.5*  --    PHOS 2.8 2.7  --        Enteroscopy 10/14/22  Impression:  -- atypical GAVE in antrum, treated with APC previously, now  with some superficial ulceration and inflammation as it heals. A few lingering spots treated today with APC as above.   -- no clear second, more distal lesion identified to correspond to pill camera images, though  pediatric colonoscopy slid fairly easily through the duodenum and to proximal jejunum    Signed: Yasemin Saucedo MD

## 2022-10-16 LAB
ANION GAP SERPL CALC-SCNC: 8 MMOL/L (ref 5–15)
BASOPHILS # BLD: 0 K/UL (ref 0–0.1)
BASOPHILS NFR BLD: 1 % (ref 0–1)
BUN SERPL-MCNC: 23 MG/DL (ref 6–20)
BUN/CREAT SERPL: 19 (ref 12–20)
CALCIUM SERPL-MCNC: 9.2 MG/DL (ref 8.5–10.1)
CHLORIDE SERPL-SCNC: 113 MMOL/L (ref 97–108)
CO2 SERPL-SCNC: 22 MMOL/L (ref 21–32)
CREAT SERPL-MCNC: 1.2 MG/DL (ref 0.55–1.02)
DIFFERENTIAL METHOD BLD: ABNORMAL
EOSINOPHIL # BLD: 0.2 K/UL (ref 0–0.4)
EOSINOPHIL NFR BLD: 5 % (ref 0–7)
ERYTHROCYTE [DISTWIDTH] IN BLOOD BY AUTOMATED COUNT: 23.6 % (ref 11.5–14.5)
GLUCOSE SERPL-MCNC: 129 MG/DL (ref 65–100)
HCT VFR BLD AUTO: 26 % (ref 35–47)
HGB BLD-MCNC: 7.9 G/DL (ref 11.5–16)
IMM GRANULOCYTES # BLD AUTO: 0 K/UL (ref 0–0.04)
IMM GRANULOCYTES NFR BLD AUTO: 1 % (ref 0–0.5)
LYMPHOCYTES # BLD: 0.6 K/UL (ref 0.8–3.5)
LYMPHOCYTES NFR BLD: 16 % (ref 12–49)
MAGNESIUM SERPL-MCNC: 2 MG/DL (ref 1.6–2.4)
MCH RBC QN AUTO: 30.6 PG (ref 26–34)
MCHC RBC AUTO-ENTMCNC: 30.4 G/DL (ref 30–36.5)
MCV RBC AUTO: 100.8 FL (ref 80–99)
MONOCYTES # BLD: 0.4 K/UL (ref 0–1)
MONOCYTES NFR BLD: 12 % (ref 5–13)
NEUTS SEG # BLD: 2.3 K/UL (ref 1.8–8)
NEUTS SEG NFR BLD: 65 % (ref 32–75)
NRBC # BLD: 0 K/UL (ref 0–0.01)
NRBC BLD-RTO: 0 PER 100 WBC
PHOSPHATE SERPL-MCNC: 2.7 MG/DL (ref 2.6–4.7)
PLATELET # BLD AUTO: 94 K/UL (ref 150–400)
PMV BLD AUTO: 10.2 FL (ref 8.9–12.9)
POTASSIUM SERPL-SCNC: 3.2 MMOL/L (ref 3.5–5.1)
RBC # BLD AUTO: 2.58 M/UL (ref 3.8–5.2)
RBC MORPH BLD: ABNORMAL
RBC MORPH BLD: ABNORMAL
SODIUM SERPL-SCNC: 143 MMOL/L (ref 136–145)
WBC # BLD AUTO: 3.5 K/UL (ref 3.6–11)

## 2022-10-16 PROCEDURE — 74011250636 HC RX REV CODE- 250/636: Performed by: INTERNAL MEDICINE

## 2022-10-16 PROCEDURE — 74011000250 HC RX REV CODE- 250: Performed by: INTERNAL MEDICINE

## 2022-10-16 PROCEDURE — 36415 COLL VENOUS BLD VENIPUNCTURE: CPT

## 2022-10-16 PROCEDURE — 80048 BASIC METABOLIC PNL TOTAL CA: CPT

## 2022-10-16 PROCEDURE — 83735 ASSAY OF MAGNESIUM: CPT

## 2022-10-16 PROCEDURE — 84100 ASSAY OF PHOSPHORUS: CPT

## 2022-10-16 PROCEDURE — 74011250637 HC RX REV CODE- 250/637: Performed by: INTERNAL MEDICINE

## 2022-10-16 PROCEDURE — 65270000046 HC RM TELEMETRY

## 2022-10-16 PROCEDURE — 74011250637 HC RX REV CODE- 250/637: Performed by: STUDENT IN AN ORGANIZED HEALTH CARE EDUCATION/TRAINING PROGRAM

## 2022-10-16 PROCEDURE — C9113 INJ PANTOPRAZOLE SODIUM, VIA: HCPCS | Performed by: INTERNAL MEDICINE

## 2022-10-16 PROCEDURE — 85025 COMPLETE CBC W/AUTO DIFF WBC: CPT

## 2022-10-16 RX ORDER — ALLOPURINOL 100 MG/1
100 TABLET ORAL 2 TIMES DAILY
Qty: 60 TABLET | Refills: 0 | Status: SHIPPED | OUTPATIENT
Start: 2022-10-16 | End: 2022-11-15

## 2022-10-16 RX ORDER — SUCRALFATE 1 G/1
1 TABLET ORAL
Qty: 120 TABLET | Refills: 0 | Status: SHIPPED | OUTPATIENT
Start: 2022-10-16 | End: 2022-11-15

## 2022-10-16 RX ORDER — POTASSIUM CHLORIDE 20 MEQ/1
40 TABLET, EXTENDED RELEASE ORAL
Status: COMPLETED | OUTPATIENT
Start: 2022-10-16 | End: 2022-10-16

## 2022-10-16 RX ADMIN — POTASSIUM CHLORIDE 10 MEQ: 750 TABLET, FILM COATED, EXTENDED RELEASE ORAL at 08:48

## 2022-10-16 RX ADMIN — SUCRALFATE 1 G: 1 TABLET ORAL at 17:35

## 2022-10-16 RX ADMIN — SODIUM CHLORIDE 40 MG: 9 INJECTION INTRAMUSCULAR; INTRAVENOUS; SUBCUTANEOUS at 08:48

## 2022-10-16 RX ADMIN — CASTOR OIL AND BALSAM, PERU: 788; 87 OINTMENT TOPICAL at 22:14

## 2022-10-16 RX ADMIN — SODIUM CHLORIDE, PRESERVATIVE FREE 10 ML: 5 INJECTION INTRAVENOUS at 06:49

## 2022-10-16 RX ADMIN — SODIUM CHLORIDE, PRESERVATIVE FREE 10 ML: 5 INJECTION INTRAVENOUS at 22:20

## 2022-10-16 RX ADMIN — ATORVASTATIN CALCIUM 20 MG: 20 TABLET, FILM COATED ORAL at 08:48

## 2022-10-16 RX ADMIN — AMLODIPINE BESYLATE 10 MG: 5 TABLET ORAL at 08:48

## 2022-10-16 RX ADMIN — POLYETHYLENE GLYCOL 3350 17 G: 17 POWDER, FOR SOLUTION ORAL at 08:47

## 2022-10-16 RX ADMIN — SUCRALFATE 1 G: 1 TABLET ORAL at 08:48

## 2022-10-16 RX ADMIN — SUCRALFATE 1 G: 1 TABLET ORAL at 22:14

## 2022-10-16 RX ADMIN — SUCRALFATE 1 G: 1 TABLET ORAL at 10:45

## 2022-10-16 RX ADMIN — SODIUM CHLORIDE, PRESERVATIVE FREE 10 ML: 5 INJECTION INTRAVENOUS at 17:35

## 2022-10-16 RX ADMIN — Medication: at 08:59

## 2022-10-16 RX ADMIN — EZETIMIBE 10 MG: 10 TABLET ORAL at 08:48

## 2022-10-16 RX ADMIN — ALLOPURINOL 100 MG: 100 TABLET ORAL at 17:35

## 2022-10-16 RX ADMIN — SODIUM CHLORIDE 40 MG: 9 INJECTION INTRAMUSCULAR; INTRAVENOUS; SUBCUTANEOUS at 22:14

## 2022-10-16 RX ADMIN — ALLOPURINOL 100 MG: 100 TABLET ORAL at 08:48

## 2022-10-16 RX ADMIN — POTASSIUM CHLORIDE 40 MEQ: 20 TABLET, EXTENDED RELEASE ORAL at 09:44

## 2022-10-16 RX ADMIN — SODIUM CHLORIDE, PRESERVATIVE FREE 10 ML: 5 INJECTION INTRAVENOUS at 17:36

## 2022-10-16 RX ADMIN — CASTOR OIL AND BALSAM, PERU: 788; 87 OINTMENT TOPICAL at 10:05

## 2022-10-16 RX ADMIN — LEVOTHYROXINE SODIUM 150 MCG: 0.15 TABLET ORAL at 06:46

## 2022-10-16 RX ADMIN — FUROSEMIDE 80 MG: 40 TABLET ORAL at 08:48

## 2022-10-16 NOTE — PROGRESS NOTES
Transition of Care Plan:     RUR: 21% - \"high risk\"  MAYURI: 10/17 at 10 AM  Disposition: Return to [facility] with   [facility] (RN/PT/OT) with [place] ROSALES. -Info added to AVS and updates sent through [employer].   -Pt really wanted [facility] but they declined d/t staffing. *DME (RW),: [facility] accepted and RW to be delivered to Home tomorrow afternoon.  -Update sent through [employer]  86. private duty caregiver support,- CG able to come around 10 AM to  Pt to transport her home. Follow up appointments: PCP : Michael Vazquez: 10/18 at 3 PM   GI: Luketic:   Oncology: [place]:     DME needed: DME (RW); [facility] accepted and  RW will be delivered to pt's home address  Transportation at [address] will transport 10/17 at [address] or means to access home: Pt has access to her home       IM Medicare Letter: 2nd IM delivered 10/16  Is patient a [place] and connected with the [place]? No              Caregiver Contact: Pt's son Melodie Blinks: [phone]) vs caregiver (Alicia Cage: [phone])  Discharge Caregiver contacted prior to discharge? Pt is updating family. Care Conference needed?: Not at this time    CM will continue to monitor discharge plan.       Althea William CM  Ext [phone]

## 2022-10-16 NOTE — PROGRESS NOTES
End of Shift Note     Bedside shift change report given to Son Coughlin (oncoming nurse) by Junior Kern RN (offgoing nurse).   Report included the following information SBAR, Kardex, Intake/Output, and MAR     Shift worked: days   Shift summary and any significant changes:     CM following for dc needs: planning to return to [facility] 10/17      Concerns for physician to address:  none   Zone phone for oncoming shift:   [phone]      Patient Information  Bertha Ruiz  68 y.o.  10/6/2022 11:15 AM by Toi Gongora MD. Bertha Ruiz was admitted from Home     Problem List          Patient Active Problem List     Diagnosis Date Noted    Acute anemia 10/06/2022    Anemia 08/05/2022    SSS (sick sinus syndrome) ([address] 06/27/2022    PAF (paroxysmal atrial fibrillation) ([address] 06/24/2022    Stage 3a chronic kidney disease ([address] 05/20/2022    Type 2 diabetes mellitus with chronic kidney disease ([address] 11/18/2021    Abnormal nuclear stress test 10/04/2021    Angina at rest [facility]) 10/04/2021    S/P cardiac cath 10/04/2021    Nonrheumatic aortic valve stenosis 09/08/2021    Pulmonary hypertension ([address] 09/08/2021    MICHELLE (acute kidney injury) ([address] 10/27/2020    Cellulitis of left lower leg 11/07/2019    Severe obesity ([address] 09/30/2019    Controlled type 2 diabetes mellitus without complication ([address] 40/36/1611    DDD (degenerative disc disease), lumbar 11/27/2017    Prediabetes 04/11/2016    Thrombocytopenia ([address] 01/24/2015    HTN (hypertension) 01/17/2014    Bifascicular block 12/23/2010    Fatty liver 06/18/2010    Pure hypercholesterolemia 06/18/2010    Colon polyp 06/18/2010    Gout 06/14/2010    Hypothyroidism 06/14/2010    Osteoporosis 06/14/2010    Anxiety 06/14/2010    Leg edema 06/14/2010    RSD lower limb 06/14/2010              Past Medical History:   Diagnosis Date    Abnormal nuclear stress test 10/4/2021    Angina at rest [facility]) 10/4/2021    Arthritis       KNEE,gout    Bundle branch block Endocrine disease       HYPOTHYROIDISM    Fracture       Left distal femur (age 12 - pt fell)    Fracture       Left tibia 1990 (pt fell)    Fracture       Right wrist fractured 2 times (pt fell)    GERD (gastroesophageal reflux disease)      High cholesterol      Hypertension      Hypoglycemia       \"my body produces too much insulin\"    Liver disease       BRADY    Nausea & vomiting       when had gallbladder out    Osteoporosis      Other ill-defined conditions(799.89)       edema legs    S/P cardiac cath 10/4/2021     10/4/2021 nonobstructive disease    Spinal stenosis      Thyroid disease              Activity:  Activity Level: Up with Assistance  Number times ambulated in hallways past shift: 0  Number of times OOB to chair past shift: 0     Cardiac:   Cardiac Monitoring: Yes      Cardiac Rhythm: AV Paced     Access:   Current line(s): PIV      Genitourinary:   Urinary status: external catheter     Respiratory:   O2 Device: None (Room air)  Chronic home O2 use?: NO  Incentive spirometer at bedside: NO     GI:  Last Bowel Movement Date: 10/13/22  Current diet:  DIET ONE TIME MESSAGE  DIET ONE TIME MESSAGE  DIET NPO  Passing flatus: YES  Tolerating current diet: YES     Pain Management:   Patient states pain is manageable on current regimen: YES     Skin:  Boris Score: 19  Interventions: float heels    Patient Safety:  Fall Score:  Total Score: 3  Interventions: gripper socks and pt to call before getting OOB  High Fall Risk: Yes  @Rollbelt  @dexterity to release roll belt  Yes/No ( must document dexterity  here by stating Yes or No here, otherwise this is a restraint and must follow restraint documentation policy.)     DVT prophylaxis:  DVT prophylaxis Med- Yes  DVT prophylaxis SCD or MARIETTA- Yes      Wounds: (If Applicable)  Wounds- Yes  Location Lower extremity     Active Consults:  IP CONSULT TO GASTROENTEROLOGY  IP CONSULT TO HEMATOLOGY  IP CONSULT TO HOSPITALIST     Length of Stay:  Expected LOS: 3d 0h  Actual LOS: 8  Discharge Plan: No

## 2022-10-16 NOTE — PROGRESS NOTES
Problem: Falls - Risk of  Goal: *Absence of Falls  Description: Document Shyla Vargasjoseph Fall Risk and appropriate interventions in the flowsheet. Outcome: Progressing Towards Goal  Note: Fall Risk Interventions:  Mobility Interventions: Utilize walker, cane, or other assistive device, Bed/chair exit alarm    Mentation Interventions: Adequate sleep, hydration, pain control    Medication Interventions: Bed/chair exit alarm    Elimination Interventions: Call light in reach, Bed/chair exit alarm    History of Falls Interventions: Bed/chair exit alarm         Problem: Patient Education: Go to Patient Education Activity  Goal: Patient/Family Education  Outcome: Progressing Towards Goal     Problem: Fluid Volume - Risk of, Imbalanced  Goal: *Balanced intake and output  Outcome: Progressing Towards Goal     Problem: Patient Education: Go to Patient Education Activity  Goal: Patient/Family Education  Outcome: Progressing Towards Goal     Problem: Pressure Injury - Risk of  Goal: *Prevention of pressure injury  Description: Document Boris Scale and appropriate interventions in the flowsheet.   Outcome: Progressing Towards Goal  Note: Pressure Injury Interventions:  Sensory Interventions: Assess changes in LOC    Moisture Interventions: Absorbent underpads    Activity Interventions: Pressure redistribution bed/mattress(bed type)    Mobility Interventions: HOB 30 degrees or less    Nutrition Interventions: Document food/fluid/supplement intake    Friction and Shear Interventions: Minimize layers                Problem: Patient Education: Go to Patient Education Activity  Goal: Patient/Family Education  Outcome: Progressing Towards Goal     Problem: Patient Education: Go to Patient Education Activity  Goal: Patient/Family Education  Outcome: Progressing Towards Goal     Problem: Patient Education: Go to Patient Education Activity  Goal: Patient/Family Education  Outcome: Progressing Towards Goal     Problem: Anemia Care Plan (Adult and Pediatric)  Goal: *Labs within defined limits  Outcome: Progressing Towards Goal  Goal: *Tolerates increased activity  Outcome: Progressing Towards Goal     Problem: Patient Education: Go to Patient Education Activity  Goal: Patient/Family Education  Outcome: Progressing Towards Goal

## 2022-10-16 NOTE — DISCHARGE INSTRUCTIONS
You were admitted for a low hemoglobin blood count, from a stomach bleed. You underwent endoscopies with the GI (stomach) doctors and received treatment. Your hemoglobin has stabilized and you are safe to discharge. Return to the nearest emergency room if you notice black or bloody bowel movements, unexplainable extreme fatigue, or uncontrolled bleeding. Please take the medications as listed in this paperwork and followup with the doctors listed in this paperwork. The contact numbers for their clinics is included.

## 2022-10-16 NOTE — PROGRESS NOTES
End of Shift Note    Bedside shift change report given to Zoraida Mancia, RN (oncoming nurse) by Ximena Mancera RN (offgoing nurse). Report included the following information SBAR, Kardex, Intake/Output, and MAR    Shift worked:  Night   Shift summary and any significant changes:     Uneventful night, call bell and phone within reach of patient. bed alarm on 2, able to make needs known.      Concerns for physician to address:  none   Zone phone for oncoming shift:   [phone]     Patient Information  Ronald Gilford  68 y.o.  10/6/2022 11:15 AM by Yanira Anderson MD. Ronald Gilford was admitted from Home    Problem List  Patient Active Problem List    Diagnosis Date Noted    Acute anemia 10/06/2022    Anemia 08/05/2022    SSS (sick sinus syndrome) ([address] 06/27/2022    PAF (paroxysmal atrial fibrillation) ([address] 06/24/2022    Stage 3a chronic kidney disease ([address] 05/20/2022    Type 2 diabetes mellitus with chronic kidney disease ([address] 11/18/2021    Abnormal nuclear stress test 10/04/2021    Angina at rest [facility]) 10/04/2021    S/P cardiac cath 10/04/2021    Nonrheumatic aortic valve stenosis 09/08/2021    Pulmonary hypertension ([address] 09/08/2021    MICHELLE (acute kidney injury) ([address] 10/27/2020    Cellulitis of left lower leg 11/07/2019    Severe obesity ([address] 09/30/2019    Controlled type 2 diabetes mellitus without complication ([address] 87/45/1332    DDD (degenerative disc disease), lumbar 11/27/2017    Prediabetes 04/11/2016    Thrombocytopenia ([address] 01/24/2015    HTN (hypertension) 01/17/2014    Bifascicular block 12/23/2010    Fatty liver 06/18/2010    Pure hypercholesterolemia 06/18/2010    Colon polyp 06/18/2010    Gout 06/14/2010    Hypothyroidism 06/14/2010    Osteoporosis 06/14/2010    Anxiety 06/14/2010    Leg edema 06/14/2010    RSD lower limb 06/14/2010     Past Medical History:   Diagnosis Date    Abnormal nuclear stress test 10/4/2021    Angina at rest [facility]) 10/4/2021    Arthritis     KNEE,gout    Bundle branch block Endocrine disease     HYPOTHYROIDISM    Fracture     Left distal femur (age 12 - pt fell)    Fracture     Left tibia 1990 (pt fell)    Fracture     Right wrist fractured 2 times (pt fell)    GERD (gastroesophageal reflux disease)     High cholesterol     Hypertension     Hypoglycemia     \"my body produces too much insulin\"    Liver disease     BRADY    Nausea & vomiting     when had gallbladder out    Osteoporosis     Other ill-defined conditions(799.89)     edema legs    S/P cardiac cath 10/4/2021    10/4/2021 nonobstructive disease    Spinal stenosis     Thyroid disease          Activity:  Activity Level: Up with Assistance  Number times ambulated in hallways past shift: 0  Number of times OOB to chair past shift: 0    Cardiac:   Cardiac Monitoring: Yes      Cardiac Rhythm: Ventricular Paced    Access:   Current line(s): PIV     Genitourinary:   Urinary status: external catheter    Respiratory:   O2 Device: None (Room air)  Chronic home O2 use?: NO  Incentive spirometer at bedside: NO       GI:  Last Bowel Movement Date: 10/15/22  Current diet:  ADULT DIET Clear Liquid  ADULT ORAL NUTRITION SUPPLEMENT AM Snack, PM Snack, HS Snack; Clear Liquid  Passing flatus: YES  Tolerating current diet: YES       Pain Management:   Patient states pain is manageable on current regimen: YES    Skin:  Boris Score: 18  Interventions: float heels    Patient Safety:  Fall Score:  Total Score: 3  Interventions: gripper socks and pt to call before getting OOB  High Fall Risk: Yes  @Rollbelt  @dexterity to release roll belt  Yes/No ( must document dexterity  here by stating Yes or No here, otherwise this is a restraint and must follow restraint documentation policy.)    DVT prophylaxis:  DVT prophylaxis Med- Yes  DVT prophylaxis SCD or MARIETTA- Yes     Wounds: (If Applicable)  Wounds- Yes  Location Lower extremity    Active Consults:  IP CONSULT TO GASTROENTEROLOGY  IP CONSULT TO HEMATOLOGY  IP CONSULT TO HOSPITALIST    Length of Stay:  Expected LOS: 3d 0h  Actual LOS: 10  Discharge Plan: Marianela Renae RN

## 2022-10-16 NOTE — PROGRESS NOTES
Hospitalist Progress Note    NAME: Susy Espinal   :  1945   MRN:  [id number]       Assessment / Plan:  Acute on chronic blood loss anemia  Atypical GAVE  History of cirrhosis secondary to [place]  History of recent EGD showing portal hypertensive gastropathy and colonoscopy showed normal mucosa with internal hemorrhoids  Deficiency anemia  S/p unit in total with hemoglobin of 3.4 repeat CBC after transfusion showed hemoglobin of 7.3>9.6. No active GI bleed  - pill endoscopy images reviewed 10/12/22 by GI. 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 Showed prior atypical GAVE s/p treatment with APC. No clear second lesions identified  - monitoring CBC  -- will consider CT enterography to characterize distal lesion as renal function improves, or if rapid bleeding -> CTA, to further localize bleeding  -- if bleeding continues, may need transfer to [facility] for enteroscopy with single balloon. - HgB stable, hemodynamically stable, spoke with on call GI Dr. Michael Anne 10/16/22  - medically ok for discharge with return precautions, monitoring for bleeding, melena, hematochezia    Iron study s/o CARLINE, iv iron ordered. Heme onc Consulted    Acute kidney injury - resolved  Creatinine improved  - resumed home lasix     Hypertension  GERD  Dyslipidemia  Hypothyroidism  Gouty arthritis  Atrial fibrillation  History of sick sinus syndrome s/p pacemaker placement  -Continue Norvasc, PPI, Lipitor, levothyroxine, allopurinol  -Her anticoagulation was discontinued previously due to anemia     Code Status: Full code  Surrogate Decision Maker: Son     DVT Prophylaxis: SCDs due to anemia  GI Prophylaxis: not indicated     Baseline: From home, independent of ADLs    MAYURI: 10/17  Barriers: securing HH.  Patient's caregiver out of town 10/16/22, will return 10/17/22     Subjective:     Chief Complaint / Reason for Physician Visit  Follow-up acute blood loss anemia  Patient has no complaints today. Had a brown bowel movement yesterday. No melena or hematochezia. Review of Systems:  Full ROS assessed and negative except as noted above. Objective:     VITALS:   Last 24hrs VS reviewed since prior progress note. Most recent are:  Patient Vitals for the past 24 hrs:   Temp Pulse Resp BP SpO2   10/16/22 1151 -- 60 -- -- --   10/16/22 1106 97.7 °F (36.5 °C) 69 18 138/62 97 %   10/16/22 0800 -- 63 -- -- --   10/16/22 0758 98.2 °F (36.8 °C) 61 16 (!) 148/64 95 %   10/16/22 0406 98 °F (36.7 °C) 64 18 111/69 96 %   10/16/22 0040 97.8 °F (36.6 °C) 69 18 (!) 140/68 98 %   10/15/22 2039 97.9 °F (36.6 °C) 64 16 (!) 152/67 98 %   10/15/22 1648 98 °F (36.7 °C) 67 12 139/63 97 %         Intake/Output Summary (Last 24 hours) at 10/16/2022 1354  Last data filed at 10/16/2022 0810  Gross per 24 hour   Intake --   Output 1500 ml   Net -1500 ml          I had a face to face encounter and independently examined this patient on 10/16/2022, as outlined below:    PHYSICAL EXAM:  General: Awake, No acute distress  EENT:  EOMI. Anicteric sclerae. MMM  Resp:  CTA bilaterally, no wheezing or rales. No accessory muscle use  CV:  Regular  rhythm,  No edema  GI:  Soft, Non distended, Non tender. +Bowel sounds  Neurologic:  Alert and oriented X 3, normal speech,   Psych:   Not anxious nor agitated  Skin:  No rashes. No jaundice    Reviewed most current lab test results and cultures  YES  Reviewed most current radiology test results   YES  Review and summation of old records today    NO  Reviewed patient's current orders and MAR    YES  PMH/SH reviewed - no change compared to H&P  ________________________________________________________________________  Care Plan discussed with:    Comments   Patient y    Family      RN y    Care Manager     Consultant                       n Multidiciplinary team rounds were held today with , nursing, pharmacist and clinical coordinator.   Patient's plan of care was discussed; medications were reviewed and discharge planning was addressed. ________________________________________________________________________    Procedures: see electronic medical records for all procedures/Xrays and details which were not copied into this note but were reviewed prior to creation of Plan. LABS:  I reviewed today's most current labs and imaging studies. Pertinent labs include:  Recent Labs     10/16/22  0127 10/15/22  0428 10/14/22  0452   WBC 3.5* 3.6 3.8   HGB 7.9* 7.8* 7.4*   HCT 26.0* 25.4* 25.2*   PLT 94* 100* 100*       Recent Labs     10/16/22  0127 10/15/22  0428 10/14/22  0452    144 144   K 3.2* 3.6 4.0   * 116* 116*   CO2 22 24 22   * 108* 128*   BUN 23* 24* 25*   CREA 1.20* 1.23* 1.23*   CA 9.2 9.5 9.5   MG 2.0 2.1 2.5*   PHOS 2.7 2.8 2.7       Enteroscopy 10/14/22  Impression:  -- atypical GAVE in antrum, treated with APC previously, now  with some superficial ulceration and inflammation as it heals. A few lingering spots treated today with APC as above.   -- no clear second, more distal lesion identified to correspond to pill camera images, though  pediatric colonoscopy slid fairly easily through the duodenum and to proximal jejunum    Signed: Miriam Hernandez MD

## 2022-10-17 VITALS
RESPIRATION RATE: 18 BRPM | TEMPERATURE: 98.4 F | WEIGHT: 215 LBS | SYSTOLIC BLOOD PRESSURE: 154 MMHG | BODY MASS INDEX: 43.34 KG/M2 | DIASTOLIC BLOOD PRESSURE: 65 MMHG | OXYGEN SATURATION: 95 % | HEART RATE: 61 BPM | HEIGHT: 59 IN

## 2022-10-17 LAB
ANION GAP SERPL CALC-SCNC: 7 MMOL/L (ref 5–15)
BASOPHILS # BLD: 0 K/UL (ref 0–0.1)
BASOPHILS NFR BLD: 1 % (ref 0–1)
BUN SERPL-MCNC: 20 MG/DL (ref 6–20)
BUN/CREAT SERPL: 19 (ref 12–20)
CALCIUM SERPL-MCNC: 9.2 MG/DL (ref 8.5–10.1)
CHLORIDE SERPL-SCNC: 114 MMOL/L (ref 97–108)
CO2 SERPL-SCNC: 23 MMOL/L (ref 21–32)
CREAT SERPL-MCNC: 1.08 MG/DL (ref 0.55–1.02)
DIFFERENTIAL METHOD BLD: ABNORMAL
EOSINOPHIL # BLD: 0.2 K/UL (ref 0–0.4)
EOSINOPHIL NFR BLD: 6 % (ref 0–7)
ERYTHROCYTE [DISTWIDTH] IN BLOOD BY AUTOMATED COUNT: 23.8 % (ref 11.5–14.5)
GLUCOSE SERPL-MCNC: 106 MG/DL (ref 65–100)
HCT VFR BLD AUTO: 24.5 % (ref 35–47)
HGB BLD-MCNC: 7.2 G/DL (ref 11.5–16)
IMM GRANULOCYTES # BLD AUTO: 0 K/UL (ref 0–0.04)
IMM GRANULOCYTES NFR BLD AUTO: 0 % (ref 0–0.5)
LYMPHOCYTES # BLD: 0.6 K/UL (ref 0.8–3.5)
LYMPHOCYTES NFR BLD: 19 % (ref 12–49)
MAGNESIUM SERPL-MCNC: 1.9 MG/DL (ref 1.6–2.4)
MCH RBC QN AUTO: 29.6 PG (ref 26–34)
MCHC RBC AUTO-ENTMCNC: 29.4 G/DL (ref 30–36.5)
MCV RBC AUTO: 100.8 FL (ref 80–99)
MONOCYTES # BLD: 0.4 K/UL (ref 0–1)
MONOCYTES NFR BLD: 14 % (ref 5–13)
NEUTS SEG # BLD: 1.7 K/UL (ref 1.8–8)
NEUTS SEG NFR BLD: 60 % (ref 32–75)
NRBC # BLD: 0 K/UL (ref 0–0.01)
NRBC BLD-RTO: 0 PER 100 WBC
PHOSPHATE SERPL-MCNC: 2.4 MG/DL (ref 2.6–4.7)
PLATELET # BLD AUTO: 96 K/UL (ref 150–400)
PMV BLD AUTO: 10.3 FL (ref 8.9–12.9)
POTASSIUM SERPL-SCNC: 3.3 MMOL/L (ref 3.5–5.1)
RBC # BLD AUTO: 2.43 M/UL (ref 3.8–5.2)
RBC MORPH BLD: ABNORMAL
RBC MORPH BLD: ABNORMAL
SODIUM SERPL-SCNC: 144 MMOL/L (ref 136–145)
WBC # BLD AUTO: 2.9 K/UL (ref 3.6–11)

## 2022-10-17 PROCEDURE — 74011000250 HC RX REV CODE- 250: Performed by: INTERNAL MEDICINE

## 2022-10-17 PROCEDURE — 74011250637 HC RX REV CODE- 250/637: Performed by: INTERNAL MEDICINE

## 2022-10-17 PROCEDURE — 85025 COMPLETE CBC W/AUTO DIFF WBC: CPT

## 2022-10-17 PROCEDURE — 74011250636 HC RX REV CODE- 250/636: Performed by: INTERNAL MEDICINE

## 2022-10-17 PROCEDURE — C9113 INJ PANTOPRAZOLE SODIUM, VIA: HCPCS | Performed by: INTERNAL MEDICINE

## 2022-10-17 PROCEDURE — 84100 ASSAY OF PHOSPHORUS: CPT

## 2022-10-17 PROCEDURE — 83735 ASSAY OF MAGNESIUM: CPT

## 2022-10-17 PROCEDURE — 80048 BASIC METABOLIC PNL TOTAL CA: CPT

## 2022-10-17 PROCEDURE — 74011250637 HC RX REV CODE- 250/637: Performed by: STUDENT IN AN ORGANIZED HEALTH CARE EDUCATION/TRAINING PROGRAM

## 2022-10-17 PROCEDURE — 36415 COLL VENOUS BLD VENIPUNCTURE: CPT

## 2022-10-17 RX ORDER — POTASSIUM CHLORIDE 20 MEQ/1
40 TABLET, EXTENDED RELEASE ORAL
Status: COMPLETED | OUTPATIENT
Start: 2022-10-17 | End: 2022-10-17

## 2022-10-17 RX ADMIN — ALLOPURINOL 100 MG: 100 TABLET ORAL at 09:25

## 2022-10-17 RX ADMIN — AMLODIPINE BESYLATE 10 MG: 5 TABLET ORAL at 09:25

## 2022-10-17 RX ADMIN — SUCRALFATE 1 G: 1 TABLET ORAL at 11:23

## 2022-10-17 RX ADMIN — ATORVASTATIN CALCIUM 20 MG: 20 TABLET, FILM COATED ORAL at 09:25

## 2022-10-17 RX ADMIN — POTASSIUM CHLORIDE 10 MEQ: 750 TABLET, FILM COATED, EXTENDED RELEASE ORAL at 09:25

## 2022-10-17 RX ADMIN — SUCRALFATE 1 G: 1 TABLET ORAL at 09:26

## 2022-10-17 RX ADMIN — Medication: at 09:31

## 2022-10-17 RX ADMIN — CASTOR OIL AND BALSAM, PERU: 788; 87 OINTMENT TOPICAL at 09:30

## 2022-10-17 RX ADMIN — SODIUM CHLORIDE, PRESERVATIVE FREE 10 ML: 5 INJECTION INTRAVENOUS at 05:54

## 2022-10-17 RX ADMIN — POTASSIUM CHLORIDE 40 MEQ: 1500 TABLET, EXTENDED RELEASE ORAL at 09:39

## 2022-10-17 RX ADMIN — EZETIMIBE 10 MG: 10 TABLET ORAL at 09:25

## 2022-10-17 RX ADMIN — LEVOTHYROXINE SODIUM 150 MCG: 0.15 TABLET ORAL at 05:49

## 2022-10-17 NOTE — PROGRESS NOTES
Problem: Falls - Risk of  Goal: *Absence of Falls  Description: Document Holly Don Fall Risk and appropriate interventions in the flowsheet. Outcome: Progressing Towards Goal  Note: Fall Risk Interventions:  Mobility Interventions: Bed/chair exit alarm    Mentation Interventions: Adequate sleep, hydration, pain control    Medication Interventions: Bed/chair exit alarm    Elimination Interventions: Bed/chair exit alarm, Call light in reach    History of Falls Interventions: Bed/chair exit alarm         Problem: Patient Education: Go to Patient Education Activity  Goal: Patient/Family Education  Outcome: Progressing Towards Goal     Problem: Fluid Volume - Risk of, Imbalanced  Goal: *Balanced intake and output  Outcome: Progressing Towards Goal     Problem: Patient Education: Go to Patient Education Activity  Goal: Patient/Family Education  Outcome: Progressing Towards Goal     Problem: Pressure Injury - Risk of  Goal: *Prevention of pressure injury  Description: Document Boris Scale and appropriate interventions in the flowsheet.   Outcome: Progressing Towards Goal  Note: Pressure Injury Interventions:  Sensory Interventions: Assess changes in LOC    Moisture Interventions: Absorbent underpads    Activity Interventions: Pressure redistribution bed/mattress(bed type), Increase time out of bed    Mobility Interventions: HOB 30 degrees or less    Nutrition Interventions: Document food/fluid/supplement intake    Friction and Shear Interventions: Minimize layers                Problem: Patient Education: Go to Patient Education Activity  Goal: Patient/Family Education  Outcome: Progressing Towards Goal     Problem: Patient Education: Go to Patient Education Activity  Goal: Patient/Family Education  Outcome: Progressing Towards Goal     Problem: Patient Education: Go to Patient Education Activity  Goal: Patient/Family Education  Outcome: Progressing Towards Goal     Problem: Anemia Care Plan (Adult and Pediatric)  Goal: *Labs within defined limits  Outcome: Progressing Towards Goal  Goal: *Tolerates increased activity  Outcome: Progressing Towards Goal     Problem: Patient Education: Go to Patient Education Activity  Goal: Patient/Family Education  Outcome: Progressing Towards Goal

## 2022-10-17 NOTE — DISCHARGE SUMMARY
Hospitalist Discharge Summary     Patient ID:  Ousmane Gill  [id number]  19 y.o.  1945  10/6/2022    PCP on record: Christin Spain MD    Admit date: 10/6/2022  Discharge date and time: 10/17/2022    DISCHARGE DIAGNOSIS:    Acute on chronic blood loss anemia  Atypical GAVE  History of cirrhosis secondary to [place]  History of recent EGD showing portal hypertensive gastropathy and colonoscopy showed normal mucosa with internal hemorrhoids  Deficiency anemia  Acute kidney injury - resolved  Hypertension  GERD  Dyslipidemia  Hypothyroidism  Gouty arthritis  Atrial fibrillation  History of sick sinus syndrome s/p pacemaker placement    CONSULTATIONS:  IP CONSULT TO GASTROENTEROLOGY  IP CONSULT TO HEMATOLOGY    Excerpted HPI from H&P of Ozzy Nials MD:    Shant Royal is a 68 y.o.   female who presents with past medical history of hypertension, GERD, dyslipidemia is coming the hospital chief complaints of shortness of breath. Patient reports being able to control about 2 weeks ago when she started not feeling well. She had lab work which revealed low hemoglobin and was advised to come into the emergency department for further evaluation. She reports fairly recent admission to the hospital for GI bleed and had an endoscopy at that time and her home Eliquis was discontinued at that time. She reports some brownish stool. She also reports exertional shortness of breath with no cough or phlegm. Does not report any chest pain. On arrival to ED, noted to have stable vitals. On labs hemoglobin is 4.5, platelet count is 751. BMP shows a creatinine of 1.91. Total bilirubin is 1.2. Chest x-ray shows no acute process. ______________________________________________________________________  DISCHARGE SUMMARY/HOSPITAL COURSE:  for full details see H&P, daily progress notes, labs, consult notes.      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 No active GI bleed  - pill endoscopy images reviewed 10/12/22 by GI. 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 Showed prior atypical GAVE s/p treatment with APC. No clear second lesions identified  - monitoring CBC  -- considered but did not need CT enterography to characterize distal lesion as renal function improves, or if rapid bleeding -> CTA, to further localize bleeding  --considered but did not need transfer to [facility] for enteroscopy with single balloon. - HgB stable, hemodynamically stable, spoke with on call GI Dr. Laly Huerta 10/16/22  - medically ok for discharge with return precautions, monitoring for bleeding, melena, hematochezia  - ride available 10/17/22, home health secure. Discharged today  - patient to followup with Dr. Yepez Balloon study s/o CARLINE, iv iron ordered. Heme onc Consulted  - patient to followup with Dr. Ron Quinn    Acute kidney injury - resolved  Creatinine improved  - resumed home lasix     Hypertension  GERD  Dyslipidemia  Hypothyroidism  Gouty arthritis  Atrial fibrillation  History of sick sinus syndrome s/p pacemaker placement  -Continue Norvasc, PPI, Lipitor, levothyroxine, allopurinol  -Her anticoagulation was discontinued previously due to anemia  _______________________________________________________________________  Patient seen and examined by me on discharge day. Pertinent Findings:  Gen:    Not in distress  Chest: Clear lungs  CVS:   Regular rhythm.   No edema  Abd:  Soft, not distended, not tender  Neuro:  Alert, oriented  _______________________________________________________________________  DISCHARGE MEDICATIONS:   Discharge Medication List as of 10/17/2022 10:03 AM        START taking these medications    Details   sucralfate (CARAFATE) 1 gram tablet Take 1 Tablet by mouth Before breakfast, lunch, dinner and at bedtime for 30 days. , Normal, Disp-120 Tablet, R-0           CONTINUE these medications which have CHANGED    Details   allopurinoL (ZYLOPRIM) 100 mg tablet Take 1 Tablet by mouth two (2) times a day for 30 days. , Normal, Disp-60 Tablet, R-0           CONTINUE these medications which have NOT CHANGED    Details   amLODIPine (NORVASC) 10 mg tablet Take 1 Tablet by mouth daily. , Normal, Disp-90 Tablet, R-3      potassium chloride SA (MICRO-K) 10 mEq capsule Take 2 Capsules by mouth daily. , Normal, Disp-180 Capsule, R-3      furosemide (LASIX) 80 mg tablet Take 80 mg by mouth daily. , Historical Med      pantoprazole (PROTONIX) 40 mg tablet Take 1 Tablet by mouth Before breakfast and dinner., Normal, Disp-60 Tablet, R-5      nystatin (MYCOSTATIN) powder Apply  to affected area two (2) times a day., Normal, Disp-30 g, R-0      fluticasone propionate (FLONASE) 50 mcg/actuation nasal spray 2 Sprays by Both Nostrils route daily. Administer to right and left nostril., Normal, Disp-3 Each, R-3      levothyroxine (SYNTHROID) 150 mcg tablet Take 1 Tablet by mouth Daily (before breakfast). , Normal, Disp-90 Tablet, R-3      icosapent ethyL (VASCEPA) 1 gram capsule Take 2 Capsules by mouth two (2) times daily (with meals). , Normal, Disp-360 Capsule, R-3      loratadine (Claritin) 10 mg tablet Take 1 Tablet by mouth daily. Indications: inflammation of the nose due to an allergy, Normal, Disp-90 Tablet, R-3      Zetia 10 mg tablet Take 1 tablet by mouth daily. , Normal, Disp-90 Tablet, R-3, ONESIMO      atorvastatin (LIPITOR) 20 mg tablet TAKE 1 TABLET DAILY, Normal, Disp-90 Tablet, R-3      lidocaine (LIDODERM) 5 % Apply patch to the affected area for 12 hours a day and remove for 12 hours a day., Normal, Disp-90 Each, R-3      polyethylene glycol (MIRALAX) 17 gram/dose powder Take 17 g by mouth daily. , Normal, Disp-2108 g,R-3      ketoconazole (NIZORAL) 2 % topical cream Apply  to affected area daily as needed., Historical Med, R-3      Biotin 2,500 mcg cap Take  by mouth., Historical Med      L GASSERI/B BIFIDUM/B LONGUM (Seed&Spark PO) Take 1 Tab by mouth daily. , Historical Med      vitamin E (AQUA GEMS) 400 unit capsule Take 800 Units by mouth two (2) times a day., Historical Med      cholecalciferol, vitamin D3, 50 mcg (2,000 unit) tab Take 1 Tab by mouth daily. , Historical Med      MULTIVITAMIN WITH MINERALS (ONE-A-DAY 50 PLUS PO) Take 1 Tab by mouth daily. , Historical Med               Patient Follow Up Instructions: Activity: Activity as tolerated  Diet: Cardiac Diet  Wound Care: None needed    Follow-up with GI, Hematology, PCP within 2 weeks. Follow-up tests/labs none    Follow-up Information       Follow up With Specialties Details Why Contact Info    Oral MD [place] Internal Medicine Physician Go on 10/18/2022 at 3:00pm for your PCP hospital follow up. [address] [facility]  [address] [facility]      Zeus Melchor MD Internal Medicine Physician Schedule an appointment as soon as possible for a visit in 1 week(s)  [address]      Delmy Plascencia MD Hematology and Oncology, Hematology Physician, Oncology, Internal Medicine Physician Schedule an appointment as soon as possible for a visit in 2 week(s) for your anemia [id number] [facility] [address]  [address] [facility]      Naldo Alvarez MD Gastroenterology Schedule an appointment as soon as possible for a visit in 1 week(s) for followup of your GI bleed [address] [facility] [address] [phone] [facility]  Follow up This is your home health agency.  Contact the office directly if you don't hear from them within 24-48 hrs following d/c [address], [address]  [phone]) Elsie Mackay [address]  Call [facility]  [phone]          ________________________________________________________________    Risk of deterioration: Low    Condition at Discharge:  Stable  __________________________________________________________________    Disposition  Home with family and home health services    ____________________________________________________________________    Code Status: Full Code  ___________________________________________________________________      Total time in minutes spent coordinating this discharge (includes going over instructions, follow-up, prescriptions, and preparing report for sign off to her PCP) :  >30 minutes    Signed:  Khuhsi Bey MD

## 2022-10-17 NOTE — PROGRESS NOTES
Pharmacist Discharge Medication Reconciliation    Significant PMH:   Past Medical History:   Diagnosis Date    Abnormal nuclear stress test 10/4/2021    Angina at rest [facility]) 10/4/2021    Arthritis     KNEE,gout    Bundle branch block     Endocrine disease     HYPOTHYROIDISM    Fracture     Left distal femur (age 12 - pt fell)    Fracture     Left tibia 1990 (pt fell)    Fracture     Right wrist fractured 2 times (pt fell)    GERD (gastroesophageal reflux disease)     High cholesterol     Hypertension     Hypoglycemia     \"my body produces too much insulin\"    Liver disease     BRADY    Nausea & vomiting     when had gallbladder out    Osteoporosis     Other ill-defined conditions(809.89)     edema legs    S/P cardiac cath 10/4/2021    10/4/2021 nonobstructive disease    Spinal stenosis     Thyroid disease      Encounter Diagnoses:   Encounter Diagnoses   Name Primary? Gastrointestinal hemorrhage, unspecified gastrointestinal hemorrhage type Yes    Severe anemia     Iron deficiency anemia secondary to blood loss (chronic)      Allergies: Gabapentin, Metoprolol, Celebrex [celecoxib], Eliquis [apixaban], Pcn [penicillins], and Sulfa (sulfonamide antibiotics)    Discharge Medications:   Current Discharge Medication List        START taking these medications    Details   sucralfate (CARAFATE) 1 gram tablet Take 1 Tablet by mouth Before breakfast, lunch, dinner and at bedtime for 30 days. Qty: 120 Tablet, Refills: 0  Start date: 10/16/2022, End date: 11/15/2022           CONTINUE these medications which have CHANGED    Details   allopurinoL (ZYLOPRIM) 100 mg tablet Take 1 Tablet by mouth two (2) times a day for 30 days. Qty: 60 Tablet, Refills: 0  Start date: 10/16/2022, End date: 11/15/2022           CONTINUE these medications which have NOT CHANGED    Details   amLODIPine (NORVASC) 10 mg tablet Take 1 Tablet by mouth daily.   Qty: 90 Tablet, Refills: 3    Associated Diagnoses: Primary hypertension      potassium chloride SA (MICRO-K) 10 mEq capsule Take 2 Capsules by mouth daily. Qty: 180 Capsule, Refills: 3    Associated Diagnoses: Leg edema      furosemide (LASIX) 80 mg tablet Take 80 mg by mouth daily. pantoprazole (PROTONIX) 40 mg tablet Take 1 Tablet by mouth Before breakfast and dinner. Qty: 60 Tablet, Refills: 5      nystatin (MYCOSTATIN) powder Apply  to affected area two (2) times a day. Qty: 30 g, Refills: 0      fluticasone propionate (FLONASE) 50 mcg/actuation nasal spray 2 Sprays by Both Nostrils route daily. Administer to right and left nostril. Qty: 3 Each, Refills: 3      levothyroxine (SYNTHROID) 150 mcg tablet Take 1 Tablet by mouth Daily (before breakfast). Qty: 90 Tablet, Refills: 3      icosapent ethyL (VASCEPA) 1 gram capsule Take 2 Capsules by mouth two (2) times daily (with meals). Qty: 360 Capsule, Refills: 3      loratadine (Claritin) 10 mg tablet Take 1 Tablet by mouth daily. Indications: inflammation of the nose due to an allergy  Qty: 90 Tablet, Refills: 3    Associated Diagnoses: Acute recurrent sinusitis, unspecified location      Zetia 10 mg tablet Take 1 tablet by mouth daily. Qty: 90 Tablet, Refills: 3    Associated Diagnoses: Pure hypercholesterolemia      atorvastatin (LIPITOR) 20 mg tablet TAKE 1 TABLET DAILY  Qty: 90 Tablet, Refills: 3    Associated Diagnoses: Pure hypercholesterolemia      lidocaine (LIDODERM) 5 % Apply patch to the affected area for 12 hours a day and remove for 12 hours a day. Qty: 90 Each, Refills: 3      polyethylene glycol (MIRALAX) 17 gram/dose powder Take 17 g by mouth daily. Qty: 2108 g, Refills: 3    Associated Diagnoses: Constipation, unspecified constipation type      ketoconazole (NIZORAL) 2 % topical cream Apply  to affected area daily as needed. Refills: 3      Biotin 2,500 mcg cap Take  by mouth. L GASSERI/B BIFIDUM/B LONGUM (Parametric PO) Take 1 Tab by mouth daily.       vitamin E (AQUA GEMS) 400 unit capsule Take 800 Units by mouth two (2) times a day. cholecalciferol, vitamin D3, 50 mcg (2,000 unit) tab Take 1 Tab by mouth daily. MULTIVITAMIN WITH MINERALS (ONE-A-DAY 50 PLUS PO) Take 1 Tab by mouth daily. The patient's chart, MAR and AVS were reviewed by Yazmin Franklin RPH.     Discharging Provider: Sunny November MD    Thank you,     Yazmin Franklin, [facility]

## 2022-10-17 NOTE — PROGRESS NOTES
End of Shift Note    Bedside shift change report given to Michelle Cheek RN (oncoming nurse) by Chadwick Runner, RN (offgoing nurse). Report included the following information SBAR, Kardex, Intake/Output, and MAR    Shift worked:  Night   Shift summary and any significant changes:     Uneventful night, call bell and phone within reach of patient. bed alarm on 2, able to make needs known.  Offered to give a CHG  bath but declined      Concerns for physician to address:  none   Zone phone for oncoming shift:   [phone]     Patient Information  Manolo Mcdermott  68 y.o.  10/6/2022 11:15 AM by Chani Thomas MD. Manolo Mcdermott was admitted from Home    Problem List  Patient Active Problem List    Diagnosis Date Noted    Acute anemia 10/06/2022    Anemia 08/05/2022    SSS (sick sinus syndrome) ([address] 06/27/2022    PAF (paroxysmal atrial fibrillation) ([address] 06/24/2022    Stage 3a chronic kidney disease ([address] 05/20/2022    Type 2 diabetes mellitus with chronic kidney disease ([address] 11/18/2021    Abnormal nuclear stress test 10/04/2021    Angina at rest [facility]) 10/04/2021    S/P cardiac cath 10/04/2021    Nonrheumatic aortic valve stenosis 09/08/2021    Pulmonary hypertension ([address] 09/08/2021    MICHELLE (acute kidney injury) ([address] 10/27/2020    Cellulitis of left lower leg 11/07/2019    Severe obesity ([address] 09/30/2019    Controlled type 2 diabetes mellitus without complication ([address] 56/16/1684    DDD (degenerative disc disease), lumbar 11/27/2017    Prediabetes 04/11/2016    Thrombocytopenia ([address] 01/24/2015    HTN (hypertension) 01/17/2014    Bifascicular block 12/23/2010    Fatty liver 06/18/2010    Pure hypercholesterolemia 06/18/2010    Colon polyp 06/18/2010    Gout 06/14/2010    Hypothyroidism 06/14/2010    Osteoporosis 06/14/2010    Anxiety 06/14/2010    Leg edema 06/14/2010    RSD lower limb 06/14/2010     Past Medical History:   Diagnosis Date    Abnormal nuclear stress test 10/4/2021    Angina at rest [facility]) 10/4/2021    Arthritis KNEE,gout    Bundle branch block     Endocrine disease     HYPOTHYROIDISM    Fracture     Left distal femur (age 12 - pt fell)    Fracture     Left tibia 1990 (pt fell)    Fracture     Right wrist fractured 2 times (pt fell)    GERD (gastroesophageal reflux disease)     High cholesterol     Hypertension     Hypoglycemia     \"my body produces too much insulin\"    Liver disease     BRADY    Nausea & vomiting     when had gallbladder out    Osteoporosis     Other ill-defined conditions(799.89)     edema legs    S/P cardiac cath 10/4/2021    10/4/2021 nonobstructive disease    Spinal stenosis     Thyroid disease          Activity:  Activity Level: Up with Assistance  Number times ambulated in hallways past shift: 0  Number of times OOB to chair past shift: 0    Cardiac:   Cardiac Monitoring: Yes      Cardiac Rhythm: BBB, Ventricular Paced    Access:   Current line(s): PIV     Genitourinary:   Urinary status: external catheter    Respiratory:   O2 Device: None (Room air)  Chronic home O2 use?: NO  Incentive spirometer at bedside: NO       GI:  Last Bowel Movement Date: 10/15/22  Current diet:  ADULT DIET Clear Liquid  ADULT ORAL NUTRITION SUPPLEMENT AM Snack, PM Snack, HS Snack; Clear Liquid  Passing flatus: YES  Tolerating current diet: YES       Pain Management:   Patient states pain is manageable on current regimen: YES    Skin:  Boris Score: 18  Interventions: float heels    Patient Safety:  Fall Score:  Total Score: 3  Interventions: gripper socks and pt to call before getting OOB  High Fall Risk: Yes  @Rollbelt  @dexterity to release roll belt  Yes/No ( must document dexterity  here by stating Yes or No here, otherwise this is a restraint and must follow restraint documentation policy.)    DVT prophylaxis:  DVT prophylaxis Med- Yes  DVT prophylaxis SCD or MARIETTA- Yes     Wounds: (If Applicable)  Wounds- Yes  Location Lower extremity    Active Consults:  IP CONSULT TO GASTROENTEROLOGY  IP CONSULT TO HEMATOLOGY  IP CONSULT TO HOSPITALIST    Length of Stay:  Expected LOS: 3d 0h  Actual LOS: 11  Discharge Plan: Marianela Recio RN

## 2022-10-18 ENCOUNTER — OFFICE VISIT (OUTPATIENT)
Dept: INTERNAL MEDICINE CLINIC | Age: 77
End: 2022-10-18
Payer: MEDICARE

## 2022-10-18 VITALS
TEMPERATURE: 98 F | WEIGHT: 226 LBS | OXYGEN SATURATION: 97 % | BODY MASS INDEX: 45.56 KG/M2 | HEART RATE: 76 BPM | SYSTOLIC BLOOD PRESSURE: 138 MMHG | HEIGHT: 59 IN | RESPIRATION RATE: 18 BRPM | DIASTOLIC BLOOD PRESSURE: 60 MMHG

## 2022-10-18 DIAGNOSIS — K31.819 GAVE (GASTRIC ANTRAL VASCULAR ECTASIA): Primary | ICD-10-CM

## 2022-10-18 DIAGNOSIS — Z23 NEEDS FLU SHOT: ICD-10-CM

## 2022-10-18 DIAGNOSIS — I87.2 VENOUS INSUFFICIENCY: ICD-10-CM

## 2022-10-18 DIAGNOSIS — D50.0 IRON DEFICIENCY ANEMIA DUE TO CHRONIC BLOOD LOSS: ICD-10-CM

## 2022-10-18 DIAGNOSIS — D50.9 IRON DEFICIENCY ANEMIA, UNSPECIFIED IRON DEFICIENCY ANEMIA TYPE: ICD-10-CM

## 2022-10-18 DIAGNOSIS — E87.70 HYPERVOLEMIA, UNSPECIFIED HYPERVOLEMIA TYPE: ICD-10-CM

## 2022-10-18 PROCEDURE — G0008 ADMIN INFLUENZA VIRUS VAC: HCPCS | Performed by: INTERNAL MEDICINE

## 2022-10-18 PROCEDURE — 99496 TRANSJ CARE MGMT HIGH F2F 7D: CPT | Performed by: INTERNAL MEDICINE

## 2022-10-18 PROCEDURE — 90694 VACC AIIV4 NO PRSRV 0.5ML IM: CPT | Performed by: INTERNAL MEDICINE

## 2022-10-18 PROCEDURE — G8427 DOCREV CUR MEDS BY ELIG CLIN: HCPCS | Performed by: INTERNAL MEDICINE

## 2022-10-18 RX ORDER — METOLAZONE 2.5 MG/1
2.5 TABLET ORAL EVERY OTHER DAY
Qty: 30 TABLET | Refills: 0 | Status: SHIPPED | OUTPATIENT
Start: 2022-10-18 | End: 2022-10-18 | Stop reason: SDUPTHER

## 2022-10-18 RX ORDER — METOLAZONE 2.5 MG/1
2.5 TABLET ORAL EVERY OTHER DAY
Qty: 30 TABLET | Refills: 0 | Status: SHIPPED | OUTPATIENT
Start: 2022-10-18

## 2022-10-18 NOTE — PROGRESS NOTES
HISTORY OF PRESENT ILLNESS  Vaishnavi Raza is a 68 y.o. female. HPI  Hx DM-2 htn HLD gout hypothyroid osteoporosis BRADY with fibrosis--[facility] hepatology, suspected cirrhosis-Dr CORRAL , pancreatic cyst,  PAF Aortic stenosis-mild ckd 3 here with paid CG  Here for MICHAEL HARKINS  Admitted again for anemia/GI bleed  hb 3.8 up to 7.2 on discharge-s/p blood transfusion and iv iron  Back in August admitted hb 4.3-dx portal hypertensive gastropathy and hemorrhoids. Eliquis was stopped. Aspirin restarted  Had EGD-oozing of blood from atyical gave. 2 small bowel AVM. Seen by GI and had enteroscopy and tx of gave by APC  Will get outtp fu GI and hematologist for iv iron  C/o icnreaesed weight gain 11 lbs, LE edema and HENLEY since return to home   Has paid CG at home and here in office  No melena per pt  Last OV  Here for APURVA-admitted for anemia hb 4.3 with sob , fatigue, dizziness , feeling cold, love  Hydrated, prbcs -hb up to 9.0  Had egd/colon-portal hypertensive gastropathy suspected cause of anemia on eliquis per gi. Colon-hemorrhoids.  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   female who presents with past medical history of hypertension, GERD, dyslipidemia is coming the hospital chief complaints of shortness of breath. Patient reports being able to control about 2 weeks ago when she started not feeling well. She had lab work which revealed low hemoglobin and was advised to come into the emergency department for further evaluation. She reports fairly recent admission to the hospital for GI bleed and had an endoscopy at that time and her home Eliquis was discontinued at that time. She reports some brownish stool. She also reports exertional shortness of breath with no cough or phlegm. Does not report any chest pain. On arrival to ED, noted to have stable vitals. On labs hemoglobin is 4.5, platelet count is 742. BMP shows a creatinine of 1.91. Total bilirubin is 1.2. Chest x-ray shows no acute process. ______________________________________________________________________  DISCHARGE SUMMARY/HOSPITAL COURSE:  for full details see H&P, daily progress notes, labs, consult notes. 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 No active GI bleed  - pill endoscopy images reviewed 10/12/22 by GI. 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 Showed prior atypical GAVE s/p treatment with APC.  No clear second lesions identified  - monitoring CBC  -- considered but did not need CT enterography to characterize distal lesion as renal function improves, or if rapid bleeding -> CTA, to further localize bleeding  --considered but did not need transfer to [facility] for enteroscopy with single balloon. - HgB stable, hemodynamically stable, spoke with on call GI Dr. Isaiah Tracey 10/16/22  - medically ok for discharge with return precautions, monitoring for bleeding, melena, hematochezia  - ride available 10/17/22, home health secure. Discharged today  - patient to followup with Dr. Dwight Phalen study s/o CARLINE, iv iron ordered. Heme onc Consulted  - patient to followup with Dr. Doris Bejarano    Acute kidney injury - resolved  Creatinine improved  - resumed home lasix     Hypertension  GERD  Dyslipidemia  Hypothyroidism  Gouty arthritis  Atrial fibrillation  History of sick sinus syndrome s/p pacemaker placement  -Continue Norvasc, PPI, Lipitor, levothyroxine, allopurinol  -Her anticoagulation was discontinued previously due to anemia  _______________________________________________________________________  Patient seen and examined by me on discharge day. Pertinent Findings:  Gen:    Not in distress  Chest:  Clear lungs  CVS:    Regular rhythm. No edema  Abd:     Soft, not distended, not tender  Neuro:  Alert, oriented  _______________________________________________________________________  DISCHARGE MEDICATIONS:   Discharge Medication List as of 10/17/2022 10:03 AM              START taking these medications     Details   sucralfate (CARAFATE) 1 gram tablet Take 1 Tablet by mouth Before breakfast, lunch, dinner and at bedtime for 30 days. , Normal, Disp-120 Tablet, R-0                  CONTINUE these medications which have CHANGED     Details   allopurinoL (ZYLOPRIM) 100 mg tablet Take 1 Tablet by mouth two (2) times a day for 30 days. , Normal, Disp-60 Tablet, R-0                  CONTINUE these medications which have NOT CHANGED     Details   amLODIPine (NORVASC) 10 mg tablet Take 1 Tablet by mouth daily. , Normal, Disp-90 Tablet, R-3       potassium chloride SA (MICRO-K) 10 mEq capsule Take 2 Capsules by mouth daily. , Normal, Disp-180 Capsule, R-3       furosemide (LASIX) 80 mg tablet Take 80 mg by mouth daily. , Historical Med       pantoprazole (PROTONIX) 40 mg tablet Take 1 Tablet by mouth Before breakfast and dinner., Normal, Disp-60 Tablet, R-5       nystatin (MYCOSTATIN) powder Apply  to affected area two (2) times a day., Normal, Disp-30 g, R-0       fluticasone propionate (FLONASE) 50 mcg/actuation nasal spray 2 Sprays by Both Nostrils route daily. Administer to right and left nostril., Normal, Disp-3 Each, R-3       levothyroxine (SYNTHROID) 150 mcg tablet Take 1 Tablet by mouth Daily (before breakfast). , Normal, Disp-90 Tablet, R-3       icosapent ethyL (VASCEPA) 1 gram capsule Take 2 Capsules by mouth two (2) times daily (with meals). , Normal, Disp-360 Capsule, R-3       loratadine (Claritin) 10 mg tablet Take 1 Tablet by mouth daily. Indications: inflammation of the nose due to an allergy, Normal, Disp-90 Tablet, R-3       Zetia 10 mg tablet Take 1 tablet by mouth daily. , Normal, Disp-90 Tablet, R-3, ONESIMO       atorvastatin (LIPITOR) 20 mg tablet TAKE 1 TABLET DAILY, Normal, Disp-90 Tablet, R-3       lidocaine (LIDODERM) 5 % Apply patch to the affected area for 12 hours a day and remove for 12 hours a day., Normal, Disp-90 Each, R-3       polyethylene glycol (MIRALAX) 17 gram/dose powder Take 17 g by mouth daily. , Normal, Disp-2108 g,R-3       ketoconazole (NIZORAL) 2 % topical cream Apply  to affected area daily as needed., Historical Med, R-3       Biotin 2,500 mcg cap Take  by mouth., Historical Med       L GASSERI/B BIFIDUM/B LONGUM (Football Meister PO) Take 1 Tab by mouth daily. , Historical Med       vitamin E (AQUA GEMS) 400 unit capsule Take 800 Units by mouth two (2) times a day., Historical Med       cholecalciferol, vitamin D3, 50 mcg (2,000 unit) tab Take 1 Tab by mouth daily. , Historical Med       MULTIVITAMIN WITH MINERALS (ONE-A-DAY 50 PLUS PO) Take 1 Tab by mouth daily. , Historical Med Patient Follow Up Instructions: Activity: Activity as tolerated  Diet: Cardiac Diet  Wound Care: None needed     Follow-up with GI, Hematology, PCP within 2 weeks.   Follow-up tests/labs none     Follow-up Information         Follow up With Specialties Details Why Contact Info     Arianna Allan MD Internal Medicine Physician Go on 10/18/2022 at 3:00pm for your PCP hospital follow up. [address] [facility]  [address] [facility]        Aixa Hughes MD Internal Medicine Physician Schedule an appointment as soon as possible for a visit in 1 week(s)   [address] [facility] [address] [facility]        Amada Dominique MD Hematology and Oncology, Hematology Physician, Oncology, Internal Medicine Physician Schedule an appointment as soon as possible for a visit in 2 week(s) for your anemia [phone] [facility],  [address]  [address] [phone]        Lilian Sinclair           Patient Active Problem List    Diagnosis Date Noted    Acute anemia 10/06/2022    Anemia 08/05/2022    SSS (sick sinus syndrome) ([address] 06/27/2022    PAF (paroxysmal atrial fibrillation) ([address] 06/24/2022    Stage 3a chronic kidney disease ([address] 05/20/2022    Type 2 diabetes mellitus with chronic kidney disease ([address] 11/18/2021    Abnormal nuclear stress test 10/04/2021    Angina at rest [facility]) 10/04/2021    S/P cardiac cath 10/04/2021    Nonrheumatic aortic valve stenosis 09/08/2021    Pulmonary hypertension ([address] 09/08/2021    MICHELLE (acute kidney injury) ([address] 10/27/2020    Cellulitis of left lower leg 11/07/2019    Severe obesity ([address] 09/30/2019    Controlled type 2 diabetes mellitus without complication ([address] 93/11/6296    DDD (degenerative disc disease), lumbar 11/27/2017    Prediabetes 04/11/2016    Thrombocytopenia ([address] 01/24/2015    HTN (hypertension) 01/17/2014    Bifascicular block 12/23/2010    Fatty liver 06/18/2010    Pure hypercholesterolemia 06/18/2010 Colon polyp 06/18/2010    Gout 06/14/2010    Hypothyroidism 06/14/2010    Osteoporosis 06/14/2010    Anxiety 06/14/2010    Leg edema 06/14/2010    RSD lower limb 06/14/2010     Current Outpatient Medications   Medication Sig Dispense Refill    allopurinoL (ZYLOPRIM) 100 mg tablet Take 1 Tablet by mouth two (2) times a day for 30 days. 60 Tablet 0    sucralfate (CARAFATE) 1 gram tablet Take 1 Tablet by mouth Before breakfast, lunch, dinner and at bedtime for 30 days. 120 Tablet 0    amLODIPine (NORVASC) 10 mg tablet Take 1 Tablet by mouth daily. 90 Tablet 3    fluticasone propionate (FLONASE) 50 mcg/actuation nasal spray 2 Sprays by Both Nostrils route daily. Administer to right and left nostril. 3 Each 3    atorvastatin (LIPITOR) 20 mg tablet TAKE 1 TABLET DAILY 90 Tablet 3    Biotin 2,500 mcg cap Take  by mouth. cholecalciferol, vitamin D3, 50 mcg (2,000 unit) tab Take 1 Tab by mouth daily. potassium chloride SA (MICRO-K) 10 mEq capsule Take 2 Capsules by mouth daily. 180 Capsule 3    furosemide (LASIX) 80 mg tablet Take 80 mg by mouth daily. pantoprazole (PROTONIX) 40 mg tablet Take 1 Tablet by mouth Before breakfast and dinner. 60 Tablet 5    nystatin (MYCOSTATIN) powder Apply  to affected area two (2) times a day. 30 g 0    levothyroxine (SYNTHROID) 150 mcg tablet Take 1 Tablet by mouth Daily (before breakfast). 90 Tablet 3    icosapent ethyL (VASCEPA) 1 gram capsule Take 2 Capsules by mouth two (2) times daily (with meals). 360 Capsule 3    loratadine (Claritin) 10 mg tablet Take 1 Tablet by mouth daily. Indications: inflammation of the nose due to an allergy 90 Tablet 3    Zetia 10 mg tablet Take 1 tablet by mouth daily. 90 Tablet 3    lidocaine (LIDODERM) 5 % Apply patch to the affected area for 12 hours a day and remove for 12 hours a day. 90 Each 3    polyethylene glycol (MIRALAX) 17 gram/dose powder Take 17 g by mouth daily.  2108 g 3    ketoconazole (NIZORAL) 2 % topical cream Apply  to affected area daily as needed. 3    L GASSERI/B BIFIDUM/B LONGUM (basno PO) Take 1 Tab by mouth daily. vitamin E (AQUA GEMS) 400 unit capsule Take 800 Units by mouth two (2) times a day. MULTIVITAMIN WITH MINERALS (ONE-A-DAY 50 PLUS PO) Take 1 Tab by mouth daily. Allergies   Allergen Reactions    Gabapentin Other (comments)     Suicidal ideation    Metoprolol Rash    Celebrex [Celecoxib] Hives    Eliquis [Apixaban] Other (comments)     Caused excessive anemia    Pcn [Penicillins] Unknown (comments)     Can't remember, was a long time    Sulfa (Sulfonamide Antibiotics) Hives      Lab Results   Component Value Date/Time    WBC 2.9 (L) 10/17/2022 03:26 AM    HGB 7.2 (L) 10/17/2022 03:26 AM    HCT 24.5 (L) 10/17/2022 03:26 AM    PLATELET 96 (L) 56/70/7442 03:26 AM    .8 (H) 10/17/2022 03:26 AM     Lab Results   Component Value Date/Time    GFR est non-AA 41 (L) 08/17/2022 09:31 AM    GFR est AA 49 (L) 08/17/2022 09:31 AM    Creatinine 1.08 (H) 10/17/2022 03:26 AM    BUN 20 10/17/2022 03:26 AM    Sodium 144 10/17/2022 03:26 AM    Potassium 3.3 (L) 10/17/2022 03:26 AM    Chloride 114 (H) 10/17/2022 03:26 AM    CO2 23 10/17/2022 03:26 AM    Magnesium 1.9 10/17/2022 03:26 AM    Phosphorus 2.4 (L) 10/17/2022 03:26 AM    PTH, Intact 31 01/06/2016 09:55 AM        ROS    Physical Exam  Vitals and nursing note reviewed. Constitutional:       Appearance: Normal appearance. She is well-developed. She is obese. Comments: Appears stated age   Cardiovascular:      Rate and Rhythm: Normal rate and regular rhythm. Heart sounds: Normal heart sounds. No murmur heard. No friction rub. No gallop. Pulmonary:      Effort: Pulmonary effort is normal. No respiratory distress. Breath sounds: Normal breath sounds. No wheezing. Abdominal:      General: Bowel sounds are normal.      Palpations: Abdomen is soft. Musculoskeletal:      Right lower leg: Edema present.       Left lower leg: Edema present. Comments: Left > RLE edema pitting. Lower legs wrapped b/l due to chronic wounds/ulcers   Neurological:      Mental Status: She is alert. ASSESSMENT and PLAN    ICD-10-CM ICD-9-CM    1. GAVE (gastric antral vascular ectasia)  K31.819 537.82 Hx lliver cirrhosis  Fu GI and Hepatologist  Repeat cbc next OV      2. Hypervolemia, unspecified hypervolemia type  E87.70 276.69 Add metolazone 2.5 mg every other day for now  Continue lasix 80 mg qd      3. Needs flu shot  Z23 V04.81 INFLUENZA, FLUAD, (AGE 65 Y+), IM, PF, 0.5 ML      4. Iron deficiency anemia, unspecified iron deficiency anemia type  D50.9 280.9 Fu Hem/onc iv iron       5. Iron deficiency anemia due to chronic blood loss  D50.0 280.0       6. Venous insufficiency  I87.2 459.81 Leg wrappings b/l per Dr Neema Kim  Add metolazone   7.       PAF-has been off eliquis and aspirin-fu CARD MD    RTC 1 month

## 2022-10-18 NOTE — PROGRESS NOTES
Kaylin Graham is a 68 y.o. female who presents for routine immunizations. She denies any symptoms , reactions or allergies that would exclude them from being immunized today. Risks and adverse reactions were discussed and the VIS was given to them. All questions were addressed. She was observed for 15 min post injection. There were no reactions observed.     Cody Lei, LPN

## 2022-10-18 NOTE — PATIENT INSTRUCTIONS
Office Policies    Phone calls/patient messages:            Please allow up to 24 hours for someone in the office to contact you about your call or message. Be mindful your provider may be out of the office or your message may require further review. We encourage you to use [employer] for your messages as this is a faster, more efficient way to communicate with our office                         Medication Refills:            Prescription medications require 48-72 business hours to process. We encourage you to use [employer] for your refills. For controlled medications: Please allow 72 business hours to process. Certain medications may require you to  a written prescription at our office. NO narcotic/controlled medications will be prescribed after 4pm Monday through Friday or on weekends              Form/Paperwork Completion:            Please note a $25 fee may incur for all paperwork for completed by our providers. We ask that you allow 7-10 business days. Pre-payment is due prior to picking up/faxing the completed form. You may also download your forms to [employer] to have your doctor print off.

## 2022-10-24 ENCOUNTER — HOSPITAL ENCOUNTER (OUTPATIENT)
Dept: MAMMOGRAPHY | Age: 77
Discharge: HOME | End: 2022-10-24
Attending: NURSE PRACTITIONER
Payer: MEDICARE

## 2022-10-24 DIAGNOSIS — Z12.31 SCREENING MAMMOGRAM FOR HIGH-RISK PATIENT: ICD-10-CM

## 2022-10-24 PROCEDURE — 77067 SCR MAMMO BI INCL CAD: CPT

## 2022-11-01 NOTE — PROGRESS NOTES
Zeynep Mac is a 68 y.o. female here for new patient appt for CARLINE. Hospital follow up per [facility]. Pt here with caregiver who is like a daughter to her. Pt states she has lost about 17 lbs of fluid recently out of her legs. She does have Home Health coming out to see her. 1. Have you been to the ER, urgent care clinic since your last visit? Hospitalized since your last visit?  no    2. Have you seen or consulted any other health care providers outside of the [address] [place] [address] since your last visit? Include any pap smears or colon screening.  no

## 2022-11-04 ENCOUNTER — OFFICE VISIT (OUTPATIENT)
Dept: ONCOLOGY | Age: 77
End: 2022-11-04
Payer: MEDICARE

## 2022-11-04 VITALS
HEART RATE: 94 BPM | HEIGHT: 59 IN | TEMPERATURE: 98.3 F | SYSTOLIC BLOOD PRESSURE: 115 MMHG | OXYGEN SATURATION: 93 % | DIASTOLIC BLOOD PRESSURE: 66 MMHG | WEIGHT: 208.4 LBS | BODY MASS INDEX: 42.01 KG/M2

## 2022-11-04 DIAGNOSIS — D50.0 IRON DEFICIENCY ANEMIA DUE TO CHRONIC BLOOD LOSS: Primary | ICD-10-CM

## 2022-11-04 DIAGNOSIS — D69.6 THROMBOCYTOPENIA (HCC): ICD-10-CM

## 2022-11-04 PROCEDURE — 1111F DSCHRG MED/CURRENT MED MERGE: CPT | Performed by: INTERNAL MEDICINE

## 2022-11-04 PROCEDURE — G8752 SYS BP LESS 140: HCPCS | Performed by: INTERNAL MEDICINE

## 2022-11-04 PROCEDURE — G8417 CALC BMI ABV UP PARAM F/U: HCPCS | Performed by: INTERNAL MEDICINE

## 2022-11-04 PROCEDURE — 1101F PT FALLS ASSESS-DOCD LE1/YR: CPT | Performed by: INTERNAL MEDICINE

## 2022-11-04 PROCEDURE — 99214 OFFICE O/P EST MOD 30 MIN: CPT | Performed by: INTERNAL MEDICINE

## 2022-11-04 PROCEDURE — 3074F SYST BP LT 130 MM HG: CPT | Performed by: INTERNAL MEDICINE

## 2022-11-04 PROCEDURE — 1123F ACP DISCUSS/DSCN MKR DOCD: CPT | Performed by: INTERNAL MEDICINE

## 2022-11-04 PROCEDURE — 3078F DIAST BP <80 MM HG: CPT | Performed by: INTERNAL MEDICINE

## 2022-11-04 PROCEDURE — G8427 DOCREV CUR MEDS BY ELIG CLIN: HCPCS | Performed by: INTERNAL MEDICINE

## 2022-11-04 PROCEDURE — G8536 NO DOC ELDER MAL SCRN: HCPCS | Performed by: INTERNAL MEDICINE

## 2022-11-04 PROCEDURE — G8754 DIAS BP LESS 90: HCPCS | Performed by: INTERNAL MEDICINE

## 2022-11-04 PROCEDURE — G8432 DEP SCR NOT DOC, RNG: HCPCS | Performed by: INTERNAL MEDICINE

## 2022-11-04 PROCEDURE — 1090F PRES/ABSN URINE INCON ASSESS: CPT | Performed by: INTERNAL MEDICINE

## 2022-11-04 RX ORDER — LOSARTAN POTASSIUM 25 MG/1
25 TABLET ORAL DAILY
COMMUNITY
Start: 2022-10-18

## 2022-11-04 RX ORDER — ALENDRONATE SODIUM 70 MG/1
TABLET ORAL
COMMUNITY

## 2022-11-04 NOTE — PROGRESS NOTES
Pt already seeing liver specialist.    Get IV iron and get labs done in 3 months. VORB FROM DR Nicolasa Portillo CBC WITH DIFF FERRITIN IRON PROFILE.

## 2022-11-04 NOTE — PROGRESS NOTES
[facility]  at [facility] [address], [address] [facility], [address] [place] [address]  [phone]    Hematology Note        Patient: Елена Llamas MRN: [id number]  SSN: [id number]    YOB: 1945  Age: 68 y.o. Sex: female      Subjective:      Елена Llamas is a 68 y.o. female who I am seeing in for iron deficiency anemia. She has suffered with iron deficiency anemia for over a yr. She suffers with GAVE and chronic bleeding requiring regular iron infusion and RBC transfusion. She feels fatigued. She does not tolerate oral iron supplement due to constipation and nausea. She also suffers with BRADY cirrhosis.        Review of Systems:  Constitutional: negative  Eyes: negative  Ears, Nose, Mouth, Throat, and Face: negative  Respiratory: negative  Cardiovascular: negative  Gastrointestinal: negative  Genitourinary:negative  Integument/Breast: negative  Hematologic/Lymphatic: negative  Musculoskeletal:negative  Neurological: negative      Past Medical History:   Diagnosis Date    Abnormal nuclear stress test 10/04/2021    Anemia     Angina at rest [facility]) 10/04/2021    Arthritis     KNEE,gout    Bundle branch block     Endocrine disease     HYPOTHYROIDISM    Fracture     Left distal femur (age 12 - pt fell)    Fracture     Left tibia 1990 (pt fell)    Fracture     Right wrist fractured 2 times (pt fell)    GERD (gastroesophageal reflux disease)     High cholesterol     Hypertension     Hypoglycemia     \"my body produces too much insulin\"    Liver disease     BRADY    Nausea & vomiting     when had gallbladder out    Osteoporosis     Other ill-defined conditions(799.89)     edema legs    S/P cardiac cath 10/04/2021    10/4/2021 nonobstructive disease    Spinal stenosis     Thyroid disease      Past Surgical History:   Procedure Laterality Date    COLONOSCOPY N/A 7/13/2021    COLONOSCOPY performed by Sabra Pleitez MD at [facility] ENDOSCOPY    COLONOSCOPY N/A 8/8/2022    COLONOSCOPY performed by Giorgio Jimenez MD at [place] [address] [address]  2/11/2015         COLONOSCOPY,REMV LESN,SNARE  7/13/2021         COLONOSCPY,FLEX,W/ [address] INJECT  7/13/2021         COLORECTAL SCRN; HI RISK IND  2/11/2015         HX CHOLECYSTECTOMY      HX HYSTERECTOMY      HX ORTHOPAEDIC Left     leg surgery - plates, screws, wires, pins in place    HX UROLOGICAL      PESSURY    AK ABDOMEN SURGERY PROC UNLISTED      liver biopsy--BRADY and fibrosis    UPPER GI ENDOSCOPY,BIOPSY  11/17/2015           Family History   Problem Relation Age of Onset    Diabetes Mother     Heart Disease Mother     Emphysema Father     No Known Problems Brother     Stroke Maternal Grandmother     No Known Problems Maternal Grandfather     No Known Problems Paternal Grandmother     No Known Problems Paternal Grandfather      Social History     Tobacco Use    Smoking status: Never    Smokeless tobacco: Never   Substance Use Topics    Alcohol use: No      Prior to Admission medications    Medication Sig Start Date End Date Taking? Authorizing Provider   alendronate (FOSAMAX) 70 mg tablet alendronate 70 mg tablet   Take 1 tablet every week by oral route. Yes Provider, Historical   losartan (COZAAR) 25 mg tablet Take 25 mg by mouth daily. 10/18/22  Yes Provider, Historical   metOLazone (ZAROXOLYN) 2.5 mg tablet Take 1 Tablet by mouth every other day. 10/18/22  Yes Alla Azul MD   allopurinoL (ZYLOPRIM) 100 mg tablet Take 1 Tablet by mouth two (2) times a day for 30 days. 10/16/22 11/15/22 Yes Mendel, Camilla Pickles, MD   sucralfate (CARAFATE) 1 gram tablet Take 1 Tablet by mouth Before breakfast, lunch, dinner and at bedtime for 30 days. 10/16/22 11/15/22 Yes Mendel, Camilla Pickles, MD   amLODIPine (NORVASC) 10 mg tablet Take 1 Tablet by mouth daily. 9/24/22  Yes Alla Azul MD   potassium chloride SA (MICRO-K) 10 mEq capsule Take 2 Capsules by mouth daily.  9/24/22  Yes Alla Azul MD furosemide (LASIX) 80 mg tablet Take 80 mg by mouth daily. 8/16/22  Yes Provider, Historical   pantoprazole (PROTONIX) 40 mg tablet Take 1 Tablet by mouth Before breakfast and dinner. 8/17/22  Yes Dottie Mandel MD   nystatin (MYCOSTATIN) powder Apply  to affected area two (2) times a day. 8/8/22  Yes Joanne Rees MD   fluticasone propionate [facility]) 50 mcg/actuation nasal spray 2 Sprays by Both Nostrils route daily. Administer to right and left nostril. 6/24/22  Yes Dottie Mandel MD   levothyroxine (SYNTHROID) 150 mcg tablet Take 1 Tablet by mouth Daily (before breakfast). 6/15/22  Yes Dottie Mandel MD   loratadine (Claritin) 10 mg tablet Take 1 Tablet by mouth daily. Indications: inflammation of the nose due to an allergy 12/22/21  Yes Dottie Mandel MD   Zetia 10 mg tablet Take 1 tablet by mouth daily. 12/22/21  Yes Dottie Mandel MD   atorvastatin (LIPITOR) 20 mg tablet TAKE 1 TABLET DAILY 12/22/21  Yes Dottie Mandel MD   polyethylene glycol [facility]) 17 gram/dose powder Take 17 g by mouth daily. 4/6/20  Yes Dottie Mandel MD   Biotin 2,500 mcg cap Take  by mouth. Yes Provider, Historical   vitamin E (AQUA GEMS) 400 unit capsule Take 800 Units by mouth two (2) times a day. Yes Provider, Historical   cholecalciferol, vitamin D3, 50 mcg (2,000 unit) tab Take 1 Tab by mouth daily. Yes Other, MD Janette   MULTIVITAMIN WITH MINERALS (ONE-A-DAY 50 PLUS PO) Take 1 Tab by mouth daily. Yes Provider, Historical   icosapent ethyL (VASCEPA) 1 gram capsule Take 2 Capsules by mouth two (2) times daily (with meals). Patient not taking: Reported on 11/4/2022 2/14/22   Dottie Mandel MD   lidocaine (LIDODERM) 5 % Apply patch to the affected area for 12 hours a day and remove for 12 hours a day. 12/7/21   Dottie Mandel MD   ketoconazole (NIZORAL) 2 % topical cream Apply  to affected area daily as needed.  8/23/19   Provider, Historical   L GASSERI/B BIFIDUM/B LONGUM ([facility] PO) Take 1 Tab by mouth daily. Provider, Historical            Allergies   Allergen Reactions    Gabapentin Other (comments)     Suicidal ideation    Metoprolol Rash    Celebrex [Celecoxib] Hives    Eliquis [Apixaban] Other (comments)     Caused excessive anemia    Pcn [Penicillins] Unknown (comments)     Can't remember, was a long time    Sulfa (Sulfonamide Antibiotics) Hives           Objective:     Vitals:    11/04/22 1537   BP: 115/66   Pulse: 94   Temp: 98.3 °F (36.8 °C)   TempSrc: Temporal   SpO2: 93%   Weight: 208 lb 6.4 oz (94.5 kg)   Height: 4' 11\" (1.499 m)            Physical Exam:    GENERAL: alert, cooperative, no distress, appears stated age  EYE: conjunctivae/corneas clear. PERRL, EOM's intact. Fundi benign  LYMPHATIC: Cervical, supraclavicular, and axillary nodes normal.   THROAT & NECK: normal and no erythema or exudates noted. LUNG: clear to auscultation bilaterally  HEART: regular rate and rhythm, S1, S2 normal, no murmur, click, rub or gallop  ABDOMEN: soft, non-tender. Bowel sounds normal. No masses,  no organomegaly  EXTREMITIES:  extremities normal, atraumatic, no cyanosis or edema  SKIN: Normal.  NEUROLOGIC: AOx3. Gait normal. Reflexes and motor strength normal and symmetric. Cranial nerves 2-12 and sensation grossly intact. Lab Results   Component Value Date/Time    WBC 2.9 (L) 10/17/2022 03:26 AM    HGB 7.2 (L) 10/17/2022 03:26 AM    HCT 24.5 (L) 10/17/2022 03:26 AM    PLATELET 96 (L) 71/98/7346 03:26 AM    .8 (H) 10/17/2022 03:26 AM           Lab Results   Component Value Date/Time    Iron 16 (L) 10/06/2022 03:53 PM    TIBC 342 10/06/2022 03:53 PM    Iron % saturation 5 (L) 10/06/2022 03:53 PM    Ferritin 10 08/05/2022 10:36 AM           Assessment:     1.  Iron deficiency anemia secondary to chronic gastrointestinal bleeding:    Last endoscopy 10/2022    I discussed with her various ways to replace/supplement iron which includes giving oral iron preparation such as iron sulfate or similar products or utilizing intravenous access to administer the total dose of iron. She has taken po iron for over 3 months without adequate rise in hemoglobin. She also does not tolerate po iron which leads to constipation and nausea. Thus we have agreed to proceed with IV Iron to be administered in OPIC. I counseled the patient regarding the side effects of IV Iron infusion which includes rare instances of anaphylactic reactions etc.  After weighing the benefit and risks, the patient agreed to proceed with the treatment. Plan:       1. IV Iron in OPIC  2. Labs in 3 and 6 months  3.  Return in 6 months      Signed by: Hector Weir MD                     November 4, 2022

## 2022-11-11 ENCOUNTER — OFFICE VISIT (OUTPATIENT)
Dept: INTERNAL MEDICINE CLINIC | Age: 77
End: 2022-11-11
Payer: MEDICARE

## 2022-11-11 VITALS
HEART RATE: 92 BPM | OXYGEN SATURATION: 98 % | TEMPERATURE: 99.2 F | WEIGHT: 208 LBS | RESPIRATION RATE: 16 BRPM | HEIGHT: 59 IN | BODY MASS INDEX: 41.93 KG/M2 | SYSTOLIC BLOOD PRESSURE: 123 MMHG | DIASTOLIC BLOOD PRESSURE: 62 MMHG

## 2022-11-11 DIAGNOSIS — D50.0 IRON DEFICIENCY ANEMIA DUE TO CHRONIC BLOOD LOSS: ICD-10-CM

## 2022-11-11 DIAGNOSIS — E87.71 TRANSFUSION ASSOCIATED CIRCULATORY OVERLOAD: ICD-10-CM

## 2022-11-11 DIAGNOSIS — R60.9 EDEMA, UNSPECIFIED TYPE: Primary | ICD-10-CM

## 2022-11-11 PROCEDURE — 1101F PT FALLS ASSESS-DOCD LE1/YR: CPT | Performed by: INTERNAL MEDICINE

## 2022-11-11 PROCEDURE — G8417 CALC BMI ABV UP PARAM F/U: HCPCS | Performed by: INTERNAL MEDICINE

## 2022-11-11 PROCEDURE — G8427 DOCREV CUR MEDS BY ELIG CLIN: HCPCS | Performed by: INTERNAL MEDICINE

## 2022-11-11 PROCEDURE — 1111F DSCHRG MED/CURRENT MED MERGE: CPT | Performed by: INTERNAL MEDICINE

## 2022-11-11 PROCEDURE — 1090F PRES/ABSN URINE INCON ASSESS: CPT | Performed by: INTERNAL MEDICINE

## 2022-11-11 PROCEDURE — G8754 DIAS BP LESS 90: HCPCS | Performed by: INTERNAL MEDICINE

## 2022-11-11 PROCEDURE — 99214 OFFICE O/P EST MOD 30 MIN: CPT | Performed by: INTERNAL MEDICINE

## 2022-11-11 PROCEDURE — G8536 NO DOC ELDER MAL SCRN: HCPCS | Performed by: INTERNAL MEDICINE

## 2022-11-11 PROCEDURE — G8752 SYS BP LESS 140: HCPCS | Performed by: INTERNAL MEDICINE

## 2022-11-11 PROCEDURE — G8432 DEP SCR NOT DOC, RNG: HCPCS | Performed by: INTERNAL MEDICINE

## 2022-11-11 RX ORDER — LEVALBUTEROL TARTRATE 45 UG/1
2 AEROSOL, METERED ORAL
COMMUNITY

## 2022-11-11 NOTE — PROGRESS NOTES
HISTORY OF PRESENT ILLNESS  Yoli Hernández is a 68 y.o. female. HPI  Hx DM-2 die controlled htn HLD gout hypothyroid osteoporosis BRADY with fibrosis--[facility] hepatology, suspected cirrhosis-Dr HUGGINS , pancreatic cyst,  PAF Aortic stenosis-mild ckd 3 here with paid CG  Fu leg edema, weight gain 11 lbs , villalobos after hospitalization for anemia due to GI bleed s/p transfusion  On lasix 80 mg every day for cirrhosis  Metolazone 2.5 mg every day was added last month  Weight down 18 lbs-edema improved some  Yesterdayy 203  Due for labs  On albuterol mdi now Dr Aida Cornejo Patient iron in 2 weeks    Saw hemal Kramer and yisel holland applied for leg  weeping    Las OV  Here for MICHAEL HARKINS  Admitted again for anemia/GI bleed  hb 3.8 up to 7.2 on discharge-s/p blood transfusion and iv iron  Back in August admitted hb 4.3-dx portal hypertensive gastropathy and hemorrhoids. Eliquis was stopped. Aspirin restarted  Had EGD-oozing of blood from atyical gave. 2 small bowel AVM.  Seen by GI and had enteroscopy and tx of gave by [facility]  Will get outtp fu GI and hematologist for iv iron  C/o icnreaesed weight gain 11 lbs, LE edema and VILLALOBOS since return to home   Has paid CG at home and here in office  No melena per pt    Patient Active Problem List    Diagnosis Date Noted    Iron deficiency anemia due to chronic blood loss 11/04/2022    Acute anemia 10/06/2022    Anemia 08/05/2022    SSS (sick sinus syndrome) ([facility] 06/27/2022    PAF (paroxysmal atrial fibrillation) ([facility] 06/24/2022    Stage 3a chronic kidney disease ([facility] 05/20/2022    Type 2 diabetes mellitus with chronic kidney disease ([facility] 11/18/2021    Abnormal nuclear stress test 10/04/2021    Angina at rest [facility]) 10/04/2021    S/P cardiac cath 10/04/2021    Nonrheumatic aortic valve stenosis 09/08/2021    Pulmonary hypertension ([facility] 09/08/2021    MICHELLE (acute kidney injury) ([facility] 10/27/2020    Cellulitis of left lower leg 11/07/2019    Severe obesity ([facility] 09/30/2019 Controlled type 2 diabetes mellitus without complication ([facility] 12/89/2564    DDD (degenerative disc disease), lumbar 11/27/2017    Prediabetes 04/11/2016    Thrombocytopenia ([facility] 01/24/2015    HTN (hypertension) 01/17/2014    Bifascicular block 12/23/2010    Fatty liver 06/18/2010    Pure hypercholesterolemia 06/18/2010    Colon polyp 06/18/2010    Gout 06/14/2010    Hypothyroidism 06/14/2010    Osteoporosis 06/14/2010    Anxiety 06/14/2010    Leg edema 06/14/2010    RSD lower limb 06/14/2010     Current Outpatient Medications   Medication Sig Dispense Refill    allopurinoL (ZYLOPRIM) 100 mg tablet Take 1 Tablet by mouth two (2) times a day for 30 days. 60 Tablet 5    alendronate (FOSAMAX) 70 mg tablet alendronate 70 mg tablet   Take 1 tablet every week by oral route. losartan (COZAAR) 25 mg tablet Take 25 mg by mouth daily. metOLazone (ZAROXOLYN) 2.5 mg tablet Take 1 Tablet by mouth every other day. 30 Tablet 0    sucralfate (CARAFATE) 1 gram tablet Take 1 Tablet by mouth Before breakfast, lunch, dinner and at bedtime for 30 days. 120 Tablet 0    amLODIPine (NORVASC) 10 mg tablet Take 1 Tablet by mouth daily. 90 Tablet 3    potassium chloride SA (MICRO-K) 10 mEq capsule Take 2 Capsules by mouth daily. 180 Capsule 3    furosemide (LASIX) 80 mg tablet Take 80 mg by mouth daily. pantoprazole (PROTONIX) 40 mg tablet Take 1 Tablet by mouth Before breakfast and dinner. 60 Tablet 5    nystatin (MYCOSTATIN) powder Apply  to affected area two (2) times a day. 30 g 0    fluticasone propionate (FLONASE) 50 mcg/actuation nasal spray 2 Sprays by Both Nostrils route daily. Administer to right and left nostril. 3 Each 3    levothyroxine (SYNTHROID) 150 mcg tablet Take 1 Tablet by mouth Daily (before breakfast). 90 Tablet 3    icosapent ethyL (VASCEPA) 1 gram capsule Take 2 Capsules by mouth two (2) times daily (with meals).  (Patient not taking: Reported on 11/4/2022) 360 Capsule 3    loratadine (Claritin) 10 mg tablet Take 1 Tablet by mouth daily. Indications: inflammation of the nose due to an allergy 90 Tablet 3    Zetia 10 mg tablet Take 1 tablet by mouth daily. 90 Tablet 3    atorvastatin (LIPITOR) 20 mg tablet TAKE 1 TABLET DAILY 90 Tablet 3    lidocaine (LIDODERM) 5 % Apply patch to the affected area for 12 hours a day and remove for 12 hours a day. 90 Each 3    polyethylene glycol (MIRALAX) 17 gram/dose powder Take 17 g by mouth daily. 2108 g 3    ketoconazole (NIZORAL) 2 % topical cream Apply  to affected area daily as needed. 3    Biotin 2,500 mcg cap Take  by mouth. L GASSERI/B BIFIDUM/B LONGUM ([facility] PO) Take 1 Tab by mouth daily. vitamin E (AQUA GEMS) 400 unit capsule Take 800 Units by mouth two (2) times a day. cholecalciferol, vitamin D3, 50 mcg (2,000 unit) tab Take 1 Tab by mouth daily. MULTIVITAMIN WITH MINERALS (ONE-A-DAY 50 PLUS PO) Take 1 Tab by mouth daily.        Allergies   Allergen Reactions    Gabapentin Other (comments)     Suicidal ideation    Metoprolol Rash    Celebrex [Celecoxib] Hives    Eliquis [Apixaban] Other (comments)     Caused excessive anemia    Pcn [Penicillins] Unknown (comments)     Can't remember, was a long time    Sulfa (Sulfonamide Antibiotics) Hives      Lab Results   Component Value Date/Time    WBC 2.9 (L) 10/17/2022 03:26 AM    HGB 7.2 (L) 10/17/2022 03:26 AM    HCT 24.5 (L) 10/17/2022 03:26 AM    PLATELET 96 (L) 07/71/1470 03:26 AM    .8 (H) 10/17/2022 03:26 AM     Lab Results   Component Value Date/Time    Hemoglobin A1c 5.9 (H) 05/23/2022 11:18 AM    Hemoglobin A1c 5.5 11/19/2021 11:01 AM    Hemoglobin A1c 5.9 (H) 05/19/2021 10:49 AM    Hemoglobin A1c, External 7.0 07/29/2020 12:00 AM    Hemoglobin A1c, External 6.4 06/21/2016 12:00 AM    Glucose 106 (H) 10/17/2022 03:26 AM    Glucose (POC) 104 08/08/2022 04:56 PM    Microalbumin/Creat ratio (mg/g creat) <14 11/19/2021 11:01 AM    Microalbumin,urine random <0.50 11/19/2021 11:01 AM    LDL, calculated 60.6 05/23/2022 11:18 AM    Creatinine 1.08 (H) 10/17/2022 03:26 AM      Lab Results   Component Value Date/Time    Cholesterol, total 143 05/23/2022 11:18 AM    HDL Cholesterol 56 05/23/2022 11:18 AM    LDL, calculated 60.6 05/23/2022 11:18 AM    LDL-C, External 41 07/29/2020 12:00 AM    Triglyceride 132 05/23/2022 11:18 AM    CHOL/HDL Ratio 2.6 05/23/2022 11:18 AM     Lab Results   Component Value Date/Time    GFR est non-AA 41 (L) 08/17/2022 09:31 AM    GFR est AA 49 (L) 08/17/2022 09:31 AM    Creatinine 1.08 (H) 10/17/2022 03:26 AM    BUN 20 10/17/2022 03:26 AM    Sodium 144 10/17/2022 03:26 AM    Potassium 3.3 (L) 10/17/2022 03:26 AM    Chloride 114 (H) 10/17/2022 03:26 AM    CO2 23 10/17/2022 03:26 AM    Magnesium 1.9 10/17/2022 03:26 AM    Phosphorus 2.4 (L) 10/17/2022 03:26 AM    PTH, Intact 31 01/06/2016 09:55 AM        ROS    Physical Exam  Vitals and nursing note reviewed. Constitutional:       Appearance: Normal appearance. She is well-developed. She is obese. Comments: Appears stated age   Cardiovascular:      Rate and Rhythm: Normal rate and regular rhythm. Heart sounds: Normal heart sounds. No murmur heard. No friction rub. No gallop. Pulmonary:      Effort: Pulmonary effort is normal. No respiratory distress. Breath sounds: Normal breath sounds. No wheezing. Abdominal:      General: Bowel sounds are normal.      Palpations: Abdomen is soft. Musculoskeletal:      Right lower leg: No edema. Left lower leg: No edema. Comments: Lower Legs wrapped   Neurological:      Mental Status: She is alert. ASSESSMENT and PLAN    ICD-10-CM ICD-9-CM    1. Edema, unspecified type  O17.2 306.0 METABOLIC PANEL, BASIC      METABOLIC PANEL, BASIC  Stop metolazone  Unna boots per vascular MD  Continue lasix 80 mg every day  Can restart metolazone if weight up 4-5 lbs qMWF--d/w pt      2.  Iron deficiency anemia due to chronic blood loss  D50.0 280.0 CBC W/O DIFF      CBC W/O DIFF  IV iron per onc MD      3.  Transfusion associated circulatory overload  R72.89 440.56 METABOLIC PANEL, BASIC      METABOLIC PANEL, BASIC   RTC 3 months

## 2022-11-11 NOTE — PROGRESS NOTES
1. \"Have you been to the ER, urgent care clinic since your last visit? Hospitalized since your last visit? \" No    2. \"Have you seen or consulted any other health care providers outside of the [address] [place] [address] since your last visit? \" No     3. For patients aged 39-70: Has the patient had a colonoscopy / FIT/ Cologuard? Yes - no Care Gap present      If the patient is female:    4. For patients aged 41-77: Has the patient had a mammogram within the past 2 years? Yes - no Care Gap present      5. For patients aged 21-65: Has the patient had a pap smear?  NA - based on age or sex

## 2022-11-12 LAB
ANION GAP SERPL CALC-SCNC: 7 MMOL/L (ref 5–15)
BUN SERPL-MCNC: 34 MG/DL (ref 6–20)
BUN/CREAT SERPL: 22 (ref 12–20)
CALCIUM SERPL-MCNC: 8.8 MG/DL (ref 8.5–10.1)
CHLORIDE SERPL-SCNC: 109 MMOL/L (ref 97–108)
CO2 SERPL-SCNC: 26 MMOL/L (ref 21–32)
CREAT SERPL-MCNC: 1.54 MG/DL (ref 0.55–1.02)
ERYTHROCYTE [DISTWIDTH] IN BLOOD BY AUTOMATED COUNT: 25.2 % (ref 11.5–14.5)
GLUCOSE SERPL-MCNC: 202 MG/DL (ref 65–100)
HCT VFR BLD AUTO: 20.2 % (ref 35–47)
HGB BLD-MCNC: 6.2 G/DL (ref 11.5–16)
MCH RBC QN AUTO: 33 PG (ref 26–34)
MCHC RBC AUTO-ENTMCNC: 30.7 G/DL (ref 30–36.5)
MCV RBC AUTO: 107.4 FL (ref 80–99)
NRBC # BLD: 0 K/UL (ref 0–0.01)
NRBC BLD-RTO: 0 PER 100 WBC
PLATELET # BLD AUTO: 107 K/UL (ref 150–400)
PMV BLD AUTO: 11.2 FL (ref 8.9–12.9)
POTASSIUM SERPL-SCNC: 4 MMOL/L (ref 3.5–5.1)
RBC # BLD AUTO: 1.88 M/UL (ref 3.8–5.2)
SODIUM SERPL-SCNC: 142 MMOL/L (ref 136–145)
WBC # BLD AUTO: 4.2 K/UL (ref 3.6–11)

## 2022-11-13 ENCOUNTER — HOSPITAL ENCOUNTER (INPATIENT)
Age: 77
LOS: 2 days | Discharge: HOME HEALTH | DRG: 378 | End: 2022-11-18
Attending: EMERGENCY MEDICINE | Admitting: INTERNAL MEDICINE
Payer: MEDICARE

## 2022-11-13 DIAGNOSIS — D50.0 IRON DEFICIENCY ANEMIA SECONDARY TO BLOOD LOSS (CHRONIC): ICD-10-CM

## 2022-11-13 DIAGNOSIS — D62 ACUTE ON CHRONIC BLOOD LOSS ANEMIA: Primary | ICD-10-CM

## 2022-11-13 LAB
ALBUMIN SERPL-MCNC: 3.3 G/DL (ref 3.5–5)
ALBUMIN/GLOB SERPL: 0.9 (ref 1.1–2.2)
ALP SERPL-CCNC: 227 U/L (ref 45–117)
ALT SERPL-CCNC: 29 U/L (ref 12–78)
ANION GAP SERPL CALC-SCNC: 6 MMOL/L (ref 5–15)
AST SERPL-CCNC: 36 U/L (ref 15–37)
BASOPHILS # BLD: 0 K/UL (ref 0–0.1)
BASOPHILS NFR BLD: 0 % (ref 0–1)
BILIRUB SERPL-MCNC: 1.1 MG/DL (ref 0.2–1)
BUN SERPL-MCNC: 38 MG/DL (ref 6–20)
BUN/CREAT SERPL: 25 (ref 12–20)
CALCIUM SERPL-MCNC: 9.4 MG/DL (ref 8.5–10.1)
CHLORIDE SERPL-SCNC: 109 MMOL/L (ref 97–108)
CO2 SERPL-SCNC: 27 MMOL/L (ref 21–32)
CREAT SERPL-MCNC: 1.55 MG/DL (ref 0.55–1.02)
DIFFERENTIAL METHOD BLD: ABNORMAL
EOSINOPHIL # BLD: 0.2 K/UL (ref 0–0.4)
EOSINOPHIL NFR BLD: 5 % (ref 0–7)
ERYTHROCYTE [DISTWIDTH] IN BLOOD BY AUTOMATED COUNT: 24.9 % (ref 11.5–14.5)
GLOBULIN SER CALC-MCNC: 3.5 G/DL (ref 2–4)
GLUCOSE SERPL-MCNC: 146 MG/DL (ref 65–100)
HCT VFR BLD AUTO: 19.5 % (ref 35–47)
HGB BLD-MCNC: 6 G/DL (ref 11.5–16)
IMM GRANULOCYTES # BLD AUTO: 0 K/UL (ref 0–0.04)
IMM GRANULOCYTES NFR BLD AUTO: 0 % (ref 0–0.5)
LYMPHOCYTES # BLD: 0.4 K/UL (ref 0.8–3.5)
LYMPHOCYTES NFR BLD: 11 % (ref 12–49)
MCH RBC QN AUTO: 32.3 PG (ref 26–34)
MCHC RBC AUTO-ENTMCNC: 30.8 G/DL (ref 30–36.5)
MCV RBC AUTO: 104.8 FL (ref 80–99)
MONOCYTES # BLD: 0.1 K/UL (ref 0–1)
MONOCYTES NFR BLD: 3 % (ref 5–13)
NEUTS SEG # BLD: 2.5 K/UL (ref 1.8–8)
NEUTS SEG NFR BLD: 81 % (ref 32–75)
NRBC # BLD: 0 K/UL (ref 0–0.01)
NRBC BLD-RTO: 0 PER 100 WBC
PLATELET # BLD AUTO: 90 K/UL (ref 150–400)
PLATELET COMMENTS,PCOM: ABNORMAL
PMV BLD AUTO: 11.3 FL (ref 8.9–12.9)
POTASSIUM SERPL-SCNC: 3.8 MMOL/L (ref 3.5–5.1)
PROT SERPL-MCNC: 6.8 G/DL (ref 6.4–8.2)
RBC # BLD AUTO: 1.86 M/UL (ref 3.8–5.2)
RBC MORPH BLD: ABNORMAL
SODIUM SERPL-SCNC: 142 MMOL/L (ref 136–145)
WBC # BLD AUTO: 3.2 K/UL (ref 3.6–11)

## 2022-11-13 PROCEDURE — 86978 RBC PRETREATMENT SERUM: CPT

## 2022-11-13 PROCEDURE — 86880 COOMBS TEST DIRECT: CPT

## 2022-11-13 PROCEDURE — 36415 COLL VENOUS BLD VENIPUNCTURE: CPT

## 2022-11-13 PROCEDURE — 85025 COMPLETE CBC W/AUTO DIFF WBC: CPT

## 2022-11-13 PROCEDURE — 86860 RBC ANTIBODY ELUTION: CPT

## 2022-11-13 PROCEDURE — 86920 COMPATIBILITY TEST SPIN: CPT

## 2022-11-13 PROCEDURE — 86922 COMPATIBILITY TEST ANTIGLOB: CPT

## 2022-11-13 PROCEDURE — 86900 BLOOD TYPING SEROLOGIC ABO: CPT

## 2022-11-13 PROCEDURE — 86870 RBC ANTIBODY IDENTIFICATION: CPT

## 2022-11-13 PROCEDURE — 86850 RBC ANTIBODY SCREEN: CPT

## 2022-11-13 PROCEDURE — 80053 COMPREHEN METABOLIC PANEL: CPT

## 2022-11-13 PROCEDURE — 86902 BLOOD TYPE ANTIGEN DONOR EA: CPT

## 2022-11-13 PROCEDURE — 30233N1 TRANSFUSION OF NONAUTOLOGOUS RED BLOOD CELLS INTO PERIPHERAL VEIN, PERCUTANEOUS APPROACH: ICD-10-PCS | Performed by: EMERGENCY MEDICINE

## 2022-11-13 PROCEDURE — 86904 BLOOD TYPING PATIENT SERUM: CPT

## 2022-11-13 PROCEDURE — 86901 BLOOD TYPING SEROLOGIC RH(D): CPT

## 2022-11-13 PROCEDURE — 99285 EMERGENCY DEPT VISIT HI MDM: CPT

## 2022-11-13 PROCEDURE — 86921 COMPATIBILITY TEST INCUBATE: CPT

## 2022-11-13 RX ORDER — SODIUM CHLORIDE 0.9 % (FLUSH) 0.9 %
5-40 SYRINGE (ML) INJECTION EVERY 8 HOURS
Status: DISCONTINUED | OUTPATIENT
Start: 2022-11-13 | End: 2022-11-18 | Stop reason: HOSPADM

## 2022-11-13 RX ORDER — POLYETHYLENE GLYCOL 3350 17 G/17G
17 POWDER, FOR SOLUTION ORAL DAILY PRN
Status: DISCONTINUED | OUTPATIENT
Start: 2022-11-13 | End: 2022-11-18 | Stop reason: HOSPADM

## 2022-11-13 RX ORDER — SODIUM CHLORIDE 9 MG/ML
250 INJECTION, SOLUTION INTRAVENOUS AS NEEDED
Status: DISPENSED | OUTPATIENT
Start: 2022-11-13 | End: 2022-11-14

## 2022-11-13 RX ORDER — ACETAMINOPHEN 650 MG/1
650 SUPPOSITORY RECTAL
Status: DISCONTINUED | OUTPATIENT
Start: 2022-11-13 | End: 2022-11-18 | Stop reason: HOSPADM

## 2022-11-13 RX ORDER — ONDANSETRON 4 MG/1
4 TABLET, ORALLY DISINTEGRATING ORAL
Status: DISCONTINUED | OUTPATIENT
Start: 2022-11-13 | End: 2022-11-18 | Stop reason: HOSPADM

## 2022-11-13 RX ORDER — SODIUM CHLORIDE 0.9 % (FLUSH) 0.9 %
5-40 SYRINGE (ML) INJECTION AS NEEDED
Status: DISCONTINUED | OUTPATIENT
Start: 2022-11-13 | End: 2022-11-18 | Stop reason: HOSPADM

## 2022-11-13 RX ORDER — ONDANSETRON 2 MG/ML
4 INJECTION INTRAMUSCULAR; INTRAVENOUS
Status: DISCONTINUED | OUTPATIENT
Start: 2022-11-13 | End: 2022-11-18 | Stop reason: HOSPADM

## 2022-11-13 RX ORDER — ACETAMINOPHEN 325 MG/1
650 TABLET ORAL
Status: DISCONTINUED | OUTPATIENT
Start: 2022-11-13 | End: 2022-11-18 | Stop reason: HOSPADM

## 2022-11-13 NOTE — PROGRESS NOTES
Called pt --Hb is low--recheck in ER today. May need transfusion.  Pt will get a ride from  to go to ER

## 2022-11-13 NOTE — ED PROVIDER NOTES
EMERGENCY DEPARTMENT HISTORY AND PHYSICAL EXAM      Date: 11/13/2022  Patient Name: Derek Llanos    History of Presenting Illness     Chief Complaint   Patient presents with    Abnormal Lab Results     Wheelchair into triage with cc of low HGB (6.2), hx of iron deficiency anemia. Pt denies melena, hematemesis. Endorses chronic SOB       History Provided By: Patient    HPI: Derek Llanos, 68 y.o. female with PMHx significant for chronic anemia related to gave, arthritis, hypertension, hyperlipidemia, chronic leg edema, who presents for abnormal outpatient labs. Patient tells me that she is supposed to start iron infusions in several weeks and is followed by Dr. Uyen Akins. She states that she had outpatient lab work checked by her primary care doctor and her hemoglobin was low at 6.2 so he sent her to the ED for evaluation. She does have chronic shortness of breath and states this is not significantly changed. Denies any melena or bright red blood in her stool. No chest pain or shortness of breath. PCP: Clarisse Jeffery MD    There are no other associated signs or symptoms. No other exacerbating or alleviating factors. There are no other complaints, changes, or physical findings at this time. Current Outpatient Medications   Medication Sig Dispense Refill    levalbuterol tartrate (XOPENEX) 45 mcg/actuation inhaler Take 2 Puffs by inhalation every six (6) hours as needed for Wheezing or Shortness of Breath. allopurinoL (ZYLOPRIM) 100 mg tablet Take 1 Tablet by mouth two (2) times a day for 30 days. 60 Tablet 5    alendronate (FOSAMAX) 70 mg tablet alendronate 70 mg tablet   Take 1 tablet every week by oral route. losartan (COZAAR) 25 mg tablet Take 25 mg by mouth daily. metOLazone (ZAROXOLYN) 2.5 mg tablet Take 1 Tablet by mouth every other day. 30 Tablet 0    sucralfate (CARAFATE) 1 gram tablet Take 1 Tablet by mouth Before breakfast, lunch, dinner and at bedtime for 30 days.  120 Tablet 0    amLODIPine (NORVASC) 10 mg tablet Take 1 Tablet by mouth daily. 90 Tablet 3    potassium chloride SA (MICRO-K) 10 mEq capsule Take 2 Capsules by mouth daily. 180 Capsule 3    furosemide (LASIX) 80 mg tablet Take 80 mg by mouth daily. pantoprazole (PROTONIX) 40 mg tablet Take 1 Tablet by mouth Before breakfast and dinner. 60 Tablet 5    nystatin (MYCOSTATIN) powder Apply  to affected area two (2) times a day. 30 g 0    fluticasone propionate (FLONASE) 50 mcg/actuation nasal spray 2 Sprays by Both Nostrils route daily. Administer to right and left nostril. 3 Each 3    levothyroxine (SYNTHROID) 150 mcg tablet Take 1 Tablet by mouth Daily (before breakfast). 90 Tablet 3    icosapent ethyL (VASCEPA) 1 gram capsule Take 2 Capsules by mouth two (2) times daily (with meals). 360 Capsule 3    loratadine (Claritin) 10 mg tablet Take 1 Tablet by mouth daily. Indications: inflammation of the nose due to an allergy 90 Tablet 3    Zetia 10 mg tablet Take 1 tablet by mouth daily. 90 Tablet 3    atorvastatin (LIPITOR) 20 mg tablet TAKE 1 TABLET DAILY 90 Tablet 3    lidocaine (LIDODERM) 5 % Apply patch to the affected area for 12 hours a day and remove for 12 hours a day. 90 Each 3    polyethylene glycol (MIRALAX) 17 gram/dose powder Take 17 g by mouth daily. 2108 g 3    ketoconazole (NIZORAL) 2 % topical cream Apply  to affected area daily as needed. 3    Biotin 2,500 mcg cap Take  by mouth. L GASSERI/B BIFIDUM/B LONGUM ([employer] PO) Take 1 Tab by mouth daily. vitamin E (AQUA GEMS) 400 unit capsule Take 800 Units by mouth two (2) times a day. cholecalciferol, vitamin D3, 50 mcg (2,000 unit) tab Take 1 Tab by mouth daily. MULTIVITAMIN WITH MINERALS (ONE-A-DAY 50 PLUS PO) Take 1 Tab by mouth daily.        Past History     Past Medical History:  Past Medical History:   Diagnosis Date    Abnormal nuclear stress test 10/04/2021    Anemia     Angina at rest [facility]) 10/04/2021    Arthritis KNEE,gout    Bundle branch block     Endocrine disease     HYPOTHYROIDISM    Fracture     Left distal femur (age 12 - pt fell)    Fracture     Left tibia 1990 (pt fell)    Fracture     Right wrist fractured 2 times (pt fell)    GERD (gastroesophageal reflux disease)     High cholesterol     Hypertension     Hypoglycemia     \"my body produces too much insulin\"    Liver disease     BRADY    Nausea & vomiting     when had gallbladder out    Osteoporosis     Other ill-defined conditions(799.89)     edema legs    S/P cardiac cath 10/04/2021    10/4/2021 nonobstructive disease    Spinal stenosis     Thyroid disease      Past Surgical History:  Past Surgical History:   Procedure Laterality Date    COLONOSCOPY N/A 7/13/2021    COLONOSCOPY performed by Tanisha Garza MD at [facility] ENDOSCOPY    COLONOSCOPY N/A 8/8/2022    COLONOSCOPY performed by Balbir Durán MD at [place] [address] [address]  2/11/2015         COLONOSCOPY,REMV LESN,SNARE  7/13/2021         COLONOSCPY,FLEX,W/ [address] INJECT  7/13/2021         COLORECTAL SCRN; HI RISK IND  2/11/2015         HX CHOLECYSTECTOMY      HX HYSTERECTOMY      HX ORTHOPAEDIC Left     leg surgery - plates, screws, wires, pins in place    HX UROLOGICAL      PESSURY    OK ABDOMEN SURGERY PROC UNLISTED      liver biopsy--BRADY and fibrosis    UPPER GI ENDOSCOPY,BIOPSY  11/17/2015          Family History:  Family History   Problem Relation Age of Onset    Diabetes Mother     Heart Disease Mother     Emphysema Father     No Known Problems Brother     Stroke Maternal Grandmother     No Known Problems Maternal Grandfather     No Known Problems Paternal Grandmother     No Known Problems Paternal Grandfather      Social History:  Social History     Tobacco Use    Smoking status: Never    Smokeless tobacco: Never   Vaping Use    Vaping Use: Never used   Substance Use Topics    Alcohol use: No    Drug use: Yes     Types: Prescription, OTC     Allergies:   Allergies Allergen Reactions    Gabapentin Other (comments)     Suicidal ideation    Metoprolol Rash    Celebrex [Celecoxib] Hives    Eliquis [Apixaban] Other (comments)     Caused excessive anemia    Pcn [Penicillins] Unknown (comments)     Can't remember, was a long time    Sulfa (Sulfonamide Antibiotics) Hives     Review of Systems   Review of Systems   Constitutional:  Negative for chills and fever. HENT:  Negative for congestion, rhinorrhea and sore throat. Respiratory:  Negative for cough and shortness of breath. Cardiovascular:  Negative for chest pain. Gastrointestinal:  Negative for abdominal pain, nausea and vomiting. Genitourinary:  Negative for dysuria and urgency. Skin:  Negative for rash. Neurological:  Negative for dizziness, light-headedness and headaches. All other systems reviewed and are negative. Physical Exam   Physical Exam  Vitals and nursing note reviewed. Constitutional:       General: She is not in acute distress. Appearance: She is well-developed. HENT:      Head: Normocephalic and atraumatic. Eyes:      Conjunctiva/sclera: Conjunctivae normal.      Pupils: Pupils are equal, round, and reactive to light. Cardiovascular:      Rate and Rhythm: Normal rate and regular rhythm. Pulmonary:      Effort: Pulmonary effort is normal. No respiratory distress. Breath sounds: Normal breath sounds. No stridor. Abdominal:      General: There is no distension. Palpations: Abdomen is soft. Tenderness: There is no abdominal tenderness. Musculoskeletal:         General: Normal range of motion. Cervical back: Normal range of motion. Comments: B/l legs in wraps   Skin:     General: Skin is warm and dry. Neurological:      Mental Status: She is alert and oriented to person, place, and time. Psychiatric:         Mood and Affect: Mood normal.         Thought Content:  Thought content normal.     Diagnostic Study Results   Labs -     Recent Results (from the past 12 hour(s))   CBC WITH AUTOMATED DIFF    Collection Time: 11/13/22 12:50 PM   Result Value Ref Range    WBC 3.2 (L) 3.6 - 11.0 K/uL    RBC 1.86 (L) 3.80 - 5.20 M/uL    HGB 6.0 (L) 11.5 - 16.0 g/dL    HCT 19.5 (L) 35.0 - 47.0 %    .8 (H) 80.0 - 99.0 FL    MCH 32.3 26.0 - 34.0 PG    MCHC 30.8 30.0 - 36.5 g/dL    RDW 24.9 (H) 11.5 - 14.5 %    PLATELET 90 (L) 140 - 400 K/uL    MPV 11.3 8.9 - 12.9 FL    NRBC 0.0 0  WBC    ABSOLUTE NRBC 0.00 0.00 - 0.01 K/uL    NEUTROPHILS 81 (H) 32 - 75 %    LYMPHOCYTES 11 (L) 12 - 49 %    MONOCYTES 3 (L) 5 - 13 %    EOSINOPHILS 5 0 - 7 %    BASOPHILS 0 0 - 1 %    IMMATURE GRANULOCYTES 0 0.0 - 0.5 %    ABS. NEUTROPHILS 2.5 1.8 - 8.0 K/UL    ABS. LYMPHOCYTES 0.4 (L) 0.8 - 3.5 K/UL    ABS. MONOCYTES 0.1 0.0 - 1.0 K/UL    ABS. EOSINOPHILS 0.2 0.0 - 0.4 K/UL    ABS. BASOPHILS 0.0 0.0 - 0.1 K/UL    ABS. IMM. GRANS. 0.0 0.00 - 0.04 K/UL    DF SMEAR SCANNED      PLATELET COMMENTS DECREASED PLATELETS      RBC COMMENTS TEARDROP CELLS  1+        RBC COMMENTS MACROCYTOSIS  1+        RBC COMMENTS ANISOCYTOSIS  2+       METABOLIC PANEL, COMPREHENSIVE    Collection Time: 11/13/22 12:50 PM   Result Value Ref Range    Sodium 142 136 - 145 mmol/L    Potassium 3.8 3.5 - 5.1 mmol/L    Chloride 109 (H) 97 - 108 mmol/L    CO2 27 21 - 32 mmol/L    Anion gap 6 5 - 15 mmol/L    Glucose 146 (H) 65 - 100 mg/dL    BUN 38 (H) 6 - 20 MG/DL    Creatinine 1.55 (H) 0.55 - 1.02 MG/DL    BUN/Creatinine ratio 25 (H) 12 - 20      eGFR 34 (L) >60 ml/min/1.73m2    Calcium 9.4 8.5 - 10.1 MG/DL    Bilirubin, total 1.1 (H) 0.2 - 1.0 MG/DL    ALT (SGPT) 29 12 - 78 U/L    AST (SGOT) 36 15 - 37 U/L    Alk. phosphatase 227 (H) 45 - 117 U/L    Protein, total 6.8 6.4 - 8.2 g/dL    Albumin 3.3 (L) 3.5 - 5.0 g/dL    Globulin 3.5 2.0 - 4.0 g/dL    A-G Ratio 0.9 (L) 1.1 - 2.2         Radiologic Studies -   No orders to display     No results found.   Medical Decision Making   I am the first provider for this patient. I reviewed the vital signs, available nursing notes, past medical history, past surgical history, family history and social history. Vital Signs-Reviewed the patient's vital signs. Patient Vitals for the past 12 hrs:   Temp Pulse Resp BP SpO2   11/13/22 1212 98.1 °F (36.7 °C) 68 16 (!) 155/69 98 %       Pulse Oximetry Analysis - 95% on ra      Records Reviewed: Nursing Notes and Old Medical Records    Provider Notes (Medical Decision Making):   Patient with a history of chronic blood loss anemia who presents with a chief complaint of abnormal outpatient labs, notably a hemoglobin of 6.2. She has no other acute symptoms today and denies any rectal bleeding. Repeat lab work today 6.2. Plan for unit of blood. ED Course:   Initial assessment performed. The patients presenting problems have been discussed, and they are in agreement with the care plan formulated and outlined with them. I have encouraged them to ask questions as they arise throughout their visit. Patient signed out to Dr. Cristiane Black. Did receive notification from the lab that the patient has significant antibodies so blood transfusion may be delayed. If significant delay, patient may require hospitalist admission.   Lonny Galindo MD         Procedures:  Critical Care  Performed by: Andre Dobbs MD  Authorized by: Andre Dobbs MD     Critical care provider statement:     Critical care time (minutes):  30    Critical care time was exclusive of:  Separately billable procedures and treating other patients    Critical care was necessary to treat or prevent imminent or life-threatening deterioration of the following conditions:  Circulatory failure    Critical care was time spent personally by me on the following activities:  Ordering and performing treatments and interventions, ordering and review of laboratory studies, ordering and review of radiographic studies, pulse oximetry, re-evaluation of patient's condition, review of old charts, examination of patient and evaluation of patient's response to treatment    Care discussed with: admitting provider      Critical Care:      Disposition:  admit        Diagnosis     Clinical Impression:   1. Acute on chronic blood loss anemia            Please note that this dictation was completed with [employer], the computer voice recognition software. Quite often unanticipated grammatical, syntax, homophones, and other interpretive errors are inadvertently transcribed by the computer software. Please disregard these errors.   Please excuse any errors that have escaped final proofreading

## 2022-11-13 NOTE — DISCHARGE INSTRUCTIONS
Leave compression wraps on until seen by Dr. Janae Roper at follow up appointment in 1 week.   Avoid use of Ibuprofen, Aleve, Motrin

## 2022-11-13 NOTE — CONSENT
Informed Consent for Blood Component Transfusion Note    I have discussed with the patient the rationale for blood component transfusion; its benefits in treating or preventing fatigue, organ damage, or death; and its risk which includes mild transfusion reactions, rare risk of blood borne infection, or more serious but rare reactions. I have discussed the alternatives to transfusion, including the risk and consequences of not receiving transfusion. The patient had an opportunity to ask questions and had agreed to proceed with transfusion of blood components.     Electronically signed by Elvie Marti MD on 11/13/22 at 3:30 PM

## 2022-11-14 ENCOUNTER — APPOINTMENT (OUTPATIENT)
Dept: INFUSION THERAPY | Age: 77
End: 2022-11-14
Payer: MEDICARE

## 2022-11-14 LAB — HISTORY CHECKED?,CKHIST: NORMAL

## 2022-11-14 PROCEDURE — 74011250636 HC RX REV CODE- 250/636: Performed by: PHYSICIAN ASSISTANT

## 2022-11-14 PROCEDURE — 74011250637 HC RX REV CODE- 250/637: Performed by: PHYSICIAN ASSISTANT

## 2022-11-14 PROCEDURE — 96374 THER/PROPH/DIAG INJ IV PUSH: CPT

## 2022-11-14 PROCEDURE — 74011000250 HC RX REV CODE- 250: Performed by: PHYSICIAN ASSISTANT

## 2022-11-14 PROCEDURE — 74011250636 HC RX REV CODE- 250/636: Performed by: INTERNAL MEDICINE

## 2022-11-14 PROCEDURE — G0378 HOSPITAL OBSERVATION PER HR: HCPCS

## 2022-11-14 RX ORDER — METOLAZONE 2.5 MG/1
2.5 TABLET ORAL EVERY OTHER DAY
Status: DISCONTINUED | OUTPATIENT
Start: 2022-11-14 | End: 2022-11-18 | Stop reason: HOSPADM

## 2022-11-14 RX ORDER — PANTOPRAZOLE SODIUM 40 MG/1
40 TABLET, DELAYED RELEASE ORAL
Status: DISCONTINUED | OUTPATIENT
Start: 2022-11-14 | End: 2022-11-18 | Stop reason: HOSPADM

## 2022-11-14 RX ORDER — ALLOPURINOL 100 MG/1
100 TABLET ORAL 2 TIMES DAILY
Status: DISCONTINUED | OUTPATIENT
Start: 2022-11-14 | End: 2022-11-18 | Stop reason: HOSPADM

## 2022-11-14 RX ORDER — SODIUM CHLORIDE 9 MG/ML
50 INJECTION, SOLUTION INTRAVENOUS CONTINUOUS
Status: DISCONTINUED | OUTPATIENT
Start: 2022-11-14 | End: 2022-11-17

## 2022-11-14 RX ORDER — LEVOTHYROXINE SODIUM 75 UG/1
150 TABLET ORAL
Status: DISCONTINUED | OUTPATIENT
Start: 2022-11-14 | End: 2022-11-18 | Stop reason: HOSPADM

## 2022-11-14 RX ORDER — EZETIMIBE 10 MG/1
10 TABLET ORAL DAILY
Status: DISCONTINUED | OUTPATIENT
Start: 2022-11-14 | End: 2022-11-18 | Stop reason: HOSPADM

## 2022-11-14 RX ORDER — AMLODIPINE BESYLATE 5 MG/1
10 TABLET ORAL DAILY
Status: DISCONTINUED | OUTPATIENT
Start: 2022-11-14 | End: 2022-11-18 | Stop reason: HOSPADM

## 2022-11-14 RX ORDER — IPRATROPIUM BROMIDE AND ALBUTEROL SULFATE 2.5; .5 MG/3ML; MG/3ML
3 SOLUTION RESPIRATORY (INHALATION)
Status: DISCONTINUED | OUTPATIENT
Start: 2022-11-14 | End: 2022-11-18 | Stop reason: HOSPADM

## 2022-11-14 RX ORDER — LOSARTAN POTASSIUM 50 MG/1
25 TABLET ORAL DAILY
Status: DISCONTINUED | OUTPATIENT
Start: 2022-11-14 | End: 2022-11-18 | Stop reason: HOSPADM

## 2022-11-14 RX ORDER — POTASSIUM CHLORIDE 20 MEQ/1
20 TABLET, EXTENDED RELEASE ORAL
Status: DISCONTINUED | OUTPATIENT
Start: 2022-11-14 | End: 2022-11-18 | Stop reason: HOSPADM

## 2022-11-14 RX ORDER — FUROSEMIDE 40 MG/1
80 TABLET ORAL DAILY
Status: DISCONTINUED | OUTPATIENT
Start: 2022-11-14 | End: 2022-11-17

## 2022-11-14 RX ORDER — SUCRALFATE 1 G/1
1 TABLET ORAL
Status: DISCONTINUED | OUTPATIENT
Start: 2022-11-14 | End: 2022-11-18 | Stop reason: HOSPADM

## 2022-11-14 RX ADMIN — SODIUM CHLORIDE 50 ML/HR: 9 INJECTION, SOLUTION INTRAVENOUS at 02:21

## 2022-11-14 RX ADMIN — SUCRALFATE 1 G: 1 TABLET ORAL at 06:40

## 2022-11-14 RX ADMIN — SODIUM CHLORIDE, PRESERVATIVE FREE 5 ML: 5 INJECTION INTRAVENOUS at 06:18

## 2022-11-14 RX ADMIN — SUCRALFATE 1 G: 1 TABLET ORAL at 11:37

## 2022-11-14 RX ADMIN — ALLOPURINOL 100 MG: 100 TABLET ORAL at 11:37

## 2022-11-14 RX ADMIN — EZETIMIBE 10 MG: 10 TABLET ORAL at 10:02

## 2022-11-14 RX ADMIN — SODIUM CHLORIDE, PRESERVATIVE FREE 10 ML: 5 INJECTION INTRAVENOUS at 15:34

## 2022-11-14 RX ADMIN — POTASSIUM CHLORIDE 20 MEQ: 20 TABLET, EXTENDED RELEASE ORAL at 10:01

## 2022-11-14 RX ADMIN — SODIUM CHLORIDE, PRESERVATIVE FREE 5 ML: 5 INJECTION INTRAVENOUS at 02:22

## 2022-11-14 RX ADMIN — ALLOPURINOL 100 MG: 100 TABLET ORAL at 17:06

## 2022-11-14 RX ADMIN — IRON SUCROSE 100 MG: 20 INJECTION, SOLUTION INTRAVENOUS at 15:33

## 2022-11-14 RX ADMIN — PANTOPRAZOLE SODIUM 40 MG: 40 TABLET, DELAYED RELEASE ORAL at 15:33

## 2022-11-14 RX ADMIN — LEVOTHYROXINE SODIUM 150 MCG: 0.07 TABLET ORAL at 06:40

## 2022-11-14 RX ADMIN — PANTOPRAZOLE SODIUM 40 MG: 40 TABLET, DELAYED RELEASE ORAL at 06:40

## 2022-11-14 RX ADMIN — SUCRALFATE 1 G: 1 TABLET ORAL at 23:34

## 2022-11-14 NOTE — H&P
History and Physical    Patient: Carlo Abreu MRN: [id number]  SSN: [id number]    YOB: 1945  Age: 68 y.o. Sex: female      Subjective:      Carlo Abreu is a 68 y.o. 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 Pt was recently admitted on 10/06 for a similar episode in which an EGD showed  acute small bowel AVM and active GI bleeding which required multiple blood transfusions. Associated symptoms of her current episode include dyspnea on exertion and chronic lower leg edema. Otherwise pt denies any blood in stool, abd pain, or emesis. Pt remains a&o x 3, vitals remain stable, no signs of active bleeding.      Past Medical History:   Diagnosis Date    Abnormal nuclear stress test 10/04/2021    Anemia     Angina at rest [facility]) 10/04/2021    Arthritis     KNEE,gout    Bundle branch block     Endocrine disease     HYPOTHYROIDISM    Fracture     Left distal femur (age 12 - pt fell)    Fracture     Left tibia 1990 (pt fell)    Fracture     Right wrist fractured 2 times (pt fell)    GERD (gastroesophageal reflux disease)     High cholesterol     Hypertension     Hypoglycemia     \"my body produces too much insulin\"    Liver disease     BRADY    Nausea & vomiting     when had gallbladder out    Osteoporosis     Other ill-defined conditions(799.89)     edema legs    S/P cardiac cath 10/04/2021    10/4/2021 nonobstructive disease    Spinal stenosis     Thyroid disease      Past Surgical History:   Procedure Laterality Date    COLONOSCOPY N/A 7/13/2021    COLONOSCOPY performed by Georgie Damian MD at [facility] ENDOSCOPY    COLONOSCOPY N/A 8/8/2022    COLONOSCOPY performed by Claudia Kellogg MD at [address] [place] [address],[address]  2/11/2015         COLONOSCOPY,REMV [place]ok [place]  7/13/2021         COLONOSCPY,FLEX,W/ [address] INJECT  7/13/2021         COLORECTAL SCRN; HI RISK IND  2/11/2015         HX CHOLECYSTECTOMY      HX HYSTERECTOMY      HX ORTHOPAEDIC Left     leg surgery - plates, screws, wires, pins in place    HX UROLOGICAL      PESSURY    GA ABDOMEN SURGERY PROC UNLISTED      liver biopsy--BRADY and fibrosis    UPPER GI ENDOSCOPY,BIOPSY  11/17/2015           Family History   Problem Relation Age of Onset    Diabetes Mother     Heart Disease Mother     Emphysema Father     No Known Problems Brother     Stroke Maternal Grandmother     No Known Problems Maternal Grandfather     No Known Problems Paternal Grandmother     No Known Problems Paternal Grandfather      Social History     Tobacco Use    Smoking status: Never    Smokeless tobacco: Never   Substance Use Topics    Alcohol use: No      Prior to Admission medications    Medication Sig Start Date End Date Taking? Authorizing Provider   levalbuterol tartrate (XOPENEX) 45 mcg/actuation inhaler Take 2 Puffs by inhalation every six (6) hours as needed for Wheezing or Shortness of Breath. Provider, Historical   allopurinoL (ZYLOPRIM) 100 mg tablet Take 1 Tablet by mouth two (2) times a day for 30 days. 11/10/22 12/10/22  Ronna Philippe MD   alendronate (FOSAMAX) 70 mg tablet alendronate 70 mg tablet   Take 1 tablet every week by oral route. Provider, Historical   losartan (COZAAR) 25 mg tablet Take 25 mg by mouth daily. 10/18/22   Provider, Historical   metOLazone (ZAROXOLYN) 2.5 mg tablet Take 1 Tablet by mouth every other day. 10/18/22   Ronna Philippe MD   sucralfate (CARAFATE) 1 gram tablet Take 1 Tablet by mouth Before breakfast, lunch, dinner and at bedtime for 30 days. 10/16/22 11/15/22  Timothy Sue MD   amLODIPine (NORVASC) 10 mg tablet Take 1 Tablet by mouth daily. 9/24/22   Ronna Philippe MD   potassium chloride SA (MICRO-K) 10 mEq capsule Take 2 Capsules by mouth daily. 9/24/22   Ronna Philippe MD   furosemide (LASIX) 80 mg tablet Take 80 mg by mouth daily.  8/16/22   Provider, Historical   pantoprazole (PROTONIX) 40 mg tablet Take 1 Tablet by mouth Before breakfast and dinner. 8/17/22   Shantelle Rosenberg MD   nystatin (MYCOSTATIN) powder Apply  to affected area two (2) times a day. 8/8/22   Garalnd Benjamin MD   fluticasone propionate (FLONASE) 50 mcg/actuation nasal spray 2 Sprays by Both Nostrils route daily. Administer to right and left nostril. 6/24/22   Shantelle Rosenberg MD   levothyroxine (SYNTHROID) 150 mcg tablet Take 1 Tablet by mouth Daily (before breakfast). 6/15/22   Shantelle Rosenberg MD   icosapent ethyL (VASCEPA) 1 gram capsule Take 2 Capsules by mouth two (2) times daily (with meals). 2/14/22   Shantelle Rosenberg MD   loratadine (Claritin) 10 mg tablet Take 1 Tablet by mouth daily. Indications: inflammation of the nose due to an allergy 12/22/21   Shantelle Rosenberg MD   Zetia 10 mg tablet Take 1 tablet by mouth daily. 12/22/21   Shantelle Rosenberg MD   atorvastatin (LIPITOR) 20 mg tablet TAKE 1 TABLET DAILY 12/22/21   Shantelle Rosenberg MD   lidocaine (LIDODERM) 5 % Apply patch to the affected area for 12 hours a day and remove for 12 hours a day. 12/7/21   Shantelle Rosenberg MD   polyethylene glycol [facility]) 17 gram/dose powder Take 17 g by mouth daily. 4/6/20   Shantelle Rosenberg MD   ketoconazole (NIZORAL) 2 % topical cream Apply  to affected area daily as needed. 8/23/19   Provider, Historical   Biotin 2,500 mcg cap Take  by mouth. Provider, Historical   L GASSERI/B BIFIDUM/B LONGUM ([employer] PO) Take 1 Tab by mouth daily. Provider, Historical   vitamin E (AQUA GEMS) 400 unit capsule Take 800 Units by mouth two (2) times a day. Provider, Historical   cholecalciferol, vitamin D3, 50 mcg (2,000 unit) tab Take 1 Tab by mouth daily. Other, MD Janette   MULTIVITAMIN WITH MINERALS (ONE-A-DAY 50 PLUS PO) Take 1 Tab by mouth daily.     Provider, Historical        Allergies   Allergen Reactions    Gabapentin Other (comments)     Suicidal ideation    Metoprolol Rash    Celebrex [Celecoxib] Hives    Eliquis [Apixaban] Other (comments)     Caused excessive anemia    Pcn [Penicillins] Unknown (comments)     Can't remember, was a long time    Sulfa (Sulfonamide Antibiotics) Hives       Review of Systems:  Review of Systems   Constitutional:  Positive for malaise/fatigue. Negative for chills and fever. HENT: Negative. Eyes: Negative. Respiratory:  Negative for hemoptysis. Dyspnea on exertion   Cardiovascular:  Positive for leg swelling. Gastrointestinal:  Negative for abdominal pain, blood in stool and melena. Genitourinary: Negative. Negative for hematuria. Skin: Negative. Neurological:  Negative for dizziness and headaches. Endo/Heme/Allergies: Negative. Psychiatric/Behavioral: Negative. Objective:     Recent Results (from the past 24 hour(s))   CBC WITH AUTOMATED DIFF    Collection Time: 11/13/22 12:50 PM   Result Value Ref Range    WBC 3.2 (L) 3.6 - 11.0 K/uL    RBC 1.86 (L) 3.80 - 5.20 M/uL    HGB 6.0 (L) 11.5 - 16.0 g/dL    HCT 19.5 (L) 35.0 - 47.0 %    .8 (H) 80.0 - 99.0 FL    MCH 32.3 26.0 - 34.0 PG    MCHC 30.8 30.0 - 36.5 g/dL    RDW 24.9 (H) 11.5 - 14.5 %    PLATELET 90 (L) 208 - 400 K/uL    MPV 11.3 8.9 - 12.9 FL    NRBC 0.0 0  WBC    ABSOLUTE NRBC 0.00 0.00 - 0.01 K/uL    NEUTROPHILS 81 (H) 32 - 75 %    LYMPHOCYTES 11 (L) 12 - 49 %    MONOCYTES 3 (L) 5 - 13 %    EOSINOPHILS 5 0 - 7 %    BASOPHILS 0 0 - 1 %    IMMATURE GRANULOCYTES 0 0.0 - 0.5 %    ABS. NEUTROPHILS 2.5 1.8 - 8.0 K/UL    ABS. LYMPHOCYTES 0.4 (L) 0.8 - 3.5 K/UL    ABS. MONOCYTES 0.1 0.0 - 1.0 K/UL    ABS. EOSINOPHILS 0.2 0.0 - 0.4 K/UL    ABS. BASOPHILS 0.0 0.0 - 0.1 K/UL    ABS. IMM.  GRANS. 0.0 0.00 - 0.04 K/UL    DF SMEAR SCANNED      PLATELET COMMENTS DECREASED PLATELETS      RBC COMMENTS TEARDROP CELLS  1+        RBC COMMENTS MACROCYTOSIS  1+        RBC COMMENTS ANISOCYTOSIS  2+       METABOLIC PANEL, COMPREHENSIVE    Collection Time: 11/13/22 12:50 PM   Result Value Ref Range    Sodium 142 136 - 145 mmol/L    Potassium 3.8 3.5 - 5.1 mmol/L    Chloride 109 (H) 97 - 108 mmol/L    CO2 27 21 - 32 mmol/L    Anion gap 6 5 - 15 mmol/L    Glucose 146 (H) 65 - 100 mg/dL    BUN 38 (H) 6 - 20 MG/DL    Creatinine 1.55 (H) 0.55 - 1.02 MG/DL    BUN/Creatinine ratio 25 (H) 12 - 20      eGFR 34 (L) >60 ml/min/1.73m2    Calcium 9.4 8.5 - 10.1 MG/DL    Bilirubin, total 1.1 (H) 0.2 - 1.0 MG/DL    ALT (SGPT) 29 12 - 78 U/L    AST (SGOT) 36 15 - 37 U/L    Alk. phosphatase 227 (H) 45 - 117 U/L    Protein, total 6.8 6.4 - 8.2 g/dL    Albumin 3.3 (L) 3.5 - 5.0 g/dL    Globulin 3.5 2.0 - 4.0 g/dL    A-G Ratio 0.9 (L) 1.1 - 2.2     TYPE & SCREEN    Collection Time: 11/13/22 12:50 PM   Result Value Ref Range    Crossmatch Expiration 11/16/2022,2359     ABO/Rh(D) Sharol Mcburney POSITIVE     Antibody screen POS     Antibody ID PENDING         No orders to display        Vitals:    11/13/22 1212 11/13/22 1510 11/13/22 1525   BP: (!) 155/69 (!) 145/53 (!) 125/45   Pulse: 68  66   Resp: 16  14   Temp: 98.1 °F (36.7 °C)     SpO2: 98%  100%   Weight: 208 lb (94.3 kg)     Height: 4' 11\" (1.499 m)          Physical Exam:  Physical Exam  Constitutional:       Appearance: Normal appearance. She is not ill-appearing. Eyes:      Extraocular Movements: Extraocular movements intact. Conjunctiva/sclera: Conjunctivae normal.   Cardiovascular:      Rate and Rhythm: Normal rate and regular rhythm. Pulses: Normal pulses. Heart sounds: Normal heart sounds. Pulmonary:      Effort: Pulmonary effort is normal.      Breath sounds: Normal breath sounds. Abdominal:      General: Abdomen is flat. Palpations: Abdomen is soft. Musculoskeletal:         General: Normal range of motion. Cervical back: Normal range of motion. Skin:     General: Skin is warm and dry. Comments: Bilateral lower leg edema   Neurological:      Mental Status: She is alert and oriented to person, place, and time. Mental status is at baseline. 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 Shante Parson  Stool occult    2. Chronic kidney disease, stage III  Continue to monitor at baseline  Continue IVF    3. HTN  Hold amlodipine, losartan, furosemide  Continue Zaroxolyn    4. Osteoporosis  Continue  alendronate     6. Hyperlipidemia  Continue zetia, Hold lipitor    7. Hypothyroidism  Continue synthroid    8.  [employer]  Receiving infusions as outpatient    Code status: full code  DVT Prophylaxis: SCDs 2/2 anemia  Ulcer prophylaxis: Pantoprazole    Time of admission 50 minutes    Signed By: Saira Cardoza     November 13, 2022

## 2022-11-14 NOTE — CONSULTS
Gastroenterology Attending Physician (for [facility]) attestation statement and comments. This patient was seen and examined by me in a face-to-face visit today. I reviewed the medical record including lab work, imaging and other provider notes. I confirmed the history as described above. I spoke to the patient, reviewed the medical record including lab work, imaging and other provider notes. I discussed this case in detail with CATY Gibson. I formulated an  assessment of this patient and developed a treatment plan. I agree with the above consultation note. I agree with the history, exam and assessment and plan as outlined in the note. 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 S/p prior pacer placement but has persistent SOB. Eval by PCP noted anemia prompting referral to ER. Denies abd pain, N/V, D/C,or change in [address]. PE w/ benign CV, pulm, abd exam. Trace pedal edema b/l. Multiple colon polyps removed 2022 with internal hemorrhoids and normal TI. Last EGD/enteroscopy 10/2022 with ablation with APC of duodenal AVMs and atypical GAVE. Labs and meds reviewed. DDx includes but is not limited to gastritis/gastropathy in setting of cirrhosis, with chronic blood loss from portal hypertensive gastropathy, AVMs, or GAVE. Plan:   1) Push Enteroscopy tomorrow.   2) NPO after MN  3) Serial H/H after transfusion completed w/ goal Hgb>7   4) BID PPI  Yoli Orlando MD            GI CONSULTATION NOTE  Claudette Stokes, CATY  [phone] NP in-hospital cell phone M-F until 4:30  After 5pm or on weekends, please call  for physician on call    NAME: Kika Links  : 1945  MRN: [id number]   Attending:  Dr. Trina Leong  Primary GI:  Dr. YOANA TRAMMELL   Date/Time:  2022 10:42 AM  Assessment:   Acute on chronic anemia   FOBT +  Hgb 6.0     Hx cirrhosis secondary to BRADY, compensated   Had liver bx at [facility]  2006 BRADY, moderate fibrosis with bridging, stage III  Platelets 90     GI History:  10/11/2022- pill cam showing AVM at 9 minutes and active oozing blood at 1 hour and 34 minutes, unclear if AVM or other lesion  EGD 10/10/22- atypical GAVE in antrum, treated with APC extensively  -- also, pill camera deployed in duodenum, given degree of recurrent anemia  08/2022- Colonoscopy with normal colonic mucosa throughout, internal hemorrhoids   08/2022- EGD showing portal hypertensive gastropathy    Plan:   Plan for push enteroscopy tomorrow; obtain consent. However, symptoms are likely secondary to oozing portal HTN  Recommend PRBC's  Recommend IV iron   GI lite diet for now then NPO after midnight   Monitor for s/s of bleeding; goal for hgb >7.0  Symptomatic care per primary team   Plan discussed with Dr. Nidhi Rowe    Subjective:     HISTORY OF PRESENT ILLNESS:     Derek Llanos is an 68 y.o.  female who we are asked to see for complaint of anemia. Patient has a past medical hx significant for chronic anemia secondary to atypical GAVE, HTN, and hyperlipidemia presents with abnormal blood work, HGB 6.0, and generalized weakness. Pt was recently admitted on 10/06 for a similar episode in which an EGD showed  acute small bowel AVM and active GI bleeding which required multiple blood transfusions. Associated symptoms of her current episode include dyspnea on exertion and chronic lower leg edema. Otherwise pt denies any blood in stool, abd pain, or emesis. Pt remains a&o x 3, vitals remain stable, no signs of active bleeding.      Past Medical History:   Diagnosis Date    Abnormal nuclear stress test 10/04/2021    Anemia     Angina at rest [facility]) 10/04/2021    Arthritis     KNEE,gout    Bundle branch block     Endocrine disease     HYPOTHYROIDISM    Fracture     Left distal femur (age 12 - pt fell)    Fracture     Left tibia 1990 (pt fell)    Fracture     Right wrist fractured 2 times (pt fell)    GERD (gastroesophageal reflux disease)     High cholesterol     Hypertension     Hypoglycemia     \"my body produces too much insulin\"    Liver disease     BRADY    Nausea & vomiting     when had gallbladder out    Osteoporosis     Other ill-defined conditions(799.89)     edema legs    S/P cardiac cath 10/04/2021    10/4/2021 nonobstructive disease    Spinal stenosis     Thyroid disease       Past Surgical History:   Procedure Laterality Date    COLONOSCOPY N/A 7/13/2021    COLONOSCOPY performed by Zandra Rico MD at [facility] ENDOSCOPY    COLONOSCOPY N/A 8/8/2022    COLONOSCOPY performed by Jorge Lucero MD at [place] [address] [address]  2/11/2015         [facility]uis  7/13/2021         COLONOSCPY,FLEX,W/ [address] INJECT  7/13/2021         COLORECTAL SCRN; HI RISK IND  2/11/2015         HX CHOLECYSTECTOMY      HX HYSTERECTOMY      HX ORTHOPAEDIC Left     leg surgery - plates, screws, wires, pins in place    HX UROLOGICAL      PESSURY    OR ABDOMEN SURGERY PROC UNLISTED      liver biopsy--BRADY and fibrosis    UPPER GI ENDOSCOPY,BIOPSY  11/17/2015          Social History     Tobacco Use    Smoking status: Never    Smokeless tobacco: Never   Substance Use Topics    Alcohol use: No      Family History   Problem Relation Age of Onset    Diabetes Mother     Heart Disease Mother     Emphysema Father     No Known Problems Brother     Stroke Maternal Grandmother     No Known Problems Maternal Grandfather     No Known Problems Paternal Grandmother     No Known Problems Paternal Grandfather       Allergies   Allergen Reactions    Gabapentin Other (comments)     Suicidal ideation    Metoprolol Rash    Celebrex [Celecoxib] Hives    Eliquis [Apixaban] Other (comments)     Caused excessive anemia    Pcn [Penicillins] Unknown (comments)     Can't remember, was a long time    Sulfa (Sulfonamide Antibiotics) Hives      Prior to Admission medications    Medication Sig Start Date End Date Taking? Authorizing Provider   allopurinoL (ZYLOPRIM) 100 mg tablet Take 1 Tablet by mouth two (2) times a day for 30 days. 11/10/22 12/10/22 Yes Natanael Jaramillo MD   alendronate (FOSAMAX) 70 mg tablet alendronate 70 mg tablet   Take 1 tablet every week by oral route. Yes Provider, Historical   losartan (COZAAR) 25 mg tablet Take 25 mg by mouth daily. 10/18/22  Yes Provider, Historical   metOLazone (ZAROXOLYN) 2.5 mg tablet Take 1 Tablet by mouth every other day. 10/18/22  Yes Natanael Jaramillo MD   amLODIPine (NORVASC) 10 mg tablet Take 1 Tablet by mouth daily. 9/24/22  Yes Natanael Jaramillo MD   potassium chloride SA (MICRO-K) 10 mEq capsule Take 2 Capsules by mouth daily. 9/24/22  Yes Natanael Jaramillo MD   pantoprazole (PROTONIX) 40 mg tablet Take 1 Tablet by mouth Before breakfast and dinner. 8/17/22  Yes Natanael Jaramillo MD   levothyroxine (SYNTHROID) 150 mcg tablet Take 1 Tablet by mouth Daily (before breakfast). 6/15/22  Yes Natanael Jaramillo MD   icosapent ethyL (VASCEPA) 1 gram capsule Take 2 Capsules by mouth two (2) times daily (with meals). 2/14/22  Yes Natanael Jaramillo MD   loratadine (Claritin) 10 mg tablet Take 1 Tablet by mouth daily. Indications: inflammation of the nose due to an allergy 12/22/21  Yes Natanael Jaramillo MD   atorvastatin (LIPITOR) 20 mg tablet TAKE 1 TABLET DAILY 12/22/21  Yes Natanael Jaramillo MD   polyethylene glycol (MIRALAX) 17 gram/dose powder Take 17 g by mouth daily. 4/6/20  Yes Natanael Jaramillo MD   Biotin 2,500 mcg cap Take  by mouth. Yes Provider, Historical   vitamin E (AQUA GEMS) 400 unit capsule Take 800 Units by mouth two (2) times a day. Yes Provider, Historical   cholecalciferol, vitamin D3, 50 mcg (2,000 unit) tab Take 1 Tab by mouth daily.    Yes Other, MD Janette   levalbuterol tartrate (XOPENEX) 45 mcg/actuation inhaler Take 2 Puffs by inhalation every six (6) hours as needed for Wheezing or Shortness of Breath. Provider, Historical   sucralfate (CARAFATE) 1 gram tablet Take 1 Tablet by mouth Before breakfast, lunch, dinner and at bedtime for 30 days. 10/16/22 11/15/22  Damaris Barrow MD   furosemide (LASIX) 80 mg tablet Take 80 mg by mouth daily. 8/16/22   Provider, Historical   nystatin (MYCOSTATIN) powder Apply  to affected area two (2) times a day. 8/8/22   Karoline Ocampo MD   fluticasone propionate (FLONASE) 50 mcg/actuation nasal spray 2 Sprays by Both Nostrils route daily. Administer to right and left nostril. 6/24/22   Richardson Richardson MD   Zetia 10 mg tablet Take 1 tablet by mouth daily. 12/22/21   Richardson Richardson MD   lidocaine (LIDODERM) 5 % Apply patch to the affected area for 12 hours a day and remove for 12 hours a day. 12/7/21   Richardson Richardson MD   ketoconazole (NIZORAL) 2 % topical cream Apply  to affected area daily as needed. 8/23/19   Provider, Historical   L GASSERI/B BIFIDUM/B LONGUM ([employer] PO) Take 1 Tab by mouth daily. Provider, Historical   MULTIVITAMIN WITH MINERALS (ONE-A-DAY 50 PLUS PO) Take 1 Tab by mouth daily.     Provider, Historical       Patient Active Problem List   Diagnosis Code    Gout M10.9    Hypothyroidism E03.9    Osteoporosis M81.0    Anxiety F41.9    Leg edema R60.0    RSD lower limb G90.529    Fatty liver K76.0    Pure hypercholesterolemia E78.00    Colon polyp K63.5    Bifascicular block I45.2    HTN (hypertension) I10    Thrombocytopenia (HCC) D69.6    Prediabetes R73.03    DDD (degenerative disc disease), lumbar M51.36    Controlled type 2 diabetes mellitus without complication (HCC) G23.1    Severe obesity (HCC) E66.01    Cellulitis of left lower leg L03.116    MICHELLE (acute kidney injury) ([facility] [facility] N17.9    Nonrheumatic aortic valve stenosis I35.0    Pulmonary hypertension (HCC) I27.20    Abnormal nuclear stress test R94.39    Angina at rest [facility]) I20.8    S/P cardiac cath Z98.890    Type 2 diabetes mellitus with chronic kidney disease ([facility] E11.22    Stage 3a chronic kidney disease ([facility] N18.31    PAF (paroxysmal atrial fibrillation) ([facility]) I48.0    SSS (sick sinus syndrome) ([facility]) I49.5    Anemia D64.9    Acute anemia D64.9    Iron deficiency anemia due to chronic blood loss D50.0    Acute blood loss anemia D62       REVIEW OF SYSTEMS:    Constitutional: negative fever, negative chills, negative weight loss  Eyes:   negative visual changes  ENT:   negative sore throat, tongue or lip swelling   Respiratory:  negative cough, negative dyspnea  Cards:  negative for chest pain, palpitations, lower extremity edema  GI:   See HPI  :  negative for frequency, dysuria  Integument:  negative for rash and pruritus  Heme:  negative for easy bruising and gum/nose bleeding  Musculoskel: negative for myalgias,  back pain and muscle weakness  Neuro: negative for headaches, dizziness, vertigo  Psych: negative for feelings of anxiety, depression     Objective:   VITALS:    Visit Vitals  BP (!) 125/45   Pulse 63   Temp 98.1 °F (36.7 °C)   Resp 19   Ht 4' 11\" (1.499 m)   Wt 94.3 kg (208 lb)   SpO2 99%   BMI 42.01 kg/m²       PHYSICAL EXAM:   General:          Pleasant elderly  female. NAD  Head:               Normocephalic, without obvious abnormality, atraumatic. Eyes:               Conjunctivae clear and pale, anicteric sclerae. Pupils are equal  Nose:               Nares normal. No drainage or sinus tenderness. Throat:             Lips, mucosa, and tongue normal.  No Thrush  Neck:               Supple, symmetrical,  no adenopathy, thyroid: non tender  Back:               Symmetric,  No CVA tenderness. Lungs:             CTA bilaterally. No wheezing/rhonchi/rales. Chest wall:      No tenderness or deformity. No Accessory muscle use. Heart:              Regular rate and rhythm,  no murmur, rub or gallop. Abdomen:        Soft, non-tender. Not distended. Bowel sounds normal. No masses  Extremities:     Atraumatic, No cyanosis.   3+ BLE  Skin: Texture, turgor normal. No rashes/lesions/jaundice  Psych:             Good insight. Not depressed. Not anxious or agitated. Neurologic:      EOMs intact. No facial asymmetry. No aphasia or slurred speech. A/O X 3. LAB DATA REVIEWED:    Recent Results (from the past 24 hour(s))   CBC WITH AUTOMATED DIFF    Collection Time: 11/13/22 12:50 PM   Result Value Ref Range    WBC 3.2 (L) 3.6 - 11.0 K/uL    RBC 1.86 (L) 3.80 - 5.20 M/uL    HGB 6.0 (L) 11.5 - 16.0 g/dL    HCT 19.5 (L) 35.0 - 47.0 %    .8 (H) 80.0 - 99.0 FL    MCH 32.3 26.0 - 34.0 PG    MCHC 30.8 30.0 - 36.5 g/dL    RDW 24.9 (H) 11.5 - 14.5 %    PLATELET 90 (L) 619 - 400 K/uL    MPV 11.3 8.9 - 12.9 FL    NRBC 0.0 0  WBC    ABSOLUTE NRBC 0.00 0.00 - 0.01 K/uL    NEUTROPHILS 81 (H) 32 - 75 %    LYMPHOCYTES 11 (L) 12 - 49 %    MONOCYTES 3 (L) 5 - 13 %    EOSINOPHILS 5 0 - 7 %    BASOPHILS 0 0 - 1 %    IMMATURE GRANULOCYTES 0 0.0 - 0.5 %    ABS. NEUTROPHILS 2.5 1.8 - 8.0 K/UL    ABS. LYMPHOCYTES 0.4 (L) 0.8 - 3.5 K/UL    ABS. MONOCYTES 0.1 0.0 - 1.0 K/UL    ABS. EOSINOPHILS 0.2 0.0 - 0.4 K/UL    ABS. BASOPHILS 0.0 0.0 - 0.1 K/UL    ABS. IMM. GRANS. 0.0 0.00 - 0.04 K/UL    DF SMEAR SCANNED      PLATELET COMMENTS DECREASED PLATELETS      RBC COMMENTS TEARDROP CELLS  1+        RBC COMMENTS MACROCYTOSIS  1+        RBC COMMENTS ANISOCYTOSIS  2+       METABOLIC PANEL, COMPREHENSIVE    Collection Time: 11/13/22 12:50 PM   Result Value Ref Range    Sodium 142 136 - 145 mmol/L    Potassium 3.8 3.5 - 5.1 mmol/L    Chloride 109 (H) 97 - 108 mmol/L    CO2 27 21 - 32 mmol/L    Anion gap 6 5 - 15 mmol/L    Glucose 146 (H) 65 - 100 mg/dL    BUN 38 (H) 6 - 20 MG/DL    Creatinine 1.55 (H) 0.55 - 1.02 MG/DL    BUN/Creatinine ratio 25 (H) 12 - 20      eGFR 34 (L) >60 ml/min/1.73m2    Calcium 9.4 8.5 - 10.1 MG/DL    Bilirubin, total 1.1 (H) 0.2 - 1.0 MG/DL    ALT (SGPT) 29 12 - 78 U/L    AST (SGOT) 36 15 - 37 U/L    Alk.  phosphatase 227 (H) 45 - 117 U/L    Protein, total 6.8 6.4 - 8.2 g/dL    Albumin 3.3 (L) 3.5 - 5.0 g/dL    Globulin 3.5 2.0 - 4.0 g/dL    A-G Ratio 0.9 (L) 1.1 - 2.2     TYPE & SCREEN    Collection Time: 11/13/22 12:50 PM   Result Value Ref Range    Crossmatch Expiration 11/16/2022,2359     ABO/Rh(D) O POSITIVE     Antibody screen POS     Antibody ID NO ADDITIONAL ANTIBODIES DETECTED     TRUMAN Poly POS     TRUMAN IgG POS     ANTIBODY ELUTED ALL CELLS POSITIVE-ELUATE,     Comment       Previously identified Anti Jkb and Warm Autoantibodies    TRUMAN C3b/C3d NEG     Unit number [id number]     Blood component type [facility]     Unit division 00     Status of unit ALLOCATED     ANTIGEN/ANTIBODY INFO C NEGATIVE,  THERON NEGATIVE,  Jk(B) NEGATIVE,       Crossmatch result LEAST INCOMPATIBLE     Unit number [id number]     Blood component type [facility]     Unit division 00     Status of unit ALLOCATED     ANTIGEN/ANTIBODY INFO C NEGATIVE,  THERON NEGATIVE,  Jk(B) NEGATIVE,       Crossmatch result LEAST INCOMPATIBLE    RBC, ALLOCATE    Collection Time: 11/13/22  4:00 PM   Result Value Ref Range    HISTORY CHECKED?  Historical check performed        IMAGING RESULTS:  I have personally reviewed the imaging reports      Total time spent with patient: 25 minutes ________________________________________________________________________  Care Plan discussed with:  Patient x   Family     RN x              Consultant:       CT  11/14/2022:  ________________________________________________________________________    ___________________________________________________  Consulting Provider: Luis Alfredo Dong NP      11/14/2022  10:42 AM

## 2022-11-14 NOTE — PROGRESS NOTES
Pt with Hgb of 6, pt has antibodies waiting for blood from the [facility]. IV iron given. Otherwise uneventful shift. Shift report to [facility].

## 2022-11-14 NOTE — ED NOTES
Pt placed on 2L nasal cannula due to O2 sat maintaining between 88-92%, pt denies being short of air. Pt remains AOX4, VSS, purewick placed at this time-pt requested. Waiting on blood to arrive from [facility].

## 2022-11-14 NOTE — ED NOTES
Assumed care of pt. Pt resting in room, denies any pain, waiting on blood to arrive from [facility]. Pt AOX4, VSS-paced on monitor.

## 2022-11-14 NOTE — ED NOTES
Spoke with Dr Becky Posada regarding ordered blood pressure medication, with heart rate and current bp, provider placing medication on hold at this time.

## 2022-11-14 NOTE — PROGRESS NOTES
Hospitalist Progress Note            Daily Progress Note: 11/14/2022 7:42 AM  Hospital course:     Gwynne Buerger is a 68 y.o. 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 Pt was recently admitted on 10/06 for a similar episode in which an EGD showed  acute small bowel AVM and active GI bleeding which required multiple blood transfusions. Associated symptoms of her current episode include dyspnea on exertion and chronic lower leg edema. Otherwise pt denies any blood in stool, abd pain, or emesis. Pt remains a&o x 3, vitals remain stable, no signs of active bleeding. Admitted for further management of acute on chronic anemia. GI consulted. We will consult oncology who she has seen for chronic IV iron and blood transfusions. Subjective:     Examined patient at bedside. She is pleasant, alert and conversant. She does have shortness of breath and generalized lower extremity edema. She wants to know if IV iron comes from human source as does blood. Explained to her that iron is is a mineral that we obtain from foods we eat both meats and plant foods not from humans. Assessment/Plan:   Active Problems:    Acute blood loss anemia (11/13/2022)    Acute blood loss anemia secondary to atypical GAVE  Hgb of 6.0, hemodynamically stable  NS IVF   Daily CBC  Transfusion of 1 unit PRBC, difficulty finding crossmatch d/t antibodies  Consult GI, primary gastroenterologist is Dr. Catalino Pedersen  Stool occult  Will start IV iron replacement     2. Chronic kidney disease, stage III  Continue to monitor at baseline  Continue IVF     3. HTN  Hold amlodipine, losartan, furosemide  Continue Zaroxolyn     4. Osteoporosis  Continue  alendronate      6. Hyperlipidemia  Continue zetia, Hold lipitor     7. Hypothyroidism  Continue synthroid     8.  Low Iron stores  Receiving infusions as outpatient  Will start IV iron  Intolerant of p.o. DVT Prophylaxis: SCDs  Code Status: Full Code  POA/NOK:    Disposition and discharge barriers:   GI consult  Oncology consult  Monitor hemoglobin  IV only  Care Plan discussed with: Patient, staff    Current Facility-Administered Medications   Medication Dose Route Frequency    allopurinoL (ZYLOPRIM) tablet 100 mg  100 mg Oral BID    [Held by provider] amLODIPine (NORVASC) tablet 10 mg  10 mg Oral DAILY    [Held by provider] furosemide (LASIX) tablet 80 mg  80 mg Oral DAILY    albuterol-ipratropium (DUO-NEB) 2.5 MG-0.5 MG/3 ML  3 mL Nebulization Q6H PRN    levothyroxine (SYNTHROID) tablet 150 mcg  150 mcg Oral ACB    [Held by provider] losartan (COZAAR) tablet 25 mg  25 mg Oral DAILY    [Held by provider] metOLazone (ZAROXOLYN) tablet 2.5 mg  2.5 mg Oral EVERY OTHER DAY    pantoprazole (PROTONIX) tablet 40 mg  40 mg Oral ACB&D    potassium chloride (K-DUR, KLOR-CON M20) SR tablet 20 mEq  20 mEq Oral DAILY WITH BREAKFAST    sucralfate (CARAFATE) tablet 1 g  1 g Oral AC&HS    ezetimibe (ZETIA) tablet 10 mg  10 mg Oral DAILY    0.9% sodium chloride infusion  50 mL/hr IntraVENous CONTINUOUS    0.9% sodium chloride infusion 250 mL  250 mL IntraVENous PRN    sodium chloride (NS) flush 5-40 mL  5-40 mL IntraVENous Q8H    sodium chloride (NS) flush 5-40 mL  5-40 mL IntraVENous PRN    acetaminophen (TYLENOL) tablet 650 mg  650 mg Oral Q6H PRN    Or    acetaminophen (TYLENOL) suppository 650 mg  650 mg Rectal Q6H PRN    polyethylene glycol (MIRALAX) packet 17 g  17 g Oral DAILY PRN    ondansetron (ZOFRAN ODT) tablet 4 mg  4 mg Oral Q8H PRN    Or    ondansetron (ZOFRAN) injection 4 mg  4 mg IntraVENous Q6H PRN     Current Outpatient Medications   Medication Sig    allopurinoL (ZYLOPRIM) 100 mg tablet Take 1 Tablet by mouth two (2) times a day for 30 days.     alendronate (FOSAMAX) 70 mg tablet alendronate 70 mg tablet   Take 1 tablet every week by oral route. losartan (COZAAR) 25 mg tablet Take 25 mg by mouth daily. metOLazone (ZAROXOLYN) 2.5 mg tablet Take 1 Tablet by mouth every other day. amLODIPine (NORVASC) 10 mg tablet Take 1 Tablet by mouth daily. potassium chloride SA (MICRO-K) 10 mEq capsule Take 2 Capsules by mouth daily. pantoprazole (PROTONIX) 40 mg tablet Take 1 Tablet by mouth Before breakfast and dinner. levothyroxine (SYNTHROID) 150 mcg tablet Take 1 Tablet by mouth Daily (before breakfast). icosapent ethyL (VASCEPA) 1 gram capsule Take 2 Capsules by mouth two (2) times daily (with meals). loratadine (Claritin) 10 mg tablet Take 1 Tablet by mouth daily. Indications: inflammation of the nose due to an allergy    atorvastatin (LIPITOR) 20 mg tablet TAKE 1 TABLET DAILY    polyethylene glycol (MIRALAX) 17 gram/dose powder Take 17 g by mouth daily. Biotin 2,500 mcg cap Take  by mouth.    vitamin E (AQUA GEMS) 400 unit capsule Take 800 Units by mouth two (2) times a day. cholecalciferol, vitamin D3, 50 mcg (2,000 unit) tab Take 1 Tab by mouth daily. levalbuterol tartrate (XOPENEX) 45 mcg/actuation inhaler Take 2 Puffs by inhalation every six (6) hours as needed for Wheezing or Shortness of Breath. sucralfate (CARAFATE) 1 gram tablet Take 1 Tablet by mouth Before breakfast, lunch, dinner and at bedtime for 30 days. furosemide (LASIX) 80 mg tablet Take 80 mg by mouth daily. nystatin (MYCOSTATIN) powder Apply  to affected area two (2) times a day. fluticasone propionate (FLONASE) 50 mcg/actuation nasal spray 2 Sprays by Both Nostrils route daily. Administer to right and left nostril. Zetia 10 mg tablet Take 1 tablet by mouth daily. lidocaine (LIDODERM) 5 % Apply patch to the affected area for 12 hours a day and remove for 12 hours a day.    ketoconazole (NIZORAL) 2 % topical cream Apply  to affected area daily as needed.     L GASSERI/B BIFIDUM/B LONGUM (TRINH [employer] Mondays PO) Take 1 Tab by mouth daily. MULTIVITAMIN WITH MINERALS (ONE-A-DAY 50 PLUS PO) Take 1 Tab by mouth daily. REVIEW OF SYSTEMS    Review of Systems   Constitutional:  Positive for malaise/fatigue. Respiratory:  Positive for shortness of breath. Negative for cough. Cardiovascular:  Positive for leg swelling. Negative for chest pain. Musculoskeletal:  Positive for myalgias. Neurological:  Positive for weakness. Psychiatric/Behavioral:  The patient is nervous/anxious. Objective:     Visit Vitals  BP (!) 109/42   Pulse 60   Temp 98.1 °F (36.7 °C)   Resp 16   Ht 4' 11\" (1.499 m)   Wt 94.3 kg (208 lb)   SpO2 100%   BMI 42.01 kg/m²    O2 Flow Rate (L/min): 2 l/min O2 Device: Nasal cannula    Temp (24hrs), Av.1 °F (36.7 °C), Min:98.1 °F (36.7 °C), Max:98.1 °F (36.7 °C)      No intake/output data recorded. No intake/output data recorded. PHYSICAL EXAM:    Physical Exam  Constitutional:       Appearance: She is obese. She is ill-appearing. Cardiovascular:      Rate and Rhythm: Normal rate and regular rhythm. Comments:  AV paced rhythm   Pulmonary:      Effort: No respiratory distress. Abdominal:      General: There is distension. Tenderness: There is no abdominal tenderness. Musculoskeletal:      Right lower leg: Edema present. Left lower leg: Edema present. Skin:     General: Skin is dry. Coloration: Skin is jaundiced. Neurological:      Motor: Weakness present.         Data Review    Recent Results (from the past 24 hour(s))   CBC WITH AUTOMATED DIFF    Collection Time: 22 12:50 PM   Result Value Ref Range    WBC 3.2 (L) 3.6 - 11.0 K/uL    RBC 1.86 (L) 3.80 - 5.20 M/uL    HGB 6.0 (L) 11.5 - 16.0 g/dL    HCT 19.5 (L) 35.0 - 47.0 %    .8 (H) 80.0 - 99.0 FL    MCH 32.3 26.0 - 34.0 PG    MCHC 30.8 30.0 - 36.5 g/dL    RDW 24.9 (H) 11.5 - 14.5 %    PLATELET 90 (L) 839 - 400 K/uL    MPV 11.3 8.9 - 12.9 FL    NRBC 0.0 0  WBC    ABSOLUTE NRBC 0.00 0.00 - 0.01 K/uL NEUTROPHILS 81 (H) 32 - 75 %    LYMPHOCYTES 11 (L) 12 - 49 %    MONOCYTES 3 (L) 5 - 13 %    EOSINOPHILS 5 0 - 7 %    BASOPHILS 0 0 - 1 %    IMMATURE GRANULOCYTES 0 0.0 - 0.5 %    ABS. NEUTROPHILS 2.5 1.8 - 8.0 K/UL    ABS. LYMPHOCYTES 0.4 (L) 0.8 - 3.5 K/UL    ABS. MONOCYTES 0.1 0.0 - 1.0 K/UL    ABS. EOSINOPHILS 0.2 0.0 - 0.4 K/UL    ABS. BASOPHILS 0.0 0.0 - 0.1 K/UL    ABS. IMM. GRANS. 0.0 0.00 - 0.04 K/UL    DF SMEAR SCANNED      PLATELET COMMENTS DECREASED PLATELETS      RBC COMMENTS TEARDROP CELLS  1+        RBC COMMENTS MACROCYTOSIS  1+        RBC COMMENTS ANISOCYTOSIS  2+       METABOLIC PANEL, COMPREHENSIVE    Collection Time: 11/13/22 12:50 PM   Result Value Ref Range    Sodium 142 136 - 145 mmol/L    Potassium 3.8 3.5 - 5.1 mmol/L    Chloride 109 (H) 97 - 108 mmol/L    CO2 27 21 - 32 mmol/L    Anion gap 6 5 - 15 mmol/L    Glucose 146 (H) 65 - 100 mg/dL    BUN 38 (H) 6 - 20 MG/DL    Creatinine 1.55 (H) 0.55 - 1.02 MG/DL    BUN/Creatinine ratio 25 (H) 12 - 20      eGFR 34 (L) >60 ml/min/1.73m2    Calcium 9.4 8.5 - 10.1 MG/DL    Bilirubin, total 1.1 (H) 0.2 - 1.0 MG/DL    ALT (SGPT) 29 12 - 78 U/L    AST (SGOT) 36 15 - 37 U/L    Alk.  phosphatase 227 (H) 45 - 117 U/L    Protein, total 6.8 6.4 - 8.2 g/dL    Albumin 3.3 (L) 3.5 - 5.0 g/dL    Globulin 3.5 2.0 - 4.0 g/dL    A-G Ratio 0.9 (L) 1.1 - 2.2     TYPE & SCREEN    Collection Time: 11/13/22 12:50 PM   Result Value Ref Range    Crossmatch Expiration 11/16/2022,2359     ABO/Rh(D) O POSITIVE     Antibody screen POS     Antibody ID NO ADDITIONAL ANTIBODIES DETECTED     TRUMAN Poly POS     TRUMAN IgG POS     ANTIBODY ELUTED ALL CELLS POSITIVE-ELUATE,     Comment       Previously identified Anti Jkb and Warm Autoantibodies    TRUMAN C3b/C3d NEG     Unit number [id number]     Blood component type RC LR     Unit division 00     Status of unit ALLOCATED     ANTIGEN/ANTIBODY INFO C NEGATIVE,  THERON NEGATIVE,  Jk(B) NEGATIVE,       Crossmatch result LEAST INCOMPATIBLE Unit number [id number]     Blood component type RC LR     Unit division 00     Status of unit ALLOCATED     ANTIGEN/ANTIBODY INFO C NEGATIVE,  THERON NEGATIVE,  Jk(B) NEGATIVE,       Crossmatch result LEAST INCOMPATIBLE    RBC, ALLOCATE    Collection Time: 11/13/22  4:00 PM   Result Value Ref Range    HISTORY CHECKED? Historical check performed        No orders to display             _____________________________________________________________________________  Time spent in direct care including coordination of service, review of data and examination: > 35 minutes    ______________________________________________________________________________    Olivia Green NP    This is dictation was done by dragon, computer voice recognition software. Quite often unanticipated grammatical, syntax, homophones and other interpretive errors or inadvertently transcribed by the computer software. Please excuse errors that have escaped final proofreading. Thank you.

## 2022-11-15 ENCOUNTER — ANESTHESIA EVENT (OUTPATIENT)
Dept: ENDOSCOPY | Age: 77
DRG: 378 | End: 2022-11-15
Payer: MEDICARE

## 2022-11-15 ENCOUNTER — ANESTHESIA (OUTPATIENT)
Dept: ENDOSCOPY | Age: 77
DRG: 378 | End: 2022-11-15
Payer: MEDICARE

## 2022-11-15 LAB
ALBUMIN SERPL-MCNC: 2.7 G/DL (ref 3.5–5)
ALBUMIN/GLOB SERPL: 0.9 (ref 1.1–2.2)
ALP SERPL-CCNC: 179 U/L (ref 45–117)
ALT SERPL-CCNC: 21 U/L (ref 12–78)
ANION GAP SERPL CALC-SCNC: 5 MMOL/L (ref 5–15)
AST SERPL-CCNC: 22 U/L (ref 15–37)
BASOPHILS # BLD: 0 K/UL (ref 0–0.1)
BASOPHILS NFR BLD: 1 % (ref 0–1)
BILIRUB SERPL-MCNC: 0.9 MG/DL (ref 0.2–1)
BUN SERPL-MCNC: 34 MG/DL (ref 6–20)
BUN/CREAT SERPL: 28 (ref 12–20)
CALCIUM SERPL-MCNC: 9.2 MG/DL (ref 8.5–10.1)
CHLORIDE SERPL-SCNC: 112 MMOL/L (ref 97–108)
CO2 SERPL-SCNC: 26 MMOL/L (ref 21–32)
CREAT SERPL-MCNC: 1.22 MG/DL (ref 0.55–1.02)
DIFFERENTIAL METHOD BLD: ABNORMAL
EOSINOPHIL # BLD: 0.1 K/UL (ref 0–0.4)
EOSINOPHIL NFR BLD: 6 % (ref 0–7)
ERYTHROCYTE [DISTWIDTH] IN BLOOD BY AUTOMATED COUNT: 24.3 % (ref 11.5–14.5)
GLOBULIN SER CALC-MCNC: 2.9 G/DL (ref 2–4)
GLUCOSE SERPL-MCNC: 110 MG/DL (ref 65–100)
HCT VFR BLD AUTO: 15.9 % (ref 35–47)
HCT VFR BLD AUTO: 17 % (ref 35–47)
HCT VFR BLD AUTO: 23.2 % (ref 35–47)
HGB BLD-MCNC: 4.9 G/DL (ref 11.5–16)
HGB BLD-MCNC: 5.2 G/DL (ref 11.5–16)
HGB BLD-MCNC: 7.6 G/DL (ref 11.5–16)
HISTORY CHECKED?,CKHIST: NORMAL
IMM GRANULOCYTES # BLD AUTO: 0 K/UL (ref 0–0.04)
IMM GRANULOCYTES NFR BLD AUTO: 0 % (ref 0–0.5)
LYMPHOCYTES # BLD: 0.4 K/UL (ref 0.8–3.5)
LYMPHOCYTES NFR BLD: 20 % (ref 12–49)
MCH RBC QN AUTO: 32.7 PG (ref 26–34)
MCHC RBC AUTO-ENTMCNC: 30.8 G/DL (ref 30–36.5)
MCV RBC AUTO: 106 FL (ref 80–99)
MONOCYTES # BLD: 0.1 K/UL (ref 0–1)
MONOCYTES NFR BLD: 4 % (ref 5–13)
NEUTS SEG # BLD: 1.4 K/UL (ref 1.8–8)
NEUTS SEG NFR BLD: 69 % (ref 32–75)
NRBC # BLD: 0 K/UL (ref 0–0.01)
NRBC BLD-RTO: 0 PER 100 WBC
PLATELET # BLD AUTO: 61 K/UL (ref 150–400)
PMV BLD AUTO: 10.7 FL (ref 8.9–12.9)
POTASSIUM SERPL-SCNC: 4.2 MMOL/L (ref 3.5–5.1)
PROT SERPL-MCNC: 5.6 G/DL (ref 6.4–8.2)
RBC # BLD AUTO: 1.5 M/UL (ref 3.8–5.2)
RBC MORPH BLD: ABNORMAL
SODIUM SERPL-SCNC: 143 MMOL/L (ref 136–145)
WBC # BLD AUTO: 2 K/UL (ref 3.6–11)

## 2022-11-15 PROCEDURE — 74011250636 HC RX REV CODE- 250/636: Performed by: PHYSICIAN ASSISTANT

## 2022-11-15 PROCEDURE — 74011250637 HC RX REV CODE- 250/637: Performed by: PHYSICIAN ASSISTANT

## 2022-11-15 PROCEDURE — 74011000250 HC RX REV CODE- 250: Performed by: PHYSICIAN ASSISTANT

## 2022-11-15 PROCEDURE — 36430 TRANSFUSION BLD/BLD COMPNT: CPT

## 2022-11-15 PROCEDURE — 96376 TX/PRO/DX INJ SAME DRUG ADON: CPT

## 2022-11-15 PROCEDURE — 80053 COMPREHEN METABOLIC PANEL: CPT

## 2022-11-15 PROCEDURE — 85018 HEMOGLOBIN: CPT

## 2022-11-15 PROCEDURE — 85025 COMPLETE CBC W/AUTO DIFF WBC: CPT

## 2022-11-15 PROCEDURE — 99222 1ST HOSP IP/OBS MODERATE 55: CPT | Performed by: INTERNAL MEDICINE

## 2022-11-15 PROCEDURE — G0378 HOSPITAL OBSERVATION PER HR: HCPCS

## 2022-11-15 PROCEDURE — 74011250636 HC RX REV CODE- 250/636: Performed by: INTERNAL MEDICINE

## 2022-11-15 PROCEDURE — 30233N1 TRANSFUSION OF NONAUTOLOGOUS RED BLOOD CELLS INTO PERIPHERAL VEIN, PERCUTANEOUS APPROACH: ICD-10-PCS | Performed by: EMERGENCY MEDICINE

## 2022-11-15 PROCEDURE — P9016 RBC LEUKOCYTES REDUCED: HCPCS

## 2022-11-15 PROCEDURE — 36415 COLL VENOUS BLD VENIPUNCTURE: CPT

## 2022-11-15 RX ORDER — SODIUM CHLORIDE 9 MG/ML
250 INJECTION, SOLUTION INTRAVENOUS AS NEEDED
Status: DISCONTINUED | OUTPATIENT
Start: 2022-11-15 | End: 2022-11-18

## 2022-11-15 RX ADMIN — PANTOPRAZOLE SODIUM 40 MG: 40 TABLET, DELAYED RELEASE ORAL at 09:31

## 2022-11-15 RX ADMIN — SODIUM CHLORIDE, PRESERVATIVE FREE 10 ML: 5 INJECTION INTRAVENOUS at 23:17

## 2022-11-15 RX ADMIN — SUCRALFATE 1 G: 1 TABLET ORAL at 23:17

## 2022-11-15 RX ADMIN — SUCRALFATE 1 G: 1 TABLET ORAL at 17:38

## 2022-11-15 RX ADMIN — ALLOPURINOL 100 MG: 100 TABLET ORAL at 18:15

## 2022-11-15 RX ADMIN — SODIUM CHLORIDE 300 MG: 900 INJECTION, SOLUTION INTRAVENOUS at 17:45

## 2022-11-15 RX ADMIN — SUCRALFATE 1 G: 1 TABLET ORAL at 13:02

## 2022-11-15 RX ADMIN — EZETIMIBE 10 MG: 10 TABLET ORAL at 09:31

## 2022-11-15 RX ADMIN — ALLOPURINOL 100 MG: 100 TABLET ORAL at 09:31

## 2022-11-15 RX ADMIN — SODIUM CHLORIDE, PRESERVATIVE FREE 10 ML: 5 INJECTION INTRAVENOUS at 14:55

## 2022-11-15 RX ADMIN — PANTOPRAZOLE SODIUM 40 MG: 40 TABLET, DELAYED RELEASE ORAL at 17:38

## 2022-11-15 RX ADMIN — LEVOTHYROXINE SODIUM 150 MCG: 0.07 TABLET ORAL at 09:31

## 2022-11-15 RX ADMIN — SODIUM CHLORIDE, PRESERVATIVE FREE 10 ML: 5 INJECTION INTRAVENOUS at 09:32

## 2022-11-15 RX ADMIN — SUCRALFATE 1 G: 1 TABLET ORAL at 09:31

## 2022-11-15 RX ADMIN — SODIUM CHLORIDE 50 ML/HR: 9 INJECTION, SOLUTION INTRAVENOUS at 08:45

## 2022-11-15 RX ADMIN — POTASSIUM CHLORIDE 20 MEQ: 20 TABLET, EXTENDED RELEASE ORAL at 09:31

## 2022-11-15 NOTE — CONSULTS
[address] [facility] [address], [address] [facility], [address] [place] [address]  [phone]    Hematology Note        Patient: Aldair Miller MRN: [id number]  SSN: [id number]    YOB: 1945  Age: 68 y.o. Sex: female      Subjective:      Aldair Miller is a 68 y.o. female who is seen in the OP for iron deficiency anemia. She has suffered with iron deficiency anemia for over a yr. She suffers with GAVE and chronic bleeding requiring regular iron infusion and RBC transfusion. She also suffers with BRADY cirrhosis. Interval History:     11/15/2022 Patient was recently seen in the outpatient setting for CARLINE. Plan was to infuse IV iron in OPIC unfortunately patient not receive infusion as she was told to go to the ER by her PCP for low hemoglobin. Patient seen at bedside today in ED she is very pale and complains fatigue. Plan for transfusion of blood today which has been delayed due to antibodies.     Review of Systems:  Constitutional: negative  Eyes: negative  Ears, Nose, Mouth, Throat, and Face: negative  Respiratory: negative  Cardiovascular: negative  Gastrointestinal: negative  Genitourinary:negative  Integument/Breast: negative  Hematologic/Lymphatic: negative  Musculoskeletal:negative  Neurological: negative      Past Medical History:   Diagnosis Date    Abnormal nuclear stress test 10/04/2021    Anemia     Angina at rest [facility]) 10/04/2021    Arthritis     KNEE,gout    Bundle branch block     Endocrine disease     HYPOTHYROIDISM    Fracture     Left distal femur (age 12 - pt fell)    Fracture     Left tibia 1990 (pt fell)    Fracture     Right wrist fractured 2 times (pt fell)    GERD (gastroesophageal reflux disease)     High cholesterol     Hypertension     Hypoglycemia     \"my body produces too much insulin\"    Liver disease     BRADY    Nausea & vomiting     when had gallbladder out    Osteoporosis     Other ill-defined conditions(799.89)     edema legs    S/P cardiac cath 10/04/2021    10/4/2021 nonobstructive disease    Spinal stenosis     Thyroid disease      Past Surgical History:   Procedure Laterality Date    COLONOSCOPY N/A 7/13/2021    COLONOSCOPY performed by Tayla Moreno MD at [facility] ENDOSCOPY    COLONOSCOPY N/A 8/8/2022    COLONOSCOPY performed by Marcelo Kraus MD at [place] [address] [address]  2/11/2015         COLONOSCOPY,REMV [address]  7/13/2021         COLONOSCPY,FLEX,W/ [address] INJECT  7/13/2021         COLORECTAL SCRN; HI RISK IND  2/11/2015         HX CHOLECYSTECTOMY      HX HYSTERECTOMY      HX ORTHOPAEDIC Left     leg surgery - plates, screws, wires, pins in place    HX UROLOGICAL      PESSURY    SD ABDOMEN SURGERY PROC UNLISTED      liver biopsy--BRADY and fibrosis    UPPER GI ENDOSCOPY,BIOPSY  11/17/2015           Family History   Problem Relation Age of Onset    Diabetes Mother     Heart Disease Mother     Emphysema Father     No Known Problems Brother     Stroke Maternal Grandmother     No Known Problems Maternal Grandfather     No Known Problems Paternal Grandmother     No Known Problems Paternal Grandfather      Social History     Tobacco Use    Smoking status: Never    Smokeless tobacco: Never   Substance Use Topics    Alcohol use: No      Prior to Admission medications    Medication Sig Start Date End Date Taking? Authorizing Provider   allopurinoL (ZYLOPRIM) 100 mg tablet Take 1 Tablet by mouth two (2) times a day for 30 days. 11/10/22 12/10/22 Yes Kristyn Hernandez MD   alendronate (FOSAMAX) 70 mg tablet alendronate 70 mg tablet   Take 1 tablet every week by oral route. Yes Provider, Historical   losartan (COZAAR) 25 mg tablet Take 25 mg by mouth daily. 10/18/22  Yes Provider, Historical   metOLazone (ZAROXOLYN) 2.5 mg tablet Take 1 Tablet by mouth every other day. 10/18/22  Yes Kristyn Hernandez MD   amLODIPine (NORVASC) 10 mg tablet Take 1 Tablet by mouth daily.  9/24/22 Yes Reese Ward MD   potassium chloride SA (MICRO-K) 10 mEq capsule Take 2 Capsules by mouth daily. 9/24/22  Yes Reese Ward MD   pantoprazole (PROTONIX) 40 mg tablet Take 1 Tablet by mouth Before breakfast and dinner. 8/17/22  Yes Reese Ward MD   levothyroxine (SYNTHROID) 150 mcg tablet Take 1 Tablet by mouth Daily (before breakfast). 6/15/22  Yes Reese Ward MD   icosapent ethyL (VASCEPA) 1 gram capsule Take 2 Capsules by mouth two (2) times daily (with meals). 2/14/22  Yes Reese Ward MD   loratadine (Claritin) 10 mg tablet Take 1 Tablet by mouth daily. Indications: inflammation of the nose due to an allergy 12/22/21  Yes Reese Ward MD   atorvastatin (LIPITOR) 20 mg tablet TAKE 1 TABLET DAILY 12/22/21  Yes Reese Ward MD   polyethylene glycol (MIRALAX) 17 gram/dose powder Take 17 g by mouth daily. 4/6/20  Yes Reese Ward MD   Biotin 2,500 mcg cap Take  by mouth. Yes Provider, Historical   vitamin E (AQUA GEMS) 400 unit capsule Take 800 Units by mouth two (2) times a day. Yes Provider, Historical   cholecalciferol, vitamin D3, 50 mcg (2,000 unit) tab Take 1 Tab by mouth daily. Yes Other, MD Janette   levalbuterol tartrate (XOPENEX) 45 mcg/actuation inhaler Take 2 Puffs by inhalation every six (6) hours as needed for Wheezing or Shortness of Breath. Provider, Historical   sucralfate (CARAFATE) 1 gram tablet Take 1 Tablet by mouth Before breakfast, lunch, dinner and at bedtime for 30 days. 10/16/22 11/15/22  Sylvia Cuevas MD   furosemide (LASIX) 80 mg tablet Take 80 mg by mouth daily. 8/16/22   Provider, Historical   nystatin (MYCOSTATIN) powder Apply  to affected area two (2) times a day. 8/8/22   Anjel Moralez MD   fluticasone propionate (FLONASE) 50 mcg/actuation nasal spray 2 Sprays by Both Nostrils route daily. Administer to right and left nostril. 6/24/22   Reese Ward MD   Zetia 10 mg tablet Take 1 tablet by mouth daily.  12/22/21   Reese Ward MD   lidocaine (LIDODERM) 5 % Apply patch to the affected area for 12 hours a day and remove for 12 hours a day. 12/7/21   Mian Gonzalez MD   ketoconazole (NIZORAL) 2 % topical cream Apply  to affected area daily as needed. 8/23/19   Provider, Historical   L GASSERI/B BIFIDUM/B LONGUM ([place] PO) Take 1 Tab by mouth daily. Provider, Historical   MULTIVITAMIN WITH MINERALS (ONE-A-DAY 50 PLUS PO) Take 1 Tab by mouth daily. Provider, Historical            Allergies   Allergen Reactions    Gabapentin Other (comments)     Suicidal ideation    Metoprolol Rash    Celebrex [Celecoxib] Hives    Eliquis [Apixaban] Other (comments)     Caused excessive anemia    Pcn [Penicillins] Unknown (comments)     Can't remember, was a long time    Sulfa (Sulfonamide Antibiotics) Hives           Objective:     Vitals:    11/15/22 1307 11/15/22 1322 11/15/22 1337 11/15/22 1351   BP: 108/67 (!) 141/41 (!) 124/43 (!) 127/41   Pulse: 73 66 76 60   Resp: 17 20 17 19   Temp:    98.4 °F (36.9 °C)   SpO2: 98% 98% 98% 99%   Weight:       Height:                Physical Exam:    GENERAL: alert, cooperative, no distress, appears stated age, pale  EYE: conjunctivae/corneas clear. PERRL, EOM's intact. Fundi benign  LYMPHATIC: Cervical, supraclavicular, and axillary nodes normal.   THROAT & NECK: normal and no erythema or exudates noted. LUNG: clear to auscultation bilaterally  HEART: regular rate and rhythm, S1, S2 normal, no murmur, click, rub or gallop  ABDOMEN: soft, non-tender. Bowel sounds normal. No masses,  no organomegaly  EXTREMITIES:  extremities normal, atraumatic, no cyanosis or edema  SKIN: Normal, pale  NEUROLOGIC: AOx3. Cranial nerves 2-12 and sensation grossly intact.         Lab Results   Component Value Date/Time    WBC 2.0 (L) 11/15/2022 05:34 AM    HGB 5.2 (LL) 11/15/2022 08:27 AM    HCT 17.0 (LL) 11/15/2022 08:27 AM    PLATELET 61 (L) 29/13/2469 05:34 AM    .0 (H) 11/15/2022 05:34 AM           Lab Results Component Value Date/Time    Iron 16 (L) 10/06/2022 03:53 PM    TIBC 342 10/06/2022 03:53 PM    Iron % saturation 5 (L) 10/06/2022 03:53 PM    Ferritin 10 08/05/2022 10:36 AM           Assessment:     1. Iron deficiency anemia secondary to chronic gastrointestinal bleeding:    Last endoscopy 10/2022    Patient does not tolerate oral iron supplementation. During last visit on 11/4 decision was made to move forward with IV iron supplementation. Had not yet received IV iron infusion prior to ER visit. Plan:       > Plan for IV iron infusion while inpatient  > Appreciate GI input-plan for push enteroscopy tomorrow  > Agree with supportive transfusions of PRBCs  to keep hemoglobin greater than 7  > Will likely need further IV iron in the outpatient setting    Discussed with Dr. Purcell Aw    We will continue to follow peripherally while patient remains admitted to [address] [place] [address]. We will arrange close outpatient follow-up.       Signed by: Precilla Brunner, NP                     November 15, 2022

## 2022-11-15 NOTE — PROGRESS NOTES
Care Management Initial Assessment - Readmission     Transition of Care Plan:    RUR: N/A - Observation status; ISAURA signed 11/14  Disposition: Home (ILF at [facility]) with Miguelito Sesaykristengardenia  SN/PT/OT (orders sent + accepted) and continued caregiver support   If SNF or IPR: Date FOC offered:  Date FOC received:  Date authorization started with reference number:  Date authorization received and expires:  Accepting facility:  Follow up appointments: PCP, Specialists as indicated  DME needed: Pt has a rollator, shower chair, bedside commode; RW ordered at previous admission through [facility] has not been delivered per pt  Transportation at Discharge: Anticipating need for medical transport per pt   Keys or means to access home: Has access        IM Medicare Letter: N/A - Observation status   Is patient a  and connected with the [place]? N/A               If yes, was [place] transfer form completed and [facility] notified? Caregiver Contact: Pt's sonAmadou [phone]  Discharge Caregiver contacted prior to discharge? To be contacted if pt chooses  Care Conference needed?:  No    Reason for Readmission:   Acute blood loss anemia             RUR Score/Risk Level: N/A - Observation status         PCP: First and Last name: Benji Wick MD     Name of Practice:    Are you a current patient: Yes/No: Yes   Approximate date of last visit: 11/11/22   Can you participate in a virtual visit with your PCP: No    Is a Care Conference indicated: No      Did you attend your follow up appointment (s): If not, why not:  Yes, attended PCP follow up 11/11/22       Resources/supports as identified by patient/family:  Supportive family; caregiver support (M-F 8 AM - 4 PM;  overnight, and weekends per pt), Home health, stable housing        Top Challenges facing patient (as identified by patient/family and CM):          Finances/Medication cost?  No concerns voiced, prefers [address], provides transport     Support system or lack thereof? Appears adequate, no concerns voiced, pt has extensive caregiver support       Living arrangements? Resides in [address] apartment at [facility]         Self-care/ADLs/Cognition? Alert, oriented, has support with ADLs/IADLs from caregivers           Current Advanced Directive/Advance Care Plan:  Advance Care Planning   Healthcare Decision Maker:       Primary Decision Maker: Pattie Room - [phone]      Today we documented Decision Maker(s) consistent with Legal Next of Kin hierarchy. Plan for utilizing home health: Pt is open with [facility] and would like to resume services at d/c; MURPHY orders sent today to [facility] and anticipated d/c date discussed. Transition of Care Plan:    Based on readmission, the patient's previous Plan of Care   has been evaluated and/or modified. The current Transition of Care Plan is:   Home (ILF at [facility]) with [facility] SN/PT/OT (orders sent + accepted) and continued caregiver support           Initial note: CM reviewed chart. CM completed assessment with pt at bedside. CM introduced self, role of CM, verified demographics, and discussed transition of care planning. Pt plans to return to previous home (ILF apartment at [facility]) with continued extensive caregiver support. CG helps pt with cooking, cleaning, caring for her cat, and transportation needs. Pt anticipates need for medical transport at d/c. MURPHY orders have been sent via [address] [place] [address],Ground Floor on today to [facility] to coordinate [facility] in preparation for pt's d/c. Care management will continue to be available to assist as transition of care planning needs arise. Care Management Interventions  PCP Verified by CM:  Yes  Palliative Care Criteria Met (RRAT>21 & CHF Dx)?: No  Mode of Transport at Discharge: BLS  Transition of Care Consult (CM Consult): Discharge Planning, [address]: No  Reason Outside [facility]: Patient already serviced by other home care/hospice agency  Discharge Durable Medical Equipment: No (Owns a rollator, shower chair, bedside commode.  RW was ordered at last admission but has not been delivered)  Physical Therapy Consult: No  Occupational Therapy Consult: No  Speech Therapy Consult: No  Support Systems: Child(paige), Caregiver/Home Care Staff  Confirm Follow Up Transport: Other (see comment) (Caregivers)  The Patient and/or Patient Representative was Provided with a Choice of Provider and Agrees with the Discharge Plan?: Yes  Freedom of Choice List was Provided with Basic Dialogue that Supports the Patient's Individualized Plan of Care/Goals, Treatment Preferences and Shares the Quality Data Associated with the Providers?: Yes  Discharge Location  Patient Expects to be Discharged to[de-identified] Home with home health (Home with [facility] SN/PT/OT and continued caregiver support)    Readmission Assessment  Number of days since last admission?: 8-30 days  Previous disposition: Home with Home Health  Who is being interviewed?: Patient  What was the patient's/caregiver's perception as to why they think they needed to return back to the hospital?: Other (Comment) (Instructed by PCP)  Did you visit your Primary Care Physician after you left the hospital, before you returned this time?: Yes  Did you see a specialist, such as Cardiac, Pulmonary, Orthopedic Physician, etc. after you left the hospital?: Yes  Who advised the patient to return to the hospital?: Physician  Does the patient report anything that got in the way of taking their medications?: No  In our efforts to provide the best possible care to you and others like you, can you think of anything that we could have done to help you after you left the hospital the first time, so that you might not have needed to return so soon?: Other (Comment) (None voiced, pt returned to hospital at the instruction of her PCP)    Allegra January, CASANDRA  Care Manager, [address] [place] [address]

## 2022-11-15 NOTE — PROGRESS NOTES
Hospitalist Progress Note    NAME: Quentin Silva   :  1945   MRN:  [id number]       Assessment / Plan:  Acute on chronic anemia in setting of recent GI bleed, known GAVE - GI following, for EGD 11/15, will transfuse 2 units pRBCs; receives IV iron outpatient  Generalized weakness - poss sec to symptomatic anemia  HTN  Hyperlipidemia  Hypothyroidism - on synthroid  CKD3 - cr at baseline  Osteoporosis - on alendronate  Morbid obesity - BMI 42.01  VTE prophylaxis - scds  Dispo - plan to dc to home once stable    Estimated discharge date:   Barriers: GI w/u stable h/h    Code status: Full  Prophylaxis: SCD's     Subjective:     Patient was seen and examined. No acute events overnight. Patient laying in bed, states she feels fine. Denies any dizziness, palpitations, diaphoresis. Understands plan for transfusion with goal to get EGD once stable. Discussed with RN overnight events. All patient's questions were answered. Review of Systems:  Symptom Y/N Comments  Symptom Y/N Comments   Fever/Chills n   Chest Pain n    Poor Appetite    Edema     Cough n   Abdominal Pain n    Sputum    Joint Pain     SOB/HENLEY n   Pruritis/Rash     Nausea/vomit n   Tolerating PT/OT     Diarrhea    Tolerating Diet y    Constipation    Other       Could NOT obtain due to:          Objective:     VITALS:   Last 24hrs VS reviewed since prior progress note.  Most recent are:  Patient Vitals for the past 24 hrs:   Temp Pulse Resp BP SpO2   11/15/22 0325 98.1 °F (36.7 °C) 60 17 (!) 118/36 96 %   11/15/22 0000 99 °F (37.2 °C) 66 18 (!) 128/43 94 %   22 1916 98.6 °F (37 °C) 60 18 (!) 130/44 99 %   22 1758 -- 60 20 (!) 126/46 97 %   22 1558 -- 60 20 (!) 124/43 96 %   22 1358 -- 63 19 (!) 134/43 98 %   22 1158 -- 66 19 (!) 125/43 99 %   22 0958 -- 76 23 (!) 129/39 --     No intake or output data in the 24 hours ending 11/15/22 0803     I had a face to face encounter and independently examined this patient on 11/15/2022, as outlined below:  PHYSICAL EXAM:  General: WD, WN. Alert, cooperative, no acute distress    EENT:  EOMI. Anicteric sclerae. MMM  Resp:  CTA bilaterally, no wheezing or rales. No accessory muscle use  CV:  Regular  rhythm,  No edema  GI:  Soft, Non distended, Non tender. +Bowel sounds  Neurologic:  EOMs intact. No facial asymmetry. No aphasia or slurred speech. Symmetrical strength, Sensation grossly intact. Alert and oriented X 4.   Psych:   Good insight. Not anxious nor agitated  Skin:  No rashes. No jaundice    Reviewed most current lab test results and cultures  YES  Reviewed most current radiology test results   YES  Review and summation of old records today    NO  Reviewed patient's current orders and MAR    YES  PMH/SH reviewed - no change compared to H&P  ________________________________________________________________________  Care Plan discussed with:    Comments   Patient X    Family      RN X    Care Manager X    Consultant                       X Multidiciplinary team rounds were held today with , nursing, pharmacist and clinical coordinator. Patient's plan of care was discussed; medications were reviewed and discharge planning was addressed. ________________________________________________________________________        Comments   >50% of visit spent in counseling and coordination of care X    ________________________________________________________________________  Michoacano Leigh DO     Procedures: see electronic medical records for all procedures/Xrays and details which were not copied into this note but were reviewed prior to creation of Plan. LABS:  I reviewed today's most current labs and imaging studies.   Pertinent labs include:  Recent Labs     11/15/22  0534 11/13/22  1250   WBC 2.0* 3.2*   HGB 4.9* 6.0*   HCT 15.9* 19.5*   PLT 61* 90*     Recent Labs     11/15/22  0534 11/13/22  1250    142   K 4.2 3.8   * 109*   CO2 26 27 * 146*   BUN 34* 38*   CREA 1.22* 1.55*   CA 9.2 9.4   ALB 2.7* 3.3*   TBILI 0.9 1.1*   ALT 21 29       Signed: Nakul Treadwell DO

## 2022-11-15 NOTE — PROGRESS NOTES
9847 - Hgb 4.9 this morning. Was told in report that we are waiting on blood. Lab states that the blood has been ready and allocated for the past 2 days. Will notify provider. Upon further investigation, lab states that blood arrived early this morning. 3308 - Dr. Gina Zaldivar would like a repeat H and H prior to transfusing blood. If below 7, she would like 2 units transfused. 2205 - Consent form signed and placed on chart. H and H drawn. 1053 - first unit of PRBCs hung. Administer 2 units per Dr. Gina Zaldivar and repeat H&H 2 hours after second unit is complete. 1351 - First unit of PRBCs completed. No transfusion reaction noted. Patient hemodynamically stable. 1442 - Second unit of blood started. 1730 - second unit of blood completed. Check h and h at [address] - Bedside shift change report given to Nicholas Lowe and Gaston Shrestha RN (oncoming nurse) by Cullen Montes (offgoing nurse). Report included the following information SBAR, Kardex, Procedure Summary, Intake/Output, MAR, Accordion, Recent Results, Med Rec Status, and Cardiac Rhythm a paced .

## 2022-11-15 NOTE — PROGRESS NOTES
GI PROGRESS NOTE  Cony Ashton, CATY  [phone] NP in-hospital cell phone M-F until 4:30  After 5pm or on weekends, please call  for physician on call  NAME:Char Alvarez :1945 [facility]:[id number]   ATTG: Dr. Gonzales Lute: Shantelle Rosenberg MD  GI: Dr. 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, compensated   Had liver bx at [facility]   BRADY, moderate fibrosis with bridging, stage III  Platelets 61   GI History:  10/11/2022- pill cam showing AVM at 9 minutes and active oozing blood at 1 hour and 34 minutes, unclear if AVM or other lesion  EGD 10/10/22- atypical GAVE in antrum, treated with APC extensively  -- also, pill camera deployed in duodenum, given degree of recurrent anemia  2022- Colonoscopy with normal colonic mucosa throughout, internal hemorrhoids   2022- EGD showing portal hypertensive gastropathy    Plan:   Plan for push enteroscopy tomorrow; obtain consent. However, symptoms are likely secondary to oozing portal HTN. Unable to complete today d/t severe anemia. Hgb needs to be >7.0 prior to EGD  Recommend PRBC's  Recommend IV iron   GI lite diet for now then NPO after midnight   Monitor for s/s of bleeding; goal for hgb >7.0  Symptomatic care per primary team   Plan of care discussed with Dr. Bakari Ley   This patient was seen and examined by me in a face-to-face visit today. I reviewed the medical record including lab work, imaging and other provider notes. I confirmed the interval history as described above. I spoke to the patient, discussing our findings and plans. I discussed this case in detail with CATY Gibson. I formulated an updated  assessment of this patient and guided our treatment plan. I agree with the above progress note. I agree with the history, exam and assessment and plan as outlined in the note.   I would like to add the followinyo F w. Recurrent anemia in setting of cirrhosis 2/2 BRADY with portal hypertensive gastropathy and reported atypical GAVE treated with APC 10/10/22. Drop in Hgb to 5.2 delays EGD until tomorrow with plan for transfusion today. Note plt 61. Plan fo rEGD tomorrow AM if Hgb>7. NPO after MN. Ayleen Ellington MD    Subjective:   Discussed with RN events overnight. Updated on plan of care. Patient verbalized understanding     Review of Systems:  Symptom Y/N Comments  Symptom Y/N Comments   Fever/Chills n   Chest Pain n    Cough n   Headaches n    Sputum n   Joint Pain n    SOB/HENLEY n   Pruritis/Rash n    Tolerating Diet y   Other       Could NOT obtain due to:      Objective:   VITALS:   Last 24hrs VS reviewed since prior progress note. Most recent are:  Visit Vitals  BP (!) 133/57 (BP 1 Location: Right arm, BP Patient Position: At rest)   Pulse 63   Temp 98.2 °F (36.8 °C)   Resp 22   Ht 4' 11\" (1.499 m)   Wt 94.3 kg (208 lb)   SpO2 97%   BMI 42.01 kg/m²     No intake or output data in the 24 hours ending 11/15/22 1114  PHYSICAL EXAM:  General: Pleasant elderly  female. NAD  HEENT: NC, Atraumatic. Anicteric sclerae. Lungs:  CTA Bilaterally. No Wheezing/Rhonchi/Rales. Heart:  Regular  rhythm,  No murmur, No Rubs, No Gallops  Abdomen: Soft, Non distended, Non tender. +Bowel sounds, no HSM  Extremities: No c/c/e  Neurologic:  Alert and oriented X 3. No acute neurological distress   Psych:   Good insight. Not anxious nor agitated.     Lab and Radiology Data Reviewed: (see below)    Medications Reviewed: (see below)  PMH/SH reviewed - no change compared to H&P  ________________________________________________________________________  Total time spent with patient: 15 minutes ________________________________________________________________________  Care Plan discussed with:  Patient x   Family     RN x              Consultant:       Trung Theodore NP     Procedures: see electronic medical records for all procedures/Xrays and details which were not copied into this note but were reviewed prior to creation of Plan. LABS:  Recent Labs     11/15/22  0827 11/15/22  0534 11/13/22  1250   WBC  --  2.0* 3.2*   HGB 5.2* 4.9* 6.0*   HCT 17.0* 15.9* 19.5*   PLT  --  61* 90*     Recent Labs     11/15/22  0534 11/13/22  1250    142   K 4.2 3.8   * 109*   CO2 26 27   BUN 34* 38*   CREA 1.22* 1.55*   * 146*   CA 9.2 9.4     Recent Labs     11/15/22  0534 11/13/22  1250   * 227*   TP 5.6* 6.8   ALB 2.7* 3.3*   GLOB 2.9 3.5     No results for input(s): INR, PTP, APTT, INREXT in the last 72 hours. No results for input(s): FE, TIBC, PSAT, FERR in the last 72 hours. Lab Results   Component Value Date/Time    Folate 49.6 (H) 10/10/2022 11:07 AM     No results for input(s): PH, PCO2, PO2 in the last 72 hours. No results for input(s): CPK, CKMB in the last 72 hours.     No lab exists for component: TROPONINI  Lab Results   Component Value Date/Time    Color YELLOW/STRAW 10/27/2020 02:54 PM    Appearance CLOUDY (A) 10/27/2020 02:54 PM    Specific gravity 1.015 10/27/2020 02:54 PM    pH (UA) 5.0 10/27/2020 02:54 PM    Protein 100 (A) 10/27/2020 02:54 PM    Glucose Negative 10/27/2020 02:54 PM    Ketone Negative 10/27/2020 02:54 PM    Bilirubin NEGATIVE 11/04/2012 10:39 PM    Urobilinogen 1.0 10/27/2020 02:54 PM    Nitrites Negative 10/27/2020 02:54 PM    Leukocyte Esterase LARGE (A) 10/27/2020 02:54 PM    Epithelial cells MANY (A) 10/27/2020 02:54 PM    Bacteria 3+ (A) 10/27/2020 02:54 PM    WBC >100 (H) 10/27/2020 02:54 PM    RBC 5-10 10/27/2020 02:54 PM       MEDICATIONS:  Current Facility-Administered Medications   Medication Dose Route Frequency    iron sucrose (VENOFER) 300 mg in 0.9% sodium chloride 250 mL IVPB  300 mg IntraVENous Q24H    0.9% sodium chloride infusion 250 mL  250 mL IntraVENous PRN    allopurinoL (ZYLOPRIM) tablet 100 mg  100 mg Oral BID    [Held by provider] amLODIPine (NORVASC) tablet 10 mg  10 mg Oral DAILY    [Held by provider] furosemide (LASIX) tablet 80 mg  80 mg Oral DAILY    albuterol-ipratropium (DUO-NEB) 2.5 MG-0.5 MG/3 ML  3 mL Nebulization Q6H PRN    levothyroxine (SYNTHROID) tablet 150 mcg  150 mcg Oral ACB    [Held by provider] losartan (COZAAR) tablet 25 mg  25 mg Oral DAILY    [Held by provider] metOLazone (ZAROXOLYN) tablet 2.5 mg  2.5 mg Oral EVERY OTHER DAY    pantoprazole (PROTONIX) tablet 40 mg  40 mg Oral ACB&D    potassium chloride (K-DUR, KLOR-CON M20) SR tablet 20 mEq  20 mEq Oral DAILY WITH BREAKFAST    sucralfate (CARAFATE) tablet 1 g  1 g Oral AC&HS    ezetimibe (ZETIA) tablet 10 mg  10 mg Oral DAILY    0.9% sodium chloride infusion  50 mL/hr IntraVENous CONTINUOUS    sodium chloride (NS) flush 5-40 mL  5-40 mL IntraVENous Q8H    sodium chloride (NS) flush 5-40 mL  5-40 mL IntraVENous PRN    acetaminophen (TYLENOL) tablet 650 mg  650 mg Oral Q6H PRN    Or    acetaminophen (TYLENOL) suppository 650 mg  650 mg Rectal Q6H PRN    polyethylene glycol (MIRALAX) packet 17 g  17 g Oral DAILY PRN    ondansetron (ZOFRAN ODT) tablet 4 mg  4 mg Oral Q8H PRN    Or    ondansetron (ZOFRAN) injection 4 mg  4 mg IntraVENous Q6H PRN     Current Outpatient Medications   Medication Sig    allopurinoL (ZYLOPRIM) 100 mg tablet Take 1 Tablet by mouth two (2) times a day for 30 days. alendronate (FOSAMAX) 70 mg tablet alendronate 70 mg tablet   Take 1 tablet every week by oral route. losartan (COZAAR) 25 mg tablet Take 25 mg by mouth daily. metOLazone (ZAROXOLYN) 2.5 mg tablet Take 1 Tablet by mouth every other day. amLODIPine (NORVASC) 10 mg tablet Take 1 Tablet by mouth daily. potassium chloride SA (MICRO-K) 10 mEq capsule Take 2 Capsules by mouth daily. pantoprazole (PROTONIX) 40 mg tablet Take 1 Tablet by mouth Before breakfast and dinner. levothyroxine (SYNTHROID) 150 mcg tablet Take 1 Tablet by mouth Daily (before breakfast).     icosapent ethyL (VASCEPA) 1 gram capsule Take 2 Capsules by mouth two (2) times daily (with meals). loratadine (Claritin) 10 mg tablet Take 1 Tablet by mouth daily. Indications: inflammation of the nose due to an allergy    atorvastatin (LIPITOR) 20 mg tablet TAKE 1 TABLET DAILY    polyethylene glycol (MIRALAX) 17 gram/dose powder Take 17 g by mouth daily. Biotin 2,500 mcg cap Take  by mouth.    vitamin E (AQUA GEMS) 400 unit capsule Take 800 Units by mouth two (2) times a day. cholecalciferol, vitamin D3, 50 mcg (2,000 unit) tab Take 1 Tab by mouth daily. levalbuterol tartrate (XOPENEX) 45 mcg/actuation inhaler Take 2 Puffs by inhalation every six (6) hours as needed for Wheezing or Shortness of Breath. sucralfate (CARAFATE) 1 gram tablet Take 1 Tablet by mouth Before breakfast, lunch, dinner and at bedtime for 30 days. furosemide (LASIX) 80 mg tablet Take 80 mg by mouth daily. nystatin (MYCOSTATIN) powder Apply  to affected area two (2) times a day. fluticasone propionate (FLONASE) 50 mcg/actuation nasal spray 2 Sprays by Both Nostrils route daily. Administer to right and left nostril. Zetia 10 mg tablet Take 1 tablet by mouth daily. lidocaine (LIDODERM) 5 % Apply patch to the affected area for 12 hours a day and remove for 12 hours a day.    ketoconazole (NIZORAL) 2 % topical cream Apply  to affected area daily as needed. L GASSERI/B BIFIDUM/B LONGUM ([employer] PO) Take 1 Tab by mouth daily. MULTIVITAMIN WITH MINERALS (ONE-A-DAY 50 PLUS PO) Take 1 Tab by mouth daily.

## 2022-11-16 ENCOUNTER — APPOINTMENT (OUTPATIENT)
Dept: VASCULAR SURGERY | Age: 77
DRG: 378 | End: 2022-11-16
Attending: NURSE PRACTITIONER
Payer: MEDICARE

## 2022-11-16 LAB
ALBUMIN SERPL-MCNC: 2.7 G/DL (ref 3.5–5)
ALBUMIN/GLOB SERPL: 0.9 (ref 1.1–2.2)
ALP SERPL-CCNC: 196 U/L (ref 45–117)
ALT SERPL-CCNC: 23 U/L (ref 12–78)
ANION GAP SERPL CALC-SCNC: 5 MMOL/L (ref 5–15)
AST SERPL-CCNC: 25 U/L (ref 15–37)
BASOPHILS # BLD: 0 K/UL (ref 0–0.1)
BASOPHILS NFR BLD: 0 % (ref 0–1)
BILIRUB SERPL-MCNC: 1 MG/DL (ref 0.2–1)
BUN SERPL-MCNC: 31 MG/DL (ref 6–20)
BUN/CREAT SERPL: 26 (ref 12–20)
CALCIUM SERPL-MCNC: 9.1 MG/DL (ref 8.5–10.1)
CHLORIDE SERPL-SCNC: 113 MMOL/L (ref 97–108)
CO2 SERPL-SCNC: 24 MMOL/L (ref 21–32)
CREAT SERPL-MCNC: 1.17 MG/DL (ref 0.55–1.02)
DIFFERENTIAL METHOD BLD: ABNORMAL
EOSINOPHIL # BLD: 0.2 K/UL (ref 0–0.4)
EOSINOPHIL NFR BLD: 7 % (ref 0–7)
ERYTHROCYTE [DISTWIDTH] IN BLOOD BY AUTOMATED COUNT: 22.8 % (ref 11.5–14.5)
GLOBULIN SER CALC-MCNC: 3 G/DL (ref 2–4)
GLUCOSE SERPL-MCNC: 135 MG/DL (ref 65–100)
HCT VFR BLD AUTO: 22.7 % (ref 35–47)
HGB BLD-MCNC: 7.1 G/DL (ref 11.5–16)
IMM GRANULOCYTES # BLD AUTO: 0 K/UL (ref 0–0.04)
IMM GRANULOCYTES NFR BLD AUTO: 0 % (ref 0–0.5)
LYMPHOCYTES # BLD: 0.4 K/UL (ref 0.8–3.5)
LYMPHOCYTES NFR BLD: 14 % (ref 12–49)
MCH RBC QN AUTO: 31 PG (ref 26–34)
MCHC RBC AUTO-ENTMCNC: 31.3 G/DL (ref 30–36.5)
MCV RBC AUTO: 99.1 FL (ref 80–99)
MONOCYTES # BLD: 0.1 K/UL (ref 0–1)
MONOCYTES NFR BLD: 3 % (ref 5–13)
NEUTS SEG # BLD: 2 K/UL (ref 1.8–8)
NEUTS SEG NFR BLD: 76 % (ref 32–75)
NRBC # BLD: 0 K/UL (ref 0–0.01)
NRBC BLD-RTO: 0 PER 100 WBC
PLATELET # BLD AUTO: 63 K/UL (ref 150–400)
PMV BLD AUTO: 10.9 FL (ref 8.9–12.9)
POTASSIUM SERPL-SCNC: 4.2 MMOL/L (ref 3.5–5.1)
PROT SERPL-MCNC: 5.7 G/DL (ref 6.4–8.2)
RBC # BLD AUTO: 2.29 M/UL (ref 3.8–5.2)
RBC MORPH BLD: ABNORMAL
RBC MORPH BLD: ABNORMAL
SODIUM SERPL-SCNC: 142 MMOL/L (ref 136–145)
WBC # BLD AUTO: 2.7 K/UL (ref 3.6–11)

## 2022-11-16 PROCEDURE — 36415 COLL VENOUS BLD VENIPUNCTURE: CPT

## 2022-11-16 PROCEDURE — 2709999900 HC NON-CHARGEABLE SUPPLY: Performed by: INTERNAL MEDICINE

## 2022-11-16 PROCEDURE — 77030038665 HC SOL ELEVIEW INJ AGNT ARPH -B: Performed by: INTERNAL MEDICINE

## 2022-11-16 PROCEDURE — G0378 HOSPITAL OBSERVATION PER HR: HCPCS

## 2022-11-16 PROCEDURE — 88305 TISSUE EXAM BY PATHOLOGIST: CPT

## 2022-11-16 PROCEDURE — 0W3P8ZZ CONTROL BLEEDING IN GASTROINTESTINAL TRACT, VIA NATURAL OR ARTIFICIAL OPENING ENDOSCOPIC: ICD-10-PCS | Performed by: INTERNAL MEDICINE

## 2022-11-16 PROCEDURE — 74011000250 HC RX REV CODE- 250: Performed by: PHYSICIAN ASSISTANT

## 2022-11-16 PROCEDURE — 77030008565 HC TBNG SUC IRR ERBE -B: Performed by: INTERNAL MEDICINE

## 2022-11-16 PROCEDURE — 74011250637 HC RX REV CODE- 250/637: Performed by: INTERNAL MEDICINE

## 2022-11-16 PROCEDURE — 74011000250 HC RX REV CODE- 250: Performed by: NURSE ANESTHETIST, CERTIFIED REGISTERED

## 2022-11-16 PROCEDURE — 80053 COMPREHEN METABOLIC PANEL: CPT

## 2022-11-16 PROCEDURE — 77030003657 HC NDL SCLER BSC -B: Performed by: INTERNAL MEDICINE

## 2022-11-16 PROCEDURE — 88342 IMHCHEM/IMCYTCHM 1ST ANTB: CPT

## 2022-11-16 PROCEDURE — 74011250637 HC RX REV CODE- 250/637: Performed by: PHYSICIAN ASSISTANT

## 2022-11-16 PROCEDURE — 74011250636 HC RX REV CODE- 250/636: Performed by: NURSE ANESTHETIST, CERTIFIED REGISTERED

## 2022-11-16 PROCEDURE — 65270000029 HC RM PRIVATE

## 2022-11-16 PROCEDURE — 76040000007: Performed by: INTERNAL MEDICINE

## 2022-11-16 PROCEDURE — 77030019988 HC FCPS ENDOSC DISP BSC -B: Performed by: INTERNAL MEDICINE

## 2022-11-16 PROCEDURE — 74011250636 HC RX REV CODE- 250/636: Performed by: PHYSICIAN ASSISTANT

## 2022-11-16 PROCEDURE — 93922 UPR/L XTREMITY ART 2 LEVELS: CPT

## 2022-11-16 PROCEDURE — 74011000250 HC RX REV CODE- 250: Performed by: INTERNAL MEDICINE

## 2022-11-16 PROCEDURE — 85025 COMPLETE CBC W/AUTO DIFF WBC: CPT

## 2022-11-16 PROCEDURE — 77030010936 HC CLP LIG BSC -C: Performed by: INTERNAL MEDICINE

## 2022-11-16 PROCEDURE — 76060000032 HC ANESTHESIA 0.5 TO 1 HR: Performed by: INTERNAL MEDICINE

## 2022-11-16 DEVICE — CLIP INT L235CM WRK CHAN DIA2.8MM OPN 11MM LCK MECHANISM MR: Type: IMPLANTABLE DEVICE | Site: DUODENUM | Status: FUNCTIONAL

## 2022-11-16 DEVICE — WORKING LENGTH 235CM, WORKING CHANNEL 2.8MM
Type: IMPLANTABLE DEVICE | Site: DUODENUM | Status: FUNCTIONAL
Brand: RESOLUTION 360 CLIP

## 2022-11-16 RX ORDER — NALOXONE HYDROCHLORIDE 0.4 MG/ML
0.4 INJECTION, SOLUTION INTRAMUSCULAR; INTRAVENOUS; SUBCUTANEOUS
Status: DISCONTINUED | OUTPATIENT
Start: 2022-11-16 | End: 2022-11-16 | Stop reason: HOSPADM

## 2022-11-16 RX ORDER — PROPOFOL 10 MG/ML
INJECTION, EMULSION INTRAVENOUS AS NEEDED
Status: DISCONTINUED | OUTPATIENT
Start: 2022-11-16 | End: 2022-11-16 | Stop reason: HOSPADM

## 2022-11-16 RX ORDER — MIDAZOLAM HYDROCHLORIDE 1 MG/ML
.25-5 INJECTION, SOLUTION INTRAMUSCULAR; INTRAVENOUS
Status: DISCONTINUED | OUTPATIENT
Start: 2022-11-16 | End: 2022-11-16 | Stop reason: HOSPADM

## 2022-11-16 RX ORDER — FLUMAZENIL 0.1 MG/ML
0.2 INJECTION INTRAVENOUS
Status: DISCONTINUED | OUTPATIENT
Start: 2022-11-16 | End: 2022-11-16 | Stop reason: HOSPADM

## 2022-11-16 RX ORDER — FENTANYL CITRATE 50 UG/ML
25 INJECTION, SOLUTION INTRAMUSCULAR; INTRAVENOUS
Status: DISCONTINUED | OUTPATIENT
Start: 2022-11-16 | End: 2022-11-16 | Stop reason: HOSPADM

## 2022-11-16 RX ORDER — ATROPINE SULFATE 0.1 MG/ML
0.5 INJECTION INTRAVENOUS
Status: DISCONTINUED | OUTPATIENT
Start: 2022-11-16 | End: 2022-11-16 | Stop reason: HOSPADM

## 2022-11-16 RX ORDER — SODIUM CHLORIDE 0.9 % (FLUSH) 0.9 %
5-40 SYRINGE (ML) INJECTION EVERY 8 HOURS
Status: DISCONTINUED | OUTPATIENT
Start: 2022-11-16 | End: 2022-11-17 | Stop reason: SDUPTHER

## 2022-11-16 RX ORDER — ETOMIDATE 2 MG/ML
INJECTION INTRAVENOUS AS NEEDED
Status: DISCONTINUED | OUTPATIENT
Start: 2022-11-16 | End: 2022-11-16 | Stop reason: HOSPADM

## 2022-11-16 RX ORDER — DEXTROMETHORPHAN/PSEUDOEPHED 2.5-7.5/.8
1.2 DROPS ORAL
Status: DISCONTINUED | OUTPATIENT
Start: 2022-11-16 | End: 2022-11-16 | Stop reason: HOSPADM

## 2022-11-16 RX ORDER — SODIUM CHLORIDE 9 MG/ML
75 INJECTION, SOLUTION INTRAVENOUS CONTINUOUS
Status: DISPENSED | OUTPATIENT
Start: 2022-11-16 | End: 2022-11-16

## 2022-11-16 RX ORDER — SODIUM CHLORIDE 0.9 % (FLUSH) 0.9 %
5-40 SYRINGE (ML) INJECTION AS NEEDED
Status: DISCONTINUED | OUTPATIENT
Start: 2022-11-16 | End: 2022-11-17 | Stop reason: SDUPTHER

## 2022-11-16 RX ORDER — EPINEPHRINE 0.1 MG/ML
1 INJECTION INTRACARDIAC; INTRAVENOUS
Status: DISCONTINUED | OUTPATIENT
Start: 2022-11-16 | End: 2022-11-16 | Stop reason: HOSPADM

## 2022-11-16 RX ORDER — LIDOCAINE HYDROCHLORIDE 20 MG/ML
INJECTION, SOLUTION EPIDURAL; INFILTRATION; INTRACAUDAL; PERINEURAL AS NEEDED
Status: DISCONTINUED | OUTPATIENT
Start: 2022-11-16 | End: 2022-11-16 | Stop reason: HOSPADM

## 2022-11-16 RX ORDER — GLYCOPYRROLATE 0.2 MG/ML
INJECTION INTRAMUSCULAR; INTRAVENOUS AS NEEDED
Status: DISCONTINUED | OUTPATIENT
Start: 2022-11-16 | End: 2022-11-16 | Stop reason: HOSPADM

## 2022-11-16 RX ADMIN — SODIUM CHLORIDE, PRESERVATIVE FREE 10 ML: 5 INJECTION INTRAVENOUS at 14:45

## 2022-11-16 RX ADMIN — ETOMIDATE 6 MG: 2 INJECTION, SOLUTION INTRAVENOUS at 07:40

## 2022-11-16 RX ADMIN — GLYCOPYRROLATE 0.2 MG: 0.2 INJECTION, SOLUTION INTRAMUSCULAR; INTRAVENOUS at 07:40

## 2022-11-16 RX ADMIN — PROPOFOL 25 MG: 10 INJECTION, EMULSION INTRAVENOUS at 08:05

## 2022-11-16 RX ADMIN — EZETIMIBE 10 MG: 10 TABLET ORAL at 09:30

## 2022-11-16 RX ADMIN — PROPOFOL 25 MG: 10 INJECTION, EMULSION INTRAVENOUS at 07:55

## 2022-11-16 RX ADMIN — PROPOFOL 25 MG: 10 INJECTION, EMULSION INTRAVENOUS at 07:44

## 2022-11-16 RX ADMIN — SODIUM CHLORIDE, PRESERVATIVE FREE 10 ML: 5 INJECTION INTRAVENOUS at 14:46

## 2022-11-16 RX ADMIN — SUCRALFATE 1 G: 1 TABLET ORAL at 09:30

## 2022-11-16 RX ADMIN — SODIUM CHLORIDE 50 ML/HR: 9 INJECTION, SOLUTION INTRAVENOUS at 09:21

## 2022-11-16 RX ADMIN — PANTOPRAZOLE SODIUM 40 MG: 40 TABLET, DELAYED RELEASE ORAL at 17:23

## 2022-11-16 RX ADMIN — SODIUM CHLORIDE, PRESERVATIVE FREE 10 ML: 5 INJECTION INTRAVENOUS at 06:41

## 2022-11-16 RX ADMIN — ALLOPURINOL 100 MG: 100 TABLET ORAL at 17:23

## 2022-11-16 RX ADMIN — LIDOCAINE HYDROCHLORIDE 60 MG: 20 INJECTION, SOLUTION EPIDURAL; INFILTRATION; INTRACAUDAL; PERINEURAL at 07:40

## 2022-11-16 RX ADMIN — PANTOPRAZOLE SODIUM 40 MG: 40 TABLET, DELAYED RELEASE ORAL at 09:30

## 2022-11-16 RX ADMIN — PROPOFOL 25 MG: 10 INJECTION, EMULSION INTRAVENOUS at 07:59

## 2022-11-16 RX ADMIN — SUCRALFATE 1 G: 1 TABLET ORAL at 21:13

## 2022-11-16 RX ADMIN — PROPOFOL 25 MG: 10 INJECTION, EMULSION INTRAVENOUS at 07:46

## 2022-11-16 RX ADMIN — POTASSIUM CHLORIDE 20 MEQ: 20 TABLET, EXTENDED RELEASE ORAL at 09:29

## 2022-11-16 RX ADMIN — SUCRALFATE 1 G: 1 TABLET ORAL at 17:23

## 2022-11-16 RX ADMIN — Medication 1 LOZENGE: at 16:25

## 2022-11-16 RX ADMIN — ALLOPURINOL 100 MG: 100 TABLET ORAL at 09:30

## 2022-11-16 RX ADMIN — SODIUM CHLORIDE, PRESERVATIVE FREE 10 ML: 5 INJECTION INTRAVENOUS at 21:13

## 2022-11-16 RX ADMIN — PROPOFOL 20 MG: 10 INJECTION, EMULSION INTRAVENOUS at 07:42

## 2022-11-16 RX ADMIN — PROPOFOL 25 MG: 10 INJECTION, EMULSION INTRAVENOUS at 07:52

## 2022-11-16 RX ADMIN — PROPOFOL 25 MG: 10 INJECTION, EMULSION INTRAVENOUS at 07:50

## 2022-11-16 RX ADMIN — Medication 1 LOZENGE: at 21:13

## 2022-11-16 RX ADMIN — PROPOFOL 30 MG: 10 INJECTION, EMULSION INTRAVENOUS at 07:40

## 2022-11-16 RX ADMIN — PROPOFOL 25 MG: 10 INJECTION, EMULSION INTRAVENOUS at 07:57

## 2022-11-16 RX ADMIN — PROPOFOL 25 MG: 10 INJECTION, EMULSION INTRAVENOUS at 07:48

## 2022-11-16 RX ADMIN — SUCRALFATE 1 G: 1 TABLET ORAL at 12:09

## 2022-11-16 RX ADMIN — PROPOFOL 25 MG: 10 INJECTION, EMULSION INTRAVENOUS at 08:02

## 2022-11-16 NOTE — PROGRESS NOTES
Cecilia Holder of Shift Note    Bedside shift change report given to Jazzmine Olivera RN (oncoming nurse) by Chelita Silva RN (offgoing nurse). Report included the following information SBAR, Kardex, Intake/Output, MAR, Recent Results, and Med Rec Status    Shift worked:  3603-7964     Shift summary and any significant changes:     Pt had EGD procedure this AM. Pt returned to room, vitals taken and stable. Pt complained of sore throat. Np prescribed throat lozenges. Consult to Vascular done for lower extremities. Caring rounds completed. Prescribed med's given per MAR.           Chelita Silva, RN

## 2022-11-16 NOTE — H&P
See prior Consultation and Progress Note. The patient was reexamined. No interval change in the last 30 days. Proceed with procedure(s) with deep sedation or conscious sedation as clinically indicated.

## 2022-11-16 NOTE — PROCEDURES
NAME:  Zee Bhatti   :   1945   MRN:   [id number]     Date/Time:  2022 8:16 AM    Enteroscopy Procedure Note    Procedure: Enteroscopy with control of bleeding with hemoclip placement, argon plasma coagulation for ablation of AVMs, submucosal injection of [address] for tattoo    Indication:  Chronic blood loss anemia  Pre-operative Diagnosis: see indication above  Post-operative Diagnosis: see findings below    Primary Gastroenterologist:  Evaristo Dhillon MD  :  Drew Phillips MD  Referring Provider:   Freeman Cooper MD; -Bev Mcdermott MD    Exam:  Airway: clear, no airway problems anticipated  Heart: RRR, without gallops or rubs  Lungs: clear bilaterally without wheezes, crackles, or rhonchi  Abdomen: soft, nontender, nondistended, bowel sounds present  Mental Status: awake, alert and oriented to person, place and time     Anethesia/Sedation:  MAC anesthesia Propofol 300mg IV  Procedure Details   After informed consent was obtained for the procedure, with all risks and benefits of procedure explained the patient was taken to the endoscopy suite and placed in the left lateral decubitus position. Following sequential administration of sedation as per above, the PCF-190 pediatric colonoscope was inserted into the mouth and advanced under direct vision to fourth portion of the duodenum or proximal jejunum at a distance of 180cm from incisors. A careful inspection was made as the colonoscope was withdrawn, including a retroflexed view of the proximal stomach; findings and interventions are described below. Findings:    -Normal esophagus  -Edematous appearance of proximal and stomach; biopsied to exclude portal hypertensive gastropathy vs. GAVE  -Edematous appearance of antrum with oozing foci and areas of shallow ulceration and inflammation consistent with prior APC treatment completed 10/14/22. Zoraida Alvarado  A few additional areas treated with APC as gastric setting as felt this could represent atypical GAVE. Antrum biopsied separately to  exclude portal hypertensive gastropathy vs. GAVE. -Colonoscope passed to 180cm felt to be consistent with 4th portion duodenum or proximal jejunum. Submucosal injection of 2cc [place] Ink placed at most distal point of evaluation.    -Two small non-bleeding AVM in distal duodenum anad jejunum ablated with APC at duodenal setting  -Ulcerated area in distal third portion duodenum with overlying clot at 120cm, felt consistent with previously treated AVM at prior 10/14/22 endoscopy session noted. Clot lavaged and single hemoclip placed across ulceration with no residual bleeding. Therapies:  gastric biopsies; APC of stomach and small intestines, hemoclip placement, [place] Ink tattoo injection  Specimens: #1 proximal gastric; #2 gastric antrum (both r/o portal hypertensive gastropathy vs. GAVE)  EBL:  None. Complications:   None; patient tolerated the procedure well. Impression:    -Normal esophagus  -Edematous appearance of proximal and stomach; biopsied to exclude portal hypertensive gastropathy vs. GAVE  -Edematous appearance of antrum with oozing foci and areas of shallow ulceration and inflammation consistent with prior APC treatment completed 10/14/22. Vearl Pippins A few additional areas treated with APC as gastric setting as felt this could represent atypical GAVE. Antrum biopsied separately to  exclude portal hypertensive gastropathy vs. GAVE. -Colonoscope passed to 180cm felt to be consistent with 4th portion duodenum or proximal jejunum. Submucosal injection of 2cc [place] Ink placed at most distal point of evaluation.    -Two small non-bleeding AVM in distal duodenum anad jejunum ablated with APC at duodenal setting  -Ulcerated area in distal third portion duodenum with overlying clot at 120cm, felt consistent with previously treated AVM at prior 10/14/22 endoscopy session noted.   Clot lavaged and single hemoclip placed across ulceration with no residual bleeding. Recommendations:  -Acid suppression with a proton pump inhibitor. ,   -Await pathology; if biopsies c/w portal hypertension, may benefit from TIPS,   -Resume diet  -Follow clinical symptoms and laboratory studies for evidence of rebleeding.,     Discharge disposition:  To room after recovery in Endoscopy    Nazario Lal MD

## 2022-11-16 NOTE — CONSULTS
Consult acknowledged. Ms. Chaney [facility] is a 68 y.o, female with a pmhx significant for diabetes mellitus, hypertension, hyperlipidemia, chronic renal disease, paroxsymal atrial fibrillation, thyroid disease, obesity, and anxiety. She is admitted to the hospital with profound anemia. 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 She is s/p enteroscopy with hemoclip placement,argon plasma coagulation for ablation of AVMs, and submucosal injection of [address] for tattoo on 11/16/22. While admitted she was noted to have a left lateral leg wound with underlying chronic BLE edema. The wound care team was consulted for a compression wrap and requested ABIs prior to the wrap being placed. We have been consulted for evaluation. CORINNE ordered. Formal consult to follow in the am once resulted.

## 2022-11-16 NOTE — PROGRESS NOTES
TRANSFER - OUT REPORT:    Verbal report given to SANTOS Holland(name) on Ousmane Gill  being transferred to Oncology (unit) for routine progression of care       Report consisted of patients Situation, Background, Assessment and   Recommendations(SBAR). Information from the following report(s) SBAR, Kardex, ED Summary, Procedure Summary, Intake/Output, MAR, Accordion, Recent Results, Med Rec Status, Cardiac Rhythm A-Paced, and Alarm Parameters  was reviewed with the receiving nurse. Lines:   Peripheral IV 11/13/22 Right Antecubital (Active)   Site Assessment Clean, dry, & intact 11/15/22 0325   Phlebitis Assessment 0 11/15/22 0325   Infiltration Assessment 0 11/15/22 0325   Dressing Status Clean, dry, & intact 11/15/22 0325   Dressing Type Tape;Transparent 11/15/22 0325   Hub Color/Line Status Pink;Patent; Flushed; Infusing 11/15/22 0325   Action Taken Blood drawn 11/15/22 0325   Alcohol Cap Used No 11/15/22 0325        Opportunity for questions and clarification was provided.       Patient transported with:   Monitor  Tech

## 2022-11-16 NOTE — WOUND CARE
Wound care nurse consult: consult acknowledged for application of BLE compression wraps to this patient. NP, Leah Ledesma placed order. No current CORINNE results found in chart. Perfect serve message sent to NP to order CORINNE's to determine if patient is compressible with x3 layer modified compression wraps. WC nurse will look for CORINNE results in chart to determine if patient can have BLE compression wraps applied.     Oleksandr Gould RN, CWON, zone ph# [phone]

## 2022-11-16 NOTE — PROGRESS NOTES
Endoscopy Case End Note:    4224:  Procedure scope was pre-cleaned, per protocol, at bedside by FARIDEH Ryan      0564:  Report received from anesthesia - [address] [place] [address]. See anesthesia flowsheet for intra-procedure vital signs and events. 2027:  glasses, phone and dentures returned to patient.

## 2022-11-16 NOTE — ROUTINE PROCESS
Norah Holiday  1945  [id number]    Situation:  Verbal report received from: PALAK Hough RN  Procedure: Procedure(s):  ESOPHAGOGASTRODUODENOSCOPY (EGD)  ENTEROSCOPY  ENDOSCOPIC ARGON PLASMA COAGULATION  INJECTION  RESOLUTION CLIP  ESOPHAGOGASTRODUODENAL (EGD) BIOPSY    Background:    Preoperative diagnosis: anemia  Postoperative diagnosis: GI bleeding, chronic blood loss anemia, small intestinal AVM, gastritis    :  Dr. Laura Venegas  Assistant(s): Endoscopy Technician-1: Frankey Becket  Endoscopy RN-1: Estelle Hough RN    Specimens:   ID Type Source Tests Collected by Time Destination   1 : Biopsy: stomach proximal Preservative OTHER (use comments)  Miguel Ángel Cid MD 11/16/2022 6442 Pathology   2 : Biopsy  Preservative Stomach, Antrum  Miguel Ángel Cid MD 11/16/2022 0802 Pathology     H. Pylori  no    Assessment:  Intra-procedure medications     Anesthesia gave intra-procedure sedation and medications, see anesthesia flow sheet yes    Intravenous fluids: NS@ [facility]     Vital signs stable     yes    Abdominal assessment: round and soft    yes    Recommendation:    Return to floor  yes, inpatient Room 1138  Family or Friend no one in room per patient  Permission to share finding with family or friend n/a

## 2022-11-16 NOTE — PROGRESS NOTES
TRANSFER - OUT REPORT:    Verbal report given to SANTOS Verdugo(name) on Norah Holiday  being transferred to 113(unit) for routine progression of care       Report consisted of patients Situation, Background, Assessment and   Recommendations(SBAR). Information from the following report(s) SBAR, Procedure Summary, Intake/Output, MAR, and Procedure Verification was reviewed with the receiving nurse. Lines:   Peripheral IV 11/13/22 Right Antecubital (Active)   Site Assessment Clean, dry, & intact 11/16/22 0448   Phlebitis Assessment 0 11/16/22 0448   Infiltration Assessment 0 11/16/22 0448   Dressing Status Clean, dry, & intact 11/16/22 0448   Dressing Type Tape;Transparent 11/16/22 0448   Hub Color/Line Status Pink; Infusing 11/16/22 0448   Action Taken Open ports on tubing capped 11/15/22 2045   Alcohol Cap Used Yes 11/15/22 2045       Peripheral IV 11/13/22 Left;Upper Arm (Active)   Site Assessment Clean, dry, & intact 11/16/22 0703   Phlebitis Assessment 0 11/16/22 0703   Infiltration Assessment 0 11/16/22 0703   Dressing Status Clean, dry, & intact 11/16/22 0703   Dressing Type Tape;Transparent 11/16/22 0703   Hub Color/Line Status Green; Infusing;Patent 11/16/22 0703        Opportunity for questions and clarification was provided.       P

## 2022-11-16 NOTE — PROGRESS NOTES
Hospitalist Progress Note    Subjective:   Daily Progress Note: 11/16/2022 1:02 PM    Hospital Course: Patient presents for abnormal outpatient labs. Patient was suppose to start iron infusions and is being followed by Dr. Jacky Gaines. Hgb level was 6.2. Patient denies melena or BRBPR. Past medical history of chronic anemia, arthritis, HTN, hyperlipidemia, and chronic leg edema. Subjective: Patient observed resting in bed with eyes opened. Complains of sore throat. Denies chest pain, shortness of breath, palpitations, dizziness or distress.     Current Facility-Administered Medications   Medication Dose Route Frequency    sodium chloride (NS) flush 5-40 mL  5-40 mL IntraVENous Q8H    sodium chloride (NS) flush 5-40 mL  5-40 mL IntraVENous PRN    iron sucrose (VENOFER) 300 mg in 0.9% sodium chloride 250 mL IVPB  300 mg IntraVENous Q24H    0.9% sodium chloride infusion 250 mL  250 mL IntraVENous PRN    allopurinoL (ZYLOPRIM) tablet 100 mg  100 mg Oral BID    [Held by provider] amLODIPine (NORVASC) tablet 10 mg  10 mg Oral DAILY    [Held by provider] furosemide (LASIX) tablet 80 mg  80 mg Oral DAILY    albuterol-ipratropium (DUO-NEB) 2.5 MG-0.5 MG/3 ML  3 mL Nebulization Q6H PRN    levothyroxine (SYNTHROID) tablet 150 mcg  150 mcg Oral ACB    [Held by provider] losartan (COZAAR) tablet 25 mg  25 mg Oral DAILY    [Held by provider] metOLazone (ZAROXOLYN) tablet 2.5 mg  2.5 mg Oral EVERY OTHER DAY    pantoprazole (PROTONIX) tablet 40 mg  40 mg Oral ACB&D    potassium chloride (K-DUR, KLOR-CON M20) SR tablet 20 mEq  20 mEq Oral DAILY WITH BREAKFAST    sucralfate (CARAFATE) tablet 1 g  1 g Oral AC&HS    ezetimibe (ZETIA) tablet 10 mg  10 mg Oral DAILY    0.9% sodium chloride infusion  50 mL/hr IntraVENous CONTINUOUS    sodium chloride (NS) flush 5-40 mL  5-40 mL IntraVENous Q8H    sodium chloride (NS) flush 5-40 mL  5-40 mL IntraVENous PRN    acetaminophen (TYLENOL) tablet 650 mg  650 mg Oral Q6H PRN    Or acetaminophen (TYLENOL) suppository 650 mg  650 mg Rectal Q6H PRN    polyethylene glycol (MIRALAX) packet 17 g  17 g Oral DAILY PRN    ondansetron (ZOFRAN ODT) tablet 4 mg  4 mg Oral Q8H PRN    Or    ondansetron (ZOFRAN) injection 4 mg  4 mg IntraVENous Q6H PRN        Review of Systems:    Review of Systems   Constitutional: Negative. HENT:  Positive for sore throat. Eyes: Negative. Respiratory: Negative. Cardiovascular: Negative. Gastrointestinal: Negative. Genitourinary: Negative. Musculoskeletal: Negative. Skin: Negative. Neurological: Negative. Endo/Heme/Allergies: Negative. Psychiatric/Behavioral: Negative. Objective:     Visit Vitals  BP (!) 126/44   Pulse 62   Temp 97.7 °F (36.5 °C)   Resp 18   Ht 4' 11\" (1.499 m)   Wt 94.3 kg (208 lb)   SpO2 99%   Breastfeeding No   BMI 42.01 kg/m²    O2 Flow Rate (L/min): 2 l/min O2 Device: None (Room air)    Temp (24hrs), Av.2 °F (36.8 °C), Min:97.7 °F (36.5 °C), Max:98.9 °F (37.2 °C)       07 -  1900  In: 565 [P.O.:120; I.V.:450]  Out: -   1901 -  0700  In: 693.3   Out: 1050 [Urine:1050]    PHYSICAL EXAM:    Physical Exam  Vitals reviewed. Constitutional:       Appearance: Normal appearance. HENT:      Head: Normocephalic and atraumatic. Right Ear: External ear normal.      Left Ear: External ear normal.      Nose: Nose normal.      Mouth/Throat:      Mouth: Mucous membranes are moist.   Eyes:      Pupils: Pupils are equal, round, and reactive to light. Cardiovascular:      Rate and Rhythm: Normal rate and regular rhythm. Pulses: Normal pulses. Comments: Telemetry: atrial paced on telemetry. Pulmonary:      Effort: Pulmonary effort is normal.   Abdominal:      General: Bowel sounds are normal.      Comments: Last bowel movement reported 22. Musculoskeletal:      Cervical back: Normal range of motion. Comments: Generalized weakness to bilateral lower extremities. Skin:     General: Skin is warm and dry. Capillary Refill: Capillary refill takes 2 to 3 seconds. Comments: Compression wraps intact to bilateral lower extremities. Neurological:      Mental Status: She is alert and oriented to person, place, and time. Psychiatric:         Mood and Affect: Mood normal.          Data Review    Recent Results (from the past 24 hour(s))   HGB & HCT    Collection Time: 11/15/22  8:19 PM   Result Value Ref Range    HGB 7.6 (L) 11.5 - 16.0 g/dL    HCT 23.2 (L) 35.0 - 47.0 %   CBC WITH AUTOMATED DIFF    Collection Time: 11/16/22  4:00 AM   Result Value Ref Range    WBC 2.7 (L) 3.6 - 11.0 K/uL    RBC 2.29 (L) 3.80 - 5.20 M/uL    HGB 7.1 (L) 11.5 - 16.0 g/dL    HCT 22.7 (L) 35.0 - 47.0 %    MCV 99.1 (H) 80.0 - 99.0 FL    MCH 31.0 26.0 - 34.0 PG    MCHC 31.3 30.0 - 36.5 g/dL    RDW 22.8 (H) 11.5 - 14.5 %    PLATELET 63 (L) 616 - 400 K/uL    MPV 10.9 8.9 - 12.9 FL    NRBC 0.0 0  WBC    ABSOLUTE NRBC 0.00 0.00 - 0.01 K/uL    NEUTROPHILS 76 (H) 32 - 75 %    LYMPHOCYTES 14 12 - 49 %    MONOCYTES 3 (L) 5 - 13 %    EOSINOPHILS 7 0 - 7 %    BASOPHILS 0 0 - 1 %    IMMATURE GRANULOCYTES 0 0.0 - 0.5 %    ABS. NEUTROPHILS 2.0 1.8 - 8.0 K/UL    ABS. LYMPHOCYTES 0.4 (L) 0.8 - 3.5 K/UL    ABS. MONOCYTES 0.1 0.0 - 1.0 K/UL    ABS. EOSINOPHILS 0.2 0.0 - 0.4 K/UL    ABS. BASOPHILS 0.0 0.0 - 0.1 K/UL    ABS. IMM.  GRANS. 0.0 0.00 - 0.04 K/UL    DF SMEAR SCANNED      RBC COMMENTS ANISOCYTOSIS  1+        RBC COMMENTS TEARDROP CELLS  1+       METABOLIC PANEL, COMPREHENSIVE    Collection Time: 11/16/22  4:00 AM   Result Value Ref Range    Sodium 142 136 - 145 mmol/L    Potassium 4.2 3.5 - 5.1 mmol/L    Chloride 113 (H) 97 - 108 mmol/L    CO2 24 21 - 32 mmol/L    Anion gap 5 5 - 15 mmol/L    Glucose 135 (H) 65 - 100 mg/dL    BUN 31 (H) 6 - 20 MG/DL    Creatinine 1.17 (H) 0.55 - 1.02 MG/DL    BUN/Creatinine ratio 26 (H) 12 - 20      eGFR 48 (L) >60 ml/min/1.73m2    Calcium 9.1 8.5 - 10.1 MG/DL Bilirubin, total 1.0 0.2 - 1.0 MG/DL    ALT (SGPT) 23 12 - 78 U/L    AST (SGOT) 25 15 - 37 U/L    Alk. 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 Chronic kidney disease, stage III     - Bun/Creat improving     - continue IVF    3. HTN    - will resume medications as blood pressure allows    4. Osteoporosis    - continue alendronate    5. Hyperlipidemia    - continue zetia    6. Hypothyroidism    - continue synthroid     7. History of BRADY cirrhosis     - Avoid NSAIDs    PT/OT    Discharge disposition: 24-48 hours pending GI and hematology clearance.     DVT Prophylaxis: SCD  Code Status:  Full Code  POA: Michaelle Lee, son ([address] discussed with: patient, nursing, CM  _______________________________________________________________    Madisyn Duque NP

## 2022-11-16 NOTE — PROGRESS NOTES
Bedside shift change report given to Mattie Del Cid RN (oncoming nurse) by Hernandez Mckoy RN (offgoing nurse). Report included the following information SBAR, Kardex, ED Summary, Intake/Output, MAR, Accordion, Recent Results, Med Rec Status, Cardiac Rhythm NSR, and Alarm Parameters .

## 2022-11-16 NOTE — ANESTHESIA PREPROCEDURE EVALUATION
Anesthetic History     PONV          Review of Systems / Medical History  Patient summary reviewed, nursing notes reviewed and pertinent labs reviewed    Pulmonary  Within defined limits                 Neuro/Psych             Comments: spinal stenosis Cardiovascular    Hypertension: well controlled        Dysrhythmias   Pacemaker (PM for SSS), CAD and hyperlipidemia    Exercise tolerance: <4 METS  Comments: Hx Bifascicular block    TTE (05/2021): LV: Estimated LVEF is 55 - 60%. LA: Mildly dilated left atrium. Mild aortic valve stenosis is present. TV: Moderate tricuspid valve regurgitation is present. Right Ventricular Arterial Pressure (RVSP) is 58 mmHg. Pulmonary hypertension found to be moderate.        GI/Hepatic/Renal     GERD: well controlled    Renal disease: CRI  Liver disease    Comments: Anemia, hgb 7.1         PERSONAL HX OF POLYPS   BRADY Syndrome  Fatty liver Endo/Other    Diabetes: well controlled, type 2  Hypothyroidism: poorly controlled  Morbid obesity, arthritis and anemia     Other Findings   Comments: Gout  Thrombocytopenia  RSD lower limb  Osteoporosis   DDD  Ambulates with a walker   AVM (arteriovenous malformation) of small bowel, acquired with hemorrhage                       Physical Exam    Airway  Mallampati: III  TM Distance: 4 - 6 cm  Neck ROM: normal range of motion   Mouth opening: Normal     Cardiovascular    Rhythm: regular  Rate: normal    Murmur: Grade 4     Dental    Dentition: Full upper dentures and Poor dentition  Comments: No loose lower teeth, some missing   Pulmonary      Decreased breath sounds: bibasilar           Abdominal  GI exam deferred       Other Findings            Anesthetic Plan    ASA: 4  Anesthesia type: total IV anesthesia and MAC          Induction: Intravenous  Anesthetic plan and risks discussed with: Patient

## 2022-11-17 LAB
ABO + RH BLD: NORMAL
ALBUMIN SERPL-MCNC: 2.5 G/DL (ref 3.5–5)
ALBUMIN/GLOB SERPL: 0.8 (ref 1.1–2.2)
ALP SERPL-CCNC: 190 U/L (ref 45–117)
ALT SERPL-CCNC: 20 U/L (ref 12–78)
ANION GAP SERPL CALC-SCNC: 6 MMOL/L (ref 5–15)
ANTI-COMPLEMENT (C3B,C3D): NORMAL
ANTIGENS PRESENT RBC DONR: NORMAL
AST SERPL-CCNC: 26 U/L (ref 15–37)
BASOPHILS # BLD: 0 K/UL (ref 0–0.1)
BASOPHILS NFR BLD: 0 % (ref 0–1)
BILIRUB SERPL-MCNC: 0.9 MG/DL (ref 0.2–1)
BLD GP AB SCN SERPL QL ELUTION: NORMAL
BLD PROD TYP BPU: NORMAL
BLOOD BANK CMNT PATIENT-IMP: NORMAL
BLOOD GROUP ANTIBODIES SERPL: NORMAL
BLOOD GROUP ANTIBODIES SERPL: NORMAL
BPU ID: NORMAL
BUN SERPL-MCNC: 25 MG/DL (ref 6–20)
BUN/CREAT SERPL: 22 (ref 12–20)
CALCIUM SERPL-MCNC: 9 MG/DL (ref 8.5–10.1)
CHLORIDE SERPL-SCNC: 116 MMOL/L (ref 97–108)
CO2 SERPL-SCNC: 22 MMOL/L (ref 21–32)
CREAT SERPL-MCNC: 1.12 MG/DL (ref 0.55–1.02)
CROSSMATCH RESULT,%XM: NORMAL
DAT IGG-SP REAG RBC QL: NORMAL
DAT POLY-SP REAG RBC QL: NORMAL
DIFFERENTIAL METHOD BLD: ABNORMAL
EOSINOPHIL # BLD: 0.2 K/UL (ref 0–0.4)
EOSINOPHIL NFR BLD: 8 % (ref 0–7)
ERYTHROCYTE [DISTWIDTH] IN BLOOD BY AUTOMATED COUNT: 22.2 % (ref 11.5–14.5)
GLOBULIN SER CALC-MCNC: 3.1 G/DL (ref 2–4)
GLUCOSE SERPL-MCNC: 133 MG/DL (ref 65–100)
HCT VFR BLD AUTO: 23.4 % (ref 35–47)
HGB BLD-MCNC: 7.1 G/DL (ref 11.5–16)
IMM GRANULOCYTES # BLD AUTO: 0 K/UL (ref 0–0.04)
IMM GRANULOCYTES NFR BLD AUTO: 0 % (ref 0–0.5)
LEFT ABI: 0.91
LEFT TBI: 0.45
LEFT TOE PRESSURE: 71 MMHG
LYMPHOCYTES # BLD: 0.4 K/UL (ref 0.8–3.5)
LYMPHOCYTES NFR BLD: 18 % (ref 12–49)
MCH RBC QN AUTO: 31 PG (ref 26–34)
MCHC RBC AUTO-ENTMCNC: 30.3 G/DL (ref 30–36.5)
MCV RBC AUTO: 102.2 FL (ref 80–99)
MONOCYTES # BLD: 0.1 K/UL (ref 0–1)
MONOCYTES NFR BLD: 3 % (ref 5–13)
NEUTS SEG # BLD: 1.8 K/UL (ref 1.8–8)
NEUTS SEG NFR BLD: 71 % (ref 32–75)
NRBC # BLD: 0 K/UL (ref 0–0.01)
NRBC BLD-RTO: 0 PER 100 WBC
PLATELET # BLD AUTO: 60 K/UL (ref 150–400)
PMV BLD AUTO: 11.2 FL (ref 8.9–12.9)
POTASSIUM SERPL-SCNC: 4.3 MMOL/L (ref 3.5–5.1)
PROT SERPL-MCNC: 5.6 G/DL (ref 6.4–8.2)
RBC # BLD AUTO: 2.29 M/UL (ref 3.8–5.2)
RIGHT ABI: 1.28
RIGHT ARM BP: 158 MMHG
RIGHT TBI: 0.62
RIGHT TOE PRESSURE: 98 MMHG
SODIUM SERPL-SCNC: 144 MMOL/L (ref 136–145)
SPECIMEN EXP DATE BLD: NORMAL
STATUS OF UNIT,%ST: NORMAL
UNIT DIVISION, %UDIV: 0
VAS LEFT DORSALIS PEDIS BP: 144 MMHG
VAS RIGHT DORSALIS PEDIS BP: 203 MMHG
WBC # BLD AUTO: 2.5 K/UL (ref 3.6–11)

## 2022-11-17 PROCEDURE — 97530 THERAPEUTIC ACTIVITIES: CPT

## 2022-11-17 PROCEDURE — 74011250637 HC RX REV CODE- 250/637: Performed by: PHYSICIAN ASSISTANT

## 2022-11-17 PROCEDURE — 97161 PT EVAL LOW COMPLEX 20 MIN: CPT

## 2022-11-17 PROCEDURE — 97535 SELF CARE MNGMENT TRAINING: CPT

## 2022-11-17 PROCEDURE — 74011250637 HC RX REV CODE- 250/637: Performed by: NURSE PRACTITIONER

## 2022-11-17 PROCEDURE — 85025 COMPLETE CBC W/AUTO DIFF WBC: CPT

## 2022-11-17 PROCEDURE — 97166 OT EVAL MOD COMPLEX 45 MIN: CPT

## 2022-11-17 PROCEDURE — 74011000250 HC RX REV CODE- 250: Performed by: PHYSICIAN ASSISTANT

## 2022-11-17 PROCEDURE — 80053 COMPREHEN METABOLIC PANEL: CPT

## 2022-11-17 PROCEDURE — 97165 OT EVAL LOW COMPLEX 30 MIN: CPT

## 2022-11-17 PROCEDURE — 29581 APPL MULTLAYER CMPRN SYS LEG: CPT

## 2022-11-17 PROCEDURE — 97116 GAIT TRAINING THERAPY: CPT

## 2022-11-17 PROCEDURE — 65270000029 HC RM PRIVATE

## 2022-11-17 PROCEDURE — 74011000250 HC RX REV CODE- 250: Performed by: INTERNAL MEDICINE

## 2022-11-17 PROCEDURE — 36415 COLL VENOUS BLD VENIPUNCTURE: CPT

## 2022-11-17 RX ORDER — AMOXICILLIN 250 MG
1 CAPSULE ORAL 2 TIMES DAILY
Status: DISCONTINUED | OUTPATIENT
Start: 2022-11-18 | End: 2022-11-18 | Stop reason: HOSPADM

## 2022-11-17 RX ORDER — FUROSEMIDE 40 MG/1
40 TABLET ORAL DAILY
Status: DISCONTINUED | OUTPATIENT
Start: 2022-11-18 | End: 2022-11-18 | Stop reason: HOSPADM

## 2022-11-17 RX ORDER — OXYCODONE AND ACETAMINOPHEN 5; 325 MG/1; MG/1
1 TABLET ORAL
Status: DISCONTINUED | OUTPATIENT
Start: 2022-11-17 | End: 2022-11-18 | Stop reason: HOSPADM

## 2022-11-17 RX ADMIN — SODIUM CHLORIDE, PRESERVATIVE FREE 10 ML: 5 INJECTION INTRAVENOUS at 22:22

## 2022-11-17 RX ADMIN — SODIUM CHLORIDE, PRESERVATIVE FREE 10 ML: 5 INJECTION INTRAVENOUS at 13:10

## 2022-11-17 RX ADMIN — SUCRALFATE 1 G: 1 TABLET ORAL at 17:02

## 2022-11-17 RX ADMIN — OXYCODONE HYDROCHLORIDE AND ACETAMINOPHEN 1 TABLET: 5; 325 TABLET ORAL at 22:22

## 2022-11-17 RX ADMIN — SODIUM CHLORIDE, PRESERVATIVE FREE 10 ML: 5 INJECTION INTRAVENOUS at 22:00

## 2022-11-17 RX ADMIN — SUCRALFATE 1 G: 1 TABLET ORAL at 11:19

## 2022-11-17 RX ADMIN — ALLOPURINOL 100 MG: 100 TABLET ORAL at 08:21

## 2022-11-17 RX ADMIN — SODIUM CHLORIDE, PRESERVATIVE FREE 5 ML: 5 INJECTION INTRAVENOUS at 06:27

## 2022-11-17 RX ADMIN — LACTULOSE 30 ML: 20 SOLUTION ORAL at 13:13

## 2022-11-17 RX ADMIN — POTASSIUM CHLORIDE 20 MEQ: 20 TABLET, EXTENDED RELEASE ORAL at 08:20

## 2022-11-17 RX ADMIN — Medication 1 LOZENGE: at 08:23

## 2022-11-17 RX ADMIN — ACETAMINOPHEN 650 MG: 325 TABLET ORAL at 18:38

## 2022-11-17 RX ADMIN — LEVOTHYROXINE SODIUM 150 MCG: 0.07 TABLET ORAL at 06:27

## 2022-11-17 RX ADMIN — SUCRALFATE 1 G: 1 TABLET ORAL at 08:21

## 2022-11-17 RX ADMIN — PANTOPRAZOLE SODIUM 40 MG: 40 TABLET, DELAYED RELEASE ORAL at 17:02

## 2022-11-17 RX ADMIN — ALLOPURINOL 100 MG: 100 TABLET ORAL at 17:02

## 2022-11-17 RX ADMIN — Medication 1 LOZENGE: at 17:04

## 2022-11-17 RX ADMIN — EZETIMIBE 10 MG: 10 TABLET ORAL at 08:21

## 2022-11-17 RX ADMIN — PANTOPRAZOLE SODIUM 40 MG: 40 TABLET, DELAYED RELEASE ORAL at 08:21

## 2022-11-17 RX ADMIN — SUCRALFATE 1 G: 1 TABLET ORAL at 22:22

## 2022-11-17 NOTE — PROGRESS NOTES
Problem: Mobility Impaired (Adult and Pediatric)  Goal: *Acute Goals and Plan of Care (Insert Text)  Description: FUNCTIONAL STATUS PRIOR TO ADMISSION: Pt lives in [address] [place] [address] Road with caregiver assist 5x/ wk for IADLs and transportation and overnight caregiver for assist to bathroom since hospital admission last month. Pt uses rollator for household amb; notes that RW ordered last admission but not yet delivered. Was receiving HH PT/OT. Physical Therapy Goals  Initiated 11/17/2022  1. Patient will move from supine to sit and sit to supine  in bed with modified independence within 7 day(s). 2.  Patient will transfer from bed to chair and chair to bed with supervision/set-up using the least restrictive device within 7 day(s). 3.  Patient will perform sit to stand with supervision/set-up within 7 day(s). 4.  Patient will ambulate with supervision/set-up for 50 feet with the least restrictive device within 7 day(s). Outcome: Not Met   PHYSICAL THERAPY EVALUATION  Patient: Ronald Gilford (25 y.o. female)  Date: 11/17/2022  Primary Diagnosis: Acute blood loss anemia [D62]  Anemia [D64.9]  Procedure(s) (LRB):  ESOPHAGOGASTRODUODENOSCOPY (EGD) (N/A)  ENTEROSCOPY (N/A)  ENDOSCOPIC ARGON PLASMA COAGULATION (N/A)  INJECTION (N/A)  RESOLUTION CLIP (N/A)  ESOPHAGOGASTRODUODENAL (EGD) BIOPSY (N/A) 1 Day Post-Op   Precautions: fall       ASSESSMENT  Based on the objective data described below, the patient presents with bilat LE edema, general weakness, decreased dynamic standing balance, mild gait dysfunction following admission for acute blood loss anemia. Pt now s/p blood transfusion and enteroscopy with hemoclip and argon plasma coagulation for ablation of AVMs POD#1. Of note, pt with h/o GAVE and upper GI bleed. Pt A+O x 4 and able to provide thorough history. Tolerated amb in room with RW and CGA, mild HENLEY, SpO2 stable on RA.  Pt will benefit from mobilization with nursing staff as tolerated and gait progression with acute PT. Recommend continued caregiver assist and HH PT at d/c. Current Level of Function Impacting Discharge (mobility/balance): supine to sit min A, transfer CGA, amb with RW CGA    Functional Outcome Measure: The patient scored 19/28 on the Tinetti outcome measure which is indicative of moderate risk fro fall. Other factors to consider for discharge: required caregiver assist at baseline     Patient will benefit from skilled therapy intervention to address the above noted impairments. PLAN :  Recommendations and Planned Interventions: bed mobility training, transfer training, gait training, therapeutic exercises, edema management/control, patient and family training/education, and therapeutic activities      Frequency/Duration: Patient will be followed by physical therapy:  4 times a week to address goals. Recommendation for discharge: (in order for the patient to meet his/her long term goals)  Physical therapy at least 2 days/week in the home AND ensure assist and/or supervision for safety with mobility using RW    This discharge recommendation:  Has not yet been discussed the attending provider and/or case management    IF patient discharges home will need the following DME: RW ordered last admission but not delivered per pt report         SUBJECTIVE:   Patient stated I want to know what foods I can eat to get iron.     OBJECTIVE DATA SUMMARY:   HISTORY:    Past Medical History:   Diagnosis Date    Abnormal nuclear stress test 10/04/2021    Anemia     Angina at rest [facility]) 10/04/2021    Arthritis     KNEE,gout    Bundle branch block     Endocrine disease     HYPOTHYROIDISM    Fracture     Left distal femur (age 12 - pt fell)    Fracture     Left tibia 1990 (pt fell)    Fracture     Right wrist fractured 2 times (pt fell)    GERD (gastroesophageal reflux disease)     High cholesterol     Hypertension     Hypoglycemia     \"my body produces too much insulin\"    Liver disease     BRADY    Nausea & vomiting     when had gallbladder out    Osteoporosis     Other ill-defined conditions(799.89)     edema legs    S/P cardiac cath 10/04/2021    10/4/2021 nonobstructive disease    Spinal stenosis     Thyroid disease      Past Surgical History:   Procedure Laterality Date    COLONOSCOPY N/A 7/13/2021    COLONOSCOPY performed by Tanisha Garza MD at [facility] ENDOSCOPY    COLONOSCOPY N/A 8/8/2022    COLONOSCOPY performed by Balbir Durán MD at [place] [address] [address]  2/11/2015         Lamont Wilner  7/13/2021         COLONOSCPY,FLEX,W/ [address] INJECT  7/13/2021         COLORECTAL SCRN; HI RISK IND  2/11/2015         HX CHOLECYSTECTOMY      HX HYSTERECTOMY      HX ORTHOPAEDIC Left     leg surgery - plates, screws, wires, pins in place    HX UROLOGICAL      [address] UNLISTED      liver biopsy--BRADY and fibrosis    SB ENDOSCOPY,W/CONTROL,BLEEDING  11/16/2022    SMALL BOWEL ENDOSCOPY,ABLATE LESN  11/16/2022    SMALL BOWEL ENDOSCOPY,BIOPSY  11/16/2022    UPPER GI ENDOSCOPY,BIOPSY  11/17/2015            Personal factors and/or comorbidities impacting plan of care:  recent h/o frequent hospitalizations    Home Situation  [address] Environment[address] Name: Maria Elena  One/Two Story Residence: One story  Living Alone: Yes  Support Systems: Child(paige), Caregiver/Home Care Staff  Patient Expects to be Discharged to[de-identified] Assisted Living  Current DME Used/Available at Home: Kellen Senior    EXAMINATION/PRESENTATION/DECISION MAKING:   Critical Behavior:  Neurologic State: Alert, Appropriate for age  Orientation Level: Oriented X4  Cognition: Appropriate decision making     Hearing:   Auditory  Auditory Impairment: None  Edema: unna boots in place bilat LEs  Range Of Motion:  AROM: Generally decreased, functional                       Strength:    Strength: Generally decreased, functional                    Tone & Sensation:   Tone: Normal Sensation: Intact               Coordination:  Coordination: Within functional limits  Vision:      Functional Mobility:  Bed Mobility:     Supine to Sit: Minimum assistance; Additional time  Sit to Supine: Stand-by assistance     Transfers:  Sit to Stand: Contact guard assistance  Stand to Sit: Contact guard assistance                       Balance:   Sitting: Intact  Standing: Impaired  Standing - Static: Good;Constant support  Standing - Dynamic : Fair;Good;Constant support  Ambulation/Gait Training:  Distance (ft): 12 Feet (ft) (x 2)  Assistive Device: Gait belt;Walker, rolling  Ambulation - Level of Assistance: Contact guard assistance        Gait Abnormalities: Decreased step clearance;Trunk sway increased (reports LLD with L LE shorter 2* remote h/o fx and repair; wears built up shoe at baseline)        Base of Support: Widened     Speed/Keerthi: Pace decreased (<100 feet/min)  Step Length: Left shortened;Right shortened          Functional Measure:  Tinetti test:    Sitting Balance: 1  Arises: 1  Attempts to Rise: 2  Immediate Standing Balance: 1  Standing Balance: 1  Nudged: 1  Eyes Closed: 1  Turn 360 Degrees - Continuous/Discontinuous: 1  Turn 360 Degrees - Steady/Unsteady: 1  Sitting Down: 1  Balance Score: 11 Balance total score  Indication of Gait: 1  R Step Length/Height: 1  L Step Length/Height: 1  R Foot Clearance: 1  L Foot Clearance: 1  Step Symmetry: 1  Step Continuity: 1  Path: 1  Trunk: 0  Walking Time: 0  Gait Score: 8 Gait total score  Total Score: 19/28 Overall total score         Tinetti Tool Score Risk of Falls  <19 = High Fall Risk  19-24 = Moderate Fall Risk  25-28 = Low Fall Risk  Tinetti ME. Performance-Oriented Assessment of Mobility Problems in Elderly Patients. Elam 66; [id number].  (Scoring Description: PT Bulletin Feb. 10, 1993)    Older adults: Dahlia Jade et al, 2009; n = [address] elderly evaluated with ABC, EULOGIO, ADL, and IADL)  · Mean EULOGIO score for males aged 69-68 years = 26.21(3.40)  · Mean EULOGIO score for females age 69-68 years = 25.16(4.30)  · Mean EULOGIO score for males over 80 years = 23.29(6.02)  · Mean EULOGIO score for females over [de-identified] years = 17.20(8.32)           Physical Therapy Evaluation Charge Determination   History Examination Presentation Decision-Making   HIGH Complexity :3+ comorbidities / personal factors will impact the outcome/ POC  MEDIUM Complexity : 3 Standardized tests and measures addressing body structure, function, activity limitation and / or participation in recreation  LOW Complexity : Stable, uncomplicated  LOW Complexity : FOTO score of       Based on the above components, the patient evaluation is determined to be of the following complexity level: LOW     Pain Rating:  No c/o pain    Activity Tolerance:   Good and SpO2 stable on RA    After treatment patient left in no apparent distress:   Supine in bed, Call bell within reach, Side rails x 3, and HOB elevated    COMMUNICATION/EDUCATION:   The patients plan of care was discussed with: Registered nurse. Fall prevention education was provided and the patient/caregiver indicated understanding., Patient/family have participated as able in goal setting and plan of care. , and Patient/family agree to work toward stated goals and plan of care.     Thank you for this referral.  Chel Casiano, PT   Time Calculation: 34 mins

## 2022-11-17 NOTE — PROGRESS NOTES
Physical Therapy    PT evaluation completed, full report to follow. Recommend pt return home with continued caregiver assist and [facility] PT at d/c. Will con't to follow in acute setting for mobility progression as tolerated.     Akhil Gipson, PT, MPT

## 2022-11-17 NOTE — PROGRESS NOTES
Darline Running of Shift Note    Bedside shift change report given to  [employer] (oncoming nurse) by Chas Rojas RN (offgoing nurse). Report included the following information SBAR, Kardex, Intake/Output, MAR, Recent Results, and Med Rec Status    Shift worked:  [id number]     Shift summary and any significant changes:     Pt given prescribed med's per MAR. Pt up to recliner for most of day. Pt compression wrap on legs redressed by wound care RN. Patient c/o of pain in lower rt foot pain from compression wrap, tylenol given.           Chas Rojas RN

## 2022-11-17 NOTE — PROGRESS NOTES
Hospitalist Progress Note    Subjective:   Daily Progress Note: 11/17/2022 1:09 PM    Hospital Course: Patient presents for abnormal outpatient labs. Patient was suppose to start iron infusions and is being followed by Dr. Dilshad Melendez. Hgb level was 6.2. Patient denies melena or BRBPR. Past medical history of chronic anemia, arthritis, HTN, hyperlipidemia, and chronic leg edema. Subjective:Patient observed resting in bed with eyes opened. States that sore throat is better. Denies chest pain, shortness of breath, palpitations, dizziness or distress.     Current Facility-Administered Medications   Medication Dose Route Frequency    benzocaine-menthoL (CHLORASEPTIC MAX) lozenge 1 Lozenge  1 Lozenge Oral PRN    0.9% sodium chloride infusion 250 mL  250 mL IntraVENous PRN    allopurinoL (ZYLOPRIM) tablet 100 mg  100 mg Oral BID    [Held by provider] amLODIPine (NORVASC) tablet 10 mg  10 mg Oral DAILY    [Held by provider] furosemide (LASIX) tablet 80 mg  80 mg Oral DAILY    albuterol-ipratropium (DUO-NEB) 2.5 MG-0.5 MG/3 ML  3 mL Nebulization Q6H PRN    levothyroxine (SYNTHROID) tablet 150 mcg  150 mcg Oral ACB    [Held by provider] losartan (COZAAR) tablet 25 mg  25 mg Oral DAILY    [Held by provider] metOLazone (ZAROXOLYN) tablet 2.5 mg  2.5 mg Oral EVERY OTHER DAY    pantoprazole (PROTONIX) tablet 40 mg  40 mg Oral ACB&D    potassium chloride (K-DUR, KLOR-CON M20) SR tablet 20 mEq  20 mEq Oral DAILY WITH BREAKFAST    sucralfate (CARAFATE) tablet 1 g  1 g Oral AC&HS    ezetimibe (ZETIA) tablet 10 mg  10 mg Oral DAILY    sodium chloride (NS) flush 5-40 mL  5-40 mL IntraVENous Q8H    sodium chloride (NS) flush 5-40 mL  5-40 mL IntraVENous PRN    acetaminophen (TYLENOL) tablet 650 mg  650 mg Oral Q6H PRN    Or    acetaminophen (TYLENOL) suppository 650 mg  650 mg Rectal Q6H PRN    polyethylene glycol (MIRALAX) packet 17 g  17 g Oral DAILY PRN    ondansetron (ZOFRAN ODT) tablet 4 mg  4 mg Oral Q8H PRN    Or ondansetron (ZOFRAN) injection 4 mg  4 mg IntraVENous Q6H PRN        Review of Systems:  Review of Systems   Constitutional: Negative. HENT:  Positive for sore throat. Eyes: Negative. Respiratory: Negative. Cardiovascular: Negative. Gastrointestinal: Negative. Genitourinary: Negative. Musculoskeletal: Negative. Skin: Negative. Neurological: Negative. Endo/Heme/Allergies: Negative. Psychiatric/Behavioral: Negative. Objective:     Visit Vitals  BP (!) 130/44   Pulse 62   Temp 97.5 °F (36.4 °C)   Resp 18   Ht 4' 11\" (1.499 m)   Wt 94.3 kg (208 lb)   SpO2 98%   Breastfeeding No   BMI 42.01 kg/m²    O2 Flow Rate (L/min): 2 l/min O2 Device: None (Room air)    Temp (24hrs), Av.4 °F (36.9 °C), Min:97.5 °F (36.4 °C), Max:99.7 °F (37.6 °C)      No intake/output data recorded. 11/15 1901 -  0700  In: 570 [P.O.:120; I.V.:450]  Out: 1050 [Urine:1050]    PHYSICAL EXAM:    Physical Exam  Vitals reviewed. Constitutional:       Appearance: Normal appearance. HENT:      Head: Normocephalic and atraumatic. Right Ear: External ear normal.      Left Ear: External ear normal.      Nose: Nose normal.      Mouth/Throat:      Mouth: Mucous membranes are moist.   Eyes:      Pupils: Pupils are equal, round, and reactive to light. Cardiovascular:      Rate and Rhythm: Normal rate and regular rhythm. Pulses: Normal pulses. Comments: Telemetry: atrial paced on telemetry. Pulmonary:      Effort: Pulmonary effort is normal.   Abdominal:      General: Bowel sounds are normal.      Comments: Last bowel movement reported 22. Musculoskeletal:      Cervical back: Normal range of motion. Comments: Generalized weakness to bilateral lower extremities. Skin:     General: Skin is warm and dry. Capillary Refill: Capillary refill takes 2 to 3 seconds. Comments: Compression wraps intact to bilateral lower extremities.    Neurological:      Mental Status: She is alert and oriented to person, place, and time. Psychiatric:         Mood and Affect: Mood normal.       Data Review    Recent Results (from the past 24 hour(s))   ANKLE BRACHIAL INDEX    Collection Time: 11/16/22  3:30 PM   Result Value Ref Range    Left dorsalis pedis  mmHg    Left toe pressure 71 mmHg    Right arm  mmHg    Right dorsalis pedis  mmHg    Right toe pressure 98 mmHg    Left CORINNE 0.91     Right CORINNE 1.28     Left TBI 0.45     Right TBI 0.62    CBC WITH AUTOMATED DIFF    Collection Time: 11/17/22  6:26 AM   Result Value Ref Range    WBC 2.5 (L) 3.6 - 11.0 K/uL    RBC 2.29 (L) 3.80 - 5.20 M/uL    HGB 7.1 (L) 11.5 - 16.0 g/dL    HCT 23.4 (L) 35.0 - 47.0 %    .2 (H) 80.0 - 99.0 FL    MCH 31.0 26.0 - 34.0 PG    MCHC 30.3 30.0 - 36.5 g/dL    RDW 22.2 (H) 11.5 - 14.5 %    PLATELET 60 (L) 432 - 400 K/uL    MPV 11.2 8.9 - 12.9 FL    NRBC 0.0 0  WBC    ABSOLUTE NRBC 0.00 0.00 - 0.01 K/uL    NEUTROPHILS 71 32 - 75 %    LYMPHOCYTES 18 12 - 49 %    MONOCYTES 3 (L) 5 - 13 %    EOSINOPHILS 8 (H) 0 - 7 %    BASOPHILS 0 0 - 1 %    IMMATURE GRANULOCYTES 0 0.0 - 0.5 %    ABS. NEUTROPHILS 1.8 1.8 - 8.0 K/UL    ABS. LYMPHOCYTES 0.4 (L) 0.8 - 3.5 K/UL    ABS. MONOCYTES 0.1 0.0 - 1.0 K/UL    ABS. EOSINOPHILS 0.2 0.0 - 0.4 K/UL    ABS. BASOPHILS 0.0 0.0 - 0.1 K/UL    ABS. IMM. GRANS. 0.0 0.00 - 0.04 K/UL    DF AUTOMATED     METABOLIC PANEL, COMPREHENSIVE    Collection Time: 11/17/22  6:26 AM   Result Value Ref Range    Sodium 144 136 - 145 mmol/L    Potassium 4.3 3.5 - 5.1 mmol/L    Chloride 116 (H) 97 - 108 mmol/L    CO2 22 21 - 32 mmol/L    Anion gap 6 5 - 15 mmol/L    Glucose 133 (H) 65 - 100 mg/dL    BUN 25 (H) 6 - 20 MG/DL    Creatinine 1.12 (H) 0.55 - 1.02 MG/DL    BUN/Creatinine ratio 22 (H) 12 - 20      eGFR 51 (L) >60 ml/min/1.73m2    Calcium 9.0 8.5 - 10.1 MG/DL    Bilirubin, total 0.9 0.2 - 1.0 MG/DL    ALT (SGPT) 20 12 - 78 U/L    AST (SGOT) 26 15 - 37 U/L    Alk.  phosphatase 190 (H) 45 - 117 U/L    Protein, total 5.6 (L) 6.4 - 8.2 g/dL    Albumin 2.5 (L) 3.5 - 5.0 g/dL    Globulin 3.1 2.0 - 4.0 g/dL    A-G Ratio 0.8 (L) 1.1 - 2.2         ANKLE BRACHIAL INDEX             Active Problems:    Anemia (8/5/2022)      Acute blood loss anemia (11/13/2022)        Assessment/Plan:     Acute blood loss anemia secondary to atypical GAVE       Thrombocytopenia     - Hgb 7.1    -  transfuse to keep Hgb >7     - iron infusions completed     - enteroscopy performed 11/16/22     - continue protonix and carafate awaiting pathology     - Await bx, if portal hypertension may benefit from TIPS procedure for management      -Avoid NSAIDs     - hematology and GI following     2. Chronic kidney disease, stage III     - Bun 25 /Creat 1.12 improving     - discontinue IVF     - will restart home lasix at 1/2 dose at 40mg daily on tomorrow and continue to hold metolazone. - will restart metolazone as kidney function allows. - will monitor labwork     3. HTN    - will resume blood pressure medications with parameters     4. Osteoporosis    - continue alendronate     5. Hyperlipidemia    - continue zetia     6. Hypothyroidism    - continue synthroid     7. Chronic bilateral lower extremity edema      Chronic venous stasis/insufficiency     - CORINNE right NC and left 0.91. Right TBI 0.62 and left 0.45. Normal flow to the ankle bilaterally. - wound care for weekly compression wrap changes. Last performed 11/17/22.     - appreciate vascular input.    - follow up with Dr. Waleska Galindo at discharge    8. Constipation     - continue miralax. - will add pericolace and a dose of lactulose today. 9. BRADY cirrhosis - POA     - avoid NSAIDs    PT    Discharge disposition: pending hospital course and clearance from GI awaiting pathology report. PT recommends HH at discharge.      DVT Prophylaxis: SCD  Code Status:  Full Code  POA: Manav Peña, son [phone]    Care Plan discussed with: patient, nursing, CM  _______________________________________________________________    [facility], NP

## 2022-11-17 NOTE — WOUND CARE
Wound care nurse consult from NP from Vascular surgery team, Claudette Aviles, stating   Patient can likely tolerate a light Proform 3 layer compression wrap. Wound care team consult for week compression changes. Question: Reason for Consult: Answer: ABIs: right NC and left 0.91. Right TBI 0.62 and left 0.45.      Patient is a 67 y/o CF admitted for chronic anemia and weakness Hgb=6.0    Past Medical History:   Diagnosis Date    Abnormal nuclear stress test 10/04/2021    Anemia     Angina at rest [facility]) 10/04/2021    Arthritis     KNEE,gout    Bundle branch block     Endocrine disease     HYPOTHYROIDISM    Fracture     Left distal femur (age 12 - pt fell)    Fracture     Left tibia 1990 (pt fell)    Fracture     Right wrist fractured 2 times (pt fell)    GERD (gastroesophageal reflux disease)     High cholesterol     Hypertension     Hypoglycemia     \"my body produces too much insulin\"    Liver disease     BRADY    Nausea & vomiting     when had gallbladder out    Osteoporosis     Other ill-defined conditions(799.89)     edema legs    S/P cardiac cath 10/04/2021    10/4/2021 nonobstructive disease    Spinal stenosis     Thyroid disease      Past Surgical History:   Procedure Laterality Date    COLONOSCOPY N/A 7/13/2021    COLONOSCOPY performed by Apple Claire MD at [facility] ENDOSCOPY    COLONOSCOPY N/A 8/8/2022    COLONOSCOPY performed by Baylee Slater MD at [address]  2/11/2015         COLONOSCOPY,REMV LESN,SNARE  7/13/2021         COLONOSCPY,FLEX,W/ [address] INJECT  7/13/2021         COLORECTAL SCRN; HI RISK IND  2/11/2015         HX CHOLECYSTECTOMY      HX HYSTERECTOMY      HX ORTHOPAEDIC Left     leg surgery - plates, screws, wires, pins in place    HX UROLOGICAL      PESSURY    VT ABDOMEN SURGERY PROC UNLISTED      liver biopsy--BRADY and fibrosis    SB ENDOSCOPY,W/CONTROL,BLEEDING  11/16/2022    SMALL BOWEL ENDOSCOPY,ABLATE LESN  11/16/2022    SMALL BOWEL ENDOSCOPY,BIOPSY  11/16/2022    UPPER GI ENDOSCOPY,BIOPSY  11/17/2015          Patient is a long time patient of vascular MD Dr Gerardo Piña who had ordered [facility] wraps to BLE for chronic edema and venous stasis wounds. Bilateral UNNA boots have been on 7 days and WC nurse removed them and washed legs with soap and water, lotion applied to feet and heels. Some scattered partial thickness venous stasis wounds around both LE's L>R. Xeroform gauze applied over BLE before a 3 layer, modified compression wrap applied, gently. Patient informed that if compression wraps felt too tight and causing unrelenting pain, to have staff cut them off immediately and re-consult wound care for evaluation. Patient will need compression wraps changed in 5-7 days.  nurse to monitor patients inpatient status and change next week if still here.     Eitan Saavedra, RN, [facility], [place]

## 2022-11-17 NOTE — CONSULTS
Vascular Surgery Consult Note  11/17/2022    Subjective:     Ms. Elina Abreu is a 68 y.o, female with a pmhx significant for diabetes mellitus, hypertension, hyperlipidemia, chronic renal disease, paroxsymal atrial fibrillation, thyroid disease, obesity, and anxiety. She is admitted to the hospital with profound anemia. She has a history of GAVE and BRADY/cirrhosis with chronic pancytopenia and GI bleeding. She was recently admitted to the hospital for the same and diagnosed with a small bowel AVM. She is s/p enteroscopy with hemoclip placement,argon plasma coagulation for ablation of AVMs, and submucosal injection of [address] for tattoo on 11/16/22. She presents to the hospital with BLE unna-boots. She has underlying chronic BLE edema that has been refractory to diuretics. The wound care team was consulted for a compression wrap and requested ABIs prior to the wrap being placed. We have been consulted for evaluation. The patient is a well known patient of Dr. Kendall Rodríguez and has known chronic venous stasis disease. She was seen in the office last week and bilateral unna-boots were placed with plans for weekly changes in the office and with [facility]. Past medical history  Chronic bilateral lower extremity edema   Chronic venous stasis/insufficiency   Diabetes mellitus   Hypertension  Hyperlipidemia   Chronic renal disease stage IIIa  Paroxsymal atrial fibrillation  Bifascicular heart block  Sick sinus syndrome  Nonrheumatic aortic valve stenosis   Pulmonary hypertension   Hypothyroidism  Obesity  GAVE  BRADY   Cirrhosis   Chronic pancytopenia   Chronic upper gastrointestinal hemorrage  AVMs  Chronic anemia   Iron deficiency   Anxiety  Gout   Osteoporosis   Lumbar DDD w/ spinal stenosis   Constipation     Past procedural history  Enteroscopy with hemoclip placement,argon plasma coagulation for ablation of AVMs, and submucosal injection of [address] for tattoo on 11/16/22.   Polypectomy 2015 & 2021  Cholecystectomy   Hysterectomy   ;eft leg ORIF  Pessury   Liver biopsy    Family history  Diabetes: mother  Emphysema: father   Heart disease: mother     Social history  She is a non-smoker. She denies alcohol use. She is a resident of [address] [place] [address]. She ambulates with a rollator. Home medications   allopurinoL (ZYLOPRIM) 100 mg tablet Take 1 Tablet by mouth two (2) times a day for 30 days. alendronate (FOSAMAX) 70 mg tablet alendronate 70 mg tablet   Take 1 tablet every week by oral route. losartan (COZAAR) 25 mg tablet Take 25 mg by mouth daily. metOLazone (ZAROXOLYN) 2.5 mg tablet Take 1 Tablet by mouth every other day. amLODIPine (NORVASC) 10 mg tablet Take 1 Tablet by mouth daily. potassium chloride SA (MICRO-K) 10 mEq capsule Take 2 Capsules by mouth daily. pantoprazole (PROTONIX) 40 mg tablet Take 1 Tablet by mouth Before breakfast and dinner. levothyroxine (SYNTHROID) 150 mcg tablet Take 1 Tablet by mouth Daily (before breakfast). icosapent ethyL (VASCEPA) 1 gram capsule Take 2 Capsules by mouth two (2) times daily (with meals). loratadine (Claritin) 10 mg tablet Take 1 Tablet by mouth daily. Indications: inflammation of the nose due to an allergy   atorvastatin (LIPITOR) 20 mg tablet TAKE 1 TABLET DAILY   polyethylene glycol (MIRALAX) 17 gram/dose powder Take 17 g by mouth daily. Biotin 2,500 mcg cap Take  by mouth.   vitamin E (AQUA GEMS) 400 unit capsule Take 800 Units by mouth two (2) times a day. cholecalciferol, vitamin D3, 50 mcg (2,000 unit) tab Take 1 Tab by mouth daily. levalbuterol tartrate (XOPENEX) 45 mcg/actuation inhaler Take 2 Puffs by inhalation every six (6) hours as needed for Wheezing or Shortness of Breath. furosemide (LASIX) 80 mg tablet Take 80 mg by mouth daily. nystatin (MYCOSTATIN) powder Apply  to affected area two (2) times a day. fluticasone propionate (FLONASE) 50 mcg/actuation nasal spray 2 Sprays by Both Nostrils route daily. Administer to right and left nostril. Zetia 10 mg tablet Take 1 tablet by mouth daily. lidocaine (LIDODERM) 5 % Apply patch to the affected area for 12 hours a day and remove for 12 hours a day.   ketoconazole (NIZORAL) 2 % topical cream Apply  to affected area daily as needed. L GASSERI/B BIFIDUM/B LONGUM ([employer] PO) Take 1 Tab by mouth daily. MULTIVITAMIN WITH MINERALS (ONE-A-DAY 50 PLUS PO) Take 1 Tab by mouth daily. Allergies and intolerances  Gabapentin: SI  Metoprolol: rash  Celebrex: hives  Eliquis: anemia   Penicillins: unknown  Sulfa: hives    Review of Systems   Constitutional:  Positive for activity change and fatigue. Negative for chills. HENT:  Negative for congestion. Eyes:  Negative for visual disturbance. Respiratory:  Positive for shortness of breath. Negative for cough. Cardiovascular:  Positive for leg swelling (chronic). Negative for chest pain. Gastrointestinal:  Negative for nausea and vomiting. Endocrine: Negative for polydipsia and polyuria. Genitourinary: Negative. Musculoskeletal:  Negative for arthralgias, gait problem and myalgias. Skin:  Positive for pallor. Negative for color change and wound (left lateral leg). Allergic/Immunologic: Negative for immunocompromised state. Neurological:  Positive for weakness (generalized). Hematological: Negative. Psychiatric/Behavioral: Negative. Objective:     Patient Vitals for the past 24 hrs:   BP Temp Pulse Resp SpO2   11/17/22 0742 (!) 129/44 98.2 °F (36.8 °C) 60 18 98 %   11/16/22 2316 (!) 136/44 99.7 °F (37.6 °C) 62 17 100 %   11/16/22 1946 (!) 133/41 98.4 °F (36.9 °C) 60 17 97 %   11/16/22 1629 (!) 148/51 98.4 °F (36.9 °C) 61 18 97 %   11/16/22 1117 (!) 126/44 97.7 °F (36.5 °C) 62 18 99 %   11/16/22 0912 (!) 153/43 97.8 °F (36.6 °C) 63 18 98 %        Physical Exam  Constitutional:       Appearance: She is obese. HENT:      Head: Normocephalic.       Nose: Nose normal. Mouth/Throat:      Mouth: Mucous membranes are moist.   Eyes:      Pupils: Pupils are equal, round, and reactive to light. Cardiovascular:      Rate and Rhythm: Normal rate and regular rhythm. Pulses:           Femoral pulses are 2+ on the right side and 2+ on the left side. Dorsalis pedis pulses are detected w/ Doppler on the right side and detected w/ Doppler on the left side. Posterior tibial pulses are detected w/ Doppler on the right side and detected w/ Doppler on the left side. Pulmonary:      Effort: Pulmonary effort is normal. No respiratory distress. Abdominal:      General: There is no distension. Palpations: Abdomen is soft. Musculoskeletal:         General: Normal range of motion. Cervical back: Normal range of motion. Skin:     Coloration: Skin is pale. Comments: BLE unna boots   Neurological:      Mental Status: She is alert. Mental status is at baseline.        Pertinent Test Results:   Recent Results (from the past 24 hour(s))   ANKLE BRACHIAL INDEX    Collection Time: 11/16/22  3:30 PM   Result Value Ref Range    Left dorsalis pedis  mmHg    Left toe pressure 71 mmHg    Right arm  mmHg    Right dorsalis pedis  mmHg    Right toe pressure 98 mmHg    Left CORINNE 0.91     Right CORINNE 1.28     Left TBI 0.45     Right TBI 0.62    CBC WITH AUTOMATED DIFF    Collection Time: 11/17/22  6:26 AM   Result Value Ref Range    WBC 2.5 (L) 3.6 - 11.0 K/uL    RBC 2.29 (L) 3.80 - 5.20 M/uL    HGB 7.1 (L) 11.5 - 16.0 g/dL    HCT 23.4 (L) 35.0 - 47.0 %    .2 (H) 80.0 - 99.0 FL    MCH 31.0 26.0 - 34.0 PG    MCHC 30.3 30.0 - 36.5 g/dL    RDW 22.2 (H) 11.5 - 14.5 %    PLATELET 60 (L) 788 - 400 K/uL    MPV 11.2 8.9 - 12.9 FL    NRBC 0.0 0  WBC    ABSOLUTE NRBC 0.00 0.00 - 0.01 K/uL    NEUTROPHILS 71 32 - 75 %    LYMPHOCYTES 18 12 - 49 %    MONOCYTES 3 (L) 5 - 13 %    EOSINOPHILS 8 (H) 0 - 7 %    BASOPHILS 0 0 - 1 %    IMMATURE GRANULOCYTES 0 0.0 - 0.5 % ABS. NEUTROPHILS 1.8 1.8 - 8.0 K/UL    ABS. LYMPHOCYTES 0.4 (L) 0.8 - 3.5 K/UL    ABS. MONOCYTES 0.1 0.0 - 1.0 K/UL    ABS. EOSINOPHILS 0.2 0.0 - 0.4 K/UL    ABS. BASOPHILS 0.0 0.0 - 0.1 K/UL    ABS. IMM. GRANS. 0.0 0.00 - 0.04 K/UL    DF AUTOMATED     METABOLIC PANEL, COMPREHENSIVE    Collection Time: 11/17/22  6:26 AM   Result Value Ref Range    Sodium 144 136 - 145 mmol/L    Potassium 4.3 3.5 - 5.1 mmol/L    Chloride 116 (H) 97 - 108 mmol/L    CO2 22 21 - 32 mmol/L    Anion gap 6 5 - 15 mmol/L    Glucose 133 (H) 65 - 100 mg/dL    BUN 25 (H) 6 - 20 MG/DL    Creatinine 1.12 (H) 0.55 - 1.02 MG/DL    BUN/Creatinine ratio 22 (H) 12 - 20      eGFR 51 (L) >60 ml/min/1.73m2    Calcium 9.0 8.5 - 10.1 MG/DL    Bilirubin, total 0.9 0.2 - 1.0 MG/DL    ALT (SGPT) 20 12 - 78 U/L    AST (SGOT) 26 15 - 37 U/L    Alk. phosphatase 190 (H) 45 - 117 U/L    Protein, total 5.6 (L) 6.4 - 8.2 g/dL    Albumin 2.5 (L) 3.5 - 5.0 g/dL    Globulin 3.1 2.0 - 4.0 g/dL    A-G Ratio 0.8 (L) 1.1 - 2.2         Assessmen/Plan:     Chronic bilateral lower extremity edema   -Refractory to metolazone & Lasix  Obesity  -ABIs: right NC and left 0.91. Right TBI 0.62 and left 0.45. Normal flow to the ankle bilaterally. Patient can likely tolerate a light Proform 3 layer compression wrap. Wound care team consult for week compression changes. Outpatient follow up at discharge in one week with Dr. Suad Moore. Vascular surgery signing off. We appreciate the opportunity to participate in the care of Ms. Siu. Please reconsult as needed.       Anemia of acute and chronic blood loss/iron deficiency/Chronic pancytopenia/BRADY/Cirrhosis     Small bowel AVM/GAVE  -s/p enteroscopy with hemoclip placement,argon plasma coagulation for ablation of AVMs, and submucosal injection of [address] for tattoo on 11/16/22.  -H&H remains labile  -PPI and Carafate  Management per primary team and gastroenterology    Diabetes mellitus   -controlled Hypothyroidism  -Synthroid     Chronic renal disease stage IIIa  -@ baseline     Hypertension  -labile. Diuretics and antihypertensives held   Hyperlipidemia   -Zetia  Paroxsymal atrial fibrillation  Bifascicular heart block/Sick sinus syndrome  Nonrheumatic aortic valve stenosis   Pulmonary hypertension     Anxiety    Constipation     VTE Prophylaxis:  SCDs    Disposition:  Home to [place] with HH and CG support.      Signed By: Nieves Santillan NP     November 17, 2022

## 2022-11-17 NOTE — PROGRESS NOTES
Problem: Self Care Deficits Care Plan (Adult)  Goal: *Acute Goals and Plan of Care (Insert Text)  Description: FUNCTIONAL STATUS PRIOR TO ADMISSION:Multiple recent admission, pt with h/o GAVE and upper GI bleed. S-mod I self care at rollator level with 24/7 S in the home since last discarge so she does not have to walk without direct S at all times with chronic anemia and hypotension at times. HOME SUPPORT: The patient lived alone with 24/7 hired help to provide assistance in her [address]. Occupational Therapy Goals  Initiated 11/17/2022  1. Patient will perform grooming in standing with modified independence within 7 day(s). 2.  Patient will perform upper body dressing with modified independence within 7 day(s). 3.  Patient will perform lower body dressing with minimal assistance/contact guard assist within 7 day(s). 4.  Patient will perform toilet transfers with supervision/set-up within 7 day(s). 5.  Patient will perform all aspects of toileting with supervision/set-up within 7 day(s). 6.  Patient will participate in upper extremity therapeutic exercise/activities with supervision/set-up for 5 minutes within 7 day(s). 7.  Patient will utilize energy conservation, fall prevention, joint protection techniques during functional activities with verbal cues within 7 day(s).    Outcome: Progressing Towards Goal   OCCUPATIONAL THERAPY EVALUATION  Patient: Carlo Abreu (26 y.o. female)  Date: 11/17/2022  Primary Diagnosis: Acute blood loss anemia [D62]  Anemia [D64.9]  Procedure(s) (LRB):  ESOPHAGOGASTRODUODENOSCOPY (EGD) (N/A)  ENTEROSCOPY (N/A)  ENDOSCOPIC ARGON PLASMA COAGULATION (N/A)  INJECTION (N/A)  RESOLUTION CLIP (N/A)  ESOPHAGOGASTRODUODENAL (EGD) BIOPSY (N/A) 1 Day Post-Op   Precautions: partially dependent  Fall    ASSESSMENT  Based on the objective data described below, the patient presents to ER 11-13 with severe anemia; Hgb today 7.1; B LEs with wraps for edema management as B LEs were weeping with LE edema. She has general weakness, decreased dynamic standing balance with ADLs, mild gait dysfunction with RW following this admission for acute blood loss anemia. Pt now s/p blood transfusion and enteroscopy with hemoclip and argon plasma coagulation for ablation of AVMs POD#1. Of note, pt with h/o GAVE and upper GI bleed, multiple hospitalizations this year. Pt A+O x 4 and able to provide thorough history. SpO2 stable on RA with self care tasks. Pt will benefit from mobilization to bathroom with nursing staff as tolerated and up for all meals; RN reports she will place chair  in room. Recommend continued caregiver assist 24/7 in the home and [facility] OT and PT at [facility] Current Level of Function Impacting Discharge (ADLs/self-care): Set up- mod I UE ADLs, Min A-mod A LE ADLs, Min A-CGA functional mobility with RW    Functional Outcome Measure: The patient scored Total: 55/100 on the Barthel Index outcome measure which is indicative of being partially dependent in basic self-care. Other factors to consider for discharge:   Vitals:    11/17/22 1034 11/17/22 1041 11/17/22 1044 11/17/22 1142   BP: (!) 136/47 (!) 144/51 (!) 159/47 (!) 130/44   BP 1 Location: Right upper arm  Right upper arm    BP Patient Position: Supine Sitting Standing    Pulse: 63 65 67 62   Temp:    97.5 °F (36.4 °C)   Resp:       Height:       Weight:       SpO2: 100%  Comment: room air 100%  Comment: room air 100%  Comment: room air          Patient will benefit from skilled therapy intervention to address the above noted impairments.        PLAN :  Recommendations and Planned Interventions: self care training, functional mobility training, therapeutic exercise, balance training, visual/perceptual training, therapeutic activities, cognitive retraining, endurance activities, patient education, home safety training, and family training/education    Frequency/Duration: Patient will be followed by occupational therapy 4 times a week to address goals. Recommendation for discharge: (in order for the patient to meet his/her long term goals)  Occupational therapy at least 2 days/week in the home AND ensure assist and/or supervision for safety with functional mobility, ADLs    This discharge recommendation:  A follow-up discussion with the attending provider and/or case management is planned    IF patient discharges home will need the following DME: AE: long handled bathing, AE: long handled dressing, bedside commode, transfer bench, and walker: rollator  (had RW ordered last admission per chart but patient reports it was never delivered)       SUBJECTIVE:   Patient stated  I pull up on the sink to get off the toilet at home.  (joint protection training initiated)    OBJECTIVE DATA SUMMARY:   HISTORY:   Past Medical History:   Diagnosis Date    Abnormal nuclear stress test 10/04/2021    Anemia     Angina at rest [facility]) 10/04/2021    Arthritis     KNEE,gout    Bundle branch block     Endocrine disease     HYPOTHYROIDISM    Fracture     Left distal femur (age 12 - pt fell)    Fracture     Left tibia 1990 (pt fell)    Fracture     Right wrist fractured 2 times (pt fell)    GERD (gastroesophageal reflux disease)     High cholesterol     Hypertension     Hypoglycemia     \"my body produces too much insulin\"    Liver disease     BRADY    Nausea & vomiting     when had gallbladder out    Osteoporosis     Other ill-defined conditions(799.89)     edema legs    S/P cardiac cath 10/04/2021    10/4/2021 nonobstructive disease    Spinal stenosis     Thyroid disease      Past Surgical History:   Procedure Laterality Date    COLONOSCOPY N/A 7/13/2021    COLONOSCOPY performed by Doyle Ontiveros MD at [facility] ENDOSCOPY    COLONOSCOPY N/A 8/8/2022    COLONOSCOPY performed by Alex Hernandez MD at [address] [place] [address],[address]  2/11/2015         Carroll Gusman  7/13/2021         COLONOSCPY,FLEX,W/ [address] INJECT 7/13/2021         COLORECTAL SCRN; HI RISK IND  2/11/2015         HX CHOLECYSTECTOMY      HX HYSTERECTOMY      HX ORTHOPAEDIC Left     leg surgery - plates, screws, wires, pins in place    HX UROLOGICAL      PESSURY    OR ABDOMEN SURGERY PROC UNLISTED      liver biopsy--BRADY and fibrosis    SB ENDOSCOPY,W/CONTROL,BLEEDING  11/16/2022    SMALL BOWEL ENDOSCOPY,ABLATE LESN  11/16/2022    SMALL BOWEL ENDOSCOPY,BIOPSY  11/16/2022    UPPER GI ENDOSCOPY,BIOPSY  11/17/2015            Expanded or extensive additional review of patient history:     Home Situation  Home Environment: [address] Name: [facility]  One/Two Story Residence: One story  Living Alone: No  Support Systems: Child(paige), Caregiver/Home Care Staff (hired caregivers)  Patient Expects to be Discharged to[de-identified]  ([facility])  Current DME Used/Available at Home: Grab bars, Shower chair, Walker, rollator  Tub or Shower Type: Shower    Hand dominance: Right    EXAMINATION OF PERFORMANCE DEFICITS:  Cognitive/Behavioral Status:  Neurologic State: Alert; Appropriate for age  Orientation Level: Oriented X4  Cognition: Appropriate decision making; Appropriate for age attention/concentration; Appropriate safety awareness; Follows commands (not formally assessed; appears [facility])  Perception: Appears intact  Perseveration: No perseveration noted  Safety/Judgement: Awareness of environment; Fall prevention    Skin: see nsg notes; weeping with edema B LEs, now wrapped in coban     Edema: ++ B LE with B wraps in place Note CORINNE orders    Hearing:   Auditory  Auditory Impairment: None    Vision/Perceptual:                           Acuity: Impaired near vision [facility] today; reports severe deficits earlier this week upon admission; patient says, \"I couldnt tell if it was a man or m=woman from across the room\")    Corrective Lenses: Glasses    Range of Motion:  B UE WFL  AROM: Generally decreased, functional (distal LE limitations due to decreased bending over tolerance; props foot on recliner stool or has [facility] aides assist her PTA with LE dressing)                         Strength:    Strength: Generally decreased, functional; decreased functional endurance                Coordination:  Coordination: Within functional limits  Fine Motor Skills-Upper: Left Intact; Right Intact    Gross Motor Skills-Upper: Left Intact; Right Intact    Tone & Sensation:    Tone: Normal  Sensation: Intact                      Balance:  Sitting: Intact  Standing: Impaired  Standing - Static: Good;Constant support  Standing - Dynamic : Fair;Good;Constant support    Functional Mobility and Transfers for ADLs:  Bed Mobility:  Rolling: Minimum assistance  Supine to Sit: Minimum assistance; Additional time  Sit to Supine: Stand-by assistance  Scooting: Minimum assistance    Transfers:  Sit to Stand: Contact guard assistance  Stand to Sit: Contact guard assistance  Bed to Chair: Contact guard assistance  Toilet Transfer : Minimum assistance (training provided for recommendation of use of BSC over commode; currently pulls herself up on sink)    ADL Assessment:  Feeding: Modified independent    Oral Facial Hygiene/Grooming: Modified Independent    Bathing: Minimum assistance    Type of Bath: Bath Pack    Upper Body Dressing: Setup    Lower Body Dressing: Moderate assistance    Toileting: Minimum assistance                ADL Intervention and task modifications:   Patient instructed and indicated understanding the benefits of maintaining activity tolerance, functional mobility, and independence with self care tasks during acute stay  to ensure safe return home and to baseline. Encouraged patient to increase frequency and duration OOB, be out of bed for all meals, perform daily ADLs (as approved by RN/MD regarding bathing etc), and performing functional mobility to/from bathroom with staff only. Cognitive Retraining  Safety/Judgement: Awareness of environment; Fall prevention    Functional Measure:    Barthel Index:  Bathin  Bladder: 5  Bowels: 10  Groomin  Dressin  Feeding: 10  Mobility: 5  Stairs: 0  Toilet Use: 5  Transfer (Bed to Chair and Back): 10  Total: 55/100      The Barthel ADL Index: Guidelines  1. The index should be used as a record of what a patient does, not as a record of what a patient could do. 2. The main aim is to establish degree of independence from any help, physical or verbal, however minor and for whatever reason. 3. The need for supervision renders the patient not independent. 4. A patient's performance should be established using the best available evidence. Asking the patient, friends/relatives and nurses are the usual sources, but direct observation and common sense are also important. However direct testing is not needed. 5. Usually the patient's performance over the preceding 24-48 hours is important, but occasionally longer periods will be relevant. 6. Middle categories imply that the patient supplies over 50 per cent of the effort. 7. Use of aids to be independent is allowed. Score Interpretation (from [address] [address])    Independent   60-79 Minimally independent   40-59 Partially dependent   20-39 Very dependent   <20 Totally dependent     -Maru La., Barthel, D.W. (1965). Functional evaluation: the Barthel Index. [address] [facility] ([address] (1997). The Barthel activities of daily living index: self-reporting versus actual performance in the old (> or = 75 years). Journal of [address] [place] [address] 45(7), 14 [facility], J.J.M.F, Lance Yost., Tamika Cook. (1999). Measuring the change in disability after inpatient rehabilitation; comparison of the responsiveness of the Barthel Index and Functional Bartley Measure. Journal of Neurology, Neurosurgery, and Psychiatry, 66(4), 542-189.   -Hobart Ormond, N.ADELFO.KALLI, HORTENSIA Merino.JUAN, & Giselle Middleton M.A. (2004) Assessment of post-stroke quality of life in cost-effectiveness studies: The usefulness of the Barthel Index and the EuroQoL-5D. Quality of Life Research, 15, 623-23     Occupational Therapy Evaluation Charge Determination   History Examination Decision-Making   MEDIUM Complexity : Expanded review of history including physical, cognitive and psychosocial  history  MEDIUM Complexity : 3-5 performance deficits relating to physical, cognitive , or psychosocial skils that result in activity limitations and / or participation restrictions MEDIUM Complexity : Patient may present with comorbidities that affect occupational performnce. Miniml to moderate modification of tasks or assistance (eg, physical or verbal ) with assesment(s) is necessary to enable patient to complete evaluation       Based on the above components, the patient evaluation is determined to be of the following complexity level: MEDIUM  Pain Rating:  No pain reported    Activity Tolerance:   Fair, SpO2 stable on RA, requires frequent rest breaks, observed SOB with activity, and low DBP    After treatment patient left in no apparent distress:    Supine in bed, Call bell within reach, Bed / chair alarm activated, and Side rails x 3    COMMUNICATION/EDUCATION:   The patients plan of care was discussed with: Physical therapist and Registered nurse. Home safety education was provided and the patient/caregiver indicated understanding., Patient/family have participated as able in goal setting and plan of care. , and Patient/family agree to work toward stated goals and plan of care. This patients plan of care is appropriate for delegation to \[facility]\"".     Thank you for this referral.  Britney Perez OTR/L  Time Calculation: 29 mins

## 2022-11-17 NOTE — PROGRESS NOTES
GI PROGRESS NOTE  Mayela Gill, CATY  [phone] NP in-hospital cell phone M-F until 4:30  After 5pm or on weekends, please call  for physician on call    NAME:Char Ortiz :1945 [id number]   ATTG: Dr. Karina Cain PCP: Patti Polk MD  Date/Time:  2022 11:59 AM   Reason for following: acute on chronic anemia, FOBT +, cirrhosis  Assessment:     Acute on chronic anemia   FOBT +  Hgb stable, no overt signs of bleeding  Hx cirrhosis secondary to BRADY, compensated   Had liver bx at [facility]   BRADY, moderate fibrosis with bridging, stage III  Platelets 61   GI History:  2022 EGD: normal esophagus, edematous appearance of stomach, antrum with oozing foci and areas of ulceration and inflammation. Bx portal hypertensive gastropathy vs GAVE. 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 Monitor for further S&S of GI bleed  -Serial H&H as clinically indicated. Goal hemoglobin >7  -Avoid NSAIDs  -Will continue to follow   *Plan discussed with Dr. Seamus Mccullough   This patient was seen and examined by me in a face-to-face visit today. I reviewed the medical record including lab work, imaging and other provider notes. I confirmed the interval history as described above. I spoke to the patient, discussing our findings and plans.  I discussed this case in detail with SNOW Freitas NP. I formulated an updated  assessment of this patient and guided our treatment plan. I agree with the above progress note. I agree with the history, exam and assessment and plan as outlined in the note. I would like to add the followinyo F with cirrhosis complicated by portal hypertensive gastropathy, with chronic recurrent anemia flet related to portal hypertensive gastropathy and small intestinal AVMs. Her Hgb shows minimal change and no bleeding is reported. Plan:   1) Daily PPI;  2) There are likely other SB AVMs that cannot be reached or identified. 3) As per gastric bx, the presentation is more supportive of portal hypertensive gastropathy and not GAVE. Therefore, if anemia persists, and is not responsive to transfusion of PRBC and repletion of IV Iron, she could be considered for TIPS. Artem Latif MD  Subjective:   Patient resting comfortably in bed during visit. No abdominal pain. No bowel movements. Tolerating diet. No N/V    Objective:   VITALS:   Last 24hrs VS reviewed since prior progress note. Most recent are:  Visit Vitals  BP (!) 130/44   Pulse 62   Temp 97.5 °F (36.4 °C)   Resp 18   Ht 4' 11\" (1.499 m)   Wt 94.3 kg (208 lb)   SpO2 98%   Breastfeeding No   BMI 42.01 kg/m²       Intake/Output Summary (Last 24 hours) at 2022 1159  Last data filed at 2022 9838  Gross per 24 hour   Intake --   Output 700 ml   Net -700 ml     PHYSICAL EXAM:  General: WD, WN. Alert, cooperative, no acute distress    HEENT: NC, Atraumatic. Anicteric sclerae. Lungs:  CTA Bilaterally. No Wheezing/Rhonchi/Rales. Heart:  Regular  rhythm,  No Rubs, No Gallops  Abdomen: Soft, Non distended, Non tender. +Bowel sounds, no HSM  Extremities: No c/c/e  Neurologic:  Alert and oriented X 3. No acute neurological distress   Psych:   Good insight. Not anxious nor agitated. Lab and Radiology Data Reviewed: (see below)  PATHOLOGIC DIAGNOSIS  1.  Proximal stomach; biopsy: Tangentially oriented gastric oxyntic-type mucosa with no significant   histopathologic abnormality. No active inflammation, no intestinal metaplasia and no dysplasia noted. No H. pylori-type organisms identified on H&E-stained sections. 2. Stomach, antrum; biopsy:        Reactive gastropathy with mild acute gastritis and mildly dilated   lamina propria vessels; (see comment). No intestinal metaplasia and no dysplasia noted. Pending staining for H. pylori-type organisms and    *Comment*   In reference to specimen #2, some of the features are suggestive of portal   hypertension. No fibrin thrombi, as seen in gastric antral vascular   ectasia, are identified. Correlation with clinical and endoscopic findings   is suggested. Medications Reviewed: (see below)  PMH/SH reviewed - no change compared to H&P  ________________________________________________________________________  Total time spent with patient: 20 minutes ________________________________________________________________________  Care Plan discussed with:  Patient Y   Family     RN               Consultant:       Chapincito Ocamop NP     Procedures: see electronic medical records for all procedures/Xrays and details which were not copied into this note but were reviewed prior to creation of Plan. LABS:  Recent Labs     11/17/22  0626 11/16/22  0400   WBC 2.5* 2.7*   HGB 7.1* 7.1*   HCT 23.4* 22.7*   PLT 60* 63*     Recent Labs     11/17/22  0626 11/16/22  0400 11/15/22  0534    142 143   K 4.3 4.2 4.2   * 113* 112*   CO2 22 24 26   BUN 25* 31* 34*   CREA 1.12* 1.17* 1.22*   * 135* 110*   CA 9.0 9.1 9.2     Recent Labs     11/17/22  0626 11/16/22  0400 11/15/22  0534   * 196* 179*   TP 5.6* 5.7* 5.6*   ALB 2.5* 2.7* 2.7*   GLOB 3.1 3.0 2.9     No results for input(s): INR, PTP, APTT, INREXT in the last 72 hours. No results for input(s): FE, TIBC, PSAT, FERR in the last 72 hours.    Lab Results   Component Value Date/Time    Folate 49.6 (H) 10/10/2022 11:07 AM     No results for input(s): PH, PCO2, PO2 in the last 72 hours. No results for input(s): CPK, CKMB in the last 72 hours.     No lab exists for component: TROPONINI  Lab Results   Component Value Date/Time    Color YELLOW/STRAW 10/27/2020 02:54 PM    Appearance CLOUDY (A) 10/27/2020 02:54 PM    Specific gravity 1.015 10/27/2020 02:54 PM    pH (UA) 5.0 10/27/2020 02:54 PM    Protein 100 (A) 10/27/2020 02:54 PM    Glucose Negative 10/27/2020 02:54 PM    Ketone Negative 10/27/2020 02:54 PM    Bilirubin NEGATIVE 11/04/2012 10:39 PM    Urobilinogen 1.0 10/27/2020 02:54 PM    Nitrites Negative 10/27/2020 02:54 PM    Leukocyte Esterase LARGE (A) 10/27/2020 02:54 PM    Epithelial cells MANY (A) 10/27/2020 02:54 PM    Bacteria 3+ (A) 10/27/2020 02:54 PM    WBC >100 (H) 10/27/2020 02:54 PM    RBC 5-10 10/27/2020 02:54 PM       MEDICATIONS:  Current Facility-Administered Medications   Medication Dose Route Frequency    sodium chloride (NS) flush 5-40 mL  5-40 mL IntraVENous Q8H    sodium chloride (NS) flush 5-40 mL  5-40 mL IntraVENous PRN    benzocaine-menthoL (CHLORASEPTIC MAX) lozenge 1 Lozenge  1 Lozenge Oral PRN    0.9% sodium chloride infusion 250 mL  250 mL IntraVENous PRN    allopurinoL (ZYLOPRIM) tablet 100 mg  100 mg Oral BID    [Held by provider] amLODIPine (NORVASC) tablet 10 mg  10 mg Oral DAILY    [Held by provider] furosemide (LASIX) tablet 80 mg  80 mg Oral DAILY    albuterol-ipratropium (DUO-NEB) 2.5 MG-0.5 MG/3 ML  3 mL Nebulization Q6H PRN    levothyroxine (SYNTHROID) tablet 150 mcg  150 mcg Oral ACB    [Held by provider] losartan (COZAAR) tablet 25 mg  25 mg Oral DAILY    [Held by provider] metOLazone (ZAROXOLYN) tablet 2.5 mg  2.5 mg Oral EVERY OTHER DAY    pantoprazole (PROTONIX) tablet 40 mg  40 mg Oral ACB&D    potassium chloride (K-DUR, KLOR-CON M20) SR tablet 20 mEq  20 mEq Oral DAILY WITH BREAKFAST    sucralfate (CARAFATE) tablet 1 g  1 g Oral AC&HS    ezetimibe (ZETIA) tablet 10 mg  10 mg Oral DAILY    sodium chloride (NS) flush 5-40 mL  5-40 mL IntraVENous Q8H    sodium chloride (NS) flush 5-40 mL  5-40 mL IntraVENous PRN    acetaminophen (TYLENOL) tablet 650 mg  650 mg Oral Q6H PRN    Or    acetaminophen (TYLENOL) suppository 650 mg  650 mg Rectal Q6H PRN    polyethylene glycol (MIRALAX) packet 17 g  17 g Oral DAILY PRN    ondansetron (ZOFRAN ODT) tablet 4 mg  4 mg Oral Q8H PRN    Or    ondansetron (ZOFRAN) injection 4 mg  4 mg IntraVENous Q6H PRN

## 2022-11-18 ENCOUNTER — TELEPHONE (OUTPATIENT)
Dept: ONCOLOGY | Age: 77
End: 2022-11-18

## 2022-11-18 VITALS
SYSTOLIC BLOOD PRESSURE: 130 MMHG | WEIGHT: 208 LBS | HEART RATE: 60 BPM | HEIGHT: 59 IN | RESPIRATION RATE: 18 BRPM | DIASTOLIC BLOOD PRESSURE: 44 MMHG | OXYGEN SATURATION: 100 % | TEMPERATURE: 97.9 F | BODY MASS INDEX: 41.93 KG/M2

## 2022-11-18 LAB
ALBUMIN SERPL-MCNC: 2.8 G/DL (ref 3.5–5)
ALBUMIN/GLOB SERPL: 0.9 (ref 1.1–2.2)
ALP SERPL-CCNC: 199 U/L (ref 45–117)
ALT SERPL-CCNC: 22 U/L (ref 12–78)
ANION GAP SERPL CALC-SCNC: 5 MMOL/L (ref 5–15)
AST SERPL-CCNC: 29 U/L (ref 15–37)
BASOPHILS # BLD: 0 K/UL (ref 0–0.1)
BASOPHILS NFR BLD: 0 % (ref 0–1)
BILIRUB SERPL-MCNC: 1.1 MG/DL (ref 0.2–1)
BUN SERPL-MCNC: 23 MG/DL (ref 6–20)
BUN/CREAT SERPL: 21 (ref 12–20)
CALCIUM SERPL-MCNC: 9.1 MG/DL (ref 8.5–10.1)
CHLORIDE SERPL-SCNC: 113 MMOL/L (ref 97–108)
CO2 SERPL-SCNC: 24 MMOL/L (ref 21–32)
CREAT SERPL-MCNC: 1.1 MG/DL (ref 0.55–1.02)
DIFFERENTIAL METHOD BLD: ABNORMAL
EOSINOPHIL # BLD: 0.2 K/UL (ref 0–0.4)
EOSINOPHIL NFR BLD: 9 % (ref 0–7)
ERYTHROCYTE [DISTWIDTH] IN BLOOD BY AUTOMATED COUNT: 21.5 % (ref 11.5–14.5)
GLOBULIN SER CALC-MCNC: 3 G/DL (ref 2–4)
GLUCOSE SERPL-MCNC: 102 MG/DL (ref 65–100)
HCT VFR BLD AUTO: 24.4 % (ref 35–47)
HGB BLD-MCNC: 7.6 G/DL (ref 11.5–16)
IMM GRANULOCYTES # BLD AUTO: 0 K/UL (ref 0–0.04)
IMM GRANULOCYTES NFR BLD AUTO: 0 % (ref 0–0.5)
LYMPHOCYTES # BLD: 0.5 K/UL (ref 0.8–3.5)
LYMPHOCYTES NFR BLD: 20 % (ref 12–49)
MCH RBC QN AUTO: 31.1 PG (ref 26–34)
MCHC RBC AUTO-ENTMCNC: 31.1 G/DL (ref 30–36.5)
MCV RBC AUTO: 100 FL (ref 80–99)
MONOCYTES # BLD: 0.1 K/UL (ref 0–1)
MONOCYTES NFR BLD: 3 % (ref 5–13)
NEUTS SEG # BLD: 1.6 K/UL (ref 1.8–8)
NEUTS SEG NFR BLD: 67 % (ref 32–75)
NRBC # BLD: 0 K/UL (ref 0–0.01)
NRBC BLD-RTO: 0 PER 100 WBC
PLATELET # BLD AUTO: 59 K/UL (ref 150–400)
PMV BLD AUTO: 11 FL (ref 8.9–12.9)
POTASSIUM SERPL-SCNC: 4.4 MMOL/L (ref 3.5–5.1)
PROT SERPL-MCNC: 5.8 G/DL (ref 6.4–8.2)
RBC # BLD AUTO: 2.44 M/UL (ref 3.8–5.2)
SODIUM SERPL-SCNC: 142 MMOL/L (ref 136–145)
WBC # BLD AUTO: 2.3 K/UL (ref 3.6–11)

## 2022-11-18 PROCEDURE — 85025 COMPLETE CBC W/AUTO DIFF WBC: CPT

## 2022-11-18 PROCEDURE — 36415 COLL VENOUS BLD VENIPUNCTURE: CPT

## 2022-11-18 PROCEDURE — 74011250637 HC RX REV CODE- 250/637: Performed by: PHYSICIAN ASSISTANT

## 2022-11-18 PROCEDURE — 74011250637 HC RX REV CODE- 250/637: Performed by: NURSE PRACTITIONER

## 2022-11-18 PROCEDURE — 80053 COMPREHEN METABOLIC PANEL: CPT

## 2022-11-18 PROCEDURE — 74011250637 HC RX REV CODE- 250/637: Performed by: INTERNAL MEDICINE

## 2022-11-18 PROCEDURE — 74011000250 HC RX REV CODE- 250: Performed by: PHYSICIAN ASSISTANT

## 2022-11-18 RX ORDER — PANTOPRAZOLE SODIUM 40 MG/1
40 TABLET, DELAYED RELEASE ORAL
Qty: 30 TABLET | Refills: 0 | Status: SHIPPED | OUTPATIENT
Start: 2022-11-18 | End: 2022-12-31

## 2022-11-18 RX ORDER — FUROSEMIDE 80 MG/1
40 TABLET ORAL DAILY
Qty: 30 TABLET | Refills: 0 | Status: ON HOLD | OUTPATIENT
Start: 2022-11-18

## 2022-11-18 RX ORDER — SUCRALFATE 1 G/1
1 TABLET ORAL
Qty: 120 TABLET | Refills: 0 | Status: SHIPPED | OUTPATIENT
Start: 2022-11-18 | End: 2022-11-18

## 2022-11-18 RX ADMIN — POTASSIUM CHLORIDE 20 MEQ: 20 TABLET, EXTENDED RELEASE ORAL at 10:51

## 2022-11-18 RX ADMIN — SUCRALFATE 1 G: 1 TABLET ORAL at 10:51

## 2022-11-18 RX ADMIN — ALLOPURINOL 100 MG: 100 TABLET ORAL at 10:51

## 2022-11-18 RX ADMIN — LEVOTHYROXINE SODIUM 150 MCG: 0.07 TABLET ORAL at 06:04

## 2022-11-18 RX ADMIN — Medication 1 LOZENGE: at 06:04

## 2022-11-18 RX ADMIN — PANTOPRAZOLE SODIUM 40 MG: 40 TABLET, DELAYED RELEASE ORAL at 10:50

## 2022-11-18 RX ADMIN — EZETIMIBE 10 MG: 10 TABLET ORAL at 10:48

## 2022-11-18 RX ADMIN — OXYCODONE HYDROCHLORIDE AND ACETAMINOPHEN 1 TABLET: 5; 325 TABLET ORAL at 02:08

## 2022-11-18 RX ADMIN — SODIUM CHLORIDE, PRESERVATIVE FREE 10 ML: 5 INJECTION INTRAVENOUS at 06:05

## 2022-11-18 NOTE — PROGRESS NOTES
Bedside shift change report given to Danilo Pina RN (oncoming nurse) by Yesenia Palm RN (offgoing nurse). Report included the following information SBAR, Kardex, ED Summary, Procedure Summary, MAR, and Recent Results.

## 2022-11-18 NOTE — TELEPHONE ENCOUNTER
Returned call to pt. HIPAA verified by two patient identifiers. She is aware to still come on Monday for IV iron even though she got some in the hospital.    Please advise how much more she should get.   She got 2 in the hospital.

## 2022-11-18 NOTE — PROGRESS NOTES
Spiritual Care Assessment/Progress Note  [facility]      NAME: Maliha Sigala      MRN: [id number]  AGE: 68 y.o.  SEX: female  Protestant Affiliation: [employer]ism   Language: English     11/18/2022     Total Time (in minutes): 62     Spiritual Assessment begun in [facility] through conversation with:         [x]Patient        [] Family    [] Friend(s)        Reason for Consult: Initial/Spiritual assessment, patient floor     Spiritual beliefs: (Please include comment if needed)     [x] Identifies with a tashi tradition:         [] Supported by a tashi community:            [] Claims no spiritual orientation:           [] Seeking spiritual identity:                [] Adheres to an individual form of spirituality:           [] Not able to assess:                           Identified resources for coping:      [x] Prayer                               [] Music                  [] Guided Imagery     [x] Family/friends                 [] Pet visits     [] Devotional reading                         [] Unknown     [] Other:                                               Interventions offered during this visit: (See comments for more details)    Patient Interventions: Initial visit, Affirmation of tashi, Affirmation of emotions/emotional suffering, Initial/Spiritual assessment, patient floor, Prayer (assurance of), Prayer (actual), Normalization of emotional/spiritual concerns           Plan of Care:     [x] Support spiritual and/or cultural needs    [] Support AMD and/or advance care planning process      [] Support grieving process   [] Coordinate Rites and/or Rituals    [] Coordination with community clergy   [] No spiritual needs identified at this time   [] Detailed Plan of Care below (See Comments)  [] Make referral to Music Therapy  [] Make referral to Pet Therapy     [] Make referral to Addiction services  [] Make referral to [facility]  [] Make referral to Spiritual Care Partner  [] No future visits requested        [x] Contact Spiritual Care for further referrals     Comments:  reviewed the patient's chart prior to the visit. Ms. Paul Salgado was on her phone when the  entered the room. She asked the  to wait a moment because she needed to talk. She asked questions in reference to different faiths and shared her beliefs. She stated she is affiliated with the [employer] and believe in the [place]. She shared her concerns in reference to her health and her love for her animals/cats. She shared pictures of her adopted son and his kindness and love shown towards her.  provided a safe place for her to share, empathetic listening, encouragement and prayer. Ms. Paul Salgado is in room [id number] on the Oncology floor. She stated she lives at [facility] in room [address]. She thanked the Nedra Breen for the visit.  services are available 24 hours a day as requested. Rev. MICAH Yung  [facility]   Paging Service [phone]-[facility] IncTAVARES [phone])

## 2022-11-18 NOTE — PROGRESS NOTES
GI PROGRESS NOTE  Trista Mcdermott, CATY  [phone] NP in-hospital cell phone M-F until 4:30  After 5pm or on weekends, please call  for physician on call    NAME:Char Ramos :1945 [id number]   ATTG: Dr. Bartolome Pond PCP: Dottie Mandel MD  Date/Time:  2022 11:59 AM   Reason for following: acute on chronic anemia, FOBT +, cirrhosis  Assessment:     Acute on chronic anemia   FOBT +  Hgb stable, no overt signs of bleeding  Gastric bx suggestive of portal hypertensive gastropathy and small intestinal AVMS, could consider TIPS if fails conservative measures  Hx cirrhosis secondary to BRADY, compensated   Had liver bx at [facility]   BRADY, moderate fibrosis with bridging, stage III  Platelets 61     GI History:  2022 EGD: normal esophagus, edematous appearance of stomach, antrum with oozing foci and areas of ulceration and inflammation. Bx portal hypertensive gastropathy vs GAVE. 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 If anemia persists despite PRBC transfusions and iron infusions, she may benefit from TIPS procedure, can be discussed with patient as outpatient if meets discharge criteria  -Diet as tolerated  -Supportive measures per primary team. Monitor for further S&S of GI bleed  -Serial H&H as clinically indicated. Goal hemoglobin >7  -Avoid NSAIDs  -Will sign off for now.  Should follow as outpatient in GI clinic to discuss next steps, our team will call patient to setup. *Plan discussed with Dr. Allyson Reid  This patient was seen and examined by me in a face-to-face visit today. I reviewed the medical record including lab work, imaging and other provider notes. I confirmed the interval history as described above. I spoke to the patient, discussing our findings and plans. I discussed this case in detail with SNOW Guerra NP. I formulated an updated  assessment of this patient and guided our treatment plan. I agree with the above progress note. I agree with the history, exam and assessment and plan as outlined in the note. I would like to add the followinyo F with cirrhosis complicated by portal hypertensive gastropathy, with chronic recurrent anemia flet related to portal hypertensive gastropathy and small intestinal AVMs. Her Hgb shows minimal change and no bleeding is reported. Plan:   1) Daily PPI;  2) There are likely other SB AVMs that cannot be reached or identified. 3) As per gastric bx, the presentation is more supportive of portal hypertensive gastropathy and not GAVE. Therefore, if anemia persists as OP, and is not responsive to transfusion of PRBC and repletion of IV Iron, she could be considered for TIPS. OK to offer her d/c home today with OP follow-up w/ her hematologist and our GI office. .  Will sign off  Bennett Galindo MD  Subjective:   Patient resting comfortably in bed during visit. No abdominal pain. No bowel movements. Tolerating diet. No N/V. Had bowel movement yesterday that she reports looked \"normal\" no signs of GI bleed. Objective:   VITALS:   Last 24hrs VS reviewed since prior progress note. Most recent are:  Visit Vitals  BP (!) 130/44   Pulse 60   Temp 97.9 °F (36.6 °C)   Resp 18   Ht 4' 11\" (1.499 m)   Wt 94.3 kg (208 lb)   SpO2 100%   Breastfeeding No   BMI 42.01 kg/m²     No intake or output data in the 24 hours ending 22 1157    PHYSICAL EXAM:  General: WD, WN.  Alert, cooperative, no acute distress    HEENT: NC, Atraumatic. Anicteric sclerae. Lungs:  CTA Bilaterally. No Wheezing/Rhonchi/Rales. Heart:  Regular  rhythm,  No Rubs, No Gallops  Abdomen: Soft, Non distended, Non tender. +Bowel sounds, no HSM  Extremities: No c/c/e  Neurologic:  Alert and oriented X 3. No acute neurological distress   Psych:   Good insight. Not anxious nor agitated. Lab and Radiology Data Reviewed: (see below)  PATHOLOGIC DIAGNOSIS  1. Proximal stomach; biopsy:        Tangentially oriented gastric oxyntic-type mucosa with no significant   histopathologic abnormality. No active inflammation, no intestinal metaplasia and no dysplasia noted. No H. pylori-type organisms identified on H&E-stained sections. 2. Stomach, antrum; biopsy:        Reactive gastropathy with mild acute gastritis and mildly dilated   lamina propria vessels; (see comment). No intestinal metaplasia and no dysplasia noted. Pending staining for H. pylori-type organisms and    *Comment*   In reference to specimen #2, some of the features are suggestive of portal   hypertension. No fibrin thrombi, as seen in gastric antral vascular   ectasia, are identified. Correlation with clinical and endoscopic findings   is suggested. Medications Reviewed: (see below)  PMH/SH reviewed - no change compared to H&P  ________________________________________________________________________  Total time spent with patient: 20 minutes ________________________________________________________________________  Care Plan discussed with:  Patient Y   Family     RN               Consultant:       Dio Powers NP     Procedures: see electronic medical records for all procedures/Xrays and details which were not copied into this note but were reviewed prior to creation of Plan.       LABS:  Recent Labs     11/18/22  0430 11/17/22  0626   WBC 2.3* 2.5*   HGB 7.6* 7.1*   HCT 24.4* 23.4*   PLT 59* 60*       Recent Labs     11/18/22  0430 11/17/22  6290 11/16/22  0400    144 142   K 4.4 4.3 4.2   * 116* 113*   CO2 24 22 24   BUN 23* 25* 31*   CREA 1.10* 1.12* 1.17*   * 133* 135*   CA 9.1 9.0 9.1       Recent Labs     11/18/22  0430 11/17/22  0626 11/16/22  0400   * 190* 196*   TP 5.8* 5.6* 5.7*   ALB 2.8* 2.5* 2.7*   GLOB 3.0 3.1 3.0       No results for input(s): INR, PTP, APTT, INREXT, INREXT in the last 72 hours. No results for input(s): FE, TIBC, PSAT, FERR in the last 72 hours. Lab Results   Component Value Date/Time    Folate 49.6 (H) 10/10/2022 11:07 AM     No results for input(s): PH, PCO2, PO2 in the last 72 hours. No results for input(s): CPK, CKMB in the last 72 hours.     No lab exists for component: TROPONINI  Lab Results   Component Value Date/Time    Color YELLOW/STRAW 10/27/2020 02:54 PM    Appearance CLOUDY (A) 10/27/2020 02:54 PM    Specific gravity 1.015 10/27/2020 02:54 PM    pH (UA) 5.0 10/27/2020 02:54 PM    Protein 100 (A) 10/27/2020 02:54 PM    Glucose Negative 10/27/2020 02:54 PM    Ketone Negative 10/27/2020 02:54 PM    Bilirubin NEGATIVE 11/04/2012 10:39 PM    Urobilinogen 1.0 10/27/2020 02:54 PM    Nitrites Negative 10/27/2020 02:54 PM    Leukocyte Esterase LARGE (A) 10/27/2020 02:54 PM    Epithelial cells MANY (A) 10/27/2020 02:54 PM    Bacteria 3+ (A) 10/27/2020 02:54 PM    WBC >100 (H) 10/27/2020 02:54 PM    RBC 5-10 10/27/2020 02:54 PM       MEDICATIONS:  Current Facility-Administered Medications   Medication Dose Route Frequency    senna-docusate (PERICOLACE) 8.6-50 mg per tablet 1 Tablet  1 Tablet Oral BID    furosemide (LASIX) tablet 40 mg  40 mg Oral DAILY    oxyCODONE-acetaminophen (PERCOCET) 5-325 mg per tablet 1 Tablet  1 Tablet Oral Q4H PRN    benzocaine-menthoL (CHLORASEPTIC MAX) lozenge 1 Lozenge  1 Lozenge Oral PRN    allopurinoL (ZYLOPRIM) tablet 100 mg  100 mg Oral BID    amLODIPine (NORVASC) tablet 10 mg  10 mg Oral DAILY    albuterol-ipratropium (DUO-NEB) 2.5 MG-0.5 MG/3 ML  3 mL Nebulization Q6H PRN    levothyroxine (SYNTHROID) tablet 150 mcg  150 mcg Oral ACB    losartan (COZAAR) tablet 25 mg  25 mg Oral DAILY    [Held by provider] metOLazone (ZAROXOLYN) tablet 2.5 mg  2.5 mg Oral EVERY OTHER DAY    pantoprazole (PROTONIX) tablet 40 mg  40 mg Oral ACB&D    potassium chloride (K-DUR, KLOR-CON M20) SR tablet 20 mEq  20 mEq Oral DAILY WITH BREAKFAST    sucralfate (CARAFATE) tablet 1 g  1 g Oral AC&HS    ezetimibe (ZETIA) tablet 10 mg  10 mg Oral DAILY    sodium chloride (NS) flush 5-40 mL  5-40 mL IntraVENous Q8H    sodium chloride (NS) flush 5-40 mL  5-40 mL IntraVENous PRN    acetaminophen (TYLENOL) tablet 650 mg  650 mg Oral Q6H PRN    Or    acetaminophen (TYLENOL) suppository 650 mg  650 mg Rectal Q6H PRN    polyethylene glycol (MIRALAX) packet 17 g  17 g Oral DAILY PRN    ondansetron (ZOFRAN ODT) tablet 4 mg  4 mg Oral Q8H PRN    Or    ondansetron (ZOFRAN) injection 4 mg  4 mg IntraVENous Q6H PRN

## 2022-11-18 NOTE — PROGRESS NOTES
Hospitalist Progress Note    Subjective:   Daily Progress Note: 11/18/2022 9:45 AM    Hospital Course: Patient presents for abnormal outpatient labs. Patient was suppose to start iron infusions and is being followed by Dr. James Alvarez. Hgb level was 6.2. Patient denies melena or BRBPR. Past medical history of chronic anemia, arthritis, HTN, hyperlipidemia, and chronic leg edema. Subjective: Patient observed resting in bed with eyes opened. Denies complaints. No acute distress noted.     Current Facility-Administered Medications   Medication Dose Route Frequency    senna-docusate (PERICOLACE) 8.6-50 mg per tablet 1 Tablet  1 Tablet Oral BID    furosemide (LASIX) tablet 40 mg  40 mg Oral DAILY    oxyCODONE-acetaminophen (PERCOCET) 5-325 mg per tablet 1 Tablet  1 Tablet Oral Q4H PRN    benzocaine-menthoL (CHLORASEPTIC MAX) lozenge 1 Lozenge  1 Lozenge Oral PRN    allopurinoL (ZYLOPRIM) tablet 100 mg  100 mg Oral BID    amLODIPine (NORVASC) tablet 10 mg  10 mg Oral DAILY    albuterol-ipratropium (DUO-NEB) 2.5 MG-0.5 MG/3 ML  3 mL Nebulization Q6H PRN    levothyroxine (SYNTHROID) tablet 150 mcg  150 mcg Oral ACB    losartan (COZAAR) tablet 25 mg  25 mg Oral DAILY    [Held by provider] metOLazone (ZAROXOLYN) tablet 2.5 mg  2.5 mg Oral EVERY OTHER DAY    pantoprazole (PROTONIX) tablet 40 mg  40 mg Oral ACB&D    potassium chloride (K-DUR, KLOR-CON M20) SR tablet 20 mEq  20 mEq Oral DAILY WITH BREAKFAST    sucralfate (CARAFATE) tablet 1 g  1 g Oral AC&HS    ezetimibe (ZETIA) tablet 10 mg  10 mg Oral DAILY    sodium chloride (NS) flush 5-40 mL  5-40 mL IntraVENous Q8H    sodium chloride (NS) flush 5-40 mL  5-40 mL IntraVENous PRN    acetaminophen (TYLENOL) tablet 650 mg  650 mg Oral Q6H PRN    Or    acetaminophen (TYLENOL) suppository 650 mg  650 mg Rectal Q6H PRN    polyethylene glycol (MIRALAX) packet 17 g  17 g Oral DAILY PRN    ondansetron (ZOFRAN ODT) tablet 4 mg  4 mg Oral Q8H PRN    Or    ondansetron (ZOFRAN) injection 4 mg  4 mg IntraVENous Q6H PRN        Review of Systems:    Review of Systems   Constitutional: Negative. HENT:  Negative  Eyes: Negative. Respiratory: Negative. Cardiovascular: Negative. Gastrointestinal: Negative. Genitourinary: Negative. Musculoskeletal: Negative. Skin: Negative. Neurological: Negative. Endo/Heme/Allergies: Negative. Psychiatric/Behavioral: Negative. Objective:     Visit Vitals  /85   Pulse 63   Temp 97.7 °F (36.5 °C)   Resp 18   Ht 4' 11\" (1.499 m)   Wt 94.3 kg (208 lb)   SpO2 100%   Breastfeeding No   BMI 42.01 kg/m²    O2 Flow Rate (L/min): 2 l/min O2 Device: None (Room air)    Temp (24hrs), Av.8 °F (36.6 °C), Min:97.5 °F (36.4 °C), Max:98.6 °F (37 °C)      No intake/output data recorded.  1901 -  0700  In: -   Out: 700 [Urine:700]    PHYSICAL EXAM:    Physical Exam  Vitals reviewed. Constitutional:       Appearance: Normal appearance. HENT:      Head: Normocephalic and atraumatic. Right Ear: External ear normal.      Left Ear: External ear normal.      Nose: Nose normal.      Mouth/Throat:      Mouth: Mucous membranes are moist.   Eyes:      Pupils: Pupils are equal, round, and reactive to light. Cardiovascular:      Rate and Rhythm: Normal rate and regular rhythm. Pulses: Normal pulses. Comments: Telemetry: atrial paced on telemetry. Pulmonary:      Effort: Pulmonary effort is normal.   Abdominal:      General: Bowel sounds are normal.      Comments: Last bowel movement reported 22. Musculoskeletal:      Cervical back: Normal range of motion. Comments: Generalized weakness to bilateral lower extremities. Skin:     General: Skin is warm and dry. Capillary Refill: Capillary refill takes 2 to 3 seconds. Comments: Compression wraps intact to bilateral lower extremities. Neurological:      Mental Status: She is alert and oriented to person, place, and time.    Psychiatric:         Mood and Affect: Mood normal.       Data Review    Recent Results (from the past 24 hour(s))   CBC WITH AUTOMATED DIFF    Collection Time: 11/18/22  4:30 AM   Result Value Ref Range    WBC 2.3 (L) 3.6 - 11.0 K/uL    RBC 2.44 (L) 3.80 - 5.20 M/uL    HGB 7.6 (L) 11.5 - 16.0 g/dL    HCT 24.4 (L) 35.0 - 47.0 %    .0 (H) 80.0 - 99.0 FL    MCH 31.1 26.0 - 34.0 PG    MCHC 31.1 30.0 - 36.5 g/dL    RDW 21.5 (H) 11.5 - 14.5 %    PLATELET 59 (L) 697 - 400 K/uL    MPV 11.0 8.9 - 12.9 FL    NRBC 0.0 0  WBC    ABSOLUTE NRBC 0.00 0.00 - 0.01 K/uL    NEUTROPHILS 67 32 - 75 %    LYMPHOCYTES 20 12 - 49 %    MONOCYTES 3 (L) 5 - 13 %    EOSINOPHILS 9 (H) 0 - 7 %    BASOPHILS 0 0 - 1 %    IMMATURE GRANULOCYTES 0 0.0 - 0.5 %    ABS. NEUTROPHILS 1.6 (L) 1.8 - 8.0 K/UL    ABS. LYMPHOCYTES 0.5 (L) 0.8 - 3.5 K/UL    ABS. MONOCYTES 0.1 0.0 - 1.0 K/UL    ABS. EOSINOPHILS 0.2 0.0 - 0.4 K/UL    ABS. BASOPHILS 0.0 0.0 - 0.1 K/UL    ABS. IMM. GRANS. 0.0 0.00 - 0.04 K/UL    DF AUTOMATED     METABOLIC PANEL, COMPREHENSIVE    Collection Time: 11/18/22  4:30 AM   Result Value Ref Range    Sodium 142 136 - 145 mmol/L    Potassium 4.4 3.5 - 5.1 mmol/L    Chloride 113 (H) 97 - 108 mmol/L    CO2 24 21 - 32 mmol/L    Anion gap 5 5 - 15 mmol/L    Glucose 102 (H) 65 - 100 mg/dL    BUN 23 (H) 6 - 20 MG/DL    Creatinine 1.10 (H) 0.55 - 1.02 MG/DL    BUN/Creatinine ratio 21 (H) 12 - 20      eGFR 52 (L) >60 ml/min/1.73m2    Calcium 9.1 8.5 - 10.1 MG/DL    Bilirubin, total 1.1 (H) 0.2 - 1.0 MG/DL    ALT (SGPT) 22 12 - 78 U/L    AST (SGOT) 29 15 - 37 U/L    Alk.  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 2. Chronic kidney disease, stage III     - Bun 23 /Creat 1.10 improving     3. Osteoporosis    - continue alendronate     4. Hyperlipidemia    - continue zetia     5. Hypothyroidism    - continue synthroid      6. Chronic bilateral lower extremity edema      Chronic venous stasis/insufficiency     - CORINNE right NC and left 0.91. Right TBI 0.62 and left 0.45. Normal flow to the ankle bilaterally. - wound care for weekly compression wrap changes. Last performed 11/17/22.     - appreciate vascular input.    - follow up with Dr. Jaden Fernandez at discharge     7. Constipation-resolved     - last reported bowel movement 11/17/22    8. BRADY cirrhosis. - avoid NSAIDs     PT    Discharge disposition: home today with HHPT and caregiver.       DVT Prophylaxis: SCD  Code Status:  Full Code  POA: John Chisholm, son ([id number] discussed with: patient, nursing, CM  _______________________________________________________________    Rogelio Love NP

## 2022-11-18 NOTE — PROGRESS NOTES
CM: Taz Nolen is currently working with pt in the [facility]. CM aware that pt will be d/c on today and transition to [facility], on today. Pt is known to have Radha [address] and CM sent MURPHY order, provided by [facility]. CM completed room visit with pt to provide 2nd  Medicare Letter (sign) placed in chart. Pt reported that she has daily caregiver services that will assist her with transport home at the time of d/c. Caregiver will arrive to [facility] by noon.     CM completed the needs of the pt at time of d/c.    Taz Nolen, MSW, [address] [place] [address]

## 2022-11-18 NOTE — DISCHARGE SUMMARY
Hospitalist Discharge Summary     Patient ID:    Joya Astudillo  [id number]  68 y.o.  1945    Admit date: 11/13/2022    Discharge date : 11/18/2022    Chronic Diagnoses:    Problem List as of 11/18/2022 Date Reviewed: 10/14/2022            Codes Class Noted - Resolved    Acute blood loss anemia ICD-10-CM: D62  ICD-9-CM: 285.1  11/13/2022 - Present        Iron deficiency anemia due to chronic blood loss ICD-10-CM: D50.0  ICD-9-CM: 280.0  11/4/2022 - Present        Acute anemia ICD-10-CM: D64.9  ICD-9-CM: 285.9  10/6/2022 - Present        Anemia ICD-10-CM: D64.9  ICD-9-CM: 285.9  8/5/2022 - Present        SSS (sick sinus syndrome) ([facility] ICD-10-CM: I49.5  ICD-9-CM: 427.81  6/27/2022 - Present        PAF (paroxysmal atrial fibrillation) ([facility]) ICD-10-CM: I48.0  ICD-9-CM: 427.31  6/24/2022 - Present        Stage 3a chronic kidney disease ([facility] ICD-10-CM: N18.31  ICD-9-CM: 585.3  5/20/2022 - Present        Type 2 diabetes mellitus with chronic kidney disease ([facility] ICD-10-CM: E11.22  ICD-9-CM: 250.40, 585.9  11/18/2021 - Present        Abnormal nuclear stress test ICD-10-CM: R94.39  ICD-9-CM: 794.39  10/4/2021 - Present        Angina at rest [facility]) ICD-10-CM: I20.8  ICD-9-CM: 413.9  10/4/2021 - Present        S/P cardiac cath ICD-10-CM: Z98.890  ICD-9-CM: V45.89  10/4/2021 - Present    Overview Signed 10/4/2021  3:05 PM by Keo Palmer NP     10/4/2021 nonobstructive disease             Nonrheumatic aortic valve stenosis ICD-10-CM: I35.0  ICD-9-CM: 424.1  9/8/2021 - Present        Pulmonary hypertension ([facility] ICD-10-CM: I27.20  ICD-9-CM: 416.8  9/8/2021 - Present        MICHELLE (acute kidney injury) ([facility] ICD-10-CM: N17.9  ICD-9-CM: 584.9  10/27/2020 - Present        Cellulitis of left lower leg ICD-10-CM: L03.116  ICD-9-CM: 682.6  11/7/2019 - Present        Severe obesity ([facility] ICD-10-CM: E66.01  ICD-9-CM: 278.01  9/30/2019 - Present        Controlled type 2 diabetes mellitus without complication ([facility] [id number]-[id number]: E11.9  ICD-9-CM: 250.00  11/28/2017 - Present        DDD (degenerative disc disease), lumbar ICD-10-CM: M51.36  ICD-9-CM: 722.52  11/27/2017 - Present        Prediabetes ICD-10-CM: R73.03  ICD-9-CM: 790.29  4/11/2016 - Present        Thrombocytopenia ([facility] ICD-10-CM: D69.6  ICD-9-CM: 287.5  1/24/2015 - Present        HTN (hypertension) ICD-10-CM: I10  ICD-9-CM: 401.9  1/17/2014 - Present        Bifascicular block ICD-10-CM: I45.2  ICD-9-CM: 426.53  12/23/2010 - Present    Overview Signed 12/23/2010  9:06 AM by Alla Azul MD     Normal NST 2003             Fatty liver ICD-10-CM: K76.0  ICD-9-CM: 571.8  6/18/2010 - Present    Overview Signed 6/18/2010  6:41 PM by Alla Azul MD     VCU-liver bx BRADY with bridging fibrosis             Pure hypercholesterolemia ICD-10-CM: E78.00  ICD-9-CM: 272.0  6/18/2010 - Present        Colon polyp ICD-10-CM: K63.5  ICD-9-CM: 211.3  6/18/2010 - Present    Overview Signed 6/18/2010  6:41 PM by Alla Azul MD     2006 colonoscopy -negative             Gout ICD-10-CM: M10.9  ICD-9-CM: 274.9  6/14/2010 - Present        Hypothyroidism ICD-10-CM: E03.9  ICD-9-CM: 244.9  6/14/2010 - Present        Osteoporosis ICD-10-CM: M81.0  ICD-9-CM: 733.00  6/14/2010 - Present    Overview Signed 7/26/2011 11:27 AM by Alla Azul MD     T-2.5 at hip 2011  T -1.3 spine             Anxiety ICD-10-CM: F41.9  ICD-9-CM: 300.00  6/14/2010 - Present        Leg edema ICD-10-CM: R60.0  ICD-9-CM: 782.3  6/14/2010 - Present        RSD lower limb ICD-10-CM: G90.529  ICD-9-CM: 337.22  6/14/2010 - Present    Overview Signed 6/14/2010 10:19 AM by Alla Azul MD     Chronic leg pain               RESOLVED: Peripheral vascular disease ([facility] ICD-10-CM: I73.9  ICD-9-CM: 443.9  9/30/2019 - 9/8/2021        RESOLVED: Non-pressure chronic ulcer of left lower leg, limited to breakdown of skin ([facility] ICD-10-CM: J31.755  ICD-9-CM: 707.10  8/5/2019 - 11/18/2021       22    Final Diagnoses: Active Problems:    Anemia (8/5/2022)      Acute blood loss anemia (11/13/2022)        Reason for Hospitalization: Acute blood loss anemia      Hospital Course: Patient presents for abnormal outpatient labs. Patient was suppose to start iron infusions and is being followed by Dr. Desiree Freeman. Hgb level was 6.2. She subsequently received 1 unit of PRBC with improvement in Hgb to 7.6. Past medical history of chronic anemia, thrombocytopenia, arthritis, HTN,  BRADY with cirrhosis, hyperlipidemia, and chronic leg edema. Patient was evaluated by GI, hematology, vascular surgery and was cleared for discharge on today with outpatient follow up. Discharge Medications:   Current Discharge Medication List        START taking these medications    Details   benzocaine-menthoL (CHLORASEPTIC MAX) 15-10 mg lozg lozenge Take 1 Lozenge by mouth as needed for Sore throat. Qty: 30 Lozenge, Refills: 0  Start date: 11/18/2022           CONTINUE these medications which have CHANGED    Details          furosemide (LASIX) 80 mg tablet Take 0.5 Tablets by mouth daily. Qty: 30 Tablet, Refills: 0  Start date: 11/18/2022                      CONTINUE these medications which have NOT CHANGED    Details   allopurinoL (ZYLOPRIM) 100 mg tablet Take 1 Tablet by mouth two (2) times a day for 30 days. Qty: 60 Tablet, Refills: 5      alendronate (FOSAMAX) 70 mg tablet alendronate 70 mg tablet   Take 1 tablet every week by oral route. losartan (COZAAR) 25 mg tablet Take 25 mg by mouth daily. metOLazone (ZAROXOLYN) 2.5 mg tablet Take 1 Tablet by mouth every other day. Qty: 30 Tablet, Refills: 0      amLODIPine (NORVASC) 10 mg tablet Take 1 Tablet by mouth daily. Qty: 90 Tablet, Refills: 3    Associated Diagnoses: Primary hypertension      potassium chloride SA (MICRO-K) 10 mEq capsule Take 2 Capsules by mouth daily.   Qty: 180 Capsule, Refills: 3    Associated Diagnoses: Leg edema      pantoprazole (PROTONIX) 40 mg tablet Take 1 Tablet by mouth Before breakfast and dinner. Qty: 60 Tablet, Refills: 5      levothyroxine (SYNTHROID) 150 mcg tablet Take 1 Tablet by mouth Daily (before breakfast). Qty: 90 Tablet, Refills: 3      icosapent ethyL (VASCEPA) 1 gram capsule Take 2 Capsules by mouth two (2) times daily (with meals). Qty: 360 Capsule, Refills: 3      loratadine (Claritin) 10 mg tablet Take 1 Tablet by mouth daily. Indications: inflammation of the nose due to an allergy  Qty: 90 Tablet, Refills: 3    Associated Diagnoses: Acute recurrent sinusitis, unspecified location      atorvastatin (LIPITOR) 20 mg tablet TAKE 1 TABLET DAILY  Qty: 90 Tablet, Refills: 3    Associated Diagnoses: Pure hypercholesterolemia      polyethylene glycol (MIRALAX) 17 gram/dose powder Take 17 g by mouth daily. Qty: 2108 g, Refills: 3    Associated Diagnoses: Constipation, unspecified constipation type      Biotin 2,500 mcg cap Take  by mouth.      vitamin E (AQUA GEMS) 400 unit capsule Take 800 Units by mouth two (2) times a day. cholecalciferol, vitamin D3, 50 mcg (2,000 unit) tab Take 1 Tab by mouth daily. levalbuterol tartrate (XOPENEX) 45 mcg/actuation inhaler Take 2 Puffs by inhalation every six (6) hours as needed for Wheezing or Shortness of Breath. nystatin (MYCOSTATIN) powder Apply  to affected area two (2) times a day. Qty: 30 g, Refills: 0      fluticasone propionate (FLONASE) 50 mcg/actuation nasal spray 2 Sprays by Both Nostrils route daily. Administer to right and left nostril. Qty: 3 Each, Refills: 3      Zetia 10 mg tablet Take 1 tablet by mouth daily. Qty: 90 Tablet, Refills: 3    Associated Diagnoses: Pure hypercholesterolemia      lidocaine (LIDODERM) 5 % Apply patch to the affected area for 12 hours a day and remove for 12 hours a day. Qty: 90 Each, Refills: 3      ketoconazole (NIZORAL) 2 % topical cream Apply  to affected area daily as needed.   Refills: 3      L GASSERI/B BIFIDUM/B LONGUM (Zonoff PO) Take 1 Tab by mouth daily. MULTIVITAMIN WITH MINERALS (ONE-A-DAY 50 PLUS PO) Take 1 Tab by mouth daily. Follow up Care:    1. Regino Reis MD in 1-2 weeks. Follow-up Information       Follow up With Specialties Details Why Contact Info    Regino Reis MD Internal Medicine Physician Schedule an appointment as soon as possible for a visit   [address] [facility]  [phone]      [facility] EMERGENCY DEPT Emergency Medicine  As needed, If symptoms worsen [address] [facility] [address]    Harlan Valenzuela MD Vascular Surgery Follow up in 1 week(s)  [address] [facility] [address] Philippe Bhardwaj MD Hematology and Oncology, Hematology Physician, Oncology, Internal Medicine Physician Call in 1 week(s)  [address]  Ronan Soares 42      Yanna Alves MD Gastroenterology Call in 1 week(s)  [facility]  [facility]  [phone]                * Follow-up Care/Patient Instructions: Activity: Activity as tolerated  Diet: Regular Diet  Wound Care: compression wraps to be kept on until seen by Dr. Isaac Cotton in 1 week. Condition at Discharge:  Stable  __________________________________________________________________    Disposition  Home Health Care [facility]  ____________________________________________________________________    Code Status:  Full Code  ___________________________________________________________________    Discharge Exam:  Patient seen and examined by me on discharge day. Physical Exam     CONSULTATIONS: GI, hematology, vascular surgery.     Significant Diagnostic Studies:   Recent Results (from the past 24 hour(s))   CBC WITH AUTOMATED DIFF    Collection Time: 11/18/22  4:30 AM   Result Value Ref Range    WBC 2.3 (L) 3.6 - 11.0 K/uL    RBC 2.44 (L) 3.80 - 5.20 M/uL    HGB 7.6 (L) 11.5 - 16.0 g/dL    HCT 24.4 (L) 35.0 - 47.0 %    .0 (H) 80.0 - 99.0 FL    MCH 31.1 26.0 - 34.0 PG    MCHC 31.1 30.0 - 36.5 g/dL    RDW 21.5 (H) 11.5 - 14.5 %    PLATELET 59 (L) 235 - 400 K/uL    MPV 11.0 8.9 - 12.9 FL    NRBC 0.0 0  WBC    ABSOLUTE NRBC 0.00 0.00 - 0.01 K/uL    NEUTROPHILS 67 32 - 75 %    LYMPHOCYTES 20 12 - 49 %    MONOCYTES 3 (L) 5 - 13 %    EOSINOPHILS 9 (H) 0 - 7 %    BASOPHILS 0 0 - 1 %    IMMATURE GRANULOCYTES 0 0.0 - 0.5 %    ABS. NEUTROPHILS 1.6 (L) 1.8 - 8.0 K/UL    ABS. LYMPHOCYTES 0.5 (L) 0.8 - 3.5 K/UL    ABS. MONOCYTES 0.1 0.0 - 1.0 K/UL    ABS. EOSINOPHILS 0.2 0.0 - 0.4 K/UL    ABS. BASOPHILS 0.0 0.0 - 0.1 K/UL    ABS. IMM. GRANS. 0.0 0.00 - 0.04 K/UL    DF AUTOMATED     METABOLIC PANEL, COMPREHENSIVE    Collection Time: 11/18/22  4:30 AM   Result Value Ref Range    Sodium 142 136 - 145 mmol/L    Potassium 4.4 3.5 - 5.1 mmol/L    Chloride 113 (H) 97 - 108 mmol/L    CO2 24 21 - 32 mmol/L    Anion gap 5 5 - 15 mmol/L    Glucose 102 (H) 65 - 100 mg/dL    BUN 23 (H) 6 - 20 MG/DL    Creatinine 1.10 (H) 0.55 - 1.02 MG/DL    BUN/Creatinine ratio 21 (H) 12 - 20      eGFR 52 (L) >60 ml/min/1.73m2    Calcium 9.1 8.5 - 10.1 MG/DL    Bilirubin, total 1.1 (H) 0.2 - 1.0 MG/DL    ALT (SGPT) 22 12 - 78 U/L    AST (SGOT) 29 15 - 37 U/L    Alk. phosphatase 199 (H) 45 - 117 U/L    Protein, total 5.8 (L) 6.4 - 8.2 g/dL    Albumin 2.8 (L) 3.5 - 5.0 g/dL    Globulin 3.0 2.0 - 4.0 g/dL    A-G Ratio 0.9 (L) 1.1 - 2.2       ANKLE BRACHIAL INDEX   Final Result          Discharge: time spent less than 30 minutes in discharge  Education and counseling.      Signed:  Jasvir Manriquez NP  11/18/2022  10:16 AM

## 2022-11-19 NOTE — ANESTHESIA POSTPROCEDURE EVALUATION
Procedure(s):  ESOPHAGOGASTRODUODENOSCOPY (EGD)  ENTEROSCOPY  ENDOSCOPIC ARGON PLASMA COAGULATION  INJECTION  RESOLUTION CLIP  ESOPHAGOGASTRODUODENAL (EGD) BIOPSY. total IV anesthesia, MAC    Anesthesia Post Evaluation        Patient location during evaluation: PACU  Note status: Adequate. Level of consciousness: responsive to verbal stimuli and sleepy but conscious  Pain management: satisfactory to patient  Airway patency: patent  Anesthetic complications: no  Cardiovascular status: acceptable  Respiratory status: acceptable  Hydration status: acceptable  Comments: +Post-Anesthesia Evaluation and Assessment    Patient: Ayala Rod MRN: [id number]  SSN: [id number]   YOB: 1945  Age: 68 y.o. Sex: female      Cardiovascular Function/Vital Signs    BP (!) 130/44   Pulse 60   Temp 36.6 °C (97.9 °F)   Resp 18   Ht 4' 11\" (1.499 m)   Wt 94.3 kg (208 lb)   SpO2 100%   Breastfeeding No   BMI 42.01 kg/m²     Patient is status post Procedure(s):  ESOPHAGOGASTRODUODENOSCOPY (EGD)  ENTEROSCOPY  ENDOSCOPIC ARGON PLASMA COAGULATION  INJECTION  RESOLUTION CLIP  ESOPHAGOGASTRODUODENAL (EGD) BIOPSY. Nausea/Vomiting: Controlled. Postoperative hydration reviewed and adequate. Pain:  Pain Scale 1: Numeric (0 - 10) (11/18/22 0750)  Pain Intensity 1: 0 (11/18/22 0750)   Managed. Neurological Status: At baseline. Mental Status and Level of Consciousness: Arousable. Pulmonary Status:   O2 Device: None (Room air) (11/18/22 0422)   Adequate oxygenation and airway patent. Complications related to anesthesia: None    Post-anesthesia assessment completed. No concerns.     Signed By: Luisito Conner MD    11/18/2022  Post anesthesia nausea and vomiting:  controlled      INITIAL Post-op Vital signs:   Vitals Value Taken Time   /44 11/18/22 1132   Temp 36.6 °C (97.9 °F) 11/18/22 1132   Pulse 60 11/18/22 1132   Resp 18 11/18/22 1132   SpO2 100 % 11/18/22 1132

## 2022-11-19 NOTE — PROGRESS NOTES
I have reviewed discharge instructions with the patient and caregiver. The patient and caregiver verbalized understanding. Discharge medications reviewed with patient and caregiver and appropriate educational materials and side effects teaching were provided. Follow-up appointments reviewed. Opportunity for questions and clarification was provided. Venous access removed without difficulty. Patient's belongings gathered and sent with patient. Patient is ready for discharge.      Neha Bacon RN

## 2022-11-19 NOTE — PROGRESS NOTES
Problem: Falls - Risk of  Goal: *Absence of Falls  Description: Document Darlen Augusta Fall Risk and appropriate interventions in the flowsheet.   Outcome: Resolved/Met  Note: Fall Risk Interventions:  Mobility Interventions: Communicate number of staff needed for ambulation/transfer         Medication Interventions: Patient to call before getting OOB    Elimination Interventions: Call light in reach              Problem: Patient Education: Go to Patient Education Activity  Goal: Patient/Family Education  Outcome: Resolved/Met     Problem: Patient Education: Go to Patient Education Activity  Goal: Patient/Family Education  Outcome: Resolved/Met     Problem: Patient Education: Go to Patient Education Activity  Goal: Patient/Family Education  Outcome: Resolved/Met

## 2022-11-29 ENCOUNTER — HOSPITAL ENCOUNTER (OUTPATIENT)
Dept: INFUSION THERAPY | Age: 77
Discharge: HOME | End: 2022-11-29
Payer: MEDICARE

## 2022-11-29 VITALS
HEIGHT: 59 IN | HEART RATE: 87 BPM | WEIGHT: 204 LBS | OXYGEN SATURATION: 97 % | DIASTOLIC BLOOD PRESSURE: 58 MMHG | SYSTOLIC BLOOD PRESSURE: 121 MMHG | BODY MASS INDEX: 41.12 KG/M2

## 2022-11-29 DIAGNOSIS — D50.0 IRON DEFICIENCY ANEMIA DUE TO CHRONIC BLOOD LOSS: Primary | ICD-10-CM

## 2022-11-29 PROCEDURE — 96374 THER/PROPH/DIAG INJ IV PUSH: CPT

## 2022-11-29 PROCEDURE — 74011250636 HC RX REV CODE- 250/636: Performed by: INTERNAL MEDICINE

## 2022-11-29 RX ORDER — SODIUM CHLORIDE 9 MG/ML
5-250 INJECTION, SOLUTION INTRAVENOUS AS NEEDED
Status: DISCONTINUED | OUTPATIENT
Start: 2022-11-29 | End: 2022-11-30 | Stop reason: HOSPADM

## 2022-11-29 RX ADMIN — IRON SUCROSE 200 MG: 20 INJECTION, SOLUTION INTRAVENOUS at 15:03

## 2022-11-29 NOTE — PROGRESS NOTES
[facility] Progress Note    Date: 2022    Name: Hero Jones    MRN: [id number]         : 1945    Ms. Siu Arrived ambulatory and in no distress for Venofer Dose 1 of 5. Assessment was completed. Pt reports dyspnea on exertion, BLE edema, increased fatigue. 24 G PIV established to right arm without difficulty. Pt received Venofer while inpatient. Reviewed purpose, administration and frequency. Encouraged patient to voice any questions or concerns     Ms. Siu's vitals were reviewed. Patient Vitals for the past 12 hrs:   Pulse BP SpO2   22 1526 (P) 67 (!) (P) 131/52 97 %   22 1459 87 (!) 121/58 99 %       Medications:  Medications Administered       iron sucrose (VENOFER) injection 200 mg       Admin Date  2022 Action  Given Dose  200 mg Route  IntraVENous Administered By  Ashli Mckeon RN                  Ms. Julia Gaming tolerated treatment well and was discharged from [place] [id number] in stable condition . PIV flushed & removed. Discharge instructions reviewed with patient, verbalized understanding. Patient observed for 30 minutes post infusion, no s/s of reaction. She is to return on 2022 for her next appointment.     Leopoldo Cheng, RN  2022

## 2022-12-06 ENCOUNTER — HOSPITAL ENCOUNTER (OUTPATIENT)
Dept: INFUSION THERAPY | Age: 77
Discharge: HOME | End: 2022-12-06
Payer: MEDICARE

## 2022-12-06 ENCOUNTER — DOCUMENTATION ONLY (OUTPATIENT)
Dept: ONCOLOGY | Age: 77
End: 2022-12-06

## 2022-12-06 VITALS
HEART RATE: 69 BPM | TEMPERATURE: 98.9 F | DIASTOLIC BLOOD PRESSURE: 52 MMHG | OXYGEN SATURATION: 96 % | SYSTOLIC BLOOD PRESSURE: 129 MMHG | RESPIRATION RATE: 19 BRPM

## 2022-12-06 DIAGNOSIS — D50.0 IRON DEFICIENCY ANEMIA DUE TO CHRONIC BLOOD LOSS: Primary | ICD-10-CM

## 2022-12-06 LAB
FERRITIN SERPL-MCNC: 892 NG/ML (ref 26–388)
HCT VFR BLD AUTO: 15.7 % (ref 35–47)
HGB BLD-MCNC: 5 G/DL (ref 11.5–16)
HISTORY CHECKED?,CKHIST: NORMAL
IRON SATN MFR SERPL: 90 % (ref 20–50)
IRON SERPL-MCNC: 177 UG/DL (ref 35–150)
TIBC SERPL-MCNC: 196 UG/DL (ref 250–450)

## 2022-12-06 PROCEDURE — 86920 COMPATIBILITY TEST SPIN: CPT

## 2022-12-06 PROCEDURE — 84466 ASSAY OF TRANSFERRIN: CPT

## 2022-12-06 PROCEDURE — 85014 HEMATOCRIT: CPT

## 2022-12-06 PROCEDURE — 86850 RBC ANTIBODY SCREEN: CPT

## 2022-12-06 PROCEDURE — 86901 BLOOD TYPING SEROLOGIC RH(D): CPT

## 2022-12-06 PROCEDURE — 86904 BLOOD TYPING PATIENT SERUM: CPT

## 2022-12-06 PROCEDURE — 74011250636 HC RX REV CODE- 250/636: Performed by: INTERNAL MEDICINE

## 2022-12-06 PROCEDURE — 86900 BLOOD TYPING SEROLOGIC ABO: CPT

## 2022-12-06 PROCEDURE — 86922 COMPATIBILITY TEST ANTIGLOB: CPT

## 2022-12-06 PROCEDURE — 83540 ASSAY OF IRON: CPT

## 2022-12-06 PROCEDURE — 86880 COOMBS TEST DIRECT: CPT

## 2022-12-06 PROCEDURE — 74011000250 HC RX REV CODE- 250: Performed by: INTERNAL MEDICINE

## 2022-12-06 PROCEDURE — 82728 ASSAY OF FERRITIN: CPT

## 2022-12-06 PROCEDURE — 86978 RBC PRETREATMENT SERUM: CPT

## 2022-12-06 PROCEDURE — 96374 THER/PROPH/DIAG INJ IV PUSH: CPT

## 2022-12-06 PROCEDURE — 86870 RBC ANTIBODY IDENTIFICATION: CPT

## 2022-12-06 PROCEDURE — 36415 COLL VENOUS BLD VENIPUNCTURE: CPT

## 2022-12-06 RX ORDER — ONDANSETRON 2 MG/ML
8 INJECTION INTRAMUSCULAR; INTRAVENOUS AS NEEDED
Status: DISCONTINUED | OUTPATIENT
Start: 2022-12-06 | End: 2022-12-07 | Stop reason: HOSPADM

## 2022-12-06 RX ORDER — SODIUM CHLORIDE 0.9 % (FLUSH) 0.9 %
5-40 SYRINGE (ML) INJECTION AS NEEDED
Status: DISCONTINUED | OUTPATIENT
Start: 2022-12-06 | End: 2022-12-07 | Stop reason: HOSPADM

## 2022-12-06 RX ORDER — DIPHENHYDRAMINE HYDROCHLORIDE 50 MG/ML
25 INJECTION, SOLUTION INTRAMUSCULAR; INTRAVENOUS AS NEEDED
Status: DISCONTINUED | OUTPATIENT
Start: 2022-12-06 | End: 2022-12-07 | Stop reason: HOSPADM

## 2022-12-06 RX ORDER — SODIUM CHLORIDE 9 MG/ML
250 INJECTION, SOLUTION INTRAVENOUS AS NEEDED
Status: DISCONTINUED | OUTPATIENT
Start: 2022-12-06 | End: 2022-12-08 | Stop reason: HOSPADM

## 2022-12-06 RX ADMIN — SODIUM CHLORIDE, PRESERVATIVE FREE 10 ML: 5 INJECTION INTRAVENOUS at 14:17

## 2022-12-06 RX ADMIN — SODIUM CHLORIDE, PRESERVATIVE FREE 10 ML: 5 INJECTION INTRAVENOUS at 14:11

## 2022-12-06 RX ADMIN — IRON SUCROSE 200 MG: 20 INJECTION, SOLUTION INTRAVENOUS at 14:11

## 2022-12-06 NOTE — PROGRESS NOTES
[facility] Progress Note  Name:Char Siu  : 1945  MRN: [id number]    Pt arrived to [facility] ambulatory in no acute distress at 1400 for Venofer . Assessment completed. IV established in right ac site WNL. Labs obtained, as ordered. Vital Signs:  Patient Vitals for the past 12 hrs:   Temp Pulse Resp BP SpO2   22 1455 -- 69 19 (!) 129/52 --   22 1421 -- 86 24 (!) 154/54 96 %   22 1400 98.9 °F (37.2 °C) 92 22 (!) 136/48 98 %       Labs:  See [facility] for pending results. After push finished patient began having a coughing fit. Vss obtained. Patient given water and denies any shortness of breath. Patient states that she occasionally has these issues. Patient denied having any symptoms of COVID-19, i.e. SOB, coughing, fever, or generally not feeling well. Also denies having been exposed to COVID-19 recently or having had any recent contact with family/friend that has a pending COVID test.     The following medications administered:  Medications Administered       iron sucrose (VENOFER) injection 200 mg       Admin Date  2022 Action  Given Dose  200 mg Route  IntraVENous Administered By  Mo Jones RN              sodium chloride (NS) flush 5-40 mL       Admin Date  2022 Action  Given Dose  10 mL Route  IntraVENous Administered By  Mo Jones RN               Admin Date  2022 Action  Given Dose  10 mL Route  IntraVENous Administered By  Mo Jones RN                Patient observed for 30 minutes post infusion with no adverse reactions noted. Patients HGB-5.0. MD office made aware. Per Monique Barrow Np patient to be scheduled for two units of PRBC's. Patient and care giver Yovana Zuniga aware to be at  [facility] @ 0900 tomorrow. Pt tolerated treatment well. IV flushed per policy and removed, 2x2 and coban placed. Pt discharged ambulatory in no acute distress at 1450, accompanied by caregiver.   Next appointment 22 @ 1500.    Future Appointments   Date Time Provider Hilary Dickinson   12/7/2022  9:00  [facility] [address] 2 81 [facility]   12/13/2022  3:00 PM SOLORZANO FASTRACK 6 [facility] REG   12/20/2022  2:00 PM SOLORZANO FASTRACK 4 [facility] REG   1/5/2023  9:30 AM Lyudmila Aviles MD RDE JOHN 332 BS AMB   5/12/2023  9:15 AM Thais Canada MD [facility] BS AMB

## 2022-12-06 NOTE — PROGRESS NOTES
I have discussed with the patient the rationale for blood component transfusion; its benefits in treating or preventing fatigue, organ damage, or death; and its risk which includes mild transfusion reactions, rare risk of blood borne infection, or more serious but rare reactions. I have discussed the alternatives to transfusion, including the risk and consequences of not receiving transfusion. The patient had an opportunity to ask questions and had agreed to proceed with transfusion of blood components.   Signed By: Aarti Horvath NP     December 6, 2022

## 2022-12-07 ENCOUNTER — HOSPITAL ENCOUNTER (OUTPATIENT)
Dept: INFUSION THERAPY | Age: 77
Discharge: HOME | End: 2022-12-07
Payer: MEDICARE

## 2022-12-07 VITALS
RESPIRATION RATE: 16 BRPM | SYSTOLIC BLOOD PRESSURE: 126 MMHG | DIASTOLIC BLOOD PRESSURE: 42 MMHG | HEART RATE: 63 BPM | TEMPERATURE: 98.5 F | OXYGEN SATURATION: 98 %

## 2022-12-07 LAB
HCT VFR BLD AUTO: 14.4 % (ref 35–47)
HCT VFR BLD AUTO: 18.1 % (ref 35–47)
HGB BLD-MCNC: 4.6 G/DL (ref 11.5–16)
HGB BLD-MCNC: 5.8 G/DL (ref 11.5–16)
HISTORY CHECKED?,CKHIST: NORMAL

## 2022-12-07 PROCEDURE — 86900 BLOOD TYPING SEROLOGIC ABO: CPT

## 2022-12-07 PROCEDURE — 86901 BLOOD TYPING SEROLOGIC RH(D): CPT

## 2022-12-07 PROCEDURE — P9016 RBC LEUKOCYTES REDUCED: HCPCS

## 2022-12-07 PROCEDURE — 74011250636 HC RX REV CODE- 250/636: Performed by: INTERNAL MEDICINE

## 2022-12-07 PROCEDURE — 86902 BLOOD TYPE ANTIGEN DONOR EA: CPT

## 2022-12-07 PROCEDURE — 86922 COMPATIBILITY TEST ANTIGLOB: CPT

## 2022-12-07 PROCEDURE — 36415 COLL VENOUS BLD VENIPUNCTURE: CPT

## 2022-12-07 PROCEDURE — 36430 TRANSFUSION BLD/BLD COMPNT: CPT

## 2022-12-07 PROCEDURE — 86880 COOMBS TEST DIRECT: CPT

## 2022-12-07 PROCEDURE — 86921 COMPATIBILITY TEST INCUBATE: CPT

## 2022-12-07 PROCEDURE — 85018 HEMOGLOBIN: CPT

## 2022-12-07 PROCEDURE — 86904 BLOOD TYPING PATIENT SERUM: CPT

## 2022-12-07 PROCEDURE — 86978 RBC PRETREATMENT SERUM: CPT

## 2022-12-07 PROCEDURE — 77030013169 SET IV BLD ICUM -A

## 2022-12-07 PROCEDURE — 74011250637 HC RX REV CODE- 250/637: Performed by: NURSE PRACTITIONER

## 2022-12-07 PROCEDURE — 86870 RBC ANTIBODY IDENTIFICATION: CPT

## 2022-12-07 PROCEDURE — 86850 RBC ANTIBODY SCREEN: CPT

## 2022-12-07 PROCEDURE — 86920 COMPATIBILITY TEST SPIN: CPT

## 2022-12-07 RX ORDER — SODIUM CHLORIDE 9 MG/ML
250 INJECTION, SOLUTION INTRAVENOUS AS NEEDED
Status: DISCONTINUED | OUTPATIENT
Start: 2022-12-07 | End: 2022-12-09 | Stop reason: HOSPADM

## 2022-12-07 RX ORDER — DIPHENHYDRAMINE HCL 25 MG
25 CAPSULE ORAL ONCE
Status: COMPLETED | OUTPATIENT
Start: 2022-12-07 | End: 2022-12-07

## 2022-12-07 RX ORDER — ACETAMINOPHEN 325 MG/1
650 TABLET ORAL ONCE
Status: COMPLETED | OUTPATIENT
Start: 2022-12-07 | End: 2022-12-07

## 2022-12-07 RX ORDER — SODIUM CHLORIDE 0.9 % (FLUSH) 0.9 %
5-10 SYRINGE (ML) INJECTION AS NEEDED
Status: DISCONTINUED | OUTPATIENT
Start: 2022-12-07 | End: 2022-12-08 | Stop reason: HOSPADM

## 2022-12-07 RX ADMIN — SODIUM CHLORIDE 250 ML: 9 INJECTION, SOLUTION INTRAVENOUS at 09:27

## 2022-12-07 RX ADMIN — ACETAMINOPHEN 650 MG: 325 TABLET ORAL at 09:26

## 2022-12-07 RX ADMIN — DIPHENHYDRAMINE HYDROCHLORIDE 25 MG: 25 CAPSULE ORAL at 09:26

## 2022-12-07 NOTE — PROGRESS NOTES
[facility] Progress Note  December 7, 2022      Jerry Dub 37 arrived via wheelchair and short of breath for 2 units PRBCs. Patient COVID Screening completed:  Do you have any symptoms of COVID-19? SOB, coughing, fever, or generally not feeling well? NO  Have you been exposed to COVID-19 recently? NO  Have you had any recent contact with family/friend that has a pending COVID test? NO    Assessment unremarkable with the exception of blurry vision in both eyes and swelling in bilateral feet/ankles. No other concerns voiced. 22G PIV established in R forearm without difficulty. Education provided regarding reason for blood transfusion and possible reactions. All questions and concerns regarding transfusion answered, patient voiced understanding. Problem: Anemia Care Plan (Adult and Pediatric)  Goal: *Labs within defined limits  Outcome: Progressing Towards Goal     Problem: Knowledge Deficit  Goal: *Verbalizes understanding of procedures and medications  Outcome: Progressing Towards Goal     Problem: Patient Education:  Go to Education Activity  Goal: Patient/Family Education  Outcome: Progressing Towards Goal    Medications received:  NS @ KVO  Tylenol 650 mg PO  Benadryl 25 mg PO    0935:  1st unit PRBCs started and infusing without difficulty, observed x 15 minutes  1135:  1st unit completed without adverse reaction noted, NS flushing line. Per protocol, H&H drawn before 2nd unit  HGB 4.6, HCT 14.4, provider notified. Per provider, H&H and SBB after 2nd unit completes. Blood bank advises patient will need 4 PINK TOPS whenever T&C is needed    1240:  2nd unit PRBCs started and infusing without difficulty, observed x 15 minutes  1440:  2nd unit completed without adverse reaction noted, NS flushing line. Per provider, hgb/hct checked after 2nd unit  HGB 5.8, HCT 18.1, provider notified. Per provider, patient will need 2 units of blood scheduled for tomorrow.     Patient Vitals for the past 12 hrs:   Temp Pulse Resp BP SpO2   12/07/22 1541 98.5 °F (36.9 °C) 63 16 (!) 126/42 --   12/07/22 1255 98.7 °F (37.1 °C) 84 16 (!) 132/43 --   12/07/22 1228 98.6 °F (37 °C) 61 16 (!) 114/38 98 %   12/07/22 1145 98.4 °F (36.9 °C) 64 16 (!) 110/36 --   12/07/22 0950 98.6 °F (37 °C) 61 18 (!) 112/32 --   12/07/22 0922 98.5 °F (36.9 °C) 63 18 (!) 120/35 97 %   12/07/22 0847 98.6 °F (37 °C) 86 20 (!) 137/42 100 %     1620 Patient tolerated transfusion  well, no adverse reaction noted. D/C instructions reviewed, copy to pt, voiced understanding. Patient observed  1 hour post transfusion with no adverse reactions noted. PIV flushed and removed. D/Cd from [address] [place] [address] via wheelchair and in no distress accompanied by caregiver.       Future Appointments   Date Time Provider Hilary Dickinson   12/13/2022  3:00 PM RASHAD ARNOLD 6 [facility] REG   12/20/2022  2:00 PM RASHAD ARNOLD 4 [facility] REG   1/5/2023  9:30 AM MD HASMUKH Beavers JOHN 332 BS AMB   5/12/2023  9:15 AM Jody Mcmahon MD [facility] BS MYRON Melissa, SANTOS  December 7, 2022

## 2022-12-07 NOTE — DISCHARGE INSTRUCTIONS
OUTPATIENT INFUSION CENTER    DISCHARGE INSTRUCTIONS FOR:  BLOOD TRANSFUSION    We hope you are feeling better after your blood transfusion. Some mild tenderness or slight bruising at your IV site is normal.  Avoid lifting or heavy use of that extremity for the rest of the day. Drink plenty of fluids, eat a normal diet and get some rest.    There are some important signs that you need to watch for in case you experience a delayed reaction to the blood you have received. Call your physician immediately if you develop any of the following symptoms:    1. Severe headache or backache;    2. Fever above 100 degrees;    3. Chills;    4. Difficulty breathing;    5.  Blood or red color in urine;    6. The feeling of weakness or constant fatigue;    7. Yellowing of the whites of your eyes or skin (jaundice). If your physician is not available, call or go to the nearest emergency room, or dial 911.     Lawrence Mccain, Signature: ___________________________ 12/7/2022  Sheree Paz RN

## 2022-12-08 ENCOUNTER — HOSPITAL ENCOUNTER (OUTPATIENT)
Dept: INFUSION THERAPY | Age: 77
Discharge: HOME | End: 2022-12-08
Payer: MEDICARE

## 2022-12-08 VITALS
DIASTOLIC BLOOD PRESSURE: 61 MMHG | TEMPERATURE: 98.5 F | RESPIRATION RATE: 16 BRPM | OXYGEN SATURATION: 100 % | SYSTOLIC BLOOD PRESSURE: 96 MMHG | HEART RATE: 87 BPM

## 2022-12-08 LAB
ABO + RH BLD: NORMAL
ANTI-COMPLEMENT (C3B,C3D): NORMAL
ANTIGENS PRESENT RBC DONR: NORMAL
ANTIGENS PRESENT RBC DONR: NORMAL
BASOPHILS # BLD: 0 K/UL (ref 0–0.1)
BASOPHILS NFR BLD: 0 % (ref 0–1)
BLD PROD TYP BPU: NORMAL
BLD PROD TYP BPU: NORMAL
BLOOD BANK CMNT PATIENT-IMP: NORMAL
BLOOD GROUP ANTIBODIES SERPL: NORMAL
BLOOD GROUP ANTIBODIES SERPL: NORMAL
BPU ID: NORMAL
BPU ID: NORMAL
CROSSMATCH RESULT,%XM: NORMAL
CROSSMATCH RESULT,%XM: NORMAL
DAT IGG-SP REAG RBC QL: NORMAL
DAT POLY-SP REAG RBC QL: NORMAL
DIFFERENTIAL METHOD BLD: ABNORMAL
EOSINOPHIL # BLD: 0.1 K/UL (ref 0–0.4)
EOSINOPHIL NFR BLD: 2 % (ref 0–7)
ERYTHROCYTE [DISTWIDTH] IN BLOOD BY AUTOMATED COUNT: 22.7 % (ref 11.5–14.5)
HCT VFR BLD AUTO: 19.6 % (ref 35–47)
HCT VFR BLD AUTO: 23.7 % (ref 35–47)
HGB BLD-MCNC: 6.4 G/DL (ref 11.5–16)
HGB BLD-MCNC: 7.9 G/DL (ref 11.5–16)
IMM GRANULOCYTES # BLD AUTO: 0.1 K/UL (ref 0–0.04)
IMM GRANULOCYTES NFR BLD AUTO: 2 % (ref 0–0.5)
LYMPHOCYTES # BLD: 0.5 K/UL (ref 0.8–3.5)
LYMPHOCYTES NFR BLD: 15 % (ref 12–49)
MCH RBC QN AUTO: 29.4 PG (ref 26–34)
MCHC RBC AUTO-ENTMCNC: 33.3 G/DL (ref 30–36.5)
MCV RBC AUTO: 88.1 FL (ref 80–99)
MONOCYTES # BLD: 0.5 K/UL (ref 0–1)
MONOCYTES NFR BLD: 15 % (ref 5–13)
NEUTS SEG # BLD: 1.9 K/UL (ref 1.8–8)
NEUTS SEG NFR BLD: 66 % (ref 32–75)
NRBC # BLD: 0 K/UL (ref 0–0.01)
NRBC BLD-RTO: 0 PER 100 WBC
PLATELET # BLD AUTO: 78 K/UL (ref 150–400)
PMV BLD AUTO: 10.2 FL (ref 8.9–12.9)
RBC # BLD AUTO: 2.69 M/UL (ref 3.8–5.2)
RBC MORPH BLD: ABNORMAL
RBC MORPH BLD: ABNORMAL
SPECIMEN EXP DATE BLD: NORMAL
STATUS OF UNIT,%ST: NORMAL
STATUS OF UNIT,%ST: NORMAL
TRANSFERRIN SERPL-MCNC: 148 MG/DL (ref 192–364)
UNIT DIVISION, %UDIV: 0
UNIT DIVISION, %UDIV: 0
WBC # BLD AUTO: 3.1 K/UL (ref 3.6–11)

## 2022-12-08 PROCEDURE — 77030013169 SET IV BLD ICUM -A

## 2022-12-08 PROCEDURE — 86922 COMPATIBILITY TEST ANTIGLOB: CPT

## 2022-12-08 PROCEDURE — 85018 HEMOGLOBIN: CPT

## 2022-12-08 PROCEDURE — P9016 RBC LEUKOCYTES REDUCED: HCPCS

## 2022-12-08 PROCEDURE — 74011000250 HC RX REV CODE- 250: Performed by: INTERNAL MEDICINE

## 2022-12-08 PROCEDURE — 36430 TRANSFUSION BLD/BLD COMPNT: CPT

## 2022-12-08 PROCEDURE — 86902 BLOOD TYPE ANTIGEN DONOR EA: CPT

## 2022-12-08 PROCEDURE — 86921 COMPATIBILITY TEST INCUBATE: CPT

## 2022-12-08 PROCEDURE — 36415 COLL VENOUS BLD VENIPUNCTURE: CPT

## 2022-12-08 PROCEDURE — 85025 COMPLETE CBC W/AUTO DIFF WBC: CPT

## 2022-12-08 PROCEDURE — 74011250637 HC RX REV CODE- 250/637: Performed by: NURSE PRACTITIONER

## 2022-12-08 PROCEDURE — 74011250636 HC RX REV CODE- 250/636: Performed by: INTERNAL MEDICINE

## 2022-12-08 RX ORDER — ACETAMINOPHEN 325 MG/1
650 TABLET ORAL ONCE
Status: COMPLETED | OUTPATIENT
Start: 2022-12-08 | End: 2022-12-08

## 2022-12-08 RX ORDER — DIPHENHYDRAMINE HCL 25 MG
25 CAPSULE ORAL ONCE
Status: COMPLETED | OUTPATIENT
Start: 2022-12-08 | End: 2022-12-08

## 2022-12-08 RX ORDER — SODIUM CHLORIDE 0.9 % (FLUSH) 0.9 %
5-10 SYRINGE (ML) INJECTION AS NEEDED
Status: DISCONTINUED | OUTPATIENT
Start: 2022-12-08 | End: 2022-12-09 | Stop reason: HOSPADM

## 2022-12-08 RX ORDER — SODIUM CHLORIDE 9 MG/ML
250 INJECTION, SOLUTION INTRAVENOUS AS NEEDED
Status: DISCONTINUED | OUTPATIENT
Start: 2022-12-08 | End: 2022-12-10 | Stop reason: HOSPADM

## 2022-12-08 RX ADMIN — DIPHENHYDRAMINE HYDROCHLORIDE 25 MG: 25 CAPSULE ORAL at 08:46

## 2022-12-08 RX ADMIN — Medication 10 ML: at 15:49

## 2022-12-08 RX ADMIN — SODIUM CHLORIDE 250 ML: 9 INJECTION, SOLUTION INTRAVENOUS at 09:10

## 2022-12-08 RX ADMIN — ACETAMINOPHEN 650 MG: 325 TABLET ORAL at 08:46

## 2022-12-08 RX ADMIN — Medication 10 ML: at 08:45

## 2022-12-08 NOTE — PROGRESS NOTES
[facility] Progress Note  December 8, 2022      614 [facility] arrived ambulatory (rollator) and in no acute distress for 2 units PRBCs. Patient COVID Screening completed:  Do you have any symptoms of COVID-19? SOB, coughing, fever, or generally not feeling well? NO  Have you been exposed to COVID-19 recently? NO  Have you had any recent contact with family/friend that has a pending COVID test? NO    Assessment unremarkable with the exception of blurry vision, bilateral leg ulcers, and bilateral swelling in feet/ankles. No other concerns voiced. 22G PIV established in R forearm without difficulty. Education provided regarding reason for blood transfusion and possible reactions. All questions and concerns regarding transfusion answered, patient voiced understanding. Problem: Anemia Care Plan (Adult and Pediatric)  Goal: *Labs within defined limits  Outcome: Progressing Towards Goal     Problem: Knowledge Deficit  Goal: *Verbalizes understanding of procedures and medications  Outcome: Progressing Towards Goal     Problem: Patient Education:  Go to Education Activity  Goal: Patient/Family Education  Outcome: Progressing Towards Goal    Medications received:  NS @ KVO  Tylenol 650 mg PO  Benadryl 25 mg PO    0910:  1st unit PRBCs started and infusing without difficulty, observed x 15 minutes  1110:  1st unit completed without adverse reaction noted, NS flushing line. Hgb check after 1st unit per protocol. Hgb 6.4, patient will received 2nd unit. 1220:  2nd unit PRBCs started and infusing without difficulty, observed x 15 minutes  1435:  2nd unit completed without adverse reaction noted, NS flushing line.     Patient Vitals for the past 12 hrs:   Temp Pulse Resp BP SpO2   12/08/22 1549 -- 87 -- 96/61 --   12/08/22 1546 98.5 °F (36.9 °C) 69 16 (!) 118/49 --   12/08/22 1235 98.6 °F (37 °C) 63 16 (!) 105/37 --   12/08/22 1210 98.8 °F (37.1 °C) 62 16 (!) 108/41 100 %   12/08/22 0925 98.5 °F (36.9 °C) 69 18 (!) 123/40 --   12/08/22 0904 98.5 °F (36.9 °C) 63 18 (!) 121/42 97 %   12/08/22 0829 98.5 °F (36.9 °C) 62 18 (!) 124/39 98 %     1550 Patient tolerated transfusion  well, no adverse reaction noted. D/C instructions reviewed, copy to pt, voiced understanding. Patient observed 1 hour post transfusion with no adverse reactions noted. PIV flushed and removed. D/Cd from [facility] ambulatory and in no distress accompanied by caregiver.       Future Appointments   Date Time Provider Hilary Teena   12/13/2022  3:00 PM SOLORZANO FASTRACK 6 [facility] REG   12/20/2022  2:00 PM SOLORZANO FASTRACK 4 [facility] REG   12/28/2022  2:30 PM SOLORZANO FASTRACK 7 [facility] REG   1/4/2023  2:30 PM SOLORZANO MED5 300 [facility] REG   1/5/2023  9:30 AM Elizabeth Milton MD RDE JOHN 332 BS AMB   1/11/2023  2:30 PM [address] REG   1/18/2023  2:30 PM [address] REG   1/25/2023  2:30 PM [address] REG   2/1/2023  2:30 PM SOLORZANO LONG1 [place] [address] REG   5/12/2023  9:15 AM Alli Josue MD [facility]/WU BS AMB     Reagan Valentine, RN  December 8, 2022

## 2022-12-09 LAB
ABO + RH BLD: NORMAL
ANTI-COMPLEMENT (C3B,C3D): NORMAL
ANTIGENS PRESENT RBC DONR: NORMAL
ANTIGENS PRESENT RBC DONR: NORMAL
BLD PROD TYP BPU: NORMAL
BLD PROD TYP BPU: NORMAL
BLOOD BANK CMNT PATIENT-IMP: NORMAL
BLOOD GROUP ANTIBODIES SERPL: NORMAL
BLOOD GROUP ANTIBODIES SERPL: NORMAL
BPU ID: NORMAL
BPU ID: NORMAL
CROSSMATCH RESULT,%XM: NORMAL
CROSSMATCH RESULT,%XM: NORMAL
DAT IGG-SP REAG RBC QL: NORMAL
DAT POLY-SP REAG RBC QL: NORMAL
SPECIMEN EXP DATE BLD: NORMAL
STATUS OF UNIT,%ST: NORMAL
STATUS OF UNIT,%ST: NORMAL
UNIT DIVISION, %UDIV: 0
UNIT DIVISION, %UDIV: 0

## 2022-12-13 ENCOUNTER — HOSPITAL ENCOUNTER (OUTPATIENT)
Dept: INFUSION THERAPY | Age: 77
Discharge: HOME | End: 2022-12-13
Payer: MEDICARE

## 2022-12-13 VITALS
SYSTOLIC BLOOD PRESSURE: 137 MMHG | RESPIRATION RATE: 18 BRPM | BODY MASS INDEX: 42.52 KG/M2 | HEART RATE: 74 BPM | WEIGHT: 210.5 LBS | TEMPERATURE: 98.7 F | DIASTOLIC BLOOD PRESSURE: 56 MMHG | OXYGEN SATURATION: 98 %

## 2022-12-13 DIAGNOSIS — D50.0 IRON DEFICIENCY ANEMIA DUE TO CHRONIC BLOOD LOSS: Primary | ICD-10-CM

## 2022-12-13 LAB
BASOPHILS # BLD: 0 K/UL (ref 0–0.1)
BASOPHILS NFR BLD: 1 % (ref 0–1)
DIFFERENTIAL METHOD BLD: ABNORMAL
EOSINOPHIL # BLD: 0.1 K/UL (ref 0–0.4)
EOSINOPHIL NFR BLD: 3 % (ref 0–7)
ERYTHROCYTE [DISTWIDTH] IN BLOOD BY AUTOMATED COUNT: 22.3 % (ref 11.5–14.5)
HCT VFR BLD AUTO: 26.9 % (ref 35–47)
HGB BLD-MCNC: 8.4 G/DL (ref 11.5–16)
IMM GRANULOCYTES # BLD AUTO: 0 K/UL (ref 0–0.04)
IMM GRANULOCYTES NFR BLD AUTO: 1 % (ref 0–0.5)
LYMPHOCYTES # BLD: 0.4 K/UL (ref 0.8–3.5)
LYMPHOCYTES NFR BLD: 12 % (ref 12–49)
MCH RBC QN AUTO: 28.6 PG (ref 26–34)
MCHC RBC AUTO-ENTMCNC: 31.2 G/DL (ref 30–36.5)
MCV RBC AUTO: 91.5 FL (ref 80–99)
MONOCYTES # BLD: 0.4 K/UL (ref 0–1)
MONOCYTES NFR BLD: 10 % (ref 5–13)
NEUTS SEG # BLD: 2.8 K/UL (ref 1.8–8)
NEUTS SEG NFR BLD: 73 % (ref 32–75)
NRBC # BLD: 0 K/UL (ref 0–0.01)
NRBC BLD-RTO: 0 PER 100 WBC
PLATELET # BLD AUTO: 99 K/UL (ref 150–400)
PMV BLD AUTO: 10.5 FL (ref 8.9–12.9)
RBC # BLD AUTO: 2.94 M/UL (ref 3.8–5.2)
RBC MORPH BLD: ABNORMAL
RBC MORPH BLD: ABNORMAL
WBC # BLD AUTO: 3.7 K/UL (ref 3.6–11)

## 2022-12-13 PROCEDURE — 74011000250 HC RX REV CODE- 250: Performed by: INTERNAL MEDICINE

## 2022-12-13 PROCEDURE — 96374 THER/PROPH/DIAG INJ IV PUSH: CPT

## 2022-12-13 PROCEDURE — 36415 COLL VENOUS BLD VENIPUNCTURE: CPT

## 2022-12-13 PROCEDURE — 85025 COMPLETE CBC W/AUTO DIFF WBC: CPT

## 2022-12-13 PROCEDURE — 74011250636 HC RX REV CODE- 250/636: Performed by: INTERNAL MEDICINE

## 2022-12-13 RX ORDER — SODIUM CHLORIDE 9 MG/ML
5-40 INJECTION INTRAMUSCULAR; INTRAVENOUS; SUBCUTANEOUS AS NEEDED
Status: DISCONTINUED | OUTPATIENT
Start: 2022-12-13 | End: 2022-12-14 | Stop reason: HOSPADM

## 2022-12-13 RX ADMIN — IRON SUCROSE 200 MG: 20 INJECTION, SOLUTION INTRAVENOUS at 15:11

## 2022-12-13 RX ADMIN — SODIUM CHLORIDE, PRESERVATIVE FREE 10 ML: 5 INJECTION INTRAVENOUS at 15:17

## 2022-12-13 RX ADMIN — SODIUM CHLORIDE, PRESERVATIVE FREE 10 ML: 5 INJECTION INTRAVENOUS at 15:09

## 2022-12-13 NOTE — PROGRESS NOTES
[facility] Visit Note    Pt arrived to [facility] ambulatory in no acute distress at 1500 for Venofer 4/5 + labs. Assessment unremarkable except dyspnea with exertion, weeping from lower legs, and occasional weakness and fatigue. #24g PIV established in right [facility] site without issue and positive blood return noted. Labs obtained- CBC with diff and Sample to blood bank. Patient denied having any symptoms of COVID-19, i.e. SOB, coughing, fever, or generally not feeling well. Also denies having been exposed to COVID-19 recently or having had any recent contact with family/friend that has a pending COVID test.     Patient Vitals for the past 12 hrs:   Temp Pulse Resp BP SpO2   12/13/22 1546 -- 74 18 (!) 137/56 --   12/13/22 1501 98.7 °F (37.1 °C) 88 20 (!) 181/71 98 %        Recent Results (from the past 12 hour(s))   CBC WITH AUTOMATED DIFF    Collection Time: 12/13/22  3:03 PM   Result Value Ref Range    WBC 3.7 3.6 - 11.0 K/uL    RBC 2.94 (L) 3.80 - 5.20 M/uL    HGB 8.4 (L) 11.5 - 16.0 g/dL    HCT 26.9 (L) 35.0 - 47.0 %    MCV 91.5 80.0 - 99.0 FL    MCH 28.6 26.0 - 34.0 PG    MCHC 31.2 30.0 - 36.5 g/dL    RDW 22.3 (H) 11.5 - 14.5 %    PLATELET 99 (L) 467 - 400 K/uL    MPV 10.5 8.9 - 12.9 FL    NRBC 0.0 0  WBC    ABSOLUTE NRBC 0.00 0.00 - 0.01 K/uL    NEUTROPHILS 73 32 - 75 %    LYMPHOCYTES 12 12 - 49 %    MONOCYTES 10 5 - 13 %    EOSINOPHILS 3 0 - 7 %    BASOPHILS 1 0 - 1 %    IMMATURE GRANULOCYTES 1 (H) 0.0 - 0.5 %    ABS. NEUTROPHILS 2.8 1.8 - 8.0 K/UL    ABS. LYMPHOCYTES 0.4 (L) 0.8 - 3.5 K/UL    ABS. MONOCYTES 0.4 0.0 - 1.0 K/UL    ABS. EOSINOPHILS 0.1 0.0 - 0.4 K/UL    ABS. BASOPHILS 0.0 0.0 - 0.1 K/UL    ABS. IMM. GRANS. 0.0 0.00 - 0.04 K/UL    DF SMEAR SCANNED      RBC COMMENTS ANISOCYTOSIS  2+        RBC COMMENTS OVALOCYTES  PRESENT          The following medications administered:  Venofer 200 mg IVP    Pt tolerated treatment well.   No adverse reaction noted after monitoring patient for 30 minutes. IV flushed per policy and removed, 2x2 and coban placed. Pt discharged ambulatory in no acute distress at 1550, accompanied by family member. Next appointment 12/20/22 @ 1400.

## 2022-12-14 LAB
ABO + RH BLD: NORMAL
ABO + RH BLD: NORMAL
ANTI-COMPLEMENT (C3B,C3D): NORMAL
ANTI-COMPLEMENT (C3B,C3D): NORMAL
ANTIGENS PRESENT RBC DONR: NORMAL
BLD PROD TYP BPU: NORMAL
BLOOD BANK CMNT PATIENT-IMP: NORMAL
BLOOD BANK CMNT PATIENT-IMP: NORMAL
BLOOD GROUP ANTIBODIES SERPL: NORMAL
BPU ID: NORMAL
CROSSMATCH RESULT,%XM: NORMAL
DAT IGG-SP REAG RBC QL: NORMAL
DAT IGG-SP REAG RBC QL: NORMAL
DAT POLY-SP REAG RBC QL: NORMAL
DAT POLY-SP REAG RBC QL: NORMAL
SPECIMEN EXP DATE BLD: NORMAL
SPECIMEN EXP DATE BLD: NORMAL
STATUS OF UNIT,%ST: NORMAL
UNIT DIVISION, %UDIV: 0

## 2022-12-19 DIAGNOSIS — E78.00 PURE HYPERCHOLESTEROLEMIA: ICD-10-CM

## 2022-12-19 RX ORDER — EZETIMIBE 10 MG/1
TABLET ORAL
Qty: 90 TABLET | Refills: 3 | Status: SHIPPED | OUTPATIENT
Start: 2022-12-19

## 2022-12-19 RX ORDER — ATORVASTATIN CALCIUM 20 MG/1
TABLET, FILM COATED ORAL
Qty: 90 TABLET | Refills: 3 | Status: SHIPPED | OUTPATIENT
Start: 2022-12-19

## 2022-12-20 ENCOUNTER — HOSPITAL ENCOUNTER (OUTPATIENT)
Dept: INFUSION THERAPY | Age: 77
Discharge: HOME | End: 2022-12-20
Payer: MEDICARE

## 2022-12-20 VITALS
OXYGEN SATURATION: 98 % | DIASTOLIC BLOOD PRESSURE: 67 MMHG | TEMPERATURE: 99 F | HEART RATE: 69 BPM | SYSTOLIC BLOOD PRESSURE: 167 MMHG

## 2022-12-20 DIAGNOSIS — D50.0 IRON DEFICIENCY ANEMIA DUE TO CHRONIC BLOOD LOSS: Primary | ICD-10-CM

## 2022-12-20 DIAGNOSIS — D62 ACUTE BLOOD LOSS ANEMIA: ICD-10-CM

## 2022-12-20 LAB
BASOPHILS # BLD: 0 K/UL (ref 0–0.1)
BASOPHILS NFR BLD: 1 % (ref 0–1)
DIFFERENTIAL METHOD BLD: ABNORMAL
EOSINOPHIL # BLD: 0.2 K/UL (ref 0–0.4)
EOSINOPHIL NFR BLD: 5 % (ref 0–7)
ERYTHROCYTE [DISTWIDTH] IN BLOOD BY AUTOMATED COUNT: 22.4 % (ref 11.5–14.5)
HCT VFR BLD AUTO: 24.5 % (ref 35–47)
HGB BLD-MCNC: 7.5 G/DL (ref 11.5–16)
IMM GRANULOCYTES # BLD AUTO: 0 K/UL (ref 0–0.04)
IMM GRANULOCYTES NFR BLD AUTO: 1 % (ref 0–0.5)
LYMPHOCYTES # BLD: 0.6 K/UL (ref 0.8–3.5)
LYMPHOCYTES NFR BLD: 16 % (ref 12–49)
MCH RBC QN AUTO: 28.5 PG (ref 26–34)
MCHC RBC AUTO-ENTMCNC: 30.6 G/DL (ref 30–36.5)
MCV RBC AUTO: 93.2 FL (ref 80–99)
MONOCYTES # BLD: 0.3 K/UL (ref 0–1)
MONOCYTES NFR BLD: 7 % (ref 5–13)
NEUTS SEG # BLD: 2.9 K/UL (ref 1.8–8)
NEUTS SEG NFR BLD: 70 % (ref 32–75)
NRBC # BLD: 0 K/UL (ref 0–0.01)
NRBC BLD-RTO: 0 PER 100 WBC
PLATELET # BLD AUTO: 77 K/UL (ref 150–400)
PMV BLD AUTO: 10.6 FL (ref 8.9–12.9)
RBC # BLD AUTO: 2.63 M/UL (ref 3.8–5.2)
RBC MORPH BLD: ABNORMAL
WBC # BLD AUTO: 4 K/UL (ref 3.6–11)

## 2022-12-20 PROCEDURE — 36415 COLL VENOUS BLD VENIPUNCTURE: CPT

## 2022-12-20 PROCEDURE — 85025 COMPLETE CBC W/AUTO DIFF WBC: CPT

## 2022-12-20 PROCEDURE — 74011250636 HC RX REV CODE- 250/636: Performed by: INTERNAL MEDICINE

## 2022-12-20 PROCEDURE — 96374 THER/PROPH/DIAG INJ IV PUSH: CPT

## 2022-12-20 RX ADMIN — IRON SUCROSE 200 MG: 20 INJECTION, SOLUTION INTRAVENOUS at 14:34

## 2022-12-20 NOTE — PROGRESS NOTES
[address] [facility] Visit Note    6743 Pt arrived at [facility] ambulatory and in no distress for Venofer 5/5. Assessment completed, no new complaints voiced. IV established in right hand. Patient denied having any symptoms of COVID-19, i.e. SOB, coughing, fever, or generally not feeling well. Also denies having been exposed to COVID-19 recently or having had any recent contact with family/friend that has a pending COVID test.     Patient Vitals for the past 12 hrs:   Temp Pulse BP SpO2   12/20/22 1509 -- 69 (!) 167/67 --   12/20/22 1357 99 °F (37.2 °C) 93 (!) 165/65 98 %      Recent Results (from the past 12 hour(s))   CBC WITH AUTOMATED DIFF    Collection Time: 12/20/22  2:10 PM   Result Value Ref Range    WBC 4.0 3.6 - 11.0 K/uL    RBC 2.63 (L) 3.80 - 5.20 M/uL    HGB 7.5 (L) 11.5 - 16.0 g/dL    HCT 24.5 (L) 35.0 - 47.0 %    MCV 93.2 80.0 - 99.0 FL    MCH 28.5 26.0 - 34.0 PG    MCHC 30.6 30.0 - 36.5 g/dL    RDW 22.4 (H) 11.5 - 14.5 %    PLATELET 77 (L) 863 - 400 K/uL    MPV 10.6 8.9 - 12.9 FL    NRBC 0.0 0  WBC    ABSOLUTE NRBC 0.00 0.00 - 0.01 K/uL    NEUTROPHILS 70 32 - 75 %    LYMPHOCYTES 16 12 - 49 %    MONOCYTES 7 5 - 13 %    EOSINOPHILS 5 0 - 7 %    BASOPHILS 1 0 - 1 %    IMMATURE GRANULOCYTES 1 (H) 0.0 - 0.5 %    ABS. NEUTROPHILS 2.9 1.8 - 8.0 K/UL    ABS. LYMPHOCYTES 0.6 (L) 0.8 - 3.5 K/UL    ABS. MONOCYTES 0.3 0.0 - 1.0 K/UL    ABS. EOSINOPHILS 0.2 0.0 - 0.4 K/UL    ABS. BASOPHILS 0.0 0.0 - 0.1 K/UL    ABS. IMM. GRANS. 0.0 0.00 - 0.04 K/UL    DF SMEAR SCANNED      RBC COMMENTS ANISOCYTOSIS  2+        RBC COMMENTS OVALOCYTES  1+        RBC COMMENTS TEARDROP CELLS  PRESENT              Medications received:  Medications Administered       iron sucrose (VENOFER) injection 200 mg       Admin Date  12/20/2022 Action  Given Dose  200 mg Route  IntraVENous Administered By  Vicenta Gaxiola, RN                    0621 Pt monitored post injection.  Tolerated treatment well, no adverse reaction noted.  IV d/c'd. D/Cd from [facility] ambulatory and in no distress accompanied by caregiver.   Next appt 12/28 for labs

## 2022-12-23 DIAGNOSIS — J01.91 ACUTE RECURRENT SINUSITIS, UNSPECIFIED LOCATION: ICD-10-CM

## 2022-12-23 RX ORDER — LORATADINE 10 MG/1
TABLET ORAL
Qty: 90 TABLET | Refills: 3 | Status: SHIPPED | OUTPATIENT
Start: 2022-12-23

## 2022-12-27 ENCOUNTER — HOSPITAL ENCOUNTER (OUTPATIENT)
Dept: INFUSION THERAPY | Age: 77
Discharge: HOME | End: 2022-12-27
Payer: MEDICARE

## 2022-12-27 VITALS
DIASTOLIC BLOOD PRESSURE: 74 MMHG | HEART RATE: 65 BPM | OXYGEN SATURATION: 97 % | RESPIRATION RATE: 20 BRPM | SYSTOLIC BLOOD PRESSURE: 158 MMHG

## 2022-12-27 DIAGNOSIS — D50.0 IRON DEFICIENCY ANEMIA DUE TO CHRONIC BLOOD LOSS: Primary | ICD-10-CM

## 2022-12-27 LAB
BASOPHILS # BLD: 0 K/UL (ref 0–0.1)
BASOPHILS NFR BLD: 0 % (ref 0–1)
DIFFERENTIAL METHOD BLD: ABNORMAL
EOSINOPHIL # BLD: 0.2 K/UL (ref 0–0.4)
EOSINOPHIL NFR BLD: 5 % (ref 0–7)
ERYTHROCYTE [DISTWIDTH] IN BLOOD BY AUTOMATED COUNT: 21.6 % (ref 11.5–14.5)
HCT VFR BLD AUTO: 18.5 % (ref 35–47)
HGB BLD-MCNC: 6 G/DL (ref 11.5–16)
IMM GRANULOCYTES # BLD AUTO: 0 K/UL (ref 0–0.04)
IMM GRANULOCYTES NFR BLD AUTO: 1 % (ref 0–0.5)
LYMPHOCYTES # BLD: 0.5 K/UL (ref 0.8–3.5)
LYMPHOCYTES NFR BLD: 14 % (ref 12–49)
MCH RBC QN AUTO: 29.1 PG (ref 26–34)
MCHC RBC AUTO-ENTMCNC: 32.4 G/DL (ref 30–36.5)
MCV RBC AUTO: 89.8 FL (ref 80–99)
MONOCYTES # BLD: 0.4 K/UL (ref 0–1)
MONOCYTES NFR BLD: 11 % (ref 5–13)
NEUTS SEG # BLD: 2.8 K/UL (ref 1.8–8)
NEUTS SEG NFR BLD: 69 % (ref 32–75)
NRBC # BLD: 0 K/UL (ref 0–0.01)
NRBC BLD-RTO: 0 PER 100 WBC
PLATELET # BLD AUTO: 80 K/UL (ref 150–400)
PMV BLD AUTO: 11.1 FL (ref 8.9–12.9)
RBC # BLD AUTO: 2.06 M/UL (ref 3.8–5.2)
RBC MORPH BLD: ABNORMAL
WBC # BLD AUTO: 3.9 K/UL (ref 3.6–11)

## 2022-12-27 PROCEDURE — 96374 THER/PROPH/DIAG INJ IV PUSH: CPT

## 2022-12-27 PROCEDURE — 36415 COLL VENOUS BLD VENIPUNCTURE: CPT

## 2022-12-27 PROCEDURE — 74011250636 HC RX REV CODE- 250/636: Performed by: INTERNAL MEDICINE

## 2022-12-27 PROCEDURE — 85025 COMPLETE CBC W/AUTO DIFF WBC: CPT

## 2022-12-27 RX ORDER — SODIUM CHLORIDE 0.9 % (FLUSH) 0.9 %
5-40 SYRINGE (ML) INJECTION AS NEEDED
Status: CANCELLED | OUTPATIENT
Start: 2022-12-27

## 2022-12-27 RX ORDER — SODIUM CHLORIDE 9 MG/ML
5-250 INJECTION, SOLUTION INTRAVENOUS AS NEEDED
Status: DISCONTINUED | OUTPATIENT
Start: 2022-12-27 | End: 2022-12-28 | Stop reason: HOSPADM

## 2022-12-27 RX ORDER — SODIUM CHLORIDE 9 MG/ML
5-40 INJECTION INTRAMUSCULAR; INTRAVENOUS; SUBCUTANEOUS AS NEEDED
Status: CANCELLED | OUTPATIENT
Start: 2022-12-27

## 2022-12-27 RX ORDER — SODIUM CHLORIDE 0.9 % (FLUSH) 0.9 %
5-40 SYRINGE (ML) INJECTION AS NEEDED
Status: DISCONTINUED | OUTPATIENT
Start: 2022-12-27 | End: 2022-12-28 | Stop reason: HOSPADM

## 2022-12-27 RX ORDER — HEPARIN 100 UNIT/ML
500 SYRINGE INTRAVENOUS AS NEEDED
Status: CANCELLED
Start: 2022-12-27

## 2022-12-27 RX ORDER — SODIUM CHLORIDE 9 MG/ML
5-250 INJECTION, SOLUTION INTRAVENOUS AS NEEDED
Status: CANCELLED | OUTPATIENT
Start: 2022-12-27

## 2022-12-27 RX ADMIN — IRON SUCROSE 200 MG: 20 INJECTION, SOLUTION INTRAVENOUS at 16:13

## 2022-12-27 NOTE — PROGRESS NOTES
[facility] Progress Note    Date: 2022    Name: Janette Prieto    MRN: [id number]         : 1945    Ms. Siu Arrived ambulatory and in no distress for Labs and Venofer Dose 5 of 5. Assessment was completed. Pt pale, reports increased fatigue and increased dyspnea on exertion. 24 G PIV established to right arm without difficulty, labs drawn and processing. Ms. Siu's vitals were reviewed. Patient Vitals for the past 12 hrs:   Pulse Resp BP SpO2   22 1642 65 -- (!) 158/74 --   22 1556 -- 20 (!) 181/73 97 %       Labs pending in [facility]    Medications:  Medications Administered       iron sucrose (VENOFER) injection 200 mg       Admin Date  2022 Action  Given Dose  200 mg Route  IntraVENous Administered By  Omari Woo RN                  Ms. Adrianna Riojas tolerated treatment well and was discharged from [place] [address] in stable condition . PIV flushed & removed. Discharge instructions reviewed with patient, verbalized understanding. Patient observed for 30 minutes post infusion, no s/s of reaction. She is to return on 2023 for her next appointment.     Gabby Garnett RN  2022

## 2022-12-28 ENCOUNTER — TELEPHONE (OUTPATIENT)
Dept: ONCOLOGY | Age: 77
End: 2022-12-28

## 2022-12-28 ENCOUNTER — HOSPITAL ENCOUNTER (OUTPATIENT)
Dept: INFUSION THERAPY | Age: 77
End: 2022-12-28

## 2022-12-28 ENCOUNTER — HOSPITAL ENCOUNTER (INPATIENT)
Age: 77
LOS: 3 days | Discharge: HOME | End: 2022-12-31
Attending: STUDENT IN AN ORGANIZED HEALTH CARE EDUCATION/TRAINING PROGRAM | Admitting: STUDENT IN AN ORGANIZED HEALTH CARE EDUCATION/TRAINING PROGRAM
Payer: MEDICARE

## 2022-12-28 DIAGNOSIS — D64.9 ANEMIA, UNSPECIFIED TYPE: Primary | ICD-10-CM

## 2022-12-28 PROBLEM — D50.0 BLOOD LOSS ANEMIA: Status: ACTIVE | Noted: 2022-01-01

## 2022-12-28 PROBLEM — K31.819 GAVE (GASTRIC ANTRAL VASCULAR ECTASIA): Status: ACTIVE | Noted: 2022-12-28

## 2022-12-28 PROBLEM — K31.819 GAVE (GASTRIC ANTRAL VASCULAR ECTASIA): Status: ACTIVE | Noted: 2022-01-01

## 2022-12-28 LAB
ALBUMIN SERPL-MCNC: 3 G/DL (ref 3.5–5)
ALBUMIN/GLOB SERPL: 0.8 {RATIO} (ref 1.1–2.2)
ALP SERPL-CCNC: 219 U/L (ref 45–117)
ALT SERPL-CCNC: 22 U/L (ref 12–78)
ANION GAP SERPL CALC-SCNC: 4 MMOL/L (ref 5–15)
AST SERPL-CCNC: 30 U/L (ref 15–37)
BASOPHILS # BLD: 0 K/UL (ref 0–0.1)
BASOPHILS NFR BLD: 0 % (ref 0–1)
BILIRUB SERPL-MCNC: 1.1 MG/DL (ref 0.2–1)
BUN SERPL-MCNC: 38 MG/DL (ref 6–20)
BUN/CREAT SERPL: 26 (ref 12–20)
CALCIUM SERPL-MCNC: 8.9 MG/DL (ref 8.5–10.1)
CHLORIDE SERPL-SCNC: 110 MMOL/L (ref 97–108)
CO2 SERPL-SCNC: 22 MMOL/L (ref 21–32)
CREAT SERPL-MCNC: 1.49 MG/DL (ref 0.55–1.02)
DIFFERENTIAL METHOD BLD: ABNORMAL
EOSINOPHIL # BLD: 0.2 K/UL (ref 0–0.4)
EOSINOPHIL NFR BLD: 6 % (ref 0–7)
ERYTHROCYTE [DISTWIDTH] IN BLOOD BY AUTOMATED COUNT: 22 % (ref 11.5–14.5)
GLOBULIN SER CALC-MCNC: 3.6 G/DL (ref 2–4)
GLUCOSE SERPL-MCNC: 142 MG/DL (ref 65–100)
HCT VFR BLD AUTO: 15.6 % (ref 35–47)
HGB BLD-MCNC: 4.8 G/DL (ref 11.5–16)
IMM GRANULOCYTES # BLD AUTO: 0 K/UL (ref 0–0.04)
IMM GRANULOCYTES NFR BLD AUTO: 1 % (ref 0–0.5)
LYMPHOCYTES # BLD: 0.4 K/UL (ref 0.8–3.5)
LYMPHOCYTES NFR BLD: 11 % (ref 12–49)
MCH RBC QN AUTO: 27.7 PG (ref 26–34)
MCHC RBC AUTO-ENTMCNC: 30.8 G/DL (ref 30–36.5)
MCV RBC AUTO: 90.2 FL (ref 80–99)
MONOCYTES # BLD: 0.3 K/UL (ref 0–1)
MONOCYTES NFR BLD: 10 % (ref 5–13)
NEUTS SEG # BLD: 2.3 K/UL (ref 1.8–8)
NEUTS SEG NFR BLD: 72 % (ref 32–75)
NRBC # BLD: 0 K/UL (ref 0–0.01)
NRBC BLD-RTO: 0 PER 100 WBC
PLATELET # BLD AUTO: 68 K/UL (ref 150–400)
PMV BLD AUTO: 11.4 FL (ref 8.9–12.9)
POTASSIUM SERPL-SCNC: 4.8 MMOL/L (ref 3.5–5.1)
PROT SERPL-MCNC: 6.6 G/DL (ref 6.4–8.2)
RBC # BLD AUTO: 1.73 M/UL (ref 3.8–5.2)
RBC MORPH BLD: ABNORMAL
RETICS # AUTO: 0 M/UL (ref 0.02–0.08)
RETICS/RBC NFR AUTO: 0.2 % (ref 0.7–2.1)
SODIUM SERPL-SCNC: 136 MMOL/L (ref 136–145)
WBC # BLD AUTO: 3.2 K/UL (ref 3.6–11)

## 2022-12-28 PROCEDURE — 86920 COMPATIBILITY TEST SPIN: CPT

## 2022-12-28 PROCEDURE — 86880 COOMBS TEST DIRECT: CPT

## 2022-12-28 PROCEDURE — C9113 INJ PANTOPRAZOLE SODIUM, VIA: HCPCS | Performed by: STUDENT IN AN ORGANIZED HEALTH CARE EDUCATION/TRAINING PROGRAM

## 2022-12-28 PROCEDURE — 74011250636 HC RX REV CODE- 250/636: Performed by: STUDENT IN AN ORGANIZED HEALTH CARE EDUCATION/TRAINING PROGRAM

## 2022-12-28 PROCEDURE — 86870 RBC ANTIBODY IDENTIFICATION: CPT

## 2022-12-28 PROCEDURE — 86901 BLOOD TYPING SEROLOGIC RH(D): CPT

## 2022-12-28 PROCEDURE — 85025 COMPLETE CBC W/AUTO DIFF WBC: CPT

## 2022-12-28 PROCEDURE — 74011000250 HC RX REV CODE- 250: Performed by: STUDENT IN AN ORGANIZED HEALTH CARE EDUCATION/TRAINING PROGRAM

## 2022-12-28 PROCEDURE — 86978 RBC PRETREATMENT SERUM: CPT

## 2022-12-28 PROCEDURE — 82746 ASSAY OF FOLIC ACID SERUM: CPT

## 2022-12-28 PROCEDURE — 99285 EMERGENCY DEPT VISIT HI MDM: CPT

## 2022-12-28 PROCEDURE — 86900 BLOOD TYPING SEROLOGIC ABO: CPT

## 2022-12-28 PROCEDURE — 86922 COMPATIBILITY TEST ANTIGLOB: CPT

## 2022-12-28 PROCEDURE — 86860 RBC ANTIBODY ELUTION: CPT

## 2022-12-28 PROCEDURE — 80053 COMPREHEN METABOLIC PANEL: CPT

## 2022-12-28 PROCEDURE — 65270000046 HC RM TELEMETRY

## 2022-12-28 PROCEDURE — 82607 VITAMIN B-12: CPT

## 2022-12-28 PROCEDURE — 86904 BLOOD TYPING PATIENT SERUM: CPT

## 2022-12-28 PROCEDURE — 36415 COLL VENOUS BLD VENIPUNCTURE: CPT

## 2022-12-28 PROCEDURE — 85045 AUTOMATED RETICULOCYTE COUNT: CPT

## 2022-12-28 PROCEDURE — 86850 RBC ANTIBODY SCREEN: CPT

## 2022-12-28 RX ORDER — SODIUM CHLORIDE 9 MG/ML
250 INJECTION, SOLUTION INTRAVENOUS AS NEEDED
Status: DISCONTINUED | OUTPATIENT
Start: 2022-12-28 | End: 2022-12-31 | Stop reason: HOSPADM

## 2022-12-28 RX ORDER — LIDOCAINE 4 G/100G
1 PATCH TOPICAL DAILY
Status: DISCONTINUED | OUTPATIENT
Start: 2022-12-29 | End: 2022-12-31 | Stop reason: HOSPADM

## 2022-12-28 RX ORDER — AMLODIPINE BESYLATE 5 MG/1
10 TABLET ORAL DAILY
Status: DISCONTINUED | OUTPATIENT
Start: 2022-12-29 | End: 2022-12-31 | Stop reason: HOSPADM

## 2022-12-28 RX ORDER — LOSARTAN POTASSIUM 50 MG/1
25 TABLET ORAL DAILY
Status: DISCONTINUED | OUTPATIENT
Start: 2022-12-29 | End: 2022-12-31 | Stop reason: HOSPADM

## 2022-12-28 RX ORDER — LORATADINE 10 MG/1
10 TABLET ORAL DAILY
Status: DISCONTINUED | OUTPATIENT
Start: 2022-12-29 | End: 2022-12-31 | Stop reason: HOSPADM

## 2022-12-28 RX ORDER — LEVOTHYROXINE SODIUM 150 UG/1
150 TABLET ORAL
Status: DISCONTINUED | OUTPATIENT
Start: 2022-12-29 | End: 2022-12-31 | Stop reason: HOSPADM

## 2022-12-28 RX ORDER — SODIUM CHLORIDE 9 MG/ML
75 INJECTION, SOLUTION INTRAVENOUS CONTINUOUS
Status: DISPENSED | OUTPATIENT
Start: 2022-12-28 | End: 2022-12-29

## 2022-12-28 RX ORDER — POLYETHYLENE GLYCOL 3350 17 G/17G
17 POWDER, FOR SOLUTION ORAL DAILY
Status: DISCONTINUED | OUTPATIENT
Start: 2022-12-29 | End: 2022-12-31 | Stop reason: HOSPADM

## 2022-12-28 RX ORDER — ALLOPURINOL 100 MG/1
100 TABLET ORAL DAILY
COMMUNITY

## 2022-12-28 RX ORDER — EZETIMIBE 10 MG/1
10 TABLET ORAL DAILY
Status: DISCONTINUED | OUTPATIENT
Start: 2022-12-29 | End: 2022-12-31 | Stop reason: HOSPADM

## 2022-12-28 RX ORDER — METOLAZONE 2.5 MG/1
2.5 TABLET ORAL EVERY OTHER DAY
Status: DISCONTINUED | OUTPATIENT
Start: 2022-12-29 | End: 2022-12-31 | Stop reason: HOSPADM

## 2022-12-28 RX ORDER — FUROSEMIDE 40 MG/1
40 TABLET ORAL DAILY
Status: DISCONTINUED | OUTPATIENT
Start: 2022-12-29 | End: 2022-12-29

## 2022-12-28 RX ORDER — FLUTICASONE PROPIONATE 50 MCG
2 SPRAY, SUSPENSION (ML) NASAL DAILY
Status: DISCONTINUED | OUTPATIENT
Start: 2022-12-29 | End: 2022-12-31 | Stop reason: HOSPADM

## 2022-12-28 RX ORDER — IPRATROPIUM BROMIDE AND ALBUTEROL SULFATE 2.5; .5 MG/3ML; MG/3ML
3 SOLUTION RESPIRATORY (INHALATION)
Status: DISCONTINUED | OUTPATIENT
Start: 2022-12-28 | End: 2022-12-31 | Stop reason: HOSPADM

## 2022-12-28 RX ORDER — POTASSIUM CHLORIDE 750 MG/1
20 TABLET, FILM COATED, EXTENDED RELEASE ORAL DAILY
Status: DISCONTINUED | OUTPATIENT
Start: 2022-12-29 | End: 2022-12-31 | Stop reason: HOSPADM

## 2022-12-28 RX ORDER — ATORVASTATIN CALCIUM 20 MG/1
20 TABLET, FILM COATED ORAL DAILY
Status: DISCONTINUED | OUTPATIENT
Start: 2022-12-29 | End: 2022-12-31 | Stop reason: HOSPADM

## 2022-12-28 RX ADMIN — SODIUM CHLORIDE 40 MG: 9 INJECTION, SOLUTION INTRAMUSCULAR; INTRAVENOUS; SUBCUTANEOUS at 23:21

## 2022-12-28 NOTE — TELEPHONE ENCOUNTER
Pt hgb is 6.0   No spot at [facility] or [facility] or [facility]. MD said to send pt to ER today. This RN called pt and her daughter was in the background. She is aware to go to the ER. We will also work on reserving a spot for her every Thursday in case she needs blood moving forward. She thanked me for the call.

## 2022-12-29 ENCOUNTER — APPOINTMENT (OUTPATIENT)
Dept: ULTRASOUND IMAGING | Age: 77
End: 2022-12-29
Attending: NURSE PRACTITIONER
Payer: MEDICARE

## 2022-12-29 LAB
ALBUMIN SERPL-MCNC: 2.6 G/DL (ref 3.5–5)
ALBUMIN/GLOB SERPL: 0.8 {RATIO} (ref 1.1–2.2)
ALP SERPL-CCNC: 189 U/L (ref 45–117)
ALT SERPL-CCNC: 19 U/L (ref 12–78)
ANION GAP SERPL CALC-SCNC: 5 MMOL/L (ref 5–15)
AST SERPL-CCNC: 20 U/L (ref 15–37)
BILIRUB SERPL-MCNC: 1.1 MG/DL (ref 0.2–1)
BUN SERPL-MCNC: 37 MG/DL (ref 6–20)
BUN/CREAT SERPL: 26 (ref 12–20)
CALCIUM SERPL-MCNC: 8.9 MG/DL (ref 8.5–10.1)
CHLORIDE SERPL-SCNC: 112 MMOL/L (ref 97–108)
CO2 SERPL-SCNC: 21 MMOL/L (ref 21–32)
CREAT SERPL-MCNC: 1.4 MG/DL (ref 0.55–1.02)
ERYTHROCYTE [DISTWIDTH] IN BLOOD BY AUTOMATED COUNT: 21.8 % (ref 11.5–14.5)
FOLATE SERPL-MCNC: 18.5 NG/ML (ref 5–21)
GLOBULIN SER CALC-MCNC: 3.4 G/DL (ref 2–4)
GLUCOSE SERPL-MCNC: 121 MG/DL (ref 65–100)
HCT VFR BLD AUTO: 13.9 % (ref 35–47)
HCT VFR BLD AUTO: 20.1 % (ref 35–47)
HGB BLD-MCNC: 4.2 G/DL (ref 11.5–16)
HGB BLD-MCNC: 6.5 G/DL (ref 11.5–16)
HISTORY CHECKED?,CKHIST: NORMAL
MCH RBC QN AUTO: 27.5 PG (ref 26–34)
MCHC RBC AUTO-ENTMCNC: 30.2 G/DL (ref 30–36.5)
MCV RBC AUTO: 90.8 FL (ref 80–99)
NRBC # BLD: 0 K/UL (ref 0–0.01)
NRBC BLD-RTO: 0 PER 100 WBC
PATH REV BLD -IMP: NORMAL
PLATELET # BLD AUTO: 55 K/UL (ref 150–400)
PMV BLD AUTO: 11.3 FL (ref 8.9–12.9)
POTASSIUM SERPL-SCNC: 4.4 MMOL/L (ref 3.5–5.1)
PROT SERPL-MCNC: 6 G/DL (ref 6.4–8.2)
RBC # BLD AUTO: 1.53 M/UL (ref 3.8–5.2)
SODIUM SERPL-SCNC: 138 MMOL/L (ref 136–145)
VIT B12 SERPL-MCNC: 777 PG/ML (ref 193–986)
WBC # BLD AUTO: 2.5 K/UL (ref 3.6–11)

## 2022-12-29 PROCEDURE — 74011250637 HC RX REV CODE- 250/637: Performed by: INTERNAL MEDICINE

## 2022-12-29 PROCEDURE — 85027 COMPLETE CBC AUTOMATED: CPT

## 2022-12-29 PROCEDURE — 86902 BLOOD TYPE ANTIGEN DONOR EA: CPT

## 2022-12-29 PROCEDURE — 76700 US EXAM ABDOM COMPLETE: CPT

## 2022-12-29 PROCEDURE — 30233N1 TRANSFUSION OF NONAUTOLOGOUS RED BLOOD CELLS INTO PERIPHERAL VEIN, PERCUTANEOUS APPROACH: ICD-10-PCS | Performed by: INTERNAL MEDICINE

## 2022-12-29 PROCEDURE — 36415 COLL VENOUS BLD VENIPUNCTURE: CPT

## 2022-12-29 PROCEDURE — 65270000046 HC RM TELEMETRY

## 2022-12-29 PROCEDURE — 36430 TRANSFUSION BLD/BLD COMPNT: CPT

## 2022-12-29 PROCEDURE — 74011250637 HC RX REV CODE- 250/637: Performed by: STUDENT IN AN ORGANIZED HEALTH CARE EDUCATION/TRAINING PROGRAM

## 2022-12-29 PROCEDURE — 80053 COMPREHEN METABOLIC PANEL: CPT

## 2022-12-29 PROCEDURE — P9016 RBC LEUKOCYTES REDUCED: HCPCS

## 2022-12-29 PROCEDURE — 74011250636 HC RX REV CODE- 250/636: Performed by: STUDENT IN AN ORGANIZED HEALTH CARE EDUCATION/TRAINING PROGRAM

## 2022-12-29 PROCEDURE — 86921 COMPATIBILITY TEST INCUBATE: CPT

## 2022-12-29 PROCEDURE — C9113 INJ PANTOPRAZOLE SODIUM, VIA: HCPCS | Performed by: STUDENT IN AN ORGANIZED HEALTH CARE EDUCATION/TRAINING PROGRAM

## 2022-12-29 PROCEDURE — 74011000250 HC RX REV CODE- 250: Performed by: STUDENT IN AN ORGANIZED HEALTH CARE EDUCATION/TRAINING PROGRAM

## 2022-12-29 PROCEDURE — 85018 HEMOGLOBIN: CPT

## 2022-12-29 PROCEDURE — 86922 COMPATIBILITY TEST ANTIGLOB: CPT

## 2022-12-29 RX ORDER — FUROSEMIDE 40 MG/1
40 TABLET ORAL DAILY
Status: DISCONTINUED | OUTPATIENT
Start: 2022-12-29 | End: 2022-12-31 | Stop reason: HOSPADM

## 2022-12-29 RX ORDER — SODIUM CHLORIDE 9 MG/ML
50 INJECTION, SOLUTION INTRAVENOUS CONTINUOUS
Status: DISCONTINUED | OUTPATIENT
Start: 2022-12-29 | End: 2022-12-29

## 2022-12-29 RX ORDER — SODIUM CHLORIDE 9 MG/ML
250 INJECTION, SOLUTION INTRAVENOUS AS NEEDED
Status: DISCONTINUED | OUTPATIENT
Start: 2022-12-29 | End: 2022-12-31 | Stop reason: HOSPADM

## 2022-12-29 RX ADMIN — POLYETHYLENE GLYCOL 3350 17 G: 17 POWDER, FOR SOLUTION ORAL at 10:50

## 2022-12-29 RX ADMIN — POTASSIUM CHLORIDE 20 MEQ: 750 TABLET, FILM COATED, EXTENDED RELEASE ORAL at 10:49

## 2022-12-29 RX ADMIN — EZETIMIBE 10 MG: 10 TABLET ORAL at 10:49

## 2022-12-29 RX ADMIN — LEVOTHYROXINE SODIUM 150 MCG: 0.15 TABLET ORAL at 10:49

## 2022-12-29 RX ADMIN — SODIUM CHLORIDE 40 MG: 9 INJECTION, SOLUTION INTRAMUSCULAR; INTRAVENOUS; SUBCUTANEOUS at 10:49

## 2022-12-29 RX ADMIN — SODIUM CHLORIDE 40 MG: 9 INJECTION, SOLUTION INTRAMUSCULAR; INTRAVENOUS; SUBCUTANEOUS at 21:08

## 2022-12-29 RX ADMIN — FUROSEMIDE 40 MG: 40 TABLET ORAL at 10:56

## 2022-12-29 RX ADMIN — SODIUM CHLORIDE 75 ML/HR: 9 INJECTION, SOLUTION INTRAVENOUS at 00:04

## 2022-12-29 RX ADMIN — ATORVASTATIN CALCIUM 20 MG: 20 TABLET, FILM COATED ORAL at 10:49

## 2022-12-29 NOTE — PROGRESS NOTES
2215: TRANSFER - IN REPORT:    Verbal report received from Ninoska) on Shabnam Louis  being received from ED/PCU front kitchen(unit) for routine progression of care      Report consisted of patients Situation, Background, Assessment and   Recommendations(SBAR). Information from the following report(s) SBAR, Kardex, ED Summary, Intake/Output, MAR, Recent Results, and Cardiac Rhythm VPaced  was reviewed with the receiving nurse. Opportunity for questions and clarification was provided. Assessment completed upon patients arrival to unit and care assumed. 0007: Critical blood work HGB 4.2 and HCT 13.9. Perfect Serve Azael of the results. 0104: Called blood blank for updated status on blood transfusion, according to the blood bank, it is under study at [facility] as of now and will possibly arrive in the afternoon. Relayed the information to Rashaun Ponce NP.     0117: No iron supplementation needed at this time and will hold off on HGB/HCT Q4H checks until blood transfused. 4363: Patient hypotensive /55 MAP 56. Perfect Serve [place], no interventions at this time. 3905: Blood Bank called for update, per blood blank tech blood is on the way, on-call Patholigist need to approve [place]'s study. Will update when blood is ready. End of Shift Note    Bedside shift change report given to Danelle Gonzalez (oncoming nurse) by Sheree Vora RN (offgoing nurse).   Report included the following information SBAR, Kardex, ED Summary, Intake/Output, MAR, Recent Results, and Cardiac Rhythm VPaced    Shift worked:  [id number]     Shift summary and any significant changes:     SEE NOTES ABOVE     Concerns for physician to address:  Blood bank     Zone phone for oncoming shift:          Activity:     Number times ambulated in hallways past shift: 0  Number of times OOB to chair past shift: 0    Cardiac:   Cardiac Monitoring: Yes           Access:  Current line(s): PIV     Genitourinary:   Urinary status: voiding and external catheter    Respiratory:   O2 Device: None (Room air)  Chronic home O2 use?: NO  Incentive spirometer at bedside: YES       GI:  Last Bowel Movement Date: 12/28/22  Current diet:  DIET NPO  Passing flatus: YES  Tolerating current diet: YES       Pain Management:   Patient states pain is manageable on current regimen: YES    Skin:  Boris Score: 19  Interventions: float heels, increase time out of bed, PT/OT consult, and internal/external urinary devices    Patient Safety:  Fall Score:    Interventions: bed/chair alarm, assistive device (walker, cane, etc), gripper socks, pt to call before getting OOB, and stay with me (per policy)       Length of Stay:  Expected LOS: - - -  Actual LOS: [address] RN

## 2022-12-29 NOTE — ED PROVIDER NOTES
EMERGENCY DEPARTMENT HISTORY AND PHYSICAL EXAM      Date: 2022  Patient Name: Janette Prieto    History of Presenting Illness     Chief Complaint   Patient presents with    Anemia     Called for blood transfusion, hgb of 6; know hx       History Provided By: Patient    HPI: Janette Prieto, 68 y.o. female with a past medical history significant for medical problems as stated below presents to the ED with cc of anemia and shortness of breath. She notes she became having short of breath yesterday and worsening today. She notes is the marker for when she is anemic. Notably she has a long history of anemia and has received over 14 units of blood in the past for chronic GI bleeds. She does follow-up with GI as noted to have gave. She notes that there is nothing to do for this at her last GI appointment note she would just have chronic bleeding all the time. She does get iron infusions follows up with nephrology for mild CKD. She was getting an iron infusion yesterday in the outpatient labs and she was noted to have a hemoglobin of 6 and referred to the ER for evaluation. She does get short of breath when she stands up as well as dizziness. She denies any blood in her stool. Is been eating and drinking normally but occasionally has some dry heaving and gagging but not actively vomiting. Denies any hematuria. Denies feeling sick recently. No chest pain, fever. She notes no abdominal pain. She has been taking medications for her acid she notes. There are no associated symptoms. No other exacerbating or ameliorating factors.     PCP: Mariam Nuñez MD    Current Facility-Administered Medications on File Prior to Encounter   Medication Dose Route Frequency Provider Last Rate Last Admin    [] 0.9% sodium chloride infusion  5-250 mL/hr IntraVENous PRN Claudette Gonzales MD        [] sodium chloride (NS) flush 5-40 mL  5-40 mL IntraVENous PRN Claudette Gonzales MD         Current Outpatient Medications on File Prior to Encounter   Medication Sig Dispense Refill    loratadine (CLARITIN) 10 mg tablet TAKE 1 TABLET BY MOUTH EVERY DAY 90 Tablet 3    atorvastatin (LIPITOR) 20 mg tablet TAKE 1 TABLET BY MOUTH AT BEDTIME 90 Tablet 3    ezetimibe (ZETIA) 10 mg tablet TAKE 1 TABLET BY MOUTH EVERY DAY 90 Tablet 3    benzocaine-menthoL (CHLORASEPTIC MAX) 15-10 mg lozg lozenge Take 1 Lozenge by mouth as needed for Sore throat. 30 Lozenge 0    furosemide (LASIX) 80 mg tablet Take 0.5 Tablets by mouth daily. 30 Tablet 0    pantoprazole (PROTONIX) 40 mg tablet Take 1 Tablet by mouth Before breakfast and dinner. 30 Tablet 0    levalbuterol tartrate (XOPENEX) 45 mcg/actuation inhaler Take 2 Puffs by inhalation every six (6) hours as needed for Wheezing or Shortness of Breath. alendronate (FOSAMAX) 70 mg tablet alendronate 70 mg tablet   Take 1 tablet every week by oral route. losartan (COZAAR) 25 mg tablet Take 25 mg by mouth daily. metOLazone (ZAROXOLYN) 2.5 mg tablet Take 1 Tablet by mouth every other day. 30 Tablet 0    amLODIPine (NORVASC) 10 mg tablet Take 1 Tablet by mouth daily. 90 Tablet 3    potassium chloride SA (MICRO-K) 10 mEq capsule Take 2 Capsules by mouth daily. 180 Capsule 3    pantoprazole (PROTONIX) 40 mg tablet Take 1 Tablet by mouth Before breakfast and dinner. 60 Tablet 5    nystatin (MYCOSTATIN) powder Apply  to affected area two (2) times a day. 30 g 0    fluticasone propionate (FLONASE) 50 mcg/actuation nasal spray 2 Sprays by Both Nostrils route daily. Administer to right and left nostril. 3 Each 3    levothyroxine (SYNTHROID) 150 mcg tablet Take 1 Tablet by mouth Daily (before breakfast). 90 Tablet 3    icosapent ethyL (VASCEPA) 1 gram capsule Take 2 Capsules by mouth two (2) times daily (with meals). 360 Capsule 3    lidocaine (LIDODERM) 5 % Apply patch to the affected area for 12 hours a day and remove for 12 hours a day.  90 Each 3    polyethylene glycol (MIRALAX) 17 gram/dose powder Take 17 g by mouth daily. 2108 g 3    ketoconazole (NIZORAL) 2 % topical cream Apply  to affected area daily as needed. 3    Biotin 2,500 mcg cap Take  by mouth. L GASSERI/B BIFIDUM/B LONGUM (Noosh PO) Take 1 Tab by mouth daily. vitamin E (AQUA GEMS) 400 unit capsule Take 800 Units by mouth two (2) times a day. cholecalciferol, vitamin D3, 50 mcg (2,000 unit) tab Take 1 Tab by mouth daily. MULTIVITAMIN WITH MINERALS (ONE-A-DAY 50 PLUS PO) Take 1 Tab by mouth daily.          Past History     Past Medical History:  Past Medical History:   Diagnosis Date    Abnormal nuclear stress test 10/04/2021    Anemia     Angina at rest [facility]) 10/04/2021    Arthritis     KNEE,gout    Bundle branch block     Endocrine disease     HYPOTHYROIDISM    Fracture     Left distal femur (age 12 - pt fell)    Fracture     Left tibia 1990 (pt fell)    Fracture     Right wrist fractured 2 times (pt fell)    GERD (gastroesophageal reflux disease)     High cholesterol     Hypertension     Hypoglycemia     \"my body produces too much insulin\"    Liver disease     BRADY    Nausea & vomiting     when had gallbladder out    Osteoporosis     Other ill-defined conditions(799.89)     edema legs    S/P cardiac cath 10/04/2021    10/4/2021 nonobstructive disease    Spinal stenosis     Thyroid disease        Past Surgical History:  Past Surgical History:   Procedure Laterality Date    COLONOSCOPY N/A 7/13/2021    COLONOSCOPY performed by Ulises Yi MD at [facility] ENDOSCOPY    COLONOSCOPY N/A 8/8/2022    COLONOSCOPY performed by Horace Victoria MD at [address] [place] [address],[address]  2/11/2015         COLONOSCOPY,GATITO TINSLEY,SNARE  7/13/2021         COLONOSCPY,FLEX,W/ [address] INJECT  7/13/2021         COLORECTAL SCRN; HI RISK IND  2/11/2015         HX CHOLECYSTECTOMY      HX HYSTERECTOMY      HX ORTHOPAEDIC Left     leg surgery - plates, screws, wires, pins in place    HX UROLOGICAL PATSY    UT ABDOMEN SURGERY PROC UNLISTED      liver biopsy--BRADY and fibrosis    SB ENDOSCOPY,W/CONTROL,BLEEDING  11/16/2022    SMALL BOWEL ENDOSCOPY,ABLATE LESN  11/16/2022    SMALL BOWEL ENDOSCOPY,BIOPSY  11/16/2022    UPPER GI ENDOSCOPY,BIOPSY  11/17/2015            Family History:  Family History   Problem Relation Age of Onset    Diabetes Mother     Heart Disease Mother     Emphysema Father     No Known Problems Brother     Stroke Maternal Grandmother     No Known Problems Maternal Grandfather     No Known Problems Paternal Grandmother     No Known Problems Paternal Grandfather        Social History:  Social History     Tobacco Use    Smoking status: Never    Smokeless tobacco: Never   Vaping Use    Vaping Use: Never used   Substance Use Topics    Alcohol use: No    Drug use: Yes     Types: Prescription, OTC       Allergies: Allergies   Allergen Reactions    Gabapentin Other (comments)     Suicidal ideation    Metoprolol Rash    Celebrex [Celecoxib] Hives    Eliquis [Apixaban] Other (comments)     Caused excessive anemia    Pcn [Penicillins] Unknown (comments)     Can't remember, was a long time    Sulfa (Sulfonamide Antibiotics) Hives         Review of Systems   Review of Systems   Constitutional:  Positive for fatigue. Negative for activity change. HENT:  Negative for congestion. Eyes:  Negative for photophobia and visual disturbance. Respiratory:  Positive for shortness of breath. Negative for cough and chest tightness. Cardiovascular:  Negative for chest pain, palpitations and leg swelling. Gastrointestinal:  Positive for vomiting. Negative for abdominal pain, constipation, diarrhea and nausea. Endocrine: Negative for polyuria. Genitourinary:  Negative for difficulty urinating, dysuria and hematuria. Musculoskeletal:  Negative for back pain. Skin:  Negative for rash and wound. Neurological:  Positive for dizziness. Negative for headaches.    Hematological:  Bruises/bleeds easily. Physical Exam   Physical Exam  Vitals and nursing note reviewed. Constitutional:       Appearance: Normal appearance. HENT:      Head: Normocephalic and atraumatic. Nose: Nose normal.      Mouth/Throat:      Mouth: Mucous membranes are moist.      Pharynx: Oropharynx is clear. Eyes:      Comments: Pale conjunctiva   Cardiovascular:      Rate and Rhythm: Normal rate and regular rhythm. Comments: 3/6 systolic flow murmur  Pulmonary:      Effort: Pulmonary effort is normal.      Breath sounds: Normal breath sounds. Abdominal:      General: Abdomen is flat. There is no distension. Palpations: Abdomen is soft. There is no mass. Tenderness: There is no abdominal tenderness. There is no guarding. Hernia: No hernia is present. Musculoskeletal:         General: No swelling, tenderness, deformity or signs of injury. Normal range of motion. Cervical back: Normal range of motion and neck supple. Skin:     General: Skin is warm and dry. Capillary Refill: Capillary refill takes less than 2 seconds. Coloration: Skin is pale. Neurological:      General: No focal deficit present. Mental Status: She is alert and oriented to person, place, and time.    Psychiatric:         Mood and Affect: Mood normal.         Behavior: Behavior normal.       Diagnostic Study Results     Labs -     Recent Results (from the past 24 hour(s))   CBC WITH AUTOMATED DIFF    Collection Time: 12/28/22  4:46 PM   Result Value Ref Range    WBC 3.2 (L) 3.6 - 11.0 K/uL    RBC 1.73 (L) 3.80 - 5.20 M/uL    HGB 4.8 (LL) 11.5 - 16.0 g/dL    HCT 15.6 (LL) 35.0 - 47.0 %    MCV 90.2 80.0 - 99.0 FL    MCH 27.7 26.0 - 34.0 PG    MCHC 30.8 30.0 - 36.5 g/dL    RDW 22.0 (H) 11.5 - 14.5 %    PLATELET 68 (L) 855 - 400 K/uL    MPV 11.4 8.9 - 12.9 FL    NRBC 0.0 0  WBC    ABSOLUTE NRBC 0.00 0.00 - 0.01 K/uL    NEUTROPHILS 72 32 - 75 %    LYMPHOCYTES 11 (L) 12 - 49 %    MONOCYTES 10 5 - 13 % EOSINOPHILS 6 0 - 7 %    BASOPHILS 0 0 - 1 %    IMMATURE GRANULOCYTES 1 (H) 0.0 - 0.5 %    ABS. NEUTROPHILS 2.3 1.8 - 8.0 K/UL    ABS. LYMPHOCYTES 0.4 (L) 0.8 - 3.5 K/UL    ABS. MONOCYTES 0.3 0.0 - 1.0 K/UL    ABS. EOSINOPHILS 0.2 0.0 - 0.4 K/UL    ABS. BASOPHILS 0.0 0.0 - 0.1 K/UL    ABS. IMM. GRANS. 0.0 0.00 - 0.04 K/UL    DF SMEAR SCANNED      RBC COMMENTS ANISOCYTOSIS  2+        RBC COMMENTS OVALOCYTES  2+        RBC COMMENTS HYPOCHROMIA  1+        RBC COMMENTS MICROCYTOSIS  2+       METABOLIC PANEL, COMPREHENSIVE    Collection Time: 12/28/22  4:46 PM   Result Value Ref Range    Sodium 136 136 - 145 mmol/L    Potassium 4.8 3.5 - 5.1 mmol/L    Chloride 110 (H) 97 - 108 mmol/L    CO2 22 21 - 32 mmol/L    Anion gap 4 (L) 5 - 15 mmol/L    Glucose 142 (H) 65 - 100 mg/dL    BUN 38 (H) 6 - 20 MG/DL    Creatinine 1.49 (H) 0.55 - 1.02 MG/DL    BUN/Creatinine ratio 26 (H) 12 - 20      eGFR 36 (L) >60 ml/min/1.73m2    Calcium 8.9 8.5 - 10.1 MG/DL    Bilirubin, total 1.1 (H) 0.2 - 1.0 MG/DL    ALT (SGPT) 22 12 - 78 U/L    AST (SGOT) 30 15 - 37 U/L    Alk. phosphatase 219 (H) 45 - 117 U/L    Protein, total 6.6 6.4 - 8.2 g/dL    Albumin 3.0 (L) 3.5 - 5.0 g/dL    Globulin 3.6 2.0 - 4.0 g/dL    A-G Ratio 0.8 (L) 1.1 - 2.2     RETICULOCYTE COUNT    Collection Time: 12/28/22  6:47 PM   Result Value Ref Range    Reticulocyte count 0.2 (L) 0.7 - 2.1 %    Absolute Retic Cnt. 0.0029 (L) 0.0164 - 0.0776 M/ul       Radiologic Studies -   No orders to display     CT Results  (Last 48 hours)      None          CXR Results  (Last 48 hours)      None              Medical Decision Making   I am the first provider for this patient. I reviewed the vital signs, available nursing notes, past medical history, past surgical history, family history and social history. Vital Signs-Reviewed the patient's vital signs.   Patient Vitals for the past 12 hrs:   Temp Pulse Resp BP SpO2   12/28/22 1615 97.9 °F (36.6 °C) 73 20 (!) 136/48 99 % Records Reviewed: Nursing records and medical records reviewed    MDM:    Medical Decision Making  26-year-old female with past medical history ofanemia, gave, hypertension presenting with anemia. Her vital signs are stable upon arrival.  She did not appear to be in hemorrhagic shock. She does appear pale but is mentating well with normal vital signs. She has a normal abdominal exam.  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 Will order 2 units of blood for her hemoglobin of 4.8 here today. She also has a normal MCV and has been getting blood transfusions but will order reticulocyte count to evaluate for any signs of decreased production in the setting as she has been noting her hair has been falling out and thus higher suspicion for vitamin deficiency as well. Will need admission for blood transfusions and repeat hemoglobin given her severe hemoglobin. Provider Notes (Medical Decision Making):   DDx includes upper GI bleed, lower GI bleed, anemia, aplastic anemia, vitamin deficiency    ED Course:   Initial assessment performed. The patients presenting problems have been discussed, and they are in agreement with the care plan formulated and outlined with them. I have encouraged them to ask questions as they arise throughout their visit. Critical Care:  I have spent 40 minutes of critical care time in evaluating and treating this patient. This includes time spent at bedside, time with family and decision makers, documentation, review of labs and imaging, and/or consultation with specialists. It does not include time spent on separately billed procedures.      This patient presents with a critical illness or injury that acutely impairs one or more vital organ systems such that there is a high probability of imminent or life threatening deterioration in the patient's condition. This case involved decision making of high complexity to assess, manipulate, and support vital organ system failure and/or to prevent further life threatening deterioration of the patient's condition. Failure to initiate these interventions on an urgent basis would likely result in sudden, clinically significant or life threatening deterioration in the patient's condition. Abnormal findings supporting critical care: anemia  Interventions to support critical care: blood transfusion, admission, chart review  Failure to intervene may result in: hemorrhagic shock, death        Disposition:    1. Admit to Hospitalist    Admission Note:  8:01 PM  Patient is being admitted to the hospital by Dr. Mitzy Chavarria. The results of their tests and reasons for their admission have been discussed with them and available family. They convey agreement and understanding for the need to be admitted and for their admission diagnosis. DISCHARGE PLAN:  1. Current Discharge Medication List        2. Follow-up Information    None       3. Return to ED if worse     Diagnosis     Clinical Impression:   1. Anemia, unspecified type        Attestations:    Rosette Pink MD    Please note that this dictation was completed with [employer], the computer voice recognition software. Quite often unanticipated grammatical, syntax, homophones, and other interpretive errors are inadvertently transcribed by the computer software. Please disregard these errors. Please excuse any errors that have escaped final proofreading. Thank you.

## 2022-12-29 NOTE — ED NOTES
Patient is being transferred to [address], Room # [phone]. Report given to Yon Concepcion RN on Chani Perry for routine progression of care. Report consisted of the following information SBAR, Kardex, ED Summary, Intake/Output, MAR, Recent Results, and Cardiac Rhythm sinus . Patient transferred to receiving unit by: debbie GRAHAM (RN or tech name). Outstanding consults needed: Yes    Next labs due: Yes    The following personal items will be sent with the patient during transfer to the floor: All valuables:    Cardiac monitoring ordered: Yes    The following CURRENT information was reported to the receiving RN:    Code status: Full Code at time of transfer    Last set of vital signs:  Vital Signs  Level of Consciousness: Alert (0) (12/28/22 1615)  Temp: 97.9 °F (36.6 °C) (12/28/22 1615)  Temp Source: Oral (12/28/22 1615)  Pulse (Heart Rate): 60 (12/28/22 2200)  Resp Rate: 18 (12/28/22 2200)  BP: (!) 129/44 (12/28/22 2200)  MAP (Monitor): 68 (12/28/22 2200)  MAP (Calculated): 72 (12/28/22 2200)  BP 1 Location: Left upper arm (12/28/22 1615)  BP 1 Method: Automatic (12/28/22 1615)  MEWS Score: 1 (12/28/22 1615)         Oxygen Therapy  O2 Sat (%): 94 % (12/28/22 2200)  Pulse via Oximetry: 60 beats per minute (12/28/22 2200)  O2 Device: None (Room air) (12/28/22 1615)      Last pain assessment:         Wounds: No     Urinary catheter: voiding and external catheter  Is there a rodgers order: No     LDAs:       Peripheral IV Right Forearm (Active)       Peripheral IV Basilic (Active)         Opportunity for questions and clarification was provided.     Latonya Connors RN

## 2022-12-29 NOTE — ED NOTES
1920: Assumed care of pt at this time. Pt resting comfortable in bed with call bell in reach and on monitor. Plan of care discussed with pt for the evening and all needs addressed at this time. 2042: This RN called lab to ask about pt's blood. Per lab, pt has antibodies and awaiting blood from [facility]. Pt updated. Pt placed on purwick at this time.    2213: report called to Beka Watters on PCU

## 2022-12-29 NOTE — PROGRESS NOTES
Hospitalist Progress Note    NAME: Beryle Piedra   :  1945   MRN:  [id number]       Assessment / Plan:    Acute blood loss anemia  2/2 GIB/GAVE:      -Hemoglobin 4.2 on presentation  -Delay in blood transfusion due to multiple antibodies in her blood  -Transfusing 2 units right now, recheck H&H this afternoon  -Continue Protonix  -GI evaluation pending  -Currently n.p.o., diet as per GI      HTN  CKD stage III  -Resume Lasix  -Patient not taking metolazone anymore  -For now holding amlodipine, ARB    History of SSS s/p pacemaker placement  History of A. fib  Hypothyroidism  HLD       DVT prophylaxis, SCD  CODE STATUS, full code  Surrogate decision-maker, son  Anticipate discharge -     Subjective:     Chief Complaint / Reason for Physician Visit  \"Currently on room air. Finally she is getting blood transfusion. Denies any melena or hematochezia overnight\". Discussed with RN events overnight. Review of Systems:  Symptom Y/N Comments  Symptom Y/N Comments   Fever/Chills    Chest Pain     Poor Appetite    Edema     Cough    Abdominal Pain     Sputum    Joint Pain     SOB/HENLEY    Pruritis/Rash     Nausea/vomit    Tolerating PT/OT     Diarrhea    Tolerating Diet     Constipation    Other       Could NOT obtain due to:      Objective:     VITALS:   Last 24hrs VS reviewed since prior progress note.  Most recent are:  Patient Vitals for the past 24 hrs:   Temp Pulse Resp BP SpO2   22 0845 98.3 °F (36.8 °C) 63 18 97/65 95 %   22 0836 98.3 °F (36.8 °C) 60 18 (!) 127/32 97 %   22 0825 98.3 °F (36.8 °C) 63 16 (!) 127/32 98 %   22 0400 98.4 °F (36.9 °C) 60 18 (!) 123/35 97 %   22 0300 -- 60 -- (!) 122/31 --   22 0200 -- 60 -- (!) 130/32 --   22 0100 -- 60 -- (!) 127/30 --   22 0000 -- 68 -- (!) 121/34 --   22 2215 97.7 °F (36.5 °C) 65 21 (!) 131/42 95 %   22 2200 -- 60 18 (!) 129/44 94 %   22 2145 -- 60 18 (!) 120/41 95 %   22 2130 -- 60 19 (!) 122/41 95 %   12/28/22 2115 -- 60 22 (!) 139/41 95 %   12/28/22 2100 -- 60 19 (!) 137/41 95 %   12/28/22 1615 97.9 °F (36.6 °C) 73 20 (!) 136/48 99 %       Intake/Output Summary (Last 24 hours) at 12/29/2022 0912  Last data filed at 12/29/2022 0400  Gross per 24 hour   Intake 295 ml   Output --   Net 295 ml        I had a face to face encounter and independently examined this patient on 12/29/2022, as outlined below:  PHYSICAL EXAM:  General: WD, WN. Alert, cooperative, no acute distress    EENT:  EOMI. Anicteric sclerae. MMM  Resp:  B/l rales  CV:  Regular  rhythm,  No edema  GI:  Soft, Non distended, Non tender. +Bowel sounds  Neurologic:  Alert and oriented X 3, normal speech,   Psych:   Good insight. Not anxious nor agitated  Skin:  No rashes. No jaundice    Reviewed most current lab test results and cultures  YES  Reviewed most current radiology test results   YES  Review and summation of old records today    NO  Reviewed patient's current orders and MAR    YES  PMH/SH reviewed - no change compared to H&P  ________________________________________________________________________  Care Plan discussed with:    Comments   Patient     Family      RN     Care Manager     Consultant                        Multidiciplinary team rounds were held today with , nursing, pharmacist and clinical coordinator. Patient's plan of care was discussed; medications were reviewed and discharge planning was addressed.      ________________________________________________________________________  Total NON critical care TIME:  23   Minutes    Total CRITICAL CARE TIME Spent:   Minutes non procedure based      Comments   >50% of visit spent in counseling and coordination of care     ________________________________________________________________________  John Aden MD     Procedures: see electronic medical records for all procedures/Xrays and details which were not copied into this note but were reviewed prior to creation of Plan. LABS:  I reviewed today's most current labs and imaging studies.   Pertinent labs include:  Recent Labs     12/29/22  0007 12/28/22  1646 12/27/22  1613   WBC 2.5* 3.2* 3.9   HGB 4.2* 4.8* 6.0*   HCT 13.9* 15.6* 18.5*   PLT 55* 68* 80*     Recent Labs     12/29/22  0007 12/28/22  1646    136   K 4.4 4.8   * 110*   CO2 21 22   * 142*   BUN 37* 38*   CREA 1.40* 1.49*   CA 8.9 8.9   ALB 2.6* 3.0*   TBILI 1.1* 1.1*   ALT 19 22       Signed: Ladonna Bo MD

## 2022-12-29 NOTE — PROGRESS NOTES
PT order acknowledged and chart reviewed. Patient's getting 2 blood transfusions due to low Hgb. Will defer and continue to follow with eval being most likely tomorrow.     Elias Gutiérrez, PT

## 2022-12-29 NOTE — ED NOTES
Bedside shift change report given to Chata N Charis Hendricks (oncoming nurse) by Ann Marie Rodriguez  (offgoing nurse). Report included the following information SBAR, Kardex, ED Summary, and MAR.

## 2022-12-29 NOTE — PROGRESS NOTES
OT note:  OT orders received and chart was reviewed.   Pts HGB is 4.2 and she is not medically appropriate to be seen for eval.  Will defer and continue to follow

## 2022-12-29 NOTE — H&P
PLEASE NOTE: I HAVE GENERATED THIS NOTE WITH THE ASSISTANCE OF VOICE-RECOGNITION TECHNOLOGY. PLEASE EXCUSE ANY SPELLING, GRAMMATICAL, AND SYNTAX ERRORS YOU MAY FIND. IF YOU NEED CLARIFICATION ON ANYTHING, PLEASE REACH OUT TO ME.  [facility]  History and Physical - Dr. Stacey Tobias:     68 y.o. 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 Differential diagnosis, explanation and analysis: Patient was just admitted for this on 11/13/2022 and before that was admitted on 10/06/2022. In both of these instances, patient was found to have AVM and GAVE related episodes. Likely the same scenario here. Acute problems:    # Acute blood loss anemia  2/2 GIB/GAVE:    - H&H q.4 hours. - PRBC's: So far, 2 U given  - IV Protonix b.i.d.   - Signs of cirrhosis: no, will not start antibiotics. - Patient does not seem to have chronic alcohol use history, so I will not start an octreotide drip.    - GI consult    Chronic Problems:    Iron Def Anemia: C/w home iron tablets, outpatient infusions  CKD: CR stable, avoid nephrotoxic meds  HTN: Diastolic blood pressure is a little low, will hold off on Furosemide, losartan, and amlodipine  Hypothyroidism: C/w home synthroid  HLD: C/w home Zetia    General Admission Parameters:    GI Prophylaxis: IV Protonix BID  Gathering data from Supriya, et. al; Murphy et al; and [facility] - Please note that routine use of GI PPX in all patients is inappropriate    DVT Prophylaxis: C/I  I have used the Padua score to determine if patient needs DVT PPX.   Please note that routine use of DVT PPX in all patients is inappropriate    Code status: Full  If code status was discussed with the patient, then code status entered as per the orders Subjective:   Primary Care Provider: Vinay Comer MD  Date of Service:  See Date Note Was Originated  Chief Complaint: Abnormal lab values    68 y.o. female presents with unknown duration of unknown onset, abnormal hgb of 6 from outpatient iron infusion center that is associated with some mild dizziness and villalobos, remitted by nothing, exacerbated by nothing. Further history: Patient states that she has had multiple episodes of this and that she has had multiple blood transfusions. Has hx of GAVE and gastric AVMs. Pertinent chart review: Patient was just admitted for this in November and in October. Tends to have difficulty with crossmatched RBC's because of all of the transfusions she has had. Review of Systems:  12 point ROS obtained and otherwise negative, except as per HPI and above.   Past Medical History:   Diagnosis Date    Abnormal nuclear stress test 10/04/2021    Anemia     Angina at rest [facility]) 10/04/2021    Arthritis     KNEE,gout    Bundle branch block     Endocrine disease     HYPOTHYROIDISM    Fracture     Left distal femur (age 12 - pt fell)    Fracture     Left tibia 1990 (pt fell)    Fracture     Right wrist fractured 2 times (pt fell)    GERD (gastroesophageal reflux disease)     High cholesterol     Hypertension     Hypoglycemia     \"my body produces too much insulin\"    Liver disease     BRADY    Nausea & vomiting     when had gallbladder out    Osteoporosis     Other ill-defined conditions(799.89)     edema legs    S/P cardiac cath 10/04/2021    10/4/2021 nonobstructive disease    Spinal stenosis     Thyroid disease       Past Surgical History:   Procedure Laterality Date    COLONOSCOPY N/A 7/13/2021    COLONOSCOPY performed by Sheila Sharif MD at [facility] ENDOSCOPY    COLONOSCOPY N/A 8/8/2022    COLONOSCOPY performed by Tod Monreal MD at [address]  2/11/2015 Luan Banerjee  7/13/2021         COLONOSCPY,FLEX,W/DIR SUBMUC INJECT  7/13/2021         COLORECTAL SCRN; HI RISK IND  2/11/2015         HX CHOLECYSTECTOMY      HX HYSTERECTOMY      HX ORTHOPAEDIC Left     leg surgery - plates, screws, wires, pins in place    HX UROLOGICAL      PESSURY    HI ABDOMEN SURGERY PROC UNLISTED      liver biopsy--BRADY and fibrosis    SB ENDOSCOPY,W/CONTROL,BLEEDING  11/16/2022    SMALL BOWEL ENDOSCOPY,ABLATE LESN  11/16/2022    SMALL BOWEL ENDOSCOPY,BIOPSY  11/16/2022    UPPER GI ENDOSCOPY,BIOPSY  11/17/2015          Prior to Admission medications    Medication Sig Start Date End Date Taking? Authorizing Provider   allopurinoL (ZYLOPRIM) 100 mg tablet Take 100 mg by mouth daily. Yes Provider, Historical   atorvastatin (LIPITOR) 20 mg tablet TAKE 1 TABLET BY MOUTH AT BEDTIME 12/19/22  Yes Matilde Gonsalves MD   ezetimibe (ZETIA) 10 mg tablet TAKE 1 TABLET BY MOUTH EVERY DAY 12/19/22  Yes Matilde Gonsalves MD   furosemide (LASIX) 80 mg tablet Take 0.5 Tablets by mouth daily. 11/18/22  Yes Lola Cao NP   pantoprazole (PROTONIX) 40 mg tablet Take 1 Tablet by mouth Before breakfast and dinner. 11/18/22  Yes Lola Cao NP   metOLazone (ZAROXOLYN) 2.5 mg tablet Take 1 Tablet by mouth every other day. 10/18/22  Yes Matilde Gonsalves MD   amLODIPine (NORVASC) 10 mg tablet Take 1 Tablet by mouth daily. 9/24/22  Yes Matilde Gonsalves MD   pantoprazole (PROTONIX) 40 mg tablet Take 1 Tablet by mouth Before breakfast and dinner. 8/17/22  Yes Matilde Gonsalves MD   nystatin (MYCOSTATIN) powder Apply  to affected area two (2) times a day. 8/8/22  Yes Radhames Lucas MD   fluticasone propionate [facility]) 50 mcg/actuation nasal spray 2 Sprays by Both Nostrils route daily. Administer to right and left nostril. 6/24/22  Yes Matilde Gonsalves MD   levothyroxine (SYNTHROID) 150 mcg tablet Take 1 Tablet by mouth Daily (before breakfast).  6/15/22  Yes Matilde Gonsalves MD   lidocaine (LIDODERM) 5 % Apply patch to the affected area for 12 hours a day and remove for 12 hours a day. 12/7/21  Yes Guera Bhatti MD   ketoconazole (NIZORAL) 2 % topical cream Apply  to affected area daily as needed. 8/23/19  Yes Provider, Historical   Biotin 2,500 mcg cap Take  by mouth. Yes Provider, Historical   loratadine (CLARITIN) 10 mg tablet TAKE 1 TABLET BY MOUTH EVERY DAY 12/23/22   Guera Bhatti MD   benzocaine-menthoL (CHLORASEPTIC MAX) 15-10 mg lozg lozenge Take 1 Lozenge by mouth as needed for Sore throat. 11/18/22   Tacos Powers NP   levalbuterol tartrate [facility]) 45 mcg/actuation inhaler Take 2 Puffs by inhalation every six (6) hours as needed for Wheezing or Shortness of Breath. Provider, Historical   alendronate (FOSAMAX) 70 mg tablet alendronate 70 mg tablet   Take 1 tablet every week by oral route. Provider, Historical   losartan (COZAAR) 25 mg tablet Take 25 mg by mouth daily. 10/18/22   Provider, Historical   potassium chloride SA (MICRO-K) 10 mEq capsule Take 2 Capsules by mouth daily. 9/24/22   Guera Bhatti MD   icosapent ethyL (VASCEPA) 1 gram capsule Take 2 Capsules by mouth two (2) times daily (with meals). 2/14/22   Guera Bhatti MD   polyethylene glycol [facility]) 17 gram/dose powder Take 17 g by mouth daily. 4/6/20   Guera Bhatti MD   L GASSERI/B BIFIDUM/B LONGUM (FabZat ToutApp PO) Take 1 Tab by mouth daily. Provider, Historical   vitamin E (AQUA GEMS) 400 unit capsule Take 800 Units by mouth two (2) times a day. Provider, Historical   cholecalciferol, vitamin D3, 50 mcg (2,000 unit) tab Take 1 Tab by mouth daily. Other, MD Janette   MULTIVITAMIN WITH MINERALS (ONE-A-DAY 50 PLUS PO) Take 1 Tab by mouth daily.     Provider, Historical     Allergies   Allergen Reactions    Gabapentin Other (comments)     Suicidal ideation    Metoprolol Rash    Celebrex [Celecoxib] Hives    Eliquis [Apixaban] Other (comments)     Caused excessive anemia    Pcn [Penicillins] Unknown (comments) Can't remember, was a long time    Sulfa (Sulfonamide Antibiotics) Hives      Family History   Problem Relation Age of Onset    Diabetes Mother     Heart Disease Mother     Emphysema Father     No Known Problems Brother     Stroke Maternal Grandmother     No Known Problems Maternal Grandfather     No Known Problems Paternal Grandmother     No Known Problems Paternal Grandfather        Social History     Socioeconomic History    Marital status: SINGLE   Tobacco Use    Smoking status: Never    Smokeless tobacco: Never   Vaping Use    Vaping Use: Never used   Substance and Sexual Activity    Alcohol use: No    Drug use: Yes     Types: Prescription, OTC    Sexual activity: Not Currently     Social Determinants of Health     Financial Resource Strain: Low Risk     Difficulty of Paying Living Expenses: Not hard at all   Food Insecurity: No Food Insecurity    Worried About Running Out of Food in the Last Year: Never true    Ran Out of Food in the Last Year: Never true      Objective:   Physical Exam:     VS as below    Const'l:          Obese body habitus, a&o, no acute distress  Head/Neck:       no cervical/head mass  Eyes:     nonicteric sclera, eom intact  ENT:      auditory acuity grossly intact either with or without device, no nasal deformity  Cardio:           reg rate reg rhythm  Pulm:     no accessory muscle use  Abd:       sntnd   Derm:     no rashes, no ulcers, no lesions  Extr:      no edema, no cyanosis, no calf tenderness, no varicosities  Neuro:    cn II-XII intact  Psych:   mood intact, judgement itnact    Data Review: All diagnostic labs and studies have been reviewed.

## 2022-12-29 NOTE — PROGRESS NOTES
Received notification from bedside RN about patient with regards to: H/H 4.2/13.9 from 4.8/15.6 earlier in the afternoon. Still awaiting for 2 units ordered as patient has multiple antibodies and blood will not be available till later in the day as per lab has q 4 H/H lab draw ordered  VS: /42, HR 65, RR 21, O2 sat 95% on RA    Intervention given: Hold off on further lab draw till unit of blood is available ordered.  Follow up blood bank and notify of the urgency

## 2022-12-30 ENCOUNTER — ANESTHESIA EVENT (OUTPATIENT)
Dept: ENDOSCOPY | Age: 77
End: 2022-12-30
Payer: MEDICARE

## 2022-12-30 ENCOUNTER — APPOINTMENT (OUTPATIENT)
Dept: CT IMAGING | Age: 77
End: 2022-12-30
Attending: INTERNAL MEDICINE
Payer: MEDICARE

## 2022-12-30 ENCOUNTER — ANESTHESIA (OUTPATIENT)
Dept: ENDOSCOPY | Age: 77
End: 2022-12-30
Payer: MEDICARE

## 2022-12-30 LAB
ABO + RH BLD: NORMAL
ANION GAP SERPL CALC-SCNC: 6 MMOL/L (ref 5–15)
ANTI-COMPLEMENT (C3B,C3D): NORMAL
ANTIGENS PRESENT RBC DONR: NORMAL
BASOPHILS # BLD: 0 K/UL (ref 0–0.1)
BASOPHILS NFR BLD: 0 % (ref 0–1)
BLD GP AB SCN SERPL QL ELUTION: NORMAL
BLD PROD TYP BPU: NORMAL
BLOOD BANK CMNT PATIENT-IMP: NORMAL
BLOOD GROUP ANTIBODIES SERPL: NORMAL
BLOOD GROUP ANTIBODIES SERPL: NORMAL
BPU ID: NORMAL
BUN SERPL-MCNC: 34 MG/DL (ref 6–20)
BUN/CREAT SERPL: 27 (ref 12–20)
CALCIUM SERPL-MCNC: 9.2 MG/DL (ref 8.5–10.1)
CHLORIDE SERPL-SCNC: 113 MMOL/L (ref 97–108)
CO2 SERPL-SCNC: 21 MMOL/L (ref 21–32)
CREAT SERPL-MCNC: 1.27 MG/DL (ref 0.55–1.02)
CROSSMATCH RESULT,%XM: NORMAL
DAT IGG-SP REAG RBC QL: NORMAL
DAT POLY-SP REAG RBC QL: NORMAL
DIFFERENTIAL METHOD BLD: ABNORMAL
EOSINOPHIL # BLD: 0.2 K/UL (ref 0–0.4)
EOSINOPHIL NFR BLD: 6 % (ref 0–7)
ERYTHROCYTE [DISTWIDTH] IN BLOOD BY AUTOMATED COUNT: 19.5 % (ref 11.5–14.5)
GLUCOSE SERPL-MCNC: 135 MG/DL (ref 65–100)
HCT VFR BLD AUTO: 22.8 % (ref 35–47)
HGB BLD-MCNC: 7.4 G/DL (ref 11.5–16)
IMM GRANULOCYTES # BLD AUTO: 0 K/UL (ref 0–0.04)
IMM GRANULOCYTES NFR BLD AUTO: 0 % (ref 0–0.5)
INR PPP: 1.1 (ref 0.9–1.1)
LYMPHOCYTES # BLD: 0.4 K/UL (ref 0.8–3.5)
LYMPHOCYTES NFR BLD: 13 % (ref 12–49)
MCH RBC QN AUTO: 28.5 PG (ref 26–34)
MCHC RBC AUTO-ENTMCNC: 32.5 G/DL (ref 30–36.5)
MCV RBC AUTO: 87.7 FL (ref 80–99)
MONOCYTES # BLD: 0.3 K/UL (ref 0–1)
MONOCYTES NFR BLD: 11 % (ref 5–13)
NEUTS SEG # BLD: 1.9 K/UL (ref 1.8–8)
NEUTS SEG NFR BLD: 68 % (ref 32–75)
NRBC # BLD: 0 K/UL (ref 0–0.01)
NRBC BLD-RTO: 0 PER 100 WBC
PLATELET # BLD AUTO: 53 K/UL (ref 150–400)
PMV BLD AUTO: 12 FL (ref 8.9–12.9)
POTASSIUM SERPL-SCNC: 4.7 MMOL/L (ref 3.5–5.1)
PROTHROMBIN TIME: 11.6 SEC (ref 9–11.1)
RBC # BLD AUTO: 2.6 M/UL (ref 3.8–5.2)
SODIUM SERPL-SCNC: 140 MMOL/L (ref 136–145)
SPECIMEN EXP DATE BLD: NORMAL
STATUS OF UNIT,%ST: NORMAL
UNIT DIVISION, %UDIV: 0
WBC # BLD AUTO: 2.7 K/UL (ref 3.6–11)

## 2022-12-30 PROCEDURE — 97165 OT EVAL LOW COMPLEX 30 MIN: CPT | Performed by: OCCUPATIONAL THERAPIST

## 2022-12-30 PROCEDURE — 74011250636 HC RX REV CODE- 250/636: Performed by: NURSE ANESTHETIST, CERTIFIED REGISTERED

## 2022-12-30 PROCEDURE — 77030022875 HC PRB AR PLSM COAG ERBE -C: Performed by: INTERNAL MEDICINE

## 2022-12-30 PROCEDURE — 85610 PROTHROMBIN TIME: CPT

## 2022-12-30 PROCEDURE — 36415 COLL VENOUS BLD VENIPUNCTURE: CPT

## 2022-12-30 PROCEDURE — 74011250636 HC RX REV CODE- 250/636: Performed by: STUDENT IN AN ORGANIZED HEALTH CARE EDUCATION/TRAINING PROGRAM

## 2022-12-30 PROCEDURE — 74011000250 HC RX REV CODE- 250: Performed by: NURSE ANESTHETIST, CERTIFIED REGISTERED

## 2022-12-30 PROCEDURE — 76040000019: Performed by: INTERNAL MEDICINE

## 2022-12-30 PROCEDURE — 74011250637 HC RX REV CODE- 250/637: Performed by: STUDENT IN AN ORGANIZED HEALTH CARE EDUCATION/TRAINING PROGRAM

## 2022-12-30 PROCEDURE — 74011250637 HC RX REV CODE- 250/637: Performed by: INTERNAL MEDICINE

## 2022-12-30 PROCEDURE — 74011000250 HC RX REV CODE- 250: Performed by: STUDENT IN AN ORGANIZED HEALTH CARE EDUCATION/TRAINING PROGRAM

## 2022-12-30 PROCEDURE — C9113 INJ PANTOPRAZOLE SODIUM, VIA: HCPCS | Performed by: STUDENT IN AN ORGANIZED HEALTH CARE EDUCATION/TRAINING PROGRAM

## 2022-12-30 PROCEDURE — 2709999900 HC NON-CHARGEABLE SUPPLY: Performed by: INTERNAL MEDICINE

## 2022-12-30 PROCEDURE — 74011250636 HC RX REV CODE- 250/636: Performed by: INTERNAL MEDICINE

## 2022-12-30 PROCEDURE — 74011000636 HC RX REV CODE- 636: Performed by: INTERNAL MEDICINE

## 2022-12-30 PROCEDURE — 97530 THERAPEUTIC ACTIVITIES: CPT | Performed by: OCCUPATIONAL THERAPIST

## 2022-12-30 PROCEDURE — 85025 COMPLETE CBC W/AUTO DIFF WBC: CPT

## 2022-12-30 PROCEDURE — 80048 BASIC METABOLIC PNL TOTAL CA: CPT

## 2022-12-30 PROCEDURE — 77030008565 HC TBNG SUC IRR ERBE -B: Performed by: INTERNAL MEDICINE

## 2022-12-30 PROCEDURE — 74177 CT ABD & PELVIS W/CONTRAST: CPT

## 2022-12-30 PROCEDURE — 97535 SELF CARE MNGMENT TRAINING: CPT | Performed by: OCCUPATIONAL THERAPIST

## 2022-12-30 PROCEDURE — 76060000031 HC ANESTHESIA FIRST 0.5 HR: Performed by: INTERNAL MEDICINE

## 2022-12-30 PROCEDURE — 74011000250 HC RX REV CODE- 250: Performed by: INTERNAL MEDICINE

## 2022-12-30 PROCEDURE — 0DJ08ZZ INSPECTION OF UPPER INTESTINAL TRACT, VIA NATURAL OR ARTIFICIAL OPENING ENDOSCOPIC: ICD-10-PCS | Performed by: INTERNAL MEDICINE

## 2022-12-30 PROCEDURE — 65270000046 HC RM TELEMETRY

## 2022-12-30 RX ORDER — DEXTROMETHORPHAN/PSEUDOEPHED 2.5-7.5/.8
1.2 DROPS ORAL
Status: DISCONTINUED | OUTPATIENT
Start: 2022-12-30 | End: 2022-12-30 | Stop reason: HOSPADM

## 2022-12-30 RX ORDER — SODIUM CHLORIDE 9 MG/ML
INJECTION, SOLUTION INTRAVENOUS
Status: DISCONTINUED | OUTPATIENT
Start: 2022-12-30 | End: 2022-12-30 | Stop reason: HOSPADM

## 2022-12-30 RX ORDER — SODIUM CHLORIDE 9 MG/ML
5-250 INJECTION, SOLUTION INTRAVENOUS AS NEEDED
OUTPATIENT
Start: 2023-01-04

## 2022-12-30 RX ORDER — SODIUM CHLORIDE 0.9 % (FLUSH) 0.9 %
5-40 SYRINGE (ML) INJECTION AS NEEDED
OUTPATIENT
Start: 2023-01-04

## 2022-12-30 RX ORDER — HEPARIN 100 UNIT/ML
500 SYRINGE INTRAVENOUS AS NEEDED
Start: 2023-01-04

## 2022-12-30 RX ORDER — PROPOFOL 10 MG/ML
INJECTION, EMULSION INTRAVENOUS AS NEEDED
Status: DISCONTINUED | OUTPATIENT
Start: 2022-12-30 | End: 2022-12-30 | Stop reason: HOSPADM

## 2022-12-30 RX ORDER — SODIUM CHLORIDE 0.9 % (FLUSH) 0.9 %
5-40 SYRINGE (ML) INJECTION EVERY 8 HOURS
Status: DISCONTINUED | OUTPATIENT
Start: 2022-12-30 | End: 2022-12-31 | Stop reason: HOSPADM

## 2022-12-30 RX ORDER — ATROPINE SULFATE 0.1 MG/ML
0.5 INJECTION INTRAVENOUS
Status: DISCONTINUED | OUTPATIENT
Start: 2022-12-30 | End: 2022-12-30 | Stop reason: HOSPADM

## 2022-12-30 RX ORDER — EPINEPHRINE 0.1 MG/ML
1 INJECTION INTRACARDIAC; INTRAVENOUS
Status: DISCONTINUED | OUTPATIENT
Start: 2022-12-30 | End: 2022-12-30 | Stop reason: HOSPADM

## 2022-12-30 RX ORDER — SODIUM CHLORIDE 0.9 % (FLUSH) 0.9 %
5-40 SYRINGE (ML) INJECTION AS NEEDED
Status: DISCONTINUED | OUTPATIENT
Start: 2022-12-30 | End: 2022-12-31 | Stop reason: HOSPADM

## 2022-12-30 RX ORDER — SODIUM CHLORIDE 9 MG/ML
5-40 INJECTION INTRAMUSCULAR; INTRAVENOUS; SUBCUTANEOUS AS NEEDED
OUTPATIENT
Start: 2023-01-04

## 2022-12-30 RX ORDER — FLUMAZENIL 0.1 MG/ML
0.2 INJECTION INTRAVENOUS
Status: DISCONTINUED | OUTPATIENT
Start: 2022-12-30 | End: 2022-12-30 | Stop reason: HOSPADM

## 2022-12-30 RX ORDER — SODIUM CHLORIDE 9 MG/ML
25 INJECTION, SOLUTION INTRAVENOUS CONTINUOUS
Status: DISPENSED | OUTPATIENT
Start: 2022-12-30 | End: 2022-12-30

## 2022-12-30 RX ORDER — NYSTATIN 100000 [USP'U]/G
POWDER TOPICAL 2 TIMES DAILY
Status: DISCONTINUED | OUTPATIENT
Start: 2022-12-30 | End: 2022-12-31 | Stop reason: HOSPADM

## 2022-12-30 RX ORDER — LIDOCAINE HYDROCHLORIDE 20 MG/ML
INJECTION, SOLUTION EPIDURAL; INFILTRATION; INTRACAUDAL; PERINEURAL AS NEEDED
Status: DISCONTINUED | OUTPATIENT
Start: 2022-12-30 | End: 2022-12-30 | Stop reason: HOSPADM

## 2022-12-30 RX ORDER — PHENYLEPHRINE HCL IN 0.9% NACL 0.4MG/10ML
SYRINGE (ML) INTRAVENOUS AS NEEDED
Status: DISCONTINUED | OUTPATIENT
Start: 2022-12-30 | End: 2022-12-30 | Stop reason: HOSPADM

## 2022-12-30 RX ORDER — NALOXONE HYDROCHLORIDE 0.4 MG/ML
0.4 INJECTION, SOLUTION INTRAMUSCULAR; INTRAVENOUS; SUBCUTANEOUS
Status: DISCONTINUED | OUTPATIENT
Start: 2022-12-30 | End: 2022-12-30 | Stop reason: HOSPADM

## 2022-12-30 RX ADMIN — SODIUM CHLORIDE 40 MG: 9 INJECTION, SOLUTION INTRAMUSCULAR; INTRAVENOUS; SUBCUTANEOUS at 21:54

## 2022-12-30 RX ADMIN — PROPOFOL 50 MG: 10 INJECTION, EMULSION INTRAVENOUS at 07:55

## 2022-12-30 RX ADMIN — LIDOCAINE HYDROCHLORIDE 80 MG: 20 INJECTION, SOLUTION EPIDURAL; INFILTRATION; INTRACAUDAL; PERINEURAL at 07:49

## 2022-12-30 RX ADMIN — EZETIMIBE 10 MG: 10 TABLET ORAL at 09:53

## 2022-12-30 RX ADMIN — SODIUM CHLORIDE 25 ML/HR: 9 INJECTION, SOLUTION INTRAVENOUS at 07:45

## 2022-12-30 RX ADMIN — FUROSEMIDE 40 MG: 40 TABLET ORAL at 09:53

## 2022-12-30 RX ADMIN — Medication 80 MCG: at 07:50

## 2022-12-30 RX ADMIN — PROPOFOL 50 MG: 10 INJECTION, EMULSION INTRAVENOUS at 07:59

## 2022-12-30 RX ADMIN — Medication 80 MG: at 07:58

## 2022-12-30 RX ADMIN — ATORVASTATIN CALCIUM 20 MG: 20 TABLET, FILM COATED ORAL at 09:53

## 2022-12-30 RX ADMIN — PROPOFOL 50 MG: 10 INJECTION, EMULSION INTRAVENOUS at 08:04

## 2022-12-30 RX ADMIN — SODIUM CHLORIDE, PRESERVATIVE FREE 10 ML: 5 INJECTION INTRAVENOUS at 21:55

## 2022-12-30 RX ADMIN — SODIUM CHLORIDE, PRESERVATIVE FREE 10 ML: 5 INJECTION INTRAVENOUS at 09:57

## 2022-12-30 RX ADMIN — SODIUM CHLORIDE 40 MG: 9 INJECTION, SOLUTION INTRAMUSCULAR; INTRAVENOUS; SUBCUTANEOUS at 09:52

## 2022-12-30 RX ADMIN — LEVOTHYROXINE SODIUM 150 MCG: 0.15 TABLET ORAL at 09:52

## 2022-12-30 RX ADMIN — PROPOFOL 50 MG: 10 INJECTION, EMULSION INTRAVENOUS at 07:49

## 2022-12-30 RX ADMIN — IOPAMIDOL 100 ML: 755 INJECTION, SOLUTION INTRAVENOUS at 12:32

## 2022-12-30 RX ADMIN — POTASSIUM CHLORIDE 20 MEQ: 750 TABLET, FILM COATED, EXTENDED RELEASE ORAL at 09:53

## 2022-12-30 RX ADMIN — SODIUM CHLORIDE: 9 INJECTION, SOLUTION INTRAVENOUS at 07:29

## 2022-12-30 RX ADMIN — POLYETHYLENE GLYCOL 3350 17 G: 17 POWDER, FOR SOLUTION ORAL at 09:53

## 2022-12-30 NOTE — PROGRESS NOTES
Hospitalist Progress Note    NAME: Kishor Beckett   :  1945   MRN:  [id number]       Assessment / Plan:    Acute blood loss anemia  2/2 GIB/GAVE:      -Hemoglobin 4.2 on presentation  -Hemoglobin improved after 3 units of PRBC. Hemoglobin 7.4 today  -EGD today showed minimal portal hypertensive gastropathy/GAVE, which was treated. No significant cause for bleeding identified  -Continue PPI  -CT abdomen pending  Will consult hematology  Liquid diet now      HTN  CKD stage III  -Resume Lasix  -Patient not taking metolazone anymore  -For now holding amlodipine, ARB    History of SSS s/p pacemaker placement  History of A. fib  Hypothyroidism  HLD    History of Beckman cirrhosis       DVT prophylaxis, SCD  CODE STATUS, full code  Surrogate decision-maker, son  Anticipate discharge      Subjective:     Chief Complaint / Reason for Physician Visit  Patient had a procedure earlier today. No further melena or hematochezia noted  Review of Systems:  Symptom Y/N Comments  Symptom Y/N Comments   Fever/Chills    Chest Pain     Poor Appetite    Edema     Cough    Abdominal Pain     Sputum    Joint Pain     SOB/HENLEY    Pruritis/Rash     Nausea/vomit    Tolerating PT/OT     Diarrhea    Tolerating Diet     Constipation    Other       Could NOT obtain due to:      Objective:     VITALS:   Last 24hrs VS reviewed since prior progress note.  Most recent are:  Patient Vitals for the past 24 hrs:   Temp Pulse Resp BP SpO2   22 1102 98.2 °F (36.8 °C) -- 18 (!) 143/48 98 %   22 0937 97.5 °F (36.4 °C) 60 18 (!) 150/60 96 %   22 0821 -- 67 21 (!) 168/43 96 %   22 0818 -- 81 15 (!) 160/39 96 %   22 0816 97.7 °F (36.5 °C) 78 25 (!) 146/57 99 %   22 0808 98.6 °F (37 °C) 68 23 (!) 153/39 95 %   22 0722 98 °F (36.7 °C) 69 16 (!) 160/45 98 %   22 0300 98.8 °F (37.1 °C) 60 16 (!) 133/48 98 %   22 2325 99.1 °F (37.3 °C) 61 18 (!) 136/50 98 %   22 -- -- -- (!) 148/51 -- 12/29/22 2030 -- -- -- (!) 150/48 --   12/29/22 2015 -- -- -- (!) 143/54 --   12/29/22 2000 -- -- -- (!) 149/52 --   12/29/22 1956 -- -- -- (!) 147/46 --   12/29/22 1906 98 °F (36.7 °C) 62 18 (!) 153/52 99 %   12/29/22 1900 98 °F (36.7 °C) 61 18 (!) 151/48 98 %   12/29/22 1840 97.8 °F (36.6 °C) 62 16 (!) 145/46 100 %   12/29/22 1500 97.8 °F (36.6 °C) 64 18 (!) 139/49 97 %         Intake/Output Summary (Last 24 hours) at 12/30/2022 1442  Last data filed at 12/30/2022 0807  Gross per 24 hour   Intake 1937.5 ml   Output 600 ml   Net 1337.5 ml          I had a face to face encounter and independently examined this patient on 12/30/2022, as outlined below:  PHYSICAL EXAM:  General: WD, WN. Alert, cooperative, no acute distress    EENT:  EOMI. Anicteric sclerae. MMM  Resp:  B/l rales  CV:  Regular  rhythm,  No edema  GI:  Soft, Non distended, Non tender. +Bowel sounds  Neurologic:  Alert and oriented X 3, normal speech,   Psych:   Good insight. Not anxious nor agitated  Skin:  No rashes. No jaundice    Reviewed most current lab test results and cultures  YES  Reviewed most current radiology test results   YES  Review and summation of old records today    NO  Reviewed patient's current orders and MAR    YES  PMH/SH reviewed - no change compared to H&P  ________________________________________________________________________  Care Plan discussed with:    Comments   Patient     Family      RN     Care Manager     Consultant                        Multidiciplinary team rounds were held today with , nursing, pharmacist and clinical coordinator. Patient's plan of care was discussed; medications were reviewed and discharge planning was addressed.      ________________________________________________________________________  Total NON critical care TIME:  23   Minutes    Total CRITICAL CARE TIME Spent:   Minutes non procedure based      Comments   >50% of visit spent in counseling and coordination of care ________________________________________________________________________  Trevor Anguiano MD     Procedures: see electronic medical records for all procedures/Xrays and details which were not copied into this note but were reviewed prior to creation of Plan. LABS:  I reviewed today's most current labs and imaging studies.   Pertinent labs include:  Recent Labs     12/30/22 0038 12/29/22  1649 12/29/22  0007 12/28/22  1646   WBC 2.7*  --  2.5* 3.2*   HGB 7.4* 6.5* 4.2* 4.8*   HCT 22.8* 20.1* 13.9* 15.6*   PLT 53*  --  55* 68*       Recent Labs     12/30/22 0038 12/29/22 0007 12/28/22  1646    138 136   K 4.7 4.4 4.8   * 112* 110*   CO2 21 21 22   * 121* 142*   BUN 34* 37* 38*   CREA 1.27* 1.40* 1.49*   CA 9.2 8.9 8.9   ALB  --  2.6* 3.0*   TBILI  --  1.1* 1.1*   ALT  --  19 22   INR 1.1  --   --          Signed: Trevor Anguiano MD

## 2022-12-30 NOTE — PROGRESS NOTES
Occupational Therapy    Patient is currently off the floor to endoscopy. Will follow back later today or tomorrow for evaluation.     Alejo Leonardo, OTR/L

## 2022-12-30 NOTE — PROGRESS NOTES
TRANSFER - IN REPORT:    Verbal report received from [facility] on [facility]  being received from [facility](unit) for ordered procedure      Report consisted of patients Situation, Background, Assessment and   Recommendations(SBAR). Information from the following report(s) SBAR was reviewed with the receiving nurse. Opportunity for questions and clarification was provided.

## 2022-12-30 NOTE — PROGRESS NOTES
TRANSFER - OUT REPORT:    Verbal report given to Chris Reyna RN(name) on [facility]  being transferred to [facility](unit) for routine post - op       Report consisted of patients Situation, Background, Assessment and   Recommendations(SBAR). Information from the following report(s) SBAR, Procedure Summary, and MAR was reviewed with the receiving nurse. Opportunity for questions and clarification was provided. Patient transported with:   Patient chart. Glasses returned to patient.

## 2022-12-30 NOTE — H&P
Date of Surgery Update:  Cris Porter was seen and examined. History and physical has been reviewed. The patient has been examined.  There have been no significant clinical changes since the completion of the originally dated History and Physical.    Signed By: Toby Virgen MD     December 30, 2022 7:26 AM

## 2022-12-30 NOTE — ROUTINE PROCESS
TRANSFER - IN REPORT:    Verbal report received from vinnie(name) on Elizabeth Hannah  being received from endo(unit) for routine progression of care      Report consisted of patients Situation, Background, Assessment and   Recommendations(SBAR). Information from the following report(s) SBAR, Procedure Summary, and MAR was reviewed with the receiving nurse. Opportunity for questions and clarification was provided. Assessment completed upon patients arrival to unit and care assumed.

## 2022-12-30 NOTE — PROGRESS NOTES
Problem: Self Care Deficits Care Plan (Adult)  Goal: *Acute Goals and Plan of Care (Insert Text)  Description:     FUNCTIONAL STATUS PRIOR TO ADMISSION: Patient is independent with self feeding, supervision/setup for UB ADLs, grooming and toileting, min to mod A for LB dressing, min A for bathing/showers and is supervision to ambulate with a rollator. She has paid caregivers during the day and night. Paid caregivers provide supervision and assistance PRN for ADLs and they perform all the IADLs. HOME SUPPORT: The patient lives alone, but has paid care givers during the day and night. Occupational Therapy Goals  Initiated 12/30/2022  1. Patient will perform grooming standing at sink with supervision/set-up within 7 day(s). 2.  Patient will perform upper body dressing with supervision/set-up within 7 day(s). 3.  Patient will perform lower body dressing with supervision/set-up, using AE PRN, within 7 day(s). 4.  Patient will perform toilet transfers with supervision within 7 day(s). 5.  Patient will perform all aspects of toileting with supervision within 7 day(s). Outcome: Not Met    OCCUPATIONAL THERAPY EVALUATION  Patient: Cris Porter (66 y.o. female)  Date: 12/30/2022  Primary Diagnosis: GAVE (gastric antral vascular ectasia) [K31.819]  Blood loss anemia [D50.0]  Procedure(s) (LRB):  ESOPHAGOGASTRODUODENOSCOPY (EGD) (N/A)  ENDOSCOPIC ARGON PLASMA COAGULATION (N/A) Day of Surgery   Precautions: falls         ASSESSMENT  Based on the objective data described below, the patient presents with GW, decreased activity tolerance, decreased safety awareness and decreased balance which is impairing her functional independence. The patient is functioning below her independent to mod A baseline, now requiring increased assistance for UB dressing, toileting and functional mobility.  The patient also demonstrates the potential to improve upon her LB ADL independence if she were provided with AE and training. She was supervision for stand grooming, toileting, UB dressing and functional mobility at baseline, but is now SBA to min A for these functional activities. At this time the patient will benefit from acute OT intervention, but likely have no further OT needs after discharge. She will need the continued supervision/assistance of her paid caregivers after discharge. Functional Outcome Measure: The patient scored 50/100 on the Barthel Index outcome measure which is indicative of a 50% impairment in function. PLAN :  Recommendations and Planned Interventions: self care training, functional mobility training, therapeutic exercise, balance training, therapeutic activities, endurance activities, patient education, and home safety training    Frequency/Duration: Patient will be followed by occupational therapy 3 times a week to address goals. Recommendation for discharge: (in order for the patient to meet his/her long term goals)  To be determined: pending progress, but likely no OT needs after discharge. She will need the continued supervision/assistance of her paid caregivers after discharge.     Equipment recommendations for successful discharge (if) home: Patient would like a Rolling Walker       OBJECTIVE DATA SUMMARY:   HISTORY:   Past Medical History:   Diagnosis Date    Abnormal nuclear stress test 10/04/2021    Anemia     Angina at rest [facility]) 10/04/2021    Arthritis     KNEE,gout    Bundle branch block     Endocrine disease     HYPOTHYROIDISM    Fracture     Left distal femur (age 12 - pt fell)    Fracture     Left tibia 1990 (pt fell)    Fracture     Right wrist fractured 2 times (pt fell)    GERD (gastroesophageal reflux disease)     High cholesterol     Hypertension     Hypoglycemia     \"my body produces too much insulin\"    Liver disease     BRADY    Nausea & vomiting     when had gallbladder out    Osteoporosis     Other ill-defined conditions(549.10)     edema legs    S/P cardiac cath 10/04/2021    10/4/2021 nonobstructive disease    Spinal stenosis     Thyroid disease      Past Surgical History:   Procedure Laterality Date    COLONOSCOPY N/A 7/13/2021    COLONOSCOPY performed by Karlene Garza MD at hospitals ENDOSCOPY    COLONOSCOPY N/A 8/8/2022    COLONOSCOPY performed by Isreal Gupta MD at [place] [address] [address]  2/11/2015         Paola Mauri  7/13/2021         COLONOSCPY,FLEX,W/ [address] INJECT  7/13/2021         COLORECTAL SCRN; HI RISK IND  2/11/2015         HX CHOLECYSTECTOMY      HX HYSTERECTOMY      HX ORTHOPAEDIC Left     leg surgery - plates, screws, wires, pins in place    HX UROLOGICAL      [address] UNLISTED      liver biopsy--BRADY and fibrosis    SB ENDOSCOPY,W/CONTROL,BLEEDING  11/16/2022    SMALL BOWEL ENDOSCOPY,ABLATE LESN  11/16/2022    SMALL BOWEL ENDOSCOPY,BIOPSY  11/16/2022    UPPER GI ENDOSCOPY,BIOPSY  11/17/2015            Expanded or extensive additional review of patient history:     Home Situation  Home Environment: Apartment  # Steps to Enter: 0  One/Two Story Residence: One story  Living Alone: No  Support Systems: Caregiver/Home Care Staff (during the day and at night)  Patient Expects to be Discharged to[de-identified]  (home with paid caregivers)  Current DME Used/Available at Home: Shower chair, Walker, rollator  Tub or Shower Type: Shower    Hand dominance: Right    EXAMINATION OF PERFORMANCE DEFICITS:  Cognitive/Behavioral Status:  Neurologic State: Alert  Orientation Level: Oriented X4  Cognition: Follows commands; Appropriate for age attention/concentration        Safety/Judgement: Awareness of environment; Insight into deficits; Decreased awareness of need for safety      Hearing: Auditory  Auditory Impairment: None    Vision/Perceptual:    Acuity: Impaired near vision; Impaired far vision (acutely blurred)    Corrective Lenses: Glasses    Range of Motion:  AROM: Generally decreased, functional    Strength:  Strength: Generally decreased, functional    Coordination:  Coordination: Within functional limits  Fine Motor Skills-Upper: Left Intact; Right Intact (occasional LUE resting tremor)    Gross Motor Skills-Upper: Left Intact; Right Intact    Tone & Sensation:  Tone: Normal  Sensation: Intact       Balance:  Sitting: Impaired  Sitting - Static: Good (unsupported)  Sitting - Dynamic: Fair (occasional)  Standing: Impaired  Standing - Static: Good  Standing - Dynamic : Fair    Functional Mobility and Transfers for ADLs:  Bed Mobility:  Rolling: Contact guard assistance; Additional time  Supine to Sit: Additional time;Minimum assistance  Scooting: Stand-by assistance    Transfers:  Sit to Stand: Contact guard assistance  Stand to Sit: Contact guard assistance  Bed to Chair: Contact guard assistance (ambulating with a RW)  Bathroom Mobility: Contact guard assistance (ambulating with a RW)  Toilet Transfer : Contact guard assistance (using grab bar)    ADL Assessment:  Feeding: Independent    Oral Facial Hygiene/Grooming: Stand-by assistance    Bathing: Minimum assistance    Upper Body Dressing: Minimum assistance    Lower Body Dressing: Moderate assistance    Toileting: Minimum assistance              Functional Measure:  Barthel Index:    Bathin  Bladder: 10  Bowels: 10  Groomin  Dressin  Feeding: 10  Mobility: 0  Stairs: 0  Toilet Use: 5  Transfer (Bed to Chair and Back): 10  Total: 50/100        Percentage of impairment   0%   1-19%   20-39%   40-59%   60-79%   80-99%   100%   Barthel Score 0-100 100 99-80 79-60 59-40 20-39 1-19   0     The Barthel ADL Index: Guidelines  1. The index should be used as a record of what a patient does, not as a record of what a patient could do. 2. The main aim is to establish degree of independence from any help, physical or verbal, however minor and for whatever reason. 3. The need for supervision renders the patient not independent.   4. A patient's performance should be established using the best available evidence. Asking the patient, friends/relatives and nurses are the usual sources, but direct observation and common sense are also important. However direct testing is not needed. 5. Usually the patient's performance over the preceding 24-48 hours is important, but occasionally longer periods will be relevant. 6. Middle categories imply that the patient supplies over 50 per cent of the effort. 7. Use of aids to be independent is allowed. Cabrera Amado., Barthel, D.W. (9284). Functional evaluation: the Barthel Index. [address] [employer] (14)2. Mahendra Rosario shailesh DARWIN BlackF, Mary Quezada., Karan Arambula., [place], [address] (1999). Measuring the change indisability after inpatient rehabilitation; comparison of the responsiveness of the Barthel Index and Functional Carter Measure. Journal of Neurology, Neurosurgery, and Psychiatry, 66(4), 260-956. Josh Everett, N.J.A, ERIC Merino, & Evaristo Rodriguez M.A. (2004.) Assessment of post-stroke quality of life in cost-effectiveness studies: The usefulness of the Barthel Index and the EuroQoL-5D. Quality of Life Research, 13, 427-43        Pain Rating:  No complaint of pain    Activity Tolerance:   Fair  VSS t/o     After treatment patient left in no apparent distress:    Sitting in chair and Call bell within reach    COMMUNICATION/EDUCATION:   The patients plan of care was discussed with: Registered Nurse. Home safety education was provided and the patient/caregiver indicated understanding., Patient/family have participated as able in goal setting and plan of care. , and Patient/family agree to work toward stated goals and plan of care. This patients plan of care is appropriate for delegation to [facility].     Thank you for this referral.  Alejo Leonardo, OTR/L  Time Calculation: 50 mins

## 2022-12-30 NOTE — PROCEDURES
NAME:  Katya Baker   :   1945   MRN:   [id number]     Date/Time:  2022 11:44 AM    EGD + push Enterosocpy Procedure Note    Procedure: Push enteroscopy with argon plasma coagulation    Indication: CARLINE  (recurrent upper GI bleeding, with last EGD 6 weeks ago)  Pre-operative Diagnosis: see indication above  Post-operative Diagnosis: see findings below  Primary Gastroenterologist:  Rama Freitas MD  :  Claire Hernandez MD  Referring Provider:   -Brigette Ibrahim MD    Exam:  Airway: clear, no airway problems anticipated  Heart: RRR, without gallops or rubs  Lungs: clear bilaterally without wheezes, crackles, or rhonchi  Abdomen: soft, nontender, nondistended, bowel sounds present  Mental Status: awake, alert and oriented to person, place and time     Anethesia/Sedation:  MAC anesthesia Propofol  Procedure Details   After informed consent was obtained for the procedure, with all risks and benefits of procedure explained the patient was taken to the endoscopy suite and placed in the left lateral decubitus position. Following sequential administration of sedation as per above, the pediatric colonoscope was inserted into the mouth and advanced under direct vision to the proximal jejunum. A careful inspection was made as the gastroscope was withdrawn, including a retroflexed view of the proximal stomach; findings and interventions are described below. Findings:   OROPHARYNX: Cords and pyriform recesses normal.   ESOPHAGUS: The Z-Line is intact. There are low grade esophageal varices, that do not completely flatten with insufflation, likely represent grade I/II. No stigmata of recent bleeding.   STOMACH:   -- gastric mucosa is friable and edematous, consistent with portal hypertensive gastropathy  -- The fundus on antegrade and retroflex views is otherwise normal.   -- Antrum has improved compared to previous EGD's, and the focal GAVE vs portal hypertensive gastropathy is minimal now, but again treated with APC today, given it's favorable response to APC these past several months. SMALL INTESTINE: colonoscope smoothly passes to the distal duodenum, though I did not reach previously tattooed site. No AVMs found, but the duodenum is generally friable and scope trauma causes a few scant erosions. Therapies:  APC to Antrum    Specimens: none    EBL:  minimal.         Complications:   None; patient tolerated the procedure well. Impression:  -- Antral mucosa has improved compared to previous EGD's, and the focal GAVE vs portal hypertensive gastropathy is minimal now, but again treated with APC today given it's favorable response to APC these past several months. It's interesting that despite improvement in the antrum, recurrent CARLINE continues. She has had mulitiple EGD's, push enteroscopies and a colonoscopy and pill camera, all since August.  -- no other AVMs nor active bleeding, nor clear cause of anemia identified (distal portion of duodenum visualized)  -- small/low grade varices now seen in distal esophagus    Recommendations:  -- serial H/H  -- continue PPI BID  -- monitor GI output  -- CT ordered for further evaluation, of anemia, abd distention, portal HTN and also to follow up pancreatic IPMN's seen previously.    -- She saw hematology last time, may help to re-involve them to treat non GI side of this multifactorial anemia    Discharge disposition:  back to wards    Julia Polanco MD

## 2022-12-30 NOTE — PROGRESS NOTES
Patient transported to the endoscopy area with endo RN x 2   Patient left personal belongings at the bedside to include upper denture and purse    Patient transported with glasses and tele box connected

## 2022-12-30 NOTE — ANESTHESIA POSTPROCEDURE EVALUATION
Procedure(s):  ESOPHAGOGASTRODUODENOSCOPY (EGD)  ENDOSCOPIC ARGON PLASMA COAGULATION. MAC    Anesthesia Post Evaluation      Multimodal analgesia: multimodal analgesia used between 6 hours prior to anesthesia start to PACU discharge  Patient location during evaluation: PACU  Patient participation: complete - patient participated  Level of consciousness: awake and alert  Pain management: adequate  Airway patency: patent  Anesthetic complications: no  Cardiovascular status: acceptable, hemodynamically stable and blood pressure returned to baseline  Respiratory status: acceptable and room air  Hydration status: euvolemic  Post anesthesia nausea and vomiting:  none  Final Post Anesthesia Temperature Assessment:  Normothermia (36.0-37.5 degrees C)      INITIAL Post-op Vital signs:   Vitals Value Taken Time   /61 12/30/22 0830   Temp 36.5 °C (97.7 °F) 12/30/22 0816   Pulse 90 12/30/22 0909   Resp 15 12/30/22 0909   SpO2 98 % 12/30/22 0829   Vitals shown include unvalidated device data.

## 2022-12-30 NOTE — ANESTHESIA PREPROCEDURE EVALUATION
Anesthetic History     PONV          Review of Systems / Medical History  Patient summary reviewed, nursing notes reviewed and pertinent labs reviewed    Pulmonary  Within defined limits                 Neuro/Psych             Comments: spinal stenosis Cardiovascular    Hypertension: well controlled        Dysrhythmias   Pacemaker (PM for SSS), CAD and hyperlipidemia    Exercise tolerance: <4 METS  Comments: Hx Bifascicular block    TTE (05/2021): LV: Estimated LVEF is 55 - 60%. LA: Mildly dilated left atrium. Mild aortic valve stenosis is present. TV: Moderate tricuspid valve regurgitation is present. Right Ventricular Arterial Pressure (RVSP) is 58 mmHg. Pulmonary hypertension found to be moderate.        GI/Hepatic/Renal     GERD: well controlled    Renal disease: CRI  Liver disease    Comments: BRADY Syndrome  Fatty liver Endo/Other    Diabetes: well controlled, type 2  Hypothyroidism: poorly controlled  Morbid obesity, arthritis and anemia ( )     Other Findings   Comments: Gout  Thrombocytopenia  RSD lower limb  Osteoporosis   DDD  Ambulates with a walker   AVM (arteriovenous malformation) of small bowel, acquired with hemorrhage                     Physical Exam    Airway  Mallampati: III  TM Distance: 4 - 6 cm  Neck ROM: normal range of motion   Mouth opening: Normal     Cardiovascular    Rhythm: regular  Rate: normal    Murmur: Grade 4, Tricuspid area     Dental    Dentition: Full upper dentures and Poor dentition  Comments: No loose lower teeth, some missing   Pulmonary      Decreased breath sounds: bibasilar           Abdominal  GI exam deferred       Other Findings            Anesthetic Plan    ASA: 3  Anesthesia type: MAC          Induction: Intravenous  Anesthetic plan and risks discussed with: Patient

## 2022-12-30 NOTE — PROGRESS NOTES
Physical Therapy    Chart reviewed in preparation for evaluation. Patient currently off the floor to endoscopy. Will continue to follow and perform evaluation when appropriate.     Janay Thomas PT, DPT

## 2022-12-31 VITALS
HEART RATE: 64 BPM | BODY MASS INDEX: 41.33 KG/M2 | WEIGHT: 205 LBS | OXYGEN SATURATION: 95 % | DIASTOLIC BLOOD PRESSURE: 51 MMHG | SYSTOLIC BLOOD PRESSURE: 128 MMHG | HEIGHT: 59 IN | TEMPERATURE: 98 F | RESPIRATION RATE: 18 BRPM

## 2022-12-31 LAB
ERYTHROCYTE [DISTWIDTH] IN BLOOD BY AUTOMATED COUNT: 19.4 % (ref 11.5–14.5)
HCT VFR BLD AUTO: 23.7 % (ref 35–47)
HGB BLD-MCNC: 7.6 G/DL (ref 11.5–16)
MCH RBC QN AUTO: 28.1 PG (ref 26–34)
MCHC RBC AUTO-ENTMCNC: 32.1 G/DL (ref 30–36.5)
MCV RBC AUTO: 87.8 FL (ref 80–99)
NRBC # BLD: 0 K/UL (ref 0–0.01)
NRBC BLD-RTO: 0 PER 100 WBC
PLATELET # BLD AUTO: 56 K/UL (ref 150–400)
PMV BLD AUTO: 11.6 FL (ref 8.9–12.9)
RBC # BLD AUTO: 2.7 M/UL (ref 3.8–5.2)
WBC # BLD AUTO: 2.6 K/UL (ref 3.6–11)

## 2022-12-31 PROCEDURE — 74011250637 HC RX REV CODE- 250/637: Performed by: STUDENT IN AN ORGANIZED HEALTH CARE EDUCATION/TRAINING PROGRAM

## 2022-12-31 PROCEDURE — 85027 COMPLETE CBC AUTOMATED: CPT

## 2022-12-31 PROCEDURE — 74011250637 HC RX REV CODE- 250/637: Performed by: INTERNAL MEDICINE

## 2022-12-31 PROCEDURE — C9113 INJ PANTOPRAZOLE SODIUM, VIA: HCPCS | Performed by: STUDENT IN AN ORGANIZED HEALTH CARE EDUCATION/TRAINING PROGRAM

## 2022-12-31 PROCEDURE — 36415 COLL VENOUS BLD VENIPUNCTURE: CPT

## 2022-12-31 PROCEDURE — 74011000250 HC RX REV CODE- 250: Performed by: INTERNAL MEDICINE

## 2022-12-31 PROCEDURE — 74011250636 HC RX REV CODE- 250/636: Performed by: STUDENT IN AN ORGANIZED HEALTH CARE EDUCATION/TRAINING PROGRAM

## 2022-12-31 PROCEDURE — 74011000250 HC RX REV CODE- 250: Performed by: STUDENT IN AN ORGANIZED HEALTH CARE EDUCATION/TRAINING PROGRAM

## 2022-12-31 RX ORDER — LACTULOSE 10 G/10G
10 SOLUTION ORAL
Qty: 30 PACKET | Refills: 0 | Status: SHIPPED | OUTPATIENT
Start: 2022-12-31 | End: 2023-01-30

## 2022-12-31 RX ADMIN — LEVOTHYROXINE SODIUM 150 MCG: 0.15 TABLET ORAL at 08:18

## 2022-12-31 RX ADMIN — POLYETHYLENE GLYCOL 3350 17 G: 17 POWDER, FOR SOLUTION ORAL at 08:19

## 2022-12-31 RX ADMIN — SODIUM CHLORIDE, PRESERVATIVE FREE 10 ML: 5 INJECTION INTRAVENOUS at 05:25

## 2022-12-31 RX ADMIN — EZETIMIBE 10 MG: 10 TABLET ORAL at 08:19

## 2022-12-31 RX ADMIN — POTASSIUM CHLORIDE 20 MEQ: 750 TABLET, FILM COATED, EXTENDED RELEASE ORAL at 08:18

## 2022-12-31 RX ADMIN — FUROSEMIDE 40 MG: 40 TABLET ORAL at 08:18

## 2022-12-31 RX ADMIN — NYSTATIN: 100000 POWDER TOPICAL at 08:20

## 2022-12-31 RX ADMIN — ATORVASTATIN CALCIUM 20 MG: 20 TABLET, FILM COATED ORAL at 08:18

## 2022-12-31 RX ADMIN — SODIUM CHLORIDE 40 MG: 9 INJECTION, SOLUTION INTRAMUSCULAR; INTRAVENOUS; SUBCUTANEOUS at 08:19

## 2022-12-31 NOTE — PROGRESS NOTES
End of Shift Note    Bedside shift change report given to [facility] (oncoming nurse) by Edin Pereyra RN (offgoing nurse). Report included the following information SBAR, Kardex, and MAR    Shift worked:  7a-7p     Shift summary and any significant changes:          Concerns for physician to address:       Zone phone for oncoming shift:          Activity:  Activity Level: Bed Rest  Number times ambulated in hallways past shift: 0  Number of times OOB to chair past shift: 2    Cardiac:   Cardiac Monitoring: Yes      Cardiac Rhythm: Ventricular Paced    Access:  Current line(s): PIV     Genitourinary:   Urinary status: voiding    Respiratory:   O2 Device: None (Room air)  Chronic home O2 use?: NO  Incentive spirometer at bedside: NO       GI:  Last Bowel Movement Date: 12/28/22  Current diet:  ADULT DIET Clear Liquid  Passing flatus: YES  Tolerating current diet: YES       Pain Management:   Patient states pain is manageable on current regimen: N/A    Skin:  Boris Score: 19  Interventions: increase time out of bed, PT/OT consult, and internal/external urinary devices    Patient Safety:  Fall Score:  Total Score: 3  Interventions: bed/chair alarm, assistive device (walker, cane, etc), and pt to call before getting OOB  High Fall Risk: Yes    Length of Stay:  Expected LOS: 3d 0h  Actual LOS: 2      Edin Pereyra RN

## 2022-12-31 NOTE — DISCHARGE INSTRUCTIONS
HOSPITALIST DISCHARGE INSTRUCTIONS    NAME: Rea Moreland   :  1945   MRN:  [id number]     Date/Time:  2022 10:03 AM    ADMIT DATE: 2022   DISCHARGE DATE: 2022     Acute blood loss anemia  2/2 GIB/GAVE:    HTN  CKD stage III  History of SSS s/p pacemaker placement  History of A. fib  Hypothyroidism  HLD       It is important that you take the medication exactly as they are prescribed. Keep your medication in the bottles provided by the pharmacist and keep a list of the medication names, dosages, and times to be taken in your wallet. Do not take other medications without consulting your doctor. What to do at Home    Recommended diet:  Cardiac Diet    Recommended activity: Activity as tolerated      If you have questions regarding the hospital related prescriptions or hospital related issues please call [address] [place] [address]' office at [phone]. You can always direct your questions to your primary care doctor if you are unable to reach your hospital physician; your PCP works as an extension of your hospital doctor just like your hospital doctor is an extension of your PCP for your time at the hospital [facility])    If you experience any of the following symptoms then please call your primary care physician or return to the emergency room if you cannot get hold of your doctor:    Fever, chills, nausea, vomiting, or persistent diarrhea  Worsening weakness or new problems with your speech or balance  Dark stools or visible blood in your stools  New Leg swelling or shortness of breath as these could be signs of a clot    Additional Instructions:      Bring these papers with you to your follow up appointments. The papers will help your doctors be sure to continue the care plan from the hospital.              Information obtained by :  I understand that if any problems occur once I am at home I am to contact my physician.     I understand and acknowledge receipt of the instructions indicated above.                                                                                                                                            Physician's or R.N.'s Signature                                                                  Date/Time                                                                                                                                              Patient or Representative Signature

## 2022-12-31 NOTE — PROGRESS NOTES
7039 Discharge paperwork reviewed with patient. Medication and follow up directions reviewed. No questions from patient at this time. Patient verbalized understanding of the information. Patient safely transported to awaiting vehicle outside with family friend driving.

## 2022-12-31 NOTE — DISCHARGE SUMMARY
Hospitalist Discharge Summary     Patient ID:  Gama Sanchez  [id number]  41 y.o.  1945 12/28/2022    PCP on record: Shruti Tripp MD    Admit date: 12/28/2022  Discharge date and time: 12/31/2022    DISCHARGE DIAGNOSIS:    Acute blood loss anemia  2/2 GIB/GAVE:    HTN  CKD stage III  History of SSS s/p pacemaker placement  History of A. fib  Hypothyroidism  HLD       CONSULTATIONS:  IP CONSULT TO GASTROENTEROLOGY    Excerpted HPI from H&P of Jovon Aguilar, DO:  68 y.o. female presents with unknown duration of unknown onset, abnormal hgb of 6 from outpatient iron infusion center that is associated with some mild dizziness and villalobos, remitted by nothing, exacerbated by nothing. Further history: Patient states that she has had multiple episodes of this and that she has had multiple blood transfusions. Has hx of GAVE and gastric AVMs. Pertinent chart review: Patient was just admitted for this in November and in October. Tends to have difficulty with crossmatched RBC's because of all of the transfusions she has had.       ______________________________________________________________________  DISCHARGE SUMMARY/HOSPITAL COURSE:  for full details see H&P, daily progress notes, labs, consult notes. Acute blood loss anemia  2/2 GIB/GAVE:       -Hemoglobin 4.2 on presentation  -Hemoglobin improved after 3 units of PRBC. Hemoglobin responded appropriately with blood transfusion, remained stable for past 48 hours  -EGD 12/30- showed minimal portal hypertensive gastropathy/GAVE, which was treated. No significant cause for bleeding identified  -Continue PPI  -CT abdomen showed moderate ascites and liver cirrhosis    -On review of chart, patient has diagnosed BRADY cirrhosis, at one point she was prescribed lactulose and spironolactone, which she is not taking it anymore. Unsure why. She says she follows with the [facility].   She was advised to follow with hepatologist.  We will start lactulose daily for now    -Hematology was consulted yesterday, but hemoglobin remained stable. She states that she has tolerated the next 2 weeks. Patient can follow-up with as outpatient. She was advised to come back to ED if any concerning symptoms arises. HTN  CKD stage III  -Resume Lasix  -Patient not taking metolazone anymore  -For now holding amlodipine, ARB. Continue to hold on discharge     History of SSS s/p pacemaker placement  History of A. fib  Hypothyroidism  HLD     History of Beckman cirrhosis          _______________________________________________________________________  Patient seen and examined by me on discharge day. Pertinent Findings:  Gen:    Not in distress  Chest: Clear lungs  CVS:   Regular rhythm. No edema  Abd:  Soft, not distended, not tender  Neuro:  Alert,   _______________________________________________________________________  DISCHARGE MEDICATIONS:   Current Discharge Medication List        START taking these medications    Details   lactulose (KRISTALOSE) 10 gram packet Take 1 Packet by mouth daily (with breakfast) for 30 days. Qty: 30 Packet, Refills: 0  Start date: 12/31/2022, End date: 1/30/2023           CONTINUE these medications which have NOT CHANGED    Details   allopurinoL (ZYLOPRIM) 100 mg tablet Take 100 mg by mouth daily. atorvastatin (LIPITOR) 20 mg tablet TAKE 1 TABLET BY MOUTH AT BEDTIME  Qty: 90 Tablet, Refills: 3    Associated Diagnoses: Pure hypercholesterolemia      ezetimibe (ZETIA) 10 mg tablet TAKE 1 TABLET BY MOUTH EVERY DAY  Qty: 90 Tablet, Refills: 3    Associated Diagnoses: Pure hypercholesterolemia      furosemide (LASIX) 80 mg tablet Take 0.5 Tablets by mouth daily. Qty: 30 Tablet, Refills: 0      pantoprazole (PROTONIX) 40 mg tablet Take 1 Tablet by mouth Before breakfast and dinner. Qty: 60 Tablet, Refills: 5      nystatin (MYCOSTATIN) powder Apply  to affected area two (2) times a day.   Qty: 30 g, Refills: 0      fluticasone propionate (FLONASE) 50 mcg/actuation nasal spray 2 Sprays by Both Nostrils route daily. Administer to right and left nostril. Qty: 3 Each, Refills: 3      levothyroxine (SYNTHROID) 150 mcg tablet Take 1 Tablet by mouth Daily (before breakfast). Qty: 90 Tablet, Refills: 3      lidocaine (LIDODERM) 5 % Apply patch to the affected area for 12 hours a day and remove for 12 hours a day. Qty: 90 Each, Refills: 3      ketoconazole (NIZORAL) 2 % topical cream Apply  to affected area daily as needed. Refills: 3      Biotin 2,500 mcg cap Take  by mouth.      loratadine (CLARITIN) 10 mg tablet TAKE 1 TABLET BY MOUTH EVERY DAY  Qty: 90 Tablet, Refills: 3    Associated Diagnoses: Acute recurrent sinusitis, unspecified location      benzocaine-menthoL (CHLORASEPTIC MAX) 15-10 mg lozg lozenge Take 1 Lozenge by mouth as needed for Sore throat. Qty: 30 Lozenge, Refills: 0      levalbuterol tartrate (XOPENEX) 45 mcg/actuation inhaler Take 2 Puffs by inhalation every six (6) hours as needed for Wheezing or Shortness of Breath. alendronate (FOSAMAX) 70 mg tablet alendronate 70 mg tablet   Take 1 tablet every week by oral route. potassium chloride SA (MICRO-K) 10 mEq capsule Take 2 Capsules by mouth daily. Qty: 180 Capsule, Refills: 3    Associated Diagnoses: Leg edema      icosapent ethyL (VASCEPA) 1 gram capsule Take 2 Capsules by mouth two (2) times daily (with meals). Qty: 360 Capsule, Refills: 3      polyethylene glycol (MIRALAX) 17 gram/dose powder Take 17 g by mouth daily. Qty: 2108 g, Refills: 3    Associated Diagnoses: Constipation, unspecified constipation type      L GASSERI/B BIFIDUM/B LONGUM ([employer] PO) Take 1 Tab by mouth daily. vitamin E (AQUA GEMS) 400 unit capsule Take 800 Units by mouth two (2) times a day. cholecalciferol, vitamin D3, 50 mcg (2,000 unit) tab Take 1 Tab by mouth daily. MULTIVITAMIN WITH MINERALS (ONE-A-DAY 50 PLUS PO) Take 1 Tab by mouth daily. STOP taking these medications       metOLazone (ZAROXOLYN) 2.5 mg tablet Comments:   Reason for Stopping:         amLODIPine (NORVASC) 10 mg tablet Comments:   Reason for Stopping:         losartan (COZAAR) 25 mg tablet Comments:   Reason for Stopping:                 Patient Follow Up Instructions:    Activity: Activity as tolerated  Diet: Cardiac Diet  Wound Care: None needed      Follow-up Information       Follow up With Specialties Details Why Contact Info    Miroslava Moses MD Internal Medicine Physician Schedule an appointment as soon as possible for a visit in 1 week(s)  [address] [facility]  [address] [facility]      Anca Jaquez MD Internal Medicine Physician   [facility] [phone]            ________________________________________________________________    Risk of deterioration: Low    Condition at Discharge:  Stable  __________________________________________________________________    Disposition  Home with family, no needs    ____________________________________________________________________    Code Status: Full Code  ___________________________________________________________________      Total time in minutes spent coordinating this discharge (includes going over instructions, follow-up, prescriptions, and preparing report for sign off to her PCP) :  35 minutes    Signed:  Bryanna Hawkins MD

## 2022-12-31 NOTE — PROGRESS NOTES
Transition of Care Plan:    RUR: 32  MAYURI: today around 1 PM   CG is here and ready to transport   Disposition: Plan Home with MURPHY order with [facility]   12:09 PM   Plan to return back home to [address] [place] [address] with [facility] and 24/7 caregiver. If SNF or IPR: Date FOC offered:  Date FOC received:  Date authorization started with reference number:  Date authorization received and expires:  Accepting facility:  Follow up appointments:  DME needed:    Transportation at Discharge: CG is here and ready to transport in car. Keys or means to access home:     CG   IM Medicare Letter: 2nd IM delivered today. Is patient a [place] and connected with the [place]?              no  If yes, was [employer] transfer form completed and VA notified? Caregiver Contact: Son: Gala Ervin: [phone]  CG: Brenden Lua: [phone]  Discharge Caregiver contacted prior to discharge? yes  Care Conference needed?:      no    Reason for Admission:   Pt was admitted on 12/28/22 d/t Anemia. RUR Score:     32      PCP: First and Last name:  Breana Velarde MD     Name of Practice: [facility]   Are you a current patient: Yes/No: yes   Approximate date of last visit: last week - 12/23   Can you do a virtual visit with your PCP:  yes             Resources/supports as identified by patient/family:   Insurance is [employer] facing patient (as identified by patient/family and CM): Finances/Medication cost? Pt has prescription drug coverage. Pharmacy: [facility]                  Transportation? CG              Support system or lack thereof? Son and CG are very supportive                     Living arrangements? Lives in [address] [place] [address] at [facility].  1st floor. No steps to enter. Pt has two CG from 8AM to 4 PM and 5 PM to 7 AM.            Self-care/ADLs/Cognition? Alert and oriented. Lives with 24/7 CGs  DME: Flower Gutierrez chair.   Pt wishes to purchas her own RW.           Current Advanced Directive/Advance Care Plan:  Full Code      Healthcare Decision Maker:       Primary Decision Maker: Abdulaziz Bob - [phone]    Payor Source Payor: Fern Nuñez / Plan: Yoli Cochran / Product Type: Managed Care Medicare /                             Plan for utilizing home health:    [facility] order sent to [facility]. No further CM needs. Care Management Interventions  PCP Verified by CM: Yes  Palliative Care Criteria Met (RRAT>21 & CHF Dx)?: No  Mode of Transport at Discharge:  Other (see comment)  Transition of Care Consult (CM Consult): Discharge Planning, [address]: No  Reason Outside [place]: Patient already serviced by other home care/hospice agency  MyChart Signup: Yes  Discharge Durable Medical Equipment: No  Physical Therapy Consult: Yes  Occupational Therapy Consult: Yes  Speech Therapy Consult: No  Support Systems: Child(paige), Caregiver/Home Care Staff  Confirm Follow Up Transport: Friends  The Plan for Transition of Care is Related to the Following Treatment Goals : HH: [facility]  The Patient and/or Patient Representative was Provided with a Choice of Provider and Agrees with the Discharge Plan?: Yes  Name of the Patient Representative Who was Provided with a Choice of Provider and Agrees with the Discharge Plan: Pt and CG  Freedom of Choice List was Provided with Basic Dialogue that Supports the Patient's Individualized Plan of Care/Goals, Treatment Preferences and Shares the Quality Data Associated with the Providers?: Yes  Discharge Location  Patient Expects to be Discharged to[de-identified] Home with home health    [place], Weekend [address] [facility]   Ext [phone]

## 2022-12-31 NOTE — PROGRESS NOTES
1900 Bedside and Verbal shift change report given to [facility] (oncoming nurse) by John Darling RN (offgoing nurse). Report included the following information SBAR, Kardex, Intake/Output, MAR, Recent Results, and Cardiac Rhythm V-Paced . End of Shift Note    Bedside shift change report given to John Darling RN (oncoming nurse) by Corey Lal RN (offgoing nurse). Report included the following information SBAR, Kardex, Intake/Output, MAR, Recent Results, and Cardiac Rhythm V-Paced    Shift worked:  1900 - 0700     Shift summary and any significant changes:     HGB up to 7.6. 2L NC worn overnight to maintain O2 sats >90%. Concerns for physician to address:       Zone phone for oncoming shift:          Activity:  Activity Level: Up with Assistance  Number times ambulated in hallways past shift: 0  Number of times OOB to chair past shift: 0    Cardiac:   Cardiac Monitoring: Yes      Cardiac Rhythm: Ventricular Paced    Access:  Current line(s): PIV     Genitourinary:   Urinary status: voiding and external catheter    Respiratory:   O2 Device: None (Room air)  Chronic home O2 use?: NO  Incentive spirometer at bedside: N/A       GI:  Last Bowel Movement Date: 12/31/22  Current diet:  ADULT DIET Clear Liquid  Passing flatus: YES  Tolerating current diet: YES       Pain Management:   Patient states pain is manageable on current regimen: YES    Skin:  Boris Score: 17  Interventions: float heels, PT/OT consult, internal/external urinary devices, and nutritional support     Patient Safety:  Fall Score:  Total Score: 3  Interventions: bed/chair alarm, assistive device (walker, cane, etc), gripper socks, pt to call before getting OOB, and stay with me (per policy)  High Fall Risk: Yes    Length of Stay:  Expected LOS: 3d 0h  Actual LOS: 3      Corey Lal RN

## 2023-01-01 ENCOUNTER — APPOINTMENT (OUTPATIENT)
Dept: INFUSION THERAPY | Age: 78
End: 2023-01-01

## 2023-01-01 ENCOUNTER — HOME CARE VISIT (OUTPATIENT)
Dept: HOSPICE | Facility: HOSPICE | Age: 78
End: 2023-01-01
Payer: MEDICARE

## 2023-01-01 ENCOUNTER — HOME CARE VISIT (OUTPATIENT)
Dept: SCHEDULING | Facility: HOME HEALTH | Age: 78
End: 2023-01-01
Payer: MEDICARE

## 2023-01-01 VITALS
RESPIRATION RATE: 22 BRPM | HEART RATE: 62 BPM | TEMPERATURE: 98.6 F | HEART RATE: 62 BPM | DIASTOLIC BLOOD PRESSURE: 75 MMHG | RESPIRATION RATE: 20 BRPM | TEMPERATURE: 98.6 F | OXYGEN SATURATION: 87 % | OXYGEN SATURATION: 87 % | SYSTOLIC BLOOD PRESSURE: 120 MMHG

## 2023-01-01 VITALS — SYSTOLIC BLOOD PRESSURE: 140 MMHG | HEART RATE: 60 BPM | DIASTOLIC BLOOD PRESSURE: 60 MMHG | TEMPERATURE: 98.8 F

## 2023-01-01 PROCEDURE — G0156 HHCP-SVS OF AIDE,EA 15 MIN: HCPCS

## 2023-01-01 PROCEDURE — G0299 HHS/HOSPICE OF RN EA 15 MIN: HCPCS

## 2023-01-01 RX ADMIN — LORAZEPAM 0.5 MG: 0.5 TABLET ORAL at 14:45

## 2023-01-01 RX ADMIN — MORPHINE SULFATE 5 MG: 20 SOLUTION ORAL at 15:25

## 2023-01-04 ENCOUNTER — HOSPITAL ENCOUNTER (OUTPATIENT)
Dept: INFUSION THERAPY | Age: 78
Discharge: HOME | End: 2023-01-04
Payer: MEDICARE

## 2023-01-04 ENCOUNTER — TELEPHONE (OUTPATIENT)
Dept: INTERNAL MEDICINE CLINIC | Age: 78
End: 2023-01-04

## 2023-01-04 VITALS
RESPIRATION RATE: 18 BRPM | WEIGHT: 215.3 LBS | OXYGEN SATURATION: 94 % | DIASTOLIC BLOOD PRESSURE: 70 MMHG | HEART RATE: 97 BPM | TEMPERATURE: 97.2 F | BODY MASS INDEX: 43.49 KG/M2 | SYSTOLIC BLOOD PRESSURE: 168 MMHG

## 2023-01-04 LAB
BASOPHILS # BLD: 0 K/UL (ref 0–0.1)
BASOPHILS NFR BLD: 1 % (ref 0–1)
DIFFERENTIAL METHOD BLD: ABNORMAL
EOSINOPHIL # BLD: 0.2 K/UL (ref 0–0.4)
EOSINOPHIL NFR BLD: 7 % (ref 0–7)
ERYTHROCYTE [DISTWIDTH] IN BLOOD BY AUTOMATED COUNT: 19.1 % (ref 11.5–14.5)
HCT VFR BLD AUTO: 25.7 % (ref 35–47)
HGB BLD-MCNC: 8.1 G/DL (ref 11.5–16)
IMM GRANULOCYTES # BLD AUTO: 0.1 K/UL (ref 0–0.04)
IMM GRANULOCYTES NFR BLD AUTO: 2 % (ref 0–0.5)
LYMPHOCYTES # BLD: 0.6 K/UL (ref 0.8–3.5)
LYMPHOCYTES NFR BLD: 16 % (ref 12–49)
MCH RBC QN AUTO: 27.6 PG (ref 26–34)
MCHC RBC AUTO-ENTMCNC: 31.5 G/DL (ref 30–36.5)
MCV RBC AUTO: 87.4 FL (ref 80–99)
MONOCYTES # BLD: 0.4 K/UL (ref 0–1)
MONOCYTES NFR BLD: 11 % (ref 5–13)
NEUTS SEG # BLD: 2.2 K/UL (ref 1.8–8)
NEUTS SEG NFR BLD: 63 % (ref 32–75)
NRBC # BLD: 0 K/UL (ref 0–0.01)
NRBC BLD-RTO: 0 PER 100 WBC
PLATELET # BLD AUTO: 62 K/UL (ref 150–400)
PLATELET COMMENTS,PCOM: ABNORMAL
PMV BLD AUTO: 10.8 FL (ref 8.9–12.9)
RBC # BLD AUTO: 2.94 M/UL (ref 3.8–5.2)
RBC MORPH BLD: ABNORMAL
WBC # BLD AUTO: 3.5 K/UL (ref 3.6–11)

## 2023-01-04 PROCEDURE — 85025 COMPLETE CBC W/AUTO DIFF WBC: CPT

## 2023-01-04 PROCEDURE — 36415 COLL VENOUS BLD VENIPUNCTURE: CPT

## 2023-01-04 NOTE — TELEPHONE ENCOUNTER
----- Message from Cherise De La Vega sent at 1/3/2023  3:39 PM EST -----  Subject: Hospital Follow Up    QUESTIONS  What hospital was the Patient Discharged from? [address]   Date of Discharge? 2022-12-31  Discharge Location? Home  Reason for hospitalization as patient stated? blood transfusion   What question does the patient have, if applicable?   ---------------------------------------------------------------------------  --------------  CALL BACK INFO  What is the best way for the office to contact you? OK to leave message on   voicemail  Preferred Call Back Phone Number? [phone]  ---------------------------------------------------------------------------  --------------  SCRIPT ANSWERS  Relationship to Patient?  Self

## 2023-01-04 NOTE — PROGRESS NOTES
[facility] Lab Draw Note:  Arrived - 2:22    Visit Vitals  BP (!) 168/70 (BP 1 Location: Left upper arm, BP Patient Position: Sitting)   Pulse 97   Temp 97.2 °F (36.2 °C)   Resp 18   Wt 97.7 kg (215 lb 4.8 oz)   SpO2 94%   BMI 43.49 kg/m²       Labs drawn peripherally from right arm -    - Tolerated well. Pt denies any acute problems/changes. Discharged from [facility] ambulatory. No distress. Next appt: 1/5/23       See [facility] for pending lab results.

## 2023-01-05 ENCOUNTER — OFFICE VISIT (OUTPATIENT)
Dept: ENDOCRINOLOGY | Age: 78
End: 2023-01-05
Payer: MEDICARE

## 2023-01-05 ENCOUNTER — HOSPITAL ENCOUNTER (OUTPATIENT)
Dept: INFUSION THERAPY | Age: 78
End: 2023-01-05

## 2023-01-05 VITALS
HEIGHT: 59 IN | HEART RATE: 88 BPM | DIASTOLIC BLOOD PRESSURE: 60 MMHG | SYSTOLIC BLOOD PRESSURE: 126 MMHG | BODY MASS INDEX: 42.98 KG/M2 | WEIGHT: 213.2 LBS

## 2023-01-05 DIAGNOSIS — E55.9 HYPOVITAMINOSIS D: ICD-10-CM

## 2023-01-05 DIAGNOSIS — M81.0 AGE-RELATED OSTEOPOROSIS WITHOUT CURRENT PATHOLOGICAL FRACTURE: ICD-10-CM

## 2023-01-05 DIAGNOSIS — E11.22 TYPE 2 DIABETES MELLITUS WITH STAGE 3B CHRONIC KIDNEY DISEASE, WITHOUT LONG-TERM CURRENT USE OF INSULIN (HCC): ICD-10-CM

## 2023-01-05 DIAGNOSIS — N18.32 TYPE 2 DIABETES MELLITUS WITH STAGE 3B CHRONIC KIDNEY DISEASE, WITHOUT LONG-TERM CURRENT USE OF INSULIN (HCC): ICD-10-CM

## 2023-01-05 DIAGNOSIS — E03.9 ACQUIRED HYPOTHYROIDISM: Primary | ICD-10-CM

## 2023-01-05 PROCEDURE — 1111F DSCHRG MED/CURRENT MED MERGE: CPT | Performed by: INTERNAL MEDICINE

## 2023-01-05 PROCEDURE — 1123F ACP DISCUSS/DSCN MKR DOCD: CPT | Performed by: INTERNAL MEDICINE

## 2023-01-05 PROCEDURE — G8427 DOCREV CUR MEDS BY ELIG CLIN: HCPCS | Performed by: INTERNAL MEDICINE

## 2023-01-05 PROCEDURE — 3078F DIAST BP <80 MM HG: CPT | Performed by: INTERNAL MEDICINE

## 2023-01-05 PROCEDURE — 99214 OFFICE O/P EST MOD 30 MIN: CPT | Performed by: INTERNAL MEDICINE

## 2023-01-05 PROCEDURE — G8432 DEP SCR NOT DOC, RNG: HCPCS | Performed by: INTERNAL MEDICINE

## 2023-01-05 PROCEDURE — 3074F SYST BP LT 130 MM HG: CPT | Performed by: INTERNAL MEDICINE

## 2023-01-05 PROCEDURE — G8536 NO DOC ELDER MAL SCRN: HCPCS | Performed by: INTERNAL MEDICINE

## 2023-01-05 PROCEDURE — G8417 CALC BMI ABV UP PARAM F/U: HCPCS | Performed by: INTERNAL MEDICINE

## 2023-01-05 PROCEDURE — 1090F PRES/ABSN URINE INCON ASSESS: CPT | Performed by: INTERNAL MEDICINE

## 2023-01-05 PROCEDURE — 1101F PT FALLS ASSESS-DOCD LE1/YR: CPT | Performed by: INTERNAL MEDICINE

## 2023-01-05 RX ORDER — ALENDRONATE SODIUM 70 MG/1
70 TABLET ORAL
Qty: 12 TABLET | Refills: 3 | Status: SHIPPED | OUTPATIENT
Start: 2023-01-05

## 2023-01-05 RX ORDER — LOSARTAN POTASSIUM 50 MG/1
TABLET ORAL
COMMUNITY
Start: 2023-01-04

## 2023-01-05 RX ORDER — AMLODIPINE BESYLATE 10 MG/1
TABLET ORAL
COMMUNITY
Start: 2023-01-04

## 2023-01-05 NOTE — PROGRESS NOTES
Chief Complaint   Patient presents with    Thyroid Problem    Diabetes     PCP and pharmacy verified    Vitamin D Deficiency    Osteoporosis     History of Present Illness: King Eliana is a 68 y.o. female here for follow up of hypothyroidism and osteoporosis. She was first diagnosed with osteoporosis \"in 1999. They put me on Fosamax for a while, then they took me off of it they said I was on it too long I have not taken any in several years. Says she was started on Fosamax initially and then this was changed to [facility] [place] in 2007. She took this until 2011. She then had about a 2 year drug holiday. After BMD in 7/2013, alendronate was resumed until early 2016. BMD in 8/2014 showed BMD to be stable. Remained at very high fx risk. 8/2016 study showed worsening of BMD although values at left femoral neck and L-spine appears stable to improved vs 2012.   2018 study done showed BMD stable vs 2016. She had a DXA scan in November 2021 did not show any clinically significant changes from 2018. At our initial visit in August 2022 we agree to start her on Denosumab every 6 months. Her eGFR was in the 40s and her Ca was 8.6. Her Vitamin D was 37.2. She was clinically and biochemically euthyroid on LT4 150mcg daily so I continued that dose. Since our initial visit, she has been in the hospital multiple times for severe anemia, requiring blood and iron transfusions. She has not had any falls or fractures sine our last visit. Pt is still taking LT4 150mcg daily and her Vitamin D \"I think I take one pill every day. \"    Pt notes she did not go have the Denosumab infusion as scheduled in September. \"The insurance said I had to pay a big copay, because of the deductible. She is following with Dr. Kiana Simms of hematology for her iron deficiency anemia. Pt has a STEPHAN-BSO in the 80s. \"I had that in my 27. I was having a lot of bleeding. \" She was on HRT for a time, but they were stopped \"almost 20 years ago.\"    Patient was evaluated by gastroenterology, received upper endoscopy as well as colonoscopy, no active signs of GI bleeding. She is still off the elequis, losartan and ASA. She saw the GI doctor, Dr. Eloina Balderrama said everything was looking ok\". Pt has a hx of diabetes, but she is not on any DM medications and her last A1C in May 2022 was 5.9%. \"She was taken off Diabetes Medications two years ago. I was on Metformin but my nephrologist said I was almost had to go on dialysis and he stopped the Metformin. \"  She is followed by Dr. Ana Shin. She is not testing her BGs and she is not taking any DM meds. She will test her BGs \"once in a while\". When she has tested it was in the 120-150s. She denies issues of hypoglycemia. Current Outpatient Medications   Medication Sig    amLODIPine (NORVASC) 10 mg tablet     alendronate (FOSAMAX) 70 mg tablet Take 1 Tablet by mouth every seven (7) days. lactulose (KRISTALOSE) 10 gram packet Take 1 Packet by mouth daily (with breakfast) for 30 days. allopurinoL (ZYLOPRIM) 100 mg tablet Take 100 mg by mouth daily. loratadine (CLARITIN) 10 mg tablet TAKE 1 TABLET BY MOUTH EVERY DAY    atorvastatin (LIPITOR) 20 mg tablet TAKE 1 TABLET BY MOUTH AT BEDTIME    ezetimibe (ZETIA) 10 mg tablet TAKE 1 TABLET BY MOUTH EVERY DAY    benzocaine-menthoL (CHLORASEPTIC MAX) 15-10 mg lozg lozenge Take 1 Lozenge by mouth as needed for Sore throat. furosemide (LASIX) 80 mg tablet Take 0.5 Tablets by mouth daily. levalbuterol tartrate (XOPENEX) 45 mcg/actuation inhaler Take 2 Puffs by inhalation every six (6) hours as needed for Wheezing or Shortness of Breath. potassium chloride SA (MICRO-K) 10 mEq capsule Take 2 Capsules by mouth daily. pantoprazole (PROTONIX) 40 mg tablet Take 1 Tablet by mouth Before breakfast and dinner. nystatin (MYCOSTATIN) powder Apply  to affected area two (2) times a day.     fluticasone propionate (FLONASE) 50 mcg/actuation nasal spray 2 Sprays by Both Nostrils route daily. Administer to right and left nostril.    levothyroxine (SYNTHROID) 150 mcg tablet Take 1 Tablet by mouth Daily (before breakfast). icosapent ethyL (VASCEPA) 1 gram capsule Take 2 Capsules by mouth two (2) times daily (with meals). lidocaine (LIDODERM) 5 % Apply patch to the affected area for 12 hours a day and remove for 12 hours a day. polyethylene glycol (MIRALAX) 17 gram/dose powder Take 17 g by mouth daily. ketoconazole (NIZORAL) 2 % topical cream Apply  to affected area daily as needed. Biotin 2,500 mcg cap Take  by mouth. L GASSERI/B BIFIDUM/B LONGUM ([employer] PO) Take 1 Tab by mouth daily. vitamin E (AQUA GEMS) 400 unit capsule Take 800 Units by mouth two (2) times a day. cholecalciferol, vitamin D3, 50 mcg (2,000 unit) tab Take 1 Tab by mouth daily. MULTIVITAMIN WITH MINERALS (ONE-A-DAY 50 PLUS PO) Take 1 Tab by mouth daily. losartan (COZAAR) 50 mg tablet  (Patient not taking: Reported on 1/5/2023)     No current facility-administered medications for this visit. Allergies   Allergen Reactions    Gabapentin Other (comments)     Suicidal ideation    Metoprolol Rash    Celebrex [Celecoxib] Hives    Eliquis [Apixaban] Other (comments)     Caused excessive anemia    Pcn [Penicillins] Unknown (comments)     Can't remember, was a long time    Sulfa (Sulfonamide Antibiotics) Hives     Review of Systems:  - Cardiovascular: no chest pain  - Neurological: no tremors  - Integumentary: skin is normal    Physical Examination:  Blood pressure 126/60, pulse 88, height 4' 11\" (1.499 m), weight 213 lb 3.2 oz (96.7 kg).   General: pleasant, no distress, good eye contact   Neck: no thyromegaly or thyroid bruits  Cardiovascular: regular, normal rate, nl s1 and s2, no m/r/g   Integumentary: skin is normal, 2+ edema  Neurological: reflexes 2+ at biceps, no tremors  Psychiatric: normal mood and affect    Data Reviewed:   Component Latest Ref Rng & Units 12/31/2022 12/30/2022   WBC      3.6 - 11.0 K/uL 2.6 (L)    RBC      3.80 - 5.20 M/uL 2.70 (L)    HGB      11.5 - 16.0 g/dL 7.6 (L)    HCT      35.0 - 47.0 % 23.7 (L)    MCV      80.0 - 99.0 FL 87.8    MCH      26.0 - 34.0 PG 28.1    MCHC      30.0 - 36.5 g/dL 32.1    RDW      11.5 - 14.5 % 19.4 (H)    PLATELET      536 - 159 K/uL 56 (L)    MPV      8.9 - 12.9 FL 11.6    NRBC      0  WBC 0.0    ABSOLUTE NRBC      0.00 - 0.01 K/uL 0.00    Sodium      136 - 145 mmol/L  140   Potassium      3.5 - 5.1 mmol/L  4.7   Chloride      97 - 108 mmol/L  113 (H)   CO2      21 - 32 mmol/L  21   Anion gap      5 - 15 mmol/L  6   Glucose      65 - 100 mg/dL  135 (H)   BUN      6 - 20 MG/DL  34 (H)   Creatinine      0.55 - 1.02 MG/DL  1.27 (H)   BUN/Creatinine ratio      12 - 20    27 (H)   eGFR      >60 ml/min/1.73m2  44 (L)   Calcium      8.5 - 10.1 MG/DL  9.2       Assessment/Plan:   1) Hypothyroidism > Pt is clinically euthyroid on the Levoxyl 150mcg daily. Pt to continue this dose and I will repeat her TFTs for our next visit. 2) Osteoporosis > Pt never had the Denosumab treatment and she was told she would have to pay $9000 for the medication. Pt would rater go back to trying the Alendronate 70mg weekly. Her eGFR last week was 44 so she can still take Alendronate safely. We will continue to monitor. 3) Hypovitaminosis D > I will order a Vitamin D level and increase her Vitamin D dose as needed based on the result. Our goal is to keep the Vitamin D > 30.    4) DM > We discussed that since she has had pRBC and iron infusions we can not test an A1C at this time. Pt voices understanding and agreement with the plan. RTC 4 months    Patient Instructions   I will order Fosamax and you will take one pill once every week.       Copy sent to:

## 2023-01-05 NOTE — LETTER
1/5/2023    Patient: Aubrie Watters   YOB: 1945   Date of Visit: 1/5/2023     Marielle Gu MD  [address]  [address]  [address]  [place]    Dear Marielle Gu MD,      Thank you for referring Ms. Emely Jack to [facility] for evaluation. My notes for this consultation are attached. If you have questions, please do not hesitate to call me. I look forward to following your patient along with you.       Sincerely,    Joanie Lyle MD

## 2023-01-08 RX ORDER — HEPARIN 100 UNIT/ML
500 SYRINGE INTRAVENOUS AS NEEDED
Start: 2023-01-11

## 2023-01-08 RX ORDER — SODIUM CHLORIDE 9 MG/ML
5-250 INJECTION, SOLUTION INTRAVENOUS AS NEEDED
OUTPATIENT
Start: 2023-01-11

## 2023-01-08 RX ORDER — SODIUM CHLORIDE 9 MG/ML
5-40 INJECTION INTRAMUSCULAR; INTRAVENOUS; SUBCUTANEOUS AS NEEDED
OUTPATIENT
Start: 2023-01-11

## 2023-01-08 RX ORDER — SODIUM CHLORIDE 0.9 % (FLUSH) 0.9 %
5-40 SYRINGE (ML) INJECTION AS NEEDED
OUTPATIENT
Start: 2023-01-11

## 2023-01-11 ENCOUNTER — HOSPITAL ENCOUNTER (OUTPATIENT)
Dept: INFUSION THERAPY | Age: 78
Discharge: HOME | End: 2023-01-11
Payer: MEDICARE

## 2023-01-11 VITALS
WEIGHT: 212.5 LBS | OXYGEN SATURATION: 98 % | HEART RATE: 97 BPM | DIASTOLIC BLOOD PRESSURE: 70 MMHG | TEMPERATURE: 97.6 F | BODY MASS INDEX: 42.92 KG/M2 | SYSTOLIC BLOOD PRESSURE: 158 MMHG | RESPIRATION RATE: 18 BRPM

## 2023-01-11 LAB
BASOPHILS # BLD: 0 K/UL (ref 0–0.1)
BASOPHILS NFR BLD: 0 % (ref 0–1)
DIFFERENTIAL METHOD BLD: ABNORMAL
EOSINOPHIL # BLD: 0.2 K/UL (ref 0–0.4)
EOSINOPHIL NFR BLD: 4 % (ref 0–7)
ERYTHROCYTE [DISTWIDTH] IN BLOOD BY AUTOMATED COUNT: 18.3 % (ref 11.5–14.5)
HCT VFR BLD AUTO: 20.6 % (ref 35–47)
HGB BLD-MCNC: 6.6 G/DL (ref 11.5–16)
IMM GRANULOCYTES # BLD AUTO: 0.1 K/UL (ref 0–0.04)
IMM GRANULOCYTES NFR BLD AUTO: 2 % (ref 0–0.5)
LYMPHOCYTES # BLD: 0.6 K/UL (ref 0.8–3.5)
LYMPHOCYTES NFR BLD: 14 % (ref 12–49)
MCH RBC QN AUTO: 28.2 PG (ref 26–34)
MCHC RBC AUTO-ENTMCNC: 32 G/DL (ref 30–36.5)
MCV RBC AUTO: 88 FL (ref 80–99)
MONOCYTES # BLD: 0.5 K/UL (ref 0–1)
MONOCYTES NFR BLD: 11 % (ref 5–13)
NEUTS SEG # BLD: 3 K/UL (ref 1.8–8)
NEUTS SEG NFR BLD: 69 % (ref 32–75)
NRBC # BLD: 0 K/UL (ref 0–0.01)
NRBC BLD-RTO: 0 PER 100 WBC
PLATELET # BLD AUTO: 53 K/UL (ref 150–400)
PLATELET COMMENTS,PCOM: ABNORMAL
PMV BLD AUTO: 11.2 FL (ref 8.9–12.9)
RBC # BLD AUTO: 2.34 M/UL (ref 3.8–5.2)
RBC MORPH BLD: ABNORMAL
RBC MORPH BLD: ABNORMAL
WBC # BLD AUTO: 4.4 K/UL (ref 3.6–11)

## 2023-01-11 PROCEDURE — 86880 COOMBS TEST DIRECT: CPT

## 2023-01-11 PROCEDURE — 86870 RBC ANTIBODY IDENTIFICATION: CPT

## 2023-01-11 PROCEDURE — 86860 RBC ANTIBODY ELUTION: CPT

## 2023-01-11 PROCEDURE — 86920 COMPATIBILITY TEST SPIN: CPT

## 2023-01-11 PROCEDURE — 86978 RBC PRETREATMENT SERUM: CPT

## 2023-01-11 PROCEDURE — 86900 BLOOD TYPING SEROLOGIC ABO: CPT

## 2023-01-11 PROCEDURE — 36415 COLL VENOUS BLD VENIPUNCTURE: CPT

## 2023-01-11 PROCEDURE — 86922 COMPATIBILITY TEST ANTIGLOB: CPT

## 2023-01-11 PROCEDURE — 86904 BLOOD TYPING PATIENT SERUM: CPT

## 2023-01-11 PROCEDURE — 86901 BLOOD TYPING SEROLOGIC RH(D): CPT

## 2023-01-11 PROCEDURE — 86885 COOMBS TEST INDIRECT QUAL: CPT

## 2023-01-11 PROCEDURE — 85025 COMPLETE CBC W/AUTO DIFF WBC: CPT

## 2023-01-11 RX ORDER — ACETAMINOPHEN 325 MG/1
650 TABLET ORAL ONCE
Status: ACTIVE | OUTPATIENT
Start: 2023-01-12 | End: 2023-01-12

## 2023-01-11 RX ORDER — DIPHENHYDRAMINE HCL 25 MG
25 CAPSULE ORAL ONCE
Status: ACTIVE | OUTPATIENT
Start: 2023-01-12 | End: 2023-01-12

## 2023-01-12 ENCOUNTER — HOSPITAL ENCOUNTER (OUTPATIENT)
Dept: INFUSION THERAPY | Age: 78
End: 2023-01-12
Payer: MEDICARE

## 2023-01-12 ENCOUNTER — HOSPITAL ENCOUNTER (OUTPATIENT)
Dept: INFUSION THERAPY | Age: 78
Discharge: HOME | End: 2023-01-12
Payer: MEDICARE

## 2023-01-12 VITALS
SYSTOLIC BLOOD PRESSURE: 187 MMHG | DIASTOLIC BLOOD PRESSURE: 62 MMHG | RESPIRATION RATE: 18 BRPM | TEMPERATURE: 98.1 F | OXYGEN SATURATION: 95 % | HEART RATE: 78 BPM

## 2023-01-12 LAB — HISTORY CHECKED?,CKHIST: NORMAL

## 2023-01-12 RX ORDER — SODIUM CHLORIDE 9 MG/ML
250 INJECTION, SOLUTION INTRAVENOUS AS NEEDED
Status: DISCONTINUED | OUTPATIENT
Start: 2023-01-12 | End: 2023-01-14 | Stop reason: HOSPADM

## 2023-01-12 RX ORDER — DIPHENHYDRAMINE HCL 25 MG
25 CAPSULE ORAL ONCE
Status: COMPLETED | OUTPATIENT
Start: 2023-01-13 | End: 2023-01-13

## 2023-01-12 RX ORDER — SODIUM CHLORIDE 9 MG/ML
250 INJECTION, SOLUTION INTRAVENOUS AS NEEDED
Status: DISCONTINUED | OUTPATIENT
Start: 2023-01-12 | End: 2023-01-16 | Stop reason: SDUPTHER

## 2023-01-12 RX ORDER — ACETAMINOPHEN 325 MG/1
650 TABLET ORAL ONCE
Status: COMPLETED | OUTPATIENT
Start: 2023-01-13 | End: 2023-01-13

## 2023-01-12 NOTE — PROGRESS NOTES
0900 Pt to appening via wc for additional labs. T&C requires 4 full pink top tubes due to multiple antibodies. Labs drawn from [facility]. Patient Vitals for the past 12 hrs:   Temp Pulse Resp BP SpO2   01/12/23 0902 98.1 °F (36.7 °C) 78 18 (!) 187/62 95 %      0910 Pt d/c via wc. Pt to [facility] 9/13 for 1 unit PRBC's.

## 2023-01-13 ENCOUNTER — HOSPITAL ENCOUNTER (OUTPATIENT)
Dept: INFUSION THERAPY | Age: 78
Discharge: HOME | End: 2023-01-13
Payer: MEDICARE

## 2023-01-13 VITALS
OXYGEN SATURATION: 100 % | DIASTOLIC BLOOD PRESSURE: 39 MMHG | TEMPERATURE: 98.2 F | SYSTOLIC BLOOD PRESSURE: 113 MMHG | HEART RATE: 60 BPM | RESPIRATION RATE: 18 BRPM

## 2023-01-13 PROCEDURE — 74011250636 HC RX REV CODE- 250/636: Performed by: INTERNAL MEDICINE

## 2023-01-13 PROCEDURE — 86921 COMPATIBILITY TEST INCUBATE: CPT

## 2023-01-13 PROCEDURE — 74011000250 HC RX REV CODE- 250: Performed by: INTERNAL MEDICINE

## 2023-01-13 PROCEDURE — 77030013169 SET IV BLD ICUM -A

## 2023-01-13 PROCEDURE — 86922 COMPATIBILITY TEST ANTIGLOB: CPT

## 2023-01-13 PROCEDURE — 74011250637 HC RX REV CODE- 250/637: Performed by: NURSE PRACTITIONER

## 2023-01-13 PROCEDURE — 36430 TRANSFUSION BLD/BLD COMPNT: CPT

## 2023-01-13 PROCEDURE — 86902 BLOOD TYPE ANTIGEN DONOR EA: CPT

## 2023-01-13 PROCEDURE — P9016 RBC LEUKOCYTES REDUCED: HCPCS

## 2023-01-13 RX ORDER — SODIUM CHLORIDE 0.9 % (FLUSH) 0.9 %
5-10 SYRINGE (ML) INJECTION AS NEEDED
Status: DISCONTINUED | OUTPATIENT
Start: 2023-01-13 | End: 2023-01-14 | Stop reason: HOSPADM

## 2023-01-13 RX ORDER — SODIUM CHLORIDE 9 MG/ML
250 INJECTION, SOLUTION INTRAVENOUS AS NEEDED
Status: DISCONTINUED | OUTPATIENT
Start: 2023-01-13 | End: 2023-01-15 | Stop reason: HOSPADM

## 2023-01-13 RX ADMIN — Medication 10 ML: at 11:37

## 2023-01-13 RX ADMIN — SODIUM CHLORIDE 250 ML: 9 INJECTION, SOLUTION INTRAVENOUS at 11:41

## 2023-01-13 RX ADMIN — ACETAMINOPHEN 650 MG: 325 TABLET ORAL at 11:44

## 2023-01-13 RX ADMIN — DIPHENHYDRAMINE HYDROCHLORIDE 25 MG: 25 CAPSULE ORAL at 11:44

## 2023-01-13 NOTE — DISCHARGE INSTRUCTIONS
OUTPATIENT INFUSION CENTER    DISCHARGE INSTRUCTIONS FOR:  BLOOD TRANSFUSION    We hope you are feeling better after your blood transfusion. Some mild tenderness or slight bruising at your IV site is normal.  Avoid lifting or heavy use of that extremity for the rest of the day. Drink plenty of fluids, eat a normal diet and get some rest.    There are some important signs that you need to watch for in case you experience a delayed reaction to the blood you have received. Call your physician immediately if you develop any of the following symptoms:    1. Severe headache or backache;    2. Fever above 100 degrees;    3. Chills;    4. Difficulty breathing;    5.  Blood or red color in urine;    6. The feeling of weakness or constant fatigue;    7. Yellowing of the whites of your eyes or skin (jaundice). If your physician is not available, call or go to the nearest emergency room, or dial 911.     Norah Garg, Signature: ___________________________ 1/13/2023  Isai Ivey RN

## 2023-01-13 NOTE — PROGRESS NOTES
[facility] Progress Note  January 13, 2023      [address] [facility] arrived ambulatory (rollator) and in no acute distress for 1 unit PRBCs. Patient COVID Screening completed:  Do you have any symptoms of COVID-19? SOB, coughing, fever, or generally not feeling well? NO  Have you been exposed to COVID-19 recently? NO  Have you had any recent contact with family/friend that has a pending COVID test? NO    Assessment completed. Patient states legs slightly swollen due to wounds which were dressed today at patient's home. No other complaints voiced. 22G PIV established in R A/C without difficulty. Education provided regarding reason for blood transfusion and possible reactions. All questions and concerns regarding transfusion answered, patient voiced understanding. Problem: Anemia Care Plan (Adult and Pediatric)  Goal: *Labs within defined limits  Outcome: Progressing Towards Goal     Problem: Knowledge Deficit  Goal: *Verbalizes understanding of procedures and medications  Outcome: Progressing Towards Goal     Problem: Patient Education:  Go to Education Activity  Goal: Patient/Family Education  Outcome: Progressing Towards Goal    Medications received:  NS @ KVO  Tylenol 650 mg PO  Benadryl 25 mg PO    1210:  1st unit PRBCs started and infusing without difficulty, observed x 15 minutes  1413:  1st unit completed without adverse reaction noted, NS flushing line. Patient Vitals for the past 12 hrs:   Temp Pulse Resp BP SpO2   01/13/23 1429 -- 60 -- (!) 113/39 --   01/13/23 1225 98.2 °F (36.8 °C) 63 18 (!) 106/31 --   01/13/23 1200 98.3 °F (36.8 °C) 65 20 (!) 113/30 100 %   01/13/23 1132 98.5 °F (36.9 °C) 81 20 (!) 132/41 100 %     1435 Patient tolerated transfusion  well, no adverse reaction noted. D/C instructions reviewed, copy to pt, voiced understanding. Patient declined 1 hour post transfusion observation/vitals signs. PIV flushed and removed.  D/Cd from [facility] ambulatory and in no distress accompanied by caretaker.       Future Appointments   Date Time Provider Hilary Dickinson   1/16/2023  3:30 PM Jg Feliciano MD [facility] BS AMB   1/18/2023  2:30 PM Pooja  [address] [facility] [address] REG   1/19/2023 10:00 AM [address] REG   1/25/2023  2:30 PM [address] REG   1/26/2023 11:30 AM [address] REG   2/1/2023  2:30 PM [address] [place] REG   2/2/2023  9:00 AM SOL[place] MED1 [place] [address] REG   2/8/2023  2:00 PM SOL[place]O LONG1 [place] [address] REG   2/15/2023  2:00 PM SOL[place]O LONG1 [place] [address] REG   2/16/2023 10:00 AM [facility] MED6 [facility]   2/22/2023  2:00 PM SOL[place]O LONG1 [place] [address] REG   2/23/2023 10:00 AM SOLORZANO MED4 [place] [address] REG   3/1/2023  3:00 PM [address] [place] REG   5/12/2023  9:15 AM Susan May MD [facility] BS AMB   5/16/2023 11:10 AM Terell Johnston MD RDE JOHN [address] BS AMB     Marleny Flores RN  January 13, 2023

## 2023-01-14 LAB
ABO + RH BLD: NORMAL
ANTI-COMPLEMENT (C3B,C3D): NORMAL
ANTIGENS PRESENT RBC DONR: NORMAL
BLD GP AB SCN SERPL QL ELUTION: NORMAL
BLD PROD TYP BPU: NORMAL
BLOOD BANK CMNT PATIENT-IMP: NORMAL
BLOOD GROUP ANTIBODIES SERPL: NORMAL
BLOOD GROUP ANTIBODIES SERPL: NORMAL
BPU ID: NORMAL
CROSSMATCH RESULT,%XM: NORMAL
DAT IGG-SP REAG RBC QL: NORMAL
DAT POLY-SP REAG RBC QL: NORMAL
SPECIMEN EXP DATE BLD: NORMAL
STATUS OF UNIT,%ST: NORMAL
UNIT DIVISION, %UDIV: 0

## 2023-01-16 ENCOUNTER — HOSPITAL ENCOUNTER (INPATIENT)
Age: 78
LOS: 14 days | Discharge: HOSPICE | DRG: 378 | End: 2023-01-31
Attending: STUDENT IN AN ORGANIZED HEALTH CARE EDUCATION/TRAINING PROGRAM | Admitting: INTERNAL MEDICINE
Payer: MEDICARE

## 2023-01-16 ENCOUNTER — APPOINTMENT (OUTPATIENT)
Dept: GENERAL RADIOLOGY | Age: 78
DRG: 378 | End: 2023-01-16
Attending: STUDENT IN AN ORGANIZED HEALTH CARE EDUCATION/TRAINING PROGRAM
Payer: MEDICARE

## 2023-01-16 DIAGNOSIS — Z51.5 PALLIATIVE CARE BY SPECIALIST: ICD-10-CM

## 2023-01-16 DIAGNOSIS — D64.9 ANEMIA, UNSPECIFIED TYPE: Primary | ICD-10-CM

## 2023-01-16 PROBLEM — K92.2 UGIB (UPPER GASTROINTESTINAL BLEED): Status: ACTIVE | Noted: 2023-01-16

## 2023-01-16 PROBLEM — K92.2 UGIB (UPPER GASTROINTESTINAL BLEED): Status: ACTIVE | Noted: 2023-01-01

## 2023-01-16 LAB
ABO + RH BLD: NORMAL
ALBUMIN SERPL-MCNC: 2.8 G/DL (ref 3.5–5)
ALBUMIN/GLOB SERPL: 0.7 (ref 1.1–2.2)
ALP SERPL-CCNC: 252 U/L (ref 45–117)
ALT SERPL-CCNC: 37 U/L (ref 12–78)
ANION GAP SERPL CALC-SCNC: 6 MMOL/L (ref 5–15)
ANTI-COMPLEMENT (C3B,C3D): NORMAL
ANTIGENS PRESENT RBC DONR: NORMAL
APPEARANCE UR: CLEAR
AST SERPL-CCNC: 38 U/L (ref 15–37)
BACTERIA URNS QL MICRO: ABNORMAL /HPF
BASOPHILS # BLD: 0 K/UL (ref 0–0.1)
BASOPHILS NFR BLD: 0 % (ref 0–1)
BILIRUB SERPL-MCNC: 1.9 MG/DL (ref 0.2–1)
BILIRUB UR QL: NEGATIVE
BLD GP AB SCN SERPL QL ELUTION: NORMAL
BLD PROD TYP BPU: NORMAL
BLOOD BANK CMNT PATIENT-IMP: NORMAL
BLOOD GROUP ANTIBODIES SERPL: NORMAL
BLOOD GROUP ANTIBODIES SERPL: NORMAL
BNP SERPL-MCNC: 894 PG/ML
BPU ID: NORMAL
BUN SERPL-MCNC: 59 MG/DL (ref 6–20)
BUN/CREAT SERPL: 39 (ref 12–20)
CALCIUM SERPL-MCNC: 8.9 MG/DL (ref 8.5–10.1)
CHLORIDE SERPL-SCNC: 106 MMOL/L (ref 97–108)
CO2 SERPL-SCNC: 24 MMOL/L (ref 21–32)
COLOR UR: ABNORMAL
CREAT SERPL-MCNC: 1.51 MG/DL (ref 0.55–1.02)
CROSSMATCH RESULT,%XM: NORMAL
DAT IGG-SP REAG RBC QL: NORMAL
DAT POLY-SP REAG RBC QL: NORMAL
DIFFERENTIAL METHOD BLD: ABNORMAL
EOSINOPHIL # BLD: 0.1 K/UL (ref 0–0.4)
EOSINOPHIL NFR BLD: 1 % (ref 0–7)
EPITH CASTS URNS QL MICRO: ABNORMAL /LPF
ERYTHROCYTE [DISTWIDTH] IN BLOOD BY AUTOMATED COUNT: 17 % (ref 11.5–14.5)
GLOBULIN SER CALC-MCNC: 4.1 G/DL (ref 2–4)
GLUCOSE SERPL-MCNC: 195 MG/DL (ref 65–100)
GLUCOSE UR STRIP.AUTO-MCNC: NEGATIVE MG/DL
HCT VFR BLD AUTO: 18.8 % (ref 35–47)
HGB BLD-MCNC: 6 G/DL (ref 11.5–16)
HGB UR QL STRIP: NEGATIVE
HYALINE CASTS URNS QL MICRO: ABNORMAL /LPF (ref 0–2)
IMM GRANULOCYTES # BLD AUTO: 0.1 K/UL (ref 0–0.04)
IMM GRANULOCYTES NFR BLD AUTO: 1 % (ref 0–0.5)
KETONES UR QL STRIP.AUTO: NEGATIVE MG/DL
LEUKOCYTE ESTERASE UR QL STRIP.AUTO: ABNORMAL
LYMPHOCYTES # BLD: 0.4 K/UL (ref 0.8–3.5)
LYMPHOCYTES NFR BLD: 7 % (ref 12–49)
MCH RBC QN AUTO: 27.9 PG (ref 26–34)
MCHC RBC AUTO-ENTMCNC: 31.9 G/DL (ref 30–36.5)
MCV RBC AUTO: 87.4 FL (ref 80–99)
MONOCYTES # BLD: 0.3 K/UL (ref 0–1)
MONOCYTES NFR BLD: 6 % (ref 5–13)
NEUTS SEG # BLD: 4.2 K/UL (ref 1.8–8)
NEUTS SEG NFR BLD: 85 % (ref 32–75)
NITRITE UR QL STRIP.AUTO: NEGATIVE
NRBC # BLD: 0 K/UL (ref 0–0.01)
NRBC BLD-RTO: 0 PER 100 WBC
PH UR STRIP: 6.5 (ref 5–8)
PLATELET # BLD AUTO: 54 K/UL (ref 150–400)
PMV BLD AUTO: 11.6 FL (ref 8.9–12.9)
POTASSIUM SERPL-SCNC: 3.9 MMOL/L (ref 3.5–5.1)
PROT SERPL-MCNC: 6.9 G/DL (ref 6.4–8.2)
PROT UR STRIP-MCNC: NEGATIVE MG/DL
RBC # BLD AUTO: 2.15 M/UL (ref 3.8–5.2)
RBC #/AREA URNS HPF: ABNORMAL /HPF (ref 0–5)
RBC MORPH BLD: ABNORMAL
SODIUM SERPL-SCNC: 136 MMOL/L (ref 136–145)
SP GR UR REFRACTOMETRY: 1.01
SPECIMEN EXP DATE BLD: NORMAL
STATUS OF UNIT,%ST: NORMAL
UA: UC IF INDICATED,UAUC: ABNORMAL
UNIT DIVISION, %UDIV: 0
UROBILINOGEN UR QL STRIP.AUTO: 2 EU/DL (ref 0.2–1)
WBC # BLD AUTO: 5.1 K/UL (ref 3.6–11)
WBC MORPH BLD: ABNORMAL
WBC URNS QL MICRO: ABNORMAL /HPF (ref 0–4)

## 2023-01-16 PROCEDURE — 36415 COLL VENOUS BLD VENIPUNCTURE: CPT

## 2023-01-16 PROCEDURE — 86900 BLOOD TYPING SEROLOGIC ABO: CPT

## 2023-01-16 PROCEDURE — C9113 INJ PANTOPRAZOLE SODIUM, VIA: HCPCS | Performed by: STUDENT IN AN ORGANIZED HEALTH CARE EDUCATION/TRAINING PROGRAM

## 2023-01-16 PROCEDURE — 86880 COOMBS TEST DIRECT: CPT

## 2023-01-16 PROCEDURE — 87077 CULTURE AEROBIC IDENTIFY: CPT

## 2023-01-16 PROCEDURE — 85025 COMPLETE CBC W/AUTO DIFF WBC: CPT

## 2023-01-16 PROCEDURE — 93005 ELECTROCARDIOGRAM TRACING: CPT

## 2023-01-16 PROCEDURE — 86920 COMPATIBILITY TEST SPIN: CPT

## 2023-01-16 PROCEDURE — 86922 COMPATIBILITY TEST ANTIGLOB: CPT

## 2023-01-16 PROCEDURE — 99285 EMERGENCY DEPT VISIT HI MDM: CPT

## 2023-01-16 PROCEDURE — 74011636637 HC RX REV CODE- 636/637: Performed by: STUDENT IN AN ORGANIZED HEALTH CARE EDUCATION/TRAINING PROGRAM

## 2023-01-16 PROCEDURE — 71045 X-RAY EXAM CHEST 1 VIEW: CPT

## 2023-01-16 PROCEDURE — 86870 RBC ANTIBODY IDENTIFICATION: CPT

## 2023-01-16 PROCEDURE — 80053 COMPREHEN METABOLIC PANEL: CPT

## 2023-01-16 PROCEDURE — 86901 BLOOD TYPING SEROLOGIC RH(D): CPT

## 2023-01-16 PROCEDURE — 81001 URINALYSIS AUTO W/SCOPE: CPT

## 2023-01-16 PROCEDURE — 84484 ASSAY OF TROPONIN QUANT: CPT

## 2023-01-16 PROCEDURE — 87086 URINE CULTURE/COLONY COUNT: CPT

## 2023-01-16 PROCEDURE — 74011250637 HC RX REV CODE- 250/637: Performed by: STUDENT IN AN ORGANIZED HEALTH CARE EDUCATION/TRAINING PROGRAM

## 2023-01-16 PROCEDURE — 74011000250 HC RX REV CODE- 250: Performed by: STUDENT IN AN ORGANIZED HEALTH CARE EDUCATION/TRAINING PROGRAM

## 2023-01-16 PROCEDURE — 87186 SC STD MICRODIL/AGAR DIL: CPT

## 2023-01-16 PROCEDURE — 83880 ASSAY OF NATRIURETIC PEPTIDE: CPT

## 2023-01-16 PROCEDURE — 86978 RBC PRETREATMENT SERUM: CPT

## 2023-01-16 PROCEDURE — 74011250636 HC RX REV CODE- 250/636: Performed by: STUDENT IN AN ORGANIZED HEALTH CARE EDUCATION/TRAINING PROGRAM

## 2023-01-16 PROCEDURE — 86904 BLOOD TYPING PATIENT SERUM: CPT

## 2023-01-16 PROCEDURE — 74011000258 HC RX REV CODE- 258: Performed by: STUDENT IN AN ORGANIZED HEALTH CARE EDUCATION/TRAINING PROGRAM

## 2023-01-16 RX ORDER — ACETAMINOPHEN 325 MG/1
650 TABLET ORAL
Status: DISCONTINUED | OUTPATIENT
Start: 2023-01-16 | End: 2023-01-31 | Stop reason: HOSPADM

## 2023-01-16 RX ORDER — SODIUM CHLORIDE 0.9 % (FLUSH) 0.9 %
5-40 SYRINGE (ML) INJECTION AS NEEDED
Status: DISCONTINUED | OUTPATIENT
Start: 2023-01-16 | End: 2023-01-31 | Stop reason: HOSPADM

## 2023-01-16 RX ORDER — SODIUM CHLORIDE 0.9 % (FLUSH) 0.9 %
5-40 SYRINGE (ML) INJECTION AS NEEDED
OUTPATIENT
Start: 2023-01-18

## 2023-01-16 RX ORDER — POLYETHYLENE GLYCOL 3350 17 G/17G
17 POWDER, FOR SOLUTION ORAL DAILY PRN
Status: DISCONTINUED | OUTPATIENT
Start: 2023-01-16 | End: 2023-01-31 | Stop reason: HOSPADM

## 2023-01-16 RX ORDER — SPIRONOLACTONE 25 MG/1
50 TABLET ORAL DAILY
Status: DISCONTINUED | OUTPATIENT
Start: 2023-01-17 | End: 2023-01-23

## 2023-01-16 RX ORDER — ONDANSETRON 4 MG/1
4 TABLET, ORALLY DISINTEGRATING ORAL
Status: DISCONTINUED | OUTPATIENT
Start: 2023-01-16 | End: 2023-01-31 | Stop reason: HOSPADM

## 2023-01-16 RX ORDER — SODIUM CHLORIDE 9 MG/ML
250 INJECTION, SOLUTION INTRAVENOUS AS NEEDED
Status: DISCONTINUED | OUTPATIENT
Start: 2023-01-16 | End: 2023-01-21

## 2023-01-16 RX ORDER — HEPARIN 100 UNIT/ML
500 SYRINGE INTRAVENOUS AS NEEDED
Start: 2023-01-18

## 2023-01-16 RX ORDER — SODIUM CHLORIDE 0.9 % (FLUSH) 0.9 %
5-40 SYRINGE (ML) INJECTION EVERY 8 HOURS
Status: DISCONTINUED | OUTPATIENT
Start: 2023-01-16 | End: 2023-01-31 | Stop reason: HOSPADM

## 2023-01-16 RX ORDER — ONDANSETRON 2 MG/ML
4 INJECTION INTRAMUSCULAR; INTRAVENOUS
Status: DISCONTINUED | OUTPATIENT
Start: 2023-01-16 | End: 2023-01-31 | Stop reason: HOSPADM

## 2023-01-16 RX ORDER — FUROSEMIDE 20 MG/1
20 TABLET ORAL DAILY
Status: DISCONTINUED | OUTPATIENT
Start: 2023-01-17 | End: 2023-01-23

## 2023-01-16 RX ORDER — ACETAMINOPHEN 650 MG/1
650 SUPPOSITORY RECTAL
Status: DISCONTINUED | OUTPATIENT
Start: 2023-01-16 | End: 2023-01-31 | Stop reason: HOSPADM

## 2023-01-16 RX ORDER — SODIUM CHLORIDE 9 MG/ML
250 INJECTION, SOLUTION INTRAVENOUS AS NEEDED
Status: DISCONTINUED | OUTPATIENT
Start: 2023-01-16 | End: 2023-01-16 | Stop reason: SDUPTHER

## 2023-01-16 RX ORDER — SODIUM CHLORIDE 9 MG/ML
5-40 INJECTION INTRAVENOUS AS NEEDED
OUTPATIENT
Start: 2023-01-18

## 2023-01-16 RX ORDER — SODIUM CHLORIDE 9 MG/ML
5-250 INJECTION, SOLUTION INTRAVENOUS AS NEEDED
OUTPATIENT
Start: 2023-01-18

## 2023-01-16 RX ADMIN — SODIUM CHLORIDE, PRESERVATIVE FREE 10 ML: 5 INJECTION INTRAVENOUS at 18:28

## 2023-01-16 RX ADMIN — LACTULOSE 30 ML: 20 SOLUTION ORAL at 18:28

## 2023-01-16 RX ADMIN — RIFAXIMIN 550 MG: 550 TABLET ORAL at 18:28

## 2023-01-16 RX ADMIN — SODIUM CHLORIDE 40 MG: 9 INJECTION, SOLUTION INTRAMUSCULAR; INTRAVENOUS; SUBCUTANEOUS at 21:49

## 2023-01-16 RX ADMIN — SODIUM CHLORIDE, PRESERVATIVE FREE 10 ML: 5 INJECTION INTRAVENOUS at 21:49

## 2023-01-16 RX ADMIN — SODIUM CHLORIDE 1 G: 900 INJECTION INTRAVENOUS at 17:14

## 2023-01-16 RX ADMIN — OCTREOTIDE ACETATE 50 MCG/HR: 500 INJECTION, SOLUTION INTRAVENOUS; SUBCUTANEOUS at 19:12

## 2023-01-16 NOTE — PROGRESS NOTES
Problem: Pain  Goal: *Control of Pain  Outcome: Progressing Towards Goal  Goal: *PALLIATIVE CARE:  Alleviation of Pain  Outcome: Progressing Towards Goal     Problem: Patient Education: Go to Patient Education Activity  Goal: Patient/Family Education  Outcome: Progressing Towards Goal     Problem: Falls - Risk of  Goal: *Absence of Falls  Description: Document Roxann Polo Fall Risk and appropriate interventions in the flowsheet.   Outcome: Progressing Towards Goal  Note: Fall Risk Interventions:                                Problem: Patient Education: Go to Patient Education Activity  Goal: Patient/Family Education  Outcome: Progressing Towards Goal     Problem: General Infection Care Plan (Adult and Pediatric)  Goal: Improvement in signs and symptoms of infection  Outcome: Progressing Towards Goal  Goal: *Optimize nutritional status  Outcome: Progressing Towards Goal     Problem: Patient Education: Go to Patient Education Activity  Goal: Patient/Family Education  Outcome: Progressing Towards Goal

## 2023-01-16 NOTE — ED PROVIDER NOTES
MRM 2 CRITICAL CARE 3  EMERGENCY DEPARTMENT ENCOUNTER       Pt Name: Kika Eller  MRN: [id number]  [place] 1945  Date of evaluation: 1/16/2023  Provider: Joe Snow DO   PCP: Byron Preciado MD  Note Started: 2:15 PM 1/16/23     CHIEF COMPLAINT       Chief Complaint   Patient presents with    Fatigue     Pt presents to ED via EMS with complaints of generalized fatigue and weakness. Pt has a hx of low HGB and had a transfusion on Friday 1/13. HISTORY OF PRESENT ILLNESS: 1 or more elements      History From: Patient  HPI Limitations : None     Kika Eller is a 68 y.o. female who presents with generalized fatigue and weakness for the past 3 days. Patient reports history of anemia and states that her last transfusion at the infusion center was on Friday. She denies any known blood in her stool or blood in her urine but states she is feeling progressively more fatigued and weak for the past several days. She reports she feels this way when she is anemic. She states she receives transfusions due to history of ulcers and gastric bleeding. She denies any blood thinner use. She denies any chest pain, shortness of breath, abdominal pain, nausea, vomiting, diarrhea. Denies any blood in urine or blood in stool. Nursing Notes were all reviewed and agreed with or any disagreements were addressed in the HPI. REVIEW OF SYSTEMS      Review of Systems   Constitutional:  Positive for fatigue. Neurological:  Positive for light-headedness. Positives and Pertinent negatives as per HPI.     PAST HISTORY     Past Medical History:  Past Medical History:   Diagnosis Date    Abnormal nuclear stress test 10/04/2021    Anemia     Angina at rest [facility]) 10/04/2021    Arthritis     KNEE,gout    Bundle branch block     Endocrine disease     HYPOTHYROIDISM    Fracture     Left distal femur (age 12 - pt fell)    Fracture     Left tibia 1990 (pt fell)    Fracture     Right wrist fractured 2 times (pt fell)    GERD (gastroesophageal reflux disease)     High cholesterol     Hypertension     Hypoglycemia     \"my body produces too much insulin\"    Liver disease     BRADY    Nausea & vomiting     when had gallbladder out    Osteoporosis     Other ill-defined conditions(799.89)     edema legs    S/P cardiac cath 10/04/2021    10/4/2021 nonobstructive disease    Spinal stenosis     Thyroid disease        Past Surgical History:  Past Surgical History:   Procedure Laterality Date    COLONOSCOPY N/A 7/13/2021    COLONOSCOPY performed by Georgie Damian MD at [facility] ENDOSCOPY    COLONOSCOPY N/A 8/8/2022    COLONOSCOPY performed by Claudia Kellogg MD at [place] [address] [address]  2/11/2015         COLONOSCOPY,REMV LESN,SNARE  7/13/2021         COLONOSCPY,FLEX,W/ [address] INJECT  7/13/2021         COLORECTAL SCRN; HI RISK IND  2/11/2015         HX CHOLECYSTECTOMY      HX HYSTERECTOMY      HX ORTHOPAEDIC Left     leg surgery - plates, screws, wires, pins in place    HX UROLOGICAL      [address]      liver biopsy--BRADY and fibrosis    SB ENDOSCOPY,W/CONTROL,BLEEDING  11/16/2022    SMALL BOWEL ENDOSCOPY,ABLATE LESN  11/16/2022    SMALL BOWEL ENDOSCOPY,BIOPSY  11/16/2022    UPPER GI ENDOSCOPY,BIOPSY  11/17/2015            Family History:  Family History   Problem Relation Age of Onset    Diabetes Mother     Heart Disease Mother     Emphysema Father     No Known Problems Brother     Stroke Maternal Grandmother     No Known Problems Maternal Grandfather     No Known Problems Paternal Grandmother     No Known Problems Paternal Grandfather        Social History:  Social History     Tobacco Use    Smoking status: Never    Smokeless tobacco: Never   Vaping Use    Vaping Use: Never used   Substance Use Topics    Alcohol use: No    Drug use: Yes     Types: Prescription, OTC       Allergies:   Allergies   Allergen Reactions    Gabapentin Other (comments) Suicidal ideation    Metoprolol Rash    Celebrex [Celecoxib] Hives    Eliquis [Apixaban] Other (comments)     Caused excessive anemia    Pcn [Penicillins] Unknown (comments)     Can't remember, was a long time    Sulfa (Sulfonamide Antibiotics) Hives       CURRENT MEDICATIONS      Current Discharge Medication List        CONTINUE these medications which have NOT CHANGED    Details   metOLazone (ZAROXOLYN) 2.5 mg tablet TAKE 1 TABLET BY MOUTH EVERY OTHER DAY      amLODIPine (NORVASC) 10 mg tablet       losartan (COZAAR) 50 mg tablet       alendronate (FOSAMAX) 70 mg tablet Take 1 Tablet by mouth every seven (7) days. Qty: 12 Tablet, Refills: 3      lactulose (KRISTALOSE) 10 gram packet Take 1 Packet by mouth daily (with breakfast) for 30 days. Qty: 30 Packet, Refills: 0      allopurinoL (ZYLOPRIM) 100 mg tablet Take 100 mg by mouth daily. loratadine (CLARITIN) 10 mg tablet TAKE 1 TABLET BY MOUTH EVERY DAY  Qty: 90 Tablet, Refills: 3    Associated Diagnoses: Acute recurrent sinusitis, unspecified location      atorvastatin (LIPITOR) 20 mg tablet TAKE 1 TABLET BY MOUTH AT BEDTIME  Qty: 90 Tablet, Refills: 3    Associated Diagnoses: Pure hypercholesterolemia      ezetimibe (ZETIA) 10 mg tablet TAKE 1 TABLET BY MOUTH EVERY DAY  Qty: 90 Tablet, Refills: 3    Associated Diagnoses: Pure hypercholesterolemia      benzocaine-menthoL (CHLORASEPTIC MAX) 15-10 mg lozg lozenge Take 1 Lozenge by mouth as needed for Sore throat. Qty: 30 Lozenge, Refills: 0      furosemide (LASIX) 80 mg tablet Take 0.5 Tablets by mouth daily. Qty: 30 Tablet, Refills: 0      levalbuterol tartrate (XOPENEX) 45 mcg/actuation inhaler Take 2 Puffs by inhalation every six (6) hours as needed for Wheezing or Shortness of Breath. potassium chloride SA (MICRO-K) 10 mEq capsule Take 2 Capsules by mouth daily.   Qty: 180 Capsule, Refills: 3    Associated Diagnoses: Leg edema      pantoprazole (PROTONIX) 40 mg tablet Take 1 Tablet by mouth Before breakfast and dinner. Qty: 60 Tablet, Refills: 5      nystatin (MYCOSTATIN) powder Apply  to affected area two (2) times a day. Qty: 30 g, Refills: 0      fluticasone propionate (FLONASE) 50 mcg/actuation nasal spray 2 Sprays by Both Nostrils route daily. Administer to right and left nostril. Qty: 3 Each, Refills: 3      levothyroxine (SYNTHROID) 150 mcg tablet Take 1 Tablet by mouth Daily (before breakfast). Qty: 90 Tablet, Refills: 3      icosapent ethyL (VASCEPA) 1 gram capsule Take 2 Capsules by mouth two (2) times daily (with meals). Qty: 360 Capsule, Refills: 3      lidocaine (LIDODERM) 5 % Apply patch to the affected area for 12 hours a day and remove for 12 hours a day. Qty: 90 Each, Refills: 3      polyethylene glycol (MIRALAX) 17 gram/dose powder Take 17 g by mouth daily. Qty: 2108 g, Refills: 3    Associated Diagnoses: Constipation, unspecified constipation type      ketoconazole (NIZORAL) 2 % topical cream Apply  to affected area daily as needed. Refills: 3      Biotin 2,500 mcg cap Take  by mouth. L GASSERI/B BIFIDUM/B LONGUM ([employer] PO) Take 1 Tab by mouth daily. vitamin E (AQUA GEMS) 400 unit capsule Take 800 Units by mouth two (2) times a day. cholecalciferol, vitamin D3, 50 mcg (2,000 unit) tab Take 1 Tab by mouth daily. MULTIVITAMIN WITH MINERALS (ONE-A-DAY 50 PLUS PO) Take 1 Tab by mouth daily. SCREENINGS               No data recorded         PHYSICAL EXAM      ED Triage Vitals [01/16/23 1405]   ED Encounter Vitals Group      BP (!) 138/50      Pulse (Heart Rate) 77      Resp Rate 16      Temp 98.5 °F (36.9 °C)      Temp src       O2 Sat (%) 97 %      Weight 205 lb      Height 4' 8\"        Physical Exam  Vitals and nursing note reviewed. Constitutional:       Appearance: Normal appearance. HENT:      Head: Normocephalic and atraumatic.       Mouth/Throat:      Mouth: Mucous membranes are moist.   Eyes:      Conjunctiva/sclera: Conjunctivae normal.   Cardiovascular:      Rate and Rhythm: Normal rate and regular rhythm. Pulmonary:      Effort: Pulmonary effort is normal.      Breath sounds: Normal breath sounds. Abdominal:      General: Abdomen is flat. Palpations: Abdomen is soft. Musculoskeletal:      Right lower leg: No edema. Left lower leg: No edema. Skin:     General: Skin is warm. Capillary Refill: Capillary refill takes less than 2 seconds. Neurological:      Mental Status: She is alert. Mental status is at baseline. DIAGNOSTIC RESULTS   LABS:     Recent Results (from the past 12 hour(s))   METABOLIC PANEL, BASIC    Collection Time: 01/17/23  3:19 AM   Result Value Ref Range    Sodium 137 136 - 145 mmol/L    Potassium 4.3 3.5 - 5.1 mmol/L    Chloride 107 97 - 108 mmol/L    CO2 24 21 - 32 mmol/L    Anion gap 6 5 - 15 mmol/L    Glucose 147 (H) 65 - 100 mg/dL    BUN 59 (H) 6 - 20 MG/DL    Creatinine 1.43 (H) 0.55 - 1.02 MG/DL    BUN/Creatinine ratio 41 (H) 12 - 20      eGFR 38 (L) >60 ml/min/1.73m2    Calcium 8.5 8.5 - 10.1 MG/DL   CBC W/O DIFF    Collection Time: 01/17/23  3:19 AM   Result Value Ref Range    WBC 3.0 (L) 3.6 - 11.0 K/uL    RBC 1.62 (L) 3.80 - 5.20 M/uL    HGB 4.6 (LL) 11.5 - 16.0 g/dL    HCT 14.5 (LL) 35.0 - 47.0 %    MCV 89.5 80.0 - 99.0 FL    MCH 28.4 26.0 - 34.0 PG    MCHC 31.7 30.0 - 36.5 g/dL    RDW 16.6 (H) 11.5 - 14.5 %    PLATELET 29 (LL) 069 - 400 K/uL    MPV 10.1 8.9 - 12.9 FL    NRBC 0.0 0  WBC    ABSOLUTE NRBC 0.00 0.00 - 0.01 K/uL   RBC, ALLOCATE    Collection Time: 01/17/23  5:15 AM   Result Value Ref Range    HISTORY CHECKED?  Historical check performed         EKG interpreted by me: atrial paced, RBBB     RADIOLOGY:  Non-plain film images such as CT, Ultrasound and MRI are read by the radiologist. Plain radiographic images are visualized and preliminarily interpreted by the ED Provider with the below findings:     Xray appears clear w/o acute process Interpretation per the Radiologist below, if available at the time of this note:     XR CHEST PORT    Result Date: 1/16/2023  INDICATION: fatigue EXAM:  AP CHEST RADIOGRAPH COMPARISON: 10/6/2022 FINDINGS: AP portable view of the chest demonstrates left subclavian pacemaker. Patient is rotated toward the left. There is no edema, effusion, consolidation, or pneumothorax. The osseous structures are unremarkable. No acute process.        PROCEDURES   Unless otherwise noted below, none  Procedures     CRITICAL CARE TIME       EMERGENCY DEPARTMENT COURSE and DIFFERENTIAL DIAGNOSIS/MDM   Vitals:    Vitals:    01/17/23 0601 01/17/23 0709 01/17/23 0800 01/17/23 0900   BP: (!) 135/50 (!) 134/46 (!) 132/41 (!) 119/43   Pulse: 63 67 66 64   Resp: 18 21 19 24   Temp: 99.5 °F (37.5 °C) 97.9 °F (36.6 °C)     SpO2: 93% 94% 97% 94%   Weight:       Height:            Patient was given the following medications:  Medications   sodium chloride (NS) flush 5-40 mL (10 mL IntraVENous Given 1/17/23 0649)   sodium chloride (NS) flush 5-40 mL (has no administration in time range)   acetaminophen (TYLENOL) tablet 650 mg (650 mg Oral Given 1/17/23 0342)     Or   acetaminophen (TYLENOL) suppository 650 mg ( Rectal See Alternative 1/17/23 0342)   polyethylene glycol (MIRALAX) packet 17 g (has no administration in time range)   ondansetron (ZOFRAN ODT) tablet 4 mg (has no administration in time range)     Or   ondansetron (ZOFRAN) injection 4 mg (has no administration in time range)   pantoprazole (PROTONIX) 40 mg in 0.9% sodium chloride 10 mL injection (40 mg IntraVENous Given 1/17/23 0814)   octreotide (SANDOSTATIN) 500 mcg in 0.9% sodium chloride 500 mL infusion (50 mcg/hr IntraVENous New Bag 1/17/23 0621)   cefTRIAXone (ROCEPHIN) 1 g in 0.9% sodium chloride (MBP/ADV) 50 mL MBP (1 g IntraVENous New Bag 1/16/23 1714)   lactulose (CHRONULAC) 10 gram/15 mL solution 30 mL (30 mL Oral Given 1/16/23 1828)   rifAXIMin (XIFAXAN) tablet 550 mg (550 mg Oral Given 1/16/23 1828)   spironolactone (ALDACTONE) tablet 50 mg (has no administration in time range)   furosemide (LASIX) tablet 20 mg (has no administration in time range)   0.9% sodium chloride infusion 250 mL (has no administration in time range)       CONSULTS: (Who and What was discussed)  IP CONSULT TO GASTROENTEROLOGY    Chronic Conditions: anemia sp GIB/GAVE, BRADY cirrhosis, CKD II, pacemaker, afib    Social Determinants affecting Dx or Tx: None    Records Reviewed (source and summary of external notes): Nursing Notes, Old Medical Records, Previous Radiology Studies, and Previous Laboratory Studies previous admission noting history of GI bleed, Narayan Jones cirrhosis, recent admission with hemoglobin of 4.2, improved after 3 units of PRBCs, EGD on 1230 showed minimal portal hypertensive gastropathy/gave which was treated, continued on PPI    CC/HPI Summary, DDx, ED Course, and Reassessment: Presenting with chief complaint of fatigue, dizziness with history of anemia status post GI bleed. On exam she is hemodynamically stable, vital signs stable, not hypotensive or tachycardic. Abdomen is moderately distended which patient reports is her baseline and per chart review is likely due to her history of cirrhosis. She denies any chest pain or shortness of breath. Differentials include anemia versus GI bleed versus upper versus lower versus electrolyte abnormality versus MICHELLE versus urinary tract infection versus ACS versus arrhythmia versus pneumonia versus pleural effusion. Will obtain basic labs, type and screen. Disposition pending work-up. ED Course as of 01/17/23 0931   Mon Jan 16, 2023   1515 CBC WITH AUTOMATED DIFF(!):    WBC 5.1   RBC 2.15(!)   HGB 6.0(!)   HCT 18.8(!)   MCV 87.4   MCH 27.9   MCHC 31.9   RDW 17.0(!)   PLATELET 54(!)   MPV 11.6   NRBC 0.0   ABSOLUTE NRBC 0.00   NEUTROPHILS 85(!)   LYMPHOCYTES 7(!)   MONOCYTES 6   EOSINOPHILS 1   BASOPHILS 0   IMMATURE GRANULOCYTES 1(!)   ABS. NEUTROPHILS 4.2   ABS. LYMPHOCYTES 0.4(!)   ABS. MONOCYTES 0.3   ABS. EOSINOPHILS 0.1   ABS. BASOPHILS 0.0   ABS. IMM. GRANS. 0.1(!)   DF SMEAR SCANNED   RBC COMMENTS ANISOCYTOSIS  1+     WBC COMMENTS VACUOLATED POLYS  Hemoglobin of 6, patient with clinically significant antibodies, will require transfusion, will plan to admit to hospitalist [NM]      ED Course User Index  [NM] Daina Rahman, DO     CBC resulted with hgb of 6, BMP largely baseline, bilirubin slightly elevated at 1.9 with BNP elevated. Abdomen is distended but reportedly baseline per patient. No other signs of volume overload. Will need transfusion today and given her antibodies will require transfusion to be sent from [facility], will disuss with hospitalist for admission. Prevoius endoscopy revealed GAVE, patient denying any overt melena or GI bleeding at this time. She is hemodynamically stable, not tachycardic. Took her rx'd PPI this morning. Disposition Considerations (Tests not done, Shared Decision Making, Pt Expectation of Test or Tx.):      FINAL IMPRESSION     1. Anemia, unspecified type          DISPOSITION/PLAN   Admitted    Admit Note: Pt is being admitted by  . The results of their tests and reason(s) for their admission have been discussed with pt and/or available family. They convey agreement and understanding for the need to be admitted and for the admission diagnosis. PATIENT REFERRED TO:  Follow-up Information    None           DISCHARGE MEDICATIONS:  Current Discharge Medication List            DISCONTINUED MEDICATIONS:  Current Discharge Medication List            (Please note that parts of this dictation were completed with voice recognition software. Quite often unanticipated grammatical, syntax, homophones, and other interpretive errors are inadvertently transcribed by the computer software. Please disregards these errors.  Please excuse any errors that have escaped final proofreading.)    Melissa Renee, DO

## 2023-01-16 NOTE — PROGRESS NOTES
GI Attending    Called for consult in this patient well known to us. Needs transfusion   We will see her in am.    Keep NPO after midnight in case needs EGD tomorrow.

## 2023-01-16 NOTE — ED NOTES
TRANSFER - OUT REPORT:    Verbal report given to Ashley RN(name) on [facility]  being transferred to [facility](unit) for routine progression of care       Report consisted of patients Situation, Background, Assessment and   Recommendations(SBAR). Information from the following report(s) SBAR, Kardex, ED Summary, and MAR was reviewed with the receiving nurse. Lines:   Peripheral IV 01/16/23 Left Forearm (Active)   Site Assessment Clean, dry, & intact 01/16/23 1500   Phlebitis Assessment 0 01/16/23 1500   Infiltration Assessment 0 01/16/23 1500   Dressing Status Clean, dry, & intact 01/16/23 1500   Hub Color/Line Status Green 01/16/23 1500        Opportunity for questions and clarification was provided.       Patient transported with:   [employer]

## 2023-01-16 NOTE — H&P
Hospitalist Consultation Note    NAME:  Femi Espinal   :   1945   MRN:   [id number]     ATTENDING: Nika Zimmerman DO  PCP:  Adalid Portillo MD    Date/Time:  2023 4:52 PM      Recommendations/Plan:       Active Problems:    UGIB (upper gastrointestinal bleed) (2023)    PLAN  UGIB  Cirrhosis  Debility  Anasarca  MICHELLE    ASSESSMENT  - Patient has advanced cirrhosis as evident by her poor synthetic function on labs. - Patient admits having difficulty caring for herself at home. She is open to the idea of potentially seeking placement at SNF. Recommend further discussion in the a.m. with case management. - Patient has cirrhosis and upper GI bleed. BUN is elevated consistent with an upper GI bleed. Start patient on octreotide, Rocephin for SBP prophylaxis, PPI.  - GI consulted  - If creatinine function worsens, will need to consider HRS as possibility. Consider urine sodium testing if creatinine worsens. Consider albumin and midodrine if suspect HRS. - Patient will be receiving a blood transfusion in the ER  - Start lactulose and rifaximin. Titrate to 2 bowel movements a day. - Patient will be started on diuretics with spironolactone and Lasix in a 10 :4 ratio as literature recommended GDMT. Code Status: DNR  DVT Prophylaxis: SCD          Subjective:   REQUESTING PHYSICIAN:  REASON FOR CONSULT:      Sabrina Jamil is a 68 y.o.  female who I was asked to see for With past medical history of Kenia Gowers cirrhosis, history of GAVE stomach, who sees GI at [facility]. She is not able to tell me the name of her GI doctor. She tells me her PCP is Dr. Lance Resendiz. Speaking with the patient it appears that she was hospitalized recently here for GI bleed and had EGD performed. It showed gave stomach. She has routine blood transfusions performed outpatient but at home she was feeling worse and weak like she usually does when she notices her blood levels are low.   She then decided to come to the emergency room. Currently she states that she still has feelings of weakness. She has not received blood transfusion yet. Otherwise nothing makes the weakness better or worse. Upon initial presentation to the ER, patient's vitals were unremarkable. Hemoglobin was 6, platelets were 54. Patient's BUN was slightly elevated.             Past Medical History:   Diagnosis Date    Abnormal nuclear stress test 10/04/2021    Anemia     Angina at rest [facility]) 10/04/2021    Arthritis     KNEE,gout    Bundle branch block     Endocrine disease     HYPOTHYROIDISM    Fracture     Left distal femur (age 12 - pt fell)    Fracture     Left tibia 1990 (pt fell)    Fracture     Right wrist fractured 2 times (pt fell)    GERD (gastroesophageal reflux disease)     High cholesterol     Hypertension     Hypoglycemia     \"my body produces too much insulin\"    Liver disease     BRADY    Nausea & vomiting     when had gallbladder out    Osteoporosis     Other ill-defined conditions(799.89)     edema legs    S/P cardiac cath 10/04/2021    10/4/2021 nonobstructive disease    Spinal stenosis     Thyroid disease       Past Surgical History:   Procedure Laterality Date    COLONOSCOPY N/A 7/13/2021    COLONOSCOPY performed by Tanisha Garza MD at hospitals ENDOSCOPY    COLONOSCOPY N/A 8/8/2022    COLONOSCOPY performed by Balbir Durán MD at [place] [id number] [address]  2/11/2015         COLONOSCOPY,REMV LESN,SNARE  7/13/2021         COLONOSCPY,FLEX,W/ [address] INJECT  7/13/2021         COLORECTAL SCRN; HI RISK IND  2/11/2015         HX CHOLECYSTECTOMY      HX HYSTERECTOMY      HX ORTHOPAEDIC Left     leg surgery - plates, screws, wires, pins in place    HX UROLOGICAL      [address] [facility]      liver biopsy--BRADY and fibrosis    SB ENDOSCOPY,W/CONTROL,BLEEDING  11/16/2022    SMALL BOWEL ENDOSCOPY,ABLATE LESN  11/16/2022    SMALL BOWEL ENDOSCOPY,BIOPSY  11/16/2022 UPPER GI ENDOSCOPY,BIOPSY  11/17/2015          Social History     Tobacco Use    Smoking status: Never    Smokeless tobacco: Never   Substance Use Topics    Alcohol use: No      Family History   Problem Relation Age of Onset    Diabetes Mother     Heart Disease Mother     Emphysema Father     No Known Problems Brother     Stroke Maternal Grandmother     No Known Problems Maternal Grandfather     No Known Problems Paternal Grandmother     No Known Problems Paternal Grandfather        Allergies   Allergen Reactions    Gabapentin Other (comments)     Suicidal ideation    Metoprolol Rash    Celebrex [Celecoxib] Hives    Eliquis [Apixaban] Other (comments)     Caused excessive anemia    Pcn [Penicillins] Unknown (comments)     Can't remember, was a long time    Sulfa (Sulfonamide Antibiotics) Hives      Prior to Admission medications    Medication Sig Start Date End Date Taking? Authorizing Provider   amLODIPine (NORVASC) 10 mg tablet  1/4/23   Provider, Historical   losartan (COZAAR) 50 mg tablet  1/4/23   Provider, Historical   alendronate (FOSAMAX) 70 mg tablet Take 1 Tablet by mouth every seven (7) days. 1/5/23   Carol Pérez MD   lactulose (KRISTALOSE) 10 gram packet Take 1 Packet by mouth daily (with breakfast) for 30 days. 12/31/22 1/30/23  Edilson Ferrara MD   allopurinoL (ZYLOPRIM) 100 mg tablet Take 100 mg by mouth daily. Provider, Historical   loratadine (CLARITIN) 10 mg tablet TAKE 1 TABLET BY MOUTH EVERY DAY 12/23/22   Heydi Moeller MD   atorvastatin (LIPITOR) 20 mg tablet TAKE 1 TABLET BY MOUTH AT BEDTIME 12/19/22   Heydi Moeller MD   ezetimibe (ZETIA) 10 mg tablet TAKE 1 TABLET BY MOUTH EVERY DAY 12/19/22   Heydi Moeller MD   benzocaine-menthoL (CHLORASEPTIC MAX) 15-10 mg lozg lozenge Take 1 Lozenge by mouth as needed for Sore throat. 11/18/22   Fern Pope NP   furosemide (LASIX) 80 mg tablet Take 0.5 Tablets by mouth daily.  11/18/22   Fern Pope NP   levalbuterol tartrate Fabian Jonas) 45 mcg/actuation inhaler Take 2 Puffs by inhalation every six (6) hours as needed for Wheezing or Shortness of Breath. Provider, Historical   potassium chloride SA (MICRO-K) 10 mEq capsule Take 2 Capsules by mouth daily. 9/24/22   Byron Preciado MD   pantoprazole (PROTONIX) 40 mg tablet Take 1 Tablet by mouth Before breakfast and dinner. 8/17/22   Byron Preciado MD   nystatin (MYCOSTATIN) powder Apply  to affected area two (2) times a day. 8/8/22   Lindy Saleem MD   fluticasone propionate (FLONASE) 50 mcg/actuation nasal spray 2 Sprays by Both Nostrils route daily. Administer to right and left nostril. 6/24/22   Byron Preciado MD   levothyroxine (SYNTHROID) 150 mcg tablet Take 1 Tablet by mouth Daily (before breakfast). 6/15/22   Byron Preciado MD   icosapent ethyL (VASCEPA) 1 gram capsule Take 2 Capsules by mouth two (2) times daily (with meals). 2/14/22   Byron Preciado MD   lidocaine (LIDODERM) 5 % Apply patch to the affected area for 12 hours a day and remove for 12 hours a day. 12/7/21   Byron Preciado MD   polyethylene glycol [facility]) 17 gram/dose powder Take 17 g by mouth daily. 4/6/20   Byron Preciado MD   ketoconazole (NIZORAL) 2 % topical cream Apply  to affected area daily as needed. 8/23/19   Provider, Historical   Biotin 2,500 mcg cap Take  by mouth. Provider, Historical   L GASSERI/B BIFIDUM/B LONGUM (Weight Wins PO) Take 1 Tab by mouth daily. Provider, Historical   vitamin E (AQUA GEMS) 400 unit capsule Take 800 Units by mouth two (2) times a day. Provider, Historical   cholecalciferol, vitamin D3, 50 mcg (2,000 unit) tab Take 1 Tab by mouth daily. Other, MD Janette   MULTIVITAMIN WITH MINERALS (ONE-A-DAY 50 PLUS PO) Take 1 Tab by mouth daily. Provider, Historical       REVIEW OF SYSTEMS:     Total of 12 systems reviewed as follows:   I am not able to complete the review of systems because:    The patient is intubated and sedated    The patient has altered mental status due to his acute medical problems    The patient has baseline aphasia from prior stroke(s)    The patient has baseline dementia and is not reliable historian                 POSITIVE= underlined text  Negative = text not underlined  General:  fever, chills, sweats, generalized weakness, weight loss/gain,      loss of appetite   Eyes:    blurred vision, eye pain, loss of vision, double vision  ENT:    rhinorrhea, pharyngitis   Respiratory:   cough, sputum production, SOB, wheezing, HENLEY, pleuritic pain   Cardiology:   chest pain, palpitations, orthopnea, PND, edema, syncope   Gastrointestinal:  abdominal pain , N/V, dysphagia, diarrhea, constipation, bleeding   Genitourinary:  frequency, urgency, dysuria, hematuria, incontinence   Muskuloskeletal :  arthralgia, myalgia   Hematology:  easy bruising, nose or gum bleeding, lymphadenopathy   Dermatological: rash, ulceration, pruritis   Endocrine:   hot flashes or polydipsia   Neurological:  headache, dizziness, confusion, focal weakness, paresthesia,     Speech difficulties, memory loss, gait disturbance  Psychological: Feelings of anxiety, depression, agitation    Objective:   VITALS:    Visit Vitals  BP (!) 138/50 (BP 1 Location: Left upper arm, BP Patient Position: At rest)   Pulse 77   Temp 98.5 °F (36.9 °C)   Resp 16   Ht 4' 8\" (1.422 m)   Wt 93 kg (205 lb)   SpO2 97%   BMI 45.96 kg/m²     Temp (24hrs), Av.5 °F (36.9 °C), Min:98.5 °F (36.9 °C), Max:98.5 °F (36.9 °C)      PHYSICAL EXAM:   General:    Alert, cooperative, no distress, appears stated age. HEENT: Atraumatic, anicteric sclerae, pink conjunctivae     No oral ulcers, mucosa moist, throat clear  Neck:  Supple, symmetrical,  thyroid: non tender  Lungs:   Clear to auscultation bilaterally. No Wheezing or Rhonchi. No rales. Chest wall:  No tenderness  No Accessory muscle use. Heart:   Regular  rhythm,  No  murmur   No edema  Abdomen:   Soft, non-tender. Not distended.   Bowel sounds normal  Extremities: No cyanosis. No clubbing  Skin:     Not pale. Not Jaundiced  No rashes   Psych:  Good insight. Not depressed. Not anxious or agitated. Neurologic: EOMs intact. No facial asymmetry. No aphasia or slurred speech. Symmetrical strength, Alert and oriented X 4.     _______________________________________________________________________  Care Plan discussed with:    Comments   Patient     Family      RN     Care Manager                    Consultant:      ____________________________________________________________________  TOTAL TIME:     60   mins    Comments     Reviewed previous records   >50% of visit spent in counseling and coordination of care  Discussion with patient and/or family and questions answered       Critical Care Provided     Minutes non procedure based  ________________________________________________________________________  Signed: Sander Bonds DO      Procedures: see electronic medical records for all procedures/Xrays and details which were not copied into this note but were reviewed prior to creation of Plan. LAB DATA REVIEWED:    Recent Results (from the past 24 hour(s))   CBC WITH AUTOMATED DIFF    Collection Time: 01/16/23  2:20 PM   Result Value Ref Range    WBC 5.1 3.6 - 11.0 K/uL    RBC 2.15 (L) 3.80 - 5.20 M/uL    HGB 6.0 (L) 11.5 - 16.0 g/dL    HCT 18.8 (L) 35.0 - 47.0 %    MCV 87.4 80.0 - 99.0 FL    MCH 27.9 26.0 - 34.0 PG    MCHC 31.9 30.0 - 36.5 g/dL    RDW 17.0 (H) 11.5 - 14.5 %    PLATELET 54 (L) 536 - 400 K/uL    MPV 11.6 8.9 - 12.9 FL    NRBC 0.0 0  WBC    ABSOLUTE NRBC 0.00 0.00 - 0.01 K/uL    NEUTROPHILS 85 (H) 32 - 75 %    LYMPHOCYTES 7 (L) 12 - 49 %    MONOCYTES 6 5 - 13 %    EOSINOPHILS 1 0 - 7 %    BASOPHILS 0 0 - 1 %    IMMATURE GRANULOCYTES 1 (H) 0.0 - 0.5 %    ABS. NEUTROPHILS 4.2 1.8 - 8.0 K/UL    ABS. LYMPHOCYTES 0.4 (L) 0.8 - 3.5 K/UL    ABS. MONOCYTES 0.3 0.0 - 1.0 K/UL    ABS. EOSINOPHILS 0.1 0.0 - 0.4 K/UL    ABS.  BASOPHILS 0.0 0.0 - 0.1 K/UL    ABS. IMM. GRANS. 0.1 (H) 0.00 - 0.04 K/UL    DF SMEAR SCANNED      RBC COMMENTS ANISOCYTOSIS  1+        WBC COMMENTS VACUOLATED POLYS     EKG, 12 LEAD, INITIAL    Collection Time: 01/16/23  2:25 PM   Result Value Ref Range    Ventricular Rate 76 BPM    Atrial Rate 76 BPM    P-R Interval 256 ms    QRS Duration 150 ms    Q-T Interval 440 ms    QTC Calculation (Bezet) 495 ms    Calculated P Axis -24 degrees    Calculated R Axis -43 degrees    Calculated T Axis 88 degrees    Diagnosis       Atrial-paced rhythm with prolonged AV conduction  Left axis deviation  Right bundle branch block  Left ventricular hypertrophy with repolarization abnormality  Cannot rule out Septal infarct (cited on or before 16-JAN-2023)  When compared with ECG of 06-OCT-2022 11:21,  Serial changes of Septal infarct present     METABOLIC PANEL, COMPREHENSIVE    Collection Time: 01/16/23  3:21 PM   Result Value Ref Range    Sodium 136 136 - 145 mmol/L    Potassium 3.9 3.5 - 5.1 mmol/L    Chloride 106 97 - 108 mmol/L    CO2 24 21 - 32 mmol/L    Anion gap 6 5 - 15 mmol/L    Glucose 195 (H) 65 - 100 mg/dL    BUN 59 (H) 6 - 20 MG/DL    Creatinine 1.51 (H) 0.55 - 1.02 MG/DL    BUN/Creatinine ratio 39 (H) 12 - 20      eGFR 35 (L) >60 ml/min/1.73m2    Calcium 8.9 8.5 - 10.1 MG/DL    Bilirubin, total 1.9 (H) 0.2 - 1.0 MG/DL    ALT (SGPT) 37 12 - 78 U/L    AST (SGOT) 38 (H) 15 - 37 U/L    Alk.  phosphatase 252 (H) 45 - 117 U/L    Protein, total 6.9 6.4 - 8.2 g/dL    Albumin 2.8 (L) 3.5 - 5.0 g/dL    Globulin 4.1 (H) 2.0 - 4.0 g/dL    A-G Ratio 0.7 (L) 1.1 - 2.2     NT-PRO BNP    Collection Time: 01/16/23  3:21 PM   Result Value Ref Range    NT pro- (H) <450 PG/ML       _____________________________  Hospitalist: Yulissa Melendez DO

## 2023-01-16 NOTE — ED NOTES
Attempted to hang antibiotics when transport arrived. Pt stated that IV was uncomfortable. Pt may need new IV for medications.

## 2023-01-16 NOTE — ED NOTES
Pt presents to the ED via EMS with complaints of fatigue and generalized weakness. Pt has hx of low HGB and transfusions. Pt's belongings were placed in a green bag and left at bedside.

## 2023-01-17 LAB
ANION GAP SERPL CALC-SCNC: 6 MMOL/L (ref 5–15)
APTT PPP: 28.4 SEC (ref 22.1–31)
ATRIAL RATE: 76 BPM
BUN SERPL-MCNC: 59 MG/DL (ref 6–20)
BUN/CREAT SERPL: 41 (ref 12–20)
CALCIUM SERPL-MCNC: 8.5 MG/DL (ref 8.5–10.1)
CALCULATED P AXIS, ECG09: -24 DEGREES
CALCULATED R AXIS, ECG10: -43 DEGREES
CALCULATED T AXIS, ECG11: 88 DEGREES
CHLORIDE SERPL-SCNC: 107 MMOL/L (ref 97–108)
CO2 SERPL-SCNC: 24 MMOL/L (ref 21–32)
COMMENT, HOLDF: NORMAL
CREAT SERPL-MCNC: 1.43 MG/DL (ref 0.55–1.02)
DIAGNOSIS, 93000: NORMAL
ERYTHROCYTE [DISTWIDTH] IN BLOOD BY AUTOMATED COUNT: 16.6 % (ref 11.5–14.5)
ERYTHROCYTE [DISTWIDTH] IN BLOOD BY AUTOMATED COUNT: 18.8 % (ref 11.5–14.5)
ERYTHROCYTE [DISTWIDTH] IN BLOOD BY AUTOMATED COUNT: 19.7 % (ref 11.5–14.5)
FIBRINOGEN PPP-MCNC: 478 MG/DL (ref 200–475)
GLUCOSE SERPL-MCNC: 147 MG/DL (ref 65–100)
HCT VFR BLD AUTO: 14.5 % (ref 35–47)
HCT VFR BLD AUTO: 17.8 % (ref 35–47)
HCT VFR BLD AUTO: 20.3 % (ref 35–47)
HGB BLD-MCNC: 4.6 G/DL (ref 11.5–16)
HGB BLD-MCNC: 5.7 G/DL (ref 11.5–16)
HGB BLD-MCNC: 6.5 G/DL (ref 11.5–16)
HISTORY CHECKED?,CKHIST: NORMAL
HISTORY CHECKED?,CKHIST: NORMAL
INR PPP: 1.1 (ref 0.9–1.1)
LDH SERPL L TO P-CCNC: 249 U/L (ref 81–246)
MCH RBC QN AUTO: 26.6 PG (ref 26–34)
MCH RBC QN AUTO: 26.9 PG (ref 26–34)
MCH RBC QN AUTO: 28.4 PG (ref 26–34)
MCHC RBC AUTO-ENTMCNC: 31.7 G/DL (ref 30–36.5)
MCHC RBC AUTO-ENTMCNC: 32 G/DL (ref 30–36.5)
MCHC RBC AUTO-ENTMCNC: 32 G/DL (ref 30–36.5)
MCV RBC AUTO: 83.2 FL (ref 80–99)
MCV RBC AUTO: 84 FL (ref 80–99)
MCV RBC AUTO: 89.5 FL (ref 80–99)
NRBC # BLD: 0 K/UL (ref 0–0.01)
NRBC BLD-RTO: 0 PER 100 WBC
P-R INTERVAL, ECG05: 256 MS
PLATELET # BLD AUTO: 29 K/UL (ref 150–400)
PLATELET # BLD AUTO: 33 K/UL (ref 150–400)
PLATELET # BLD AUTO: 34 K/UL (ref 150–400)
PMV BLD AUTO: 10.1 FL (ref 8.9–12.9)
PMV BLD AUTO: 10.3 FL (ref 8.9–12.9)
PMV BLD AUTO: 11.2 FL (ref 8.9–12.9)
POTASSIUM SERPL-SCNC: 4.3 MMOL/L (ref 3.5–5.1)
PROTHROMBIN TIME: 11.5 SEC (ref 9–11.1)
Q-T INTERVAL, ECG07: 440 MS
QRS DURATION, ECG06: 150 MS
QTC CALCULATION (BEZET), ECG08: 495 MS
RBC # BLD AUTO: 1.62 M/UL (ref 3.8–5.2)
RBC # BLD AUTO: 2.12 M/UL (ref 3.8–5.2)
RBC # BLD AUTO: 2.44 M/UL (ref 3.8–5.2)
RETICS # AUTO: 0 M/UL (ref 0.02–0.08)
RETICS/RBC NFR AUTO: 0.2 % (ref 0.7–2.1)
SAMPLES BEING HELD,HOLD: NORMAL
SODIUM SERPL-SCNC: 137 MMOL/L (ref 136–145)
THERAPEUTIC RANGE,PTTT: ABNORMAL SECS (ref 58–77)
TROPONIN-HIGH SENSITIVITY: 61 NG/L (ref 0–51)
VENTRICULAR RATE, ECG03: 76 BPM
WBC # BLD AUTO: 3 K/UL (ref 3.6–11)
WBC # BLD AUTO: 3.3 K/UL (ref 3.6–11)
WBC # BLD AUTO: 3.3 K/UL (ref 3.6–11)

## 2023-01-17 PROCEDURE — 74011000250 HC RX REV CODE- 250: Performed by: STUDENT IN AN ORGANIZED HEALTH CARE EDUCATION/TRAINING PROGRAM

## 2023-01-17 PROCEDURE — 86921 COMPATIBILITY TEST INCUBATE: CPT

## 2023-01-17 PROCEDURE — 74011636637 HC RX REV CODE- 636/637: Performed by: STUDENT IN AN ORGANIZED HEALTH CARE EDUCATION/TRAINING PROGRAM

## 2023-01-17 PROCEDURE — 74011250636 HC RX REV CODE- 250/636: Performed by: STUDENT IN AN ORGANIZED HEALTH CARE EDUCATION/TRAINING PROGRAM

## 2023-01-17 PROCEDURE — 86922 COMPATIBILITY TEST ANTIGLOB: CPT

## 2023-01-17 PROCEDURE — 85610 PROTHROMBIN TIME: CPT

## 2023-01-17 PROCEDURE — 85045 AUTOMATED RETICULOCYTE COUNT: CPT

## 2023-01-17 PROCEDURE — 65610000006 HC RM INTENSIVE CARE

## 2023-01-17 PROCEDURE — 36415 COLL VENOUS BLD VENIPUNCTURE: CPT

## 2023-01-17 PROCEDURE — P9016 RBC LEUKOCYTES REDUCED: HCPCS

## 2023-01-17 PROCEDURE — 86902 BLOOD TYPE ANTIGEN DONOR EA: CPT

## 2023-01-17 PROCEDURE — 76937 US GUIDE VASCULAR ACCESS: CPT

## 2023-01-17 PROCEDURE — 83615 LACTATE (LD) (LDH) ENZYME: CPT

## 2023-01-17 PROCEDURE — 36430 TRANSFUSION BLD/BLD COMPNT: CPT

## 2023-01-17 PROCEDURE — 74011250637 HC RX REV CODE- 250/637: Performed by: INTERNAL MEDICINE

## 2023-01-17 PROCEDURE — 80048 BASIC METABOLIC PNL TOTAL CA: CPT

## 2023-01-17 PROCEDURE — C9113 INJ PANTOPRAZOLE SODIUM, VIA: HCPCS | Performed by: STUDENT IN AN ORGANIZED HEALTH CARE EDUCATION/TRAINING PROGRAM

## 2023-01-17 PROCEDURE — 74011250637 HC RX REV CODE- 250/637: Performed by: STUDENT IN AN ORGANIZED HEALTH CARE EDUCATION/TRAINING PROGRAM

## 2023-01-17 PROCEDURE — 85027 COMPLETE CBC AUTOMATED: CPT

## 2023-01-17 RX ORDER — LEVOTHYROXINE SODIUM 150 UG/1
150 TABLET ORAL
Status: DISCONTINUED | OUTPATIENT
Start: 2023-01-17 | End: 2023-01-31 | Stop reason: HOSPADM

## 2023-01-17 RX ORDER — BALSAM PERU/CASTOR OIL
OINTMENT (GRAM) TOPICAL 2 TIMES DAILY
Status: DISCONTINUED | OUTPATIENT
Start: 2023-01-17 | End: 2023-01-31 | Stop reason: HOSPADM

## 2023-01-17 RX ORDER — AMMONIUM LACTATE 12 G/100G
LOTION TOPICAL 2 TIMES DAILY
Status: DISCONTINUED | OUTPATIENT
Start: 2023-01-17 | End: 2023-01-31 | Stop reason: HOSPADM

## 2023-01-17 RX ORDER — METOLAZONE 2.5 MG/1
TABLET ORAL
COMMUNITY
Start: 2023-01-04 | End: 2023-01-31

## 2023-01-17 RX ADMIN — Medication 1 AMPULE: at 10:47

## 2023-01-17 RX ADMIN — SODIUM CHLORIDE, PRESERVATIVE FREE 10 ML: 5 INJECTION INTRAVENOUS at 13:18

## 2023-01-17 RX ADMIN — SODIUM CHLORIDE 40 MG: 9 INJECTION, SOLUTION INTRAMUSCULAR; INTRAVENOUS; SUBCUTANEOUS at 20:09

## 2023-01-17 RX ADMIN — CASTOR OIL AND BALSAM, PERU: 788; 87 OINTMENT TOPICAL at 20:09

## 2023-01-17 RX ADMIN — CASTOR OIL AND BALSAM, PERU: 788; 87 OINTMENT TOPICAL at 13:16

## 2023-01-17 RX ADMIN — RIFAXIMIN 550 MG: 550 TABLET ORAL at 18:16

## 2023-01-17 RX ADMIN — OCTREOTIDE ACETATE 50 MCG/HR: 500 INJECTION, SOLUTION INTRAVENOUS; SUBCUTANEOUS at 06:21

## 2023-01-17 RX ADMIN — SODIUM CHLORIDE 40 MG: 9 INJECTION, SOLUTION INTRAMUSCULAR; INTRAVENOUS; SUBCUTANEOUS at 08:14

## 2023-01-17 RX ADMIN — Medication 1 AMPULE: at 20:09

## 2023-01-17 RX ADMIN — FUROSEMIDE 20 MG: 40 TABLET ORAL at 09:42

## 2023-01-17 RX ADMIN — SODIUM CHLORIDE, PRESERVATIVE FREE 10 ML: 5 INJECTION INTRAVENOUS at 06:49

## 2023-01-17 RX ADMIN — SODIUM CHLORIDE, PRESERVATIVE FREE 10 ML: 5 INJECTION INTRAVENOUS at 22:00

## 2023-01-17 RX ADMIN — ACETAMINOPHEN 650 MG: 325 TABLET ORAL at 03:42

## 2023-01-17 RX ADMIN — LEVOTHYROXINE SODIUM 150 MCG: 0.15 TABLET ORAL at 13:17

## 2023-01-17 RX ADMIN — Medication: at 18:16

## 2023-01-17 RX ADMIN — SPIRONOLACTONE 50 MG: 25 TABLET ORAL at 09:47

## 2023-01-17 RX ADMIN — LACTULOSE 30 ML: 20 SOLUTION ORAL at 18:16

## 2023-01-17 RX ADMIN — LACTULOSE 30 ML: 20 SOLUTION ORAL at 09:42

## 2023-01-17 RX ADMIN — RIFAXIMIN 550 MG: 550 TABLET ORAL at 09:42

## 2023-01-17 NOTE — CONSENT
Informed Consent for Blood Component Transfusion Note    I have discussed with the patient the rationale for blood component transfusion; its benefits in treating or preventing fatigue, organ damage, or death; and its risk which includes mild transfusion reactions, rare risk of blood borne infection, or more serious but rare reactions. I have discussed the alternatives to transfusion, including the risk and consequences of not receiving transfusion. The patient had an opportunity to ask questions and had agreed to proceed with transfusion of blood components.     Electronically signed by Nakul Calvo DO on 1/16/23 at 4:31 PM

## 2023-01-17 NOTE — CONSULTS
GI CONSULTATION NOTE  Arianna Resendez NP  [phone] NP in-hospital cell phone M-F until 4:30  After 5pm or on weekends, please call  for physician on call    NAME: Joya Astudillo   :  1945   MRN:  [id number]   Attending:  Dr Blanca Gonzáles  Primary GI:  Dr Dawit Narvaez  Date/Time:  2023 10:53 AM  Assessment:   Acute on chronic anemia   GAVE and AVMS  Thrombocytopenia  23 : Hgb 6.0, MCV 87.4, Plt 54  23 : Hgb 4.6, plt 29  Denies melena, hematochezia, or changes in bowel habits  Reports fatigue, weakness, and shortness of breath  Hx cirrhosis secondary to BRADY, compensated   Had liver bx at [facility]   BRADY, moderate fibrosis with bridging, stage III  Platelets 29 today  CT abd/pel 22 : cirrhosis with moderate ascites  No previous paracentesis here at [facility]  No known hx of SBP per this chart     GI History:  2023 : EGD : -- Antral mucosa has improved compared to previous EGD's, and the focal GAVE vs portal hypertensive gastropathy is minimal now, but again treated with APC today given it's favorable response to APC these past several months. It's interesting that despite improvement in the antrum, recurrent CARLINE continues. She has had mulitiple EGD's, push enteroscopies and a colonoscopy and pill camera, all since August.  -- no other AVMs nor active bleeding, nor clear cause of anemia identified (distal portion of duodenum visualized)  -- small/low grade varices now seen in distal esophagus  2022 EGD: normal esophagus, edematous appearance of stomach, antrum with oozing foci and areas of ulceration and inflammation. Bx portal hypertensive gastropathy vs GAVE. 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  Ulcerated area in 3rd portion of duodenum s/p hemoclip  10/11/2022- pill cam showing AVM at 9 minutes and active oozing blood at 1 hour and 34 minutes, unclear if AVM or other lesion  EGD 10/10/22- atypical GAVE in antrum, treated with APC extensively  -- also, pill camera deployed in duodenum, given degree of recurrent anemia  08/2022- Colonoscopy with normal colonic mucosa throughout, internal hemorrhoids   08/2022- EGD showing portal hypertensive gastropathy      Plan:   No plan for EGD at this time as last was on 12/30/22 and unlikely to be helpful. Suspect she has persistent GAVE and numerous AVMs that are oozing and bleeding causing drop in hgb 2/2 low plt level. Clear liquids okay now, after transfusion, can advance to regular diet tonight  Serial H/H - transfuse for hgb < 7.0 - pt has 3 units ready per nursing  Continue BID PPI  Okay to stop octreotide gtt by GI as no overt bleeding  Discontinue rocephin for prophylaxis of SBP  Continue lasix, aldactone, xifaxan, lactulose for cirrhosis management. Dr Keshav Avery is reaching out to hepatologist at [facility] to discuss if pt would be a candidate for TIPS procedure or if they have any recommendations as well. We are waiting to hear back from him. Plan discussed with Dr Keshav Avery. Thank you for consultation. Subjective:     HISTORY OF PRESENT ILLNESS:     Lawrence Mccain is an 68 y.o.  female who we are asked to see for complaint of chronic recurrent anemia. Patient presented to the ED for fatigue and weakness. She endorses some shortness of breath and felt like she was anemic again as she has a long history of this. She denies any nausea, vomiting, abdominal pain. Denies any visible blood in stool, denies black or dark stoo. Denies any change in bowel habits. Denies any changes in her appetite. She endorses feeling weaker and weaker as she keep coming back to the hospital. She is follow by Dr Sean Isaac for CARLINE and has had iron infusions here. She has been hospitalized many times in the past for anemia and well knownt o us with a history of GAVE and AVMs. She was last seen inpt here on 12/30/22 where she had another EGD.      Aristeo Segundo has a known history of cirrhosis and is followed by [facility] but hasn't seem them recently. Some discuss or thought about TIPS procedures but unsure if she ever got her study done to evaluate for valvular stenosis at [facility]. She is thrombocytopenic with plts of 29. Hgb is down today to 4.6.        Past Medical History:   Diagnosis Date    Abnormal nuclear stress test 10/04/2021    Anemia     Angina at rest [facility]) 10/04/2021    Arthritis     KNEE,gout    Bundle branch block     Endocrine disease     HYPOTHYROIDISM    Fracture     Left distal femur (age 12 - pt fell)    Fracture     Left tibia 1990 (pt fell)    Fracture     Right wrist fractured 2 times (pt fell)    GERD (gastroesophageal reflux disease)     High cholesterol     Hypertension     Hypoglycemia     \"my body produces too much insulin\"    Liver disease     BRADY    Nausea & vomiting     when had gallbladder out    Osteoporosis     Other ill-defined conditions(799.89)     edema legs    S/P cardiac cath 10/04/2021    10/4/2021 nonobstructive disease    Spinal stenosis     Thyroid disease       Past Surgical History:   Procedure Laterality Date    COLONOSCOPY N/A 7/13/2021    COLONOSCOPY performed by Missy Berry MD at \[facility]\"" ENDOSCOPY    COLONOSCOPY N/A 8/8/2022    COLONOSCOPY performed by Janki Thompson MD at [place] [address] [address]  2/11/2015         COLONOSCOPY,REMV LESN,SNARE  7/13/2021         COLONOSCPY,FLEX,W/ [address] INJECT  7/13/2021         COLORECTAL SCRN; HI RISK IND  2/11/2015         HX CHOLECYSTECTOMY      HX HYSTERECTOMY      HX ORTHOPAEDIC Left     leg surgery - plates, screws, wires, pins in place    HX UROLOGICAL      [address]      liver biopsy--BRADY and fibrosis    SB ENDOSCOPY,W/CONTROL,BLEEDING  11/16/2022    SMALL BOWEL ENDOSCOPY,ABLATE LESN  11/16/2022    SMALL BOWEL ENDOSCOPY,BIOPSY  11/16/2022    UPPER GI ENDOSCOPY,BIOPSY  11/17/2015          Social History     Tobacco Use    Smoking status: Never    Smokeless tobacco: Never   Substance Use Topics    Alcohol use: No      Family History   Problem Relation Age of Onset    Diabetes Mother     Heart Disease Mother     Emphysema Father     No Known Problems Brother     Stroke Maternal Grandmother     No Known Problems Maternal Grandfather     No Known Problems Paternal Grandmother     No Known Problems Paternal Grandfather       Allergies   Allergen Reactions    Gabapentin Other (comments)     Suicidal ideation    Metoprolol Rash    Celebrex [Celecoxib] Hives    Eliquis [Apixaban] Other (comments)     Caused excessive anemia    Pcn [Penicillins] Unknown (comments)     Can't remember, was a long time    Sulfa (Sulfonamide Antibiotics) Hives      Prior to Admission medications    Medication Sig Start Date End Date Taking? Authorizing Provider   metOLazone (ZAROXOLYN) 2.5 mg tablet TAKE 1 TABLET BY MOUTH EVERY OTHER DAY 1/4/23   Provider, Historical   amLODIPine (NORVASC) 10 mg tablet Take 10 mg by mouth daily. 1/4/23   Provider, Historical   losartan (COZAAR) 50 mg tablet  1/4/23   Provider, Historical   alendronate (FOSAMAX) 70 mg tablet Take 1 Tablet by mouth every seven (7) days. 1/5/23   Dee Mims MD   lactulose (KRISTALOSE) 10 gram packet Take 1 Packet by mouth daily (with breakfast) for 30 days. 12/31/22 1/30/23  Jacinto Bonds MD   allopurinoL (ZYLOPRIM) 100 mg tablet Take 100 mg by mouth daily. Provider, Historical   loratadine (CLARITIN) 10 mg tablet TAKE 1 TABLET BY MOUTH EVERY DAY 12/23/22   Tres Dow MD   atorvastatin (LIPITOR) 20 mg tablet TAKE 1 TABLET BY MOUTH AT BEDTIME 12/19/22   rTes Dow MD   ezetimibe (ZETIA) 10 mg tablet TAKE 1 TABLET BY MOUTH EVERY DAY 12/19/22   Tres Dow MD   benzocaine-menthoL (CHLORASEPTIC MAX) 15-10 mg lozg lozenge Take 1 Lozenge by mouth as needed for Sore throat. 11/18/22   Rambo Ordaz NP   furosemide (LASIX) 80 mg tablet Take 0.5 Tablets by mouth daily.  11/18/22   Rambo Ordaz NP   levalbuterol tartrate (XOPENEX) 45 mcg/actuation inhaler Take 2 Puffs by inhalation every six (6) hours as needed for Wheezing or Shortness of Breath. Provider, Historical   potassium chloride SA (MICRO-K) 10 mEq capsule Take 2 Capsules by mouth daily. 9/24/22   Noa Romero MD   pantoprazole (PROTONIX) 40 mg tablet Take 1 Tablet by mouth Before breakfast and dinner. 8/17/22   Noa Romero MD   nystatin (MYCOSTATIN) powder Apply  to affected area two (2) times a day. 8/8/22   Todd Quiroz MD   fluticasone propionate (FLONASE) 50 mcg/actuation nasal spray 2 Sprays by Both Nostrils route daily. Administer to right and left nostril. 6/24/22   Noa Romero MD   levothyroxine (SYNTHROID) 150 mcg tablet Take 1 Tablet by mouth Daily (before breakfast). 6/15/22   Noa Romero MD   icosapent ethyL (VASCEPA) 1 gram capsule Take 2 Capsules by mouth two (2) times daily (with meals). 2/14/22   Noa Romero MD   lidocaine (LIDODERM) 5 % Apply patch to the affected area for 12 hours a day and remove for 12 hours a day. 12/7/21   Noa Romero MD   polyethylene glycol [facility]) 17 gram/dose powder Take 17 g by mouth daily. 4/6/20   Noa Romero MD   ketoconazole (NIZORAL) 2 % topical cream Apply  to affected area daily as needed. 8/23/19   Provider, Historical   Biotin 2,500 mcg cap Take  by mouth. Provider, Historical   L GASSERI/B BIFIDUM/B LONGUM (Site9 PO) Take 1 Tab by mouth daily. Provider, Historical   vitamin E (AQUA GEMS) 400 unit capsule Take 800 Units by mouth two (2) times a day. Provider, Historical   cholecalciferol, vitamin D3, 50 mcg (2,000 unit) tab Take 1 Tab by mouth daily. Other, MD Janette   MULTIVITAMIN WITH MINERALS (ONE-A-DAY 50 PLUS PO) Take 1 Tab by mouth daily.     Provider, Historical       Patient Active Problem List   Diagnosis Code    Gout M10.9    Hypothyroidism E03.9    Osteoporosis M81.0    Anxiety F41.9    Leg edema R60.0    RSD lower limb G90.529    Fatty liver K76.0    Pure hypercholesterolemia E78.00    Colon polyp K63.5    Bifascicular block I45.2    HTN (hypertension) I10    Thrombocytopenia (HCC) D69.6    Prediabetes R73.03    DDD (degenerative disc disease), lumbar M51.36    Controlled type 2 diabetes mellitus without complication (HCC) H79.9    Severe obesity (HCC) E66.01    Cellulitis of left lower leg L03.116    MICHELLE (acute kidney injury) ([facility] [address] N17.9    Nonrheumatic aortic valve stenosis I35.0    Pulmonary hypertension (HCC) I27.20    Abnormal nuclear stress test R94.39    Angina at rest [facility]) I20.8    S/P cardiac cath Z98.890    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    Stage 3a chronic kidney disease (HCC) N18.31    PAF (paroxysmal atrial fibrillation) (HCC) I48.0    SSS (sick sinus syndrome) (HCC) I49.5    Anemia D64.9    Acute anemia D64.9    Iron deficiency anemia due to chronic blood loss D50.0    Acute blood loss anemia D62    Blood loss anemia D50.0    GAVE (gastric antral vascular ectasia) K31.819    UGIB (upper gastrointestinal bleed) K92.2       REVIEW OF SYSTEMS:    Constitutional: negative fever, negative chills, negative weight loss  Eyes:   negative visual changes  ENT:   negative sore throat, tongue or lip swelling   Respiratory:  negative cough, negative dyspnea  Cards:  negative for chest pain, palpitations  GI:   See HPI  :  negative for frequency, dysuria  Integument:  negative for rash and pruritus  Heme:  negative for easy bruising and gum/nose bleeding  Musculoskel: negative for myalgias,  back pain  Neuro: negative for headaches, vertigo  Psych: negative for feelings of anxiety, depression     Pertinent Positives: weakness, fatigue, shortness of breath      Objective:   VITALS:    Visit Vitals  BP (!) 119/43   Pulse 64   Temp 97.9 °F (36.6 °C)   Resp 24   Ht 4' 8\" (1.422 m)   Wt 93 kg (205 lb)   SpO2 94%   Breastfeeding No   BMI 45.96 kg/m²       PHYSICAL EXAM:   General:          Alert, frail, weak, pail, WN, cooperative, no distress, appears stated age. Head:               Normocephalic, without obvious abnormality, atraumatic. Eyes:               Conjunctivae clear and pale, anicteric sclerae. Pupils are equal  Nose:               Nares normal.  Throat:             Lips, mucosa, and tongue normal.    Neck:               Supple, symmetrical,  no adenopathy, thyroid: non tender  Lungs:             CTA bilaterally. Chest wall:      No tenderness or deformity. No Accessory muscle use. Heart:              Regular rate and rhythm,  no murmur, rub or gallop. Abdomen:        Soft, non-tender. Not distended. Bowel sounds normal. No masses  Extremities:     Atraumatic, No cyanosis. BLE edema with cellulitis, currently wrapped in dressing  Skin:                Texture, turgor normal. No rashes/lesions/jaundice  Lymph:            Cervical, supraclavicular normal.  Psych:             Good insight. Not depressed. Not and xious or agitated. Neurologic:      EOMs intact. No facial asymmetry. No aphasia or slurred speech. Normal                        strength, A/O X 3. LAB DATA REVIEWED:    Recent Results (from the past 24 hour(s))   CBC WITH AUTOMATED DIFF    Collection Time: 01/16/23  2:20 PM   Result Value Ref Range    WBC 5.1 3.6 - 11.0 K/uL    RBC 2.15 (L) 3.80 - 5.20 M/uL    HGB 6.0 (L) 11.5 - 16.0 g/dL    HCT 18.8 (L) 35.0 - 47.0 %    MCV 87.4 80.0 - 99.0 FL    MCH 27.9 26.0 - 34.0 PG    MCHC 31.9 30.0 - 36.5 g/dL    RDW 17.0 (H) 11.5 - 14.5 %    PLATELET 54 (L) 773 - 400 K/uL    MPV 11.6 8.9 - 12.9 FL    NRBC 0.0 0  WBC    ABSOLUTE NRBC 0.00 0.00 - 0.01 K/uL    NEUTROPHILS 85 (H) 32 - 75 %    LYMPHOCYTES 7 (L) 12 - 49 %    MONOCYTES 6 5 - 13 %    EOSINOPHILS 1 0 - 7 %    BASOPHILS 0 0 - 1 %    IMMATURE GRANULOCYTES 1 (H) 0.0 - 0.5 %    ABS. NEUTROPHILS 4.2 1.8 - 8.0 K/UL    ABS. LYMPHOCYTES 0.4 (L) 0.8 - 3.5 K/UL    ABS. MONOCYTES 0.3 0.0 - 1.0 K/UL    ABS.  EOSINOPHILS 0.1 0.0 - 0.4 K/UL    ABS. BASOPHILS 0.0 0.0 - 0.1 K/UL    ABS. IMM. GRANS. 0.1 (H) 0.00 - 0.04 K/UL    DF SMEAR SCANNED      RBC COMMENTS ANISOCYTOSIS  1+        WBC COMMENTS VACUOLATED POLYS     EKG, 12 LEAD, INITIAL    Collection Time: 01/16/23  2:25 PM   Result Value Ref Range    Ventricular Rate 76 BPM    Atrial Rate 76 BPM    P-R Interval 256 ms    QRS Duration 150 ms    Q-T Interval 440 ms    QTC Calculation (Bezet) 495 ms    Calculated P Axis -24 degrees    Calculated R Axis -43 degrees    Calculated T Axis 88 degrees    Diagnosis       Atrial-paced rhythm with prolonged AV conduction  Left axis deviation  Right bundle branch block  Left ventricular hypertrophy with repolarization abnormality  Cannot rule out Septal infarct (cited on or before 16-JAN-2023)  When compared with ECG of 06-OCT-2022 11:21,  Serial changes of Septal infarct present     METABOLIC PANEL, COMPREHENSIVE    Collection Time: 01/16/23  3:21 PM   Result Value Ref Range    Sodium 136 136 - 145 mmol/L    Potassium 3.9 3.5 - 5.1 mmol/L    Chloride 106 97 - 108 mmol/L    CO2 24 21 - 32 mmol/L    Anion gap 6 5 - 15 mmol/L    Glucose 195 (H) 65 - 100 mg/dL    BUN 59 (H) 6 - 20 MG/DL    Creatinine 1.51 (H) 0.55 - 1.02 MG/DL    BUN/Creatinine ratio 39 (H) 12 - 20      eGFR 35 (L) >60 ml/min/1.73m2    Calcium 8.9 8.5 - 10.1 MG/DL    Bilirubin, total 1.9 (H) 0.2 - 1.0 MG/DL    ALT (SGPT) 37 12 - 78 U/L    AST (SGOT) 38 (H) 15 - 37 U/L    Alk. phosphatase 252 (H) 45 - 117 U/L    Protein, total 6.9 6.4 - 8.2 g/dL    Albumin 2.8 (L) 3.5 - 5.0 g/dL    Globulin 4.1 (H) 2.0 - 4.0 g/dL    A-G Ratio 0.7 (L) 1.1 - 2.2     NT-PRO BNP    Collection Time: 01/16/23  3:21 PM   Result Value Ref Range    NT pro- (H) <450 PG/ML   RBC, ALLOCATE    Collection Time: 01/16/23  4:00 PM   Result Value Ref Range    HISTORY CHECKED?  Historical check performed    URINALYSIS W/ REFLEX CULTURE    Collection Time: 01/16/23  5:07 PM    Specimen: Urine   Result Value Ref Range Color YELLOW/STRAW      Appearance CLEAR CLEAR      Specific gravity 1.014      pH (UA) 6.5 5.0 - 8.0      Protein Negative NEG mg/dL    Glucose Negative NEG mg/dL    Ketone Negative NEG mg/dL    Bilirubin Negative NEG      Blood Negative NEG      Urobilinogen 2.0 (H) 0.2 - 1.0 EU/dL    Nitrites Negative NEG      Leukocyte Esterase MODERATE (A) NEG      UA:UC IF INDICATED URINE CULTURE ORDERED (A) CNI      WBC 20-50 0 - 4 /hpf    RBC 0-5 0 - 5 /hpf    Epithelial cells FEW FEW /lpf    Bacteria 1+ (A) NEG /hpf    Hyaline cast 0-2 0 - 2 /lpf   TYPE & SCREEN    Collection Time: 01/16/23  6:55 PM   Result Value Ref Range    Crossmatch Expiration 01/19/2023,2359     ABO/Rh(D) O POSITIVE     Antibody screen POS     Antibody ID NO ADDITIONAL ANTIBODIES DETECTED     TRUMAN Poly POS     TRUMAN IgG POS     TRUMAN C3b/C3d NEG     Comment       Previously identified Anti C, Jkb and Warm Autoantibodies    Unit number [id number]     Blood component type [facility]     Unit division 00     Status of unit ISSUED     Crossmatch result LEAST INCOMPATIBLE     Unit number [id number]     Blood component type [facility]     Unit division 00     Status of unit ALLOCATED     Crossmatch result LEAST INCOMPATIBLE     Unit number [id number]     Blood component type [facility]     Unit division 00     Status of unit ALLOCATED     ANTIGEN/ANTIBODY INFO C NEGATIVE,  THERON NEGATIVE,  Jk(B) NEGATIVE,       Crossmatch result LEAST INCOMPATIBLE    METABOLIC PANEL, BASIC    Collection Time: 01/17/23  3:19 AM   Result Value Ref Range    Sodium 137 136 - 145 mmol/L    Potassium 4.3 3.5 - 5.1 mmol/L    Chloride 107 97 - 108 mmol/L    CO2 24 21 - 32 mmol/L    Anion gap 6 5 - 15 mmol/L    Glucose 147 (H) 65 - 100 mg/dL    BUN 59 (H) 6 - 20 MG/DL    Creatinine 1.43 (H) 0.55 - 1.02 MG/DL    BUN/Creatinine ratio 41 (H) 12 - 20      eGFR 38 (L) >60 ml/min/1.73m2    Calcium 8.5 8.5 - 10.1 MG/DL   CBC W/O DIFF    Collection Time: 01/17/23  3:19 AM   Result Value Ref Range WBC 3.0 (L) 3.6 - 11.0 K/uL    RBC 1.62 (L) 3.80 - 5.20 M/uL    HGB 4.6 (LL) 11.5 - 16.0 g/dL    HCT 14.5 (LL) 35.0 - 47.0 %    MCV 89.5 80.0 - 99.0 FL    MCH 28.4 26.0 - 34.0 PG    MCHC 31.7 30.0 - 36.5 g/dL    RDW 16.6 (H) 11.5 - 14.5 %    PLATELET 29 (LL) 504 - 400 K/uL    MPV 10.1 8.9 - 12.9 FL    NRBC 0.0 0  WBC    ABSOLUTE NRBC 0.00 0.00 - 0.01 K/uL   RBC, ALLOCATE    Collection Time: 01/17/23  5:15 AM   Result Value Ref Range    HISTORY CHECKED? Historical check performed        IMAGING RESULTS:  I have personally reviewed the imaging reports      Total time spent with patient: 50 minutes ________________________________________________________________________  Care Plan discussed with:  Patient y   Family     SANTOS Lawrence              Consultant:       CT  1/17/2023:  ________________________________________________________________________    ___________________________________________________  Consulting Provider:  Fabian Gama NP      1/17/2023  10:53 AM

## 2023-01-17 NOTE — PROGRESS NOTES
This RN called blood bank to follow up on status of the unit of blood patient is waiting for. Informed she will need a special unit due to antibodies. Blood bank states her lab work is being processed at the [facility] now, and to expect delays. They will call when the unit is ready.

## 2023-01-17 NOTE — WOUND CARE
Wound Care consult for the bilateral leg skin lesions and left leg open wounds that were present on admission. Chart reviewed and patient assessed. Pt. Lives in a shelter home and she has her legs \"wrapped\" with [employer] / dressings a few times per week. She is usually ambulatory. Assessment today: the wraps were removed by nursing due to the tightness of the wraps that were placed pre-admission. The edema is minimal below the knees but prominent above the knees. The right leg is just dry, flaky skin. Heels are intact. Both legs: The left knee area (just below it) has open wounds most likely caused by the previous wraps. They are located across the upper part of the shin. WOUND POA CONDITIONS    Wound Pretibial Left Left upper shin wounds open (Active)   Wound Image   01/17/23 1550   Wound Etiology Venous 01/17/23 1550   Dressing Status New dressing applied 01/17/23 1550   Cleansed Cleansed with saline 01/17/23 1550   Dressing/Treatment Xeroform;ABD pad;Roll gauze 01/17/23 1550   Offloading for Diabetic Foot Ulcers Offloading ordered 01/17/23 1550   Dressing Change Due 01/18/23 01/17/23 1550   Wound Length (cm) 2.8 cm 01/17/23 1550   Wound Width (cm) 9 cm 01/17/23 1550   Wound Depth (cm) 0.1 cm 01/17/23 1550   Wound Surface Area (cm^2) 25.2 cm^2 01/17/23 1550   Wound Volume (cm^3) 2.52 cm^3 01/17/23 1550   Wound Assessment Erythema;Dry 01/17/23 1550   Drainage Amount Scant 01/17/23 1550   Drainage Description Sanguineous 01/17/23 1550   Wound Odor None 01/17/23 1550   Galina-Wound/Incision Assessment Dry/flaky 01/17/23 1550   Wound Thickness Description Partial thickness 01/17/23 1550       Treatment discussed with nursing at the bedside as she was doing the wound care. Orders written for the wound care to be done daily. We will not need to wrap her legs while she is here.  We will keep the legs treated with Lac-Hydrin lotion as ordered twice per day for one week and then decrease to daily for several weeks after that. Plan: follow up with the PCP who usually cares for her. Pt. To discuss the \"tightness\" of the wraps with the provider. Float the heels / elevate the heels from the bed surface.    Nina Sánchez RN, BSN, [employer]

## 2023-01-17 NOTE — PROGRESS NOTES
Transition of Care Plan:    RUR:33%    Disposition: Return to [facility] independent living vs snf    If SNF or IPR: Date FOC offered: 1/17/23    Date [address] received:1/17/23    Date authorization started with reference number:TBD    Date authorization received and expires:TBD    Accepting facility:TBD    Follow up appointments: To be done by facility    DME needed: None    Transportation at Discharge: Family vs BLS    [place] or means to access home:  N/A--Patient to go to facility          Medicare Letter: To be given prior to discharge    Is patient a  and connected with the [address]? No             If yes, was [employer] transfer form completed and [facility] notified? N/A    Caregiver Contact: Family    Discharge Caregiver contacted prior to discharge? Caregiver to be contacted prior to discharge. Care Conference needed?:    No        Reason for Readmission:   Patient came in complaining of fatigue and weakness. RUR Score/Risk Level:   33%      PCP: First and Last name:  Valdemar Jimenes     Name of Practice: [facility]     Are you a current patient: Yes/No: Yes     Approximate date of last visit: 12/22     Can you participate in a virtual visit with your PCP: Yes    Is a Care Conference indicated: No      Did you attend your follow up appointment (s): If not, why not: No patient states she ended back  in the hospital when her appt was scheduled          Resources/supports as identified by patient/family:   She has supportive family and caregivers       Top Challenges facing patient (as identified by patient/family and CM): Finances/Medication cost?   She does not have problems obtaining her medications. Transportation   Her caregivers transport her where she needs to go. Support system or lack thereof? She has supportive family and caregivers. Living arrangements? She lives at [facility] independent living with 24/7 caregivers. She does not use home oxygen or cpap. Self-care/ADLs/Cognition? Patient was independent prior to coming to the hospital. Her caregivers assists with cooking, cleaning and drives her to her destinations. Current Advanced Directive/Advance Care Plan:      Advance Care Planning     General Advance Care Planning (ACP) Conversation      Date of Conversation: 1/17/23  Conducted with: Patient with Decision Making Capacity    Healthcare Decision Maker:     Primary Decision Maker: Obdulio Altman - Shane - [phone]  Click here to complete [address] including selection of the Healthcare Decision Maker Relationship (ie \"Primary\")        Content/Action Overview:   DECLINED ACP conversation - will revisit periodically   Reviewed DNR/DNI and patient elects Full Code (Attempt Resuscitation)         Length of Voluntary ACP Conversation in minutes:  <16 minutes (Non-Billable)    Ghada Patino RN                    Plan for utilizing home health:   Patient has used [facility] home [facility] in the past.              Transition of Care Plan:    Based on readmission, the patient's previous Plan of Care   has been evaluated and/or modified. The current Transition of Care Plan is:  Therapy has worked with patient and is recommending snf and patient is in agreement. Choice given and her first choice is [facility] [employer], her second choice is [facility] and she will let me know of her third choice. Referral sent to [address] [place] [address] and will await their response. Rama Silva RN BSN CRM        [phone]

## 2023-01-17 NOTE — PROGRESS NOTES
Comprehensive Nutrition Assessment    Type and Reason for Visit: Initial, Wound    Nutrition Recommendations/Plan:   Continue diet advancement per GI  Please document % meals and supplements consumed in flowsheet I/O's under intake      Malnutrition Assessment:  Malnutrition Status:  No malnutrition (01/17/23 1438)        Nutrition Assessment:  Pt admitted with upper GI bleed. PMH: cirrhosis 2' BRADY, GAVE, GERD, HTN. Chart reviewed, case discussed during CCU rounds. GI follows, no plans for EGD. Started on clears this morning with plans to advance to regular consistency diet for dinner. Pt denies with wt loss or poor appetite. Her biggest concern is potential for discharge as she is ready to get out of the hospital.  Provided menu and explanation of ordering. Will monitor her PO intake. Wt Readings from Last 10 Encounters:   01/16/23 93 kg (205 lb)   01/11/23 96.4 kg (212 lb 8 oz)   01/05/23 96.7 kg (213 lb 3.2 oz)   01/04/23 97.7 kg (215 lb 4.8 oz)   12/28/22 93 kg (205 lb)   12/13/22 95.5 kg (210 lb 8 oz)   11/29/22 92.5 kg (204 lb)   11/13/22 94.3 kg (208 lb)   11/11/22 94.3 kg (208 lb)   11/04/22 94.5 kg (208 lb 6.4 oz)            Nutrition Related Findings:    Meds: lasix, lactulose, protonix, aldactone. BM 1/15 Wound Type: Stage II(buttocks), Venous stasis    Current Nutrition Intake & Therapies:        ADULT DIET Clear Liquid; NO RED FOODS OR DRINK PLEASE    Anthropometric Measures:  Height: 4' 8\" (142.2 cm)  Ideal Body Weight (IBW): 80 lbs (36 kg)     Current Body Wt:  93 kg (205 lb 0.4 oz), 256.3 % IBW.  Stated  Current BMI (kg/m2): 46                          BMI Category: Obese class 3 (BMI 40.0 or greater)    Estimated Daily Nutrient Needs:  Energy Requirements Based On: Formula  Weight Used for Energy Requirements: Current  Energy (kcal/day): MSJ 1550 (1274 x 1.2)  Weight Used for Protein Requirements: Current  Protein (g/day): 74-93g (0.8-1gPro/kg)  Method Used for Fluid Requirements: Other (comment)  Fluid (ml/day): per MD    Nutrition Diagnosis:   No nutrition diagnosis at this time     Nutrition Interventions:   Food and/or Nutrient Delivery: Continue current diet, Modify current diet  Nutrition Education/Counseling: No recommendations at this time  Coordination of Nutrition Care: Continue to monitor while inpatient, Interdisciplinary rounds       Goals:     Goals: PO intake 75% or greater, by next RD assessment       Nutrition Monitoring and Evaluation:   Behavioral-Environmental Outcomes: None identified  Food/Nutrient Intake Outcomes: Diet advancement/tolerance, Food and nutrient intake  Physical Signs/Symptoms Outcomes: Biochemical data, Nutrition focused physical findings, Skin, Weight, GI status    Discharge Planning:     Too soon to determine    Walter Ramirez RD, CNSC  Contact: ext [phone]

## 2023-01-17 NOTE — H&P
[facility] INITIAL ASSESSMENT. Name: Hreo Jones   : 1945   MRN: [id number]   Date: 2023        Chief Complaint   Patient presents with    Fatigue     Pt presents to ED via EMS with complaints of generalized fatigue and weakness. Pt has a hx of low HGB and had a transfusion on . HPI:   Transferred to ICU for observation of low hg      Active Problems Being Managed:     Anemia  Cirrhosis  ? GI bleeding    Assessment/Plan:     1) anemia : suspect is due to sequestration from splenomegaly as she is pancytopenic  R/o hemolysis  Obtain LDH and reticulocyte count  Transfuse PRBC today    2) cirrhosis : continue rifaximin and aldactone    3) GI bleed? From GAVE   On protonix  GI is following  Clear liquid diet     DVT prophylaxis: scd's  SUP: protonix  Code status: partial    Next of kin: Jacob Kearns (Son)   [phone] [facility])    I personally spent 40 minutes of critical care time. This is time spent at this critically ill patient's bedside actively involved in patient care as well as the coordination of care and discussions with the patient's family. This does not include any procedural time which has been billed separately.         Review of systems:     ROS   Denies any pain in abdomen  Denies any nausea or vomiting    Objective:     Vital Signs:  Visit Vitals  BP (!) 126/43   Pulse 63   Temp 98.1 °F (36.7 °C)   Resp 17   Ht 4' 8\" (1.422 m)   Wt 93 kg (205 lb)   SpO2 97%   Breastfeeding No   BMI 45.96 kg/m²      O2 Device: None (Room air) Temp (24hrs), Av.8 °F (37.1 °C), Min:97.9 °F (36.6 °C), Max:100 °F (37.8 °C)           Intake/Output:     Intake/Output Summary (Last 24 hours) at 2023 1128  Last data filed at 2023 0800  Gross per 24 hour   Intake 640 ml   Output --   Net 640 ml       Physical Exam:  Physical Exam  Pallor+ve  HEENT\" dry mucus membranes  Heart; s1,s2  Lungs: clear  Abd: soft  Ext: no edema  Cns: no focal deficit  Past Medical History:        has a past medical history of Abnormal nuclear stress test (10/04/2021), Anemia, Angina at rest [facility]) (10/04/2021), Arthritis, Bundle branch block, Endocrine disease, Fracture, Fracture, Fracture, GERD (gastroesophageal reflux disease), High cholesterol, Hypertension, Hypoglycemia, Liver disease, Nausea & vomiting, Osteoporosis, Other ill-defined conditions(799.89), S/P cardiac cath (10/04/2021), Spinal stenosis, and Thyroid disease. Past Surgical History:      has a past surgical history that includes hx cholecystectomy; pr unlisted procedure abdomen peritoneum & omentum; hx urological; colorectal scrn; hi risk ind (2/11/2015); colonoscopy,remv lesn,snare (2/11/2015); upper gi endoscopy,biopsy (11/17/2015); hx orthopaedic (Left); hx hysterectomy; colonoscopy,remv lesn,snare (7/13/2021); colonoscpy,flex,w/dir submuc inject (7/13/2021); colonoscopy (N/A, 7/13/2021); colonoscopy (N/A, 8/8/2022); small bowel endoscopy,biopsy (11/16/2022); sb endoscopy,w/control,bleeding (11/16/2022); and small bowel endoscopy,ablate lesn (11/16/2022). Home Medications:     Prior to Admission medications    Medication Sig Start Date End Date Taking? Authorizing Provider   metOLazone (ZAROXOLYN) 2.5 mg tablet TAKE 1 TABLET BY MOUTH EVERY OTHER DAY 1/4/23   Provider, Historical   amLODIPine (NORVASC) 10 mg tablet Take 10 mg by mouth daily. 1/4/23   Provider, Historical   losartan (COZAAR) 50 mg tablet  1/4/23   Provider, Historical   alendronate (FOSAMAX) 70 mg tablet Take 1 Tablet by mouth every seven (7) days. 1/5/23   José Miguel Monge MD   lactulose (KRISTALOSE) 10 gram packet Take 1 Packet by mouth daily (with breakfast) for 30 days. 12/31/22 1/30/23  Arash Garza MD   allopurinoL (ZYLOPRIM) 100 mg tablet Take 100 mg by mouth daily.     Provider, Historical   loratadine (CLARITIN) 10 mg tablet TAKE 1 TABLET BY MOUTH EVERY DAY 12/23/22   Ronna Philippe MD   atorvastatin (LIPITOR) 20 mg tablet TAKE 1 TABLET BY MOUTH AT BEDTIME 12/19/22   Dottie Mandel MD   ezetimibe (ZETIA) 10 mg tablet TAKE 1 TABLET BY MOUTH EVERY DAY 12/19/22   Dottie Mandel MD   benzocaine-menthoL (CHLORASEPTIC MAX) 15-10 mg lozg lozenge Take 1 Lozenge by mouth as needed for Sore throat. 11/18/22   Danni Trinh NP   furosemide (LASIX) 80 mg tablet Take 0.5 Tablets by mouth daily. 11/18/22   Danni Trinh NP   levalbuterol tartrate [facility]) 45 mcg/actuation inhaler Take 2 Puffs by inhalation every six (6) hours as needed for Wheezing or Shortness of Breath. Provider, Historical   potassium chloride SA (MICRO-K) 10 mEq capsule Take 2 Capsules by mouth daily. 9/24/22   Dottie Mandel MD   pantoprazole (PROTONIX) 40 mg tablet Take 1 Tablet by mouth Before breakfast and dinner. 8/17/22   Dottie Mandel MD   nystatin (MYCOSTATIN) powder Apply  to affected area two (2) times a day. 8/8/22   Joanne Rees MD   fluticasone propionate (FLONASE) 50 mcg/actuation nasal spray 2 Sprays by Both Nostrils route daily. Administer to right and left nostril. 6/24/22   Dottie Mandel MD   levothyroxine (SYNTHROID) 150 mcg tablet Take 1 Tablet by mouth Daily (before breakfast). 6/15/22   Dottie Mandel MD   icosapent ethyL (VASCEPA) 1 gram capsule Take 2 Capsules by mouth two (2) times daily (with meals). 2/14/22   Dottie Mandel MD   lidocaine (LIDODERM) 5 % Apply patch to the affected area for 12 hours a day and remove for 12 hours a day. 12/7/21   Dottie Mandel MD   polyethylene glycol [facility]) 17 gram/dose powder Take 17 g by mouth daily. 4/6/20   Dottie Mandel MD   ketoconazole (NIZORAL) 2 % topical cream Apply  to affected area daily as needed. 8/23/19   Provider, Historical   Biotin 2,500 mcg cap Take  by mouth. Provider, Historical   L GASSERI/B BIFIDUM/B LONGUM ([facility] [employer] [facility]) Take 1 Tab by mouth daily. Provider, Historical   vitamin E (AQUA GEMS) 400 unit capsule Take 800 Units by mouth two (2) times a day.     Provider, Historical   cholecalciferol, vitamin D3, 50 mcg (2,000 unit) tab Take 1 Tab by mouth daily. Other, MD Janette   MULTIVITAMIN WITH MINERALS (ONE-A-DAY 50 PLUS PO) Take 1 Tab by mouth daily. Provider, Historical         Allergies/Social/Family History: Allergies   Allergen Reactions    Gabapentin Other (comments)     Suicidal ideation    Metoprolol Rash    Celebrex [Celecoxib] Hives    Eliquis [Apixaban] Other (comments)     Caused excessive anemia    Pcn [Penicillins] Unknown (comments)     Can't remember, was a long time    Sulfa (Sulfonamide Antibiotics) Hives      Social History     Tobacco Use    Smoking status: Never    Smokeless tobacco: Never   Substance Use Topics    Alcohol use: No      Family History   Problem Relation Age of Onset    Diabetes Mother     Heart Disease Mother     Emphysema Father     No Known Problems Brother     Stroke Maternal Grandmother     No Known Problems Maternal Grandfather     No Known Problems Paternal Grandmother     No Known Problems Paternal Grandfather          LABS AND  DATA:   Reviewed      Peak airway pressure:      Minute ventilation:        CRITICAL CARE CONSULTANT NOTE  I had a face to face encounter with the patient, reviewed and interpreted patient data including clinical events, labs, images, vital signs, I/O's, and examined patient. I have discussed the case and the plan and management of the patient's care with the consulting services, the bedside nurses and the respiratory therapist.      NOTE OF PERSONAL INVOLVEMENT IN CARE   This patient has a high probability of imminent, clinically significant deterioration, which requires the highest level of preparedness to intervene urgently. I participated in the decision-making and personally managed or directed the management of the following life and organ supporting interventions that required my frequent assessment to treat or prevent imminent deterioration.         Shahid Abdul MD Pulmonary/CCM  [facility]  [phone]  1/17/2023

## 2023-01-17 NOTE — PROGRESS NOTES
@5672 received critical lab results. Hgb 4.6 from 6, HCT 14.5, PLT 29. Perfect Serve provider with results. Blood bank still had no blood available for this patient's antibodies. VS WNL, with exception of mild temp 100F.     @0640 called report to CCU pod 3. Pt being transferred to higher level of care. Belongings packed, transported with rapid response RN and PCA via bed.

## 2023-01-17 NOTE — PROGRESS NOTES
Notified by RN of critical lab of hemoglobin 4.6 down from 6 yesterday, hematocrit 14.5, and PLT of 29. 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 One unit was ordered 1/16 and is in the process of being delivered by [employer].      VS: /45 RR 18 HR 66 spO2 94% Temp 100    Interventions: ordered 2 more units of pRBCs to be given and repeat H/H at 9:15 or 6 hours after, vitals as per unit protocol, transfer to ICU for active GIB with history of GAVE with T&S with multiple antibodies, hgb 6-->4.6 today, may require unmatched blood with high risk for reaction, blood >4 hours away, currently hemodynamically stable    D/w Dr. Tanya Hilton and ICU

## 2023-01-17 NOTE — PROGRESS NOTES
0730  - Bedside report received from [facility]. Pt A & O x 4, on room air, BLE wounds POA. Stage 2 noted on right buttock. 0900 - Pt only has 22 PIV. Unable to get PIV. Call placed to picc team. CCU nurses attempting to get line with ultrasound. 1000 - Unable to get new IV. 1050- 1 unit prbc started in 22G. Ostreotide held for transfusion. 1200 - Reassessment documented. See flow sheets    1400 - Wound care to see pt.    1600 - Repeat Hgb 5.7, plt 34, HCT 17.8. Dr. Vicki Whelan notified. No new orders at this time. Reassessment documented. See flow sheets    1800 - BLE  wounds cleansed with soap and water and treated with hydratin lotion per order. Bedside and Verbal shift change report given to SANTOS Akbar (oncoming nurse) by Reggie Julian RN (offgoing nurse). Report included the following information SBAR, Kardex, Intake/Output, MAR, Recent Results, and Med Rec Status.

## 2023-01-17 NOTE — PROGRESS NOTES
6783:   TRANSFER - IN REPORT:    Verbal report received from Marcia(name) on Kaylin Graham  being received from surg tele(unit) for change in patient condition(low hemoglobin)      Report consisted of patients Situation, Background, Assessment and   Recommendations(SBAR). Information from the following report(s) SBAR, Kardex, Intake/Output, MAR, Recent Results, and Cardiac Rhythm v paced  was reviewed with the receiving nurse. Opportunity for questions and clarification was provided. Assessment completed upon patients arrival to unit and care assumed. 0700: Bedside shift change report given to Conor Mejia (oncoming nurse) by Jennifer Vallecillo (offgoing nurse). Report included the following information SBAR, Kardex, Intake/Output, MAR, Recent Results, and Cardiac Rhythm v paced .

## 2023-01-18 ENCOUNTER — HOSPITAL ENCOUNTER (OUTPATIENT)
Dept: INFUSION THERAPY | Age: 78
End: 2023-01-18

## 2023-01-18 PROBLEM — K92.2 GI BLEED: Status: ACTIVE | Noted: 2023-01-01

## 2023-01-18 LAB
ABO + RH BLD: NORMAL
ALBUMIN SERPL-MCNC: 2.4 G/DL (ref 3.5–5)
ALBUMIN/GLOB SERPL: 0.6 (ref 1.1–2.2)
ALP SERPL-CCNC: 218 U/L (ref 45–117)
ALT SERPL-CCNC: 34 U/L (ref 12–78)
ANION GAP SERPL CALC-SCNC: 6 MMOL/L (ref 5–15)
ANTI-COMPLEMENT (C3B,C3D): NORMAL
ANTIGENS PRESENT RBC DONR: NORMAL
AST SERPL-CCNC: 29 U/L (ref 15–37)
BILIRUB SERPL-MCNC: 1.6 MG/DL (ref 0.2–1)
BLD PROD TYP BPU: NORMAL
BLOOD BANK CMNT PATIENT-IMP: NORMAL
BLOOD GROUP ANTIBODIES SERPL: NORMAL
BLOOD GROUP ANTIBODIES SERPL: NORMAL
BPU ID: NORMAL
BUN SERPL-MCNC: 53 MG/DL (ref 6–20)
BUN/CREAT SERPL: 39 (ref 12–20)
CALCIUM SERPL-MCNC: 8.7 MG/DL (ref 8.5–10.1)
CHLORIDE SERPL-SCNC: 110 MMOL/L (ref 97–108)
CO2 SERPL-SCNC: 24 MMOL/L (ref 21–32)
CREAT SERPL-MCNC: 1.36 MG/DL (ref 0.55–1.02)
CROSSMATCH RESULT,%XM: NORMAL
DAT IGG-SP REAG RBC QL: NORMAL
DAT POLY-SP REAG RBC QL: NORMAL
ERYTHROCYTE [DISTWIDTH] IN BLOOD BY AUTOMATED COUNT: 18.4 % (ref 11.5–14.5)
ERYTHROCYTE [DISTWIDTH] IN BLOOD BY AUTOMATED COUNT: 18.7 % (ref 11.5–14.5)
GLOBULIN SER CALC-MCNC: 3.8 G/DL (ref 2–4)
GLUCOSE SERPL-MCNC: 152 MG/DL (ref 65–100)
HCT VFR BLD AUTO: 22.9 % (ref 35–47)
HCT VFR BLD AUTO: 24.8 % (ref 35–47)
HGB BLD-MCNC: 7.5 G/DL (ref 11.5–16)
HGB BLD-MCNC: 8.1 G/DL (ref 11.5–16)
MAGNESIUM SERPL-MCNC: 2.8 MG/DL (ref 1.6–2.4)
MCH RBC QN AUTO: 27.3 PG (ref 26–34)
MCH RBC QN AUTO: 27.5 PG (ref 26–34)
MCHC RBC AUTO-ENTMCNC: 32.7 G/DL (ref 30–36.5)
MCHC RBC AUTO-ENTMCNC: 32.8 G/DL (ref 30–36.5)
MCV RBC AUTO: 83.3 FL (ref 80–99)
MCV RBC AUTO: 84.1 FL (ref 80–99)
NRBC # BLD: 0 K/UL (ref 0–0.01)
NRBC # BLD: 0 K/UL (ref 0–0.01)
NRBC BLD-RTO: 0 PER 100 WBC
NRBC BLD-RTO: 0 PER 100 WBC
PHOSPHATE SERPL-MCNC: 4.1 MG/DL (ref 2.6–4.7)
PLATELET # BLD AUTO: 32 K/UL (ref 150–400)
PLATELET # BLD AUTO: 36 K/UL (ref 150–400)
PMV BLD AUTO: 10.1 FL (ref 8.9–12.9)
PMV BLD AUTO: 10.7 FL (ref 8.9–12.9)
POTASSIUM SERPL-SCNC: 4.2 MMOL/L (ref 3.5–5.1)
PROT SERPL-MCNC: 6.2 G/DL (ref 6.4–8.2)
RBC # BLD AUTO: 2.75 M/UL (ref 3.8–5.2)
RBC # BLD AUTO: 2.95 M/UL (ref 3.8–5.2)
SODIUM SERPL-SCNC: 140 MMOL/L (ref 136–145)
SPECIMEN EXP DATE BLD: NORMAL
STATUS OF UNIT,%ST: NORMAL
UNIT DIVISION, %UDIV: 0
WBC # BLD AUTO: 3.3 K/UL (ref 3.6–11)
WBC # BLD AUTO: 3.6 K/UL (ref 3.6–11)

## 2023-01-18 PROCEDURE — 74011000258 HC RX REV CODE- 258: Performed by: INTERNAL MEDICINE

## 2023-01-18 PROCEDURE — C9113 INJ PANTOPRAZOLE SODIUM, VIA: HCPCS | Performed by: STUDENT IN AN ORGANIZED HEALTH CARE EDUCATION/TRAINING PROGRAM

## 2023-01-18 PROCEDURE — 74011250636 HC RX REV CODE- 250/636: Performed by: INTERNAL MEDICINE

## 2023-01-18 PROCEDURE — 83735 ASSAY OF MAGNESIUM: CPT

## 2023-01-18 PROCEDURE — 80053 COMPREHEN METABOLIC PANEL: CPT

## 2023-01-18 PROCEDURE — 74011250637 HC RX REV CODE- 250/637: Performed by: INTERNAL MEDICINE

## 2023-01-18 PROCEDURE — 84100 ASSAY OF PHOSPHORUS: CPT

## 2023-01-18 PROCEDURE — 74011250637 HC RX REV CODE- 250/637: Performed by: STUDENT IN AN ORGANIZED HEALTH CARE EDUCATION/TRAINING PROGRAM

## 2023-01-18 PROCEDURE — 74011250636 HC RX REV CODE- 250/636: Performed by: STUDENT IN AN ORGANIZED HEALTH CARE EDUCATION/TRAINING PROGRAM

## 2023-01-18 PROCEDURE — 85027 COMPLETE CBC AUTOMATED: CPT

## 2023-01-18 PROCEDURE — 74011000250 HC RX REV CODE- 250: Performed by: STUDENT IN AN ORGANIZED HEALTH CARE EDUCATION/TRAINING PROGRAM

## 2023-01-18 PROCEDURE — 65270000046 HC RM TELEMETRY

## 2023-01-18 PROCEDURE — 36415 COLL VENOUS BLD VENIPUNCTURE: CPT

## 2023-01-18 RX ORDER — SODIUM CHLORIDE 9 MG/ML
5-250 INJECTION, SOLUTION INTRAVENOUS AS NEEDED
OUTPATIENT
Start: 2023-01-25

## 2023-01-18 RX ORDER — SODIUM CHLORIDE 9 MG/ML
5-40 INJECTION INTRAVENOUS AS NEEDED
OUTPATIENT
Start: 2023-01-25

## 2023-01-18 RX ORDER — HYDRALAZINE HYDROCHLORIDE 20 MG/ML
10 INJECTION INTRAMUSCULAR; INTRAVENOUS
Status: DISCONTINUED | OUTPATIENT
Start: 2023-01-18 | End: 2023-01-31 | Stop reason: HOSPADM

## 2023-01-18 RX ORDER — HEPARIN 100 UNIT/ML
500 SYRINGE INTRAVENOUS AS NEEDED
Start: 2023-01-25

## 2023-01-18 RX ORDER — SODIUM CHLORIDE 0.9 % (FLUSH) 0.9 %
5-40 SYRINGE (ML) INJECTION AS NEEDED
OUTPATIENT
Start: 2023-01-25

## 2023-01-18 RX ADMIN — CASTOR OIL AND BALSAM, PERU: 788; 87 OINTMENT TOPICAL at 20:49

## 2023-01-18 RX ADMIN — LACTULOSE 30 ML: 20 SOLUTION ORAL at 08:55

## 2023-01-18 RX ADMIN — FUROSEMIDE 20 MG: 40 TABLET ORAL at 08:52

## 2023-01-18 RX ADMIN — RIFAXIMIN 550 MG: 550 TABLET ORAL at 17:05

## 2023-01-18 RX ADMIN — SPIRONOLACTONE 50 MG: 25 TABLET ORAL at 08:52

## 2023-01-18 RX ADMIN — RIFAXIMIN 550 MG: 550 TABLET ORAL at 08:52

## 2023-01-18 RX ADMIN — Medication: at 17:05

## 2023-01-18 RX ADMIN — LACTULOSE 30 ML: 20 SOLUTION ORAL at 17:05

## 2023-01-18 RX ADMIN — CASTOR OIL AND BALSAM, PERU: 788; 87 OINTMENT TOPICAL at 08:54

## 2023-01-18 RX ADMIN — SODIUM CHLORIDE 1 G: 900 INJECTION INTRAVENOUS at 12:56

## 2023-01-18 RX ADMIN — LEVOTHYROXINE SODIUM 150 MCG: 0.15 TABLET ORAL at 06:45

## 2023-01-18 RX ADMIN — Medication 1 AMPULE: at 20:48

## 2023-01-18 RX ADMIN — SODIUM CHLORIDE, PRESERVATIVE FREE 10 ML: 5 INJECTION INTRAVENOUS at 22:00

## 2023-01-18 RX ADMIN — SODIUM CHLORIDE 40 MG: 9 INJECTION, SOLUTION INTRAMUSCULAR; INTRAVENOUS; SUBCUTANEOUS at 20:49

## 2023-01-18 RX ADMIN — Medication: at 08:54

## 2023-01-18 RX ADMIN — SODIUM CHLORIDE, PRESERVATIVE FREE 10 ML: 5 INJECTION INTRAVENOUS at 14:00

## 2023-01-18 RX ADMIN — SODIUM CHLORIDE 40 MG: 9 INJECTION, SOLUTION INTRAMUSCULAR; INTRAVENOUS; SUBCUTANEOUS at 08:55

## 2023-01-18 RX ADMIN — SODIUM CHLORIDE, PRESERVATIVE FREE 10 ML: 5 INJECTION INTRAVENOUS at 06:45

## 2023-01-18 RX ADMIN — Medication 1 AMPULE: at 08:54

## 2023-01-18 NOTE — PROGRESS NOTES
Hospitalist Progress Note    NAME: Quentin Silva   :  1945   MRN:  [id number]       Assessment / Plan:    Acute on chronic anemia due to GI bleed  GAVE vs AVMS  Thrombocytopenia  Hx of cirrhosis secondary to BRADY, compensated, follows with [facility] hepatology  S/p 3 units prbc. Hgb currently stable  Octreotide discontinue. Cont' with PPI BID, IV rocephin for SBP prophylaxis  Con't lasix, aldactone, xifaxan, lactulose  for liver cirrhosis managemnt  Appreciate GI following, not recommending TIPS at this time. FU MRI/MRCP for pancreatic cystic lesion - last scan 3/2021  Trend labs      Estimated discharge date:   Barriers:    Code status:   Prophylaxis:   Recommended Disposition:      Subjective:     Chief Complaint / Reason for Physician Visit  NAD. No new complaint today. Caregiver was at bedside. Discussed with RN events overnight. Review of Systems:  Symptom Y/N Comments  Symptom Y/N Comments   Fever/Chills    Chest Pain     Poor Appetite    Edema     Cough    Abdominal Pain     Sputum    Joint Pain     SOB/HENLEY    Pruritis/Rash     Nausea/vomit    Tolerating PT/OT     Diarrhea    Tolerating Diet     Constipation    Other       Could NOT obtain due to:      Objective:     VITALS:   Last 24hrs VS reviewed since prior progress note.  Most recent are:  Patient Vitals for the past 24 hrs:   Temp Pulse Resp BP SpO2   23 1200 98.1 °F (36.7 °C) 65 19 (!) 152/67 99 %   23 1000 -- 66 22 126/76 98 %   23 0900 98.1 °F (36.7 °C) 64 21 (!) 159/62 99 %   23 0800 98.1 °F (36.7 °C) 60 14 -- 96 %   23 0700 -- 73 13 -- 98 %   23 0600 -- 60 15 (!) 122/48 98 %   23 0500 -- 60 17 (!) 129/55 98 %   23 0400 98.3 °F (36.8 °C) 62 17 (!) 113/51 97 %   23 0300 -- 60 15 (!) 130/48 98 %   23 0200 -- 60 17 (!) 121/50 98 %   23 0100 -- 60 16 (!) 116/50 97 %   23 0045 -- 60 16 (!) 115/49 97 %   23 0030 -- 60 15 (!) 126/50 98 %   23 0015 -- 60 11 (!) 127/51 97 %   01/18/23 0000 98.4 °F (36.9 °C) 60 16 (!) 119/51 98 %   01/17/23 2330 98.1 °F (36.7 °C) 60 16 (!) 121/48 93 %   01/17/23 2315 98.1 °F (36.7 °C) 60 20 (!) 123/50 95 %   01/17/23 2300 98.3 °F (36.8 °C) 63 18 (!) 123/51 94 %   01/17/23 2259 98.3 °F (36.8 °C) 65 17 -- 95 %   01/17/23 2245 -- 60 16 (!) 113/50 93 %   01/17/23 2116 98.6 °F (37 °C) 60 16 (!) 117/47 94 %   01/17/23 2100 -- 60 15 -- 94 %   01/17/23 2000 98.3 °F (36.8 °C) 60 19 -- 96 %   01/17/23 1800 98.4 °F (36.9 °C) 68 20 (!) 123/92 100 %   01/17/23 1700 98.4 °F (36.9 °C) 67 18 109/68 99 %   01/17/23 1645 98.4 °F (36.9 °C) 62 20 (!) 125/47 98 %       Intake/Output Summary (Last 24 hours) at 1/18/2023 1554  Last data filed at 1/18/2023 0900  Gross per 24 hour   Intake 1434.58 ml   Output 550 ml   Net 884.58 ml        I had a face to face encounter and independently examined this patient on 1/18/2023, as outlined below:  PHYSICAL EXAM:  General: WD, WN. Alert, cooperative, no acute distress    EENT:  EOMI. Anicteric sclerae. MMM  Resp:  CTA bilaterally, no wheezing or rales. No accessory muscle use  CV:  Regular  rhythm,  No edema  GI:  Soft, Non distended, Non tender. +Bowel sounds  Neurologic:  Alert and oriented X 3, normal speech  Psych:   Good insight. Not anxious nor agitated  Skin:  No rashes. No jaundice    Reviewed most current lab test results and cultures  YES  Reviewed most current radiology test results   YES  Review and summation of old records today    NO  Reviewed patient's current orders and MAR    YES  PMH/SH reviewed - no change compared to H&P  ________________________________________________________________________  Care Plan discussed with:    Comments   Patient x    Family      RN x    Care Manager     Consultant                        Multidiciplinary team rounds were held today with , nursing, pharmacist and clinical coordinator.   Patient's plan of care was discussed; medications were reviewed and discharge planning was addressed. ________________________________________________________________________  Total NON critical care TIME:  35   Minutes    Total CRITICAL CARE TIME Spent:   Minutes non procedure based      Comments   >50% of visit spent in counseling and coordination of care     ________________________________________________________________________  Adrian De La Cruz MD     Procedures: see electronic medical records for all procedures/Xrays and details which were not copied into this note but were reviewed prior to creation of Plan. LABS:  I reviewed today's most current labs and imaging studies.   Pertinent labs include:  Recent Labs     01/18/23  1004 01/18/23  0441 01/17/23  2202   WBC 3.6 3.3* 3.3*   HGB 8.1* 7.5* 6.5*   HCT 24.8* 22.9* 20.3*   PLT 36* 32* 33*     Recent Labs     01/18/23  0441 01/17/23  1608 01/17/23  0319 01/16/23  1521     --  137 136   K 4.2  --  4.3 3.9   *  --  107 106   CO2 24  --  24 24   *  --  147* 195*   BUN 53*  --  59* 59*   CREA 1.36*  --  1.43* 1.51*   CA 8.7  --  8.5 8.9   MG 2.8*  --   --   --    PHOS 4.1  --   --   --    ALB 2.4*  --   --  2.8*   TBILI 1.6*  --   --  1.9*   ALT 34  --   --  37   INR  --  1.1  --   --        Signed: Adrian De La Cruz MD

## 2023-01-18 NOTE — PROGRESS NOTES
MRI PENDING    Completion of MRI Screening Sheet    Fax to [phone] when completed    Please call [phone]  When this is done    Thank You

## 2023-01-18 NOTE — PROGRESS NOTES
0730 - Bedside report received from [facility], RN. Pt A & O x 4, room air. Voiding via purewic, vs stable, BLE wounds noted, dressing intact. Assessment completed. See flow sheets for details. 0900 - Wound care completed per order. 1200 - Reassessment completed. See flow sheets for details. 1600 - MRI checklist completed and sent to MRI.      1800 - Perfect serve sent to Dr. Ez Wood about pt's continued HTN.    1900 - PRN hydralazine added for SBP > 160      Bedside and Verbal shift change report given to Logan Chao RN,  (oncoming nurse) by Akin Reinoso RN (offgoing nurse). Report included the following information SBAR, Kardex, Intake/Output, MAR, Recent Results, and Med Rec Status.

## 2023-01-18 NOTE — PROGRESS NOTES
Physician Progress Note      Lety Pavon  CSN #:                  [id number]  :                       1945  ADMIT DATE:       2023 1:57 PM  [address] [facility] DATE:  RESPONDING  PROVIDER #:        Kasandra Mc MD          QUERY TEXT:    77yoF pt admitted with GI bleed? and has anemia documented. 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 Thank you,  Que Whaley RN, CDI  Options provided:  -- Anemia due to acute blood loss  -- Anemia due to chronic blood loss  -- Anemia due to acute on chronic blood loss  -- Anemia due to iron deficiency  -- Other - I will add my own diagnosis  -- Disagree - Not applicable / Not valid  -- Disagree - Clinically unable to determine / Unknown  -- Refer to Clinical Documentation Reviewer    PROVIDER RESPONSE TEXT:    This patient has acute on chronic blood loss anemia.     Query created by: Herlinda Masterson on 2023 4:39 PM      Electronically signed by:  Kasandra Mc MD 2023 9:36 AM

## 2023-01-18 NOTE — PROGRESS NOTES
1930: Bedside shift change report given to SANTOS Akbar (oncoming nurse) by Lindsay Sanders RN (offgoing nurse). Report included the following information SBAR, Kardex, Intake/Output, MAR, Recent Results, and Cardiac Rhythm A Paced . 2230: Repeat hemoglobin was 6.5 and platelets were 33. Notified NP DAVID Parada, will transfuse last unit of PRBCs that were ordered. 2259: Began transfusing one unit PRBCs. 0000: Reassessment complete, see flowsheet for details. No changes to previous assessment. Patient O2 sat dropping while sleeping, placed on 2 liters nasal cannula. 0227: Blood transfusion complete. 0400: Reassessment complete, see flowsheet for details. No changes to previous assessment. 0700: Bedside shift change report given to Lindsay Sanders RN (oncoming nurse) by Gil Clark RN (offgoing nurse). Report included the following information SBAR, Kardex, Intake/Output, MAR, Recent Results, and Cardiac Rhythm A Paced .

## 2023-01-18 NOTE — PROGRESS NOTES
GI PROGRESS NOTE  Molly Han, CATY  [phone] NP in-hospital cell phone M-F until 4:30  After 5pm or on weekends, please call  for physician on call    NAME:Char Wilson :1945 SGZ:[id number]   ATTG: Dr Shanna Ignacio PCP: Talha Hoang MD Date/Time:  2023 12:28 PM   Primary GI: Dr. Gloriajean Aschoff    Assessment:     Acute on chronic anemia   GAVE and AVMS  Thrombocytopenia  23 : Hgb 6.0, MCV 87.4, Plt 54  23 : Hgb 4.6, plt 29  23 : Hgb 8.1 s/p 3 units  Denies melena, hematochezia, or changes in bowel habits  Feels better today, less pale. 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 This has changed in appearance since . MRI/MRCP abd : 3/12/21 : Multiple pancreatic cystic lesions with the dominant lesion in the head and uncinate process measuring up to 4.5 cm which are all most likely reflective of side branch intraductal papillary mucinous tumor. Follow-up in 6-12 months recommended. Pacemaker - placed 2022    GI History:  2023 : EGD : -- Antral mucosa has improved compared to previous EGD's, and the focal GAVE vs portal hypertensive gastropathy is minimal now, but again treated with APC today given it's favorable response to APC these past several months. It's interesting that despite improvement in the antrum, recurrent CARLINE continues.  She has had mulitiple EGD's, push enteroscopies and a colonoscopy and pill camera, all since August.  -- no other AVMs nor active bleeding, nor clear cause of anemia identified (distal portion of duodenum visualized)  -- small/low grade varices now seen in distal esophagus  11/16/2022 EGD: normal esophagus, edematous appearance of stomach, antrum with oozing foci and areas of ulceration and inflammation. Bx portal hypertensive gastropathy vs GAVE. 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 Ulcerated area in 3rd portion of duodenum s/p hemoclip  10/11/2022- pill cam showing AVM at 9 minutes and active oozing blood at 1 hour and 34 minutes, unclear if AVM or other lesion  EGD 10/10/22- atypical GAVE in antrum, treated with APC extensively  -- also, pill camera deployed in duodenum, given degree of recurrent anemia  08/2022- Colonoscopy with normal colonic mucosa throughout, internal hemorrhoids   08/2022- EGD showing portal hypertensive gastropathy     Plan:     No plan for EGD at this time as last was on 12/30/22 and unlikely to be helpful. Suspect she has persistent GAVE and numerous AVMs that are oozing and bleeding causing drop in hgb 2/2 low plt level. Advance diet as tolerated. Serial H/H - transfuse for hgb < 7.0 - pt has 3 units ready per nursing  Continue BID PPI  Continue lasix, aldactone, xifaxan, lactulose for cirrhosis management. Pt needs FU MRI/MRCP for pancreatic cystic lesion - last scan 3/2021. Pt  now has pacemaker - please check to see if compatible and able to scan for FU. Thanks. Dr Mani Nuñez  spoke to Dr Jennifer Eller at [place] [address] yesterday and he is not eager to rush to a TIPS and he would like to see patient. I have personally called [facility] and spoke to [place] at [phone]. She has given me a FU appointment for the patient. January 31, 2023 at [address] [facility] - pt needs to arrive at [address] [place] [address]. Location : [address] [place] [address], 3rd Floor. Plan discussed with Dr Mani Nuñez. Nothing further to add. Will sign off. Subjective:   Discussed with RN events overnight. No bleeding. Feeling better with blood. Alert and talking to  during my visit.      Complaint Y/N Description   Abdominal Pain n    Hematemesis n Hematochezia n    Melena n    Constipation n    Diarrhea n    Dyspepsia n    Dysphagia n    Jaundiced n    Nausea/vomiting n      Review of Systems:  Symptom Y/N Comments  Symptom Y/N Comments   Fever/Chills    Chest Pain     Cough    Headaches     Sputum    Joint Pain     SOB/HENLEY    Pruritis/Rash     Tolerating Diet y   Other       Could NOT obtain due to:      Objective:   VITALS:   Last 24hrs VS reviewed since prior progress note. Most recent are:  Visit Vitals  BP (!) 152/67 (BP 1 Location: Left arm, BP Patient Position: At rest)   Pulse 65   Temp 98.1 °F (36.7 °C)   Resp 19   Ht 4' 8\" (1.422 m)   Wt 93 kg (205 lb)   SpO2 99%   Breastfeeding No   BMI 45.96 kg/m²       Intake/Output Summary (Last 24 hours) at 1/18/2023 1228  Last data filed at 1/18/2023 0900  Gross per 24 hour   Intake 1873.33 ml   Output 550 ml   Net 1323.33 ml     PHYSICAL EXAM:  General: WD, pale. Alert, cooperative, no acute distress    HEENT: NC, Atraumatic. Anicteric sclerae. Lungs:  CTA Bilaterally. No Wheezing/Rhonchi/Rales. Heart:  Regular  rhythm,  No murmur - paced, No Rubs, No Gallops  Abdomen: Soft, Non distended, Non tender. +Bowel sounds, no HSM  Extremities: BLE edema/cellulitis  Neurologic:  Alert and oriented X 3. No acute neurological distress   Psych:   Good insight. Not anxious nor agitated. Lab and Radiology Data Reviewed: (see below)    Medications Reviewed: (see below)  PMH/SH reviewed - no change compared to H&P  ________________________________________________________________________  Total time spent with patient: 20 minutes ________________________________________________________________________  Care Plan discussed with:  Patient y   Leticia Baptiste at bedside   RN Christoph Master              Consultant: Jeannette De La Cruz NP     Procedures: see electronic medical records for all procedures/Xrays and details which were not copied into this note but were reviewed prior to creation of Plan.       LABS:  Recent Labs     01/18/23  1004 01/18/23  0441   WBC 3.6 3.3*   HGB 8.1* 7.5*   HCT 24.8* 22.9*   PLT 36* 32*     Recent Labs     01/18/23  0441 01/17/23  0319 01/16/23  1521    137 136   K 4.2 4.3 3.9   * 107 106   CO2 24 24 24   BUN 53* 59* 59*   CREA 1.36* 1.43* 1.51*   * 147* 195*   CA 8.7 8.5 8.9   MG 2.8*  --   --    PHOS 4.1  --   --      Recent Labs     01/18/23  0441 01/16/23  1521   * 252*   TP 6.2* 6.9   ALB 2.4* 2.8*   GLOB 3.8 4.1*     Recent Labs     01/17/23  1608   INR 1.1   PTP 11.5*   APTT 28.4      No results for input(s): FE, TIBC, PSAT, FERR in the last 72 hours. Lab Results   Component Value Date/Time    Folate 18.5 12/28/2022 08:06 PM     No results for input(s): PH, PCO2, PO2 in the last 72 hours. No results for input(s): CPK, CKMB in the last 72 hours.     No lab exists for component: TROPONINI  Lab Results   Component Value Date/Time    Color YELLOW/STRAW 01/16/2023 05:07 PM    Appearance CLEAR 01/16/2023 05:07 PM    Specific gravity 1.014 01/16/2023 05:07 PM    pH (UA) 6.5 01/16/2023 05:07 PM    Protein Negative 01/16/2023 05:07 PM    Glucose Negative 01/16/2023 05:07 PM    Ketone Negative 01/16/2023 05:07 PM    Bilirubin Negative 01/16/2023 05:07 PM    Urobilinogen 2.0 (H) 01/16/2023 05:07 PM    Nitrites Negative 01/16/2023 05:07 PM    Leukocyte Esterase MODERATE (A) 01/16/2023 05:07 PM    Epithelial cells FEW 01/16/2023 05:07 PM    Bacteria 1+ (A) 01/16/2023 05:07 PM    WBC 20-50 01/16/2023 05:07 PM    RBC 0-5 01/16/2023 05:07 PM       MEDICATIONS:  Current Facility-Administered Medications   Medication Dose Route Frequency    cefTRIAXone (ROCEPHIN) 1 g in 0.9% sodium chloride (MBP/ADV) 50 mL MBP  1 g IntraVENous Q24H    alcohol 62% (NOZIN) nasal  1 Ampule  1 Ampule Topical Q12H    levothyroxine (SYNTHROID) tablet 150 mcg  150 mcg Oral 6am    balsam peru-castor oiL (VENELEX) ointment   Topical BID    ammonium lactate (LAC-HYDRIN) 12 % lotion   Topical BID sodium chloride (NS) flush 5-40 mL  5-40 mL IntraVENous Q8H    sodium chloride (NS) flush 5-40 mL  5-40 mL IntraVENous PRN    acetaminophen (TYLENOL) tablet 650 mg  650 mg Oral Q6H PRN    Or    acetaminophen (TYLENOL) suppository 650 mg  650 mg Rectal Q6H PRN    polyethylene glycol (MIRALAX) packet 17 g  17 g Oral DAILY PRN    ondansetron (ZOFRAN ODT) tablet 4 mg  4 mg Oral Q8H PRN    Or    ondansetron (ZOFRAN) injection 4 mg  4 mg IntraVENous Q6H PRN    pantoprazole (PROTONIX) 40 mg in 0.9% sodium chloride 10 mL injection  40 mg IntraVENous Q12H    lactulose (CHRONULAC) 10 gram/15 mL solution 30 mL  20 g Oral BID    rifAXIMin (XIFAXAN) tablet 550 mg  550 mg Oral BID    spironolactone (ALDACTONE) tablet 50 mg  50 mg Oral DAILY    furosemide (LASIX) tablet 20 mg  20 mg Oral DAILY    0.9% sodium chloride infusion 250 mL  250 mL IntraVENous PRN

## 2023-01-19 ENCOUNTER — APPOINTMENT (OUTPATIENT)
Dept: MRI IMAGING | Age: 78
DRG: 378 | End: 2023-01-19
Attending: NURSE PRACTITIONER
Payer: MEDICARE

## 2023-01-19 ENCOUNTER — HOSPITAL ENCOUNTER (OUTPATIENT)
Dept: INFUSION THERAPY | Age: 78
End: 2023-01-19

## 2023-01-19 LAB
ALBUMIN SERPL-MCNC: 2.4 G/DL (ref 3.5–5)
ALBUMIN/GLOB SERPL: 0.7 (ref 1.1–2.2)
ALP SERPL-CCNC: 256 U/L (ref 45–117)
ALT SERPL-CCNC: 34 U/L (ref 12–78)
ANION GAP SERPL CALC-SCNC: 6 MMOL/L (ref 5–15)
AST SERPL-CCNC: 34 U/L (ref 15–37)
BASOPHILS # BLD: 0 K/UL (ref 0–0.1)
BASOPHILS NFR BLD: 1 % (ref 0–1)
BILIRUB SERPL-MCNC: 1.3 MG/DL (ref 0.2–1)
BUN SERPL-MCNC: 48 MG/DL (ref 6–20)
BUN/CREAT SERPL: 38 (ref 12–20)
CALCIUM SERPL-MCNC: 8.8 MG/DL (ref 8.5–10.1)
CHLORIDE SERPL-SCNC: 110 MMOL/L (ref 97–108)
CO2 SERPL-SCNC: 25 MMOL/L (ref 21–32)
CREAT SERPL-MCNC: 1.26 MG/DL (ref 0.55–1.02)
DIFFERENTIAL METHOD BLD: ABNORMAL
EOSINOPHIL # BLD: 0.3 K/UL (ref 0–0.4)
EOSINOPHIL NFR BLD: 9 % (ref 0–7)
ERYTHROCYTE [DISTWIDTH] IN BLOOD BY AUTOMATED COUNT: 18.2 % (ref 11.5–14.5)
GLOBULIN SER CALC-MCNC: 3.3 G/DL (ref 2–4)
GLUCOSE SERPL-MCNC: 142 MG/DL (ref 65–100)
HCT VFR BLD AUTO: 23.3 % (ref 35–47)
HGB BLD-MCNC: 7.8 G/DL (ref 11.5–16)
IMM GRANULOCYTES # BLD AUTO: 0.1 K/UL (ref 0–0.04)
IMM GRANULOCYTES NFR BLD AUTO: 2 % (ref 0–0.5)
LYMPHOCYTES # BLD: 0.5 K/UL (ref 0.8–3.5)
LYMPHOCYTES NFR BLD: 14 % (ref 12–49)
MAGNESIUM SERPL-MCNC: 2.7 MG/DL (ref 1.6–2.4)
MCH RBC QN AUTO: 28.2 PG (ref 26–34)
MCHC RBC AUTO-ENTMCNC: 33.5 G/DL (ref 30–36.5)
MCV RBC AUTO: 84.1 FL (ref 80–99)
MONOCYTES # BLD: 0.3 K/UL (ref 0–1)
MONOCYTES NFR BLD: 9 % (ref 5–13)
NEUTS SEG # BLD: 2.1 K/UL (ref 1.8–8)
NEUTS SEG NFR BLD: 65 % (ref 32–75)
NRBC # BLD: 0 K/UL (ref 0–0.01)
NRBC BLD-RTO: 0 PER 100 WBC
PHOSPHATE SERPL-MCNC: 3.8 MG/DL (ref 2.6–4.7)
PLATELET # BLD AUTO: 34 K/UL (ref 150–400)
PMV BLD AUTO: 10.4 FL (ref 8.9–12.9)
POTASSIUM SERPL-SCNC: 4.3 MMOL/L (ref 3.5–5.1)
PROT SERPL-MCNC: 5.7 G/DL (ref 6.4–8.2)
RBC # BLD AUTO: 2.77 M/UL (ref 3.8–5.2)
RBC MORPH BLD: ABNORMAL
SODIUM SERPL-SCNC: 141 MMOL/L (ref 136–145)
WBC # BLD AUTO: 3.3 K/UL (ref 3.6–11)

## 2023-01-19 PROCEDURE — 74011250636 HC RX REV CODE- 250/636: Performed by: INTERNAL MEDICINE

## 2023-01-19 PROCEDURE — 97535 SELF CARE MNGMENT TRAINING: CPT | Performed by: OCCUPATIONAL THERAPIST

## 2023-01-19 PROCEDURE — 97161 PT EVAL LOW COMPLEX 20 MIN: CPT | Performed by: PHYSICAL THERAPIST

## 2023-01-19 PROCEDURE — 74011000258 HC RX REV CODE- 258: Performed by: INTERNAL MEDICINE

## 2023-01-19 PROCEDURE — A9576 INJ PROHANCE MULTIPACK: HCPCS | Performed by: INTERNAL MEDICINE

## 2023-01-19 PROCEDURE — 97530 THERAPEUTIC ACTIVITIES: CPT | Performed by: PHYSICAL THERAPIST

## 2023-01-19 PROCEDURE — 77010033678 HC OXYGEN DAILY

## 2023-01-19 PROCEDURE — 74011250637 HC RX REV CODE- 250/637: Performed by: INTERNAL MEDICINE

## 2023-01-19 PROCEDURE — 83735 ASSAY OF MAGNESIUM: CPT

## 2023-01-19 PROCEDURE — 97116 GAIT TRAINING THERAPY: CPT | Performed by: PHYSICAL THERAPIST

## 2023-01-19 PROCEDURE — 97165 OT EVAL LOW COMPLEX 30 MIN: CPT | Performed by: OCCUPATIONAL THERAPIST

## 2023-01-19 PROCEDURE — 80053 COMPREHEN METABOLIC PANEL: CPT

## 2023-01-19 PROCEDURE — 74011250636 HC RX REV CODE- 250/636: Performed by: STUDENT IN AN ORGANIZED HEALTH CARE EDUCATION/TRAINING PROGRAM

## 2023-01-19 PROCEDURE — 97530 THERAPEUTIC ACTIVITIES: CPT | Performed by: OCCUPATIONAL THERAPIST

## 2023-01-19 PROCEDURE — 74011000250 HC RX REV CODE- 250: Performed by: STUDENT IN AN ORGANIZED HEALTH CARE EDUCATION/TRAINING PROGRAM

## 2023-01-19 PROCEDURE — 74011250637 HC RX REV CODE- 250/637: Performed by: STUDENT IN AN ORGANIZED HEALTH CARE EDUCATION/TRAINING PROGRAM

## 2023-01-19 PROCEDURE — 84100 ASSAY OF PHOSPHORUS: CPT

## 2023-01-19 PROCEDURE — 51798 US URINE CAPACITY MEASURE: CPT

## 2023-01-19 PROCEDURE — 85025 COMPLETE CBC W/AUTO DIFF WBC: CPT

## 2023-01-19 PROCEDURE — 74183 MRI ABD W/O CNTR FLWD CNTR: CPT

## 2023-01-19 PROCEDURE — 36415 COLL VENOUS BLD VENIPUNCTURE: CPT

## 2023-01-19 PROCEDURE — C9113 INJ PANTOPRAZOLE SODIUM, VIA: HCPCS | Performed by: STUDENT IN AN ORGANIZED HEALTH CARE EDUCATION/TRAINING PROGRAM

## 2023-01-19 PROCEDURE — 65270000046 HC RM TELEMETRY

## 2023-01-19 RX ORDER — LORAZEPAM 2 MG/ML
1 INJECTION INTRAMUSCULAR
Status: COMPLETED | OUTPATIENT
Start: 2023-01-19 | End: 2023-01-19

## 2023-01-19 RX ADMIN — LACTULOSE 30 ML: 20 SOLUTION ORAL at 18:37

## 2023-01-19 RX ADMIN — Medication 1 AMPULE: at 21:05

## 2023-01-19 RX ADMIN — ONDANSETRON 4 MG: 2 INJECTION INTRAMUSCULAR; INTRAVENOUS at 14:25

## 2023-01-19 RX ADMIN — LACTULOSE 30 ML: 20 SOLUTION ORAL at 08:21

## 2023-01-19 RX ADMIN — FUROSEMIDE 20 MG: 40 TABLET ORAL at 08:22

## 2023-01-19 RX ADMIN — Medication: at 08:24

## 2023-01-19 RX ADMIN — SODIUM CHLORIDE 40 MG: 9 INJECTION, SOLUTION INTRAMUSCULAR; INTRAVENOUS; SUBCUTANEOUS at 21:06

## 2023-01-19 RX ADMIN — RIFAXIMIN 550 MG: 550 TABLET ORAL at 18:37

## 2023-01-19 RX ADMIN — SPIRONOLACTONE 50 MG: 25 TABLET ORAL at 08:22

## 2023-01-19 RX ADMIN — GADOTERIDOL 20 ML: 279.3 INJECTION, SOLUTION INTRAVENOUS at 15:40

## 2023-01-19 RX ADMIN — CASTOR OIL AND BALSAM, PERU: 788; 87 OINTMENT TOPICAL at 21:05

## 2023-01-19 RX ADMIN — SODIUM CHLORIDE, PRESERVATIVE FREE 10 ML: 5 INJECTION INTRAVENOUS at 06:00

## 2023-01-19 RX ADMIN — SODIUM CHLORIDE, PRESERVATIVE FREE 10 ML: 5 INJECTION INTRAVENOUS at 14:29

## 2023-01-19 RX ADMIN — CASTOR OIL AND BALSAM, PERU: 788; 87 OINTMENT TOPICAL at 08:24

## 2023-01-19 RX ADMIN — SODIUM CHLORIDE, PRESERVATIVE FREE 10 ML: 5 INJECTION INTRAVENOUS at 21:06

## 2023-01-19 RX ADMIN — LORAZEPAM 1 MG: 2 INJECTION INTRAMUSCULAR; INTRAVENOUS at 14:48

## 2023-01-19 RX ADMIN — LEVOTHYROXINE SODIUM 150 MCG: 0.15 TABLET ORAL at 05:48

## 2023-01-19 RX ADMIN — RIFAXIMIN 550 MG: 550 TABLET ORAL at 08:22

## 2023-01-19 RX ADMIN — Medication: at 18:38

## 2023-01-19 RX ADMIN — SODIUM CHLORIDE 40 MG: 9 INJECTION, SOLUTION INTRAMUSCULAR; INTRAVENOUS; SUBCUTANEOUS at 08:22

## 2023-01-19 RX ADMIN — SODIUM CHLORIDE 1 G: 900 INJECTION INTRAVENOUS at 11:33

## 2023-01-19 RX ADMIN — Medication 1 AMPULE: at 08:22

## 2023-01-19 NOTE — PROGRESS NOTES
Bedside and Verbal shift change report received from SANTOS Yost(offgoing nurse). Report included the following information SBAR, Kardex, ED Summary, Procedure Summary, Intake/Output, MAR, Accordion, Recent Results, Med Rec Status, Cardiac Rhythm A. Paced, Alarm Parameters , and Quality Measures. Noted resting with her eyes closed, no facial grimaces. 1000: Incontinent care completed, noted with the skin breakdown on the right buttock. 1200:Lower extremities' wound care completed. 1330:Her care giver is at the bedside. Continue the current plan of care. 1425:Patient anxious and nauseated for pending test. She stated, \"I cant be closed in, I'm scared\" MD paged. Zofran given for nausea. 1445:Updates given to , see new orders. 1450:Ativan administered for anxiety and transported off the unit for the MRI. She was escorted with the portable monitor, and being monitored by the telemetry staff. 1600:She returned to the CCU bed 2522 without any challenges. She stated,\"it wasn't as bad as I had anticipated\"  1700: No pain, or facial grimaces at this time. 1800: She accepted her meal without c/o nausea or regurgitation. Planning for discharge. 1930: Bedside and Verbal shift change report given to DAVID Armstrong RN (oncoming nurse) by myself (offgoing nurse). Report included the following information SBAR, Kardex, ED Summary, Procedure Summary, Intake/Output, MAR, Accordion, Recent Results, Med Rec Status, Cardiac Rhythm A. Paced, Alarm Parameters , and Quality Measures.  in. Repositioned for comfort.

## 2023-01-19 NOTE — PROGRESS NOTES
Hospitalist Progress Note    NAME: Ousmane Gill   :  1945   MRN:  [id number]       Assessment / Plan:    Acute on chronic anemia due to upper GI bleed POA  GAVE vs AVMs POA  Thrombocytopenia due to portal HTN POA PLTs 32,000 - 36,000  Cirrhosis secondary to BRADY, compensated POA  Follows with [facility] hepatology  S/p 3 units pRBCs. Hgb currently stable 7.5 to 8.1  S/p octreotide  Cont' with PPI BID, IV rocephin for SBP prophylaxis  Con't lasix, aldactone, xifaxan, lactulose  for liver cirrhosis managemnt  Appreciate GI following, not recommending TIPS at this time. No repeat EGD       Last EGD 2022 Findings:   OROPHARYNX: Cords and pyriform recesses normal.   ESOPHAGUS: The Z-Line is intact. There are low grade esophageal varices, that do not completely flatten with insufflation, likely represent grade I/II. No stigmata of recent bleeding. STOMACH:   -- gastric mucosa is friable and edematous, consistent with portal hypertensive gastropathy  -- The fundus on antegrade and retroflex views is otherwise normal.   -- Antrum has improved compared to previous EGD's, and the focal GAVE vs portal hypertensive gastropathy is minimal now, but again treated with APC today, given it's favorable response to APC these past several months. SMALL INTESTINE: colonoscope smoothly passes to the distal duodenum, though I did not reach previously tattooed site. No AVMs found, but the duodenum is generally friable and scope trauma causes a few scant erosions. FU MRI/MRCP for pancreatic cystic lesion - last scan 3/2021      MRI done today, results pending  Trend labs  Cleared by GI for discharge  PT recommending SNF, will d/w patient    Estimated discharge date: 2023  Barriers:    Code status:   Prophylaxis:   Recommended Disposition:      Subjective:     Chief Complaint / Reason for Physician Visit  NAD. No new complaint today. Caregiver was at bedside. Discussed with RN events overnight.      Review of Systems:  Symptom Y/N Comments  Symptom Y/N Comments   Fever/Chills    Chest Pain     Poor Appetite    Edema     Cough    Abdominal Pain     Sputum    Joint Pain     SOB/HENLEY    Pruritis/Rash     Nausea/vomit    Tolerating PT/OT     Diarrhea    Tolerating Diet     Constipation    Other       Could NOT obtain due to:      Objective:     VITALS:   Last 24hrs VS reviewed since prior progress note. Most recent are:  Patient Vitals for the past 24 hrs:   Temp Pulse Resp BP SpO2   01/19/23 1630 97.7 °F (36.5 °C) 64 17 (!) 136/58 93 %   01/19/23 1615 -- 60 21 -- 97 %   01/19/23 1445 -- 67 18 (!) 170/58 96 %   01/19/23 1430 -- 69 22 (!) 164/59 100 %   01/19/23 1415 -- (!) 107 16 -- --   01/19/23 1400 -- 88 17 (!) 170/68 98 %   01/19/23 1300 -- 60 18 -- --   01/19/23 1200 97.7 °F (36.5 °C) 60 16 (!) 141/49 100 %   01/19/23 1100 -- 64 18 (!) 147/52 97 %   01/19/23 1000 -- 60 17 (!) 135/56 95 %   01/19/23 0900 -- 60 20 132/64 99 %   01/19/23 0800 98.1 °F (36.7 °C) 62 18 (!) 144/48 97 %   01/19/23 0700 -- 60 14 (!) 121/41 95 %   01/19/23 0600 -- 60 17 (!) 137/46 94 %   01/19/23 0500 -- 60 18 (!) 151/54 97 %   01/19/23 0400 -- 61 20 (!) 150/67 97 %   01/19/23 0300 -- 60 16 (!) 135/56 98 %   01/19/23 0200 -- 60 17 (!) 150/57 98 %   01/19/23 0100 -- 60 17 (!) 137/55 96 %   01/19/23 0000 98.3 °F (36.8 °C) 60 17 (!) 140/55 96 %   01/18/23 2300 -- 60 16 (!) 138/53 93 %   01/18/23 2200 -- 67 21 (!) 147/57 95 %   01/18/23 2100 -- -- -- (!) 160/56 --   01/18/23 2000 98 °F (36.7 °C) 60 16 (!) 155/59 98 %   01/18/23 1900 -- 64 18 (!) 152/58 96 %   01/18/23 1800 -- 70 19 (!) 164/69 98 %         Intake/Output Summary (Last 24 hours) at 1/19/2023 1651  Last data filed at 1/19/2023 1133  Gross per 24 hour   Intake 470 ml   Output 400 ml   Net 70 ml          I had a face to face encounter and independently examined this patient on 1/19/2023, as outlined below:  PHYSICAL EXAM:  General: WD, WN.  Alert, cooperative, no acute distress EENT:  EOMI. Anicteric sclerae. MMM  Resp:  CTA bilaterally, no wheezing or rales. No accessory muscle use  CV:  Regular  rhythm,  No edema  GI:  Soft, Non distended, Non tender. +Bowel sounds  Neurologic:  Alert and oriented X 3, normal speech  Psych:   Good insight. Not anxious nor agitated  Skin:  No rashes. No jaundice    Reviewed most current lab test results and cultures  YES  Reviewed most current radiology test results   YES  Review and summation of old records today    NO  Reviewed patient's current orders and MAR    YES  PMH/SH reviewed - no change compared to H&P  ________________________________________________________________________  Care Plan discussed with:    Comments   Patient x    Family      RN x    Care Manager     Consultant                        Multidiciplinary team rounds were held today with , nursing, pharmacist and clinical coordinator. Patient's plan of care was discussed; medications were reviewed and discharge planning was addressed. ________________________________________________________________________  Total NON critical care TIME:  35   Minutes    Total CRITICAL CARE TIME Spent:   Minutes non procedure based      Comments   >50% of visit spent in counseling and coordination of care     ________________________________________________________________________  Adriana Sin MD     Procedures: see electronic medical records for all procedures/Xrays and details which were not copied into this note but were reviewed prior to creation of Plan. LABS:  I reviewed today's most current labs and imaging studies.   Pertinent labs include:  Recent Labs     01/19/23  0359 01/18/23  1004 01/18/23  0441   WBC 3.3* 3.6 3.3*   HGB 7.8* 8.1* 7.5*   HCT 23.3* 24.8* 22.9*   PLT 34* 36* 32*       Recent Labs     01/19/23  0359 01/18/23  0441 01/17/23  1608 01/17/23 0319    140  --  137   K 4.3 4.2  --  4.3   * 110*  --  107   CO2 25 24  --  24   * 152*  --  147*   BUN 48* 53*  --  59*   CREA 1.26* 1.36*  --  1.43*   CA 8.8 8.7  --  8.5   MG 2.7* 2.8*  --   --    PHOS 3.8 4.1  --   --    ALB 2.4* 2.4*  --   --    TBILI 1.3* 1.6*  --   --    ALT 34 34  --   --    INR  --   --  1.1  --          Signed: Emperatriz Albrecht MD

## 2023-01-19 NOTE — PROGRESS NOTES
1900-Bedside shift change report given to Esmer Fonseca (oncoming nurse) by Reggie Julian RN (offgoing nurse). Report included the following information SBAR, Kardex, ED Summary, Intake/Output, MAR, and Recent Results. 2000-initial assessment complete, see flowsheet. Pt A/O x4, lungs diminished, on room air, BP stable, trace edema noted on lower extremities, abdomen semi soft and distended, purewick in place. 2330-pt O2 sats at 85%, placed on 2 L NC.    0000-reassessment complete, see flowsheet. 0400-reassessment complete, see flowsheet. Labs sent via tube station. 0700-Bedside shift change report given to [place] (oncoming nurse) by Roger Ibarra RN (offgoing nurse). Report included the following information SBAR, Kardex, ED Summary, MAR, and Recent Results.

## 2023-01-19 NOTE — PROGRESS NOTES
Transition of Care Plan:     RUR:33%     Disposition: SNF     If SNF or IPR: Date FOC offered: 1/17/23     Date [address] received:1/18/23     Date authorization started with reference number:[id number] and liason to provide the reference number . Date authorization received and expires:TBD     Accepting facility: [facility]     Follow up appointments: To be done by facility     DME needed: None     Transportation at Discharge: Family vs BLS     [place] or means to access home:  N/A--Patient to go to facility          Medicare Letter: To be given prior to discharge     Is patient a [place] and connected with the [place]? No             If yes, was [employer] transfer form completed and [facility] notified? N/A     Caregiver Contact: Family      [facility] has accepted patient and will start insurance authorization. Rama Silva RN BSN CRM        [phone]

## 2023-01-19 NOTE — PROGRESS NOTES
Problem: Self Care Deficits Care Plan (Adult)  Goal: *Acute Goals and Plan of Care (Insert Text)  Description:     FUNCTIONAL STATUS PRIOR TO ADMISSION: Patient is mod I for ambulation with a RW, she is independent after setup to supervision/setup for all UB ADLs, grooming and toileting, she is independent with self feeding, and she is min A for LB dressing/bathing. HOME SUPPORT: Patient resides with caregivers providing assistance/supervision for ADLs and functional mobility PRN. Occupational Therapy Goals  Initiated 1/19/2023  1. Patient will perform grooming standing at sink for 3 minutes with supervision/set-up within 7 day(s). 2.  Patient will perform upper body dressing with supervision/set-up within 7 day(s). 3.  Patient will perform lower body dressing with supervision/set-up, using AE PRN, within 7 day(s). 4.  Patient will perform toilet transfers with supervision/set-up within 7 day(s). 5.  Patient will perform all aspects of toileting with supervision/set-up within 7 day(s). Outcome: Not Met    OCCUPATIONAL THERAPY EVALUATION  Patient: Sterling Gomez (60 y.o. female)  Date: 1/19/2023  Primary Diagnosis: UGIB (upper gastrointestinal bleed) [K92.2]  GI bleed [K92.2]       Precautions:         ASSESSMENT  Based on the objective data described below, the patient presents with GW, decreased activity tolerance, decreased safety awareness and decreased balance which is impairing her functional independence. The patient is functioning below her independent to min A baseline, now performing ADLs at an independent to mod A level and is CGA to min A of 2 for functional mobility. The patient will benefit from acute OT intervention and will need SNF rehab after discharge. Functional Outcome Measure: The patient scored 45/100 on the Barthel Index outcome measure which is indicative of a 55% impairment in function.           PLAN :  Recommendations and Planned Interventions: self care training, functional mobility training, therapeutic exercise, balance training, therapeutic activities, endurance activities, patient education, and home safety training    Frequency/Duration: Patient will be followed by occupational therapy 3 times a week to address goals.     Recommendation for discharge: (in order for the patient to meet his/her long term goals)  Therapy up to 5 days/week in SNF setting           OBJECTIVE DATA SUMMARY:   HISTORY:   Past Medical History:   Diagnosis Date    Abnormal nuclear stress test 10/04/2021    Anemia     Angina at rest [facility]) 10/04/2021    Arthritis     KNEE,gout    Bundle branch block     Endocrine disease     HYPOTHYROIDISM    Fracture     Left distal femur (age 12 - pt fell)    Fracture     Left tibia 1990 (pt fell)    Fracture     Right wrist fractured 2 times (pt fell)    GERD (gastroesophageal reflux disease)     High cholesterol     Hypertension     Hypoglycemia     \"my body produces too much insulin\"    Liver disease     BRADY    Nausea & vomiting     when had gallbladder out    Osteoporosis     Other ill-defined conditions(799.89)     edema legs    S/P cardiac cath 10/04/2021    10/4/2021 nonobstructive disease    Spinal stenosis     Thyroid disease      Past Surgical History:   Procedure Laterality Date    COLONOSCOPY N/A 7/13/2021    COLONOSCOPY performed by Aziza Parham MD at [facility] ENDOSCOPY    COLONOSCOPY N/A 8/8/2022    COLONOSCOPY performed by Jakob Vilchis MD at [facility] ENDOSCOPY    COLONOSCOPY,REMV Rhonda Downs  2/11/2015         COLONOSCOPY,REMV ALVINA,SNARE  7/13/2021         COLONOSCPY,FLEX,W/ [address] INJECT  7/13/2021         COLORECTAL SCRN; HI RISK IND  2/11/2015         HX CHOLECYSTECTOMY      HX HYSTERECTOMY      HX ORTHOPAEDIC Left     leg surgery - plates, screws, wires, pins in place    HX UROLOGICAL      [address] [facility]      liver biopsy--BRADY and fibrosis    SB ENDOSCOPY,W/CONTROL,BLEEDING 11/16/2022    SMALL BOWEL ENDOSCOPY,ABLATE LESN  11/16/2022    SMALL BOWEL ENDOSCOPY,BIOPSY  11/16/2022    UPPER GI ENDOSCOPY,BIOPSY  11/17/2015            Expanded or extensive additional review of patient history:     [address]: Independent living  One/Two Story Residence: One story  Living Alone: No  Support Systems: Other (Comment) (independent living at [facility])  Patient Expects to be Discharged to[de-identified] Other: (independent living.)  Current DME Used/Available at Home: Bella Leonidas, rollator, Shower chair  Tub or Shower Type: Shower    Hand dominance: Right    EXAMINATION OF PERFORMANCE DEFICITS:  Cognitive/Behavioral Status:  Neurologic State: Alert  Orientation Level: Oriented X4  Cognition: Appropriate for age attention/concentration; Follows commands        Safety/Judgement: Awareness of environment; Insight into deficits      Hearing: Auditory  Auditory Impairment: None    Vision/Perceptual:    Acuity: Within Defined Limits    Corrective Lenses: Glasses    Range of Motion:  AROM: Generally decreased, functional    Strength:  Strength: Generally decreased, functional    Coordination:  Coordination: Within functional limits            Tone & Sensation:  Tone: Normal  Sensation: Intact       Balance:  Sitting: Impaired  Sitting - Static: Good (unsupported)  Sitting - Dynamic: Fair (occasional)  Standing: Impaired  Standing - Static: Good;Constant support  Standing - Dynamic : Fair;Constant support    Functional Mobility and Transfers for ADLs:  Bed Mobility:  Rolling: Minimum assistance  Supine to Sit: Minimum assistance  Sit to Supine: Minimum assistance;Assist x2  Scooting: Contact guard assistance    Transfers:  Sit to Stand: Minimum assistance  Stand to Sit: Contact guard assistance    ADL Assessment:  Feeding: Independent    Oral Facial Hygiene/Grooming: Contact guard assistance    Bathing: Moderate assistance    Upper Body Dressing: Minimum assistance    Lower Body Dressing:  Moderate assistance    Toileting: Moderate assistance                ADL Intervention and task modifications:  Initiated bed mobility, dressing ADL, transfer, grooming and safety training. Functional Measure:  Barthel Index:    Bathin  Bladder: 5  Bowels: 10  Groomin  Dressin  Feeding: 10  Mobility: 0  Stairs: 0  Toilet Use: 5  Transfer (Bed to Chair and Back): 10  Total: 45/100        Percentage of impairment   0%   1-19%   20-39%   40-59%   60-79%   80-99%   100%   Barthel Score 0-100 100 99-80 79-60 59-40 20-39 1-19   0     The Barthel ADL Index: Guidelines  1. The index should be used as a record of what a patient does, not as a record of what a patient could do. 2. The main aim is to establish degree of independence from any help, physical or verbal, however minor and for whatever reason. 3. The need for supervision renders the patient not independent. 4. A patient's performance should be established using the best available evidence. Asking the patient, friends/relatives and nurses are the usual sources, but direct observation and common sense are also important. However direct testing is not needed. 5. Usually the patient's performance over the preceding 24-48 hours is important, but occasionally longer periods will be relevant. 6. Middle categories imply that the patient supplies over 50 per cent of the effort. 7. Use of aids to be independent is allowed. Demar Mora., Barthel, D.W. (0183). Functional evaluation: the Barthel Index. [facility] (14)2. MEAGHAN Javed Priscilla Boll., Tim Eans.[facility], [address] [place] [address] (). Measuring the change indisability after inpatient rehabilitation; comparison of the responsiveness of the Barthel Index and Functional [place] Measure. Journal of Neurology, Neurosurgery, and Psychiatry, 66(4), 149-266.   Leslee Jacome NSARAY.A, REIC Merino, & Aidan Berger M.A. (2004.) Assessment of post-stroke quality of life in cost-effectiveness studies: The usefulness of the Barthel Index and the EuroQoL-5D. Quality of Life Research, 13, 427-96        Pain Rating:  No complaint of pain    Activity Tolerance:   Fair  VSS    After treatment patient left in no apparent distress:    Supine in bed and Call bell within reach    COMMUNICATION/EDUCATION:   The patients plan of care was discussed with: Physical Therapist and Registered Nurse. Home safety education was provided and the patient/caregiver indicated understanding., Patient/family have participated as able in goal setting and plan of care. , and Patient/family agree to work toward stated goals and plan of care. This patients plan of care is appropriate for delegation to [facility].     Thank you for this referral.  Alejo Leonardo, OTR/L  Time Calculation: 32 mins

## 2023-01-19 NOTE — PROGRESS NOTES
Problem: Mobility Impaired (Adult and Pediatric)  Goal: *Acute Goals and Plan of Care (Insert Text)  Description: FUNCTIONAL STATUS PRIOR TO ADMISSION: Patient was modified independent using a rollator for functional mobility. HOME SUPPORT PRIOR TO ADMISSION: The patient lived alone with paid caregivers to provide assistance with IADLs and ADLs. Physical Therapy Goals  Initiated 1/19/2023  1. Patient will move from supine to sit and sit to supine , scoot up and down, and roll side to side in bed with supervision/set-up within 7 day(s). 2.  Patient will transfer from bed to chair and chair to bed with supervision/set-up using the least restrictive device within 7 day(s). 3.  Patient will perform sit to stand with supervision/set-up within 7 day(s). Outcome: Not Met   PHYSICAL THERAPY EVALUATION  Patient: Susy Espinal (8 y.o. female)  Date: 1/19/2023  Primary Diagnosis: UGIB (upper gastrointestinal bleed) [K92.2]  GI bleed [K92.2]       Precautions: falls ; Patient required use of rolling scale for step stool to safely transfer in and out of bed      ASSESSMENT  Based on the objective data described below, the patient presents with generalized weakness and impaired functional mobility, balance and endurance. Patient requiring min A for bed mobility and transfer to standing. She was able to ambulate 20 feet in room using rollator for support. Gait is slow with increased trunk sway noted but no overt LOB noted. Patient returned to bed following tx session secondary to awaiting transport for MRI. Patient reports modified independence at baseline using rollator for support. She lives in [address] [facility] living and has 24 hour care givers. Care giver is present and indicates that patient is sedentary at baseline. Patient is below her functional baseline and would benefit from further skilled therapy.  Patient is requesting to discharge to SNF following discharge to improve overall mobility, balance and endurance prior to returning to [facility] with paid caregivers. Current Level of Function Impacting Discharge (mobility/balance): CGA to min A    Functional Outcome Measure: The patient scored 45/100 on the Barthel Index outcome measure which is indicative of partial dependence. Patient will benefit from skilled therapy intervention to address the above noted impairments. PLAN :  Recommendations and Planned Interventions: bed mobility training, transfer training, gait training, therapeutic exercises, patient and family training/education, and therapeutic activities      Frequency/Duration: Patient will be followed by physical therapy:  4 times a week to address goals. Recommendation for discharge: (in order for the patient to meet his/her long term goals)  Therapy up to 5 days/week in SNF setting    This discharge recommendation:  Has not yet been discussed the attending provider and/or case management    IF patient discharges home will need the following DME: patient owns DME required for discharge         SUBJECTIVE:   Patient stated I have gotten weak so the doctor wants me to go to rehab.     OBJECTIVE DATA SUMMARY:   HISTORY:    Past Medical History:   Diagnosis Date    Abnormal nuclear stress test 10/04/2021    Anemia     Angina at rest [facility]) 10/04/2021    Arthritis     KNEE,gout    Bundle branch block     Endocrine disease     HYPOTHYROIDISM    Fracture     Left distal femur (age 12 - pt fell)    Fracture     Left tibia 1990 (pt fell)    Fracture     Right wrist fractured 2 times (pt fell)    GERD (gastroesophageal reflux disease)     High cholesterol     Hypertension     Hypoglycemia     \"my body produces too much insulin\"    Liver disease     BRADY    Nausea & vomiting     when had gallbladder out    Osteoporosis     Other ill-defined conditions(799.89)     edema legs    S/P cardiac cath 10/04/2021    10/4/2021 nonobstructive disease    Spinal stenosis     Thyroid disease Past Surgical History:   Procedure Laterality Date    COLONOSCOPY N/A 7/13/2021    COLONOSCOPY performed by Peña Moore MD at [facility] ENDOSCOPY    COLONOSCOPY N/A 8/8/2022    COLONOSCOPY performed by Kerwin Malone MD at [place] [address] [address]  2/11/2015         COLONOSCOPY,REMV LESN,SNARE  7/13/2021         COLONOSCPY,FLEX,W/ [address] INJECT  7/13/2021         COLORECTAL SCRN; HI RISK IND  2/11/2015         HX CHOLECYSTECTOMY      HX HYSTERECTOMY      HX ORTHOPAEDIC Left     leg surgery - plates, screws, wires, pins in place    HX UROLOGICAL      [address] [facility]      liver biopsy--BRADY and fibrosis    SB ENDOSCOPY,W/CONTROL,BLEEDING  11/16/2022    SMALL BOWEL ENDOSCOPY,ABLATE LESN  11/16/2022    SMALL BOWEL ENDOSCOPY,BIOPSY  11/16/2022    UPPER GI ENDOSCOPY,BIOPSY  11/17/2015              Home Situation  Home Environment: Independent living  # Steps to Enter: 0  One/Two Story Residence: One story  Living Alone: No  Support Systems: Caregiver/Home Care Staff  Patient Expects to be Discharged to[de-identified] Other: (independent living.)  Current DME Used/Available at Home: Tye Spore, rollator, Shower chair  Tub or Shower Type: Shower    EXAMINATION/PRESENTATION/DECISION MAKING:   Critical Behavior:  Neurologic State: Alert  Orientation Level: Oriented X4  Cognition: Appropriate for age attention/concentration, Follows commands  Safety/Judgement: Awareness of environment, Insight into deficits  Hearing:   Auditory  Auditory Impairment: None    Range Of Motion:  AROM: Generally decreased, functional                       Strength:    Strength: Generally decreased, functional                    Tone & Sensation:   Tone: Normal              Sensation: Intact               Coordination:  Coordination: Within functional limits  Vision:   Acuity: Within Defined Limits  Corrective Lenses: Glasses  Functional Mobility:  Bed Mobility:  Rolling: Minimum assistance  Supine to Sit: Minimum assistance  Sit to Supine: Minimum assistance;Assist x2  Scooting: Contact guard assistance  Transfers:  Sit to Stand: Minimum assistance  Stand to Sit: Contact guard assistance                       Balance:   Sitting: Impaired  Sitting - Static: Good (unsupported)  Sitting - Dynamic: Fair (occasional)  Standing: Impaired  Standing - Static: Good;Constant support  Standing - Dynamic : Fair;Constant support  Ambulation/Gait Training:  Distance (ft): 20 Feet (ft)  Assistive Device: Walker, rollator;Gait belt  Ambulation - Level of Assistance: Contact guard assistance        Gait Abnormalities: Decreased step clearance;Trunk sway increased        Base of Support: Widened     Speed/Keerthi: Pace decreased (<100 feet/min); Slow  Step Length: Left shortened;Right shortened            Functional Measure:  Barthel Index:    Bathin  Bladder: 5  Bowels: 10  Groomin  Dressin  Feeding: 10  Mobility: 0  Stairs: 0  Toilet Use: 5  Transfer (Bed to Chair and Back): 10  Total: 45/100       The Barthel ADL Index: Guidelines  1. The index should be used as a record of what a patient does, not as a record of what a patient could do. 2. The main aim is to establish degree of independence from any help, physical or verbal, however minor and for whatever reason. 3. The need for supervision renders the patient not independent. 4. A patient's performance should be established using the best available evidence. Asking the patient, friends/relatives and nurses are the usual sources, but direct observation and common sense are also important. However direct testing is not needed. 5. Usually the patient's performance over the preceding 24-48 hours is important, but occasionally longer periods will be relevant. 6. Middle categories imply that the patient supplies over 50 per cent of the effort. 7. Use of aids to be independent is allowed.     Score Interpretation (from [address] [address])    Independent   60-79 Minimally independent   40-59 Partially dependent   20-39 Very dependent   <20 Totally dependent     -Maru La., BarthelISADORA. (1965). Functional evaluation: the Barthel Index. [address] ([address] (1997). The Barthel activities of daily living index: self-reporting versus actual performance in the old (> or = 75 years). Journal of [address] [place] [address] 45(7), [facility], JCHRISTINAF, Melanie Martinez., Amey Lesch. (1999). Measuring the change in disability after inpatient rehabilitation; comparison of the responsiveness of the Barthel Index and Functional [place] Measure. Journal of Neurology, Neurosurgery, and Psychiatry, 66(4), 532-802. DHEERAJ Mims, ERIC Merino, & Renetta Delgado MRENETTA. (2004) Assessment of post-stroke quality of life in cost-effectiveness studies: The usefulness of the Barthel Index and the EuroQoL-5D. Quality of Life Research, 15, 897-35          Activity Tolerance:   Fair    After treatment patient left in no apparent distress:   Supine in bed, Call bell within reach, Caregiver / family present, and Side rails x 3    COMMUNICATION/EDUCATION:   The patients plan of care was discussed with: Occupational therapist and Registered nurse. Fall prevention education was provided and the patient/caregiver indicated understanding., Patient/family have participated as able in goal setting and plan of care. , and Patient/family agree to work toward stated goals and plan of care.     Thank you for this referral.  Ramiro Owen, PT   Time Calculation: 32 mins

## 2023-01-20 LAB
ALBUMIN SERPL-MCNC: 2.5 G/DL (ref 3.5–5)
ALBUMIN/GLOB SERPL: 0.7 (ref 1.1–2.2)
ALP SERPL-CCNC: 266 U/L (ref 45–117)
ALT SERPL-CCNC: 32 U/L (ref 12–78)
ANION GAP SERPL CALC-SCNC: 4 MMOL/L (ref 5–15)
AST SERPL-CCNC: 31 U/L (ref 15–37)
BACTERIA SPEC CULT: ABNORMAL
BASOPHILS # BLD: 0 K/UL (ref 0–0.1)
BASOPHILS NFR BLD: 0 % (ref 0–1)
BILIRUB SERPL-MCNC: 1.1 MG/DL (ref 0.2–1)
BUN SERPL-MCNC: 43 MG/DL (ref 6–20)
BUN/CREAT SERPL: 37 (ref 12–20)
CALCIUM SERPL-MCNC: 9.5 MG/DL (ref 8.5–10.1)
CC UR VC: ABNORMAL
CHLORIDE SERPL-SCNC: 110 MMOL/L (ref 97–108)
CO2 SERPL-SCNC: 27 MMOL/L (ref 21–32)
CREAT SERPL-MCNC: 1.17 MG/DL (ref 0.55–1.02)
DIFFERENTIAL METHOD BLD: ABNORMAL
EOSINOPHIL # BLD: 0 K/UL (ref 0–0.4)
EOSINOPHIL NFR BLD: 0 % (ref 0–7)
ERYTHROCYTE [DISTWIDTH] IN BLOOD BY AUTOMATED COUNT: 17.8 % (ref 11.5–14.5)
GLOBULIN SER CALC-MCNC: 3.8 G/DL (ref 2–4)
GLUCOSE SERPL-MCNC: 150 MG/DL (ref 65–100)
HCT VFR BLD AUTO: 25.3 % (ref 35–47)
HGB BLD-MCNC: 8.2 G/DL (ref 11.5–16)
IMM GRANULOCYTES # BLD AUTO: 0 K/UL (ref 0–0.04)
IMM GRANULOCYTES NFR BLD AUTO: 0 % (ref 0–0.5)
LYMPHOCYTES # BLD: 0.2 K/UL (ref 0.8–3.5)
LYMPHOCYTES NFR BLD: 8 % (ref 12–49)
MAGNESIUM SERPL-MCNC: 2.7 MG/DL (ref 1.6–2.4)
MCH RBC QN AUTO: 27.9 PG (ref 26–34)
MCHC RBC AUTO-ENTMCNC: 32.4 G/DL (ref 30–36.5)
MCV RBC AUTO: 86.1 FL (ref 80–99)
MONOCYTES # BLD: 0.1 K/UL (ref 0–1)
MONOCYTES NFR BLD: 4 % (ref 5–13)
NEUTS SEG # BLD: 2.4 K/UL (ref 1.8–8)
NEUTS SEG NFR BLD: 88 % (ref 32–75)
NRBC # BLD: 0 K/UL (ref 0–0.01)
NRBC BLD-RTO: 0 PER 100 WBC
PHOSPHATE SERPL-MCNC: 3.9 MG/DL (ref 2.6–4.7)
PLATELET # BLD AUTO: 28 K/UL (ref 150–400)
PMV BLD AUTO: 10.3 FL (ref 8.9–12.9)
POTASSIUM SERPL-SCNC: 4.5 MMOL/L (ref 3.5–5.1)
PROT SERPL-MCNC: 6.3 G/DL (ref 6.4–8.2)
RBC # BLD AUTO: 2.94 M/UL (ref 3.8–5.2)
RBC MORPH BLD: ABNORMAL
SERVICE CMNT-IMP: ABNORMAL
SODIUM SERPL-SCNC: 141 MMOL/L (ref 136–145)
TOTAL CELLS COUNTED SPEC: 25
WBC # BLD AUTO: 2.7 K/UL (ref 3.6–11)

## 2023-01-20 PROCEDURE — C9113 INJ PANTOPRAZOLE SODIUM, VIA: HCPCS | Performed by: STUDENT IN AN ORGANIZED HEALTH CARE EDUCATION/TRAINING PROGRAM

## 2023-01-20 PROCEDURE — 83735 ASSAY OF MAGNESIUM: CPT

## 2023-01-20 PROCEDURE — 36415 COLL VENOUS BLD VENIPUNCTURE: CPT

## 2023-01-20 PROCEDURE — 80053 COMPREHEN METABOLIC PANEL: CPT

## 2023-01-20 PROCEDURE — 74011250636 HC RX REV CODE- 250/636: Performed by: INTERNAL MEDICINE

## 2023-01-20 PROCEDURE — 77010033678 HC OXYGEN DAILY

## 2023-01-20 PROCEDURE — 74011250637 HC RX REV CODE- 250/637: Performed by: INTERNAL MEDICINE

## 2023-01-20 PROCEDURE — 74011250637 HC RX REV CODE- 250/637: Performed by: STUDENT IN AN ORGANIZED HEALTH CARE EDUCATION/TRAINING PROGRAM

## 2023-01-20 PROCEDURE — 84100 ASSAY OF PHOSPHORUS: CPT

## 2023-01-20 PROCEDURE — 74011250636 HC RX REV CODE- 250/636: Performed by: STUDENT IN AN ORGANIZED HEALTH CARE EDUCATION/TRAINING PROGRAM

## 2023-01-20 PROCEDURE — 65270000046 HC RM TELEMETRY

## 2023-01-20 PROCEDURE — 74011000250 HC RX REV CODE- 250: Performed by: STUDENT IN AN ORGANIZED HEALTH CARE EDUCATION/TRAINING PROGRAM

## 2023-01-20 PROCEDURE — 74011000258 HC RX REV CODE- 258: Performed by: INTERNAL MEDICINE

## 2023-01-20 PROCEDURE — 85025 COMPLETE CBC W/AUTO DIFF WBC: CPT

## 2023-01-20 RX ADMIN — RIFAXIMIN 550 MG: 550 TABLET ORAL at 17:05

## 2023-01-20 RX ADMIN — SPIRONOLACTONE 50 MG: 25 TABLET ORAL at 08:50

## 2023-01-20 RX ADMIN — SODIUM CHLORIDE 1 G: 900 INJECTION INTRAVENOUS at 10:36

## 2023-01-20 RX ADMIN — SODIUM CHLORIDE, PRESERVATIVE FREE 10 ML: 5 INJECTION INTRAVENOUS at 22:24

## 2023-01-20 RX ADMIN — RIFAXIMIN 550 MG: 550 TABLET ORAL at 08:50

## 2023-01-20 RX ADMIN — CASTOR OIL AND BALSAM, PERU: 788; 87 OINTMENT TOPICAL at 08:49

## 2023-01-20 RX ADMIN — SODIUM CHLORIDE 40 MG: 9 INJECTION, SOLUTION INTRAMUSCULAR; INTRAVENOUS; SUBCUTANEOUS at 08:46

## 2023-01-20 RX ADMIN — Medication: at 17:05

## 2023-01-20 RX ADMIN — LEVOTHYROXINE SODIUM 150 MCG: 0.15 TABLET ORAL at 05:53

## 2023-01-20 RX ADMIN — CASTOR OIL AND BALSAM, PERU: 788; 87 OINTMENT TOPICAL at 21:00

## 2023-01-20 RX ADMIN — Medication: at 08:49

## 2023-01-20 RX ADMIN — SODIUM CHLORIDE, PRESERVATIVE FREE 10 ML: 5 INJECTION INTRAVENOUS at 06:02

## 2023-01-20 RX ADMIN — Medication 1 AMPULE: at 08:46

## 2023-01-20 RX ADMIN — LACTULOSE 30 ML: 20 SOLUTION ORAL at 17:05

## 2023-01-20 RX ADMIN — LACTULOSE 30 ML: 20 SOLUTION ORAL at 08:51

## 2023-01-20 RX ADMIN — SODIUM CHLORIDE 40 MG: 9 INJECTION, SOLUTION INTRAMUSCULAR; INTRAVENOUS; SUBCUTANEOUS at 22:24

## 2023-01-20 RX ADMIN — SODIUM CHLORIDE, PRESERVATIVE FREE 10 ML: 5 INJECTION INTRAVENOUS at 16:24

## 2023-01-20 RX ADMIN — FUROSEMIDE 20 MG: 40 TABLET ORAL at 08:50

## 2023-01-20 NOTE — PROGRESS NOTES
Transition of Care Plan:     RUR:33%     Disposition: SNF     If SNF or IPR: Date FOC offered: 1/17/23     Date [address] received:1/18/23     Date authorization started with reference number:1/20/23 and liason to provide the reference number . Date authorization received and expires:TBD     Accepting facility: [facility]     Follow up appointments: To be done by facility     DME needed: None     Transportation at Discharge: Family vs BLS     Biron or means to access home:  N/A--Patient to go to facility         IM Medicare Letter: To be given prior to discharge     Is patient a  and connected with the [facility]? No             If yes, was [employer] transfer form completed and VA notified? N/A     Caregiver Contact: Family        Called [facility] and spoke with liason regarding insurance authorization. They sent the insurance auth this morning and she states when she receives it she will let CM know. They do admit over the weekend if auth is obtained and they have a bed. Patient informed and in agreement to go to snf. Caregiver will inform her son. Rama Silva  RN BSN CRM        [phone]

## 2023-01-20 NOTE — PROGRESS NOTES
1905- Bedside shift change report given to MARILUZ Correa RN (oncoming nurse) by SANTOS Castanon (offgoing nurse). Report included the following information SBAR, Kardex, Intake/Output, Med Rec Status, Cardiac Rhythm A paced, and Alarm Parameters . 2000- Shift assessment completed. See flowsheets for details. 2330- Bath completed at this time. 0000- Reassessment completed. See flowsheets for details. Patient desaturating into the low 80s, 2L nasal cannula placed on patient at this time. 0400- Reassessment completed. See flowsheets for details. 0154- Critical lab results- platelets of 28. Provider Perfect Served and notified at this time. 0622- Bedside shift change report given to Ba Gomez RN (oncoming nurse) by [employer], RN (offgoing nurse). Report included the following information SBAR, Kardex, Intake/Output, Recent Results, Cardiac Rhythm A paced, and Alarm Parameters .

## 2023-01-20 NOTE — PROGRESS NOTES
Bedside shift change report given to Abran Mcallister RN (oncoming nurse) by Tiana Banda RN (offgoing nurse). Report included the following information SBAR, Kardex, ED Summary, Procedure Summary, Intake/Output, MAR, Recent Results, Cardiac Rhythm A Paced, Alarm Parameters , Quality Measures, and Dual Neuro Assessment. TRANSFER - OUT REPORT:    Verbal report given to Magalys GRAHAM(name) on Manolo Mcdermott  being transferred to [facility](unit) for routine progression of care       Report consisted of patients Situation, Background, Assessment and   Recommendations(SBAR). Information from the following report(s) SBAR, Kardex, ED Summary, Intake/Output, MAR, Recent Results, Cardiac Rhythm APaced, Alarm Parameters , and Quality Measures was reviewed with the receiving nurse. Lines:   Peripheral IV 01/16/23 Left;Posterior Hand (Active)   Site Assessment Clean, dry, & intact 01/20/23 0800   Phlebitis Assessment 0 01/20/23 0800   Infiltration Assessment 0 01/20/23 0800   Dressing Status Clean, dry, & intact 01/20/23 0800   Dressing Type Tape;Transparent 01/20/23 0800   Hub Color/Line Status Blue;Capped;Flushed 01/20/23 0800   Action Taken Open ports on tubing capped 01/20/23 0800   Alcohol Cap Used Yes 01/20/23 0800       Extended Dwell Peripheral IV (Active)   Criteria for Appropriate Use Limited/no vessel suitable for conventional peripheral access 01/20/23 0800   Site Assessment Clean, dry, & intact 01/20/23 0800   Phlebitis Assessment 0 01/20/23 0800   Infiltration Assessment 0 01/20/23 0800   External Catheter Length (cm) 0 centimeters 01/19/23 1630   Line Status Flushed 01/20/23 0800   Line Care Connections checked and tightened; Chlorhexidine wipes 01/20/23 0800   Alcohol Cap Used Yes 01/20/23 0800   Date of Last Dressing Change 01/17/23 01/20/23 0800   Dressing Type Disk with Chlorhexadine gluconate (CHG); Transparent 01/20/23 0800   Dressing Status Clean, dry, & intact 01/20/23 0400        Opportunity for questions and clarification was provided.       Patient transported with:   Monitor  Registered Nurse  Tech

## 2023-01-21 LAB
ALBUMIN SERPL-MCNC: 2.3 G/DL (ref 3.5–5)
ALBUMIN/GLOB SERPL: 0.7 (ref 1.1–2.2)
ALP SERPL-CCNC: 339 U/L (ref 45–117)
ALT SERPL-CCNC: 37 U/L (ref 12–78)
ANION GAP SERPL CALC-SCNC: 6 MMOL/L (ref 5–15)
AST SERPL-CCNC: 41 U/L (ref 15–37)
BASOPHILS # BLD: 0 K/UL (ref 0–0.1)
BASOPHILS NFR BLD: 0 % (ref 0–1)
BILIRUB SERPL-MCNC: 1 MG/DL (ref 0.2–1)
BUN SERPL-MCNC: 41 MG/DL (ref 6–20)
BUN/CREAT SERPL: 38 (ref 12–20)
CALCIUM SERPL-MCNC: 9.5 MG/DL (ref 8.5–10.1)
CHLORIDE SERPL-SCNC: 109 MMOL/L (ref 97–108)
CO2 SERPL-SCNC: 26 MMOL/L (ref 21–32)
CREAT SERPL-MCNC: 1.09 MG/DL (ref 0.55–1.02)
DIFFERENTIAL METHOD BLD: ABNORMAL
EOSINOPHIL # BLD: 0.3 K/UL (ref 0–0.4)
EOSINOPHIL NFR BLD: 10 % (ref 0–7)
ERYTHROCYTE [DISTWIDTH] IN BLOOD BY AUTOMATED COUNT: 17.5 % (ref 11.5–14.5)
GLOBULIN SER CALC-MCNC: 3.4 G/DL (ref 2–4)
GLUCOSE SERPL-MCNC: 146 MG/DL (ref 65–100)
HCT VFR BLD AUTO: 22.1 % (ref 35–47)
HGB BLD-MCNC: 7.2 G/DL (ref 11.5–16)
IMM GRANULOCYTES # BLD AUTO: 0 K/UL (ref 0–0.04)
IMM GRANULOCYTES NFR BLD AUTO: 0 % (ref 0–0.5)
LYMPHOCYTES # BLD: 0.4 K/UL (ref 0.8–3.5)
LYMPHOCYTES NFR BLD: 15 % (ref 12–49)
MAGNESIUM SERPL-MCNC: 2.4 MG/DL (ref 1.6–2.4)
MCH RBC QN AUTO: 27.6 PG (ref 26–34)
MCHC RBC AUTO-ENTMCNC: 32.6 G/DL (ref 30–36.5)
MCV RBC AUTO: 84.7 FL (ref 80–99)
MONOCYTES # BLD: 0.2 K/UL (ref 0–1)
MONOCYTES NFR BLD: 9 % (ref 5–13)
NEUTS SEG # BLD: 1.6 K/UL (ref 1.8–8)
NEUTS SEG NFR BLD: 66 % (ref 32–75)
NRBC # BLD: 0 K/UL (ref 0–0.01)
NRBC BLD-RTO: 0 PER 100 WBC
PHOSPHATE SERPL-MCNC: 3.9 MG/DL (ref 2.6–4.7)
PLATELET # BLD AUTO: 22 K/UL (ref 150–400)
PLATELET COMMENTS,PCOM: ABNORMAL
PMV BLD AUTO: 11 FL (ref 8.9–12.9)
POTASSIUM SERPL-SCNC: 4.2 MMOL/L (ref 3.5–5.1)
PROT SERPL-MCNC: 5.7 G/DL (ref 6.4–8.2)
RBC # BLD AUTO: 2.61 M/UL (ref 3.8–5.2)
RBC MORPH BLD: ABNORMAL
SODIUM SERPL-SCNC: 141 MMOL/L (ref 136–145)
WBC # BLD AUTO: 2.5 K/UL (ref 3.6–11)

## 2023-01-21 PROCEDURE — 36415 COLL VENOUS BLD VENIPUNCTURE: CPT

## 2023-01-21 PROCEDURE — 80053 COMPREHEN METABOLIC PANEL: CPT

## 2023-01-21 PROCEDURE — 74011250637 HC RX REV CODE- 250/637: Performed by: INTERNAL MEDICINE

## 2023-01-21 PROCEDURE — 74011250636 HC RX REV CODE- 250/636: Performed by: STUDENT IN AN ORGANIZED HEALTH CARE EDUCATION/TRAINING PROGRAM

## 2023-01-21 PROCEDURE — 74011250637 HC RX REV CODE- 250/637: Performed by: GENERAL ACUTE CARE HOSPITAL

## 2023-01-21 PROCEDURE — 83735 ASSAY OF MAGNESIUM: CPT

## 2023-01-21 PROCEDURE — 94760 N-INVAS EAR/PLS OXIMETRY 1: CPT

## 2023-01-21 PROCEDURE — 84100 ASSAY OF PHOSPHORUS: CPT

## 2023-01-21 PROCEDURE — C9113 INJ PANTOPRAZOLE SODIUM, VIA: HCPCS | Performed by: STUDENT IN AN ORGANIZED HEALTH CARE EDUCATION/TRAINING PROGRAM

## 2023-01-21 PROCEDURE — 85025 COMPLETE CBC W/AUTO DIFF WBC: CPT

## 2023-01-21 PROCEDURE — 65270000046 HC RM TELEMETRY

## 2023-01-21 PROCEDURE — 74011250637 HC RX REV CODE- 250/637: Performed by: STUDENT IN AN ORGANIZED HEALTH CARE EDUCATION/TRAINING PROGRAM

## 2023-01-21 PROCEDURE — 74011000250 HC RX REV CODE- 250: Performed by: STUDENT IN AN ORGANIZED HEALTH CARE EDUCATION/TRAINING PROGRAM

## 2023-01-21 RX ADMIN — CASTOR OIL AND BALSAM, PERU: 788; 87 OINTMENT TOPICAL at 18:49

## 2023-01-21 RX ADMIN — SODIUM CHLORIDE, PRESERVATIVE FREE 10 ML: 5 INJECTION INTRAVENOUS at 14:00

## 2023-01-21 RX ADMIN — SODIUM CHLORIDE, PRESERVATIVE FREE 30 ML: 5 INJECTION INTRAVENOUS at 05:00

## 2023-01-21 RX ADMIN — SPIRONOLACTONE 50 MG: 25 TABLET ORAL at 10:40

## 2023-01-21 RX ADMIN — RIFAXIMIN 550 MG: 550 TABLET ORAL at 18:47

## 2023-01-21 RX ADMIN — SODIUM CHLORIDE, PRESERVATIVE FREE 10 ML: 5 INJECTION INTRAVENOUS at 22:00

## 2023-01-21 RX ADMIN — Medication: at 18:49

## 2023-01-21 RX ADMIN — CASTOR OIL AND BALSAM, PERU: 788; 87 OINTMENT TOPICAL at 20:41

## 2023-01-21 RX ADMIN — Medication 1 AMPULE: at 09:00

## 2023-01-21 RX ADMIN — RIFAXIMIN 550 MG: 550 TABLET ORAL at 10:40

## 2023-01-21 RX ADMIN — FUROSEMIDE 20 MG: 40 TABLET ORAL at 10:40

## 2023-01-21 RX ADMIN — LACTULOSE 30 ML: 20 SOLUTION ORAL at 10:39

## 2023-01-21 RX ADMIN — Medication: at 18:48

## 2023-01-21 RX ADMIN — SODIUM CHLORIDE 40 MG: 9 INJECTION, SOLUTION INTRAMUSCULAR; INTRAVENOUS; SUBCUTANEOUS at 10:40

## 2023-01-21 RX ADMIN — LACTULOSE 45 ML: 20 SOLUTION ORAL at 18:47

## 2023-01-21 RX ADMIN — SODIUM CHLORIDE 40 MG: 9 INJECTION, SOLUTION INTRAMUSCULAR; INTRAVENOUS; SUBCUTANEOUS at 20:39

## 2023-01-21 RX ADMIN — Medication 1 AMPULE: at 20:39

## 2023-01-21 RX ADMIN — LEVOTHYROXINE SODIUM 150 MCG: 0.15 TABLET ORAL at 06:29

## 2023-01-21 NOTE — PROGRESS NOTES
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 Hgb currently stable 7.5 to 8.1  S/p octreotide  Cont' with PPI BID, IV rocephin for SBP prophylaxis  Con't lasix, aldactone, xifaxan, lactulose  for liver cirrhosis managemnt  Appreciate GI following, not recommending TIPS at this time. No repeat EGD       Last EGD 2022 Findings:   OROPHARYNX: Cords and pyriform recesses normal.   ESOPHAGUS: The Z-Line is intact. There are low grade esophageal varices, that do not completely flatten with insufflation, likely represent grade I/II. No stigmata of recent bleeding. STOMACH:   -- gastric mucosa is friable and edematous, consistent with portal hypertensive gastropathy  -- The fundus on antegrade and retroflex views is otherwise normal.   -- Antrum has improved compared to previous EGD's, and the focal GAVE vs portal hypertensive gastropathy is minimal now, but again treated with APC today, given it's favorable response to APC these past several months. SMALL INTESTINE: colonoscope smoothly passes to the distal duodenum, though I did not reach previously tattooed site. No AVMs found, but the duodenum is generally friable and scope trauma causes a few scant erosions. FU MRI/MRCP for pancreatic cystic lesion - last scan 3/2021      MRI with pancreatic lesion stable      + ascites  Trend labs  Cleared by GI for discharge        Discussed MRI results, felt to hold paracentesis, follow up at [email or web address]  PT recommending SNF,awaiting insurance approval    Code status: Partial code  Prophylaxis:   Recommended Disposition:        Subjective:     Chief Complaint / Reason for Physician Visit  NAD. No new complaint today.     No abdominal pain or N/V  No further bleeding  Awaiting insurance approval for SNF   Discussed with RN events overnight. Review of Systems:  Symptom Y/N Comments  Symptom Y/N Comments   Fever/Chills n   Chest Pain n    Poor Appetite    Edema     Cough n   Abdominal Pain n    Sputum    Joint Pain     SOB/HENLEY n   Pruritis/Rash     Nausea/vomit n   Tolerating PT/OT     Diarrhea n   Tolerating Diet y    Constipation    Other       Could NOT obtain due to:      Objective:     VITALS:   Last 24hrs VS reviewed since prior progress note. Most recent are:  Patient Vitals for the past 24 hrs:   Temp Pulse Resp BP SpO2   01/20/23 1948 98.2 °F (36.8 °C) 66 16 (!) 146/81 98 %   01/20/23 1740 98.2 °F (36.8 °C) 62 18 (!) 151/49 97 %   01/20/23 1600 97.9 °F (36.6 °C) 60 19 (!) 141/54 98 %   01/20/23 1200 98 °F (36.7 °C) 65 20 (!) 144/47 100 %   01/20/23 0800 98.7 °F (37.1 °C) 60 17 (!) 144/54 100 %   01/20/23 0600 -- 68 18 (!) 140/73 100 %   01/20/23 0400 98.6 °F (37 °C) 60 17 (!) 150/62 99 %   01/20/23 0200 -- 60 16 (!) 124/46 99 %   01/20/23 0000 97.8 °F (36.6 °C) 60 17 (!) 133/55 100 %   01/19/23 2200 -- 60 16 (!) 134/57 95 %         Intake/Output Summary (Last 24 hours) at 1/20/2023 2159  Last data filed at 1/20/2023 1626  Gross per 24 hour   Intake 530 ml   Output 1150 ml   Net -620 ml          I had a face to face encounter and independently examined this patient on 1/20/2023, as outlined below:  PHYSICAL EXAM:  General: WD, WN. Alert, cooperative, no acute distress    EENT:  Anicteric sclerae. MMM  Resp:  CTA bilaterally, no wheezing or rales. No accessory muscle use  CV:  Regular  rhythm,  No edema  GI:  Soft, Non distended, Non tender. +Bowel sounds  Neurologic:  Alert and oriented X 3, normal speech  Psych:   Good insight. Not anxious nor agitated  Skin:  No rashes.   No jaundice    Reviewed most current lab test results and cultures  YES  Reviewed most current radiology test results   YES  Review and summation of old records today    NO  Reviewed patient's current orders and MAR    YES  PMH/SH reviewed - no change compared to H&P  ________________________________________________________________________  Care Plan discussed with:    Comments   Patient x    Family      RN x    Care Manager     Consultant                        Multidiciplinary team rounds were held today with , nursing, pharmacist and clinical coordinator. Patient's plan of care was discussed; medications were reviewed and discharge planning was addressed. ________________________________________________________________________      Comments   >50% of visit spent in counseling and coordination of care     ________________________________________________________________________  Can Moseley MD     Procedures: see electronic medical records for all procedures/Xrays and details which were not copied into this note but were reviewed prior to creation of Plan. LABS:  I reviewed today's most current labs and imaging studies.   Pertinent labs include:  Recent Labs     01/20/23  0421 01/19/23  0359 01/18/23  1004   WBC 2.7* 3.3* 3.6   HGB 8.2* 7.8* 8.1*   HCT 25.3* 23.3* 24.8*   PLT 28* 34* 36*       Recent Labs     01/20/23  0421 01/19/23  0359 01/18/23  0441    141 140   K 4.5 4.3 4.2   * 110* 110*   CO2 27 25 24   * 142* 152*   BUN 43* 48* 53*   CREA 1.17* 1.26* 1.36*   CA 9.5 8.8 8.7   MG 2.7* 2.7* 2.8*   PHOS 3.9 3.8 4.1   ALB 2.5* 2.4* 2.4*   TBILI 1.1* 1.3* 1.6*   ALT 32 34 34         Signed: Can Moseley MD

## 2023-01-21 NOTE — PROGRESS NOTES
1/21/23 5:39 AM  [phone] Hospital or Facility: [facility] From: Eugene Salcido RE: Edward Lj 1945 RM: [id number] [facility] called a critical platelet of 22, was 28 on 1/20/23. ...thanks Need Callback: NO CALLBACK [address] 5:40 AM  1/21/23 5:40 AM  Thank Ko Solis

## 2023-01-21 NOTE — PROGRESS NOTES
Bedside shift change report given to [address] [place] [address] (oncoming nurse) by Matilde Patino RN (offgoing nurse). Report included the following information SBAR, Kardex, Intake/Output, and Cardiac Rhythm A-paced .

## 2023-01-21 NOTE — PROGRESS NOTES
Problem: Pain  Goal: *Control of Pain  Outcome: Progressing Towards Goal     Problem: Falls - Risk of  Goal: *Absence of Falls  Description: Document [place] Fall Risk and appropriate interventions in the flowsheet. Outcome: Progressing Towards Goal  Note: Fall Risk Interventions:                                Problem: Pressure Injury - Risk of  Goal: *Prevention of pressure injury  Description: Document Boris Scale and appropriate interventions in the flowsheet.   Outcome: Progressing Towards Goal  Note: Pressure Injury Interventions:  Sensory Interventions: Assess changes in LOC, Keep linens dry and wrinkle-free, Float heels, Minimize linen layers    Moisture Interventions: Internal/External urinary devices, Minimize layers    Activity Interventions: Increase time out of bed    Mobility Interventions: Float heels, HOB 30 degrees or less    Nutrition Interventions: Document food/fluid/supplement intake    Friction and Shear Interventions: Apply protective barrier, creams and emollients, Minimize layers

## 2023-01-22 LAB
ERYTHROCYTE [DISTWIDTH] IN BLOOD BY AUTOMATED COUNT: 17.5 % (ref 11.5–14.5)
HCT VFR BLD AUTO: 23 % (ref 35–47)
HGB BLD-MCNC: 7.4 G/DL (ref 11.5–16)
MCH RBC QN AUTO: 27.2 PG (ref 26–34)
MCHC RBC AUTO-ENTMCNC: 32.2 G/DL (ref 30–36.5)
MCV RBC AUTO: 84.6 FL (ref 80–99)
NRBC # BLD: 0 K/UL (ref 0–0.01)
NRBC BLD-RTO: 0 PER 100 WBC
PLATELET # BLD AUTO: 24 K/UL (ref 150–400)
PMV BLD AUTO: 10.1 FL (ref 8.9–12.9)
RBC # BLD AUTO: 2.72 M/UL (ref 3.8–5.2)
WBC # BLD AUTO: 3.1 K/UL (ref 3.6–11)

## 2023-01-22 PROCEDURE — 74011250636 HC RX REV CODE- 250/636: Performed by: STUDENT IN AN ORGANIZED HEALTH CARE EDUCATION/TRAINING PROGRAM

## 2023-01-22 PROCEDURE — 74011250637 HC RX REV CODE- 250/637: Performed by: INTERNAL MEDICINE

## 2023-01-22 PROCEDURE — C9113 INJ PANTOPRAZOLE SODIUM, VIA: HCPCS | Performed by: STUDENT IN AN ORGANIZED HEALTH CARE EDUCATION/TRAINING PROGRAM

## 2023-01-22 PROCEDURE — 74011250637 HC RX REV CODE- 250/637: Performed by: STUDENT IN AN ORGANIZED HEALTH CARE EDUCATION/TRAINING PROGRAM

## 2023-01-22 PROCEDURE — 74011000250 HC RX REV CODE- 250: Performed by: STUDENT IN AN ORGANIZED HEALTH CARE EDUCATION/TRAINING PROGRAM

## 2023-01-22 PROCEDURE — 94760 N-INVAS EAR/PLS OXIMETRY 1: CPT

## 2023-01-22 PROCEDURE — 74011250637 HC RX REV CODE- 250/637: Performed by: GENERAL ACUTE CARE HOSPITAL

## 2023-01-22 PROCEDURE — 65270000046 HC RM TELEMETRY

## 2023-01-22 PROCEDURE — 85027 COMPLETE CBC AUTOMATED: CPT

## 2023-01-22 PROCEDURE — 36415 COLL VENOUS BLD VENIPUNCTURE: CPT

## 2023-01-22 RX ADMIN — SODIUM CHLORIDE, PRESERVATIVE FREE 10 ML: 5 INJECTION INTRAVENOUS at 05:16

## 2023-01-22 RX ADMIN — LACTULOSE 45 ML: 20 SOLUTION ORAL at 17:41

## 2023-01-22 RX ADMIN — RIFAXIMIN 550 MG: 550 TABLET ORAL at 17:41

## 2023-01-22 RX ADMIN — Medication: at 09:00

## 2023-01-22 RX ADMIN — CASTOR OIL AND BALSAM, PERU: 788; 87 OINTMENT TOPICAL at 21:00

## 2023-01-22 RX ADMIN — LEVOTHYROXINE SODIUM 150 MCG: 0.15 TABLET ORAL at 05:16

## 2023-01-22 RX ADMIN — SODIUM CHLORIDE 40 MG: 9 INJECTION, SOLUTION INTRAMUSCULAR; INTRAVENOUS; SUBCUTANEOUS at 09:01

## 2023-01-22 RX ADMIN — LACTULOSE 45 ML: 20 SOLUTION ORAL at 09:01

## 2023-01-22 RX ADMIN — SODIUM CHLORIDE, PRESERVATIVE FREE 10 ML: 5 INJECTION INTRAVENOUS at 13:07

## 2023-01-22 RX ADMIN — SODIUM CHLORIDE, PRESERVATIVE FREE 30 ML: 5 INJECTION INTRAVENOUS at 21:00

## 2023-01-22 RX ADMIN — SODIUM CHLORIDE 40 MG: 9 INJECTION, SOLUTION INTRAMUSCULAR; INTRAVENOUS; SUBCUTANEOUS at 21:00

## 2023-01-22 RX ADMIN — CASTOR OIL AND BALSAM, PERU: 788; 87 OINTMENT TOPICAL at 09:00

## 2023-01-22 RX ADMIN — Medication: at 17:44

## 2023-01-22 RX ADMIN — RIFAXIMIN 550 MG: 550 TABLET ORAL at 09:01

## 2023-01-22 RX ADMIN — FUROSEMIDE 20 MG: 40 TABLET ORAL at 09:01

## 2023-01-22 RX ADMIN — SPIRONOLACTONE 50 MG: 25 TABLET ORAL at 09:01

## 2023-01-22 NOTE — PROGRESS NOTES
Problem: Pain  Goal: *Control of Pain  Outcome: Progressing Towards Goal  Goal: *PALLIATIVE CARE:  Alleviation of Pain  Outcome: Progressing Towards Goal     Problem: Patient Education: Go to Patient Education Activity  Goal: Patient/Family Education  Outcome: Progressing Towards Goal     Problem: Falls - Risk of  Goal: *Absence of Falls  Description: Document [place] Fall Risk and appropriate interventions in the flowsheet. Outcome: Progressing Towards Goal  Note: Fall Risk Interventions:                                Problem: Patient Education: Go to Patient Education Activity  Goal: Patient/Family Education  Outcome: Progressing Towards Goal     Problem: General Infection Care Plan (Adult and Pediatric)  Goal: Improvement in signs and symptoms of infection  Outcome: Progressing Towards Goal  Goal: *Optimize nutritional status  Outcome: Progressing Towards Goal     Problem: Patient Education: Go to Patient Education Activity  Goal: Patient/Family Education  Outcome: Progressing Towards Goal     Problem: Pressure Injury - Risk of  Goal: *Prevention of pressure injury  Description: Document Boris Scale and appropriate interventions in the flowsheet.   Outcome: Progressing Towards Goal  Note: Pressure Injury Interventions:  Sensory Interventions: Assess changes in LOC    Moisture Interventions: Absorbent underpads, Internal/External urinary devices    Activity Interventions: Increase time out of bed    Mobility Interventions: Float heels    Nutrition Interventions: Document food/fluid/supplement intake    Friction and Shear Interventions: Apply protective barrier, creams and emollients, Minimize layers                Problem: Patient Education: Go to Patient Education Activity  Goal: Patient/Family Education  Outcome: Progressing Towards Goal     Problem: Patient Education: Go to Patient Education Activity  Goal: Patient/Family Education  Outcome: Progressing Towards Goal     Problem: Patient Education: Go to Patient Education Activity  Goal: Patient/Family Education  Outcome: Progressing Towards Goal

## 2023-01-22 NOTE — PROGRESS NOTES
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 Hgb currently stable 7.5 to 8.1  S/p octreotide  Cont' with PPI BID, IV rocephin for SBP prophylaxis  Con't lasix, aldactone, xifaxan, lactulose  for liver cirrhosis managemnt  Appreciate GI following, not recommending TIPS at this time. No repeat EGD  Last EGD 2022 Findings:   ESOPHAGUS: The Z-Line is intact. There are low grade esophageal varices, that do not completely flatten with insufflation, likely represent grade I/II. No stigmata of recent bleeding. STOMACH:   -- gastric mucosa is friable and edematous, consistent with portal hypertensive gastropathy  -- The fundus on antegrade and retroflex views is otherwise normal.   -- Antrum has improved compared to previous EGD's, and the focal GAVE vs portal hypertensive gastropathy is minimal now, but again treated with APC today, given it's favorable response to APC these past several months. SMALL INTESTINE: colonoscope smoothly passes to the distal duodenum, though I did not reach previously tattooed site. No AVMs found, but the duodenum is generally friable and scope trauma causes a few scant erosions.   FU MRI/MRCP for pancreatic cystic lesion - last scan 3/2021      MRI with pancreatic lesion stable,  + ascites  Cleared by GI for discharge  Discussed MRI results, felt to hold paracentesis, follow up at [address] [place] [address]  PT recommending SNF,awaiting insurance approval  Plts count dropping, cont to monitor, no active bleeding  Monitor Plts  Iron transfusion   Hem consult in AM    Code status: Partial code  Prophylaxis:   Recommended Disposition:        Subjective:     Chief Complaint / Reason for Physician Visit  NAD. No new complaint today. No abdominal pain or N/V  No further bleeding  Awaiting insurance approval for SNF  Discussed with RN events overnight. Review of Systems:  Symptom Y/N Comments  Symptom Y/N Comments   Fever/Chills n   Chest Pain n    Poor Appetite    Edema     Cough n   Abdominal Pain n    Sputum    Joint Pain     SOB/HENLEY n   Pruritis/Rash     Nausea/vomit n   Tolerating PT/OT     Diarrhea n   Tolerating Diet y    Constipation    Other       Could NOT obtain due to:      Objective:     VITALS:   Last 24hrs VS reviewed since prior progress note. Most recent are:  Patient Vitals for the past 24 hrs:   Temp Pulse Resp BP SpO2   01/22/23 0857 97.7 °F (36.5 °C) (!) 59 18 (!) 154/55 98 %   01/22/23 0341 98.4 °F (36.9 °C) 60 17 (!) 146/51 97 %   01/21/23 2048 98.1 °F (36.7 °C) 61 16 (!) 142/52 97 %   01/21/23 1552 98.2 °F (36.8 °C) 63 18 (!) 137/54 96 %         Intake/Output Summary (Last 24 hours) at 1/22/2023 1018  Last data filed at 1/22/2023 0341  Gross per 24 hour   Intake 400 ml   Output 450 ml   Net -50 ml          I had a face to face encounter and independently examined this patient on 1/22/2023, as outlined below:  PHYSICAL EXAM:  General: WD, WN. Alert, cooperative, no acute distress    EENT:  Anicteric sclerae. MMM  Resp:  CTA bilaterally, no wheezing or rales. No accessory muscle use  CV:  Regular  rhythm,  No edema  GI:  Soft, Non distended, Non tender. +Bowel sounds  Neurologic:  Alert and oriented X 3, normal speech  Psych:   Good insight. Not anxious nor agitated  Skin:  No rashes.   No jaundice    Reviewed most current lab test results and cultures  YES  Reviewed most current radiology test results   YES  Review and summation of old records today    NO  Reviewed patient's current orders and MAR    YES  PMH/SH reviewed - no change compared to H&P  ________________________________________________________________________  Care Plan discussed with:    Comments   Patient x Family      RN x    Care Manager     Consultant                        Multidiciplinary team rounds were held today with , nursing, pharmacist and clinical coordinator. Patient's plan of care was discussed; medications were reviewed and discharge planning was addressed. ________________________________________________________________________      Comments   >50% of visit spent in counseling and coordination of care     ________________________________________________________________________  Burnard Goltz, MD     Procedures: see electronic medical records for all procedures/Xrays and details which were not copied into this note but were reviewed prior to creation of Plan. LABS:  I reviewed today's most current labs and imaging studies.   Pertinent labs include:  Recent Labs     01/22/23  0541 01/21/23  0428 01/20/23  0421   WBC 3.1* 2.5* 2.7*   HGB 7.4* 7.2* 8.2*   HCT 23.0* 22.1* 25.3*   PLT 24* 22* 28*       Recent Labs     01/21/23  0428 01/20/23  0421    141   K 4.2 4.5   * 110*   CO2 26 27   * 150*   BUN 41* 43*   CREA 1.09* 1.17*   CA 9.5 9.5   MG 2.4 2.7*   PHOS 3.9 3.9   ALB 2.3* 2.5*   TBILI 1.0 1.1*   ALT 37 32         Signed: Burnard Goltz, MD

## 2023-01-22 NOTE — PROGRESS NOTES
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 Hgb currently stable 7.5 to 8.1  S/p octreotide  Cont' with PPI BID, IV rocephin for SBP prophylaxis  Con't lasix, aldactone, xifaxan, lactulose  for liver cirrhosis managemnt  Appreciate GI following, not recommending TIPS at this time. No repeat EGD       Last EGD 2022 Findings:   OROPHARYNX: Cords and pyriform recesses normal.   ESOPHAGUS: The Z-Line is intact. There are low grade esophageal varices, that do not completely flatten with insufflation, likely represent grade I/II. No stigmata of recent bleeding. STOMACH:   -- gastric mucosa is friable and edematous, consistent with portal hypertensive gastropathy  -- The fundus on antegrade and retroflex views is otherwise normal.   -- Antrum has improved compared to previous EGD's, and the focal GAVE vs portal hypertensive gastropathy is minimal now, but again treated with APC today, given it's favorable response to APC these past several months. SMALL INTESTINE: colonoscope smoothly passes to the distal duodenum, though I did not reach previously tattooed site. No AVMs found, but the duodenum is generally friable and scope trauma causes a few scant erosions.   FU MRI/MRCP for pancreatic cystic lesion - last scan 3/2021      MRI with pancreatic lesion stable      + ascites  Trend labs  Cleared by GI for discharge        Discussed MRI results, felt to hold paracentesis, follow up at [facility]  PT recommending SNF,awaiting insurance approval  Plts count dropping, cont to monitor, no active bleeding    Code status: Partial code  Prophylaxis:   Recommended Disposition:        Subjective:     Chief Complaint / Reason for Physician Visit  NAD. No new complaint today. No abdominal pain or N/V  No further bleeding  Awaiting insurance approval for SNF   Discussed with RN events overnight. Review of Systems:  Symptom Y/N Comments  Symptom Y/N Comments   Fever/Chills n   Chest Pain n    Poor Appetite    Edema     Cough n   Abdominal Pain n    Sputum    Joint Pain     SOB/HENLEY n   Pruritis/Rash     Nausea/vomit n   Tolerating PT/OT     Diarrhea n   Tolerating Diet y    Constipation    Other       Could NOT obtain due to:      Objective:     VITALS:   Last 24hrs VS reviewed since prior progress note. Most recent are:  Patient Vitals for the past 24 hrs:   Temp Pulse Resp BP SpO2   01/21/23 2048 98.1 °F (36.7 °C) 61 16 (!) 142/52 97 %   01/21/23 1552 98.2 °F (36.8 °C) 63 18 (!) 137/54 96 %   01/21/23 0824 97.5 °F (36.4 °C) 61 22 (!) 145/53 100 %   01/21/23 0445 98.8 °F (37.1 °C) 60 17 (!) 147/52 97 %   01/21/23 0044 -- 64 16 (!) 137/54 97 %         Intake/Output Summary (Last 24 hours) at 1/22/2023 0015  Last data filed at 1/21/2023 0569  Gross per 24 hour   Intake 100 ml   Output 500 ml   Net -400 ml          I had a face to face encounter and independently examined this patient on 1/22/2023, as outlined below:  PHYSICAL EXAM:  General: WD, WN. Alert, cooperative, no acute distress    EENT:  Anicteric sclerae. MMM  Resp:  CTA bilaterally, no wheezing or rales. No accessory muscle use  CV:  Regular  rhythm,  No edema  GI:  Soft, Non distended, Non tender. +Bowel sounds  Neurologic:  Alert and oriented X 3, normal speech  Psych:   Good insight. Not anxious nor agitated  Skin:  No rashes.   No jaundice    Reviewed most current lab test results and cultures  YES  Reviewed most current radiology test results   YES  Review and summation of old records today    NO  Reviewed patient's current orders and MAR    YES  PMH/SH reviewed - no change compared to H&P  ________________________________________________________________________  Care Plan discussed with:    Comments   Patient x    Family      RN x    Care Manager     Consultant                        Multidiciplinary team rounds were held today with , nursing, pharmacist and clinical coordinator. Patient's plan of care was discussed; medications were reviewed and discharge planning was addressed. ________________________________________________________________________      Comments   >50% of visit spent in counseling and coordination of care     ________________________________________________________________________  Naseem Casiano MD     Procedures: see electronic medical records for all procedures/Xrays and details which were not copied into this note but were reviewed prior to creation of Plan. LABS:  I reviewed today's most current labs and imaging studies.   Pertinent labs include:  Recent Labs     01/21/23 0428 01/20/23 0421 01/19/23  0359   WBC 2.5* 2.7* 3.3*   HGB 7.2* 8.2* 7.8*   HCT 22.1* 25.3* 23.3*   PLT 22* 28* 34*       Recent Labs     01/21/23 0428 01/20/23 0421 01/19/23  0359    141 141   K 4.2 4.5 4.3   * 110* 110*   CO2 26 27 25   * 150* 142*   BUN 41* 43* 48*   CREA 1.09* 1.17* 1.26*   CA 9.5 9.5 8.8   MG 2.4 2.7* 2.7*   PHOS 3.9 3.9 3.8   ALB 2.3* 2.5* 2.4*   TBILI 1.0 1.1* 1.3*   ALT 37 32 34         Signed: Naseem Casiano MD

## 2023-01-22 NOTE — PROGRESS NOTES
Problem: Pain  Goal: *Control of Pain  Outcome: Progressing Towards Goal  Goal: *PALLIATIVE CARE:  Alleviation of Pain  Outcome: Progressing Towards Goal     Problem: Patient Education: Go to Patient Education Activity  Goal: Patient/Family Education  Outcome: Progressing Towards Goal     Problem: Falls - Risk of  Goal: *Absence of Falls  Description: Document [place] Fall Risk and appropriate interventions in the flowsheet. Outcome: Progressing Towards Goal  Note: Fall Risk Interventions:                                Problem: Patient Education: Go to Patient Education Activity  Goal: Patient/Family Education  Outcome: Progressing Towards Goal     Problem: General Infection Care Plan (Adult and Pediatric)  Goal: Improvement in signs and symptoms of infection  Outcome: Progressing Towards Goal  Goal: *Optimize nutritional status  Outcome: Progressing Towards Goal     Problem: Patient Education: Go to Patient Education Activity  Goal: Patient/Family Education  Outcome: Progressing Towards Goal     Problem: Pressure Injury - Risk of  Goal: *Prevention of pressure injury  Description: Document Boris Scale and appropriate interventions in the flowsheet.   Outcome: Progressing Towards Goal  Note: Pressure Injury Interventions:  Sensory Interventions: Keep linens dry and wrinkle-free    Moisture Interventions: Apply protective barrier, creams and emollients, Absorbent underpads    Activity Interventions: Increase time out of bed    Mobility Interventions: Pressure redistribution bed/mattress (bed type)    Nutrition Interventions: Document food/fluid/supplement intake, Discuss nutritional consult with provider, Offer support with meals,snacks and hydration    Friction and Shear Interventions: Apply protective barrier, creams and emollients, Minimize layers                Problem: Patient Education: Go to Patient Education Activity  Goal: Patient/Family Education  Outcome: Progressing Towards Goal     Problem: Patient Education: Go to Patient Education Activity  Goal: Patient/Family Education  Outcome: Progressing Towards Goal     Problem: Patient Education: Go to Patient Education Activity  Goal: Patient/Family Education  Outcome: Progressing Towards Goal

## 2023-01-22 NOTE — PROGRESS NOTES
Patient had episode of nose bleeding, minimal in amount, applied pressure and ice packs, informed on call. Bleeding stopped after few minutes. Bedside shift change report given to Kendell Jones (oncoming nurse) by Damaris Dykes RN (offgoing nurse). Report included the following information SBAR, Kardex, MAR, Recent Results, and Cardiac Rhythm A paced .

## 2023-01-23 LAB
ERYTHROCYTE [DISTWIDTH] IN BLOOD BY AUTOMATED COUNT: 17.6 % (ref 11.5–14.5)
FERRITIN SERPL-MCNC: 4715 NG/ML (ref 8–252)
FOLATE SERPL-MCNC: 14.6 NG/ML (ref 5–21)
HCT VFR BLD AUTO: 22.2 % (ref 35–47)
HGB BLD-MCNC: 7.1 G/DL (ref 11.5–16)
IRON SATN MFR SERPL: 92 % (ref 20–50)
IRON SERPL-MCNC: 238 UG/DL (ref 35–150)
MCH RBC QN AUTO: 27.2 PG (ref 26–34)
MCHC RBC AUTO-ENTMCNC: 32 G/DL (ref 30–36.5)
MCV RBC AUTO: 85.1 FL (ref 80–99)
NRBC # BLD: 0 K/UL (ref 0–0.01)
NRBC BLD-RTO: 0 PER 100 WBC
PLATELET # BLD AUTO: 23 K/UL (ref 150–400)
PMV BLD AUTO: 10.3 FL (ref 8.9–12.9)
RBC # BLD AUTO: 2.61 M/UL (ref 3.8–5.2)
TIBC SERPL-MCNC: 260 UG/DL (ref 250–450)
VIT B12 SERPL-MCNC: 766 PG/ML (ref 193–986)
WBC # BLD AUTO: 3 K/UL (ref 3.6–11)

## 2023-01-23 PROCEDURE — 74011250637 HC RX REV CODE- 250/637: Performed by: INTERNAL MEDICINE

## 2023-01-23 PROCEDURE — 85027 COMPLETE CBC AUTOMATED: CPT

## 2023-01-23 PROCEDURE — 74011250637 HC RX REV CODE- 250/637: Performed by: STUDENT IN AN ORGANIZED HEALTH CARE EDUCATION/TRAINING PROGRAM

## 2023-01-23 PROCEDURE — 82746 ASSAY OF FOLIC ACID SERUM: CPT

## 2023-01-23 PROCEDURE — 74011250636 HC RX REV CODE- 250/636: Performed by: GENERAL ACUTE CARE HOSPITAL

## 2023-01-23 PROCEDURE — 74011250637 HC RX REV CODE- 250/637: Performed by: GENERAL ACUTE CARE HOSPITAL

## 2023-01-23 PROCEDURE — 65270000046 HC RM TELEMETRY

## 2023-01-23 PROCEDURE — 94760 N-INVAS EAR/PLS OXIMETRY 1: CPT

## 2023-01-23 PROCEDURE — 83540 ASSAY OF IRON: CPT

## 2023-01-23 PROCEDURE — C9113 INJ PANTOPRAZOLE SODIUM, VIA: HCPCS | Performed by: STUDENT IN AN ORGANIZED HEALTH CARE EDUCATION/TRAINING PROGRAM

## 2023-01-23 PROCEDURE — 74011000250 HC RX REV CODE- 250: Performed by: STUDENT IN AN ORGANIZED HEALTH CARE EDUCATION/TRAINING PROGRAM

## 2023-01-23 PROCEDURE — 74011250636 HC RX REV CODE- 250/636: Performed by: STUDENT IN AN ORGANIZED HEALTH CARE EDUCATION/TRAINING PROGRAM

## 2023-01-23 PROCEDURE — 82607 VITAMIN B-12: CPT

## 2023-01-23 PROCEDURE — 82728 ASSAY OF FERRITIN: CPT

## 2023-01-23 PROCEDURE — 36415 COLL VENOUS BLD VENIPUNCTURE: CPT

## 2023-01-23 RX ORDER — SPIRONOLACTONE 25 MG/1
50 TABLET ORAL 2 TIMES DAILY
Status: DISCONTINUED | OUTPATIENT
Start: 2023-01-23 | End: 2023-01-31 | Stop reason: HOSPADM

## 2023-01-23 RX ORDER — FUROSEMIDE 20 MG/1
20 TABLET ORAL
Status: DISCONTINUED | OUTPATIENT
Start: 2023-01-23 | End: 2023-01-31 | Stop reason: HOSPADM

## 2023-01-23 RX ADMIN — SODIUM CHLORIDE, PRESERVATIVE FREE 10 ML: 5 INJECTION INTRAVENOUS at 18:23

## 2023-01-23 RX ADMIN — FUROSEMIDE 20 MG: 20 TABLET ORAL at 18:20

## 2023-01-23 RX ADMIN — Medication: at 18:23

## 2023-01-23 RX ADMIN — RIFAXIMIN 550 MG: 550 TABLET ORAL at 10:17

## 2023-01-23 RX ADMIN — SODIUM CHLORIDE, PRESERVATIVE FREE 10 ML: 5 INJECTION INTRAVENOUS at 22:57

## 2023-01-23 RX ADMIN — LACTULOSE 45 ML: 20 SOLUTION ORAL at 18:20

## 2023-01-23 RX ADMIN — POLYETHYLENE GLYCOL 3350 17 G: 17 POWDER, FOR SOLUTION ORAL at 18:20

## 2023-01-23 RX ADMIN — RIFAXIMIN 550 MG: 550 TABLET ORAL at 18:20

## 2023-01-23 RX ADMIN — CASTOR OIL AND BALSAM, PERU: 788; 87 OINTMENT TOPICAL at 20:47

## 2023-01-23 RX ADMIN — SODIUM CHLORIDE 40 MG: 9 INJECTION, SOLUTION INTRAMUSCULAR; INTRAVENOUS; SUBCUTANEOUS at 10:17

## 2023-01-23 RX ADMIN — SODIUM CHLORIDE, PRESERVATIVE FREE 10 ML: 5 INJECTION INTRAVENOUS at 05:57

## 2023-01-23 RX ADMIN — CASTOR OIL AND BALSAM, PERU: 788; 87 OINTMENT TOPICAL at 10:20

## 2023-01-23 RX ADMIN — SPIRONOLACTONE 50 MG: 25 TABLET ORAL at 10:17

## 2023-01-23 RX ADMIN — LACTULOSE 45 ML: 20 SOLUTION ORAL at 10:19

## 2023-01-23 RX ADMIN — FUROSEMIDE 20 MG: 40 TABLET ORAL at 10:17

## 2023-01-23 RX ADMIN — SODIUM CHLORIDE 40 MG: 9 INJECTION, SOLUTION INTRAMUSCULAR; INTRAVENOUS; SUBCUTANEOUS at 20:47

## 2023-01-23 RX ADMIN — Medication: at 10:20

## 2023-01-23 RX ADMIN — IRON SUCROSE 300 MG: 20 INJECTION, SOLUTION INTRAVENOUS at 13:28

## 2023-01-23 RX ADMIN — SPIRONOLACTONE 50 MG: 25 TABLET ORAL at 18:20

## 2023-01-23 RX ADMIN — LEVOTHYROXINE SODIUM 150 MCG: 0.15 TABLET ORAL at 05:56

## 2023-01-23 NOTE — PROGRESS NOTES
Transition of Care Plan:     RUR:33%  Disposition: SNF ([address] [place] [address])  If SNF or IPR: Date FOC offered: 1/17/23  Date [address] received:1/18/23  Date authorization started with reference number:1/20/23 and lalo to provide the reference number . Date authorization received and expires: 1/23/23  Accepting facility: [address] [place] [address]  Follow up appointments: To be done by facility  DME needed: None  Transportation at Discharge: Family vs BLS  [place] or means to access home:  N/A--Patient to go to facility     Medicare Letter: To be given prior to discharge  Is patient a [place] and connected with the [place]? No  If yes, was [place] transfer form completed and [facility] notified? N/A  Caregiver Contact: Family    Chart reviewed. CM continuing to follow for discharge planning. Received call from [facility] at [address] [place] [address] who confirmed that they have obtained insurance approval for SNF placement and bed is available for pt to admit today. Awaiting Hem/Onc consult and medical clearance. Anticipate d/c in 24-48 hrs. Facility aware of this. Will continue to follow.     Heidy Tam, MSW   Care Manager, [facility]

## 2023-01-23 NOTE — CONSULTS
[address] [facility] [address], [address] [facility], [address] [place] [address]  [phone]    Hematology Note        Patient: Lakeisha Larios MRN: [id number]  SSN: [id number]    YOB: 1945  Age: 68 y.o. Sex: female      Subjective:      Lakeisha Larios is a 68 y.o. female who is seen in the OP for iron deficiency anemia. She has suffered with iron deficiency anemia for over a yr. She suffers with GAVE and chronic bleeding requiring regular iron infusion and RBC transfusion. She also suffers with BRADY cirrhosis. Interval History:     1/23/2023 Patient was admitted for CARLINE and GI bleed in November 2022. She has been followed in the outpatient center for CARLINE and iron infusions. She was actually due for outpatient iron infusions on 1/19. This was canceled due to hospitalization. She is still admitted to [facility] for acute on chronic anemia due to upper GI bleed and worsening thrombocytopenia.     Review of Systems:  Constitutional: negative  Eyes: negative  Ears, Nose, Mouth, Throat, and Face: negative  Respiratory: negative  Cardiovascular: negative  Gastrointestinal: negative  Genitourinary:negative  Integument/Breast: negative  Hematologic/Lymphatic: negative  Musculoskeletal:negative  Neurological: negative      Past Medical History:   Diagnosis Date    Abnormal nuclear stress test 10/04/2021    Anemia     Angina at rest [facility]) 10/04/2021    Arthritis     KNEE,gout    Bundle branch block     Endocrine disease     HYPOTHYROIDISM    Fracture     Left distal femur (age 12 - pt fell)    Fracture     Left tibia 1990 (pt fell)    Fracture     Right wrist fractured 2 times (pt fell)    GERD (gastroesophageal reflux disease)     High cholesterol     Hypertension     Hypoglycemia     \"my body produces too much insulin\"    Liver disease     BRADY    Nausea & vomiting     when had gallbladder out    Osteoporosis Other ill-defined conditions(799.89)     edema legs    S/P cardiac cath 10/04/2021    10/4/2021 nonobstructive disease    Spinal stenosis     Thyroid disease      Past Surgical History:   Procedure Laterality Date    COLONOSCOPY N/A 7/13/2021    COLONOSCOPY performed by Miguel Ángel Cid MD at \[facility]\"" ENDOSCOPY    COLONOSCOPY N/A 8/8/2022    COLONOSCOPY performed by Mary Jay MD at [place] [address] [address]  2/11/2015         COLONOSCOPY,REMV [address]  7/13/2021         COLONOSCPY,FLEX,W/ [address] INJECT  7/13/2021         COLORECTAL SCRN; HI RISK IND  2/11/2015         HX CHOLECYSTECTOMY      HX HYSTERECTOMY      HX ORTHOPAEDIC Left     leg surgery - plates, screws, wires, pins in place    HX UROLOGICAL      [address]      liver biopsy--BRADY and fibrosis    SB ENDOSCOPY,W/CONTROL,BLEEDING  11/16/2022    SMALL BOWEL ENDOSCOPY,ABLATE LESN  11/16/2022    SMALL BOWEL ENDOSCOPY,BIOPSY  11/16/2022    UPPER GI ENDOSCOPY,BIOPSY  11/17/2015           Family History   Problem Relation Age of Onset    Diabetes Mother     Heart Disease Mother     Emphysema Father     No Known Problems Brother     Stroke Maternal Grandmother     No Known Problems Maternal Grandfather     No Known Problems Paternal Grandmother     No Known Problems Paternal Grandfather      Social History     Tobacco Use    Smoking status: Never    Smokeless tobacco: Never   Substance Use Topics    Alcohol use: No      Prior to Admission medications    Medication Sig Start Date End Date Taking? Authorizing Provider   metOLazone (ZAROXOLYN) 2.5 mg tablet TAKE 1 TABLET BY MOUTH EVERY OTHER DAY 1/4/23   Provider, Historical   amLODIPine (NORVASC) 10 mg tablet Take 10 mg by mouth daily. 1/4/23   Provider, Historical   losartan (COZAAR) 50 mg tablet  1/4/23   Provider, Historical   alendronate (FOSAMAX) 70 mg tablet Take 1 Tablet by mouth every seven (7) days.  1/5/23   Lisa Lakhwinder Enriquez MD   lactulose (KRISTALOSE) 10 gram packet Take 1 Packet by mouth daily (with breakfast) for 30 days. 12/31/22 1/30/23  Ana Olosn MD   allopurinoL (ZYLOPRIM) 100 mg tablet Take 100 mg by mouth daily. Provider, Historical   loratadine (CLARITIN) 10 mg tablet TAKE 1 TABLET BY MOUTH EVERY DAY 12/23/22   Shaista Zhou MD   atorvastatin (LIPITOR) 20 mg tablet TAKE 1 TABLET BY MOUTH AT BEDTIME 12/19/22   Shaista Zhou MD   ezetimibe (ZETIA) 10 mg tablet TAKE 1 TABLET BY MOUTH EVERY DAY 12/19/22   Shaista Zhou MD   benzocaine-menthoL (CHLORASEPTIC MAX) 15-10 mg lozg lozenge Take 1 Lozenge by mouth as needed for Sore throat. 11/18/22   Randee De La Fuente NP   furosemide (LASIX) 80 mg tablet Take 0.5 Tablets by mouth daily. 11/18/22   Randee De La Fuente NP   levalbuterol tartrate [facility]) 45 mcg/actuation inhaler Take 2 Puffs by inhalation every six (6) hours as needed for Wheezing or Shortness of Breath. Provider, Historical   potassium chloride SA (MICRO-K) 10 mEq capsule Take 2 Capsules by mouth daily. 9/24/22   Shaista Zhou MD   pantoprazole (PROTONIX) 40 mg tablet Take 1 Tablet by mouth Before breakfast and dinner. 8/17/22   Shaista Zhou MD   nystatin (MYCOSTATIN) powder Apply  to affected area two (2) times a day. 8/8/22   Ruby Bey MD   fluticasone propionate (FLONASE) 50 mcg/actuation nasal spray 2 Sprays by Both Nostrils route daily. Administer to right and left nostril. 6/24/22   Shaista Zhou MD   levothyroxine (SYNTHROID) 150 mcg tablet Take 1 Tablet by mouth Daily (before breakfast). 6/15/22   Shaista Zhou MD   icosapent ethyL (VASCEPA) 1 gram capsule Take 2 Capsules by mouth two (2) times daily (with meals). 2/14/22   Shaista Zhou MD   lidocaine (LIDODERM) 5 % Apply patch to the affected area for 12 hours a day and remove for 12 hours a day. 12/7/21   Shaista Zhou MD   polyethylene glycol [facility]) 17 gram/dose powder Take 17 g by mouth daily.  4/6/20   Shaista Zhou, MD   ketoconazole (NIZORAL) 2 % topical cream Apply  to affected area daily as needed. 8/23/19   Provider, Historical   Biotin 2,500 mcg cap Take  by mouth. Provider, Historical   L GASSERI/B BIFIDUM/B LONGUM (Etalia PO) Take 1 Tab by mouth daily. Provider, Historical   vitamin E (AQUA GEMS) 400 unit capsule Take 800 Units by mouth two (2) times a day. Provider, Historical   cholecalciferol, vitamin D3, 50 mcg (2,000 unit) tab Take 1 Tab by mouth daily. Other, Phys, MD   MULTIVITAMIN WITH MINERALS (ONE-A-DAY 50 PLUS PO) Take 1 Tab by mouth daily. Provider, Historical            Allergies   Allergen Reactions    Gabapentin Other (comments)     Suicidal ideation    Metoprolol Rash    Celebrex [Celecoxib] Hives    Eliquis [Apixaban] Other (comments)     Caused excessive anemia    Pcn [Penicillins] Unknown (comments)     Can't remember, was a long time    Sulfa (Sulfonamide Antibiotics) Hives           Objective:     Vitals:    01/22/23 2009 01/23/23 0327 01/23/23 0335 01/23/23 0919   BP: (!) 150/55 (!) 171/54 (!) 168/62    Pulse: 73 61  (!) 59   Resp: 18 18  18   Temp: 98.1 °F (36.7 °C) 98.1 °F (36.7 °C)  97.9 °F (36.6 °C)   SpO2: 100% 94%  97%   Weight:       Height:                Physical Exam:    GENERAL: alert, cooperative, no distress, appears stated age, pale  EYE: conjunctivae/corneas clear. PERRL, EOM's intact. Fundi benign  LYMPHATIC: Cervical, supraclavicular, and axillary nodes normal.   THROAT & NECK: normal and no erythema or exudates noted. LUNG: clear to auscultation bilaterally  HEART: regular rate and rhythm, S1, S2 normal, no murmur, click, rub or gallop  ABDOMEN: soft, non-tender. Bowel sounds normal. No masses,  no organomegaly  EXTREMITIES:  extremities normal, atraumatic, no cyanosis or edema  SKIN: Normal, pale  NEUROLOGIC: AOx3. Cranial nerves 2-12 and sensation grossly intact.         Lab Results   Component Value Date/Time    WBC 3.0 (L) 01/23/2023 03:24 AM HGB 7.1 (L) 01/23/2023 03:24 AM    HCT 22.2 (L) 01/23/2023 03:24 AM    PLATELET 23 (LL) 82/64/3063 03:24 AM    MCV 85.1 01/23/2023 03:24 AM           Lab Results   Component Value Date/Time    Iron 177 (H) 12/06/2022 02:03 PM    TIBC 196 (L) 12/06/2022 02:03 PM    Iron % saturation 90 (H) 12/06/2022 02:03 PM    Ferritin 892 (H) 12/06/2022 02:03 PM           Assessment:     1. Iron deficiency anemia secondary to chronic gastrointestinal bleeding:    Patient does not tolerate oral iron supplementation. During last visit on 11/4 decision was made to move forward with IV iron supplementation. Outpatient iron infusion was scheduled for last week, this was missed due to current hospitalization. GI history summary   GI History:  12/30/2023 : EGD : -- Antral mucosa has improved compared to previous EGD's, and the focal GAVE vs portal hypertensive gastropathy is minimal now, but again treated with APC today given it's favorable response to APC these past several months. It's interesting that despite improvement in the antrum, recurrent CARLINE continues. She has had mulitiple EGD's, push enteroscopies and a colonoscopy and pill camera, all since August.  -- no other AVMs nor active bleeding, nor clear cause of anemia identified (distal portion of duodenum visualized)  -- small/low grade varices now seen in distal esophagus  11/16/2022 EGD: normal esophagus, edematous appearance of stomach, antrum with oozing foci and areas of ulceration and inflammation. Bx portal hypertensive gastropathy vs GAVE. 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  Ulcerated area in 3rd portion of duodenum s/p hemoclip  10/11/2022- pill cam showing AVM at 9 minutes and active oozing blood at 1 hour and 34 minutes, unclear if AVM or other lesion  EGD 10/10/22- atypical GAVE in antrum, treated with APC extensively  -- also, pill camera deployed in duodenum, given degree of recurrent anemia  08/2022- Colonoscopy with normal colonic mucosa throughout    Acute on chronic thrombocytopenia  Likely multifactorial given BRADY cirrhosis and recent GI bleed  Platelets 23 today  Low concern for HIT with no evidence of heparin exposure    Plan:     > Obtain iron profile, ferritin, erythropoietin, reticulocyte count and peripheral smear  > Will likely benefit from IV iron infusion given she missed her outpatient infusion last week due to hospitalization  > Likely thrombocytopenia is multifactorial given history of BRADY cirrhosis and recent GI bleed, baseline platelet level is around 50  > Low concern for HIT with no evidence of recent heparin exposure  > Will rule out cause of CKD as component of chronic anemia  > Recommend to transfuse to keep platelets greater than 10 or goal to keep greater than 50 if bleeding is suspected  > Follow CBC  > Appreciate GI input  > Consult to Palliative Care, would benefit from [address] given multiple co-morbidities     Discussed with     We will continue to follow patient during hospitalization and arrange appropriate outpatient follow-up.       Signed by: Jeanette Muhammad NP                     January 23, 2023

## 2023-01-23 NOTE — PROGRESS NOTES
Hospitalist Progress Note    NAME: Ana Fitzpatrick   :  1945   MRN:  [id number]       Assessment / Plan:    Acute on chronic anemia due to upper GI bleed POA  GAVE vs AVMs POA  Thrombocytopenia due to portal HTN POA PLTs 32,000 - 36,000  Cirrhosis secondary to BRADY, compensated POA  Thrombocytopenia,   Follows with [facility] hepatology. Dr Moran Every  spoke to Dr Kiya Zuleta at [place] [address] yesterday and he is not eager to rush to a TIPS and he would like to see patient. 2023 [address] - pt needs to arrive at [address] [facility]. Location : [address] [facility], 3rd Floor. S/p 3 units pRBCs. S/p octreotide  Appreciate GI following, not recommending TIPS at this time. No repeat EGD  Last EGD 2022:   ESOPHAGUS: The Z-Line is intact. There are low grade esophageal varices, that do not completely flatten with insufflation, likely represent grade I/II. No stigmata of recent bleeding. STOMACH:   -- gastric mucosa is friable and edematous, consistent with portal hypertensive gastropathy  -- The fundus on antegrade and retroflex views is otherwise normal.   -- Antrum has improved compared to previous EGD's, and the focal GAVE vs portal hypertensive gastropathy is minimal now, but again treated with APC today, given it's favorable response to APC these past several months. SMALL INTESTINE: colonoscope smoothly passes to the distal duodenum, though I did not reach previously tattooed site. No AVMs found, but the duodenum is generally friable and scope trauma causes a few scant erosions. FU MRI/MRCP for pancreatic cystic lesion - last scan 3/2021      MRI with pancreatic lesion stable,  + ascites  Cleared by GI for discharge  Discussed MRI results, felt to hold paracentesis, follow up at [facility]  PT recommending SNF,awaiting insurance approval  Plts count dropping, cont to monitor, no active bleeding. HIT unlikely as no heparin exposure. Baseline plts ~50s  Monitor Plts.  Onc recs to transfuse to keep platelets greater than 10 or goal to keep greater than 50 if bleeding is suspected  Iron transfusion   Cont' with PPI BID, IV rocephin for SBP prophylaxis  Con't lasix and aldactone (increase dose as BP/renal function can tolerate). Cont xifaxan, lactulose  for liver cirrhosis managemnt    Code status: Partial code  Prophylaxis:   Recommended Disposition:        Subjective:     Chief Complaint / Reason for Physician Visit  NAD. No new complaint today. No abdominal pain or N/V  No further bleeding  Awaiting insurance approval for SNF  Discussed with RN events overnight. Review of Systems:  Symptom Y/N Comments  Symptom Y/N Comments   Fever/Chills n   Chest Pain n    Poor Appetite    Edema     Cough n   Abdominal Pain n    Sputum    Joint Pain     SOB/HENLEY n   Pruritis/Rash     Nausea/vomit n   Tolerating PT/OT     Diarrhea n   Tolerating Diet y    Constipation    Other       Could NOT obtain due to:      Objective:     VITALS:   Last 24hrs VS reviewed since prior progress note. Most recent are:  Patient Vitals for the past 24 hrs:   Temp Pulse Resp BP SpO2   01/23/23 0919 97.9 °F (36.6 °C) (!) 59 18 -- 97 %   01/23/23 0335 -- -- -- (!) 168/62 --   01/23/23 0327 98.1 °F (36.7 °C) 61 18 (!) 171/54 94 %   01/22/23 2009 98.1 °F (36.7 °C) 73 18 (!) 150/55 100 %         Intake/Output Summary (Last 24 hours) at 1/23/2023 1525  Last data filed at 1/23/2023 0349  Gross per 24 hour   Intake --   Output 600 ml   Net -600 ml          I had a face to face encounter and independently examined this patient on 1/23/2023, as outlined below:  PHYSICAL EXAM:  General: WD, WN. Alert, cooperative, no acute distress    EENT:  Anicteric sclerae. MMM  Resp:  CTA bilaterally, no wheezing or rales. No accessory muscle use  CV:  Regular  rhythm,  +2 edema  GI:  Soft,+ distended, Non tender. +Bowel sounds  Neurologic:  Alert and oriented X 3, normal speech  Psych:   Good insight. Not anxious nor agitated  Skin:  No rashes.   No jaundice    Reviewed most current lab test results and cultures  YES  Reviewed most current radiology test results   YES  Review and summation of old records today    NO  Reviewed patient's current orders and MAR    YES  PMH/SH reviewed - no change compared to H&P  ________________________________________________________________________  Care Plan discussed with:    Comments   Patient x    Family      RN x    Care Manager x    Consultant                       x Multidiciplinary team rounds were held today with , nursing, pharmacist and clinical coordinator. Patient's plan of care was discussed; medications were reviewed and discharge planning was addressed. ________________________________________________________________________  Total NON critical care TIME:  35   Minutes     Total CRITICAL CARE TIME Spent:   Minutes non procedure based         Comments   >50% of visit spent in counseling and coordination of care     ________________________________________________________________________  David Parisi MD     Procedures: see electronic medical records for all procedures/Xrays and details which were not copied into this note but were reviewed prior to creation of Plan. LABS:  I reviewed today's most current labs and imaging studies.   Pertinent labs include:  Recent Labs     01/23/23  0324 01/22/23  0541 01/21/23 0428   WBC 3.0* 3.1* 2.5*   HGB 7.1* 7.4* 7.2*   HCT 22.2* 23.0* 22.1*   PLT 23* 24* 22*       Recent Labs     01/21/23  0428      K 4.2   *   CO2 26   *   BUN 41*   CREA 1.09*   CA 9.5   MG 2.4   PHOS 3.9   ALB 2.3*   TBILI 1.0   ALT 37         Signed: David Parisi MD

## 2023-01-23 NOTE — PROGRESS NOTES
Comprehensive Nutrition Assessment    Type and Reason for Visit: Reassess    Nutrition Recommendations/Plan:   Continue current diet      Malnutrition Assessment:  Malnutrition Status:  No malnutrition (01/17/23 1438)      Nutrition Assessment:    Chart reviewed; patient continues on a regular diet. She reports a good appetite but doesn't always like the food; states she needs a menu. Provided patient with a menu to help with meal selections. No other nutrition questions or concerns reported. Nutrition Related Findings:    -150-142  BM 1/22  Lasix, Lactulose, Synthroid, Protonix, Aldactone  Wound Type: Venous stasis    Current Nutrition Intake & Therapies:        ADULT DIET Regular    Anthropometric Measures:  Height: 4' 8\" (142.2 cm)  Ideal Body Weight (IBW): 80 lbs (36 kg)     Current Body Wt:  101.6 kg (223 lb 15.8 oz), 256.3 % IBW. Stated  Current BMI (kg/m2): 50.2                          BMI Category: Obese class 3 (BMI 40.0 or greater)    Estimated Daily Nutrient Needs:  Energy Requirements Based On: Formula  Weight Used for Energy Requirements: Current  Energy (kcal/day): 1632 kcals (BMR x 1. 2AF)  Weight Used for Protein Requirements: Current  Protein (g/day): 81-102g (0.8-1.0 g/kg bw)  Method Used for Fluid Requirements: Other (comment)  Fluid (ml/day): per MD    Nutrition Diagnosis:   No nutrition diagnosis at this time    Nutrition Interventions:   Food and/or Nutrient Delivery: Continue current diet  Nutrition Education/Counseling: No recommendations at this time  Coordination of Nutrition Care: Continue to monitor while inpatient       Goals:  Previous Goal Met: Goal(s) achieved  Goals: PO intake 75% or greater, by next RD assessment       Nutrition Monitoring and Evaluation:   Behavioral-Environmental Outcomes: None identified  Food/Nutrient Intake Outcomes: Diet advancement/tolerance, Food and nutrient intake  Physical Signs/Symptoms Outcomes: Biochemical data, Nutrition focused physical findings, Skin, Weight, GI status    Discharge Planning:    Continue current diet    Quintin Macias RD  Contact: ext [phone]

## 2023-01-24 LAB
ANION GAP SERPL CALC-SCNC: 4 MMOL/L (ref 5–15)
BUN SERPL-MCNC: 34 MG/DL (ref 6–20)
BUN/CREAT SERPL: 34 (ref 12–20)
CALCIUM SERPL-MCNC: 10.7 MG/DL (ref 8.5–10.1)
CHLORIDE SERPL-SCNC: 109 MMOL/L (ref 97–108)
CO2 SERPL-SCNC: 29 MMOL/L (ref 21–32)
CREAT SERPL-MCNC: 1 MG/DL (ref 0.55–1.02)
ERYTHROCYTE [DISTWIDTH] IN BLOOD BY AUTOMATED COUNT: 17.2 % (ref 11.5–14.5)
GLUCOSE SERPL-MCNC: 141 MG/DL (ref 65–100)
HCT VFR BLD AUTO: 20.7 % (ref 35–47)
HGB BLD-MCNC: 6.7 G/DL (ref 11.5–16)
HISTORY CHECKED?,CKHIST: NORMAL
MCH RBC QN AUTO: 27.5 PG (ref 26–34)
MCHC RBC AUTO-ENTMCNC: 32.4 G/DL (ref 30–36.5)
MCV RBC AUTO: 84.8 FL (ref 80–99)
NRBC # BLD: 0 K/UL (ref 0–0.01)
NRBC BLD-RTO: 0 PER 100 WBC
PERIPHERAL SMEAR,PSM: NORMAL
PLATELET # BLD AUTO: 18 K/UL (ref 150–400)
PMV BLD AUTO: 11.4 FL (ref 8.9–12.9)
POTASSIUM SERPL-SCNC: 4.1 MMOL/L (ref 3.5–5.1)
RBC # BLD AUTO: 2.44 M/UL (ref 3.8–5.2)
RETICS # AUTO: 0 M/UL (ref 0.02–0.08)
RETICS/RBC NFR AUTO: 0.1 % (ref 0.7–2.1)
SODIUM SERPL-SCNC: 142 MMOL/L (ref 136–145)
WBC # BLD AUTO: 2.5 K/UL (ref 3.6–11)

## 2023-01-24 PROCEDURE — 86870 RBC ANTIBODY IDENTIFICATION: CPT

## 2023-01-24 PROCEDURE — 74011250637 HC RX REV CODE- 250/637: Performed by: STUDENT IN AN ORGANIZED HEALTH CARE EDUCATION/TRAINING PROGRAM

## 2023-01-24 PROCEDURE — 86920 COMPATIBILITY TEST SPIN: CPT

## 2023-01-24 PROCEDURE — 86900 BLOOD TYPING SEROLOGIC ABO: CPT

## 2023-01-24 PROCEDURE — 86922 COMPATIBILITY TEST ANTIGLOB: CPT

## 2023-01-24 PROCEDURE — 86902 BLOOD TYPE ANTIGEN DONOR EA: CPT

## 2023-01-24 PROCEDURE — 74011250636 HC RX REV CODE- 250/636: Performed by: STUDENT IN AN ORGANIZED HEALTH CARE EDUCATION/TRAINING PROGRAM

## 2023-01-24 PROCEDURE — 85045 AUTOMATED RETICULOCYTE COUNT: CPT

## 2023-01-24 PROCEDURE — 99223 1ST HOSP IP/OBS HIGH 75: CPT | Performed by: NURSE PRACTITIONER

## 2023-01-24 PROCEDURE — 74011250637 HC RX REV CODE- 250/637: Performed by: GENERAL ACUTE CARE HOSPITAL

## 2023-01-24 PROCEDURE — 85027 COMPLETE CBC AUTOMATED: CPT

## 2023-01-24 PROCEDURE — 86880 COOMBS TEST DIRECT: CPT

## 2023-01-24 PROCEDURE — 36430 TRANSFUSION BLD/BLD COMPNT: CPT

## 2023-01-24 PROCEDURE — 74011250636 HC RX REV CODE- 250/636: Performed by: GENERAL ACUTE CARE HOSPITAL

## 2023-01-24 PROCEDURE — 80048 BASIC METABOLIC PNL TOTAL CA: CPT

## 2023-01-24 PROCEDURE — P9016 RBC LEUKOCYTES REDUCED: HCPCS

## 2023-01-24 PROCEDURE — 86901 BLOOD TYPING SEROLOGIC RH(D): CPT

## 2023-01-24 PROCEDURE — 86885 COOMBS TEST INDIRECT QUAL: CPT

## 2023-01-24 PROCEDURE — 86850 RBC ANTIBODY SCREEN: CPT

## 2023-01-24 PROCEDURE — 82668 ASSAY OF ERYTHROPOIETIN: CPT

## 2023-01-24 PROCEDURE — C9113 INJ PANTOPRAZOLE SODIUM, VIA: HCPCS | Performed by: STUDENT IN AN ORGANIZED HEALTH CARE EDUCATION/TRAINING PROGRAM

## 2023-01-24 PROCEDURE — 74011250637 HC RX REV CODE- 250/637: Performed by: INTERNAL MEDICINE

## 2023-01-24 PROCEDURE — 86921 COMPATIBILITY TEST INCUBATE: CPT

## 2023-01-24 PROCEDURE — 65270000046 HC RM TELEMETRY

## 2023-01-24 PROCEDURE — 86978 RBC PRETREATMENT SERUM: CPT

## 2023-01-24 PROCEDURE — 94760 N-INVAS EAR/PLS OXIMETRY 1: CPT

## 2023-01-24 PROCEDURE — 36415 COLL VENOUS BLD VENIPUNCTURE: CPT

## 2023-01-24 PROCEDURE — 74011250636 HC RX REV CODE- 250/636: Performed by: NURSE PRACTITIONER

## 2023-01-24 PROCEDURE — 74011000250 HC RX REV CODE- 250: Performed by: STUDENT IN AN ORGANIZED HEALTH CARE EDUCATION/TRAINING PROGRAM

## 2023-01-24 RX ORDER — SODIUM CHLORIDE 9 MG/ML
250 INJECTION, SOLUTION INTRAVENOUS AS NEEDED
Status: DISPENSED | OUTPATIENT
Start: 2023-01-24 | End: 2023-01-25

## 2023-01-24 RX ADMIN — RIFAXIMIN 550 MG: 550 TABLET ORAL at 11:46

## 2023-01-24 RX ADMIN — SODIUM CHLORIDE 40 MG: 9 INJECTION, SOLUTION INTRAMUSCULAR; INTRAVENOUS; SUBCUTANEOUS at 21:58

## 2023-01-24 RX ADMIN — SPIRONOLACTONE 50 MG: 25 TABLET ORAL at 11:46

## 2023-01-24 RX ADMIN — LACTULOSE 45 ML: 20 SOLUTION ORAL at 11:45

## 2023-01-24 RX ADMIN — SPIRONOLACTONE 50 MG: 25 TABLET ORAL at 17:39

## 2023-01-24 RX ADMIN — SODIUM CHLORIDE, PRESERVATIVE FREE 10 ML: 5 INJECTION INTRAVENOUS at 17:41

## 2023-01-24 RX ADMIN — CASTOR OIL AND BALSAM, PERU: 788; 87 OINTMENT TOPICAL at 21:48

## 2023-01-24 RX ADMIN — LEVOTHYROXINE SODIUM 150 MCG: 0.15 TABLET ORAL at 06:09

## 2023-01-24 RX ADMIN — SODIUM CHLORIDE, PRESERVATIVE FREE 10 ML: 5 INJECTION INTRAVENOUS at 21:58

## 2023-01-24 RX ADMIN — Medication: at 11:51

## 2023-01-24 RX ADMIN — RIFAXIMIN 550 MG: 550 TABLET ORAL at 17:39

## 2023-01-24 RX ADMIN — FUROSEMIDE 20 MG: 20 TABLET ORAL at 11:48

## 2023-01-24 RX ADMIN — CASTOR OIL AND BALSAM, PERU: 788; 87 OINTMENT TOPICAL at 11:52

## 2023-01-24 RX ADMIN — SODIUM CHLORIDE, PRESERVATIVE FREE 10 ML: 5 INJECTION INTRAVENOUS at 06:09

## 2023-01-24 RX ADMIN — EPOETIN ALFA-EPBX 40000 UNITS: 40000 INJECTION, SOLUTION INTRAVENOUS; SUBCUTANEOUS at 21:35

## 2023-01-24 RX ADMIN — Medication: at 17:40

## 2023-01-24 RX ADMIN — LACTULOSE 45 ML: 20 SOLUTION ORAL at 17:38

## 2023-01-24 RX ADMIN — SODIUM CHLORIDE 40 MG: 9 INJECTION, SOLUTION INTRAMUSCULAR; INTRAVENOUS; SUBCUTANEOUS at 11:45

## 2023-01-24 RX ADMIN — IRON SUCROSE 300 MG: 20 INJECTION, SOLUTION INTRAVENOUS at 11:46

## 2023-01-24 RX ADMIN — FUROSEMIDE 20 MG: 20 TABLET ORAL at 17:39

## 2023-01-24 NOTE — PROGRESS NOTES
Per OT note:    \" OCCUPATIONAL THERAPY:  OT chart reviewed--noted \"Critical plt of 18 reported to provider. Also noted hgb to be 6.7. Type and screen sent to lab. 1 unit PRBC ordered. Blood will be coming from [facility] and was advised to expect some delay per blood bank. Pt notified of plan. \"  Will follow up when patient more appropriate for therapy per 3 x week POC. Henok Aranda, OTR/L \"    PT will defer and continue to follow.     Janet Irwin, PT

## 2023-01-24 NOTE — PROGRESS NOTES
OCCUPATIONAL THERAPY:  OT chart reviewed--noted \"Critical plt of 18 reported to provider. Also noted hgb to be 6.7. Type and screen sent to lab. 1 unit PRBC ordered. Blood will be coming from [facility] and was advised to expect some delay per blood bank. Pt notified of plan. \"  Will follow up when patient more appropriate for therapy per 3 x week POC.   Naresh Horvath OTR/L

## 2023-01-24 NOTE — PROGRESS NOTES
1133: This nurse checked status of blood from blood bank. [facility] due to special antibodies. 1515: This nurse checked status of blood. The blood is still being processed and we do not have an update at this time. Will continue to call back. 1900: Called blood bank, who now has blood ready. Reported to oncoming RN, This nurse cannot start blood. Cannot do CBC at this time because administration of blood has not been completed.

## 2023-01-24 NOTE — PROGRESS NOTES
[address] [facility] [address], [address] [facility], [address] [place], [place] [phone]    Hematology Note        Patient: Lawrence Mccain MRN: [id number]  SSN: [id number]    YOB: 1945  Age: 68 y.o. Sex: female      Subjective:      Lawrence Mccain is a 68 y.o. female who is seen in the OP for iron deficiency anemia. She has suffered with iron deficiency anemia for over a yr. She suffers with GAVE and chronic bleeding requiring regular iron infusion and RBC transfusion. She also suffers with BRADY cirrhosis. Interval History:     1/23/2023 Patient was admitted for CARLINE and GI bleed in November 2022. She has been followed in the outpatient center for CARLINE and iron infusions. She was actually due for outpatient iron infusions on 1/19. This was canceled due to hospitalization. She is still admitted to [facility] for acute on chronic anemia due to upper GI bleed and worsening thrombocytopenia. 1/24/2023 : Patient seen at bedside today. Discussed lab results concerning for anemia related to CKD. Discussed Retacrit injection. Patient verbalized understanding and agreement.     Review of Systems:  Constitutional: negative  Eyes: negative  Ears, Nose, Mouth, Throat, and Face: negative  Respiratory: negative  Cardiovascular: negative  Gastrointestinal: negative  Genitourinary:negative  Integument/Breast: negative  Hematologic/Lymphatic: negative  Musculoskeletal:negative  Neurological: negative      Past Medical History:   Diagnosis Date    Abnormal nuclear stress test 10/04/2021    Anemia     Angina at rest [facility]) 10/04/2021    Arthritis     KNEE,gout    Bundle branch block     Endocrine disease     HYPOTHYROIDISM    Fracture     Left distal femur (age 12 - pt fell)    Fracture     Left tibia 1990 (pt fell)    Fracture     Right wrist fractured 2 times (pt fell)    GERD (gastroesophageal reflux disease) High cholesterol     Hypertension     Hypoglycemia     \"my body produces too much insulin\"    Liver disease     BRADY    Nausea & vomiting     when had gallbladder out    Osteoporosis     Other ill-defined conditions(799.89)     edema legs    S/P cardiac cath 10/04/2021    10/4/2021 nonobstructive disease    Spinal stenosis     Thyroid disease      Past Surgical History:   Procedure Laterality Date    COLONOSCOPY N/A 7/13/2021    COLONOSCOPY performed by Maria E Wick MD at [facility] ENDOSCOPY    COLONOSCOPY N/A 8/8/2022    COLONOSCOPY performed by Sylvie Pierce MD at [place] [address] [address]  2/11/2015         COLONOSCOPY,REMV [address]ch  7/13/2021         COLONOSCPY,FLEX,W/ [address] INJECT  7/13/2021         COLORECTAL SCRN; HI RISK IND  2/11/2015         HX CHOLECYSTECTOMY      HX HYSTERECTOMY      HX ORTHOPAEDIC Left     leg surgery - plates, screws, wires, pins in place    HX UROLOGICAL      [address]      liver biopsy--BRADY and fibrosis    SB ENDOSCOPY,W/CONTROL,BLEEDING  11/16/2022    SMALL BOWEL ENDOSCOPY,ABLATE LESN  11/16/2022    SMALL BOWEL ENDOSCOPY,BIOPSY  11/16/2022    UPPER GI ENDOSCOPY,BIOPSY  11/17/2015           Family History   Problem Relation Age of Onset    Diabetes Mother     Heart Disease Mother     Emphysema Father     No Known Problems Brother     Stroke Maternal Grandmother     No Known Problems Maternal Grandfather     No Known Problems Paternal Grandmother     No Known Problems Paternal Grandfather      Social History     Tobacco Use    Smoking status: Never    Smokeless tobacco: Never   Substance Use Topics    Alcohol use: No      Prior to Admission medications    Medication Sig Start Date End Date Taking? Authorizing Provider   metOLazone (ZAROXOLYN) 2.5 mg tablet TAKE 1 TABLET BY MOUTH EVERY OTHER DAY 1/4/23   Provider, Historical   amLODIPine (NORVASC) 10 mg tablet Take 10 mg by mouth daily.  1/4/23 Provider, Historical   losartan (COZAAR) 50 mg tablet  1/4/23   Provider, Historical   alendronate (FOSAMAX) 70 mg tablet Take 1 Tablet by mouth every seven (7) days. 1/5/23   Sawyer Valdes MD   lactulose (KRISTALOSE) 10 gram packet Take 1 Packet by mouth daily (with breakfast) for 30 days. 12/31/22 1/30/23  Onur Wellington MD   allopurinoL (ZYLOPRIM) 100 mg tablet Take 100 mg by mouth daily. Provider, Historical   loratadine (CLARITIN) 10 mg tablet TAKE 1 TABLET BY MOUTH EVERY DAY 12/23/22   Kita Vasquez MD   atorvastatin (LIPITOR) 20 mg tablet TAKE 1 TABLET BY MOUTH AT BEDTIME 12/19/22   Kita Vasquez MD   ezetimibe (ZETIA) 10 mg tablet TAKE 1 TABLET BY MOUTH EVERY DAY 12/19/22   Kita aVsquez MD   benzocaine-menthoL (CHLORASEPTIC MAX) 15-10 mg lozg lozenge Take 1 Lozenge by mouth as needed for Sore throat. 11/18/22   Iraj Parks NP   furosemide (LASIX) 80 mg tablet Take 0.5 Tablets by mouth daily. 11/18/22   Iraj Parks NP   levalbuterol tartrate [facility]) 45 mcg/actuation inhaler Take 2 Puffs by inhalation every six (6) hours as needed for Wheezing or Shortness of Breath. Provider, Historical   potassium chloride SA (MICRO-K) 10 mEq capsule Take 2 Capsules by mouth daily. 9/24/22   Kita Vasquez MD   pantoprazole (PROTONIX) 40 mg tablet Take 1 Tablet by mouth Before breakfast and dinner. 8/17/22   Kita Vasquez MD   nystatin (MYCOSTATIN) powder Apply  to affected area two (2) times a day. 8/8/22   Maximus Mcdaniel MD   fluticasone propionate (FLONASE) 50 mcg/actuation nasal spray 2 Sprays by Both Nostrils route daily. Administer to right and left nostril. 6/24/22   Kita Vasquez MD   levothyroxine (SYNTHROID) 150 mcg tablet Take 1 Tablet by mouth Daily (before breakfast). 6/15/22   Kita Vasquez MD   icosapent ethyL (VASCEPA) 1 gram capsule Take 2 Capsules by mouth two (2) times daily (with meals).  2/14/22   Kita Vasquez MD   lidocaine (LIDODERM) 5 % Apply patch to the affected area for 12 hours a day and remove for 12 hours a day. 12/7/21   Tristan Sales MD   polyethylene glycol [facility]) 17 gram/dose powder Take 17 g by mouth daily. 4/6/20   Tristan Sales MD   ketoconazole (NIZORAL) 2 % topical cream Apply  to affected area daily as needed. 8/23/19   Provider, Historical   Biotin 2,500 mcg cap Take  by mouth. Provider, Historical   L GASSERI/B BIFIDUM/B LONGUM (Cardiac Guard PO) Take 1 Tab by mouth daily. Provider, Historical   vitamin E (AQUA GEMS) 400 unit capsule Take 800 Units by mouth two (2) times a day. Provider, Historical   cholecalciferol, vitamin D3, 50 mcg (2,000 unit) tab Take 1 Tab by mouth daily. Other, MD Janette   MULTIVITAMIN WITH MINERALS (ONE-A-DAY 50 PLUS PO) Take 1 Tab by mouth daily. Provider, Historical            Allergies   Allergen Reactions    Gabapentin Other (comments)     Suicidal ideation    Metoprolol Rash    Celebrex [Celecoxib] Hives    Eliquis [Apixaban] Other (comments)     Caused excessive anemia    Pcn [Penicillins] Unknown (comments)     Can't remember, was a long time    Sulfa (Sulfonamide Antibiotics) Hives           Objective:     Vitals:    01/23/23 1958 01/24/23 0331 01/24/23 0801 01/24/23 1444   BP: (!) 164/51 (!) 155/48 (!) 148/53 (!) 153/54   Pulse: 61 (!) 59 64 65   Resp: 18 16 18    Temp: 98.2 °F (36.8 °C) 98.2 °F (36.8 °C) 98.4 °F (36.9 °C) 97.9 °F (36.6 °C)   SpO2: 96% 97% 99% 98%   Weight:       Height:                Physical Exam:    GENERAL: alert, cooperative, no distress, appears stated age, pale  EYE: conjunctivae/corneas clear. PERRL, EOM's intact. Fundi benign  LYMPHATIC: Cervical, supraclavicular, and axillary nodes normal.   THROAT & NECK: normal and no erythema or exudates noted. LUNG: clear to auscultation bilaterally  HEART: regular rate and rhythm, S1, S2 normal, no murmur, click, rub or gallop  ABDOMEN: soft, non-tender.  Bowel sounds normal. No masses,  no organomegaly  EXTREMITIES: extremities normal, atraumatic, no cyanosis or edema  SKIN: Normal, pale  NEUROLOGIC: AOx3. Cranial nerves 2-12 and sensation grossly intact. Lab Results   Component Value Date/Time    WBC 2.5 (L) 01/24/2023 03:25 AM    HGB 6.7 (L) 01/24/2023 03:25 AM    HCT 20.7 (L) 01/24/2023 03:25 AM    PLATELET 18 (LL) 32/68/1654 03:25 AM    MCV 84.8 01/24/2023 03:25 AM           Lab Results   Component Value Date/Time    Iron 238 (H) 01/23/2023 06:05 PM    TIBC 260 01/23/2023 06:05 PM    Iron % saturation 92 (H) 01/23/2023 06:05 PM    Ferritin 4,715 (H) 01/23/2023 06:05 PM           Assessment:     1. Iron deficiency anemia secondary to chronic gastrointestinal bleeding:    Patient does not tolerate oral iron supplementation. During last visit on 11/4 decision was made to move forward with IV iron supplementation. Outpatient iron infusion was scheduled for last week, this was missed due to current hospitalization. GI history summary   GI History:  12/30/2023 : EGD : -- Antral mucosa has improved compared to previous EGD's, and the focal GAVE vs portal hypertensive gastropathy is minimal now, but again treated with APC today given it's favorable response to APC these past several months. It's interesting that despite improvement in the antrum, recurrent CARLINE continues. She has had mulitiple EGD's, push enteroscopies and a colonoscopy and pill camera, all since August.  -- no other AVMs nor active bleeding, nor clear cause of anemia identified (distal portion of duodenum visualized)  -- small/low grade varices now seen in distal esophagus  11/16/2022 EGD: normal esophagus, edematous appearance of stomach, antrum with oozing foci and areas of ulceration and inflammation. Bx portal hypertensive gastropathy vs GAVE. 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  Ulcerated area in 3rd portion of duodenum s/p hemoclip  10/11/2022- pill cam showing AVM at 9 minutes and active oozing blood at 1 hour and 34 minutes, unclear if AVM or other lesion  EGD 10/10/22- atypical GAVE in antrum, treated with APC extensively  -- also, pill camera deployed in duodenum, given degree of recurrent anemia  08/2022- Colonoscopy with normal colonic mucosa throughout    Acute on chronic thrombocytopenia  Pancytopenia  Likely multifactorial given BRADY cirrhosis and recent GI bleed  Platelets 23 today  Low concern for HIT with no evidence of heparin exposure    Plan:     > Ferritin elevated, iron and iron saturation elevated on iron profile  > No role for IV iron repletion at this time  > Reticulocyte count 0.1, erythropoietin level pending  > Likely component of chronic anemia is related to CKD, will give Retacrit dose today  > Hemoglobin 6.7, plan for 1 unit packed red blood cell transfusion today  > Likely thrombocytopenia is multifactorial given history of BRADY cirrhosis and recent GI bleed, baseline platelet level is around 50  > Low concern for HIT with no evidence of recent heparin exposure  > Recommend to transfuse to keep platelets greater than 10 or goal to keep greater than 50 if bleeding is suspected  > Follow CBC, platelets trending down slightly  > Cause of acute thrombocytopenia likely multifactorial given recent GI bleed. Consider bone marrow biopsy if does not improve with overall pancytopenia.  > Concern for overall bone marrow dysfunction, will continue to trend CBC. Cautious regarding bone marrow biopsy as patient would not be a candidate for any treatment if bone marrow revealed underlying dysfunction. > Appreciate GI input  > Consult to Palliative Care, would benefit from [address] given multiple co-morbidities     Discussed with     We will continue to follow patient during hospitalization and arrange appropriate outpatient follow-up.       Signed by: Jayro Winters NP                     January 24, 2023

## 2023-01-24 NOTE — PROGRESS NOTES
Pt experiencing a bloody nose. This is recurrent from a couple days ago. Small amount of blood seen. No intervention at this time.

## 2023-01-24 NOTE — PROGRESS NOTES
Spiritual Care Assessment/Progress Note  [facility]      NAME: Steele Carrel      MRN: [id number]  AGE: 68 y.o.  SEX: female  Sabianist Affiliation: Jehovah's witness   Language: English     1/24/2023     Total Time (in minutes): 36     Spiritual Assessment begun in [facility]LE[facility] through conversation with:         [x]Patient        [] Family    [] Friend(s)              Spiritual beliefs: (Please include comment if needed)     [x] Identifies with a tashi tradition:         [] Supported by a tashi community:            [] Claims no spiritual orientation:           [] Seeking spiritual identity:                [] Adheres to an individual form of spirituality:           [] Not able to assess:                           Identified resources for coping:      [x] Prayer                               [] Music                  [] Guided Imagery     [x] Family/friends                 [] Pet visits     [] Devotional reading                         [] Unknown     [] Other:                                                Interventions offered during this visit: (See comments for more details)    Patient Interventions: Coping skills reviewed/reinforced, Affirmation of emotions/emotional suffering, Life review/legacy, Normalization of emotional/spiritual concerns, Sabianist beliefs/image of God discussed, Integration of medical assessment with existing values and beliefs, Prayer (assurance of), Affirmation of tashi, Catharsis/review of pertinent events in supportive environment, Iconic (affirming the presence of God/Higher Power)           Plan of Care:     [x] Support spiritual and/or cultural needs    [] Support AMD and/or advance care planning process      [] Support grieving process   [] Coordinate Rites and/or Rituals    [] Coordination with community clergy   [] No spiritual needs identified at this time   [] Detailed Plan of Care below (See Comments)  [] Make referral to Music Therapy  [] Make referral to Pet Therapy     [] Make referral to Addiction services  [] Make referral to [facility]  [] Make referral to Spiritual Care Partner  [] No future visits requested        [x] Contact Spiritual Care for further referrals     Comments:  Patient was welcoming of visit when  visited for initial spiritual assessment in 3234. She remembered   from a previous visit and engaged in life review and story telling. She indicated she was in good spirit despite multiple hospitalizations, especially in the past year. She shared that her main souse of hope was God. She has strong tashi, she stated, and she also has a good support system. Her care giver for the past two years is like a daughter to her. And she also has a son out of state who keeps in touch. Son is coming to visit this weekend. Thoughts and feelings were affirmed in a supportive atmosphere. She expressed no particular need for support when asked.  assured her of his prayers, she thanked  for the visit.        Visited by: Eloina marshall: [phone] ([phone])

## 2023-01-24 NOTE — PROGRESS NOTES
Received notification from bedside RN about patient with regards to: platelet 18.  Noted H/H 67./20.7  VS: /48, HR 59, RR 16, O2 sat 97% on RA    Intervention given: Transfuse 1 unit PRBC ordered, has orders for daily CBC

## 2023-01-24 NOTE — ACP (ADVANCE CARE PLANNING)
Advance Care Planning     Advance Care Planning (ACP) Physician/NP/PA Conversation      Date of Conversation: 1/16/2023  Conducted with: Patient with Decision Making Capacity    Healthcare Decision Maker:     Primary Decision Maker: Micah Gonzalez - [phone]    Secondary Decision Maker: Hiro Etienne - Caregiver - [phone]  Click here to complete [address] including selection of the Healthcare Decision Maker Relationship (ie \"Primary\")        Care Preferences:    Hospitalization: \"If your health worsens and it becomes clear that your chance of recovery is unlikely, what would be your preference regarding hospitalization? \"  The patient would prefer hospitalization. Ventilation: \"If you were unable to breathe on your own and your chance of recovery was unlikely, what would be your preference about the use of a ventilator (breathing machine) if it was available to you? \"   The patient would desire the use of a ventilator. Resuscitation: \"In the event your heart stopped as a result of an underlying serious health condition, would you want attempts to be made to restart your heart, or would you prefer a natural death? \"   No, do NOT attempt to resuscitate. Additional topics discussed: treatment goals, benefit/burden of treatment options, artificial nutrition, ventilation preferences, hospitalization preferences, and resuscitation preferences    Conversation Outcomes / Follow-Up Plan:   ACP complete - no further action today  Reviewed DNR/DNI and patient confirms current DNR status - completed forms on file (place new order if needed)     Length of Voluntary ACP Conversation in minutes:  30 minutes    Merlyn Baum, NP     Per my discussion with pt:  1) DNR  2) full life prolonging measures  3) feeding tube ok    While alive she wants full aggressive care, if her heart stops beating no CPR.

## 2023-01-24 NOTE — PROGRESS NOTES
Critical plt of 18 reported to provider. Also noted hgb to be 6.7. Type and screen sent to lab. 1 unit PRBC ordered. Blood will be coming from [facility] and was advised to expect some delay per blood bank. Pt notified of plan.

## 2023-01-24 NOTE — CONSULTS
Palliative Medicine Consult  Benny: [phone] ([phone])    Patient Name: Ousmane Gill  YOB: 1945    Today's date:  1/24/23  Date of Initial Consult: 1/24/23  Reason for Consult: goals of care  Requesting Provider: Marshall Gong NP   Primary Care Physician: Christin Spain MD     SUMMARY:   Ousmane Gill is a 68 y.o. 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     HPI: 1/16: presented to ED with c/o generalized fatigue and weakness for 3 days; she has anemia from ulcers and GIB, and her last transfusion was 1/13/23. She feels like she does when she's anemic. Denies blood in stool or urine. Admission course: In ED:  abn lab: glu 147, Bun/Cr 59/1.43, bilirubin 1.9, wbc 3.0, h/h 4.6/14.5, plt ct 29. CXR neg for acute process. Current medical issues leading to Palliative Medicine involvement include: anemia due to GIB, iron deficiency and CKD. Psychosocial: resides at [employer] independent living   PALLIATIVE DIAGNOSES:   Fatigue  Lightheadedness   UGIB  Liver cirrhosis   Physical debility  Anasarca   MICHELLE  Pancytopenia  Care decisions     PLAN:   Prior to visit, I completed an extensive review of patient's medical records, including medical documentation, vital signs, MARs, and results of various labs and other diagnostics. I also spoke with patient's nurse Jaiden Villela . Met with pt, obtained history, goals of care and code status. Pt lives in independent living but also has 24/7 caregivers and is managing nicely. She is aware of her medical conditions and their impact on her. Together we completed an AMD and POST form (see ACP note). Pt asked about bone marrow  procedure so I explained the procedure to her. Goals are clear, will sign off.   Initial consult note routed to primary continuity provider and/or primary health care team members  Communicated plan of care with: Palliative IDTChloe 192 Team     GOALS OF CARE / TREATMENT PREFERENCES:     GOALS OF CARE:    Patient/Health Care Proxy Stated Goals: Prolong life    TREATMENT PREFERENCES:   Code Status: Partial Code    Advance Care Planning:  [x] [facility] Interdisciplinary Team has updated the ACP Navigator with Health Care Decision Maker and Patient Capacity      Primary Decision MakerCjuan Garcia - [phone]    Secondary Decision Maker: Alejandra Sheridan - Sapna - [phone]  Advance Care Planning 1/24/2023   Patient's Healthcare Decision Maker is: Named in scanned ACP document   Confirm Advance Directive Yes, on file   Patient Would Like to Complete Advance Directive -   Does the patient have other document types MOST/MOLST/POST/POLST       Medical Interventions: Full interventions       Other:    As far as possible, the palliative care team has discussed with patient / health care proxy about goals of care / treatment preferences for patient.      HISTORY:     History obtained from: patient and chart     The patient is:   [x] Verbal and participatory  [] Non-participatory due to:         Clinical Pain Assessment (nonverbal scale for severity on nonverbal patients):   Clinical Pain Assessment  Severity: 0     Activity (Movement): Lying quietly, normal position    Duration: for how long has pt been experiencing pain (e.g., 2 days, 1 month, years)  Frequency: how often pain is an issue (e.g., several times per day, once every few days, constant)     FUNCTIONAL ASSESSMENT:     Palliative Performance Scale (PPS):  PPS: 40       PSYCHOSOCIAL/SPIRITUAL SCREENING:     Palliative IDT has assessed this patient for cultural preferences / practices and a referral made as appropriate to needs (Cultural Services, Patient Advocacy, Ethics, etc.)    Any spiritual / Yazidism concerns:  [] Yes /  [x] No   If \"Yes\" to discuss with pastoral care during IDT     Does caregiver feel burdened by caring for their loved one:   [] Yes /  [x] No /  [] No Caregiver Present    If \"Yes\" to discuss with social work during IDT    Anticipatory grief assessment:   [x] Normal  / [] Maladaptive     If \"Maladaptive\" to discuss with social work during IDT    ESAS Anxiety: Anxiety: 0    ESAS Depression: Depression: 0        REVIEW OF SYSTEMS:     Positive and pertinent negative findings in ROS are noted above in HPI. The following systems were [x] reviewed / [] unable to be reviewed as noted in HPI  Other findings are noted below. Systems: constitutional, ears/nose/mouth/throat, respiratory, gastrointestinal, genitourinary, musculoskeletal, integumentary, neurologic, psychiatric, endocrine. Positive findings noted below. Modified ESAS Completed by: provider   Fatigue: 5 Drowsiness: 0   Depression: 0 Pain: 0   Anxiety: 0 Nausea: 0   Anorexia: 0 Dyspnea: 0     Constipation: No     Stool Occurrence(s): 1        PHYSICAL EXAM:     From RN flowsheet:  Wt Readings from Last 3 Encounters:   01/22/23 223 lb 15.8 oz (101.6 kg)   01/11/23 212 lb 8 oz (96.4 kg)   01/05/23 213 lb 3.2 oz (96.7 kg)     Blood pressure (!) 153/54, pulse 65, temperature 97.9 °F (36.6 °C), resp. rate 18, height 4' 8\" (1.422 m), weight 223 lb 15.8 oz (101.6 kg), SpO2 98 %, not currently breastfeeding.     Pain Scale 1: Numeric (0 - 10)  Pain Intensity 1: 0                 Last bowel movement, if known:     Constitutional: aaox3, pleasant and cooperative, NAD  Eyes: pupils equal, anicteric  ENMT: no nasal discharge, moist mucous membranes  Cardiovascular: regular rhythm, distal pulses intact  Respiratory: breathing not labored, symmetric  Gastrointestinal: soft non-tender, +bowel sounds  Musculoskeletal: no deformity, no tenderness to palpation  Skin: warm, dry, pale  Neurologic: following commands, moving all extremities  Psychiatric: full affect, no hallucinations          HISTORY: Active Problems:    UGIB (upper gastrointestinal bleed) (1/16/2023)      GI bleed (1/18/2023)    Past Medical History:   Diagnosis Date    Abnormal nuclear stress test 10/04/2021    Anemia     Angina at rest [facility]) 10/04/2021    Arthritis     KNEE,gout    Bundle branch block     Endocrine disease     HYPOTHYROIDISM    Fracture     Left distal femur (age 12 - pt fell)    Fracture     Left tibia 1990 (pt fell)    Fracture     Right wrist fractured 2 times (pt fell)    GERD (gastroesophageal reflux disease)     High cholesterol     Hypertension     Hypoglycemia     \"my body produces too much insulin\"    Liver disease     BRADY    Nausea & vomiting     when had gallbladder out    Osteoporosis     Other ill-defined conditions(799.89)     edema legs    S/P cardiac cath 10/04/2021    10/4/2021 nonobstructive disease    Spinal stenosis     Thyroid disease       Past Surgical History:   Procedure Laterality Date    COLONOSCOPY N/A 7/13/2021    COLONOSCOPY performed by Yvonne Barrera MD at [facility] ENDOSCOPY    COLONOSCOPY N/A 8/8/2022    COLONOSCOPY performed by Maurice Kwon MD at [address]  2/11/2015         COLONOSCOPY,REMV LESN,SNARE  7/13/2021         COLONOSCPY,FLEX,W/ [address] INJECT  7/13/2021         COLORECTAL SCRN; HI RISK IND  2/11/2015         HX CHOLECYSTECTOMY      HX HYSTERECTOMY      HX ORTHOPAEDIC Left     leg surgery - plates, screws, wires, pins in place    HX UROLOGICAL      [address]      liver biopsy--BRADY and fibrosis    SB ENDOSCOPY,W/CONTROL,BLEEDING  11/16/2022    SMALL BOWEL ENDOSCOPY,ABLATE LESN  11/16/2022    SMALL BOWEL ENDOSCOPY,BIOPSY  11/16/2022    UPPER GI ENDOSCOPY,BIOPSY  11/17/2015           Family History   Problem Relation Age of Onset    Diabetes Mother     Heart Disease Mother     Emphysema Father     No Known Problems Brother     Stroke Maternal Grandmother     No Known Problems Maternal Grandfather     No Known Problems Paternal Grandmother     No Known Problems Paternal Grandfather       History reviewed, no pertinent family history.   Social History     Tobacco Use    Smoking status: Never    Smokeless tobacco: Never   Substance Use Topics    Alcohol use: No     Allergies   Allergen Reactions    Gabapentin Other (comments)     Suicidal ideation    Metoprolol Rash    Celebrex [Celecoxib] Hives    Eliquis [Apixaban] Other (comments)     Caused excessive anemia    Pcn [Penicillins] Unknown (comments)     Can't remember, was a long time    Sulfa (Sulfonamide Antibiotics) Hives      Current Facility-Administered Medications   Medication Dose Route Frequency    0.9% sodium chloride infusion 250 mL  250 mL IntraVENous PRN    epoetin xuan-epbx (RETACRIT) injection 40,000 Units  40,000 Units SubCUTAneous ONCE    iron sucrose (VENOFER) 300 mg in 0.9% sodium chloride 250 mL IVPB  300 mg IntraVENous Q24H    furosemide (LASIX) tablet 20 mg  20 mg Oral ACB&D    spironolactone (ALDACTONE) tablet 50 mg  50 mg Oral BID    lactulose (CHRONULAC) 10 gram/15 mL solution 45 mL  30 g Oral BID    hydrALAZINE (APRESOLINE) 20 mg/mL injection 10 mg  10 mg IntraVENous Q6H PRN    levothyroxine (SYNTHROID) tablet 150 mcg  150 mcg Oral 6am    balsam peru-castor oiL (VENELEX) ointment   Topical BID    ammonium lactate (LAC-HYDRIN) 12 % lotion   Topical BID    sodium chloride (NS) flush 5-40 mL  5-40 mL IntraVENous Q8H    sodium chloride (NS) flush 5-40 mL  5-40 mL IntraVENous PRN    acetaminophen (TYLENOL) tablet 650 mg  650 mg Oral Q6H PRN    Or    acetaminophen (TYLENOL) suppository 650 mg  650 mg Rectal Q6H PRN    polyethylene glycol (MIRALAX) packet 17 g  17 g Oral DAILY PRN    ondansetron (ZOFRAN ODT) tablet 4 mg  4 mg Oral Q8H PRN    Or    ondansetron (ZOFRAN) injection 4 mg  4 mg IntraVENous Q6H PRN    pantoprazole (PROTONIX) 40 mg in 0.9% sodium chloride 10 mL injection  40 mg IntraVENous Q12H    rifAXIMin [facility]) tablet 550 mg  550 mg Oral BID          LAB AND IMAGING FINDINGS:     Lab Results   Component Value Date/Time    WBC 2.5 (L) 01/24/2023 03:25 AM    HGB 6.7 (L) 01/24/2023 03:25 AM    PLATELET 18 (LL) 06/11/0054 03:25 AM     Lab Results   Component Value Date/Time    Sodium 142 01/24/2023 03:25 AM    Potassium 4.1 01/24/2023 03:25 AM    Chloride 109 (H) 01/24/2023 03:25 AM    CO2 29 01/24/2023 03:25 AM    BUN 34 (H) 01/24/2023 03:25 AM    Creatinine 1.00 01/24/2023 03:25 AM    Calcium 10.7 (H) 01/24/2023 03:25 AM    Magnesium 2.4 01/21/2023 04:28 AM    Phosphorus 3.9 01/21/2023 04:28 AM      Lab Results   Component Value Date/Time    Alk. phosphatase 339 (H) 01/21/2023 04:28 AM    Protein, total 5.7 (L) 01/21/2023 04:28 AM    Albumin 2.3 (L) 01/21/2023 04:28 AM    Globulin 3.4 01/21/2023 04:28 AM     Lab Results   Component Value Date/Time    INR 1.1 01/17/2023 04:08 PM    Prothrombin time 11.5 (H) 01/17/2023 04:08 PM    aPTT 28.4 01/17/2023 04:08 PM      Lab Results   Component Value Date/Time    Iron 238 (H) 01/23/2023 06:05 PM    TIBC 260 01/23/2023 06:05 PM    Iron % saturation 92 (H) 01/23/2023 06:05 PM    Ferritin 4,715 (H) 01/23/2023 06:05 PM      No results found for: PH, PCO2, PO2  No components found for: [place]   Lab Results   Component Value Date/Time     10/28/2020 04:10 AM    CK - MB <0.5 (L) 01/03/2011 10:30 AM                Total time: 75  Counseling / coordination time, spent as noted above: 55  > 50% counseling / coordination?: y    Prolonged service was provided for  []30 min   []75 min in face to face time in the presence of the patient, spent as noted above. Time Start:   Time End:   Note: this can only be billed with 92491 (initial) or 61516 (follow up). If multiple start / stop times, list each separately.

## 2023-01-25 ENCOUNTER — HOSPITAL ENCOUNTER (OUTPATIENT)
Dept: INFUSION THERAPY | Age: 78
End: 2023-01-25

## 2023-01-25 LAB
EPO SERPL-ACNC: 787 MIU/ML (ref 2.6–18.5)
ERYTHROCYTE [DISTWIDTH] IN BLOOD BY AUTOMATED COUNT: 16.8 % (ref 11.5–14.5)
HCT VFR BLD AUTO: 23.9 % (ref 35–47)
HGB BLD-MCNC: 7.9 G/DL (ref 11.5–16)
MCH RBC QN AUTO: 28 PG (ref 26–34)
MCHC RBC AUTO-ENTMCNC: 33.1 G/DL (ref 30–36.5)
MCV RBC AUTO: 84.8 FL (ref 80–99)
NRBC # BLD: 0 K/UL (ref 0–0.01)
NRBC BLD-RTO: 0 PER 100 WBC
PLATELET # BLD AUTO: 19 K/UL (ref 150–400)
PMV BLD AUTO: 11.6 FL (ref 8.9–12.9)
RBC # BLD AUTO: 2.82 M/UL (ref 3.8–5.2)
WBC # BLD AUTO: 2.9 K/UL (ref 3.6–11)

## 2023-01-25 PROCEDURE — C9113 INJ PANTOPRAZOLE SODIUM, VIA: HCPCS | Performed by: STUDENT IN AN ORGANIZED HEALTH CARE EDUCATION/TRAINING PROGRAM

## 2023-01-25 PROCEDURE — 74011250636 HC RX REV CODE- 250/636: Performed by: GENERAL ACUTE CARE HOSPITAL

## 2023-01-25 PROCEDURE — 74011250637 HC RX REV CODE- 250/637: Performed by: GENERAL ACUTE CARE HOSPITAL

## 2023-01-25 PROCEDURE — 74011250636 HC RX REV CODE- 250/636: Performed by: STUDENT IN AN ORGANIZED HEALTH CARE EDUCATION/TRAINING PROGRAM

## 2023-01-25 PROCEDURE — 97535 SELF CARE MNGMENT TRAINING: CPT | Performed by: OCCUPATIONAL THERAPIST

## 2023-01-25 PROCEDURE — 74011250637 HC RX REV CODE- 250/637: Performed by: INTERNAL MEDICINE

## 2023-01-25 PROCEDURE — 36415 COLL VENOUS BLD VENIPUNCTURE: CPT

## 2023-01-25 PROCEDURE — 97530 THERAPEUTIC ACTIVITIES: CPT

## 2023-01-25 PROCEDURE — 74011250637 HC RX REV CODE- 250/637: Performed by: STUDENT IN AN ORGANIZED HEALTH CARE EDUCATION/TRAINING PROGRAM

## 2023-01-25 PROCEDURE — 94760 N-INVAS EAR/PLS OXIMETRY 1: CPT

## 2023-01-25 PROCEDURE — 97116 GAIT TRAINING THERAPY: CPT

## 2023-01-25 PROCEDURE — 85027 COMPLETE CBC AUTOMATED: CPT

## 2023-01-25 PROCEDURE — 65270000046 HC RM TELEMETRY

## 2023-01-25 PROCEDURE — 74011000250 HC RX REV CODE- 250: Performed by: STUDENT IN AN ORGANIZED HEALTH CARE EDUCATION/TRAINING PROGRAM

## 2023-01-25 RX ADMIN — CASTOR OIL AND BALSAM, PERU: 788; 87 OINTMENT TOPICAL at 21:00

## 2023-01-25 RX ADMIN — SODIUM CHLORIDE, PRESERVATIVE FREE 10 ML: 5 INJECTION INTRAVENOUS at 13:08

## 2023-01-25 RX ADMIN — CASTOR OIL AND BALSAM, PERU: 788; 87 OINTMENT TOPICAL at 09:20

## 2023-01-25 RX ADMIN — Medication: at 17:18

## 2023-01-25 RX ADMIN — SODIUM CHLORIDE, PRESERVATIVE FREE 10 ML: 5 INJECTION INTRAVENOUS at 22:20

## 2023-01-25 RX ADMIN — SODIUM CHLORIDE, PRESERVATIVE FREE 10 ML: 5 INJECTION INTRAVENOUS at 06:54

## 2023-01-25 RX ADMIN — SODIUM CHLORIDE 40 MG: 9 INJECTION, SOLUTION INTRAMUSCULAR; INTRAVENOUS; SUBCUTANEOUS at 09:18

## 2023-01-25 RX ADMIN — SPIRONOLACTONE 50 MG: 25 TABLET ORAL at 09:18

## 2023-01-25 RX ADMIN — LEVOTHYROXINE SODIUM 150 MCG: 0.15 TABLET ORAL at 07:03

## 2023-01-25 RX ADMIN — LACTULOSE 45 ML: 20 SOLUTION ORAL at 17:18

## 2023-01-25 RX ADMIN — LACTULOSE 45 ML: 20 SOLUTION ORAL at 09:19

## 2023-01-25 RX ADMIN — FUROSEMIDE 20 MG: 20 TABLET ORAL at 09:19

## 2023-01-25 RX ADMIN — FUROSEMIDE 20 MG: 20 TABLET ORAL at 17:18

## 2023-01-25 RX ADMIN — RIFAXIMIN 550 MG: 550 TABLET ORAL at 09:18

## 2023-01-25 RX ADMIN — RIFAXIMIN 550 MG: 550 TABLET ORAL at 17:18

## 2023-01-25 RX ADMIN — SODIUM CHLORIDE 40 MG: 9 INJECTION, SOLUTION INTRAMUSCULAR; INTRAVENOUS; SUBCUTANEOUS at 21:35

## 2023-01-25 RX ADMIN — SPIRONOLACTONE 50 MG: 25 TABLET ORAL at 17:18

## 2023-01-25 RX ADMIN — IRON SUCROSE 300 MG: 20 INJECTION, SOLUTION INTRAVENOUS at 09:19

## 2023-01-25 RX ADMIN — Medication: at 09:19

## 2023-01-25 NOTE — PROGRESS NOTES
[address] [facility] [address], [address] [facility], [address] [place] [address]  [phone]    Hematology Note        Patient: She Low MRN: [id number]  SSN: [id number]    YOB: 1945  Age: 68 y.o. Sex: female      Subjective:      She Low is a 68 y.o. female who is seen in the OP for iron deficiency anemia. She has suffered with iron deficiency anemia for over a yr. She suffers with GAVE and chronic bleeding requiring regular iron infusion and RBC transfusion. She also suffers with BRADY cirrhosis. Interval History:     1/23/2023 Patient was admitted for CARLINE and GI bleed in November 2022. She has been followed in the outpatient center for CARLINE and iron infusions. She was actually due for outpatient iron infusions on 1/19. This was canceled due to hospitalization. She is still admitted to [facility] for acute on chronic anemia due to upper GI bleed and worsening thrombocytopenia. 1/24/2023 : Patient seen at bedside today. Discussed lab results concerning for anemia related to CKD. Discussed Retacrit injection. Patient verbalized understanding and agreement. 1/25/2023: Patient seen at bedside today, she was sitting up in the chair after working with PT. Discussed her ongoing thrombocytopenia and potential causes. We also discussed role of bone marrow biopsy and given her significant comorbidities she is not a candidate for any any intervention/systemic therapy that could be expected to result from biopsy. She verbalized understanding of this and stated that she was not willing to move forward biopsy at this time given overall invasive procedure and risk of discomfort. We discussed that if her pancytopenia continues we can revisit the biopsy but it would be for information only as she would not tolerate any systemic therapy to treat any underlying malignancy found on biopsy.   She verbalized understanding and agreement of this.     Review of Systems:  Constitutional: negative  Eyes: negative  Ears, Nose, Mouth, Throat, and Face: negative  Respiratory: negative  Cardiovascular: negative  Gastrointestinal: negative  Genitourinary:negative  Integument/Breast: negative  Hematologic/Lymphatic: negative  Musculoskeletal:negative  Neurological: negative      Past Medical History:   Diagnosis Date    Abnormal nuclear stress test 10/04/2021    Anemia     Angina at rest [facility]) 10/04/2021    Arthritis     KNEE,gout    Bundle branch block     Endocrine disease     HYPOTHYROIDISM    Fracture     Left distal femur (age 12 - pt fell)    Fracture     Left tibia 1990 (pt fell)    Fracture     Right wrist fractured 2 times (pt fell)    GERD (gastroesophageal reflux disease)     High cholesterol     Hypertension     Hypoglycemia     \"my body produces too much insulin\"    Liver disease     BRADY    Nausea & vomiting     when had gallbladder out    Osteoporosis     Other ill-defined conditions(799.89)     edema legs    S/P cardiac cath 10/04/2021    10/4/2021 nonobstructive disease    Spinal stenosis     Thyroid disease      Past Surgical History:   Procedure Laterality Date    COLONOSCOPY N/A 7/13/2021    COLONOSCOPY performed by Roverto Funez MD at [facility] ENDOSCOPY    COLONOSCOPY N/A 8/8/2022    COLONOSCOPY performed by Elizabeth Kwan MD at [facility] ENDOSCOPY    COLONOSCOPY,REMV Richad Rotunda  2/11/2015         COLONOSCOPY,REMV Richad Rotunda  7/13/2021         COLONOSCPY,FLEX,W/DIR SUBMUC INJECT  7/13/2021         COLORECTAL SCRN; HI RISK IND  2/11/2015         HX CHOLECYSTECTOMY      HX HYSTERECTOMY      HX ORTHOPAEDIC Left     leg surgery - plates, screws, wires, pins in place    HX UROLOGICAL      [address]      liver biopsy--BRADY and fibrosis    SB ENDOSCOPY,W/CONTROL,BLEEDING  11/16/2022    SMALL BOWEL ENDOSCOPY,ABLATE LESN  11/16/2022    SMALL BOWEL ENDOSCOPY,BIOPSY  11/16/2022    UPPER GI ENDOSCOPY,BIOPSY  11/17/2015           Family History   Problem Relation Age of Onset    Diabetes Mother     Heart Disease Mother     Emphysema Father     No Known Problems Brother     Stroke Maternal Grandmother     No Known Problems Maternal Grandfather     No Known Problems Paternal Grandmother     No Known Problems Paternal Grandfather      Social History     Tobacco Use    Smoking status: Never    Smokeless tobacco: Never   Substance Use Topics    Alcohol use: No      Prior to Admission medications    Medication Sig Start Date End Date Taking? Authorizing Provider   metOLazone (ZAROXOLYN) 2.5 mg tablet TAKE 1 TABLET BY MOUTH EVERY OTHER DAY 1/4/23   Provider, Historical   amLODIPine (NORVASC) 10 mg tablet Take 10 mg by mouth daily. 1/4/23   Provider, Historical   losartan (COZAAR) 50 mg tablet  1/4/23   Provider, Historical   alendronate (FOSAMAX) 70 mg tablet Take 1 Tablet by mouth every seven (7) days. 1/5/23   Celina Ospina MD   lactulose (KRISTALOSE) 10 gram packet Take 1 Packet by mouth daily (with breakfast) for 30 days. 12/31/22 1/30/23  Maryann Heaton MD   allopurinoL (ZYLOPRIM) 100 mg tablet Take 100 mg by mouth daily. Provider, Historical   loratadine (CLARITIN) 10 mg tablet TAKE 1 TABLET BY MOUTH EVERY DAY 12/23/22   Diego Valladares MD   atorvastatin (LIPITOR) 20 mg tablet TAKE 1 TABLET BY MOUTH AT BEDTIME 12/19/22   Diego Valladares MD   ezetimibe (ZETIA) 10 mg tablet TAKE 1 TABLET BY MOUTH EVERY DAY 12/19/22   Diego Valladares MD   benzocaine-menthoL (CHLORASEPTIC MAX) 15-10 mg lozg lozenge Take 1 Lozenge by mouth as needed for Sore throat. 11/18/22   Dave Askew NP   furosemide (LASIX) 80 mg tablet Take 0.5 Tablets by mouth daily. 11/18/22   Dave Askew NP   levalbuterol tartrate [facility]) 45 mcg/actuation inhaler Take 2 Puffs by inhalation every six (6) hours as needed for Wheezing or Shortness of Breath.     Provider, Historical   potassium chloride SA (MICRO-K) 10 mEq capsule Take 2 Capsules by mouth daily. 9/24/22   Tres Dow MD   pantoprazole (PROTONIX) 40 mg tablet Take 1 Tablet by mouth Before breakfast and dinner. 8/17/22   Tres Dow MD   nystatin (MYCOSTATIN) powder Apply  to affected area two (2) times a day. 8/8/22   Steve Barney MD   fluticasone propionate (FLONASE) 50 mcg/actuation nasal spray 2 Sprays by Both Nostrils route daily. Administer to right and left nostril. 6/24/22   Tres Dow MD   levothyroxine (SYNTHROID) 150 mcg tablet Take 1 Tablet by mouth Daily (before breakfast). 6/15/22   Tres Dow MD   icosapent ethyL (VASCEPA) 1 gram capsule Take 2 Capsules by mouth two (2) times daily (with meals). 2/14/22   Tres Dow MD   lidocaine (LIDODERM) 5 % Apply patch to the affected area for 12 hours a day and remove for 12 hours a day. 12/7/21   Tres Dow MD   polyethylene glycol [facility]) 17 gram/dose powder Take 17 g by mouth daily. 4/6/20   Tres Dow MD   ketoconazole (NIZORAL) 2 % topical cream Apply  to affected area daily as needed. 8/23/19   Provider, Historical   Biotin 2,500 mcg cap Take  by mouth. Provider, Historical   L GASSERI/B BIFIDUM/B LONGUM (IdealSeat PO) Take 1 Tab by mouth daily. Provider, Historical   vitamin E (AQUA GEMS) 400 unit capsule Take 800 Units by mouth two (2) times a day. Provider, Historical   cholecalciferol, vitamin D3, 50 mcg (2,000 unit) tab Take 1 Tab by mouth daily. Other, MD Janette   MULTIVITAMIN WITH MINERALS (ONE-A-DAY 50 PLUS PO) Take 1 Tab by mouth daily.     Provider, Historical            Allergies   Allergen Reactions    Gabapentin Other (comments)     Suicidal ideation    Metoprolol Rash    Celebrex [Celecoxib] Hives    Eliquis [Apixaban] Other (comments)     Caused excessive anemia    Pcn [Penicillins] Unknown (comments)     Can't remember, was a long time    Sulfa (Sulfonamide Antibiotics) Hives           Objective:     Vitals: 01/24/23 2244 01/24/23 2344 01/25/23 0333 01/25/23 0848   BP: (!) 139/46 (!) 126/46 (!) 151/56 (!) 151/49   Pulse: 60 (!) 59 (!) 59 61   Resp: 18 18 18 18   Temp: 98.1 °F (36.7 °C)  99 °F (37.2 °C) 98.2 °F (36.8 °C)   SpO2: 94% 96% 93% 96%   Weight:       Height:                Physical Exam:    GENERAL: alert, cooperative, no distress, appears stated age, pale  EYE: conjunctivae/corneas clear. PERRL, EOM's intact. Fundi benign  LYMPHATIC: Cervical, supraclavicular, and axillary nodes normal.   THROAT & NECK: normal and no erythema or exudates noted. LUNG: clear to auscultation bilaterally  HEART: regular rate and rhythm, S1, S2 normal, no murmur, click, rub or gallop  ABDOMEN: soft, non-tender. Bowel sounds normal. No masses,  no organomegaly  EXTREMITIES:  extremities normal, atraumatic, no cyanosis or edema  SKIN: Normal, pale  NEUROLOGIC: AOx3. Cranial nerves 2-12 and sensation grossly intact. Lab Results   Component Value Date/Time    WBC 2.9 (L) 01/25/2023 03:21 AM    HGB 7.9 (L) 01/25/2023 03:21 AM    HCT 23.9 (L) 01/25/2023 03:21 AM    PLATELET 19 (LL) 68/02/0313 03:21 AM    MCV 84.8 01/25/2023 03:21 AM           Lab Results   Component Value Date/Time    Iron 238 (H) 01/23/2023 06:05 PM    TIBC 260 01/23/2023 06:05 PM    Iron % saturation 92 (H) 01/23/2023 06:05 PM    Ferritin 4,715 (H) 01/23/2023 06:05 PM           Assessment:     1. Iron deficiency anemia secondary to chronic gastrointestinal bleeding:    Patient does not tolerate oral iron supplementation. During last visit on 11/4 decision was made to move forward with IV iron supplementation. Outpatient iron infusion was scheduled for last week, this was missed due to current hospitalization.     GI history summary   GI History:  12/30/2023 : EGD : -- Antral mucosa has improved compared to previous EGD's, and the focal GAVE vs portal hypertensive gastropathy is minimal now, but again treated with APC today given it's favorable response to APC these past several months. It's interesting that despite improvement in the antrum, recurrent CARLINE continues. She has had mulitiple EGD's, push enteroscopies and a colonoscopy and pill camera, all since August.  -- no other AVMs nor active bleeding, nor clear cause of anemia identified (distal portion of duodenum visualized)  -- small/low grade varices now seen in distal esophagus  11/16/2022 EGD: normal esophagus, edematous appearance of stomach, antrum with oozing foci and areas of ulceration and inflammation. Bx portal hypertensive gastropathy vs GAVE. 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  Ulcerated area in 3rd portion of duodenum s/p hemoclip  10/11/2022- pill cam showing AVM at 9 minutes and active oozing blood at 1 hour and 34 minutes, unclear if AVM or other lesion  EGD 10/10/22- atypical GAVE in antrum, treated with APC extensively  -- also, pill camera deployed in duodenum, given degree of recurrent anemia  08/2022- Colonoscopy with normal colonic mucosa throughout    Acute on chronic thrombocytopenia  Pancytopenia  Likely multifactorial given BRADY cirrhosis and recent GI bleed  Platelets 19 today  Low concern for HIT with no evidence of heparin exposure    Plan:     > Ferritin elevated, iron and iron saturation elevated on iron profile  > No role for IV iron repletion at this time  > Reticulocyte count 0.1, erythropoietin level pending  > Likely component of chronic anemia is related to CKD, s/p Retacrit dose   > Trend CBC, s/p transfusion  PRBCs   > Likely thrombocytopenia is multifactorial given history of BRADY cirrhosis and recent GI bleed, baseline platelet level is around 50  > Low concern for HIT with no evidence of recent heparin exposure  > Recommend to transfuse to keep platelets greater than 10 or goal to keep greater than 50 if bleeding is suspected  > Hold transfusion of platelets until less than 10   > Cause of acute thrombocytopenia likely multifactorial given recent GI bleed. > Concern for overall bone marrow dysfunction, will continue to trend CBC. Cautious regarding bone marrow biopsy as patient would not be a candidate for any treatment if bone marrow revealed underlying dysfunction. Would defer biopsy until later date and would be information only. > Consult to Palliative Care, would benefit from [address] given multiple co-morbidities and poor short term prognosis. We will continue to follow patient during hospitalization and arrange appropriate outpatient follow-up.       Signed by: Marlyn Cheek NP                     January 25, 2023

## 2023-01-25 NOTE — PROGRESS NOTES
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 Dr Janell Jack  spoke to Dr Kalpana Kimble at [place] [address] yesterday and he is not eager to rush to a TIPS and he would like to see patient. 2023 at [place] [address] [place] - pt needs to arrive at [address] [facility]. Location : [address] [facility], 3rd Floor. Hb dropped d=today, no active bleeding. Will given one unit. S/p 4 units pRBCs since admission. S/p octreotide  Appreciate GI following, not recommending TIPS at this time. No repeat EGD  Last EGD 2022:   ESOPHAGUS: The Z-Line is intact. There are low grade esophageal varices, that do not completely flatten with insufflation, likely represent grade I/II. No stigmata of recent bleeding. STOMACH:   -- gastric mucosa is friable and edematous, consistent with portal hypertensive gastropathy  -- The fundus on antegrade and retroflex views is otherwise normal.   -- Antrum has improved compared to previous EGD's, and the focal GAVE vs portal hypertensive gastropathy is minimal now, but again treated with APC today, given it's favorable response to APC these past several months. SMALL INTESTINE: colonoscope smoothly passes to the distal duodenum, though I did not reach previously tattooed site. No AVMs found, but the duodenum is generally friable and scope trauma causes a few scant erosions. FU MRI/MRCP for pancreatic cystic lesion - last scan 3/2021. MRI with pancreatic lesion stable,  + ascites  Cont' with PPI BID,   S/p IV rocephin X5 days for SBP prophylaxis  Con't lasix and aldactone (increase dose as BP/renal function can tolerate).  Cont xifaxan, lactulose  for liver cirrhosis managemnt  Cleared by GI for discharge  Discussed MRI results, felt to hold paracentesis, follow up at [facility]  PT recommending SNF, insurance auth obtained   Plts count dropping, cont to monitor, no active bleeding. HIT unlikely as no heparin exposure. Baseline plts ~50s  Monitor Plts. Onc recs to transfuse to keep platelets greater than 10 or goal to keep greater than 50 if bleeding is suspected  Iron transfusion order X3 doses   Check erythropoietin, if low start epogen  May consider bone marrow biopsy if not improving    Code status: Partial code  Prophylaxis: SCDs  MAYURI 48hrs  Barriers: Plts dropping, hem clearance       Subjective:     Chief Complaint / Reason for Physician Visit  NAD. No new complaint today. No abdominal pain or N/V  No further bleeding  Abd distention   No epistaxis   Discussed with RN events overnight. Review of Systems:  Symptom Y/N Comments  Symptom Y/N Comments   Fever/Chills n   Chest Pain n    Poor Appetite    Edema     Cough n   Abdominal Pain n    Sputum    Joint Pain     SOB/HENLEY n   Pruritis/Rash     Nausea/vomit n   Tolerating PT/OT     Diarrhea n   Tolerating Diet y    Constipation    Other       Could NOT obtain due to:      Objective:     VITALS:   Last 24hrs VS reviewed since prior progress note.  Most recent are:  Patient Vitals for the past 24 hrs:   Temp Pulse Resp BP SpO2   01/25/23 0333 99 °F (37.2 °C) (!) 59 18 (!) 151/56 93 %   01/24/23 2344 -- (!) 59 18 (!) 126/46 96 %   01/24/23 2244 98.1 °F (36.7 °C) 60 18 (!) 139/46 94 %   01/24/23 2214 98.3 °F (36.8 °C) 63 -- (!) 138/43 99 %   01/24/23 2159 98.2 °F (36.8 °C) (!) 59 17 (!) 141/43 98 %   01/24/23 2143 98.6 °F (37 °C) 60 -- (!) 139/49 97 %   01/24/23 1444 97.9 °F (36.6 °C) 65 -- (!) 153/54 98 %   01/24/23 0801 98.4 °F (36.9 °C) 64 18 (!) 148/53 99 %         Intake/Output Summary (Last 24 hours) at 1/25/2023 0554  Last data filed at 1/24/2023 2344  Gross per 24 hour   Intake 237.5 ml   Output 600 ml   Net -362.5 ml          I had a face to face encounter and independently examined this patient on 1/25/2023, as outlined below:  PHYSICAL EXAM:  General: WD, WN. Alert, cooperative, no acute distress    EENT:  Anicteric sclerae. MMM  Resp:  CTA bilaterally, no wheezing or rales. No accessory muscle use  CV:  Regular  rhythm,  +2 edema  GI:  Soft,+ distended, Non tender. +Bowel sounds  Neurologic:  Alert and oriented X 3, normal speech  Psych:   Good insight. Not anxious nor agitated  Skin:  No rashes. No jaundice    Reviewed most current lab test results and cultures  YES  Reviewed most current radiology test results   YES  Review and summation of old records today    NO  Reviewed patient's current orders and MAR    YES  PMH/SH reviewed - no change compared to H&P  ________________________________________________________________________  Care Plan discussed with:    Comments   Patient x    Family      RN x    Care Manager x    Consultant                       x Multidiciplinary team rounds were held today with , nursing, pharmacist and clinical coordinator. Patient's plan of care was discussed; medications were reviewed and discharge planning was addressed. ________________________________________________________________________  Total NON critical care TIME:  30   Minutes     Total CRITICAL CARE TIME Spent:   Minutes non procedure based         Comments   >50% of visit spent in counseling and coordination of care     ________________________________________________________________________  Mireya Farah MD     Procedures: see electronic medical records for all procedures/Xrays and details which were not copied into this note but were reviewed prior to creation of Plan. LABS:  I reviewed today's most current labs and imaging studies.   Pertinent labs include:  Recent Labs     01/25/23  0321 01/24/23  0325 01/23/23  0324   WBC 2.9* 2.5* 3.0*   HGB 7.9* 6.7* 7.1*   HCT 23.9* 20.7* 22.2*   PLT 19* 18* 23*       Recent Labs     01/24/23  0325   NA 142   K 4.1   *   CO2 29   *   BUN 34*   CREA 1.00   CA 10.7*         Signed: Neha Davlia MD

## 2023-01-25 NOTE — PROGRESS NOTES
Problem: Mobility Impaired (Adult and Pediatric)  Goal: *Acute Goals and Plan of Care (Insert Text)  Description: FUNCTIONAL STATUS PRIOR TO ADMISSION: Patient was modified independent using a rollator for functional mobility. HOME SUPPORT PRIOR TO ADMISSION: The patient lived alone with paid caregivers to provide assistance with IADLs and ADLs. Physical Therapy Goals  Initiated 1/19/2023  1. Patient will move from supine to sit and sit to supine , scoot up and down, and roll side to side in bed with supervision/set-up within 7 day(s). 2.  Patient will transfer from bed to chair and chair to bed with supervision/set-up using the least restrictive device within 7 day(s). 3.  Patient will perform sit to stand with supervision/set-up within 7 day(s). Outcome: Not Progressing Towards Goal   PHYSICAL THERAPY TREATMENT  Patient: Fernanda Smith (51 y.o. female)  Date: 1/25/2023  Diagnosis: UGIB (upper gastrointestinal bleed) [K92.2]  GI bleed [K92.2] <principal problem not specified>      Precautions:    Chart, physical therapy assessment, plan of care and goals were reviewed. ASSESSMENT  Patient continues with skilled PT services and is not progressing towards goals. Pt presents with decreased strength and endurance. Pt performed bed mobility at  min A/CGA with cueing for sequencing. Pt performed sit to stand transfer at min A. Pt ambulated 5ft with RW at min A with cueing for sequencing. Pt reported feeling dizzy so needing to sit. Pts BP stable and dizziness improved with time. Pt reported feeling tired and agreeable to stay up in recliner. Will continue to increase pts mobility tolerance.        Current Level of Function Impacting Discharge (mobility/balance): bed mobility at min A, sit to stand transfer at min A, ambulation at min A/CGA     Other factors to consider for discharge: decreased strength and endurance          PLAN :  Patient continues to benefit from skilled intervention to address the above impairments. Continue treatment per established plan of care. to address goals. Recommendation for discharge: (in order for the patient to meet his/her long term goals)  Therapy up to 5 days/week in SNF setting    This discharge recommendation:  Has been made in collaboration with the attending provider and/or case management    IF patient discharges home will need the following DME: rolling walker       SUBJECTIVE:   Patient stated I need to get out of that bed.     OBJECTIVE DATA SUMMARY:   Critical Behavior:  Neurologic State: Alert  Orientation Level: Oriented X4  Cognition: Appropriate decision making, Appropriate for age attention/concentration, Appropriate safety awareness  Safety/Judgement: Awareness of environment, Insight into deficits  Functional Mobility Training:  Bed Mobility:  Rolling: Minimum assistance  Supine to Sit: Minimum assistance     Scooting: Contact guard assistance        Transfers:  Sit to Stand: Minimum assistance  Stand to Sit: Contact guard assistance                             Balance:  Sitting: Intact  Sitting - Static: Good (unsupported)  Sitting - Dynamic: Fair (occasional)  Standing: Impaired  Standing - Static: Fair;Constant support  Standing - Dynamic : Poor;Fair;Constant support  Ambulation/Gait Training:  Distance (ft): 5 Feet (ft)  Assistive Device: Gait belt;Walker, rolling  Ambulation - Level of Assistance: Minimal assistance;Contact guard assistance        Gait Abnormalities: Decreased step clearance;Shuffling gait        Base of Support: Widened     Speed/Keerthi: Slow;Shuffled  Step Length: Right shortened;Left shortened        Pain Rating:  Pt with no complaints of pain     Activity Tolerance:   Fair, Poor, and requires rest breaks    After treatment patient left in no apparent distress:   Sitting in chair and Call bell within reach    COMMUNICATION/COLLABORATION:   The patients plan of care was discussed with: Registered nurse.      Andrea Medina PTA Time Calculation: 33 mins

## 2023-01-25 NOTE — PROGRESS NOTES
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 Dr Anirudh Pete  spoke to Dr Zaid Wheat at [place] [address] yesterday and he is not eager to rush to a TIPS and he would like to see patient on outpatient basis  S/p octreotide  Appreciate GI following, not recommending TIPS at this time. No repeat EGD  Last EGD 2022:   ESOPHAGUS: The Z-Line is intact. There are low grade esophageal varices, that do not completely flatten with insufflation, likely represent grade I/II. No stigmata of recent bleeding. STOMACH:   -- gastric mucosa is friable and edematous, consistent with portal hypertensive gastropathy  -- The fundus on antegrade and retroflex views is otherwise normal.   -- Antrum has improved compared to previous EGD's, and the focal GAVE vs portal hypertensive gastropathy is minimal now, but again treated with APC today, given it's favorable response to APC these past several months. SMALL INTESTINE: colonoscope smoothly passes to the distal duodenum, though I did not reach previously tattooed site. No AVMs found, but the duodenum is generally friable and scope trauma causes a few scant erosions. FU MRI/MRCP for pancreatic cystic lesion - last scan 3/2021. MRI with pancreatic lesion stable,  + ascites  Cont' with PPI BID,   S/p IV rocephin X5 days for SBP prophylaxis  Con't lasix and aldactone (increase dose as BP/renal function can tolerate). Cont xifaxan, lactulose  for liver cirrhosis managemnt  Cleared by GI for discharge  Discussed MRI results, felt to hold paracentesis, follow up at [facility]  PT recommending SNF, insurance auth obtained   Plts count dropping, cont to monitor, no active bleeding. HIT unlikely as no heparin exposure.  Baseline plts ~50s  Her platelet count continues to be low at 19. Will defer to hematology regarding platelet transfusions. Check CBC in a.m. Hemoglobin is 7.9. S/p 1 unit of PRBC on 1/25. Check CBC in a.m. Code status: Partial code  Prophylaxis: SCDs      MAYURI: 1/27  Barriers: Improvement of platelets, hematology clearance       Subjective:     Chief Complaint / Reason for Physician Visit  He still reports feeling weak in general.  No bleeding. Reports some dizziness. No chest pain. Overnight events noted  No fever or chills          Objective:     VITALS:   Last 24hrs VS reviewed since prior progress note. Most recent are:  Patient Vitals for the past 24 hrs:   Temp Pulse Resp BP SpO2   01/25/23 0848 98.2 °F (36.8 °C) 61 18 (!) 151/49 96 %   01/25/23 0333 99 °F (37.2 °C) (!) 59 18 (!) 151/56 93 %   01/24/23 2344 -- (!) 59 18 (!) 126/46 96 %   01/24/23 2244 98.1 °F (36.7 °C) 60 18 (!) 139/46 94 %   01/24/23 2214 98.3 °F (36.8 °C) 63 -- (!) 138/43 99 %   01/24/23 2159 98.2 °F (36.8 °C) (!) 59 17 (!) 141/43 98 %   01/24/23 2143 98.6 °F (37 °C) 60 17 (!) 139/49 97 %   01/24/23 2115 98.5 °F (36.9 °C) 61 18 (!) 142/57 97 %   01/24/23 1444 97.9 °F (36.6 °C) 65 -- (!) 153/54 98 %         Intake/Output Summary (Last 24 hours) at 1/25/2023 1428  Last data filed at 1/25/2023 0913  Gross per 24 hour   Intake 487.5 ml   Output 950 ml   Net -462.5 ml          I had a face to face encounter and independently examined this patient on 1/25/2023, as outlined below:  PHYSICAL EXAM:  General: cooperative, no acute distress    EENT:  Anicteric sclerae. MMM  Resp:  CTA bilaterally, no wheezing or rales. No accessory muscle use  CV:  Regular  rhythm,  +2 edema  GI:  Soft, Non tender. +Bowel sounds  Neurologic:  Alert and oriented X 3, normal speech  Psych:   Not anxious nor agitated  Skin:  No rashes.   No jaundice    Reviewed most current lab test results and cultures  YES  Reviewed most current radiology test results   YES  Review and summation of old records today    NO  Reviewed patient's current orders and MAR    YES  PMH/SH reviewed - no change compared to H&P  ________________________________________________________________________  Care Plan discussed with:    Comments   Patient x    Family      RN x    Care Manager x    Consultant                       x Multidiciplinary team rounds were held today with , nursing, pharmacist and clinical coordinator. Patient's plan of care was discussed; medications were reviewed and discharge planning was addressed. ________________________________________________________________________  Total NON critical care TIME:  35  Minutes     Total CRITICAL CARE TIME Spent:   Minutes non procedure based         Comments   >50% of visit spent in counseling and coordination of care     ________________________________________________________________________  Ashley Dorado MD     Procedures: see electronic medical records for all procedures/Xrays and details which were not copied into this note but were reviewed prior to creation of Plan. LABS:  I reviewed today's most current labs and imaging studies.   Pertinent labs include:  Recent Labs     01/25/23  0321 01/24/23  0325 01/23/23  0324   WBC 2.9* 2.5* 3.0*   HGB 7.9* 6.7* 7.1*   HCT 23.9* 20.7* 22.2*   PLT 19* 18* 23*       Recent Labs     01/24/23  0325      K 4.1   *   CO2 29   *   BUN 34*   CREA 1.00   CA 10.7*         Signed: Ashley Dorado MD

## 2023-01-25 NOTE — PROGRESS NOTES
Problem: Self Care Deficits Care Plan (Adult)  Goal: *Acute Goals and Plan of Care (Insert Text)  Description:     FUNCTIONAL STATUS PRIOR TO ADMISSION: Patient is mod I for ambulation with a RW, she is independent after setup to supervision/setup for all UB ADLs, grooming and toileting, she is independent with self feeding, and she is min A for LB dressing/bathing. HOME SUPPORT: Patient resides with caregivers providing assistance/supervision for ADLs and functional mobility PRN. Occupational Therapy Goals  Initiated 1/19/2023  1. Patient will perform grooming standing at sink for 3 minutes with supervision/set-up within 7 day(s). 2.  Patient will perform upper body dressing with supervision/set-up within 7 day(s). 3.  Patient will perform lower body dressing with supervision/set-up, using AE PRN, within 7 day(s). 4.  Patient will perform toilet transfers with supervision/set-up within 7 day(s). 5.  Patient will perform all aspects of toileting with supervision/set-up within 7 day(s). Outcome: Progressing Towards Goal     OCCUPATIONAL THERAPY TREATMENT  Patient: Femi Espnial (57 y.o. female)  Date: 1/25/2023  Diagnosis: UGIB (upper gastrointestinal bleed) [K92.2]  GI bleed [K92.2] <principal problem not specified>      Precautions:    Chart, occupational therapy assessment, plan of care, and goals were reviewed. ASSESSMENT  Patient continues with skilled OT services and is progressing towards goals. Patient declined out of bed however was up earlier this date and demonstrated decreased assist for functional transfers. Issued bedside commode and recommend increased use. Receptive to instruction on positioning and skin protection     Current Level of Function Impacting Discharge (ADLs):     Other factors to consider for discharge: to go to rehab per patient         PLAN :  Patient continues to benefit from skilled intervention to address the above impairments.   Continue treatment per established plan of care to address goals. Recommend with staff: use of bedside commode 2x's per day    Recommend next OT session: transfer to bedside commode, standing tolerance    Recommendation for discharge: (in order for the patient to meet his/her long term goals)  Therapy up to 5 days/week in SNF setting    This discharge recommendation:  Has been made in collaboration with the attending provider and/or case management    IF patient discharges home will need the following DME:        SUBJECTIVE:   Patient stated I couldn't do that when I got here.  re: raising leg off of bed    OBJECTIVE DATA SUMMARY:   Cognitive/Behavioral Status:  Neurologic State: Alert  Orientation Level: Oriented X4  Cognition: Follows commands; Appropriate for age attention/concentration; Appropriate safety awareness  Perception: Appears intact  Perseveration: No perseveration noted  Safety/Judgement: Awareness of environment; Fall prevention;Home safety; Insight into deficits    Functional Mobility and Transfers for ADLs:  Bed Mobility:  Rolling: Minimum assistance  Supine to Sit: Minimum assistance  Scooting: Contact guard assistance    Transfers:  Sit to Stand: Minimum assistance          Balance:  Sitting: Intact  Sitting - Static: Good (unsupported)  Sitting - Dynamic: Fair (occasional)  Standing: Impaired  Standing - Static: Fair;Constant support  Standing - Dynamic : Poor;Fair;Constant support    ADL Intervention:   Positioning: Educated on positioning in supine and sidelying to facilitate neutral alignment and increased comfort, provided visual demonstration to increase carryover    Educated on floating heels and benefit    Educated on use of bedside commode versus reliance on pur-wic, issued bedside commode, adjusted height and placed at head of bed.  Instructed to use > or = 2 x's per day as she was able to ambulate 5' with min assist using rolling walker per PT noted        Cognitive Retraining  Safety/Judgement: Awareness of environment; Fall prevention;Home safety; Insight into deficits    Therapeutic Exercises:   Instructed on bed level exercises and incorporation throughout day    Pain:  No compaint    Activity Tolerance:   Fair    After treatment patient left in no apparent distress:   Supine in bed, Heels elevated for pressure relief, Call bell within reach, Caregiver / family present, and Side rails x 3    COMMUNICATION/COLLABORATION:   The patients plan of care was discussed with: Registered nurse.      EUN Neal/L  Time Calculation: 19 mins

## 2023-01-26 ENCOUNTER — APPOINTMENT (OUTPATIENT)
Dept: INFUSION THERAPY | Age: 78
End: 2023-01-26

## 2023-01-26 LAB
ANION GAP SERPL CALC-SCNC: 4 MMOL/L (ref 5–15)
BUN SERPL-MCNC: 37 MG/DL (ref 6–20)
BUN/CREAT SERPL: 37 (ref 12–20)
CALCIUM SERPL-MCNC: 11.5 MG/DL (ref 8.5–10.1)
CHLORIDE SERPL-SCNC: 105 MMOL/L (ref 97–108)
CO2 SERPL-SCNC: 30 MMOL/L (ref 21–32)
CREAT SERPL-MCNC: 0.99 MG/DL (ref 0.55–1.02)
ERYTHROCYTE [DISTWIDTH] IN BLOOD BY AUTOMATED COUNT: 16.6 % (ref 11.5–14.5)
GLUCOSE SERPL-MCNC: 149 MG/DL (ref 65–100)
HCT VFR BLD AUTO: 22.4 % (ref 35–47)
HGB BLD-MCNC: 7.3 G/DL (ref 11.5–16)
MCH RBC QN AUTO: 27.8 PG (ref 26–34)
MCHC RBC AUTO-ENTMCNC: 32.6 G/DL (ref 30–36.5)
MCV RBC AUTO: 85.2 FL (ref 80–99)
NRBC # BLD: 0 K/UL (ref 0–0.01)
NRBC BLD-RTO: 0 PER 100 WBC
PLATELET # BLD AUTO: 18 K/UL (ref 150–400)
PMV BLD AUTO: 11.3 FL (ref 8.9–12.9)
POTASSIUM SERPL-SCNC: 4.1 MMOL/L (ref 3.5–5.1)
RBC # BLD AUTO: 2.63 M/UL (ref 3.8–5.2)
SODIUM SERPL-SCNC: 139 MMOL/L (ref 136–145)
WBC # BLD AUTO: 2.7 K/UL (ref 3.6–11)

## 2023-01-26 PROCEDURE — 97535 SELF CARE MNGMENT TRAINING: CPT | Performed by: OCCUPATIONAL THERAPIST

## 2023-01-26 PROCEDURE — 65270000046 HC RM TELEMETRY

## 2023-01-26 PROCEDURE — C9113 INJ PANTOPRAZOLE SODIUM, VIA: HCPCS | Performed by: STUDENT IN AN ORGANIZED HEALTH CARE EDUCATION/TRAINING PROGRAM

## 2023-01-26 PROCEDURE — 74011250636 HC RX REV CODE- 250/636: Performed by: STUDENT IN AN ORGANIZED HEALTH CARE EDUCATION/TRAINING PROGRAM

## 2023-01-26 PROCEDURE — 74011250637 HC RX REV CODE- 250/637: Performed by: INTERNAL MEDICINE

## 2023-01-26 PROCEDURE — 80048 BASIC METABOLIC PNL TOTAL CA: CPT

## 2023-01-26 PROCEDURE — 74011250637 HC RX REV CODE- 250/637: Performed by: STUDENT IN AN ORGANIZED HEALTH CARE EDUCATION/TRAINING PROGRAM

## 2023-01-26 PROCEDURE — 94760 N-INVAS EAR/PLS OXIMETRY 1: CPT

## 2023-01-26 PROCEDURE — 85027 COMPLETE CBC AUTOMATED: CPT

## 2023-01-26 PROCEDURE — 74011250637 HC RX REV CODE- 250/637: Performed by: GENERAL ACUTE CARE HOSPITAL

## 2023-01-26 PROCEDURE — 97530 THERAPEUTIC ACTIVITIES: CPT | Performed by: OCCUPATIONAL THERAPIST

## 2023-01-26 PROCEDURE — 36415 COLL VENOUS BLD VENIPUNCTURE: CPT

## 2023-01-26 PROCEDURE — 74011000250 HC RX REV CODE- 250: Performed by: STUDENT IN AN ORGANIZED HEALTH CARE EDUCATION/TRAINING PROGRAM

## 2023-01-26 RX ADMIN — LACTULOSE 45 ML: 20 SOLUTION ORAL at 08:35

## 2023-01-26 RX ADMIN — SODIUM CHLORIDE 40 MG: 9 INJECTION, SOLUTION INTRAMUSCULAR; INTRAVENOUS; SUBCUTANEOUS at 08:35

## 2023-01-26 RX ADMIN — SPIRONOLACTONE 50 MG: 25 TABLET ORAL at 18:23

## 2023-01-26 RX ADMIN — FUROSEMIDE 20 MG: 20 TABLET ORAL at 18:25

## 2023-01-26 RX ADMIN — LACTULOSE 45 ML: 20 SOLUTION ORAL at 18:23

## 2023-01-26 RX ADMIN — SODIUM CHLORIDE 40 MG: 9 INJECTION, SOLUTION INTRAMUSCULAR; INTRAVENOUS; SUBCUTANEOUS at 20:51

## 2023-01-26 RX ADMIN — SODIUM CHLORIDE, PRESERVATIVE FREE 10 ML: 5 INJECTION INTRAVENOUS at 21:59

## 2023-01-26 RX ADMIN — FUROSEMIDE 20 MG: 20 TABLET ORAL at 08:36

## 2023-01-26 RX ADMIN — SODIUM CHLORIDE, PRESERVATIVE FREE 10 ML: 5 INJECTION INTRAVENOUS at 06:12

## 2023-01-26 RX ADMIN — LEVOTHYROXINE SODIUM 150 MCG: 0.15 TABLET ORAL at 06:17

## 2023-01-26 RX ADMIN — CASTOR OIL AND BALSAM, PERU: 788; 87 OINTMENT TOPICAL at 08:48

## 2023-01-26 RX ADMIN — SPIRONOLACTONE 50 MG: 25 TABLET ORAL at 08:35

## 2023-01-26 RX ADMIN — CASTOR OIL AND BALSAM, PERU: 788; 87 OINTMENT TOPICAL at 20:49

## 2023-01-26 RX ADMIN — RIFAXIMIN 550 MG: 550 TABLET ORAL at 18:23

## 2023-01-26 RX ADMIN — SODIUM CHLORIDE, PRESERVATIVE FREE 10 ML: 5 INJECTION INTRAVENOUS at 14:00

## 2023-01-26 RX ADMIN — Medication: at 08:48

## 2023-01-26 RX ADMIN — RIFAXIMIN 550 MG: 550 TABLET ORAL at 08:44

## 2023-01-26 NOTE — PROGRESS NOTES
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 Dr Jesus Manuel Siddiqui  spoke to Dr Yoli Naranjo at [place] [address] yesterday and he is not eager to rush to a TIPS and he would like to see patient on outpatient basis  S/p octreotide  Appreciate GI following, not recommending TIPS at this time. No repeat EGD  Last EGD 2022:   ESOPHAGUS: The Z-Line is intact. There are low grade esophageal varices, that do not completely flatten with insufflation, likely represent grade I/II. No stigmata of recent bleeding. STOMACH:   -- gastric mucosa is friable and edematous, consistent with portal hypertensive gastropathy  -- The fundus on antegrade and retroflex views is otherwise normal.   -- Antrum has improved compared to previous EGD's, and the focal GAVE vs portal hypertensive gastropathy is minimal now, but again treated with APC today, given it's favorable response to APC these past several months. SMALL INTESTINE: colonoscope smoothly passes to the distal duodenum, though I did not reach previously tattooed site. No AVMs found, but the duodenum is generally friable and scope trauma causes a few scant erosions. FU MRI/MRCP for pancreatic cystic lesion - last scan 3/2021. MRI with pancreatic lesion stable,  + ascites  Cont' with PPI BID,   Cleared by GI for discharge  Discussed MRI results, felt to hold paracentesis, follow up at [facility]  Hemoglobin is 7.3. Check CBC in a.m.     Cystic lesion of the pancreas  -MRI of the abdomen shows 2.8 x 5 cm cystic lesion of pancreas  -GI had recommended outpatient follow-up with [facility] for further evaluation    Thrombocytopenia   Baseline platelet count is around 50 and currently platelet count is 18  Hematology following  -Hematology is recommending platelet transfusion if platelet count drops to less than 10  Check CBC in a.m.      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 Cont xifaxan, lactulose  for liver cirrhosis managemnt    Code status: Partial code  Prophylaxis: SCDs      MAYURI: 1/27  Barriers: Improvement of platelets, hematology clearance       Subjective:     Chief Complaint / Reason for Physician Visit  She reports feeling weak. No bleeding. No chest pain or shortness of breath  No fever  Overnight events noted        Objective:     VITALS:   Last 24hrs VS reviewed since prior progress note. Most recent are:  Patient Vitals for the past 24 hrs:   Temp Pulse Resp BP SpO2   01/26/23 0759 97.5 °F (36.4 °C) 62 18 (!) 157/54 94 %   01/25/23 2055 98.3 °F (36.8 °C) 62 17 (!) 139/58 95 %   01/25/23 1718 -- 63 -- 138/68 --       No intake or output data in the 24 hours ending 01/26/23 1359       I had a face to face encounter and independently examined this patient on 1/26/2023, as outlined below:  PHYSICAL EXAM:  General: cooperative, no acute distress    EENT:  Anicteric sclerae. MMM  Resp:  CTA bilaterally, no wheezing or rales. No accessory muscle use  CV:  Regular  rhythm,  +2 edema  GI:  Soft, Non tender. +Bowel sounds  Neurologic:  Alert and awake, normal speech  Psych:   Not anxious nor agitated  Skin:  No rashes. No jaundice    Reviewed most current lab test results and cultures  YES  Reviewed most current radiology test results   YES  Review and summation of old records today    NO  Reviewed patient's current orders and MAR    YES  PMH/SH reviewed - no change compared to H&P  ________________________________________________________________________  Care Plan discussed with:    Comments   Patient x    Family      RN x    Care Manager x    Consultant                       x Multidiciplinary team rounds were held today with , nursing, pharmacist and clinical coordinator.   Patient's plan of care was discussed; medications were reviewed and discharge planning was addressed. ________________________________________________________________________  Total NON critical care TIME:  35  Minutes     Total CRITICAL CARE TIME Spent:   Minutes non procedure based         Comments   >50% of visit spent in counseling and coordination of care     ________________________________________________________________________  Adrienne Caban MD     Procedures: see electronic medical records for all procedures/Xrays and details which were not copied into this note but were reviewed prior to creation of Plan. LABS:  I reviewed today's most current labs and imaging studies.   Pertinent labs include:  Recent Labs     01/26/23  0548 01/25/23  0321 01/24/23  0325   WBC 2.7* 2.9* 2.5*   HGB 7.3* 7.9* 6.7*   HCT 22.4* 23.9* 20.7*   PLT 18* 19* 18*       Recent Labs     01/26/23  0548 01/24/23  0325    142   K 4.1 4.1    109*   CO2 30 29   * 141*   BUN 37* 34*   CREA 0.99 1.00   CA 11.5* 10.7*         Signed: Adrienne Caban MD

## 2023-01-26 NOTE — PROGRESS NOTES
Problem: Self Care Deficits Care Plan (Adult)  Goal: *Acute Goals and Plan of Care (Insert Text)  Description:     FUNCTIONAL STATUS PRIOR TO ADMISSION: Patient is mod I for ambulation with a RW, she is independent after setup to supervision/setup for all UB ADLs, grooming and toileting, she is independent with self feeding, and she is min A for LB dressing/bathing. HOME SUPPORT: Patient resides with caregivers providing assistance/supervision for ADLs and functional mobility PRN. Occupational Therapy Goals  Weekly re-assessment 1/26/2023-goals modified/updated below  1. Patient will stand for functional task > or = 5 minutes with supervision within 7 day(s). 2.  Patient will perform anterior maya-care in standing with minimal assist within 7 day(s). 3.  Patient will transfer to and from bedside commode with SBA using rolling walker within 7 day(s)  4. Patient will ambulate to bathroom door from chair ~10' with rolling walker and CGA within 7 day(s). Initiated 1/19/2023,   1. Patient will perform grooming standing at sink for 3 minutes with supervision/set-up within 7 day(s). 2.  Patient will perform upper body dressing with supervision/set-up within 7 day(s). -met  3. Patient will perform lower body dressing with supervision/set-up, using AE PRN, within 7 day(s). 4.  Patient will perform toilet transfers with supervision/set-up within 7 day(s). 5.  Patient will perform all aspects of toileting with supervision/set-up within 7 day(s). Outcome: Progressing Towards Goal    OCCUPATIONAL THERAPY TREATMENT/WEEKLY RE-EVALUATION  Patient: Ronald Gilford (79 y.o. female)  Date: 1/26/2023  Diagnosis: UGIB (upper gastrointestinal bleed) [K92.2]  GI bleed [K92.2] <principal problem not specified>      Precautions:    Chart, occupational therapy assessment, plan of care, and goals were reviewed.     ASSESSMENT  Based on the objective data described below, making progress, motivated, demonstrated increased ability to transfer to bedside commode, noted increased edema right posterior aspect of hip and sacrum, RN informed, recommend wide bedside commode to accommodate edema. Stand pivot transfers and activity tolerance improving however unable to ambulate into the bathroom yet. Will continue to follow    Current Level of Function Impacting Discharge (ADLs): min assist stand pivot transfers, total assist toileting and LE ADLs    Other factors to consider for discharge: has caregiver at home during day and aide at night         PLAN :  Goals have been updated based on progression since last assessment. Patient continues to benefit from skilled intervention to address the above impairments. Continue to follow patient 5 times a week to address goals. Recommend with staff: use of wide bedside commode > or = 3 x's/day    Recommend next OT session: per goals    Recommendation for discharge: (in order for the patient to meet his/her long term goals)  Therapy up to 5 days/week in SNF setting versus home with 24/7 assist    This discharge recommendation:  Has been made in collaboration with the attending provider and/or case management    Equipment recommendations for successful discharge (if) home:        SUBJECTIVE:   Patient stated I just keep going.  re: voiding due to Lasix    OBJECTIVE DATA SUMMARY:   Cognitive/Behavioral Status:           Perception: Appears intact  Perseveration: No perseveration noted  Safety/Judgement: Awareness of environment; Fall prevention;Good awareness of safety precautions; Insight into deficits;Home safety    Functional Mobility and Transfers for ADLs:  Bed Mobility:  Supine to Sit: Minimum assistance;Bed Modified;Assist x1;Additional time    Transfers:  Sit to Stand: Minimum assistance;Contact guard assistance; Adaptive equipment;Assist x1;Additional time  Functional Transfers  Toilet Transfer : Minimum assistance; Additional time; Adaptive equipment;Assist x1 (stand pivot to bedside commode)  Bed to Chair: Minimum assistance; Adaptive equipment;Assist x1;Additional time    Balance:  Sitting: Impaired  Sitting - Static: Good (unsupported)  Sitting - Dynamic: Fair (occasional)  Standing: Impaired; Without support  Standing - Static: Constant support;Good  Standing - Dynamic : Not tested    ADL Intervention:   Patient instructed and indicated understanding the benefits of maintaining activity tolerance, functional mobility, and independence with self care tasks during acute stay  to ensure safe return home and to baseline. Encouraged patient to increase frequency and duration OOB, be out of bed for all meals, perform daily ADLs (as approved by RN/MD regarding bathing etc), and performing functional mobility to/from bedside commode    Noted increased edema right posterior hip and sacral area as compared to left and informed RN    Grooming  Grooming Assistance: Set-up  Position Performed: Seated in chair  Washing Face: Set-up  Washing Hands: Set-up       [place]: Total assistance(dependent)  Socks: Total assistance (dependent) (had assist from caregiver prior)    Toileting  Toileting Assistance: Maximum assistance  Bladder Hygiene: Maximum assistance; Compensatory technique training (for thoroughness in standing)  Bowel Hygiene: Total assistance (dependent) (in standing)  Clothing Management: Total assistance (dependent)  Adaptive Equipment: [place]; Other (comment) (bedside commode)    Cognitive Retraining  Safety/Judgement: Awareness of environment; Fall prevention;Good awareness of safety precautions; Insight into deficits;Home safety    Pain:  No complaint    Activity Tolerance:   Fair    After treatment patient left in no apparent distress:   Sitting in chair, Call bell within reach, and LE's reclined in recliner    COMMUNICATION/COLLABORATION:   The patients plan of care was discussed with: Registered Nurse and Certified Nursing Assistant/Patient Care Technician    Baby Carrier, OTR/L  Time Calculation: 38 mins

## 2023-01-27 LAB
ALBUMIN SERPL-MCNC: 2.4 G/DL (ref 3.5–5)
ALBUMIN/GLOB SERPL: 0.6 (ref 1.1–2.2)
ALP SERPL-CCNC: 591 U/L (ref 45–117)
ALT SERPL-CCNC: 86 U/L (ref 12–78)
ANION GAP SERPL CALC-SCNC: 5 MMOL/L (ref 5–15)
AST SERPL-CCNC: 71 U/L (ref 15–37)
BILIRUB SERPL-MCNC: 1.3 MG/DL (ref 0.2–1)
BUN SERPL-MCNC: 39 MG/DL (ref 6–20)
BUN/CREAT SERPL: 35 (ref 12–20)
CALCIUM SERPL-MCNC: 11.6 MG/DL (ref 8.5–10.1)
CHLORIDE SERPL-SCNC: 105 MMOL/L (ref 97–108)
CO2 SERPL-SCNC: 30 MMOL/L (ref 21–32)
CREAT SERPL-MCNC: 1.11 MG/DL (ref 0.55–1.02)
ERYTHROCYTE [DISTWIDTH] IN BLOOD BY AUTOMATED COUNT: 16.4 % (ref 11.5–14.5)
GLOBULIN SER CALC-MCNC: 3.7 G/DL (ref 2–4)
GLUCOSE SERPL-MCNC: 188 MG/DL (ref 65–100)
HCT VFR BLD AUTO: 21.9 % (ref 35–47)
HGB BLD-MCNC: 7.1 G/DL (ref 11.5–16)
MCH RBC QN AUTO: 27.4 PG (ref 26–34)
MCHC RBC AUTO-ENTMCNC: 32.4 G/DL (ref 30–36.5)
MCV RBC AUTO: 84.6 FL (ref 80–99)
NRBC # BLD: 0 K/UL (ref 0–0.01)
NRBC BLD-RTO: 0 PER 100 WBC
PLATELET # BLD AUTO: 15 K/UL (ref 150–400)
PMV BLD AUTO: 11.1 FL (ref 8.9–12.9)
POTASSIUM SERPL-SCNC: 4.1 MMOL/L (ref 3.5–5.1)
PROT SERPL-MCNC: 6.1 G/DL (ref 6.4–8.2)
RBC # BLD AUTO: 2.59 M/UL (ref 3.8–5.2)
SODIUM SERPL-SCNC: 140 MMOL/L (ref 136–145)
WBC # BLD AUTO: 3.1 K/UL (ref 3.6–11)

## 2023-01-27 PROCEDURE — 74011250636 HC RX REV CODE- 250/636: Performed by: STUDENT IN AN ORGANIZED HEALTH CARE EDUCATION/TRAINING PROGRAM

## 2023-01-27 PROCEDURE — 94760 N-INVAS EAR/PLS OXIMETRY 1: CPT

## 2023-01-27 PROCEDURE — 74011000250 HC RX REV CODE- 250: Performed by: STUDENT IN AN ORGANIZED HEALTH CARE EDUCATION/TRAINING PROGRAM

## 2023-01-27 PROCEDURE — 85027 COMPLETE CBC AUTOMATED: CPT

## 2023-01-27 PROCEDURE — 80053 COMPREHEN METABOLIC PANEL: CPT

## 2023-01-27 PROCEDURE — 74011250637 HC RX REV CODE- 250/637: Performed by: STUDENT IN AN ORGANIZED HEALTH CARE EDUCATION/TRAINING PROGRAM

## 2023-01-27 PROCEDURE — 99233 SBSQ HOSP IP/OBS HIGH 50: CPT | Performed by: NURSE PRACTITIONER

## 2023-01-27 PROCEDURE — 36415 COLL VENOUS BLD VENIPUNCTURE: CPT

## 2023-01-27 PROCEDURE — 65270000046 HC RM TELEMETRY

## 2023-01-27 PROCEDURE — 74011250637 HC RX REV CODE- 250/637: Performed by: GENERAL ACUTE CARE HOSPITAL

## 2023-01-27 PROCEDURE — 74011250637 HC RX REV CODE- 250/637: Performed by: INTERNAL MEDICINE

## 2023-01-27 PROCEDURE — C9113 INJ PANTOPRAZOLE SODIUM, VIA: HCPCS | Performed by: STUDENT IN AN ORGANIZED HEALTH CARE EDUCATION/TRAINING PROGRAM

## 2023-01-27 RX ADMIN — SODIUM CHLORIDE, PRESERVATIVE FREE 10 ML: 5 INJECTION INTRAVENOUS at 14:17

## 2023-01-27 RX ADMIN — SPIRONOLACTONE 50 MG: 25 TABLET ORAL at 17:35

## 2023-01-27 RX ADMIN — CASTOR OIL AND BALSAM, PERU: 788; 87 OINTMENT TOPICAL at 08:40

## 2023-01-27 RX ADMIN — SPIRONOLACTONE 50 MG: 25 TABLET ORAL at 08:39

## 2023-01-27 RX ADMIN — LEVOTHYROXINE SODIUM 150 MCG: 0.15 TABLET ORAL at 05:30

## 2023-01-27 RX ADMIN — Medication: at 17:35

## 2023-01-27 RX ADMIN — SODIUM CHLORIDE 40 MG: 9 INJECTION, SOLUTION INTRAMUSCULAR; INTRAVENOUS; SUBCUTANEOUS at 08:40

## 2023-01-27 RX ADMIN — RIFAXIMIN 550 MG: 550 TABLET ORAL at 08:40

## 2023-01-27 RX ADMIN — FUROSEMIDE 20 MG: 20 TABLET ORAL at 08:39

## 2023-01-27 RX ADMIN — Medication: at 08:40

## 2023-01-27 RX ADMIN — FUROSEMIDE 20 MG: 20 TABLET ORAL at 17:35

## 2023-01-27 RX ADMIN — SODIUM CHLORIDE, PRESERVATIVE FREE 10 ML: 5 INJECTION INTRAVENOUS at 05:30

## 2023-01-27 RX ADMIN — LACTULOSE 45 ML: 20 SOLUTION ORAL at 17:34

## 2023-01-27 RX ADMIN — RIFAXIMIN 550 MG: 550 TABLET ORAL at 17:35

## 2023-01-27 RX ADMIN — CASTOR OIL AND BALSAM, PERU: 788; 87 OINTMENT TOPICAL at 21:00

## 2023-01-27 RX ADMIN — SODIUM CHLORIDE, PRESERVATIVE FREE 10 ML: 5 INJECTION INTRAVENOUS at 21:06

## 2023-01-27 RX ADMIN — LACTULOSE 45 ML: 20 SOLUTION ORAL at 08:40

## 2023-01-27 RX ADMIN — SODIUM CHLORIDE 40 MG: 9 INJECTION, SOLUTION INTRAMUSCULAR; INTRAVENOUS; SUBCUTANEOUS at 21:04

## 2023-01-27 NOTE — PROGRESS NOTES
Notified Night shift CATY Addison of critical lab values HGB 7.1 and Platelets of 15.  No orders given at this time

## 2023-01-27 NOTE — PROGRESS NOTES
Physical Therapy    Chart reviewed, patient with critical lab values of Hgb 7.1 and platelets 15, and plans to have meeting tomorrow with MD to discuss possibility of hospice. Will defer and continue to follow.     Te Rodriguez

## 2023-01-27 NOTE — PROGRESS NOTES
Palliative Medicine Consult  Benny: [phone] ([phone])    Patient Name: Aldair Miller  YOB: 1945    Today's date:  1/27/23  Date of Initial Consult: 1/24/23  Reason for Consult: goals of care  Requesting Provider: Ambika Cook NP   Primary Care Physician: Brian Sawyer MD     SUMMARY:   Aldair Miller is a 68 y.o. 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     HPI: 1/16: presented to ED with c/o generalized fatigue and weakness for 3 days; she has anemia from ulcers and GIB, and her last transfusion was 1/13/23. She feels like she does when she's anemic. Denies blood in stool or urine. 1/27: goals were defined 1/16, however, needs to be re-evaluated after pt being told that there is no treatment for the bleeding except frequent blood transfusions. Admission course: In ED:  abn lab: glu 147, Bun/Cr 59/1.43, bilirubin 1.9, wbc 3.0, h/h 4.6/14.5, plt ct 29. CXR neg for acute process. Current medical issues leading to Palliative Medicine involvement include: anemia due to GIB, iron deficiency and CKD. Psychosocial: resides at [facility] independent living   PALLIATIVE DIAGNOSES:   Fatigue  Lightheadedness   UGIB  Liver cirrhosis   Physical debility  Anasarca   MICHELLE  Pancytopenia  Care decisions     PLAN:   Prior to visit, I completed a review of patient's medical records, including medical documentation, vital signs, MARs, and results of various labs and other diagnostics. Attempted to reach Dr. Homa Henriquez by perfect serve but did not hear back. Met with pt, we talked about what she understands about her medical condition, she stated that she understood that bleeding and thrombocytopenia and Hospice was brought up.   We discussed options that include continuing full restorative care that involves having to go back and forth to hospital and infusion center for frequent transfusions, or, when she does not want to go for transfusions anymore, comfort care which includes Hospice. Pt states she has been talking to her RN, who was a Hospice RN, and \"the doctor\" is going to meet with her and her son on Sat to make decisions. I told her it would be a great idea to get as much info as possible about Hospice in order to make a good decision and offered to order a hospice info session, however, she declined. We also reviewed her POST form. Pt states \"I would like to live longer but I'm not afraid to die. \"    Patient is meeting with her son and Dr. Kal Powers tomorrow to discuss goals. Will check back on Monday. Her POST form might need to be updated (section 2 level of medical care)  Initial consult note routed to primary continuity provider and/or primary health care team members  Communicated plan of care with: Palliative Chloe STEVEN [address] Team     GOALS OF CARE / TREATMENT PREFERENCES:     GOALS OF CARE:    Patient/Health Care Proxy Stated Goals: Prolong life    TREATMENT PREFERENCES:   Code Status: Partial Code    Advance Care Planning:  [x] [facility] Interdisciplinary Team has updated the ACP Navigator with [place] and Patient Capacity      Primary Decision MakerGeronimo Palmer - [phone]    Secondary Decision Maker: Belen Alejo - [phone]  Advance Care Planning 1/24/2023   Patient's Healthcare Decision Maker is: Named in scanned ACP document   Confirm Advance Directive Yes, on file   Patient Would Like to Complete Advance Directive -   Does the patient have other document types MOST/MOLST/POST/POLST       Medical Interventions: Full interventions       Other:    As far as possible, the palliative care team has discussed with patient / health care proxy about goals of care / treatment preferences for patient.      HISTORY:     History obtained from: patient and chart     The patient is:   [x] Verbal and participatory  [] Non-participatory due to:         Clinical Pain Assessment (nonverbal scale for severity on nonverbal patients):   Clinical Pain Assessment  Severity: 0     Activity (Movement): Lying quietly, normal position    Duration: for how long has pt been experiencing pain (e.g., 2 days, 1 month, years)  Frequency: how often pain is an issue (e.g., several times per day, once every few days, constant)     FUNCTIONAL ASSESSMENT:     Palliative Performance Scale (PPS):  PPS: 30       PSYCHOSOCIAL/SPIRITUAL SCREENING:     Palliative IDT has assessed this patient for cultural preferences / practices and a referral made as appropriate to needs (Cultural Services, Patient Advocacy, Ethics, etc.)    Any spiritual / Sikh concerns:  [] Yes /  [x] No   If \"Yes\" to discuss with pastoral care during IDT     Does caregiver feel burdened by caring for their loved one:   [] Yes /  [x] No /  [] No Caregiver Present    If \"Yes\" to discuss with social work during IDT    Anticipatory grief assessment:   [x] Normal  / [] Maladaptive     If \"Maladaptive\" to discuss with social work during IDT    ESAS Anxiety: Anxiety: 0    ESAS Depression: Depression: 0        REVIEW OF SYSTEMS:     Positive and pertinent negative findings in ROS are noted above in HPI. The following systems were [x] reviewed / [] unable to be reviewed as noted in HPI  Other findings are noted below. Systems: constitutional, ears/nose/mouth/throat, respiratory, gastrointestinal, genitourinary, musculoskeletal, integumentary, neurologic, psychiatric, endocrine. Positive findings noted below.   Modified ESAS Completed by: provider   Fatigue: 4 Drowsiness: 0   Depression: 0 Pain: 0   Anxiety: 0 Nausea: 0   Anorexia: 0 Dyspnea: 0     Constipation: No     Stool Occurrence(s): 1        PHYSICAL EXAM:     From RN flowsheet:  Wt Readings from Last 3 Encounters:   01/22/23 223 lb 15.8 oz (101.6 kg)   01/11/23 212 lb 8 oz (96.4 kg)   01/05/23 213 lb 3.2 oz (96.7 kg)     Blood pressure (!) 157/53, pulse 62, temperature 97.7 °F (36.5 °C), resp. rate 18, height 4' 8\" (1.422 m), weight 223 lb 15.8 oz (101.6 kg), SpO2 96 %, not currently breastfeeding.     Pain Scale 1: Numeric (0 - 10)  Pain Intensity 1: 0                 Last bowel movement, if known:     Constitutional: aaox3, pleasant and cooperative, NAD  Eyes: pupils equal, anicteric  ENMT: no nasal discharge, moist mucous membranes  Cardiovascular: regular rhythm, distal pulses intact  Respiratory: breathing not labored, symmetric  Gastrointestinal: soft non-tender, +bowel sounds  Musculoskeletal: no deformity, no tenderness to palpation  Skin: warm, dry, pale  Neurologic: following commands, moving all extremities  Psychiatric: full affect, no hallucinations          HISTORY:     Active Problems:    UGIB (upper gastrointestinal bleed) (1/16/2023)      GI bleed (1/18/2023)    Past Medical History:   Diagnosis Date    Abnormal nuclear stress test 10/04/2021    Anemia     Angina at rest [facility]) 10/04/2021    Arthritis     KNEE,gout    Bundle branch block     Endocrine disease     HYPOTHYROIDISM    Fracture     Left distal femur (age 12 - pt fell)    Fracture     Left tibia 1990 (pt fell)    Fracture     Right wrist fractured 2 times (pt fell)    GERD (gastroesophageal reflux disease)     High cholesterol     Hypertension     Hypoglycemia     \"my body produces too much insulin\"    Liver disease     BRADY    Nausea & vomiting     when had gallbladder out    Osteoporosis     Other ill-defined conditions(799.89)     edema legs    S/P cardiac cath 10/04/2021    10/4/2021 nonobstructive disease    Spinal stenosis     Thyroid disease       Past Surgical History:   Procedure Laterality Date    COLONOSCOPY N/A 7/13/2021    COLONOSCOPY performed by Alexa Leyden, MD at [facility] ENDOSCOPY    COLONOSCOPY N/A 8/8/2022    COLONOSCOPY performed by Giorgio Jimenez MD at [address]  2/11/2015         COLONOSCOPY,REMV LESN,SNARE  7/13/2021         COLONOSCPY,FLEX,W/DIR SUBMUC INJECT  7/13/2021         COLORECTAL SCRN; HI RISK IND  2/11/2015         HX CHOLECYSTECTOMY      HX HYSTERECTOMY      HX ORTHOPAEDIC Left     leg surgery - plates, screws, wires, pins in place    HX UROLOGICAL      PESSURY    OH UNLISTED PROCEDURE ABDOMEN PERITONEUM & OMENTUM      liver biopsy--BRADY and fibrosis    SB ENDOSCOPY,W/CONTROL,BLEEDING  11/16/2022    SMALL BOWEL ENDOSCOPY,ABLATE LESN  11/16/2022    SMALL BOWEL ENDOSCOPY,BIOPSY  11/16/2022    UPPER GI ENDOSCOPY,BIOPSY  11/17/2015           Family History   Problem Relation Age of Onset    Diabetes Mother     Heart Disease Mother     Emphysema Father     No Known Problems Brother     Stroke Maternal Grandmother     No Known Problems Maternal Grandfather     No Known Problems Paternal Grandmother     No Known Problems Paternal Grandfather       History reviewed, no pertinent family history.   Social History     Tobacco Use    Smoking status: Never    Smokeless tobacco: Never   Substance Use Topics    Alcohol use: No     Allergies   Allergen Reactions    Gabapentin Other (comments)     Suicidal ideation    Metoprolol Rash    Celebrex [Celecoxib] Hives    Eliquis [Apixaban] Other (comments)     Caused excessive anemia    Pcn [Penicillins] Unknown (comments)     Can't remember, was a long time    Sulfa (Sulfonamide Antibiotics) Hives      Current Facility-Administered Medications   Medication Dose Route Frequency    furosemide (LASIX) tablet 20 mg  20 mg Oral ACB&D    spironolactone (ALDACTONE) tablet 50 mg  50 mg Oral BID    lactulose (CHRONULAC) 10 gram/15 mL solution 45 mL  30 g Oral BID    hydrALAZINE (APRESOLINE) 20 mg/mL injection 10 mg  10 mg IntraVENous Q6H PRN    levothyroxine (SYNTHROID) tablet 150 mcg  150 mcg Oral 6am    balsam peru-castor oiL (VENELEX) ointment   Topical BID    ammonium lactate (LAC-HYDRIN) 12 % lotion   Topical BID    sodium chloride (NS) flush 5-40 mL  5-40 mL IntraVENous Q8H    sodium chloride (NS) flush 5-40 mL  5-40 mL IntraVENous PRN    acetaminophen (TYLENOL) tablet 650 mg  650 mg Oral Q6H PRN    Or    acetaminophen (TYLENOL) suppository 650 mg  650 mg Rectal Q6H PRN    polyethylene glycol (MIRALAX) packet 17 g  17 g Oral DAILY PRN    ondansetron (ZOFRAN ODT) tablet 4 mg  4 mg Oral Q8H PRN    Or    ondansetron (ZOFRAN) injection 4 mg  4 mg IntraVENous Q6H PRN    pantoprazole (PROTONIX) 40 mg in 0.9% sodium chloride 10 mL injection  40 mg IntraVENous Q12H    rifAXIMin (XIFAXAN) tablet 550 mg  550 mg Oral BID          LAB AND IMAGING FINDINGS:     Lab Results   Component Value Date/Time    WBC 3.1 (L) 01/27/2023 02:13 AM    HGB 7.1 (L) 01/27/2023 02:13 AM    PLATELET 15 (LL) 92/32/9748 02:13 AM     Lab Results   Component Value Date/Time    Sodium 140 01/27/2023 02:13 AM    Potassium 4.1 01/27/2023 02:13 AM    Chloride 105 01/27/2023 02:13 AM    CO2 30 01/27/2023 02:13 AM    BUN 39 (H) 01/27/2023 02:13 AM    Creatinine 1.11 (H) 01/27/2023 02:13 AM    Calcium 11.6 (H) 01/27/2023 02:13 AM    Magnesium 2.4 01/21/2023 04:28 AM    Phosphorus 3.9 01/21/2023 04:28 AM      Lab Results   Component Value Date/Time    Alk.  phosphatase 591 (H) 01/27/2023 02:13 AM    Protein, total 6.1 (L) 01/27/2023 02:13 AM    Albumin 2.4 (L) 01/27/2023 02:13 AM    Globulin 3.7 01/27/2023 02:13 AM     Lab Results   Component Value Date/Time    INR 1.1 01/17/2023 04:08 PM    Prothrombin time 11.5 (H) 01/17/2023 04:08 PM    aPTT 28.4 01/17/2023 04:08 PM      Lab Results   Component Value Date/Time    Iron 238 (H) 01/23/2023 06:05 PM    TIBC 260 01/23/2023 06:05 PM    Iron % saturation 92 (H) 01/23/2023 06:05 PM    Ferritin 4,715 (H) 01/23/2023 06:05 PM      No results found for: PH, PCO2, PO2  No components found for: [place]   Lab Results   Component Value Date/Time     10/28/2020 04:10 AM    CK - MB <0.5 (L) 01/03/2011 10:30 AM                Total time: 45  Counseling / coordination time, spent as noted above: 35  > 50% counseling / coordination?: y    Prolonged service was provided for  []30 min   []75 min in face to face time in the presence of the patient, spent as noted above. Time Start:   Time End:   Note: this can only be billed with 77946 (initial) or 50141 (follow up). If multiple start / stop times, list each separately.

## 2023-01-27 NOTE — PROGRESS NOTES
Hospitalist Progress Note    NAME: Terell Buchanan   :  1945   MRN:  [id number]       Assessment / Plan:  Acute on chronic anemia due to upper GI bleed POA  GAVE vs AVMs POA  Follows with [facility] hepatology. Dr Mingo Mac  spoke to Dr Katarina Maciel at [place] [address] yesterday and he is not eager to rush to a TIPS and he would like to see patient on outpatient basis  S/p octreotide  Appreciate GI following, not recommending TIPS at this time. No repeat EGD  Last EGD 2022:   ESOPHAGUS: The Z-Line is intact. There are low grade esophageal varices, that do not completely flatten with insufflation, likely represent grade I/II. No stigmata of recent bleeding. STOMACH:   -- gastric mucosa is friable and edematous, consistent with portal hypertensive gastropathy  -- The fundus on antegrade and retroflex views is otherwise normal.   -- Antrum has improved compared to previous EGD's, and the focal GAVE vs portal hypertensive gastropathy is minimal now, but again treated with APC today, given it's favorable response to APC these past several months. SMALL INTESTINE: colonoscope smoothly passes to the distal duodenum, though I did not reach previously tattooed site. No AVMs found, but the duodenum is generally friable and scope trauma causes a few scant erosions. FU MRI/MRCP for pancreatic cystic lesion - last scan 3/2021. MRI with pancreatic lesion stable,  + ascites  Cont' with PPI BID,   Cleared by GI for discharge  Discussed MRI results, felt to hold paracentesis, follow up at [address] [place] [address]  Hemoglobin is 7.1 but no bleeding. Check CBC in a.m.     Cystic lesion of the pancreas  -MRI of the abdomen shows 2.8 x 5 cm cystic lesion of pancreas  -GI had recommended outpatient follow-up with [facility] for further evaluation    Thrombocytopenia   Baseline platelet count is around 50 and currently platelet count is 15  Hematology following  -Hematology is recommending platelet transfusion if platelet count drops to less than 10  -Discussed with hematologist Dr. 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 Cont xifaxan, lactulose  for liver cirrhosis managemnt    Code status: Partial code  Prophylaxis: SCDs      MAYURI: 1/29  Barriers: Improvement of platelets, hematology clearance, family's decision regarding next steps, ? Hospice       Subjective:     Chief Complaint / Reason for Physician Visit  She continues to report feeling weak. Reports shortness of breath with exertion. No cough or phlegm  No fever  Overnight events noted          Objective:     VITALS:   Last 24hrs VS reviewed since prior progress note. Most recent are:  Patient Vitals for the past 24 hrs:   Temp Pulse Resp BP SpO2   01/27/23 0746 97.7 °F (36.5 °C) 62 18 (!) 157/53 96 %   01/26/23 2044 98.1 °F (36.7 °C) (!) 59 18 (!) 168/54 98 %   01/26/23 1823 -- 61 -- (!) 155/58 --         Intake/Output Summary (Last 24 hours) at 1/27/2023 1413  Last data filed at 1/27/2023 0547  Gross per 24 hour   Intake --   Output 1700 ml   Net -1700 ml          I had a face to face encounter and independently examined this patient on 1/27/2023, as outlined below:  PHYSICAL EXAM:  General: cooperative, no acute distress    EENT:  Anicteric sclerae. MMM  Resp:  CTA bilaterally, no wheezing or rales. No accessory muscle use  CV:  Regular  rhythm,  +2 edema  GI:  Soft, Non tender. +Bowel sounds  Neurologic:  Alert and awake, normal speech  Psych:   Not anxious nor agitated  Skin:  No rashes.   No jaundice    Reviewed most current lab test results and cultures  YES  Reviewed most current radiology test results   YES  Review and summation of old records today    NO  Reviewed patient's current orders and MAR    YES  PMH/SH reviewed - no change compared to H&P  ________________________________________________________________________  Care Plan discussed with:    Comments   Patient x    Family      RN x    Care Manager x    Consultant                       x Multidiciplinary team rounds were held today with , nursing, pharmacist and clinical coordinator. Patient's plan of care was discussed; medications were reviewed and discharge planning was addressed. ________________________________________________________________________  Total NON critical care TIME:  35  Minutes     Total CRITICAL CARE TIME Spent:   Minutes non procedure based         Comments   >50% of visit spent in counseling and coordination of care     ________________________________________________________________________  Wero Bhakta MD     Procedures: see electronic medical records for all procedures/Xrays and details which were not copied into this note but were reviewed prior to creation of Plan. LABS:  I reviewed today's most current labs and imaging studies.   Pertinent labs include:  Recent Labs     01/27/23 0213 01/26/23  0548 01/25/23  0321   WBC 3.1* 2.7* 2.9*   HGB 7.1* 7.3* 7.9*   HCT 21.9* 22.4* 23.9*   PLT 15* 18* 19*       Recent Labs     01/27/23  0213 01/26/23  0548    139   K 4.1 4.1    105   CO2 30 30   * 149*   BUN 39* 37*   CREA 1.11* 0.99   CA 11.6* 11.5*   ALB 2.4*  --    TBILI 1.3*  --    ALT 86*  --          Signed: Wero Bhakta MD

## 2023-01-28 LAB
ABO + RH BLD: NORMAL
ALBUMIN SERPL-MCNC: 2.4 G/DL (ref 3.5–5)
ALBUMIN/GLOB SERPL: 0.6 (ref 1.1–2.2)
ALP SERPL-CCNC: 631 U/L (ref 45–117)
ALT SERPL-CCNC: 83 U/L (ref 12–78)
ANION GAP SERPL CALC-SCNC: 5 MMOL/L (ref 5–15)
ANTI-COMPLEMENT (C3B,C3D): NORMAL
ANTIGENS PRESENT RBC DONR: NORMAL
ANTIGENS PRESENT RBC DONR: NORMAL
AST SERPL-CCNC: 72 U/L (ref 15–37)
BILIRUB SERPL-MCNC: 1.4 MG/DL (ref 0.2–1)
BLD PROD TYP BPU: NORMAL
BLD PROD TYP BPU: NORMAL
BLOOD BANK CMNT PATIENT-IMP: NORMAL
BLOOD GROUP ANTIBODIES SERPL: NORMAL
BLOOD GROUP ANTIBODIES SERPL: NORMAL
BPU ID: NORMAL
BPU ID: NORMAL
BUN SERPL-MCNC: 38 MG/DL (ref 6–20)
BUN/CREAT SERPL: 36 (ref 12–20)
CALCIUM SERPL-MCNC: 12 MG/DL (ref 8.5–10.1)
CHLORIDE SERPL-SCNC: 104 MMOL/L (ref 97–108)
CO2 SERPL-SCNC: 29 MMOL/L (ref 21–32)
CREAT SERPL-MCNC: 1.06 MG/DL (ref 0.55–1.02)
CROSSMATCH RESULT,%XM: NORMAL
CROSSMATCH RESULT,%XM: NORMAL
DAT IGG-SP REAG RBC QL: NORMAL
DAT POLY-SP REAG RBC QL: NORMAL
ERYTHROCYTE [DISTWIDTH] IN BLOOD BY AUTOMATED COUNT: 16.2 % (ref 11.5–14.5)
GLOBULIN SER CALC-MCNC: 3.9 G/DL (ref 2–4)
GLUCOSE SERPL-MCNC: 177 MG/DL (ref 65–100)
HCT VFR BLD AUTO: 22.1 % (ref 35–47)
HGB BLD-MCNC: 7.1 G/DL (ref 11.5–16)
MCH RBC QN AUTO: 27.2 PG (ref 26–34)
MCHC RBC AUTO-ENTMCNC: 32.1 G/DL (ref 30–36.5)
MCV RBC AUTO: 84.7 FL (ref 80–99)
NRBC # BLD: 0 K/UL (ref 0–0.01)
NRBC BLD-RTO: 0 PER 100 WBC
PLATELET # BLD AUTO: 20 K/UL (ref 150–400)
PMV BLD AUTO: 10.5 FL (ref 8.9–12.9)
POTASSIUM SERPL-SCNC: 4.3 MMOL/L (ref 3.5–5.1)
PROT SERPL-MCNC: 6.3 G/DL (ref 6.4–8.2)
RBC # BLD AUTO: 2.61 M/UL (ref 3.8–5.2)
SODIUM SERPL-SCNC: 138 MMOL/L (ref 136–145)
SPECIMEN EXP DATE BLD: NORMAL
STATUS OF UNIT,%ST: NORMAL
STATUS OF UNIT,%ST: NORMAL
UNIT DIVISION, %UDIV: 0
UNIT DIVISION, %UDIV: 0
WBC # BLD AUTO: 3.5 K/UL (ref 3.6–11)

## 2023-01-28 PROCEDURE — 74011000250 HC RX REV CODE- 250: Performed by: NURSE PRACTITIONER

## 2023-01-28 PROCEDURE — 74011250637 HC RX REV CODE- 250/637: Performed by: GENERAL ACUTE CARE HOSPITAL

## 2023-01-28 PROCEDURE — 65270000046 HC RM TELEMETRY

## 2023-01-28 PROCEDURE — 80053 COMPREHEN METABOLIC PANEL: CPT

## 2023-01-28 PROCEDURE — 85027 COMPLETE CBC AUTOMATED: CPT

## 2023-01-28 PROCEDURE — 74011000250 HC RX REV CODE- 250: Performed by: STUDENT IN AN ORGANIZED HEALTH CARE EDUCATION/TRAINING PROGRAM

## 2023-01-28 PROCEDURE — 74011250637 HC RX REV CODE- 250/637: Performed by: INTERNAL MEDICINE

## 2023-01-28 PROCEDURE — 74011250637 HC RX REV CODE- 250/637: Performed by: STUDENT IN AN ORGANIZED HEALTH CARE EDUCATION/TRAINING PROGRAM

## 2023-01-28 PROCEDURE — 74011250636 HC RX REV CODE- 250/636: Performed by: STUDENT IN AN ORGANIZED HEALTH CARE EDUCATION/TRAINING PROGRAM

## 2023-01-28 PROCEDURE — 36415 COLL VENOUS BLD VENIPUNCTURE: CPT

## 2023-01-28 PROCEDURE — C9113 INJ PANTOPRAZOLE SODIUM, VIA: HCPCS | Performed by: STUDENT IN AN ORGANIZED HEALTH CARE EDUCATION/TRAINING PROGRAM

## 2023-01-28 RX ORDER — LIDOCAINE 4 G/100G
2 PATCH TOPICAL EVERY 24 HOURS
Status: DISCONTINUED | OUTPATIENT
Start: 2023-01-28 | End: 2023-01-31 | Stop reason: HOSPADM

## 2023-01-28 RX ADMIN — FUROSEMIDE 20 MG: 20 TABLET ORAL at 18:29

## 2023-01-28 RX ADMIN — CASTOR OIL AND BALSAM, PERU: 788; 87 OINTMENT TOPICAL at 09:00

## 2023-01-28 RX ADMIN — Medication: at 18:33

## 2023-01-28 RX ADMIN — ONDANSETRON 4 MG: 2 INJECTION INTRAMUSCULAR; INTRAVENOUS at 21:58

## 2023-01-28 RX ADMIN — SODIUM CHLORIDE, PRESERVATIVE FREE 10 ML: 5 INJECTION INTRAVENOUS at 05:04

## 2023-01-28 RX ADMIN — SPIRONOLACTONE 50 MG: 25 TABLET ORAL at 10:45

## 2023-01-28 RX ADMIN — LACTULOSE 45 ML: 20 SOLUTION ORAL at 18:29

## 2023-01-28 RX ADMIN — LACTULOSE 45 ML: 20 SOLUTION ORAL at 10:44

## 2023-01-28 RX ADMIN — SODIUM CHLORIDE, PRESERVATIVE FREE 10 ML: 5 INJECTION INTRAVENOUS at 21:07

## 2023-01-28 RX ADMIN — FUROSEMIDE 20 MG: 20 TABLET ORAL at 06:30

## 2023-01-28 RX ADMIN — RIFAXIMIN 550 MG: 550 TABLET ORAL at 10:44

## 2023-01-28 RX ADMIN — CASTOR OIL AND BALSAM, PERU: 788; 87 OINTMENT TOPICAL at 21:00

## 2023-01-28 RX ADMIN — SODIUM CHLORIDE, PRESERVATIVE FREE 10 ML: 5 INJECTION INTRAVENOUS at 18:30

## 2023-01-28 RX ADMIN — SODIUM CHLORIDE 40 MG: 9 INJECTION, SOLUTION INTRAMUSCULAR; INTRAVENOUS; SUBCUTANEOUS at 10:45

## 2023-01-28 RX ADMIN — SPIRONOLACTONE 50 MG: 25 TABLET ORAL at 18:29

## 2023-01-28 RX ADMIN — RIFAXIMIN 550 MG: 550 TABLET ORAL at 18:29

## 2023-01-28 RX ADMIN — Medication: at 09:00

## 2023-01-28 RX ADMIN — LEVOTHYROXINE SODIUM 150 MCG: 0.15 TABLET ORAL at 06:30

## 2023-01-28 RX ADMIN — SODIUM CHLORIDE 40 MG: 9 INJECTION, SOLUTION INTRAMUSCULAR; INTRAVENOUS; SUBCUTANEOUS at 21:07

## 2023-01-28 NOTE — PROGRESS NOTES
Problem: Pain  Goal: *Control of Pain  Outcome: Progressing Towards Goal  Goal: *PALLIATIVE CARE:  Alleviation of Pain  Outcome: Progressing Towards Goal     Problem: Patient Education: Go to Patient Education Activity  Goal: Patient/Family Education  Outcome: Progressing Towards Goal     Problem: Falls - Risk of  Goal: *Absence of Falls  Description: Document Rusty Bruno Fall Risk and appropriate interventions in the flowsheet. Outcome: Progressing Towards Goal  Note: Fall Risk Interventions:                                Problem: Patient Education: Go to Patient Education Activity  Goal: Patient/Family Education  Outcome: Progressing Towards Goal     Problem: General Infection Care Plan (Adult and Pediatric)  Goal: Improvement in signs and symptoms of infection  Outcome: Progressing Towards Goal  Goal: *Optimize nutritional status  Outcome: Progressing Towards Goal     Problem: Patient Education: Go to Patient Education Activity  Goal: Patient/Family Education  Outcome: Progressing Towards Goal     Problem: Pressure Injury - Risk of  Goal: *Prevention of pressure injury  Description: Document Boris Scale and appropriate interventions in the flowsheet.   Outcome: Progressing Towards Goal  Note: Pressure Injury Interventions:  Sensory Interventions: Float heels, Keep linens dry and wrinkle-free, Minimize linen layers    Moisture Interventions: Absorbent underpads, Minimize layers, Moisture barrier    Activity Interventions: Increase time out of bed    Mobility Interventions: HOB 30 degrees or less    Nutrition Interventions: Document food/fluid/supplement intake, Offer support with meals,snacks and hydration, Discuss nutritional consult with provider    Friction and Shear Interventions: Minimize layers, Lift sheet, HOB 30 degrees or less                Problem: Patient Education: Go to Patient Education Activity  Goal: Patient/Family Education  Outcome: Progressing Towards Goal     Problem: Patient Education: Go to Patient Education Activity  Goal: Patient/Family Education  Outcome: Progressing Towards Goal     Problem: Patient Education: Go to Patient Education Activity  Goal: Patient/Family Education  Outcome: Progressing Towards Goal

## 2023-01-28 NOTE — PROGRESS NOTES
Bedside shift report given to oncoming nurse Tim Shock LPN by off going nurse Author Iraj RN patient notes no distress during shift change patient alert and stable

## 2023-01-29 ENCOUNTER — HOSPICE ADMISSION (OUTPATIENT)
Dept: HOSPICE | Facility: HOSPICE | Age: 78
End: 2023-01-29
Payer: MEDICARE

## 2023-01-29 PROCEDURE — 65270000046 HC RM TELEMETRY

## 2023-01-29 PROCEDURE — 74011000250 HC RX REV CODE- 250: Performed by: NURSE PRACTITIONER

## 2023-01-29 PROCEDURE — 74011250637 HC RX REV CODE- 250/637: Performed by: STUDENT IN AN ORGANIZED HEALTH CARE EDUCATION/TRAINING PROGRAM

## 2023-01-29 PROCEDURE — C9113 INJ PANTOPRAZOLE SODIUM, VIA: HCPCS | Performed by: STUDENT IN AN ORGANIZED HEALTH CARE EDUCATION/TRAINING PROGRAM

## 2023-01-29 PROCEDURE — 94760 N-INVAS EAR/PLS OXIMETRY 1: CPT

## 2023-01-29 PROCEDURE — 74011250637 HC RX REV CODE- 250/637: Performed by: GENERAL ACUTE CARE HOSPITAL

## 2023-01-29 PROCEDURE — 74011250636 HC RX REV CODE- 250/636: Performed by: STUDENT IN AN ORGANIZED HEALTH CARE EDUCATION/TRAINING PROGRAM

## 2023-01-29 PROCEDURE — 74011000250 HC RX REV CODE- 250: Performed by: STUDENT IN AN ORGANIZED HEALTH CARE EDUCATION/TRAINING PROGRAM

## 2023-01-29 PROCEDURE — 77030029684 HC NEB SM VOL KT MONA -A

## 2023-01-29 PROCEDURE — 74011250637 HC RX REV CODE- 250/637: Performed by: INTERNAL MEDICINE

## 2023-01-29 RX ADMIN — LACTULOSE 45 ML: 20 SOLUTION ORAL at 17:40

## 2023-01-29 RX ADMIN — RIFAXIMIN 550 MG: 550 TABLET ORAL at 10:06

## 2023-01-29 RX ADMIN — CASTOR OIL AND BALSAM, PERU: 788; 87 OINTMENT TOPICAL at 21:00

## 2023-01-29 RX ADMIN — LACTULOSE 45 ML: 20 SOLUTION ORAL at 10:06

## 2023-01-29 RX ADMIN — LEVOTHYROXINE SODIUM 150 MCG: 0.15 TABLET ORAL at 06:35

## 2023-01-29 RX ADMIN — FUROSEMIDE 20 MG: 20 TABLET ORAL at 06:42

## 2023-01-29 RX ADMIN — SODIUM CHLORIDE 40 MG: 9 INJECTION, SOLUTION INTRAMUSCULAR; INTRAVENOUS; SUBCUTANEOUS at 10:06

## 2023-01-29 RX ADMIN — SPIRONOLACTONE 50 MG: 25 TABLET ORAL at 10:05

## 2023-01-29 RX ADMIN — SODIUM CHLORIDE 40 MG: 9 INJECTION, SOLUTION INTRAMUSCULAR; INTRAVENOUS; SUBCUTANEOUS at 22:01

## 2023-01-29 RX ADMIN — SODIUM CHLORIDE, PRESERVATIVE FREE 10 ML: 5 INJECTION INTRAVENOUS at 22:01

## 2023-01-29 RX ADMIN — SPIRONOLACTONE 50 MG: 25 TABLET ORAL at 17:46

## 2023-01-29 RX ADMIN — CASTOR OIL AND BALSAM, PERU: 788; 87 OINTMENT TOPICAL at 10:08

## 2023-01-29 RX ADMIN — Medication: at 10:07

## 2023-01-29 RX ADMIN — ACETAMINOPHEN 650 MG: 325 TABLET ORAL at 17:50

## 2023-01-29 RX ADMIN — RIFAXIMIN 550 MG: 550 TABLET ORAL at 17:40

## 2023-01-29 RX ADMIN — FUROSEMIDE 20 MG: 20 TABLET ORAL at 17:47

## 2023-01-29 RX ADMIN — SODIUM CHLORIDE, PRESERVATIVE FREE 10 ML: 5 INJECTION INTRAVENOUS at 06:35

## 2023-01-29 RX ADMIN — SODIUM CHLORIDE, PRESERVATIVE FREE 10 ML: 5 INJECTION INTRAVENOUS at 17:50

## 2023-01-29 NOTE — PROGRESS NOTES
Problem: Pain  Goal: *Control of Pain  Outcome: Progressing Towards Goal  Goal: *PALLIATIVE CARE:  Alleviation of Pain  Outcome: Progressing Towards Goal     Problem: Patient Education: Go to Patient Education Activity  Goal: Patient/Family Education  Outcome: Progressing Towards Goal     Problem: Falls - Risk of  Goal: *Absence of Falls  Description: Document Jesus Manuel Blight Fall Risk and appropriate interventions in the flowsheet. Outcome: Progressing Towards Goal  Note: Fall Risk Interventions:                                Problem: Patient Education: Go to Patient Education Activity  Goal: Patient/Family Education  Outcome: Progressing Towards Goal     Problem: General Infection Care Plan (Adult and Pediatric)  Goal: Improvement in signs and symptoms of infection  Outcome: Progressing Towards Goal  Goal: *Optimize nutritional status  Outcome: Progressing Towards Goal     Problem: Patient Education: Go to Patient Education Activity  Goal: Patient/Family Education  Outcome: Progressing Towards Goal     Problem: Pressure Injury - Risk of  Goal: *Prevention of pressure injury  Description: Document Boris Scale and appropriate interventions in the flowsheet.   Outcome: Progressing Towards Goal  Note: Pressure Injury Interventions:  Sensory Interventions: Assess changes in LOC    Moisture Interventions: Absorbent underpads, Apply protective barrier, creams and emollients    Activity Interventions: Pressure redistribution bed/mattress(bed type)    Mobility Interventions: Pressure redistribution bed/mattress (bed type)    Nutrition Interventions: Document food/fluid/supplement intake    Friction and Shear Interventions: Feet elevated on foot rest                Problem: Patient Education: Go to Patient Education Activity  Goal: Patient/Family Education  Outcome: Progressing Towards Goal     Problem: Patient Education: Go to Patient Education Activity  Goal: Patient/Family Education  Outcome: Progressing Towards Goal Problem: Patient Education: Go to Patient Education Activity  Goal: Patient/Family Education  Outcome: Progressing Towards Goal

## 2023-01-29 NOTE — PROGRESS NOTES
Bedside and Verbal shift change report given to Eliseo Vora (oncoming nurse) by Marco A Sal RN (offgoing nurse). Report included the following information SBAR, Kardex, Intake/Output, MAR, and Recent Results. Pt did well throughout shift. No concerns to report.       Rudolph Aranda

## 2023-01-29 NOTE — PROGRESS NOTES
Hospitalist Progress Note    NAME: Tarik Thomas   :  1945   MRN:  [id number]       Assessment / Plan:  Acute on chronic anemia due to upper GI bleed POA  GAVE vs AVMs POA  Follows with [facility] hepatology. Dr Claudeen Fridge  spoke to Dr Tarik Hernández at [place] [address] yesterday and he is not eager to rush to a TIPS and he would like to see patient on outpatient basis  S/p octreotide  Appreciate GI following, not recommending TIPS at this time. No repeat EGD  Last EGD 2022:   ESOPHAGUS: The Z-Line is intact. There are low grade esophageal varices, that do not completely flatten with insufflation, likely represent grade I/II. No stigmata of recent bleeding. STOMACH:   -- gastric mucosa is friable and edematous, consistent with portal hypertensive gastropathy  -- The fundus on antegrade and retroflex views is otherwise normal.   -- Antrum has improved compared to previous EGD's, and the focal GAVE vs portal hypertensive gastropathy is minimal now, but again treated with APC today, given it's favorable response to APC these past several months. SMALL INTESTINE: colonoscope smoothly passes to the distal duodenum, though I did not reach previously tattooed site. No AVMs found, but the duodenum is generally friable and scope trauma causes a few scant erosions. FU MRI/MRCP for pancreatic cystic lesion - last scan 3/2021. MRI with pancreatic lesion stable,  + ascites  Cont' with PPI BID,   Cleared by GI for discharge  Discussed MRI results, felt to hold paracentesis, follow up at [facility]  Hemoglobin is 7.1 but no bleeding. Check CBC in a.m.     Cystic lesion of the pancreas  -MRI of the abdomen shows 2.8 x 5 cm cystic lesion of pancreas  -GI had recommended outpatient follow-up with [facility] for further evaluation    Thrombocytopenia   Baseline platelet count is around 50 and currently platelet count is 15  Hematology following  -Hematology is recommending platelet transfusion if platelet count drops to less than 10  -Discussed with hematologist Dr. 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 Cont xifaxan, lactulose  for liver cirrhosis managemnt    Code status: Partial code  Prophylaxis: SCDs      MAYURI: 1/30  Barriers: Hospice, DME arrangement       Subjective:     Chief Complaint / Reason for Physician Visit  Family meeting held with patient, son at bedside today and would like to transition to hospice          Objective:     VITALS:   Last 24hrs VS reviewed since prior progress note. Most recent are:  Patient Vitals for the past 24 hrs:   Temp Pulse Resp BP SpO2   01/28/23 2204 -- -- -- (!) 152/77 --   01/28/23 2058 97.9 °F (36.6 °C) 63 18 (!) 173/76 94 %   01/28/23 1337 98.8 °F (37.1 °C) 65 18 (!) 145/45 95 %   01/28/23 0655 98.2 °F (36.8 °C) (!) 59 16 (!) 138/44 93 %         Intake/Output Summary (Last 24 hours) at 1/28/2023 2249  Last data filed at 1/28/2023 1940  Gross per 24 hour   Intake --   Output 600 ml   Net -600 ml          I had a face to face encounter and independently examined this patient on 1/28/2023, as outlined below:  PHYSICAL EXAM:  General: cooperative, no acute distress    EENT:  Anicteric sclerae. MMM  Resp:  CTA bilaterally, no wheezing or rales. No accessory muscle use  CV:  Regular  rhythm,  +2 edema  GI:  Soft, Non tender. +Bowel sounds  Neurologic:  Alert and awake, normal speech  Psych:   Not anxious nor agitated  Skin:  No rashes.   No jaundice    Reviewed most current lab test results and cultures  YES  Reviewed most current radiology test results   YES  Review and summation of old records today    NO  Reviewed patient's current orders and [facility] YES  PMH/SH reviewed - no change compared to H&P  ________________________________________________________________________  Care Plan discussed with:    Comments   Patient x    Family      RN x    Care Manager x    Consultant                       x Multidiciplinary team rounds were held today with , nursing, pharmacist and clinical coordinator. Patient's plan of care was discussed; medications were reviewed and discharge planning was addressed. ________________________________________________________________________  Total NON critical care TIME:  35  Minutes     Total CRITICAL CARE TIME Spent:   Minutes non procedure based         Comments   >50% of visit spent in counseling and coordination of care     ________________________________________________________________________  Shant Smith MD     Procedures: see electronic medical records for all procedures/Xrays and details which were not copied into this note but were reviewed prior to creation of Plan. LABS:  I reviewed today's most current labs and imaging studies.   Pertinent labs include:  Recent Labs     01/28/23  0559 01/27/23  0213 01/26/23  0548   WBC 3.5* 3.1* 2.7*   HGB 7.1* 7.1* 7.3*   HCT 22.1* 21.9* 22.4*   PLT 20* 15* 18*       Recent Labs     01/28/23  0559 01/27/23  0213 01/26/23  0548    140 139   K 4.3 4.1 4.1    105 105   CO2 29 30 30   * 188* 149*   BUN 38* 39* 37*   CREA 1.06* 1.11* 0.99   CA 12.0* 11.6* 11.5*   ALB 2.4* 2.4*  --    TBILI 1.4* 1.3*  --    ALT 83* 86*  --          Signed: Shant Smith MD

## 2023-01-29 NOTE — PROGRESS NOTES
CM was alerted that the patient's family is requesting a hospice information session. CM has sent a referral to [address] [place] [address] and contacted [facility] liaison, Kathya Santos (phone: [phone]), and alerted her to the patient's hospice referral. She is to contact the patient's family for hospice information session. CM will continue to assist with dispo planning.      Elsie Abel 596, [address] [facility]

## 2023-01-29 NOTE — HOSPICE
[facility]  [phone]    Nursing Note   Patient Name: Bertha Ruiz  YOB: 1945  Age: 68 y.o. [address] Jovan Brooks RN Note:      Referral received and hospice consult noted. Chart reviewed. Will see patient and contact the family if they are not present. If there are any questions, please call the main [address] [facility] [address] number at [phone]. Thank you for the opportunity to be of service to this patient and her family. 1430:  Entered room, patient in bed and alert. Introduced myself and that I was with Faisal Brooks (no family/visitors present). Asked if it was okay to call her son and she stated yes. Called and spoke with Can Rodgers Agapito/son/primary contact and asked if we could set-up a time to complete a hospice information meeting. Can Rodgers stated that would be fine but he would have to participate by phone. Plan is to have hospice information meeting tomorrow, Monday 1/30, at 11am in the patient's room. Can Rodgers plans to contact Kaela Balderrama and will have her to be present for the meeting.

## 2023-01-30 PROCEDURE — 74011000250 HC RX REV CODE- 250: Performed by: STUDENT IN AN ORGANIZED HEALTH CARE EDUCATION/TRAINING PROGRAM

## 2023-01-30 PROCEDURE — 74011000250 HC RX REV CODE- 250: Performed by: NURSE PRACTITIONER

## 2023-01-30 PROCEDURE — 74011250637 HC RX REV CODE- 250/637: Performed by: STUDENT IN AN ORGANIZED HEALTH CARE EDUCATION/TRAINING PROGRAM

## 2023-01-30 PROCEDURE — 65270000046 HC RM TELEMETRY

## 2023-01-30 PROCEDURE — C9113 INJ PANTOPRAZOLE SODIUM, VIA: HCPCS | Performed by: STUDENT IN AN ORGANIZED HEALTH CARE EDUCATION/TRAINING PROGRAM

## 2023-01-30 PROCEDURE — 74011250637 HC RX REV CODE- 250/637: Performed by: GENERAL ACUTE CARE HOSPITAL

## 2023-01-30 PROCEDURE — 74011250637 HC RX REV CODE- 250/637: Performed by: PHYSICIAN ASSISTANT

## 2023-01-30 PROCEDURE — 74011250636 HC RX REV CODE- 250/636: Performed by: STUDENT IN AN ORGANIZED HEALTH CARE EDUCATION/TRAINING PROGRAM

## 2023-01-30 PROCEDURE — 94760 N-INVAS EAR/PLS OXIMETRY 1: CPT

## 2023-01-30 PROCEDURE — 74011250637 HC RX REV CODE- 250/637: Performed by: INTERNAL MEDICINE

## 2023-01-30 RX ORDER — OXYCODONE HYDROCHLORIDE 5 MG/1
5 TABLET ORAL
Status: DISCONTINUED | OUTPATIENT
Start: 2023-01-30 | End: 2023-01-31 | Stop reason: HOSPADM

## 2023-01-30 RX ADMIN — Medication: at 17:32

## 2023-01-30 RX ADMIN — SODIUM CHLORIDE, PRESERVATIVE FREE 10 ML: 5 INJECTION INTRAVENOUS at 22:00

## 2023-01-30 RX ADMIN — SODIUM CHLORIDE, PRESERVATIVE FREE 10 ML: 5 INJECTION INTRAVENOUS at 15:53

## 2023-01-30 RX ADMIN — SODIUM CHLORIDE 40 MG: 9 INJECTION, SOLUTION INTRAMUSCULAR; INTRAVENOUS; SUBCUTANEOUS at 09:06

## 2023-01-30 RX ADMIN — LACTULOSE 45 ML: 20 SOLUTION ORAL at 09:06

## 2023-01-30 RX ADMIN — LACTULOSE 45 ML: 20 SOLUTION ORAL at 17:31

## 2023-01-30 RX ADMIN — Medication: at 09:08

## 2023-01-30 RX ADMIN — ACETAMINOPHEN 650 MG: 325 TABLET ORAL at 13:30

## 2023-01-30 RX ADMIN — FUROSEMIDE 20 MG: 20 TABLET ORAL at 09:06

## 2023-01-30 RX ADMIN — SODIUM CHLORIDE, PRESERVATIVE FREE 10 ML: 5 INJECTION INTRAVENOUS at 05:29

## 2023-01-30 RX ADMIN — LEVOTHYROXINE SODIUM 150 MCG: 0.15 TABLET ORAL at 05:29

## 2023-01-30 RX ADMIN — CASTOR OIL AND BALSAM, PERU: 788; 87 OINTMENT TOPICAL at 22:10

## 2023-01-30 RX ADMIN — RIFAXIMIN 550 MG: 550 TABLET ORAL at 09:06

## 2023-01-30 RX ADMIN — OXYCODONE HYDROCHLORIDE 5 MG: 5 TABLET ORAL at 22:32

## 2023-01-30 RX ADMIN — RIFAXIMIN 550 MG: 550 TABLET ORAL at 17:28

## 2023-01-30 RX ADMIN — FUROSEMIDE 20 MG: 20 TABLET ORAL at 15:48

## 2023-01-30 RX ADMIN — SODIUM CHLORIDE 40 MG: 9 INJECTION, SOLUTION INTRAMUSCULAR; INTRAVENOUS; SUBCUTANEOUS at 22:10

## 2023-01-30 RX ADMIN — CASTOR OIL AND BALSAM, PERU: 788; 87 OINTMENT TOPICAL at 09:07

## 2023-01-30 RX ADMIN — SPIRONOLACTONE 50 MG: 25 TABLET ORAL at 17:28

## 2023-01-30 RX ADMIN — SPIRONOLACTONE 50 MG: 25 TABLET ORAL at 09:06

## 2023-01-30 NOTE — PROGRESS NOTES
Bedside and Verbal shift change report given to [address], RN (oncoming nurse) by Javi Price RN (offgoing nurse). Report included the following information SBAR, Kardex, Intake/Output, MAR, and Recent Results. Pt  did well throughout the shift. Hospice was contacted and a meeting will be tomorrow. No concerns to report.        Figueroa Pathak

## 2023-01-30 NOTE — PROGRESS NOTES
Hospitalist Progress Note    NAME: Carlo Abreu   :  1945   MRN:  [id number]       Assessment / Plan:  Acute on chronic anemia due to upper GI bleed POA  GAVE vs AVMs POA  Follows with [facility] hepatology. Dr Eden Cheney  spoke to Dr Vivian Livinsgton at [place] [address] yesterday and he is not eager to rush to a TIPS and he would like to see patient on outpatient basis  S/p octreotide  Appreciate GI following, not recommending TIPS at this time. No repeat EGD  Last EGD 2022:   ESOPHAGUS: The Z-Line is intact. There are low grade esophageal varices, that do not completely flatten with insufflation, likely represent grade I/II. No stigmata of recent bleeding. STOMACH:   -- gastric mucosa is friable and edematous, consistent with portal hypertensive gastropathy  -- The fundus on antegrade and retroflex views is otherwise normal.   -- Antrum has improved compared to previous EGD's, and the focal GAVE vs portal hypertensive gastropathy is minimal now, but again treated with APC today, given it's favorable response to APC these past several months. SMALL INTESTINE: colonoscope smoothly passes to the distal duodenum, though I did not reach previously tattooed site. No AVMs found, but the duodenum is generally friable and scope trauma causes a few scant erosions. FU MRI/MRCP for pancreatic cystic lesion - last scan 3/2021. MRI with pancreatic lesion stable,  + ascites  Cont' with PPI BID,   Cleared by GI for discharge  Discussed MRI results, felt to hold paracentesis, follow up at [facility]  Hemoglobin is 7.1 but no bleeding. Check CBC in a.m.   Hospice hospice is planning on admission tomorrow at 4 PM    Cystic lesion of the pancreas  -MRI of the abdomen shows 2.8 x 5 cm cystic lesion of pancreas  -GI had recommended outpatient follow-up with [facility] for further evaluation    Thrombocytopenia   Baseline platelet count is around 50 and currently platelet count is 15  Hematology following  -Hematology is recommending platelet transfusion if platelet count drops to less than 10  -Discussed with hematologist Dr. 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 Cont xifaxan, lactulose  for liver cirrhosis managemnt    Code status: Partial code  Prophylaxis: SCDs      MAYURI: 1/ 31  Barriers: Hospice/DME       Subjective:     Chief Complaint / Reason for Physician Visit  She reports feeling better. Does not report any worsening shortness of breath. No cough or phlegm. Reports feeling weak in general  Hospice meeting scheduled today            Objective:     VITALS:   Last 24hrs VS reviewed since prior progress note. Most recent are:  Patient Vitals for the past 24 hrs:   Temp Pulse Resp BP SpO2   01/30/23 0756 98.6 °F (37 °C) 60 24 (!) 144/53 95 %   01/29/23 2135 -- -- -- -- 97 %   01/29/23 1746 97.7 °F (36.5 °C) 61 16 (!) 153/44 97 %       No intake or output data in the 24 hours ending 01/30/23 1328       I had a face to face encounter and independently examined this patient on 1/30/2023, as outlined below:  PHYSICAL EXAM:  General: cooperative, no acute distress    EENT:  Anicteric sclerae. MMM  Resp:  CTA bilaterally, no wheezing or rales. No accessory muscle use  CV:  Regular  rhythm,  +2 edema  GI:  Soft, Non tender. +Bowel sounds  Neurologic:  Alert and awake, normal speech  Psych:   Not anxious nor agitated  Skin:  No rashes.   No jaundice    Reviewed most current lab test results and cultures  YES  Reviewed most current radiology test results   YES  Review and summation of old records today    NO  Reviewed patient's current orders and MAR    YES  PMH/SH reviewed - no change compared to H&P  ________________________________________________________________________  Care Plan discussed with:    Comments   Patient x    Family      RN x    Care Manager x    Consultant                       x Multidiciplinary team rounds were held today with , nursing, pharmacist and clinical coordinator. Patient's plan of care was discussed; medications were reviewed and discharge planning was addressed. ________________________________________________________________________  Total NON critical care TIME:  35  Minutes     Total CRITICAL CARE TIME Spent:   Minutes non procedure based         Comments   >50% of visit spent in counseling and coordination of care     ________________________________________________________________________  Clarisa Bull MD     Procedures: see electronic medical records for all procedures/Xrays and details which were not copied into this note but were reviewed prior to creation of Plan. LABS:  I reviewed today's most current labs and imaging studies.   Pertinent labs include:  Recent Labs     01/28/23 0559   WBC 3.5*   HGB 7.1*   HCT 22.1*   PLT 20*       Recent Labs     01/28/23 0559      K 4.3      CO2 29   *   BUN 38*   CREA 1.06*   CA 12.0*   ALB 2.4*   TBILI 1.4*   ALT 83*         Signed: Clarisa Bull MD

## 2023-01-30 NOTE — HOSPICE
[employer] RN note:  in to meet with pt, caregiver Juli Osborne and son Celia Preciado over the phone. Discussed hospice philosophy and services. All in agreement with admitting pt to hospice at home forgoing ongoing transfusion and focusing on comfort. Plan is to discharge tomorrow 1/31 at 3:00pm, hospice to admit at 4:00pm at home when son Tiny Wolf will be in town. Pt has 24hr care givers and son is planning to relocate locally to support pt. Consents forms signed by pt with this RN. Aislinn Triana [birth year] rm 200    DX: Chronic Anemia/Upper GI Bleed Consult Date: 1/29  Discharge date: 1/31/2023 transport at 3:00pm, admission at 4:00 (son plans to be in town and present on admission)  DDNR:  yes, on charge, needs MD signature  Consents: yes, scanned to folder  Meds: SRK through [employer] for delivery 1/31  Equip: 02, bed, obt, recliner for delivery 1/30 between 2-6pm [phone]  Clinical Notes: [id number]. Jennifer Leonard [phone].   1/30. Meeting with pt, care giver Graynd on son via phone. Home with hospice. Need son's contact info on admission      Thank you for the opportunity to care for this pt and family. Please contact hospice at [phone] with any questions or concerns.

## 2023-01-30 NOTE — PROGRESS NOTES
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 Dr Pennie Kurtz  spoke to Dr Letitia Friend at [place] [address] yesterday and he is not eager to rush to a TIPS and he would like to see patient on outpatient basis  S/p octreotide  Appreciate GI following, not recommending TIPS at this time. No repeat EGD  Last EGD 2022:   ESOPHAGUS: The Z-Line is intact. There are low grade esophageal varices, that do not completely flatten with insufflation, likely represent grade I/II. No stigmata of recent bleeding. STOMACH:   -- gastric mucosa is friable and edematous, consistent with portal hypertensive gastropathy  -- The fundus on antegrade and retroflex views is otherwise normal.   -- Antrum has improved compared to previous EGD's, and the focal GAVE vs portal hypertensive gastropathy is minimal now, but again treated with APC today, given it's favorable response to APC these past several months. SMALL INTESTINE: colonoscope smoothly passes to the distal duodenum, though I did not reach previously tattooed site. No AVMs found, but the duodenum is generally friable and scope trauma causes a few scant erosions. FU MRI/MRCP for pancreatic cystic lesion - last scan 3/2021. MRI with pancreatic lesion stable,  + ascites  Cont' with PPI BID,   Cleared by GI for discharge  Discussed MRI results, felt to hold paracentesis, [facility]  Hemoglobin is 7.1 but no bleeding. Check CBC in a.m.   Hospice meeting scheduled for tomorrow    Cystic lesion of the pancreas  -MRI of the abdomen shows 2.8 x 5 cm cystic lesion of pancreas  -GI had recommended outpatient follow-up with [facility] for further evaluation    Thrombocytopenia   Baseline platelet count is around 50 and currently platelet count is 15  Hematology following  -Hematology is recommending platelet transfusion if platelet count drops to less than 10  -Discussed with hematologist Dr. 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 Cont xifaxan, lactulose  for liver cirrhosis managemnt    Code status: Partial code  Prophylaxis: SCDs      MAYURI: 1/30  Barriers: Hospice, DME arrangement       Subjective:     Chief Complaint / Reason for Physician Visit  She reports exertional shortness of breath. No cough or phlegm            Objective:     VITALS:   Last 24hrs VS reviewed since prior progress note. Most recent are:  Patient Vitals for the past 24 hrs:   Temp Pulse Resp BP SpO2   01/29/23 1746 97.7 °F (36.5 °C) 61 16 (!) 153/44 97 %   01/29/23 0739 98.8 °F (37.1 °C) (!) 59 13 (!) 142/45 91 %       No intake or output data in the 24 hours ending 01/29/23 1304       I had a face to face encounter and independently examined this patient on 1/29/2023, as outlined below:  PHYSICAL EXAM:  General: cooperative, no acute distress    EENT:  Anicteric sclerae. MMM  Resp:  CTA bilaterally, no wheezing or rales. No accessory muscle use  CV:  Regular  rhythm,  +2 edema  GI:  Soft, Non tender. +Bowel sounds  Neurologic:  Alert and awake, normal speech  Psych:   Not anxious nor agitated  Skin:  No rashes.   No jaundice    Reviewed most current lab test results and cultures  YES  Reviewed most current radiology test results   YES  Review and summation of old records today    NO  Reviewed patient's current orders and MAR    YES  PMH/SH reviewed - no change compared to H&P  ________________________________________________________________________  Care Plan discussed with:    Comments   Patient x    Family      RN x    Care Manager x    Consultant                       x Multidiciplinary team rounds were held today with , nursing, pharmacist and clinical coordinator. Patient's plan of care was discussed; medications were reviewed and discharge planning was addressed. ________________________________________________________________________  Total NON critical care TIME:  35  Minutes     Total CRITICAL CARE TIME Spent:   Minutes non procedure based         Comments   >50% of visit spent in counseling and coordination of care     ________________________________________________________________________  Sherice Napier MD     Procedures: see electronic medical records for all procedures/Xrays and details which were not copied into this note but were reviewed prior to creation of Plan. LABS:  I reviewed today's most current labs and imaging studies.   Pertinent labs include:  Recent Labs     01/28/23  0559 01/27/23 0213   WBC 3.5* 3.1*   HGB 7.1* 7.1*   HCT 22.1* 21.9*   PLT 20* 15*       Recent Labs     01/28/23  0559 01/27/23 0213    140   K 4.3 4.1    105   CO2 29 30   * 188*   BUN 38* 39*   CREA 1.06* 1.11*   CA 12.0* 11.6*   ALB 2.4* 2.4*   TBILI 1.4* 1.3*   ALT 83* 86*         Signed: Sherice Napier MD

## 2023-01-30 NOTE — PROGRESS NOTES
Physical Therapy Note    Chart reviewed. Noted plan for home with hospice with focus on comfort, tomorrow with 24 hr caregivers. Acute PT will sign off.     Dane Bey, PT, DPT

## 2023-01-31 ENCOUNTER — HOME CARE VISIT (OUTPATIENT)
Dept: SCHEDULING | Facility: HOME HEALTH | Age: 78
End: 2023-01-31
Payer: MEDICARE

## 2023-01-31 VITALS
HEIGHT: 56 IN | SYSTOLIC BLOOD PRESSURE: 144 MMHG | RESPIRATION RATE: 18 BRPM | WEIGHT: 223.99 LBS | OXYGEN SATURATION: 94 % | BODY MASS INDEX: 50.39 KG/M2 | TEMPERATURE: 98.4 F | DIASTOLIC BLOOD PRESSURE: 37 MMHG | HEART RATE: 63 BPM

## 2023-01-31 VITALS
DIASTOLIC BLOOD PRESSURE: 48 MMHG | OXYGEN SATURATION: 95 % | TEMPERATURE: 98.5 F | RESPIRATION RATE: 22 BRPM | HEART RATE: 60 BPM | SYSTOLIC BLOOD PRESSURE: 138 MMHG

## 2023-01-31 PROCEDURE — G0299 HHS/HOSPICE OF RN EA 15 MIN: HCPCS

## 2023-01-31 PROCEDURE — 74011250636 HC RX REV CODE- 250/636: Performed by: STUDENT IN AN ORGANIZED HEALTH CARE EDUCATION/TRAINING PROGRAM

## 2023-01-31 PROCEDURE — C9113 INJ PANTOPRAZOLE SODIUM, VIA: HCPCS | Performed by: STUDENT IN AN ORGANIZED HEALTH CARE EDUCATION/TRAINING PROGRAM

## 2023-01-31 PROCEDURE — 74011250637 HC RX REV CODE- 250/637: Performed by: INTERNAL MEDICINE

## 2023-01-31 PROCEDURE — 74011250637 HC RX REV CODE- 250/637: Performed by: GENERAL ACUTE CARE HOSPITAL

## 2023-01-31 PROCEDURE — 94760 N-INVAS EAR/PLS OXIMETRY 1: CPT

## 2023-01-31 PROCEDURE — 74011250637 HC RX REV CODE- 250/637: Performed by: STUDENT IN AN ORGANIZED HEALTH CARE EDUCATION/TRAINING PROGRAM

## 2023-01-31 PROCEDURE — 0651 HSPC ROUTINE HOME CARE

## 2023-01-31 PROCEDURE — 74011000250 HC RX REV CODE- 250: Performed by: STUDENT IN AN ORGANIZED HEALTH CARE EDUCATION/TRAINING PROGRAM

## 2023-01-31 RX ADMIN — LACTULOSE 45 ML: 20 SOLUTION ORAL at 09:40

## 2023-01-31 RX ADMIN — Medication: at 09:20

## 2023-01-31 RX ADMIN — RIFAXIMIN 550 MG: 550 TABLET ORAL at 09:17

## 2023-01-31 RX ADMIN — CASTOR OIL AND BALSAM, PERU: 788; 87 OINTMENT TOPICAL at 09:20

## 2023-01-31 RX ADMIN — LEVOTHYROXINE SODIUM 150 MCG: 0.15 TABLET ORAL at 06:00

## 2023-01-31 RX ADMIN — SODIUM CHLORIDE 40 MG: 9 INJECTION, SOLUTION INTRAMUSCULAR; INTRAVENOUS; SUBCUTANEOUS at 09:18

## 2023-01-31 RX ADMIN — SPIRONOLACTONE 50 MG: 25 TABLET ORAL at 09:18

## 2023-01-31 RX ADMIN — FUROSEMIDE 20 MG: 20 TABLET ORAL at 09:18

## 2023-01-31 RX ADMIN — SODIUM CHLORIDE, PRESERVATIVE FREE 10 ML: 5 INJECTION INTRAVENOUS at 06:00

## 2023-01-31 NOTE — DISCHARGE SUMMARY
Hospitalist Discharge Summary     Patient ID:  Sterling Gomez  [id number]  62 y.o.  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  female who I was asked to see for With past medical history of Seniaernestine Doughertytes cirrhosis, history of GAVE stomach, who sees GI at [facility]. She is not able to tell me the name of her GI doctor. She tells me her PCP is Dr. Doreen Han. Speaking with the patient it appears that she was hospitalized recently here for GI bleed and had EGD performed. It showed gave stomach. She has routine blood transfusions performed outpatient but at home she was feeling worse and weak like she usually does when she notices her blood levels are low. She then decided to come to the emergency room. Currently she states that she still has feelings of weakness. She has not received blood transfusion yet. Otherwise nothing makes the weakness better or worse. Upon initial presentation to the ER, patient's vitals were unremarkable. Hemoglobin was 6, platelets were 54. Patient's BUN was slightly elevated.    ______________________________________________________________________  DISCHARGE SUMMARY/HOSPITAL COURSE:  for full details see H&P, daily progress notes, labs, consult notes. Patient was admitted to hospital and noted to have acute on chronic anemia due to upper GI bleed. Patient normally follows up with [facility] hepatology. Patient had a recent endoscopy on 12/30/2022. Gastroenterology was consulted for further evaluation.   Patient's gastroenterologist Dr. Jesus Davis spoke to primary gastro/hepatologist at [facility] who did not recommend any TIPS given her history of cirrhosis and recommended outpatient follow-up. Patient was continued on Protonix. GI followed the patient during hospitalization. Hemoglobin remained stable around 7-8. Patient also developed thrombocytopenia which was progressively getting worse during hospitalization for which hematology consultation was placed. Patient was seen by hematologist Dr. Mya Bliss who reviewed all of her information and recommended that she would not be eligible for any treatment and recommended that hospice would be most appropriate for her. This recommendation was presented the patient and also her son who agreed with transitioning to hospice. Hospice was consulted and patient will be discharged to home with home hospice.      _______________________________________________________________________  Patient seen and examined by me on discharge day. Pertinent Findings:  Gen:    Not in distress  Chest: Clear lungs  CVS:   Regular rhythm. No edema  Abd:  Soft, not distended, not tender  Neuro:  Alert, awake  _______________________________________________________________________  DISCHARGE MEDICATIONS:   Current Discharge Medication List        CONTINUE these medications which have NOT CHANGED    Details   allopurinoL (ZYLOPRIM) 100 mg tablet Take 100 mg by mouth daily.       loratadine (CLARITIN) 10 mg tablet TAKE 1 TABLET BY MOUTH EVERY DAY  Qty: 90 Tablet, Refills: 3    Associated Diagnoses: Acute recurrent sinusitis, unspecified location      atorvastatin (LIPITOR) 20 mg tablet TAKE 1 TABLET BY MOUTH AT BEDTIME  Qty: 90 Tablet, Refills: 3    Associated Diagnoses: Pure hypercholesterolemia      ezetimibe (ZETIA) 10 mg tablet TAKE 1 TABLET BY MOUTH EVERY DAY  Qty: 90 Tablet, Refills: 3    Associated Diagnoses: Pure hypercholesterolemia      benzocaine-menthoL (CHLORASEPTIC MAX) 15-10 mg lozg lozenge Take 1 Lozenge by mouth as needed for Sore throat. Qty: 30 Lozenge, Refills: 0      furosemide (LASIX) 80 mg tablet Take 0.5 Tablets by mouth daily. Qty: 30 Tablet, Refills: 0      levalbuterol tartrate (XOPENEX) 45 mcg/actuation inhaler Take 2 Puffs by inhalation every six (6) hours as needed for Wheezing or Shortness of Breath.      pantoprazole (PROTONIX) 40 mg tablet Take 1 Tablet by mouth Before breakfast and dinner. Qty: 60 Tablet, Refills: 5      nystatin (MYCOSTATIN) powder Apply  to affected area two (2) times a day. Qty: 30 g, Refills: 0      fluticasone propionate (FLONASE) 50 mcg/actuation nasal spray 2 Sprays by Both Nostrils route daily. Administer to right and left nostril. Qty: 3 Each, Refills: 3      levothyroxine (SYNTHROID) 150 mcg tablet Take 1 Tablet by mouth Daily (before breakfast). Qty: 90 Tablet, Refills: 3      icosapent ethyL (VASCEPA) 1 gram capsule Take 2 Capsules by mouth two (2) times daily (with meals). Qty: 360 Capsule, Refills: 3      lidocaine (LIDODERM) 5 % Apply patch to the affected area for 12 hours a day and remove for 12 hours a day. Qty: 90 Each, Refills: 3      polyethylene glycol (MIRALAX) 17 gram/dose powder Take 17 g by mouth daily. Qty: 2108 g, Refills: 3    Associated Diagnoses: Constipation, unspecified constipation type      ketoconazole (NIZORAL) 2 % topical cream Apply  to affected area daily as needed. Refills: 3      Biotin 2,500 mcg cap Take  by mouth. L GASSERI/B BIFIDUM/B LONGUM (Geogoer PO) Take 1 Tab by mouth daily. vitamin E (AQUA GEMS) 400 unit capsule Take 800 Units by mouth two (2) times a day. cholecalciferol, vitamin D3, 50 mcg (2,000 unit) tab Take 1 Tab by mouth daily. MULTIVITAMIN WITH MINERALS (ONE-A-DAY 50 PLUS PO) Take 1 Tab by mouth daily.            STOP taking these medications       metOLazone (ZAROXOLYN) 2.5 mg tablet Comments:   Reason for Stopping:         amLODIPine (NORVASC) 10 mg tablet Comments:   Reason for Stopping:         losartan (COZAAR) 50 mg tablet Comments:   Reason for Stopping:         alendronate (FOSAMAX) 70 mg tablet Comments:   Reason for Stopping:         lactulose (KRISTALOSE) 10 gram packet Comments:   Reason for Stopping:         potassium chloride SA (MICRO-K) 10 mEq capsule Comments:   Reason for Stopping:                 Patient Follow Up Instructions:     1. Recommended diet: regular     2. Recommended activity: Activity as tolerated    3. If you experience any of the following symptoms then please call your primary care physician or return to the emergency room if you cannot get hold of your doctor:    4. Wound Care: Wound care for the legs:  Cleanse the legs with soap and water and cleanse the wounds on the left leg with NS and dress with Xeroform and ABD / Luis M Mullet only around the upper part of the left shin. Apply the Lac-Hydrin lotion to the exposed skin and allow it to absorb into the skin well. Float the heels. 5. Lab work: none    6. Bring these papers with you to your follow up appointments.  The papers will help your doctors be sure to continue the care plan from the hospital.      Follow-up Information       Follow up With Specialties Details Why Contact Info    Lisa Cochran MD Internal Medicine Physician   [address] [facility]  [phone]      Veroniqeu Rothman MD Internal Medicine Physician   [address]  [phone]            ________________________________________________________________    Risk of deterioration: High    Condition at Discharge:  Stable  __________________________________________________________________    Disposition  Home with hospice services    ____________________________________________________________________    Code Status: Partial  ___________________________________________________________________      Total time in minutes spent coordinating this discharge (includes going over instructions, follow-up, prescriptions, and preparing report for sign off to her PCP) :  35 minutes    Signed:  Arias Card MD

## 2023-01-31 NOTE — DISCHARGE INSTRUCTIONS
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 There is no need to use this SmartLink in discharge instructions. Highlight this text and delete it to clear this message}      General drug facts     If you have a very bad allergy, wear an allergy ID at all times. It is important that you take the medication exactly as they are prescribed. Keep your medication in the bottles provided by the pharmacist.  Keep a list of all your drugs (prescription, natural products, vitamins, OTC) with you. Give this list to your doctor. Do not take other medications without consulting your doctor. Do not share your drugs with others and do not take anyone else's drugs. Keep all drugs out of the reach of children and pets. Most drugs may be thrown away in household trash after mixing with coffee grounds or karissa litter and sealing in a plastic bag. Keep a list Call your doctor for help with any side effects. If in the [place] you may also call the FDA at 6-337-FDA-7917    Talk with the doctor before starting any new drug, including OTC, natural products, or vitamins. What to do at Home    1. Recommended diet: regular     2. Recommended activity: Activity as tolerated    3. If you experience any of the following symptoms then please call your primary care physician or return to the emergency room if you cannot get hold of your doctor:    4.  Wound Care: Wound care for the legs:  Cleanse the legs with soap and water and cleanse the wounds on the left leg with NS and dress with Xeroform and ABD / Noreene Habermann only around the upper part of the left shin. Apply the Lac-Hydrin lotion to the exposed skin and allow it to absorb into the skin well. Float the heels. 5. Lab work: none    6. Bring these papers with you to your follow up appointments. The papers will help your doctors be sure to continue the care plan from the hospital.      If you have questions regarding the hospital related prescriptions or hospital related issues please call [facility] at [phone]. You can always direct your questions to your primary care doctor if you are unable to reach your hospital physician; your PCP works as an extension of your hospital doctor just like your hospital doctor is an extension of your PCP for your time at the hospital [facility], [facility])      Follow-up with:   PCP: @PCP@  Kita Vasquez MD  [address] [facility]  [phone]          Austin Breaux MD  [facility] [address] [id number] [phone]             Please call for your own appointment        Information obtained by :  I understand that if any problems occur once I am at home I am to contact my physician. I understand and acknowledge receipt of the instructions indicated above.                                                                                                                                            Physician's or R.N.'s Signature                                                                  Date/Time                                                                                                                                              Patient or Representative Signature                                                          Date/Time

## 2023-01-31 NOTE — PROGRESS NOTES
Occupational Therapy    Patient chart reviewed up to date. Note plan for home today with hospice services and comfort focus. Patient to have 24-hour caregivers and son relocating locally to help as needed. Will sign off for acute OT services at this time. Thank you.   Romina Cooper, OTR/L

## 2023-01-31 NOTE — PROGRESS NOTES
Problem: Pain  Goal: *Control of Pain  Outcome: Progressing Towards Goal  Goal: *PALLIATIVE CARE:  Alleviation of Pain  Outcome: Progressing Towards Goal     Problem: Patient Education: Go to Patient Education Activity  Goal: Patient/Family Education  Outcome: Progressing Towards Goal     Problem: Falls - Risk of  Goal: *Absence of Falls  Description: Document Darlen Ozone Fall Risk and appropriate interventions in the flowsheet. 1/30/2023 1926 by Negin Dietz RN  Outcome: Progressing Towards Goal  Note: Fall Risk Interventions:       Call bell within reach. Belongings within the reach of patient.                      1/30/2023 1925 by Negin Dietz, RN  Outcome: Progressing Towards Goal  Note: Fall Risk Interventions:

## 2023-01-31 NOTE — PROGRESS NOTES
Problem: Pain  Goal: *Control of Pain  1/31/2023 1132 by Dessie Skiff, RN  Outcome: Resolved/Met  1/31/2023 1131 by Dessie Skiff, RN  Outcome: Progressing Towards Goal  1/31/2023 1130 by Dessie Skiff, RN  Outcome: Progressing Towards Goal  1/31/2023 1128 by Dessie Skiff, RN  Outcome: Progressing Towards Goal  1/31/2023 1113 by Dessie Skiff, RN  Outcome: Progressing Towards Goal  Goal: *PALLIATIVE CARE:  Alleviation of Pain  1/31/2023 1132 by Dessie Skiff, RN  Outcome: Resolved/Met  1/31/2023 1131 by Dessie Skiff, RN  Outcome: Progressing Towards Goal  1/31/2023 1130 by Dessie Skiff, RN  Outcome: Progressing Towards Goal  1/31/2023 1128 by Dessie Skiff, RN  Outcome: Progressing Towards Goal  1/31/2023 1113 by Dessie Skiff, RN  Outcome: Progressing Towards Goal

## 2023-01-31 NOTE — HOSPICE
[address] [facility] Those in [facility]  [phone]    Hospice Nursing PRE-Admission   Discharge Summary  Patient Name: Steele Carrel  YOB: 1945  Age: 68 y.o. Date of PLANNED Hospice Admission:   Hospice Attending: Dr Linda Cummings  Primary Care Physician: OCTAVIA Perez [phone] [id number]    Primary Contact and Phone:Obdulio Dempsey [phone]      ADVANCE CARE PLANNING    Code Status: DNR  Durable DNR: [x]  Yes  []  No  Advance Care Planning 2023   Patient's Healthcare Decision Maker is: Named in scanned ACP document   Confirm Advance Directive Yes, on file   Patient Would Like to Complete Advance Directive -   Does the patient have other document types MOST/MOLST/POST/POLST       Congregational: [employer]   Home: TBD    HOSPICE SUMMARY     Verbal CTI of terminal diagnosis with life expectancy of 6 months or less received from: Dr Linda Cummings    For the Hospice Diagnosis of: McDuffie Spitz Cirrhosis    NCD: Requested/Declined       CLINICAL INFORMATION   Allergies: Allergies   Allergen Reactions    Gabapentin Other (comments)     Suicidal ideation    Metoprolol Rash    Celebrex [Celecoxib] Hives    Eliquis [Apixaban] Other (comments)     Caused excessive anemia    Pcn [Penicillins] Unknown (comments)     Can't remember, was a long time    Sulfa (Sulfonamide Antibiotics) Hives         Currently this patient has:  [] Supplemental O2     [] PICC    [] PORT   [] West Catheter [] Ostomy  [] NG Tube  [] PEG Tube    [] Wounds       Does patient have an AICD device:  [] Yes     [x] No    Has ICD been deactivated? [] Yes     [x] No     Pacemaker active    COVID Screening:  not tested      ASSESSMENT & PLAN     1. Symptom Issues Identified: mild discomfort, alert    2. Spiritual Issues  Identified: TBD    3.   Psych/ Social/ Emotional Issues Identified: eager to get home             [address]: completed and scanned    2. DME Ordered/Company/Delivery Plan: yesterday      3. Unique home needs for safety: Bariatric patient  NO    4. Symptom Kit and other Medications Needs: mailed thru [address]    5. Home Admission Reservation: 4 pm    6. Transportation by: ambulance   Scheduled for 3pm       7. Verbal Report/Handoff given to:       8. Phone number to HARRISON POOLE [phone]    9.  Supplies/Wound Care: left lower leg wounds, scabs on buttocks

## 2023-01-31 NOTE — PROGRESS NOTES
Transition of Care Plan:     RUR: 21%  Disposition: home with [facility]   If SNF or IPR: Date FOC offered: 1/17/23  Date [address] received:1/18/23  Date authorization started with reference number:1/20/23 and lalo to provide the reference number . Date authorization received and expires: 1/23/23  Accepting facility: [address] [place] [address]  Follow up appointments:N/A   DME needed: None  Transportation at Discharge: Hospital to [place] [address] or means to access home:  yes   Medicare Letter: given on 1/31/23  Is patient a [place] and connected with the [place]? No  If yes, was [employer] transfer form completed and VA notified? N/A  Caregiver Contact: Family    Chart reviewed. CM aware of discharge order. Pt to discharge home today with [facility]. CM secured d/c transportation for 3PM today via Hospital [facility] Home (stretcher). Hospice admission scheduled for 4PM today. CM met with pt and caregiver at bedside this AM to finalize and review d/c plan. 2nd IMM letter provided at bedside. Signed copy placed into chart. Please see scanned documents. Pt verbalized understanding and is agreeable to d/c plan. Verified d/c address and pt confirms that he son will be at her apartment to receive her today. Notified hospice RN of confirmed transport time. Pt ready for d/c from CM standpoint. D/C magnet on door and d/c packet placed into chart. Care Management Interventions  PCP Verified by CM:  Yes  Palliative Care Criteria Met (RRAT>21 & CHF Dx)?: No  Mode of Transport at Discharge: [facility])  Hospital Transport Time of Discharge: 1500  Transition of Care Consult (CM Consult): Discharge Planning  Discharge Durable Medical Equipment: No  Physical Therapy Consult: Yes  Occupational Therapy Consult: Yes  Speech Therapy Consult: No  Support Systems: Child(paige), Caregiver/Home Care Staff, Hospice  Confirm Follow Up Transport: Family  The Plan for Transition of Care is Related to the Following Treatment Goals : home hospice  The Patient and/or Patient Representative was Provided with a Choice of Provider and Agrees with the Discharge Plan?: Yes  Freedom of Choice List was Provided with Basic Dialogue that Supports the Patient's Individualized Plan of Care/Goals, Treatment Preferences and Shares the Quality Data Associated with the Providers?: Yes  Discharge Location  Patient Expects to be Discharged to[de-identified] Home with hospice ([facility])    Freddy Cardoza, [address], [facility]  [phone]

## 2023-02-01 ENCOUNTER — HOSPITAL ENCOUNTER (OUTPATIENT)
Dept: INFUSION THERAPY | Age: 78
End: 2023-02-01

## 2023-02-01 ENCOUNTER — HOME CARE VISIT (OUTPATIENT)
Dept: SCHEDULING | Facility: HOME HEALTH | Age: 78
End: 2023-02-01
Payer: MEDICARE

## 2023-02-01 PROCEDURE — G0300 HHS/HOSPICE OF LPN EA 15 MIN: HCPCS

## 2023-02-01 PROCEDURE — 0651 HSPC ROUTINE HOME CARE

## 2023-02-01 PROCEDURE — G0156 HHCP-SVS OF AIDE,EA 15 MIN: HCPCS

## 2023-02-01 NOTE — HOSPICE
Name: Connie Leary  : 1945  Age: 68 y.o. Principle Hospice Diagnosis: BRADY Cirrhosis   Diagnoses RELATED to the terminal prognosis: hypothyroidism, GERD, HTN  Unrelated Diagnosis: GAVE stomach, anemia, angina at rest, gout, bundle branch block, high cholesterol, osteoporosis, spinal stenosis      Date of Hospice Admission: 2023  Hospice Attending Elected by Patient: Dr. Bridget Garrett   Primary Care Physician: Kathy Pedro MD     Admitting RN: Avery VALLEJO, RN  Nurse CM: Matti Hamilton  : Yareli Interiano  : Kimberley Culver DNR: yes, on file      Service: unknown      Home: TBD     Direct Observation: RN presents to home for home hospice admission. On arrival, patient resting comfortable in hospital bed. Patient complains of no pain or only shortness of breath with exertion. Patient appetite has been decreased for the last couple of months. Patient has been watching watch she eats due to multiple gout flare ups. Patient currently taking allopurinol for gout. Patient has no issues sleeping and has been taking more naps during the day. Patient has not ambulated since last Monday. Patient states they have not wanted her getting up. Physical assessment completed. Vital signs obtained. Skin assessment completed. Education completed on comfort pack medications, purewick teaching, oxygen administration, turning patient in the bed. Hospice philosophy and goals discussed with patient, patient's son, and patients 24-hour caregivers. DME (recliner/nebulizer) ordered (confirmation [id number]). Medications (Senna-S, Albuterol neb, and Lidocaine 4% patch) ordered through [facility] for mail delivery (confirmation #[id number]). Caregivers would like comfort pack medications to be in prefilled syringes. Supplies ordered (Confirmation [id number]). On departure, patient resting comfortable in hospital bed eating soup.  RN educates patients son and caregivers on calling hospice  with any questions or concerns. Palliative Performance Scale: 50%     ER Visits/ Hospitalizations in past year: 5  Onset Date of Hospice Diagnosis:     Summary of Disease Progression Leading to Hospice Diagnosis: Author of note Ashlyn Prabhakar DO  Subjective:   REQUESTING PHYSICIAN:  REASON FOR CONSULT:   Basia De La Cruz is a 68 y.o.  female who I was asked to see for With past medical history of Marden Figures cirrhosis, history of GAVE stomach, who sees GI at [facility]. She is not able to tell me the name of her GI doctor. She tells me her PCP is Dr. Vaishali Donald. Speaking with the patient it appears that she was hospitalized recently here for GI bleed and had EGD performed. It showed gave stomach. She has routine blood transfusions performed outpatient but at home she was feeling worse and weak like she usually does when she notices her blood levels are low. She then decided to come to the emergency room. Currently she states that she still has feelings of weakness. She has not received blood transfusion yet. Otherwise nothing makes the weakness better or worse. Upon initial presentation to the ER, patient's vitals were unremarkable. Hemoglobin was 6, platelets were 54. Patient's BUN was slightly elevated. Active Problems:  UGIB (upper gastrointestinal bleed) (1/16/2023)     PLAN  UGIB  Cirrhosis  Debility  Anasarca  MICHELLE     ASSESSMENT  - Patient has advanced cirrhosis as evident by her poor synthetic function on labs. - Patient admits having difficulty caring for herself at home. She is open to the idea of potentially seeking placement at SNF. Recommend further discussion in the a.m. with case management. - Patient has cirrhosis and upper GI bleed. BUN is elevated consistent with an upper GI bleed. Start patient on octreotide, Rocephin for SBP prophylaxis, PPI.  - GI consulted  - If creatinine function worsens, will need to consider HRS as possibility. Consider urine sodium testing if creatinine worsens.  Consider albumin and midodrine if suspect HRS. - Patient will be receiving a blood transfusion in the ER  - Start lactulose and rifaximin. Titrate to 2 bowel movements a day. - Patient will be started on diuretics with spironolactone and Lasix in a 10 :4 ratio as literature recommended GDMT. Use LCD Guidelines and list features:   ____X____  1. The patient should show both a and b:                          _____X___  a. Prothrombin time prolonged more than 5 seconds over control, or                                                   International Normalized Ratio (INR) >1.5;                          ____X____  b. Serum albumin     ____X____  2. End stage liver disease is present and the patient shows at  least one of the following:                          ____X____  a. Ascites, refractory to treatment or patient non-compliant;                          ________  b. Spontaneous bacterial peritonitis;                          ________  c. Hepatorenal syndrome (elevated creatinine and BUN with oliguria                           ________  d. Hepatic encephalopathy, refractory to treatment, or patient non-compliant;                          ________  e. Recurrent variceal bleeding, despite intensive therapy.    ________  3. Documentation of the following factors will support eligibility for hospice care:                          ________  a. Progressive malnutrition;                          ________  b. Muscle wasting with reduced strength and endurance;                          ________  c. Continued active alcoholism (>80 gm ethanol/day);                          ________  d. Hepatocellular carcinoma;                          ________  e. HBsAg (Hepatitis B) positivity;                          ________  f. Hepatitis C refractory to interferon treatment. SPIRITUAL/Social/Emotional/psych: Patient's son will be staying in the area for an extend amount of time. Patient has 24-hour caregivers in the home.  Patient is active with [facility]) and has a . Dr. Belgica Moralesoter contacted, agrees to serve as attending provider for hospice and provided verbal certification of terminal illness with prognosis of 6 months or less life expectancy. Orders for hospice admission, medications and plan of treatment received. Medication reconciliation completed.         Currently this patient has:   Supplemental O2: yes, on 2 LO2 for respiratory discomfort    Pacemaker: set at 60     MEDS:  I have reviewed the patient's medication list with MD and identified the following:  Nonformulary medications: n/a  Unrelated medications: Levothyroxine, Flonase, Claritin, Chloreseptic spray      IDT communication to include MD, SN, SW, CH and support team.

## 2023-02-02 ENCOUNTER — HOME CARE VISIT (OUTPATIENT)
Dept: HOSPICE | Facility: HOSPICE | Age: 78
End: 2023-02-02
Payer: MEDICARE

## 2023-02-02 ENCOUNTER — APPOINTMENT (OUTPATIENT)
Dept: INFUSION THERAPY | Age: 78
End: 2023-02-02

## 2023-02-02 ENCOUNTER — HOME CARE VISIT (OUTPATIENT)
Dept: SCHEDULING | Facility: HOME HEALTH | Age: 78
End: 2023-02-02
Payer: MEDICARE

## 2023-02-02 VITALS — OXYGEN SATURATION: 98 %

## 2023-02-02 VITALS
OXYGEN SATURATION: 97 % | HEART RATE: 66 BPM | SYSTOLIC BLOOD PRESSURE: 165 MMHG | DIASTOLIC BLOOD PRESSURE: 56 MMHG | RESPIRATION RATE: 16 BRPM

## 2023-02-02 PROCEDURE — 0651 HSPC ROUTINE HOME CARE

## 2023-02-02 PROCEDURE — G0299 HHS/HOSPICE OF RN EA 15 MIN: HCPCS

## 2023-02-02 PROCEDURE — G0300 HHS/HOSPICE OF LPN EA 15 MIN: HCPCS

## 2023-02-02 NOTE — HOSPICE
prn visit to fill pillbox. On arrival patient laying in bed a&o x 4. Patient is bedbound. Denied pain. [facility]; however, [facility] has not delivered routine medication. Unable to fill pill box. As ordered by Ashley Bower NP, have patient take blister pack of daily pills today. Await for delivery of medications. Instructed caregiver to call [address] when medications arrive so that pillbox can be filled 2/3/23. IVA Wilkins notified via email.

## 2023-02-03 ENCOUNTER — HOME CARE VISIT (OUTPATIENT)
Dept: SCHEDULING | Facility: HOME HEALTH | Age: 78
End: 2023-02-03
Payer: MEDICARE

## 2023-02-03 ENCOUNTER — HOME CARE VISIT (OUTPATIENT)
Dept: HOSPICE | Facility: HOSPICE | Age: 78
End: 2023-02-03
Payer: MEDICARE

## 2023-02-03 VITALS — HEART RATE: 60 BPM | TEMPERATURE: 101.2 F | DIASTOLIC BLOOD PRESSURE: 53 MMHG | SYSTOLIC BLOOD PRESSURE: 146 MMHG

## 2023-02-03 PROCEDURE — 0651 HSPC ROUTINE HOME CARE

## 2023-02-03 PROCEDURE — G0299 HHS/HOSPICE OF RN EA 15 MIN: HCPCS

## 2023-02-03 PROCEDURE — G0156 HHCP-SVS OF AIDE,EA 15 MIN: HCPCS

## 2023-02-03 NOTE — HOSPICE
Patient A&O 4, with son Thania Ricci and  Shashank Christian present with lots of friends coming and going. [employer] on visit as well. Sacaral wound stage 2 1cm x 0.5 cm. Purewick changed. Went over medications with son Thania Ricci and caregiver Shashank Christian. Called into Triage Aldair Wagner to get help on where the other 3 medicaitons from [facility] were at. Meds to be delivered tonight. Son Thania Ricci verbalized that he didnt need help with placing new meds in pill box. This RN placed 3 meds in pill box with Thania Ricci and Shashank Christian present. No new concerns. Advised to call hospice with any questions or concerns.

## 2023-02-03 NOTE — HOSPICE
PRN visit made r/t malfunction of oxygen concentrator. Upon arrival, this RN greeted by pt's son Aleksey Kim. He stated [employer] tech had already visited and replaced oxygen tubing, with pt now comfortable. Pt awake, watching TV. O2 sats 98%; denies dyspnea and pain. Reviewed nebulizer set up and tx with son and PCG Patrizia. Offered to fill pillbox, but son declined, as they are waiting on several meds to be delivered. PCG expressed concern about firmness of hospital bed mattress due to pt's sacral wound. 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 Upon inspection, the mattress does not have a gel layer. Discussed bed options with son; he declines to replace current bed with a bed specific for pressure injury reduction, but requests a gel overlay for current bed. Ordered  gel overlay for 2/3 delivery (conf # [id number]).

## 2023-02-04 PROCEDURE — 0651 HSPC ROUTINE HOME CARE

## 2023-02-05 VITALS
DIASTOLIC BLOOD PRESSURE: 58 MMHG | OXYGEN SATURATION: 92 % | RESPIRATION RATE: 20 BRPM | SYSTOLIC BLOOD PRESSURE: 140 MMHG | HEART RATE: 90 BPM

## 2023-02-05 PROCEDURE — 0651 HSPC ROUTINE HOME CARE

## 2023-02-05 NOTE — HOSPICE
Patient resting quietly in bed, denies pain or sob at this time. Patient reports using O2 as needed . assessmet completed. Patient reported having small bm two days ago, requested Bisacodyl suppository to given. No stool noted in rectal vault, Patient placed on bedpan at her request, small amt soft stool at rectal opening, care provided. Instructed caregiver Tonie Fernández on placing bedpan if needed, verbalize understanding. Prefill 5 syringes of 5mg of morphine and 5 prefilled syringes of 1mg haldol.  No other concerns voiced during visit, pillbox to be filled on next nursing visit,Encourage caregiver and patient to call with any question or concerns

## 2023-02-07 NOTE — HOSPICE
No spiritual care issues noted on admission, nor has the family or patient reached out for spiritual support. Will contact family the week of 02/07/23 to offer support and assess spiritual care needs.

## 2023-02-07 NOTE — HOSPICE
Patient A&O to self and place only. Per caregiver Nancy Galvan, patient had a few bites yesterday, only sips of ensure today, sleeping more than awake. Taz Interiano to call son to come so that this RN could speak with him. Patient had vomited earlier in the day, changed bedsheets, new gown, changed pure wick, and brief. Patient was in and out of lethergy at visit. Advised caregiver Nancy Galvan that if patient did not have a bm by tomorrow, to place a suppository. Advised son, Ysabel Valdivia, of EOL s/s and placed patient on 0-7 and that a nurse would be by every day for visits. Per NP Wellington Wilks, order Lorazepam 2mg/mL - 1mg per hour as needed for sob or anxiety. Advised to call hospice with any questions or concerns.

## 2023-02-09 NOTE — HOSPICE
Pronouncement of death completed by: Eli Goodman RN  Agency staff was not present at the time of death. At the time of death the patient was documented as apneic, pulseless, blood pressure absent, The pt  within her residence  The following were notified of the patient's death: MD,JADYN, Hospice Staff.   Medications were disposed of per hospice protocol

## 2023-02-09 NOTE — HOSPICE
Received call from [facility], reporting patient vomiting green bile. Vomiting subsided by this RN's arrival.  Patient is lethargic, is verbal but can only communicate affirmative or negative. She is clean and appears comfortable - she denies pain. Vital signs WNL, except for 87% SpO2. Instructed son Molly Galo re:  Haldol for intractible vomiting and Lorazepam/Morphine for EOL symptoms. Maintenance medications discontinued. Patient has had no BM since 2/3. No stool palpated in abdomen, patient denies a need of A BM, and son does not wish to pursue at this time. Reminded Molly Galo to call with any changes or needs.

## 2023-02-09 NOTE — HOSPICE
Patient son opens door, he is the only child and is grieving because the nurse has informed him his mother is now 0-7 time frame. patient is supported by friends that are at the bedside.  provides empathic presence, hearing , and prayer.

## 2023-02-09 NOTE — HOSPICE
PRN visit performed for patient decline. Patient found in bed observed with abdominal breathing, with occasional moaning and coughing. Morphine had been administered earlier with good effect. Lorazepam administered in response to increased frequency of moaning. Little effect observed. Morphine administered with good effect observed. Reviewed signs and symptoms and treatment with son Diya Pollock, who verbalized understanding. STAT Lorazepam oral solution ordered by Brigida Daily RN. Reminded family to call with any changes or needs.

## 2023-02-10 ENCOUNTER — HOME CARE VISIT (OUTPATIENT)
Dept: HOSPICE | Facility: HOSPICE | Age: 78
End: 2023-02-10
Payer: MEDICARE

## 2023-02-15 ENCOUNTER — APPOINTMENT (OUTPATIENT)
Dept: INFUSION THERAPY | Age: 78
End: 2023-02-15

## 2023-02-16 ENCOUNTER — APPOINTMENT (OUTPATIENT)
Dept: INFUSION THERAPY | Age: 78
End: 2023-02-16

## 2023-02-17 RX ORDER — FAMOTIDINE 40 MG/1
TABLET, FILM COATED ORAL
Qty: 90 TABLET | Refills: 2 | OUTPATIENT
Start: 2023-02-17

## 2023-02-17 RX ORDER — ICOSAPENT ETHYL 1000 MG/1
CAPSULE ORAL
Qty: 360 CAPSULE | Refills: 2 | OUTPATIENT
Start: 2023-02-17

## 2023-02-22 ENCOUNTER — APPOINTMENT (OUTPATIENT)
Dept: INFUSION THERAPY | Age: 78
End: 2023-02-22

## 2023-02-23 ENCOUNTER — APPOINTMENT (OUTPATIENT)
Dept: INFUSION THERAPY | Age: 78
End: 2023-02-23

## 2023-03-01 ENCOUNTER — APPOINTMENT (OUTPATIENT)
Dept: INFUSION THERAPY | Age: 78
End: 2023-03-01

## 2023-04-01 DIAGNOSIS — E03.9 ACQUIRED HYPOTHYROIDISM: ICD-10-CM

## 2023-04-01 DIAGNOSIS — N18.32 TYPE 2 DIABETES MELLITUS WITH STAGE 3B CHRONIC KIDNEY DISEASE, WITHOUT LONG-TERM CURRENT USE OF INSULIN (HCC): ICD-10-CM

## 2023-04-01 DIAGNOSIS — M81.0 AGE-RELATED OSTEOPOROSIS WITHOUT CURRENT PATHOLOGICAL FRACTURE: ICD-10-CM

## 2023-04-01 DIAGNOSIS — E11.22 TYPE 2 DIABETES MELLITUS WITH STAGE 3B CHRONIC KIDNEY DISEASE, WITHOUT LONG-TERM CURRENT USE OF INSULIN (HCC): ICD-10-CM

## 2023-04-01 DIAGNOSIS — E55.9 HYPOVITAMINOSIS D: ICD-10-CM

## 2024-01-01 NOTE — TELEPHONE ENCOUNTER
Requested Prescriptions     Pending Prescriptions Disp Refills    furosemide (LASIX) 80 mg tablet 90 Tab 3     Sig: Take 1 Tab by mouth daily.       PCP: Oskar Mathews MD    Last appt: 3/25/2019  Future Appointments   Date Time Provider [facility]   9/30/2019  9:30 AM Oskar Mathews MD [address]   11/18/2019  9:50 AM Elvis Marshall MD RDE [address] Statement Selected

## 2024-01-01 NOTE — TELEPHONE ENCOUNTER
Pt is requesting to have two referral: One to Dr. Dandy Bhatia. (pain management) appt May 6 at 10 am. Office Number: [phone]  Second: Dr. Rk Marquez  (Endocrinology) appt  May 20 th At 1 pm Office Number: [phone] Pt would like to have referrals for a year.  Pt's callback: [phone] or (c) [phone]       Message received & copied from [facility] Statement Selected

## (undated) DEVICE — (D)AGENT INJECTN ELEVIEW 10ML -- DISC BY MFG

## (undated) DEVICE — YANKAUER,TAPERED BULBOUS TIP,W/O VENT: Brand: MEDLINE

## (undated) DEVICE — Device

## (undated) DEVICE — CATH IV AUTOGRD BC PNK 20GA 25 -- INSYTE

## (undated) DEVICE — SYR 10ML LUER LOK 1/5ML GRAD --

## (undated) DEVICE — BLOCK BITE ENDOSCP AD 21 MM W/ DIL BLU LF DISP

## (undated) DEVICE — 1200 GUARD II KIT W/5MM TUBE W/O VAC TUBE: Brand: GUARDIAN

## (undated) DEVICE — STAIN INDIA INK IN NACL 10ML --

## (undated) DEVICE — SYR 3ML LL TIP 1/10ML GRAD --

## (undated) DEVICE — ABSORBABLE WOUND CLOSURE DEVICE: Brand: V-LOC 90

## (undated) DEVICE — SYRINGE MED 3ML CLR PLAS STD N CTRL LUERLOCK TIP DISP

## (undated) DEVICE — RESOLUTION 360 CLIP

## (undated) DEVICE — TOWEL 4 PLY TISS 19X30 SUE WHT

## (undated) DEVICE — NDL INJ SCLERO 25G 240CM -- INTERJECT M00518310 BX/5

## (undated) DEVICE — GLIDESHEATH SLENDER ACCESS KIT: Brand: GLIDESHEATH SLENDER

## (undated) DEVICE — FORCEPS BX L160CM DIA8MM GRSP DISECT CUP TIP NONLOCKING ROT

## (undated) DEVICE — SET ADMIN 16ML TBNG L100IN 2 Y INJ SITE IV PIGGY BK DISP

## (undated) DEVICE — NEEDLE HYPO 18GA L1.5IN PNK S STL HUB POLYPR SHLD REG BVL

## (undated) DEVICE — HYPODERMIC SAFETY NEEDLE: Brand: MAGELLAN

## (undated) DEVICE — Z DISCONTINUED PER MEDLINE LINE GAS SAMPLING O2/CO2 LNG AD 13 FT NSL W/ TBNG FILTERLINE

## (undated) DEVICE — ELECTRODE,RADIOTRANSLUCENT,FOAM,5PK: Brand: MEDLINE

## (undated) DEVICE — FIAPC® PROBE W/ FILTER 2200 C OD 2.3MM/6.9FR; L 2.2M/7.2FT: Brand: ERBE

## (undated) DEVICE — KIT ANGIOGRAPHY CUST MRMC

## (undated) DEVICE — CONTAINER SPEC 20 ML LID NEUT BUFF FORMALIN 10 % POLYPR STS

## (undated) DEVICE — DEVICE COMPR REG 24 CM VASC BND

## (undated) DEVICE — SOLIDIFIER FLD 2OZ 1500CC N DISINF IN BTL DISP SAFESORB

## (undated) DEVICE — SYR ART 700 CLEAR MARK 7 -- ARTERION

## (undated) DEVICE — NEONATAL-ADULT SPO2 SENSOR: Brand: NELLCOR

## (undated) DEVICE — ENDOSCOPY PUMP TUBING/ CAP SET: Brand: ERBE

## (undated) DEVICE — NON-REM POLYHESIVE PATIENT RETURN ELECTRODE: Brand: VALLEYLAB

## (undated) DEVICE — REM POLYHESIVE ADULT PATIENT RETURN ELECTRODE: Brand: VALLEYLAB

## (undated) DEVICE — TUBING PRSS MON L6IN PVC M FEM CONN

## (undated) DEVICE — TRAY,IRRIGATION,PISTON SYRINGE,60ML,STRL: Brand: MEDLINE

## (undated) DEVICE — GUIDEWIRE VASC L40CM DIA0018IN NDL 21GA L7CM Z S STL MAK

## (undated) DEVICE — BASIN EMSIS 16OZ GRAPHITE PLAS KID SHP MOLD GRAD FOR ORAL

## (undated) DEVICE — GUIDEWIRE VASC L260CM 0.035IN J TIP L3MM PTFE FIX COR NAMIC

## (undated) DEVICE — 3M™ TEGADERM™ TRANSPARENT FILM DRESSING FRAME STYLE, 1626W, 4 IN X 4-3/4 IN (10 CM X 12 CM), 50/CT 4CT/CASE: Brand: 3M™ TEGADERM™

## (undated) DEVICE — SYRINGE MED 10ML LUERLOCK TIP W/O SFTY DISP

## (undated) DEVICE — INTRO PEELWY HEMVLV 7F 13CM -- SHRT PRELUDE SNAP

## (undated) DEVICE — HI-TORQUE VERSACORE FLOPPY GUIDE WIRE SYSTEM 145 CM: Brand: HI-TORQUE VERSACORE

## (undated) DEVICE — PACK PROCEDURE SURG HRT CATH

## (undated) DEVICE — SPLINT WR POS F/ARTERIAL ACC -- BX/10

## (undated) DEVICE — SYRINGE ANGIO 10 CC BRL STD PRNT POLYCARB LT BLU MEDALLION

## (undated) DEVICE — INTRO SHTH HEMO 9FR 18G 13CM -- PRELUDE SNAP PEEL-AWAY

## (undated) DEVICE — SUT SLK 0 30IN SH BLK --

## (undated) DEVICE — PACER PACK: Brand: MEDLINE INDUSTRIES, INC.

## (undated) DEVICE — THE DISPOSABLE ADVANCE CAPSULE ENDOSCOPE DELIVERY DEVICE IS A 2.5MM SINGLE SHEATHED DEVICE INDICATED FOR TRANSENDOSCOPIC DELIVERY OF CAPSULES (WITH THE DIMENSIONS OF 10.5MM - 11.5MM IN DIAMETER AND 23.5MM - 26.5MM IN LENGTH), TO THE STOMACH OR DUODENUM. THIS DEVICE IS INTENDED FOR PATIENTS WHO ARE EITHER UNABLE TO SWALLOW THE CAPSULE, OR UNABLE TO PASS THE CAPSULE BEYOND THE PYLORUS IN SUFFICIENT TIME TO COMPLETE THE DESIRED DIAGNOSTIC EVALUATION.: Brand: ADVANCE

## (undated) DEVICE — SET ADMIN 16ML TBNG L100IN 2 Y INJ SITE IV PIGGY BK DISP (ORDER IN MULIPLES OF 48)

## (undated) DEVICE — ELECTRODE PT RET AD L9FT HI MOIST COND ADH HYDRGEL CORDED

## (undated) DEVICE — SNARE ENDOSCP M L240CM W27MM SHTH DIA2.4MM CHN 2.8MM OVL

## (undated) DEVICE — BAG SPEC BIOHZRD 10 X 10 IN --

## (undated) DEVICE — TRAP,MUCUS SPECIMEN, 80CC: Brand: MEDLINE

## (undated) DEVICE — STRAINER URIN CALC RNL MSH -- CONVERT TO ITEM 357634

## (undated) DEVICE — CATHETER ETER ANGIO L110CM OD5FR ID046IN L75CM 038IN 145DEG CARD

## (undated) DEVICE — 3M™ IOBAN™ 2 ANTIMICROBIAL INCISE DRAPE 6650EZ: Brand: IOBAN™ 2

## (undated) DEVICE — PILL CAM

## (undated) DEVICE — NEEDLE SCLERO 25GA L240CM OD0.51MM ID0.24MM EXTN L4MM SHTH

## (undated) DEVICE — MEDI-TRACE CADENCE ADULT, DEFIBRILLATION ELECTRODE -RTS  (10 PR/PK) - PHYSIO-CONTROL: Brand: MEDI-TRACE CADENCE

## (undated) DEVICE — HEART CATH-MRMC: Brand: MEDLINE INDUSTRIES, INC.

## (undated) DEVICE — RADIFOCUS OPTITORQUE ANGIOGRAPHIC CATHETER: Brand: OPTITORQUE

## (undated) DEVICE — KIT ACCS INTRO 4FR L10CM NDL 21GA L7CM GWIRE L40CM

## (undated) DEVICE — SUTURE V-LOC 180 SZ 2-0 L12IN ABSRB VLT GS-21 L37MM 1/2 CIR VLOCM0315